# Patient Record
Sex: MALE | Race: WHITE | Employment: OTHER | URBAN - METROPOLITAN AREA
[De-identification: names, ages, dates, MRNs, and addresses within clinical notes are randomized per-mention and may not be internally consistent; named-entity substitution may affect disease eponyms.]

---

## 2017-01-04 ENCOUNTER — TELEPHONE (OUTPATIENT)
Dept: CARDIOLOGY | Facility: CLINIC | Age: 72
End: 2017-01-04

## 2017-01-04 NOTE — TELEPHONE ENCOUNTER
Pt's wife here for OV - told JENELLE that her , this patient, needed refill of Adcirca but didn't want to be seen.  Called pt per her request-  Pt has not been seen by Dr. Jimenez or Dr. Rouse since 2014.  Pt needs follow up OV in order to get refills.  Messaged Dr. Rouse's RN who stated that an appt was needed before refills will be approved.    Called pt and he will schedule OV 02/07/2017.  On schedule.  No refills until pt seen.

## 2017-01-26 DIAGNOSIS — I27.20 PULMONARY HYPERTENSION (H): Primary | ICD-10-CM

## 2017-02-01 RX ORDER — TADALAFIL 20 MG/1
TABLET ORAL
Qty: 90 TABLET | Refills: 3 | Status: ON HOLD
Start: 2017-02-01 | End: 2017-02-25

## 2017-02-04 NOTE — PROGRESS NOTES
Herson Rouse M.D.  Cardiovascular Medicine    I personally saw and examined this patient, discussed care with housestaff and other consultants, reviewed current laboratories and imaging studies, and conveyed impression and diagnostic/therapeutic plan to patient.        Problem List  1. Oxygen dependent respiratory failure    2. Histiocytosis X    3. ASHD with stenting    4. Hyperlipidemia    5. Diabetes  6. Steroid dependence    History  Mr. Sandoval presents to clinic today, accompanied by his wife.  Since his last visit in 2014, he has had an optical surgery for a bulging membrane in his left eye.  He is in need of cataract surgery. No ED visits or hospitalizations.  He reports today that he feels his breathing function is declining.  He wears supplemental O2 with extended activity, using 4 Liters.  At home doing ADL's around the house, he does not wear the O2.  He will wear the O2 also for naps and an oxygen converter at night.  With general ADL's, he does not feel short of breath unless he is bending over.  He will take the stairs down but the elevator up.  He feels he is declining most the times he attempts to walk without his O2.    He and his wife reports episodes of dizziness when he attempts to walk up ramps or change positions quickly without his O2.  No episodes of syncope or pre-syncope.  He denies any chest pain or palpitations.  He denies any changes in speech or sudden weakness, but did notice a change in long distance vision about 6 months ago, which has prompted referral for cataract surgery.    He denies any lower leg or belly swelling.  He sleeps with two pillows at night and occasionally wakes up coughing.  His wife denies any snoring.  He has not had a recent sleep study.  He wakes 2-3 times a night to use the restroom.    He has been losing weight, secondary to chronic diarrhea.  He has been working with Dr. Trivedi of [facility].  He and his wife feel this is also contributing to his  weakness and lack of endurance.    He has done pulmonary rehab in the past, he currently does not exercise.    Objective    Wt Readings from Last 5 Encounters:   05/27/16 63.504 kg (140 lb)   09/04/14 68.13 kg (150 lb 3.2 oz)   08/27/14 71.079 kg (156 lb 11.2 oz)   08/21/14 71.079 kg (156 lb 11.2 oz)   08/19/14 70.761 kg (156 lb)       Meds  Current Outpatient Prescriptions   Medication     loperamide (IMODIUM) 2 MG capsule     ADCIRCA 20 MG TABS     metoprolol (TOPROL-XL) 25 MG 24 hr tablet     digoxin (LANOXIN) 125 MCG tablet     rosuvastatin (CRESTOR) 10 MG tablet     levothyroxine (SYNTHROID) 100 MCG tablet     FLUoxetine HCl (PROZAC PO)     AMLODIPINE BESYLATE PO     LISINOPRIL PO     METFORMIN HCL PO     PREDNISONE PO     Colchicine (COLCRYS PO)     ASPIRIN PO     No current facility-administered medications for this visit.         Labs    Results for KYLE JACOBSON (MRN [id number]) as of 2/8/2017 09:48   Ref. Range 5/27/2016 08:22 2/7/2017 10:55   Sodium Latest Ref Range: 133-144 mmol/L  139   Potassium Latest Ref Range: 3.4-5.3 mmol/L  3.8   Chloride Latest Ref Range:  mmol/L  103   Carbon Dioxide Latest Ref Range: 20-32 mmol/L  26   Urea Nitrogen Latest Ref Range: 7-30 mg/dL  10   Creatinine Latest Ref Range: 0.66-1.25 mg/dL  0.62 (L)   GFR Estimate Latest Ref Range: >60 mL/min/1.7m2  >90...   GFR Estimate If Black Latest Ref Range: >60 mL/min/1.7m2  >90...   Calcium Latest Ref Range: 8.5-10.1 mg/dL  8.4 (L)   Anion Gap Latest Ref Range: 3-14 mmol/L  10   Albumin Latest Ref Range: 3.4-5.0 g/dL  3.5   Protein Total Latest Ref Range: 6.8-8.8 g/dL  6.9   Bilirubin Total Latest Ref Range: 0.2-1.3 mg/dL  0.4   Alkaline Phosphatase Latest Ref Range:  U/L  39 (L)   ALT Latest Ref Range: 0-70 U/L  15   AST Latest Ref Range: 0-45 U/L  15   Folate Latest Ref Range: >5.4 ng/mL  22.1   Iron Latest Ref Range:  ug/dL  30 (L)   Iron Binding Cap Latest Ref Range: 240-430 ug/dL  467 (H)   Iron Saturation  Index Latest Ref Range: 15-46 %  6 (L)   N-Terminal Pro Bnp Latest Ref Range: 0-125 pg/mL  553 (H)   Vitamin B12 Latest Ref Range: 193-986 pg/mL  288   Glucose Latest Ref Range: 70-99 mg/dL  92   WBC Latest Ref Range: 4.0-11.0 10e9/L  9.0   Hemoglobin Latest Ref Range: 13.3-17.7 g/dL  12.0 (L)   Hematocrit Latest Ref Range: 40.0-53.0 %  38.8 (L)   Platelet Count Latest Ref Range: 150-450 10e9/L  197   RBC Count Latest Ref Range: 4.4-5.9 10e12/L  4.88   MCV Latest Ref Range:  fl  80   MCH Latest Ref Range: 26.5-33.0 pg  24.6 (L)   MCHC Latest Ref Range: 31.5-36.5 g/dL  30.9 (L)   RDW Latest Ref Range: 10.0-15.0 %  18.3 (H)   SURGICAL PATHOLOGY EXAM Unknown Rpt      Imaging     The right ventricle is moderately dilated. Severely decreased right   ventricular systolic function Right ventricular systolic pressure is   elevated, consistent with severe pulmonary hypertension. There is mild to   moderate (1-2+) tricuspid regurgitation. Flattened septum is consistent with   RV pressure/volume overload. The visual ejection fraction is estimated at   50-55%. The transmitral spectral Doppler flow pattern is suggestive of   impaired LV relaxation. The right atrium is mild to moderately dilated.   Severe RV systolic dysfunction, severe pulm hypertension, RV  enlargement all   appear to be new findings.  PatientHeight: 67 in  PatientWeight: 162 lbs  SystolicPressure: 114 mmHg  DiastolicPressure: 80 mmHg  HeartRate: 89 bpm  BSA 1.9 m^2        Left Ventricle  The left ventricle is normal in size.  Left ventricular hypertrophy: asymmetric with no LVOT obstruction.  The visual ejection fraction is estimated at 50-55%.  The transmitral spectral Doppler flow pattern is suggestive of impaired LV   relaxation.  Flattened septum is consistent with RV pressure/volume overload.     Right Ventricle  The right ventricle is moderately dilated.  Severely decreased right ventricular systolic function.     Atria  Normal left atrial  size.  The right atrium is mild to moderately dilated.     Tricuspid Valve  Normal tricuspid valve.  There is mild to moderate (1-2+) tricuspid regurgitation.  Right ventricular systolic pressure is elevated, consistent with severe   pulmonary hypertension.     Aortic Valve  Normal tricuspid aortic valve.     Pulmonic Valve  Normal pulmonic valve.     Vessels  The aortic root is normal size.  The inferior vena cava is not dilated.     Pericardium  There is no pericardial effusion.     Rhythm  The rhythm was normal sinus.     Procedure  Complete Echo Adult.  Contrast Definity.     MMode 2D Measurements & Calculations  IVSd: 1.3 cm  LVIDd: 3.8 cm  LVIDs: 2.4 cm  LVPWd: 1.0 cm  FS: 36 %  LV mass(C)d: 144 grams  Ao root diam: 3.2 cm  LA dimension: 3.1 cm  LA/Ao: 0.97   Doppler Measurements & Calculations  MV E point: 42 cm/sec  MV A point: 115 cm/sec  MV E/A: 0.37   MV dec time: 0.22 sec  TR Max valery: 413 cm/sec  TR Max P mmHg    Assessment/Plan     1. Pulmonary HTN.  Repeat echocardiogram.  Will also repeat labs, check B12, folate and iron panel.  Mr. Sandoval was instructed to wear his oxygen at all times. Pulmonary rehab was recommended, as well as a daily exercise regimen was encouraged. Additionally, we recommended a sleep study or overnight oximetry.     2. Chronic Diarrhea  We recommended he stop the omeprazole, utilize Imodium, and modify his diet according to BRAT guidelines, eliminate dairy and reduce caffeine intake.    3. Return to clinic in 2 weeks.    4. Laboratories demonstrate profound iron deficiency and this would require parenteral replacement and GI evaluation per Dr. Patricia.

## 2017-02-07 ENCOUNTER — OFFICE VISIT (OUTPATIENT)
Dept: CARDIOLOGY | Facility: CLINIC | Age: 72
End: 2017-02-07
Payer: MEDICARE

## 2017-02-07 VITALS
HEART RATE: 64 BPM | SYSTOLIC BLOOD PRESSURE: 132 MMHG | HEIGHT: 67 IN | DIASTOLIC BLOOD PRESSURE: 76 MMHG | WEIGHT: 135.3 LBS | BODY MASS INDEX: 21.24 KG/M2 | OXYGEN SATURATION: 84 %

## 2017-02-07 DIAGNOSIS — I27.20 PULMONARY HYPERTENSION (H): Primary | ICD-10-CM

## 2017-02-07 DIAGNOSIS — R06.09 DYSPNEA ON EXERTION: ICD-10-CM

## 2017-02-07 DIAGNOSIS — I27.20 PULMONARY HYPERTENSION (H): ICD-10-CM

## 2017-02-07 DIAGNOSIS — D64.9 ANEMIA: ICD-10-CM

## 2017-02-07 LAB
ALBUMIN SERPL-MCNC: 3.5 G/DL (ref 3.4–5)
ALP SERPL-CCNC: 39 U/L (ref 40–150)
ALT SERPL W P-5'-P-CCNC: 15 U/L (ref 0–70)
ANION GAP SERPL CALCULATED.3IONS-SCNC: 10 MMOL/L (ref 3–14)
AST SERPL W P-5'-P-CCNC: 15 U/L (ref 0–45)
BILIRUB SERPL-MCNC: 0.4 MG/DL (ref 0.2–1.3)
BUN SERPL-MCNC: 10 MG/DL (ref 7–30)
CALCIUM SERPL-MCNC: 8.4 MG/DL (ref 8.5–10.1)
CHLORIDE SERPL-SCNC: 103 MMOL/L (ref 94–109)
CO2 SERPL-SCNC: 26 MMOL/L (ref 20–32)
CREAT SERPL-MCNC: 0.62 MG/DL (ref 0.66–1.25)
ERYTHROCYTE [DISTWIDTH] IN BLOOD BY AUTOMATED COUNT: 18.3 % (ref 10–15)
FOLATE SERPL-MCNC: 22.1 NG/ML
GFR SERPL CREATININE-BSD FRML MDRD: ABNORMAL ML/MIN/1.7M2
GLUCOSE SERPL-MCNC: 92 MG/DL (ref 70–99)
HCT VFR BLD AUTO: 38.8 % (ref 40–53)
HGB BLD-MCNC: 12 G/DL (ref 13.3–17.7)
IRON SATN MFR SERPL: 6 % (ref 15–46)
IRON SERPL-MCNC: 30 UG/DL (ref 35–180)
MCH RBC QN AUTO: 24.6 PG (ref 26.5–33)
MCHC RBC AUTO-ENTMCNC: 30.9 G/DL (ref 31.5–36.5)
MCV RBC AUTO: 80 FL (ref 78–100)
NT-PROBNP SERPL-MCNC: 553 PG/ML (ref 0–125)
PLATELET # BLD AUTO: 197 10E9/L (ref 150–450)
POTASSIUM SERPL-SCNC: 3.8 MMOL/L (ref 3.4–5.3)
PROT SERPL-MCNC: 6.9 G/DL (ref 6.8–8.8)
RBC # BLD AUTO: 4.88 10E12/L (ref 4.4–5.9)
SODIUM SERPL-SCNC: 139 MMOL/L (ref 133–144)
TIBC SERPL-MCNC: 467 UG/DL (ref 240–430)
VIT B12 SERPL-MCNC: 288 PG/ML (ref 193–986)
WBC # BLD AUTO: 9 10E9/L (ref 4–11)

## 2017-02-07 PROCEDURE — 83550 IRON BINDING TEST: CPT | Performed by: INTERNAL MEDICINE

## 2017-02-07 PROCEDURE — 83880 ASSAY OF NATRIURETIC PEPTIDE: CPT | Performed by: INTERNAL MEDICINE

## 2017-02-07 PROCEDURE — 83540 ASSAY OF IRON: CPT | Performed by: INTERNAL MEDICINE

## 2017-02-07 PROCEDURE — 80053 COMPREHEN METABOLIC PANEL: CPT | Performed by: INTERNAL MEDICINE

## 2017-02-07 PROCEDURE — 85027 COMPLETE CBC AUTOMATED: CPT | Performed by: INTERNAL MEDICINE

## 2017-02-07 PROCEDURE — 36415 COLL VENOUS BLD VENIPUNCTURE: CPT | Performed by: INTERNAL MEDICINE

## 2017-02-07 PROCEDURE — 99213 OFFICE O/P EST LOW 20 MIN: CPT | Performed by: INTERNAL MEDICINE

## 2017-02-07 PROCEDURE — 82607 VITAMIN B-12: CPT | Performed by: INTERNAL MEDICINE

## 2017-02-07 PROCEDURE — 82746 ASSAY OF FOLIC ACID SERUM: CPT | Performed by: INTERNAL MEDICINE

## 2017-02-07 RX ORDER — LOPERAMIDE HCL 2 MG
2 CAPSULE ORAL PRN
COMMUNITY
End: 2017-02-22

## 2017-02-07 NOTE — PATIENT INSTRUCTIONS
Medication Changes:  STOP your Omeproazole  Patient Instructions:  **Labs today (CMP, N-Terminal Pro BNP, CBC, B-12, Folate, and Iron Studies)  Pulmonary Rehab  Follow up Appointment Information:  2 weeks with an Echocardiogram prior    For scheduling at [facility] please call [phone]  For scheduling at the [facility] please call [phone]  We are located on the second floor [address] at the [facility].  Our address is     [address] [place] [address] [address],   [address]  [place]  [address]    Thank you for allowing us to be a part of your care here at the [facility]    If you have questions or concerns please contact us at:    Willow Chavez, RALPH      Nurse Coordinator       Pulmonary Hypertension     [facility]   (P)[phone]  (F)[phone]    ** Please note that you will NOT receive a reminder call regarding your scheduled testing, reminder calls are for provider appointments only.  If you are scheduled for testing within the [facility] system you may receive a call regarding pre-registration for billing purposes only.**     Remember to weigh yourself daily after voiding and before you consume any food or beverages and log the numbers.  If you have gained/lost 2 pounds overnight or 5 pounds in a week contact us immediately for medication adjustments or further instructions.

## 2017-02-07 NOTE — Clinical Note
2/7/2017    Florencio Patricia MD  [facility] [id number]  [address]  [place] [address]    RE: Ravi Sandoval       Dear Colleague,    I had the pleasure of seeing Ravi Sandoval in the [facility].    Herson Rouse M.D.  Cardiovascular Medicine    I personally saw and examined this patient, discussed care with housestaff and other consultants, reviewed current laboratories and imaging studies, and conveyed impression and diagnostic/therapeutic plan to patient.    Problem List  1. Oxygen dependent respiratory failure    2. Histiocytosis X    3. ASHD with stenting    4. Hyperlipidemia    5. Diabetes  6. Steroid dependence    History  Mr. Sandoval presents to clinic today, accompanied by his wife.  Since his last visit in 2014, he has had an optical surgery for a bulging membrane in his left eye.  He is in need of cataract surgery. No ED visits or hospitalizations.  He reports today that he feels his breathing function is declining.  He wears supplemental O2 with extended activity, using 4 Liters.  At home doing ADL's around the house, he does not wear the O2.  He will wear the O2 also for naps and an oxygen converter at night.  With general ADL's, he does not feel short of breath unless he is bending over.  He will take the stairs down but the elevator up.  He feels he is declining most the times he attempts to walk without his O2.    He and his wife reports episodes of dizziness when he attempts to walk up ramps or change positions quickly without his O2.  No episodes of syncope or pre-syncope.  He denies any chest pain or palpitations.  He denies any changes in speech or sudden weakness, but did notice a change in long distance vision about 6 months ago, which has prompted referral for cataract surgery.    He denies any lower leg or belly swelling.  He sleeps with two pillows at night and occasionally wakes up coughing.  His wife denies any snoring.  He has not had a recent  sleep study.  He wakes 2-3 times a night to use the restroom.    He has been losing weight, secondary to chronic diarrhea.  He has been working with Dr. Trivedi of [facility].  He and his wife feel this is also contributing to his weakness and lack of endurance.    He has done pulmonary rehab in the past, he currently does not exercise.    Objective    Wt Readings from Last 5 Encounters:   05/27/16 63.504 kg (140 lb)   09/04/14 68.13 kg (150 lb 3.2 oz)   08/27/14 71.079 kg (156 lb 11.2 oz)   08/21/14 71.079 kg (156 lb 11.2 oz)   08/19/14 70.761 kg (156 lb)       Meds  Current Outpatient Prescriptions   Medication     loperamide (IMODIUM) 2 MG capsule     ADCIRCA 20 MG TABS     metoprolol (TOPROL-XL) 25 MG 24 hr tablet     digoxin (LANOXIN) 125 MCG tablet     rosuvastatin (CRESTOR) 10 MG tablet     levothyroxine (SYNTHROID) 100 MCG tablet     FLUoxetine HCl (PROZAC PO)     AMLODIPINE BESYLATE PO     LISINOPRIL PO     METFORMIN HCL PO     PREDNISONE PO     Colchicine (COLCRYS PO)     ASPIRIN PO     No current facility-administered medications for this visit.     Labs    Results for KYLE JACOBSON (MRN [id number]) as of 2/8/2017 09:48   Ref. Range 5/27/2016 08:22 2/7/2017 10:55   Sodium Latest Ref Range: 133-144 mmol/L  139   Potassium Latest Ref Range: 3.4-5.3 mmol/L  3.8   Chloride Latest Ref Range:  mmol/L  103   Carbon Dioxide Latest Ref Range: 20-32 mmol/L  26   Urea Nitrogen Latest Ref Range: 7-30 mg/dL  10   Creatinine Latest Ref Range: 0.66-1.25 mg/dL  0.62 (L)   GFR Estimate Latest Ref Range: >60 mL/min/1.7m2  >90...   GFR Estimate If Black Latest Ref Range: >60 mL/min/1.7m2  >90...   Calcium Latest Ref Range: 8.5-10.1 mg/dL  8.4 (L)   Anion Gap Latest Ref Range: 3-14 mmol/L  10   Albumin Latest Ref Range: 3.4-5.0 g/dL  3.5   Protein Total Latest Ref Range: 6.8-8.8 g/dL  6.9   Bilirubin Total Latest Ref Range: 0.2-1.3 mg/dL  0.4   Alkaline Phosphatase Latest Ref Range:  U/L  39 (L)   ALT  Latest Ref Range: 0-70 U/L  15   AST Latest Ref Range: 0-45 U/L  15   Folate Latest Ref Range: >5.4 ng/mL  22.1   Iron Latest Ref Range:  ug/dL  30 (L)   Iron Binding Cap Latest Ref Range: 240-430 ug/dL  467 (H)   Iron Saturation Index Latest Ref Range: 15-46 %  6 (L)   N-Terminal Pro Bnp Latest Ref Range: 0-125 pg/mL  553 (H)   Vitamin B12 Latest Ref Range: 193-986 pg/mL  288   Glucose Latest Ref Range: 70-99 mg/dL  92   WBC Latest Ref Range: 4.0-11.0 10e9/L  9.0   Hemoglobin Latest Ref Range: 13.3-17.7 g/dL  12.0 (L)   Hematocrit Latest Ref Range: 40.0-53.0 %  38.8 (L)   Platelet Count Latest Ref Range: 150-450 10e9/L  197   RBC Count Latest Ref Range: 4.4-5.9 10e12/L  4.88   MCV Latest Ref Range:  fl  80   MCH Latest Ref Range: 26.5-33.0 pg  24.6 (L)   MCHC Latest Ref Range: 31.5-36.5 g/dL  30.9 (L)   RDW Latest Ref Range: 10.0-15.0 %  18.3 (H)   SURGICAL PATHOLOGY EXAM Unknown Rpt      Imaging     The right ventricle is moderately dilated. Severely decreased right   ventricular systolic function Right ventricular systolic pressure is   elevated, consistent with severe pulmonary hypertension. There is mild to   moderate (1-2+) tricuspid regurgitation. Flattened septum is consistent with   RV pressure/volume overload. The visual ejection fraction is estimated at   50-55%. The transmitral spectral Doppler flow pattern is suggestive of   impaired LV relaxation. The right atrium is mild to moderately dilated.   Severe RV systolic dysfunction, severe pulm hypertension, RV  enlargement all   appear to be new findings.  PatientHeight: 67 in  PatientWeight: 162 lbs  SystolicPressure: 114 mmHg  DiastolicPressure: 80 mmHg  HeartRate: 89 bpm  BSA 1.9 m^2        Left Ventricle  The left ventricle is normal in size.  Left ventricular hypertrophy: asymmetric with no LVOT obstruction.  The visual ejection fraction is estimated at 50-55%.  The transmitral spectral Doppler flow pattern is suggestive of impaired LV    relaxation.  Flattened septum is consistent with RV pressure/volume overload.     Right Ventricle  The right ventricle is moderately dilated.  Severely decreased right ventricular systolic function.     Atria  Normal left atrial size.  The right atrium is mild to moderately dilated.     Tricuspid Valve  Normal tricuspid valve.  There is mild to moderate (1-2+) tricuspid regurgitation.  Right ventricular systolic pressure is elevated, consistent with severe   pulmonary hypertension.     Aortic Valve  Normal tricuspid aortic valve.     Pulmonic Valve  Normal pulmonic valve.     Vessels  The aortic root is normal size.  The inferior vena cava is not dilated.     Pericardium  There is no pericardial effusion.     Rhythm  The rhythm was normal sinus.     Procedure  Complete Echo Adult.  Contrast Definity.     MMode 2D Measurements & Calculations  IVSd: 1.3 cm  LVIDd: 3.8 cm  LVIDs: 2.4 cm  LVPWd: 1.0 cm  FS: 36 %  LV mass(C)d: 144 grams  Ao root diam: 3.2 cm  LA dimension: 3.1 cm  LA/Ao: 0.97   Doppler Measurements & Calculations  MV E point: 42 cm/sec  MV A point: 115 cm/sec  MV E/A: 0.37   MV dec time: 0.22 sec  TR Max valery: 413 cm/sec  TR Max P mmHg    Assessment/Plan     1. Pulmonary HTN.  Repeat echocardiogram.  Will also repeat labs, check B12, folate and iron panel.  Mr. Sandoval was instructed to wear his oxygen at all times. Pulmonary rehab was recommended, as well as a daily exercise regimen was encouraged. Additionally, we recommended a sleep study or overnight oximetry.     2. Chronic Diarrhea  We recommended he stop the omeprazole, utilize Imodium, and modify his diet according to BRAT guidelines, eliminate dairy and reduce caffeine intake.    3. Return to clinic in 2 weeks.    4. Laboratories demonstrate profound iron deficiency and this would require parenteral replacement and GI evaluation per Dr. Patricia.      Thank you for allowing me to participate in the care of your patient.    Sincerely,     Herson  Sumeet Rouse MD     [facility]

## 2017-02-07 NOTE — NURSING NOTE
Med Reconcile: Reviewed and verified all current medications with the patient. The updated medication list was printed and given to the patient.  Return Appointment: Patient given instructions regarding scheduling next clinic visit. Patient demonstrated understanding of this information and agreed to call with further questions or concerns.  Medication Change: Patient was educated regarding prescribed medication change, including discussion of the indication, administration, side effects, and when to report to MD or RN. Patient demonstrated understanding of this information and agreed to call with further questions or concerns.  Patient stated he understood all health information given and agreed to call with further questions or concerns.     Medication Changes:   STOP your Omeproazole   Patient Instructions:   **Labs today (CMP, N-Terminal Pro BNP, CBC, B-12, Folate, and Iron Studies)   Pulmonary Rehab   Follow up Appointment Information:   2 weeks with an Echocardiogram prior

## 2017-02-07 NOTE — MR AVS SNAPSHOT
After Visit Summary   2/7/2017    Ravi Sandoval    MRN: [id number]           Patient Information     Date Of Birth          1945        Visit Information        Provider Department      2/7/2017 9:00 AM Herson Rouse MD [facility] AT [place]        Today's Diagnoses     Pulmonary hypertension (H)    -  1     Dyspnea on exertion         Anemia           Care Instructions    Medication Changes:  STOP your Omeproazole  Patient Instructions:  **Labs today (CMP, N-Terminal Pro BNP, CBC, B-12, Folate, and Iron Studies)  Pulmonary Rehab  Follow up Appointment Information:  2 weeks with an Echocardiogram prior    For scheduling at [facility] please call [phone]  For scheduling at the [place] please call [phone]  We are located on the second floor [address] at the [facility].  Our address is     [address]   [address]  [place]  [address]    Thank you for allowing us to be a part of your care here at the [facility]    If you have questions or concerns please contact us at:    Willow Chavez RN      Nurse Coordinator       Pulmonary Hypertension     [facility]   [phone]  (F)[phone]    ** Please note that you will NOT receive a reminder call regarding your scheduled testing, reminder calls are for provider appointments only.  If you are scheduled for testing within the [place] system you may receive a call regarding pre-registration for billing purposes only.**     Remember to weigh yourself daily after voiding and before you consume any food or beverages and log the numbers.  If you have gained/lost 2 pounds overnight or 5 pounds in a week contact us immediately for medication adjustments or further instructions.        Follow-ups after your visit        Additional Services     PULMONARY REHAB REFERRAL           Follow-Up with Pulmonary Hypertension Clinic                "  Future tests that were ordered for you today     Open Future Orders        Priority Expected Expires Ordered    Echocardiogram Complete Routine 2/21/2017 2/7/2018 2/7/2017    PULMONARY REHAB REFERRAL Routine 2/14/2017 2/7/2018 2/7/2017    Follow-Up with Pulmonary Hypertension Clinic Routine 2/21/2017 2/7/2018 2/7/2017    Comprehensive metabolic panel Routine 2/7/2017 2/23/2017 2/7/2017    N terminal pro BNP outpatient Routine 2/7/2017 2/23/2017 2/7/2017    CBC with platelets Routine 2/7/2017 2/23/2017 2/7/2017    Folate Routine 2/7/2017 2/23/2017 2/7/2017    Vitamin B12 Routine 2/7/2017 2/23/2017 2/7/2017    Iron and iron binding capacity Routine 2/7/2017 2/23/2017 2/7/2017            Who to contact     If you have questions or need follow up information about today's clinic visit or your schedule please contact [facility] [place] directly at [phone].  Normal or non-critical lab and imaging results will be communicated to you by Shakehart, letter or phone within 4 business days after the clinic has received the results. If you do not hear from us within 7 days, please contact the clinic through [facility] or phone. If you have a critical or abnormal lab result, we will notify you by phone as soon as possible.  Submit refill requests through Invoiceable or call your pharmacy and they will forward the refill request to us. Please allow 3 business days for your refill to be completed.          Additional Information About Your Visit        Shakehart Information     Invoiceable lets you send messages to your doctor, view your test results, renew your prescriptions, schedule appointments and more. To sign up, go to [email or web address] . Click on \"Log in\" on the left side of the screen, which will take you to the Welcome page. Then click on \"Sign up Now\" on the right side of the page.     You will be asked to enter the access code listed below, as well as some personal information. Please " "follow the directions to create your username and password.     Your access code is: [email or web address]  Expires: 2017 10:43 AM     Your access code will  in 90 days. If you need help or a new code, please call your [place] clinic or [phone].        Care EveryWhere ID     This is your Care EveryWhere ID. This could be used by other organizations to access your [place] medical records  [id number]        Your Vitals Were     Pulse Height BMI (Body Mass Index) Pulse Oximetry          64 1.702 m (5' 7\") 21.19 kg/m2 84%         Blood Pressure from Last 3 Encounters:   17 132/76   16 154/85   14 142/86    Weight from Last 3 Encounters:   17 61.372 kg (135 lb 4.8 oz)   16 63.504 kg (140 lb)   14 68.13 kg (150 lb 3.2 oz)                 Today's Medication Changes          These changes are accurate as of: 17 10:45 AM.  If you have any questions, ask your nurse or doctor.               Stop taking these medicines if you haven't already. Please contact your care team if you have questions.     PRILOSEC PO   Stopped by:  Herson Rouse MD                    Primary Care Provider Office Phone # Fax #    Florencio Carmelo Patricia [phone]       [facility] [address] [address]  [facility] [address]        Thank you!     Thank you for choosing [facility] PHYSIC[facility] HE[facility] AT [place]  for your care. Our goal is always to provide you with excellent care. Hearing back from our patients is one way we can continue to improve our services. Please take a few minutes to complete the written survey that you may receive in the mail after your visit with us. Thank you!             Your Updated Medication List - Protect others around you: Learn how to safely use, store and throw away your medicines at www.disposemymeds.org.          This list is accurate as of: 17 10:45 AM.  Always use your most recent med list.                   Brand Name " Dispense Instructions for use    ADCIRCA 20 MG Tabs   Generic drug:  tadalafil (PAH)     90 tablet    TAKE ONE TABLET BY MOUTH EVERY DAY       AMLODIPINE BESYLATE PO      Take 2.5 mg by mouth daily       ASPIRIN PO      Take 81 mg by mouth At Bedtime       COLCRYS PO      Take 0.6 mg by mouth daily TAKES IN THE EVENING       digoxin 125 MCG tablet    LANOXIN    90 tablet    Take 1 tablet (125 mcg) by mouth daily       levothyroxine 100 MCG tablet    SYNTHROID    30 tablet    Take 1 tablet (100 mcg) by mouth daily       LISINOPRIL PO      Take 20 mg by mouth daily       loperamide 2 MG capsule    IMODIUM     Take 2 mg by mouth as needed for diarrhea       METFORMIN HCL PO      Take 1,000 mg by mouth 2 times daily (with meals)       metoprolol 25 MG 24 hr tablet    TOPROL-XL    180 tablet    Take 1 tablet (25 mg) by mouth 2 times daily       PREDNISONE PO      Take 10 mg by mouth daily       PROZAC PO      Take 40 mg by mouth daily       rosuvastatin 10 MG tablet    CRESTOR    90 tablet    Take 1 tablet (10 mg) by mouth daily

## 2017-02-15 ENCOUNTER — HOSPITAL ENCOUNTER (OUTPATIENT)
Dept: CARDIOLOGY | Facility: CLINIC | Age: 72
Discharge: HOME | End: 2017-02-15
Attending: INTERNAL MEDICINE | Admitting: INTERNAL MEDICINE
Payer: MEDICARE

## 2017-02-15 DIAGNOSIS — I27.20 PULMONARY HYPERTENSION (H): ICD-10-CM

## 2017-02-15 PROCEDURE — 93306 TTE W/DOPPLER COMPLETE: CPT

## 2017-02-15 PROCEDURE — 93306 TTE W/DOPPLER COMPLETE: CPT | Mod: 26 | Performed by: INTERNAL MEDICINE

## 2017-02-17 ENCOUNTER — TRANSFERRED RECORDS (OUTPATIENT)
Dept: HEALTH INFORMATION MANAGEMENT | Facility: CLINIC | Age: 72
End: 2017-02-17

## 2017-02-20 ENCOUNTER — TRANSFERRED RECORDS (OUTPATIENT)
Dept: HEALTH INFORMATION MANAGEMENT | Facility: CLINIC | Age: 72
End: 2017-02-20

## 2017-02-21 ENCOUNTER — OFFICE VISIT (OUTPATIENT)
Dept: CARDIOLOGY | Facility: CLINIC | Age: 72
End: 2017-02-21
Attending: INTERNAL MEDICINE
Payer: MEDICARE

## 2017-02-21 VITALS
WEIGHT: 136.8 LBS | SYSTOLIC BLOOD PRESSURE: 164 MMHG | HEIGHT: 67 IN | HEART RATE: 66 BPM | BODY MASS INDEX: 21.47 KG/M2 | DIASTOLIC BLOOD PRESSURE: 86 MMHG | OXYGEN SATURATION: 94 %

## 2017-02-21 DIAGNOSIS — R06.09 DYSPNEA ON EXERTION: ICD-10-CM

## 2017-02-21 DIAGNOSIS — I27.20 PULMONARY HYPERTENSION (H): Primary | ICD-10-CM

## 2017-02-21 PROBLEM — D64.9 ANEMIA: Status: ACTIVE | Noted: 2017-02-21

## 2017-02-21 PROCEDURE — 99212 OFFICE O/P EST SF 10 MIN: CPT | Performed by: INTERNAL MEDICINE

## 2017-02-21 NOTE — MR AVS SNAPSHOT
After Visit Summary   2/21/2017    Ravi Sandoval    MRN: [id number]           Patient Information     Date Of Birth          1945        Visit Information        Provider Department      2/21/2017 1:00 PM Herson Rouse MD [facility]YSIC[facility] AT [place]        Today's Diagnoses     Anemia    -  1    Pulmonary hypertension (H)        Dyspnea on exertion          Care Instructions    Medication Changes:  Increase Adcirca (tadalafil) to 40 mg Daily    Patient Instructions:  3 IV Iron Infusions    Pulmonary Rehab after Iron Infusins    Follow up Appointment Information:  April follow up with Labs prior (CBC, CMP, BNP, Iron with Iron binding capacity)    For scheduling at [facility] please call [phone]  For scheduling at the [place] please call [phone]  We are located on the second floor [address] at the [facility].  Our address is     [address] [place] [address],   [address]  [place]  [address]    Thank you for allowing us to be a part of your care here at the [facility]    If you have questions or concerns please contact us at:    Willow Chavez RN      Nurse Coordinator       Pulmonary Hypertension     [facility]   [phone])[phone]  (F)[phone]    ** Please note that you will NOT receive a reminder call regarding your scheduled testing, reminder calls are for provider appointments only.  If you are scheduled for testing within the [place] system you may receive a call regarding pre-registration for billing purposes only.**     Remember to weigh yourself daily after voiding and before you consume any food or beverages and log the numbers.  If you have gained/lost 2 pounds overnight or 5 pounds in a week contact us immediately for medication adjustments or further instructions.            Follow-ups after your visit        Additional Services     PULMONARY REHAB REFERRAL                 Future tests  "that were ordered for you today     Open Future Orders        Priority Expected Expires Ordered    Comprehensive metabolic panel Routine 2017    N terminal pro BNP outpatient Routine 2017    CBC with platelets Routine 2017    Iron and iron binding capacity Routine 2017    PULMONARY REHAB REFERRAL Routine  2018            Who to contact     If you have questions or need follow up information about today's clinic visit or your schedule please contact [facility]YSIC[facility] HE[facility] AT [place] directly at [phone].  Normal or non-critical lab and imaging results will be communicated to you by LiveHivehart, letter or phone within 4 business days after the clinic has received the results. If you do not hear from us within 7 days, please contact the clinic through LiveHivehart or phone. If you have a critical or abnormal lab result, we will notify you by phone as soon as possible.  Submit refill requests through Clever Sense or call your pharmacy and they will forward the refill request to us. Please allow 3 business days for your refill to be completed.          Additional Information About Your Visit        LiveHivehart Information     Clever Sense lets you send messages to your doctor, view your test results, renew your prescriptions, schedule appointments and more. To sign up, go to [email or web address] . Click on \"Log in\" on the left side of the screen, which will take you to the Welcome page. Then click on \"Sign up Now\" on the right side of the page.     You will be asked to enter the access code listed below, as well as some personal information. Please follow the directions to create your username and password.     Your access code is: [email or web address]  Expires: 2017 10:43 AM     Your access code will  in 90 days. If you need help or a new code, please call your [place] clinic or [phone].      " "  Care EveryWhere ID     This is your Care EveryWhere ID. This could be used by other organizations to access your [place] medical records  [id number]        Your Vitals Were     Pulse Height Pulse Oximetry BMI (Body Mass Index)          66 1.702 m (5' 7\") 94% 21.43 kg/m2         Blood Pressure from Last 3 Encounters:   02/21/17 164/86   02/07/17 132/76   05/27/16 154/85    Weight from Last 3 Encounters:   02/21/17 62.1 kg (136 lb 12.8 oz)   02/07/17 61.4 kg (135 lb 4.8 oz)   05/27/16 63.5 kg (140 lb)              We Performed the Following     Follow-Up with Pulmonary Hypertension Clinic        Primary Care Provider Office Phone # Fax #    Florencio Patricia [phone]       [facility] MANAGEMENT [address] [address]  [facility] [address]        Thank you!     Thank you for choosing [facility]YS[facility] AT [place]  for your care. Our goal is always to provide you with excellent care. Hearing back from our patients is one way we can continue to improve our services. Please take a few minutes to complete the written survey that you may receive in the mail after your visit with us. Thank you!             Your Updated Medication List - Protect others around you: Learn how to safely use, store and throw away your medicines at www.disposemymeds.org.          This list is accurate as of: 2/21/17  2:29 PM.  Always use your most recent med list.                   Brand Name Dispense Instructions for use    ADCIRCA 20 MG Tabs   Generic drug:  tadalafil (PAH)     90 tablet    TAKE ONE TABLET BY MOUTH EVERY DAY       AMLODIPINE BESYLATE PO      Take 2.5 mg by mouth daily       ASPIRIN PO      Take 81 mg by mouth At Bedtime       COLCRYS PO      Take 0.6 mg by mouth daily TAKES IN THE EVENING       digoxin 125 MCG tablet    LANOXIN    90 tablet    Take 1 tablet (125 mcg) by mouth daily       levothyroxine 100 MCG tablet    SYNTHROID    30 tablet    Take 1 tablet (100 mcg) by mouth " daily       LISINOPRIL PO      Take 20 mg by mouth daily       loperamide 2 MG capsule    IMODIUM     Take 2 mg by mouth as needed for diarrhea       METFORMIN HCL PO      Take 1,000 mg by mouth 2 times daily (with meals)       metoprolol 25 MG 24 hr tablet    TOPROL-XL    180 tablet    Take 1 tablet (25 mg) by mouth 2 times daily       PREDNISONE PO      Take 10 mg by mouth daily       PROZAC PO      Take 40 mg by mouth daily       rosuvastatin 10 MG tablet    CRESTOR    90 tablet    Take 1 tablet (10 mg) by mouth daily

## 2017-02-21 NOTE — PROGRESS NOTES
"Herson Rouse M.D.  Cardiovascular Medicine    I personally saw and examined this patient, discussed care with housestaff and other consultants, reviewed current laboratories and imaging studies, and conveyed impression and diagnostic/therapeutic plan to patient.    Problem List    History      Objective  /86 (BP Location: Left arm, Cuff Size: Adult Small)  Pulse 66  Ht 1.702 m (5' 7\")  Wt 62.1 kg (136 lb 12.8 oz)  SpO2 94%  BMI 21.43 kg/m2    Wt Readings from Last 5 Encounters:   02/21/17 62.1 kg (136 lb 12.8 oz)   02/07/17 61.4 kg (135 lb 4.8 oz)   05/27/16 63.5 kg (140 lb)   09/04/14 68.1 kg (150 lb 3.2 oz)   08/27/14 71.1 kg (156 lb 11.2 oz)       Meds    Current Outpatient Prescriptions   Medication     loperamide (IMODIUM) 2 MG capsule     ADCIRCA 20 MG TABS     metoprolol (TOPROL-XL) 25 MG 24 hr tablet     digoxin (LANOXIN) 125 MCG tablet     rosuvastatin (CRESTOR) 10 MG tablet     levothyroxine (SYNTHROID) 100 MCG tablet     FLUoxetine HCl (PROZAC PO)     AMLODIPINE BESYLATE PO     LISINOPRIL PO     METFORMIN HCL PO     PREDNISONE PO     Colchicine (COLCRYS PO)     ASPIRIN PO     No current facility-administered medications for this visit.          Labs  Results for KYLE JACOBSON (MRN [id number]) as of 2/21/2017 13:56   Ref. Range 9/4/2014 10:48 5/27/2016 07:38 5/27/2016 08:22 2/7/2017 10:55 2/15/2017 10:04   Sodium Latest Ref Range: 133 - 144 mmol/L 142   139    Potassium Latest Ref Range: 3.4 - 5.3 mmol/L 3.9   3.8    Chloride Latest Ref Range: 94 - 109 mmol/L 106   103    Carbon Dioxide Latest Ref Range: 20 - 32 mmol/L 30   26    Urea Nitrogen Latest Ref Range: 7 - 30 mg/dL 11   10    Creatinine Latest Ref Range: 0.66 - 1.25 mg/dL 0.73   0.62 (L)    GFR Estimate Latest Ref Range: >60 mL/min/1.7m2 >90...   >90...    GFR Estimate If Black Latest Ref Range: >60 mL/min/1.7m2 >90...   >90...    Calcium Latest Ref Range: 8.5 - 10.1 mg/dL 8.7   8.4 (L)    Anion Gap Latest Ref Range: 3 - 14 mmol/L 6   10  "   Magnesium Latest Ref Range: 1.6 - 2.3 mg/dL 1.3 (L)       Albumin Latest Ref Range: 3.4 - 5.0 g/dL 3.5   3.5    Protein Total Latest Ref Range: 6.8 - 8.8 g/dL 6.9   6.9    Bilirubin Total Latest Ref Range: 0.2 - 1.3 mg/dL 0.4   0.4    Alkaline Phosphatase Latest Ref Range: 40 - 150 U/L 49   39 (L)    ALT Latest Ref Range: 0 - 70 U/L 13   15    AST Latest Ref Range: 0 - 45 U/L 10   15    Folate Latest Ref Range: >5.4 ng/mL    22.1    Iron Latest Ref Range: 35 - 180 ug/dL    30 (L)    Iron Binding Cap Latest Ref Range: 240 - 430 ug/dL    467 (H)    Iron Saturation Index Latest Ref Range: 15 - 46 %    6 (L)    N-Terminal Pro Bnp Latest Ref Range: 0 - 125 pg/mL    553 (H)    T4 Free Latest Ref Range: 0.76 - 1.46 ng/dL 1.84 (H)       TSH Latest Ref Range: 0.40 - 4.00 mU/L 0.16 (L)       Vitamin B12 Latest Ref Range: 193 - 986 pg/mL    288    Glucose Latest Ref Range: 70 - 99 mg/dL 115 (H)   92    WBC Latest Ref Range: 4.0 - 11.0 10e9/L    9.0    Hemoglobin Latest Ref Range: 13.3 - 17.7 g/dL    12.0 (L)    Hematocrit Latest Ref Range: 40.0 - 53.0 %    38.8 (L)    Platelet Count Latest Ref Range: 150 - 450 10e9/L    197    RBC Count Latest Ref Range: 4.4 - 5.9 10e12/L    4.88    MCV Latest Ref Range: 78 - 100 fl    80    MCH Latest Ref Range: 26.5 - 33.0 pg    24.6 (L)    MCHC Latest Ref Range: 31.5 - 36.5 g/dL    30.9 (L)    RDW Latest Ref Range: 10.0 - 15.0 %    18.3 (H)    SURGICAL PATHOLOGY EXAM Unknown   Rpt     ECHO COMPLETE Unknown     Rpt   COLONOSCOPY Unknown  Rpt        Imaging     Contrast was used without apparent complications.  The right ventricle is moderately dilated. Severely decreased right   ventricular systolic function Right ventricular systolic pressure is   elevated, consistent with severe pulmonary hypertension. There is mild to   moderate (1-2+) tricuspid regurgitation. Flattened septum is consistent with   RV pressure/volume overload. The visual ejection fraction is estimated at   50-55%. The  transmitral spectral Doppler flow pattern is suggestive of   impaired LV relaxation. The right atrium is mild to moderately dilated.   Severe RV systolic dysfunction, severe pulm hypertension, RV  enlargement all   appear to be new findings.  PatientHeight: 67 in  PatientWeight: 162 lbs  SystolicPressure: 114 mmHg  DiastolicPressure: 80 mmHg  HeartRate: 89 bpm  BSA 1.9 m^2        Left Ventricle  The left ventricle is normal in size.  Left ventricular hypertrophy: asymmetric with no LVOT obstruction.  The visual ejection fraction is estimated at 50-55%.  The transmitral spectral Doppler flow pattern is suggestive of impaired LV   relaxation.  Flattened septum is consistent with RV pressure/volume overload.     Right Ventricle  The right ventricle is moderately dilated.  Severely decreased right ventricular systolic function.     Atria  Normal left atrial size.  The right atrium is mild to moderately dilated.     Tricuspid Valve  Normal tricuspid valve.  There is mild to moderate (1-2+) tricuspid regurgitation.  Right ventricular systolic pressure is elevated, consistent with severe   pulmonary hypertension.     Aortic Valve  Normal tricuspid aortic valve.     Pulmonic Valve  Normal pulmonic valve.     Vessels  The aortic root is normal size.  The inferior vena cava is not dilated.     Pericardium  There is no pericardial effusion.     Rhythm  The rhythm was normal sinus.     Procedure  Complete Echo Adult.  Contrast Definity.     MMode 2D Measurements & Calculations  IVSd: 1.3 cm  LVIDd: 3.8 cm  LVIDs: 2.4 cm  LVPWd: 1.0 cm  FS: 36 %  LV mass(C)d: 144 grams  Ao root diam: 3.2 cm  LA dimension: 3.1 cm  LA/Ao: 0.97   Doppler Measurements & Calculations  MV E point: 42 cm/sec  MV A point: 115 cm/sec  MV E/A: 0.37   MV dec time: 0.22 sec  TR Max valery: 413 cm/sec  TR Max P mmHg       PROCEDURES PERFORMED:   1. Right heart catheterization.   2. Vasodilator challenge study.       CLINICAL DATA: This is a 69-year-old  gentleman with a prior history   of coronary disease with previous inferior MI and coronary stenting   in 2010, but a preserved ejection fraction. He also has severe lung   disease with histiocytosis X. He has recently experienced declining   DLCO as well as worsening dyspnea on exertion. Evaluation included   an echocardiogram showing new right ventricular systolic dysfunction   and severe pulmonary hypertension. He was referred for   catheterization to further evaluate the etiology of his pulmonary   hypertension and potential intervention.       CATHETERIZATION RESULTS:   1. Severe pulmonary hypertension.       2. Good vasodilator response to Flolan.   a. Baseline hemodynamics.   1) Pulmonary artery pressure 74/34, mean of 48.   2) Pulmonary capillary wedge pressure mean 9.   3) Right atrial pressure mean 9.   4) Right ventricular pressure 70/17.   5) Thermodilution cardiac output/index 2.5/1.35.   6) Sam cardiac output/index 3.0/1.77.   7) Pulmonary vascular arteriolar resistance 15.7 Woods units.   8) Pulmonary artery oxygen saturation 52%.   9) Systemic blood pressure 169/104.       b. Flolan vasodilator hemodynamics at 16 nanograms per kilogram per   minute.   1) Pulmonary artery pressure 72/32, mean of 46.   2) Pulmonary capillary wedge pressure mean 12.   3) Right atrial pressure mean 12.   4) Thermodilution cardiac output/index 5.0/2.75.   5) Sam cardiac output/index 4.8/2.6.   6) Pulmonary vascular arteriolar resistance 6.8 Wood units.   7) Pulmonary artery oxygen saturation 66%.   8) Systemic blood pressure 118/80.       COMMENT: The patient will follow up with Dr. Rouse to further   discuss management of these results.       PROCEDURE: After local anesthesia with 1% lidocaine, a 7 Indonesian   sheath was inserted in the right internal jugular vein with a single   antegrade puncture. A 7 Indonesian sheath was inserted. A 7 Indonesian Goodrich   was advanced and hemodynamic monitoring performed on the way in    including RA, RV, PA, and pulmonary capillary wedge pressure. Next,   baseline oxygen sampling and thermodilution cardiac outputs   performed. Flolan was then begun at 2 nanograms per kilogram per   minute and titrated every 2 minutes to 16 nanograms per kilogram per   minute, at which point repeat pulmonary capillary wedge and pulmonary   artery pressures were obtained followed by oxygen sampling and then   repeat thermodilution cardiac outputs. Finally, pullback right   atrial pressure was obtained. The Flolan was discontinued and blood   pressure returned to baseline. The sheath was removed, hemostasis   obtained, and the patient transferred back to the care unit. No   contrast was used for this study. Fluoroscopy time was 0.6 minutes.  Assessment/Plan     We reviewed the findings as noted above.  His diarrhea has resolved.  He is iron deficient and will receive parenteral replacement.  Encourage pulmonary rehabilitation.  Patient to increase Adcirca to 40 day and RTC in April

## 2017-02-21 NOTE — LETTER
"2/21/2017    Florencio Patricia MD  [facility]   [address] [address]  [facility] [address]    RE: Ravi Sandoval       Dear Colleague,    I had the pleasure of seeing Ravi Sandoval in the [facility].    Herson Rouse M.D.  Cardiovascular Medicine    I personally saw and examined this patient, discussed care with housestaff and other consultants, reviewed current laboratories and imaging studies, and conveyed impression and diagnostic/therapeutic plan to patient.    Problem List    History      Objective  /86 (BP Location: Left arm, Cuff Size: Adult Small)  Pulse 66  Ht 1.702 m (5' 7\")  Wt 62.1 kg (136 lb 12.8 oz)  SpO2 94%  BMI 21.43 kg/m2    Wt Readings from Last 5 Encounters:   02/21/17 62.1 kg (136 lb 12.8 oz)   02/07/17 61.4 kg (135 lb 4.8 oz)   05/27/16 63.5 kg (140 lb)   09/04/14 68.1 kg (150 lb 3.2 oz)   08/27/14 71.1 kg (156 lb 11.2 oz)       Meds    Current Outpatient Prescriptions   Medication     loperamide (IMODIUM) 2 MG capsule     ADCIRCA 20 MG TABS     metoprolol (TOPROL-XL) 25 MG 24 hr tablet     digoxin (LANOXIN) 125 MCG tablet     rosuvastatin (CRESTOR) 10 MG tablet     levothyroxine (SYNTHROID) 100 MCG tablet     FLUoxetine HCl (PROZAC PO)     AMLODIPINE BESYLATE PO     LISINOPRIL PO     METFORMIN HCL PO     PREDNISONE PO     Colchicine (COLCRYS PO)     ASPIRIN PO     No current facility-administered medications for this visit.          Labs  Results for RAVI SANDOVAL (MRN [id number]) as of 2/21/2017 13:56   Ref. Range 9/4/2014 10:48 5/27/2016 07:38 5/27/2016 08:22 2/7/2017 10:55 2/15/2017 10:04   Sodium Latest Ref Range: 133 - 144 mmol/L 142   139    Potassium Latest Ref Range: 3.4 - 5.3 mmol/L 3.9   3.8    Chloride Latest Ref Range: 94 - 109 mmol/L 106   103    Carbon Dioxide Latest Ref Range: 20 - 32 mmol/L 30   26    Urea Nitrogen Latest Ref Range: 7 - 30 mg/dL 11   10    Creatinine Latest Ref Range: 0.66 - 1.25 mg/dL 0.73   0.62 (L)    GFR " Estimate Latest Ref Range: >60 mL/min/1.7m2 >90...   >90...    GFR Estimate If Black Latest Ref Range: >60 mL/min/1.7m2 >90...   >90...    Calcium Latest Ref Range: 8.5 - 10.1 mg/dL 8.7   8.4 (L)    Anion Gap Latest Ref Range: 3 - 14 mmol/L 6   10    Magnesium Latest Ref Range: 1.6 - 2.3 mg/dL 1.3 (L)       Albumin Latest Ref Range: 3.4 - 5.0 g/dL 3.5   3.5    Protein Total Latest Ref Range: 6.8 - 8.8 g/dL 6.9   6.9    Bilirubin Total Latest Ref Range: 0.2 - 1.3 mg/dL 0.4   0.4    Alkaline Phosphatase Latest Ref Range: 40 - 150 U/L 49   39 (L)    ALT Latest Ref Range: 0 - 70 U/L 13   15    AST Latest Ref Range: 0 - 45 U/L 10   15    Folate Latest Ref Range: >5.4 ng/mL    22.1    Iron Latest Ref Range: 35 - 180 ug/dL    30 (L)    Iron Binding Cap Latest Ref Range: 240 - 430 ug/dL    467 (H)    Iron Saturation Index Latest Ref Range: 15 - 46 %    6 (L)    N-Terminal Pro Bnp Latest Ref Range: 0 - 125 pg/mL    553 (H)    T4 Free Latest Ref Range: 0.76 - 1.46 ng/dL 1.84 (H)       TSH Latest Ref Range: 0.40 - 4.00 mU/L 0.16 (L)       Vitamin B12 Latest Ref Range: 193 - 986 pg/mL    288    Glucose Latest Ref Range: 70 - 99 mg/dL 115 (H)   92    WBC Latest Ref Range: 4.0 - 11.0 10e9/L    9.0    Hemoglobin Latest Ref Range: 13.3 - 17.7 g/dL    12.0 (L)    Hematocrit Latest Ref Range: 40.0 - 53.0 %    38.8 (L)    Platelet Count Latest Ref Range: 150 - 450 10e9/L    197    RBC Count Latest Ref Range: 4.4 - 5.9 10e12/L    4.88    MCV Latest Ref Range: 78 - 100 fl    80    MCH Latest Ref Range: 26.5 - 33.0 pg    24.6 (L)    MCHC Latest Ref Range: 31.5 - 36.5 g/dL    30.9 (L)    RDW Latest Ref Range: 10.0 - 15.0 %    18.3 (H)    SURGICAL PATHOLOGY EXAM Unknown   Rpt     ECHO COMPLETE Unknown     Rpt   COLONOSCOPY Unknown  Rpt        Imaging     Contrast was used without apparent complications.  The right ventricle is moderately dilated. Severely decreased right   ventricular systolic function Right ventricular systolic pressure  is   elevated, consistent with severe pulmonary hypertension. There is mild to   moderate (1-2+) tricuspid regurgitation. Flattened septum is consistent with   RV pressure/volume overload. The visual ejection fraction is estimated at   50-55%. The transmitral spectral Doppler flow pattern is suggestive of   impaired LV relaxation. The right atrium is mild to moderately dilated.   Severe RV systolic dysfunction, severe pulm hypertension, RV  enlargement all   appear to be new findings.  PatientHeight: 67 in  PatientWeight: 162 lbs  SystolicPressure: 114 mmHg  DiastolicPressure: 80 mmHg  HeartRate: 89 bpm  BSA 1.9 m^2        Left Ventricle  The left ventricle is normal in size.  Left ventricular hypertrophy: asymmetric with no LVOT obstruction.  The visual ejection fraction is estimated at 50-55%.  The transmitral spectral Doppler flow pattern is suggestive of impaired LV   relaxation.  Flattened septum is consistent with RV pressure/volume overload.     Right Ventricle  The right ventricle is moderately dilated.  Severely decreased right ventricular systolic function.     Atria  Normal left atrial size.  The right atrium is mild to moderately dilated.     Tricuspid Valve  Normal tricuspid valve.  There is mild to moderate (1-2+) tricuspid regurgitation.  Right ventricular systolic pressure is elevated, consistent with severe   pulmonary hypertension.     Aortic Valve  Normal tricuspid aortic valve.     Pulmonic Valve  Normal pulmonic valve.     Vessels  The aortic root is normal size.  The inferior vena cava is not dilated.     Pericardium  There is no pericardial effusion.     Rhythm  The rhythm was normal sinus.     Procedure  Complete Echo Adult.  Contrast Definity.     MMode 2D Measurements & Calculations  IVSd: 1.3 cm  LVIDd: 3.8 cm  LVIDs: 2.4 cm  LVPWd: 1.0 cm  FS: 36 %  LV mass(C)d: 144 grams  Ao root diam: 3.2 cm  LA dimension: 3.1 cm  LA/Ao: 0.97   Doppler Measurements & Calculations  MV E point: 42  cm/sec  MV A point: 115 cm/sec  MV E/A: 0.37   MV dec time: 0.22 sec  TR Max valery: 413 cm/sec  TR Max P mmHg       PROCEDURES PERFORMED:   1. Right heart catheterization.   2. Vasodilator challenge study.       CLINICAL DATA: This is a 69-year-old gentleman with a prior history   of coronary disease with previous inferior MI and coronary stenting   in , but a preserved ejection fraction. He also has severe lung   disease with histiocytosis X. He has recently experienced declining   DLCO as well as worsening dyspnea on exertion. Evaluation included   an echocardiogram showing new right ventricular systolic dysfunction   and severe pulmonary hypertension. He was referred for   catheterization to further evaluate the etiology of his pulmonary   hypertension and potential intervention.       CATHETERIZATION RESULTS:   1. Severe pulmonary hypertension.       2. Good vasodilator response to Flolan.   a. Baseline hemodynamics.   1) Pulmonary artery pressure 74/34, mean of 48.   2) Pulmonary capillary wedge pressure mean 9.   3) Right atrial pressure mean 9.   4) Right ventricular pressure 70/17.   5) Thermodilution cardiac output/index 2.5/1.35.   6) Sam cardiac output/index 3.0/1.77.   7) Pulmonary vascular arteriolar resistance 15.7 Woods units.   8) Pulmonary artery oxygen saturation 52%.   9) Systemic blood pressure 169/104.       b. Flolan vasodilator hemodynamics at 16 nanograms per kilogram per   minute.   1) Pulmonary artery pressure 72/32, mean of 46.   2) Pulmonary capillary wedge pressure mean 12.   3) Right atrial pressure mean 12.   4) Thermodilution cardiac output/index 5.0/2.75.   5) Sam cardiac output/index 4.8/2.6.   6) Pulmonary vascular arteriolar resistance 6.8 Wood units.   7) Pulmonary artery oxygen saturation 66%.   8) Systemic blood pressure 118/80.       COMMENT: The patient will follow up with Dr. Rouse to further   discuss management of these results.       PROCEDURE: After local  anesthesia with 1% lidocaine, a 7 Thai   sheath was inserted in the right internal jugular vein with a single   antegrade puncture. A 7 Thai sheath was inserted. A 7 Thai [place]   was advanced and hemodynamic monitoring performed on the way in   including RA, RV, PA, and pulmonary capillary wedge pressure. Next,   baseline oxygen sampling and thermodilution cardiac outputs   performed. Flolan was then begun at 2 nanograms per kilogram per   minute and titrated every 2 minutes to 16 nanograms per kilogram per   minute, at which point repeat pulmonary capillary wedge and pulmonary   artery pressures were obtained followed by oxygen sampling and then   repeat thermodilution cardiac outputs. Finally, pullback right   atrial pressure was obtained. The Flolan was discontinued and blood   pressure returned to baseline. The sheath was removed, hemostasis   obtained, and the patient transferred back to the care unit. No   contrast was used for this study. Fluoroscopy time was 0.6 minutes.  Assessment/Plan     We reviewed the findings as noted above.  His diarrhea has resolved.  He is iron deficient and will receive parenteral replacement.  Encourage pulmonary rehabilitation.  Patient to increase Adcirca to 40 day and RTC in April    Thank you for allowing me to participate in the care of your patient.    Sincerely,     Herson Rouse MD     [facility]

## 2017-02-21 NOTE — PATIENT INSTRUCTIONS
Medication Changes:  Increase Adcirca (tadalafil) to 40 mg Daily    Patient Instructions:  3 IV Iron Infusions    Pulmonary Rehab after Iron Infusins    Follow up Appointment Information:  April follow up with Labs prior (CBC, CMP, BNP, Iron with Iron binding capacity)    For scheduling at [facility] please call [phone]  For scheduling at the [facility] please call [phone]  We are located on the second floor [address] at the [facility].  Our address is     [facility] [address],   [address]  [place]  [address]    Thank you for allowing us to be a part of your care here at the [facility]    If you have questions or concerns please contact us at:    Willow Chavez, RN      Nurse Coordinator       Pulmonary Hypertension     [facility]   (P)[phone]  (F)[phone]    ** Please note that you will NOT receive a reminder call regarding your scheduled testing, reminder calls are for provider appointments only.  If you are scheduled for testing within the [facility] system you may receive a call regarding pre-registration for billing purposes only.**     Remember to weigh yourself daily after voiding and before you consume any food or beverages and log the numbers.  If you have gained/lost 2 pounds overnight or 5 pounds in a week contact us immediately for medication adjustments or further instructions.

## 2017-02-21 NOTE — NURSING NOTE
Med Reconcile: Reviewed and verified all current medications with the patient. The updated medication list was printed and given to the patient.  Return Appointment: Patient given instructions regarding scheduling next clinic visit. Patient demonstrated understanding of this information and agreed to call with further questions or concerns.  Medication Change: Patient was educated regarding prescribed medication change, including discussion of the indication, administration, side effects, and when to report to MD or RN. Patient demonstrated understanding of this information and agreed to call with further questions or concerns.  Patient stated he understood all health information given and agreed to call with further questions or concerns.     Medication Changes:  Increase Adcirca (tadalafil) to 40 mg Daily    Patient Instructions:  3 IV Iron Infusions    Pulmonary Rehab after Iron Infusins    Follow up Appointment Information:  April follow up with Labs prior (CBC, CMP, BNP, Iron with Iron binding capacity

## 2017-02-22 ENCOUNTER — APPOINTMENT (OUTPATIENT)
Dept: GENERAL RADIOLOGY | Facility: CLINIC | Age: 72
DRG: 314 | End: 2017-02-22
Attending: EMERGENCY MEDICINE
Payer: MEDICARE

## 2017-02-22 ENCOUNTER — APPOINTMENT (OUTPATIENT)
Dept: CT IMAGING | Facility: CLINIC | Age: 72
DRG: 314 | End: 2017-02-22
Attending: EMERGENCY MEDICINE
Payer: MEDICARE

## 2017-02-22 ENCOUNTER — HOSPITAL ENCOUNTER (INPATIENT)
Facility: CLINIC | Age: 72
LOS: 3 days | Discharge: HOME | DRG: 314 | End: 2017-02-25
Attending: EMERGENCY MEDICINE | Admitting: INTERNAL MEDICINE
Payer: MEDICARE

## 2017-02-22 DIAGNOSIS — J96.21 ACUTE ON CHRONIC RESPIRATORY FAILURE WITH HYPOXIA (H): ICD-10-CM

## 2017-02-22 DIAGNOSIS — I27.20 PULMONARY HYPERTENSION (H): Primary | ICD-10-CM

## 2017-02-22 DIAGNOSIS — I50.9 ACUTE ON CHRONIC CONGESTIVE HEART FAILURE, UNSPECIFIED CONGESTIVE HEART FAILURE TYPE: ICD-10-CM

## 2017-02-22 DIAGNOSIS — N40.1 BENIGN PROSTATIC HYPERPLASIA WITH LOWER URINARY TRACT SYMPTOMS, UNSPECIFIED MORPHOLOGY: ICD-10-CM

## 2017-02-22 DIAGNOSIS — J84.112 IPF (IDIOPATHIC PULMONARY FIBROSIS) (H): ICD-10-CM

## 2017-02-22 PROBLEM — R06.03 ACUTE RESPIRATORY DISTRESS: Status: ACTIVE | Noted: 2017-02-22

## 2017-02-22 LAB
ALBUMIN SERPL-MCNC: 3.6 G/DL (ref 3.4–5)
ALBUMIN UR-MCNC: NEGATIVE MG/DL
ALP SERPL-CCNC: 58 U/L (ref 40–150)
ALT SERPL W P-5'-P-CCNC: 15 U/L (ref 0–70)
ANION GAP SERPL CALCULATED.3IONS-SCNC: 15 MMOL/L (ref 3–14)
APPEARANCE UR: CLEAR
AST SERPL W P-5'-P-CCNC: 18 U/L (ref 0–45)
BACTERIA SPEC CULT: NORMAL
BASE DEFICIT BLDV-SCNC: 4.8 MMOL/L
BASOPHILS # BLD AUTO: 0 10E9/L (ref 0–0.2)
BASOPHILS NFR BLD AUTO: 0.1 %
BILIRUB SERPL-MCNC: 0.5 MG/DL (ref 0.2–1.3)
BILIRUB UR QL STRIP: NEGATIVE
BUN SERPL-MCNC: 9 MG/DL (ref 7–30)
CALCIUM SERPL-MCNC: 8.2 MG/DL (ref 8.5–10.1)
CHLORIDE SERPL-SCNC: 101 MMOL/L (ref 94–109)
CO2 SERPL-SCNC: 21 MMOL/L (ref 20–32)
COLOR UR AUTO: YELLOW
CREAT SERPL-MCNC: 0.59 MG/DL (ref 0.66–1.25)
DIFFERENTIAL METHOD BLD: ABNORMAL
EOSINOPHIL # BLD AUTO: 0 10E9/L (ref 0–0.7)
EOSINOPHIL NFR BLD AUTO: 0.5 %
ERYTHROCYTE [DISTWIDTH] IN BLOOD BY AUTOMATED COUNT: 18.4 % (ref 10–15)
FLUAV+FLUBV AG SPEC QL: NEGATIVE
FLUAV+FLUBV AG SPEC QL: NORMAL
GFR SERPL CREATININE-BSD FRML MDRD: ABNORMAL ML/MIN/1.7M2
GLUCOSE SERPL-MCNC: 189 MG/DL (ref 70–99)
GLUCOSE UR STRIP-MCNC: 100 MG/DL
HCO3 BLDV-SCNC: 21 MMOL/L (ref 21–28)
HCT VFR BLD AUTO: 39 % (ref 40–53)
HGB BLD-MCNC: 12.4 G/DL (ref 13.3–17.7)
HGB UR QL STRIP: NEGATIVE
IMM GRANULOCYTES # BLD: 0 10E9/L (ref 0–0.4)
IMM GRANULOCYTES NFR BLD: 0.4 %
INR PPP: 0.91 (ref 0.86–1.14)
KETONES UR STRIP-MCNC: ABNORMAL MG/DL
LACTATE BLD-SCNC: 7.1 MMOL/L (ref 0.7–2.1)
LEUKOCYTE ESTERASE UR QL STRIP: NEGATIVE
LYMPHOCYTES # BLD AUTO: 0.8 10E9/L (ref 0.8–5.3)
LYMPHOCYTES NFR BLD AUTO: 10.3 %
Lab: NORMAL
MCH RBC QN AUTO: 25.1 PG (ref 26.5–33)
MCHC RBC AUTO-ENTMCNC: 31.8 G/DL (ref 31.5–36.5)
MCV RBC AUTO: 79 FL (ref 78–100)
MICRO REPORT STATUS: NORMAL
MONOCYTES # BLD AUTO: 0.1 10E9/L (ref 0–1.3)
MONOCYTES NFR BLD AUTO: 1.4 %
NEUTROPHILS # BLD AUTO: 7 10E9/L (ref 1.6–8.3)
NEUTROPHILS NFR BLD AUTO: 87.3 %
NITRATE UR QL: NEGATIVE
NRBC # BLD AUTO: 0 10*3/UL
NRBC BLD AUTO-RTO: 0 /100
NT-PROBNP SERPL-MCNC: 750 PG/ML (ref 0–900)
OXYHGB MFR BLDV: 29 %
PCO2 BLDV: 42 MM HG (ref 40–50)
PH BLDV: 7.31 PH (ref 7.32–7.43)
PH UR STRIP: 7 PH (ref 5–7)
PLATELET # BLD AUTO: 212 10E9/L (ref 150–450)
PO2 BLDV: 22 MM HG (ref 25–47)
POTASSIUM SERPL-SCNC: 4 MMOL/L (ref 3.4–5.3)
PROT SERPL-MCNC: 7.2 G/DL (ref 6.8–8.8)
RBC # BLD AUTO: 4.95 10E12/L (ref 4.4–5.9)
SODIUM SERPL-SCNC: 137 MMOL/L (ref 133–144)
SP GR UR STRIP: 1.02 (ref 1–1.03)
SPECIMEN SOURCE: NORMAL
SPECIMEN SOURCE: NORMAL
T4 FREE SERPL-MCNC: 1.3 NG/DL (ref 0.76–1.46)
TROPONIN I SERPL-MCNC: NORMAL UG/L (ref 0–0.04)
TSH SERPL DL<=0.005 MIU/L-ACNC: 7.59 MU/L (ref 0.4–4)
URN SPEC COLLECT METH UR: ABNORMAL
UROBILINOGEN UR STRIP-ACNC: 1 EU/DL (ref 0.2–1)
WBC # BLD AUTO: 8 10E9/L (ref 4–11)

## 2017-02-22 PROCEDURE — 87804 INFLUENZA ASSAY W/OPTIC: CPT | Performed by: EMERGENCY MEDICINE

## 2017-02-22 PROCEDURE — 94640 AIRWAY INHALATION TREATMENT: CPT

## 2017-02-22 PROCEDURE — 40000275 ZZH STATISTIC RCP TIME EA 10 MIN

## 2017-02-22 PROCEDURE — 83880 ASSAY OF NATRIURETIC PEPTIDE: CPT | Performed by: EMERGENCY MEDICINE

## 2017-02-22 PROCEDURE — 83605 ASSAY OF LACTIC ACID: CPT | Performed by: EMERGENCY MEDICINE

## 2017-02-22 PROCEDURE — 96375 TX/PRO/DX INJ NEW DRUG ADDON: CPT

## 2017-02-22 PROCEDURE — 84439 ASSAY OF FREE THYROXINE: CPT | Performed by: EMERGENCY MEDICINE

## 2017-02-22 PROCEDURE — 25000128 H RX IP 250 OP 636

## 2017-02-22 PROCEDURE — 84443 ASSAY THYROID STIM HORMONE: CPT | Performed by: EMERGENCY MEDICINE

## 2017-02-22 PROCEDURE — 85610 PROTHROMBIN TIME: CPT | Performed by: EMERGENCY MEDICINE

## 2017-02-22 PROCEDURE — 94660 CPAP INITIATION&MGMT: CPT

## 2017-02-22 PROCEDURE — 40000281 ZZH STATISTIC TRANSPORT TIME EA 15 MIN

## 2017-02-22 PROCEDURE — 99291 CRITICAL CARE FIRST HOUR: CPT | Mod: 25

## 2017-02-22 PROCEDURE — 82805 BLOOD GASES W/O2 SATURATION: CPT | Performed by: EMERGENCY MEDICINE

## 2017-02-22 PROCEDURE — A9270 NON-COVERED ITEM OR SERVICE: HCPCS | Mod: GY | Performed by: INTERNAL MEDICINE

## 2017-02-22 PROCEDURE — 21000000 ZZH R&B IMCU HEART CARE

## 2017-02-22 PROCEDURE — 96365 THER/PROPH/DIAG IV INF INIT: CPT

## 2017-02-22 PROCEDURE — 71010 XR CHEST PORT 1 VW: CPT

## 2017-02-22 PROCEDURE — 71260 CT THORAX DX C+: CPT

## 2017-02-22 PROCEDURE — 87040 BLOOD CULTURE FOR BACTERIA: CPT | Performed by: EMERGENCY MEDICINE

## 2017-02-22 PROCEDURE — 84484 ASSAY OF TROPONIN QUANT: CPT | Performed by: EMERGENCY MEDICINE

## 2017-02-22 PROCEDURE — 25000132 ZZH RX MED GY IP 250 OP 250 PS 637: Mod: GY | Performed by: EMERGENCY MEDICINE

## 2017-02-22 PROCEDURE — 81003 URINALYSIS AUTO W/O SCOPE: CPT | Performed by: EMERGENCY MEDICINE

## 2017-02-22 PROCEDURE — 93005 ELECTROCARDIOGRAM TRACING: CPT

## 2017-02-22 PROCEDURE — 87086 URINE CULTURE/COLONY COUNT: CPT | Performed by: EMERGENCY MEDICINE

## 2017-02-22 PROCEDURE — 25000128 H RX IP 250 OP 636: Performed by: EMERGENCY MEDICINE

## 2017-02-22 PROCEDURE — 00000146 ZZHCL STATISTIC GLUCOSE BY METER IP

## 2017-02-22 PROCEDURE — 25500064 ZZH RX 255 OP 636: Performed by: EMERGENCY MEDICINE

## 2017-02-22 PROCEDURE — 51798 US URINE CAPACITY MEASURE: CPT

## 2017-02-22 PROCEDURE — 25000132 ZZH RX MED GY IP 250 OP 250 PS 637: Mod: GY | Performed by: INTERNAL MEDICINE

## 2017-02-22 PROCEDURE — 87186 SC STD MICRODIL/AGAR DIL: CPT | Performed by: EMERGENCY MEDICINE

## 2017-02-22 PROCEDURE — 27210339 ZZH CIRCUIT HUMIDITY W/CPAP BIP

## 2017-02-22 PROCEDURE — 96367 TX/PROPH/DG ADDL SEQ IV INF: CPT

## 2017-02-22 PROCEDURE — 99223 1ST HOSP IP/OBS HIGH 75: CPT | Mod: AI | Performed by: INTERNAL MEDICINE

## 2017-02-22 PROCEDURE — 87077 CULTURE AEROBIC IDENTIFY: CPT | Performed by: EMERGENCY MEDICINE

## 2017-02-22 PROCEDURE — 25000125 ZZHC RX 250: Performed by: EMERGENCY MEDICINE

## 2017-02-22 PROCEDURE — 99292 CRITICAL CARE ADDL 30 MIN: CPT

## 2017-02-22 PROCEDURE — 80053 COMPREHEN METABOLIC PANEL: CPT | Performed by: EMERGENCY MEDICINE

## 2017-02-22 PROCEDURE — 85025 COMPLETE CBC W/AUTO DIFF WBC: CPT | Performed by: EMERGENCY MEDICINE

## 2017-02-22 PROCEDURE — 87800 DETECT AGNT MULT DNA DIREC: CPT | Performed by: EMERGENCY MEDICINE

## 2017-02-22 PROCEDURE — A9270 NON-COVERED ITEM OR SERVICE: HCPCS | Mod: GY | Performed by: EMERGENCY MEDICINE

## 2017-02-22 RX ORDER — PROCHLORPERAZINE 25 MG
12.5 SUPPOSITORY, RECTAL RECTAL EVERY 12 HOURS PRN
Status: DISCONTINUED | OUTPATIENT
Start: 2017-02-22 | End: 2017-02-25 | Stop reason: HOSPADM

## 2017-02-22 RX ORDER — PREDNISONE 20 MG/1
40 TABLET ORAL DAILY
Status: DISCONTINUED | OUTPATIENT
Start: 2017-02-23 | End: 2017-02-23

## 2017-02-22 RX ORDER — DIGOXIN 125 MCG
125 TABLET ORAL DAILY
Status: DISCONTINUED | OUTPATIENT
Start: 2017-02-23 | End: 2017-02-25 | Stop reason: HOSPADM

## 2017-02-22 RX ORDER — POTASSIUM CHLORIDE 1500 MG/1
20-40 TABLET, EXTENDED RELEASE ORAL
Status: DISCONTINUED | OUTPATIENT
Start: 2017-02-22 | End: 2017-02-25 | Stop reason: HOSPADM

## 2017-02-22 RX ORDER — POTASSIUM CHLORIDE 29.8 MG/ML
20 INJECTION INTRAVENOUS
Status: DISCONTINUED | OUTPATIENT
Start: 2017-02-22 | End: 2017-02-25 | Stop reason: HOSPADM

## 2017-02-22 RX ORDER — TADALAFIL 20 MG/1
2 TABLET ORAL DAILY
Status: DISCONTINUED | OUTPATIENT
Start: 2017-02-23 | End: 2017-02-23

## 2017-02-22 RX ORDER — ALBUTEROL SULFATE 0.83 MG/ML
2.5 SOLUTION RESPIRATORY (INHALATION)
Status: DISCONTINUED | OUTPATIENT
Start: 2017-02-22 | End: 2017-02-22

## 2017-02-22 RX ORDER — DIPHENHYDRAMINE HYDROCHLORIDE 50 MG/ML
50 INJECTION INTRAMUSCULAR; INTRAVENOUS
Status: DISCONTINUED | OUTPATIENT
Start: 2017-02-22 | End: 2017-02-25 | Stop reason: HOSPADM

## 2017-02-22 RX ORDER — ONDANSETRON 2 MG/ML
4 INJECTION INTRAMUSCULAR; INTRAVENOUS EVERY 6 HOURS PRN
Status: DISCONTINUED | OUTPATIENT
Start: 2017-02-22 | End: 2017-02-25 | Stop reason: HOSPADM

## 2017-02-22 RX ORDER — LISINOPRIL 20 MG/1
20 TABLET ORAL DAILY
Status: DISCONTINUED | OUTPATIENT
Start: 2017-02-23 | End: 2017-02-25 | Stop reason: HOSPADM

## 2017-02-22 RX ORDER — NITROGLYCERIN 20 MG/100ML
.07-2 INJECTION INTRAVENOUS CONTINUOUS
Status: DISCONTINUED | OUTPATIENT
Start: 2017-02-22 | End: 2017-02-22

## 2017-02-22 RX ORDER — DEXTROSE MONOHYDRATE 25 G/50ML
25-50 INJECTION, SOLUTION INTRAVENOUS
Status: DISCONTINUED | OUTPATIENT
Start: 2017-02-22 | End: 2017-02-24

## 2017-02-22 RX ORDER — METHYLPREDNISOLONE SODIUM SUCCINATE 125 MG/2ML
125 INJECTION, POWDER, LYOPHILIZED, FOR SOLUTION INTRAMUSCULAR; INTRAVENOUS
Status: DISCONTINUED | OUTPATIENT
Start: 2017-02-22 | End: 2017-02-25 | Stop reason: HOSPADM

## 2017-02-22 RX ORDER — METHYLPREDNISOLONE SODIUM SUCCINATE 125 MG/2ML
INJECTION, POWDER, LYOPHILIZED, FOR SOLUTION INTRAMUSCULAR; INTRAVENOUS
Status: COMPLETED
Start: 2017-02-22 | End: 2017-02-22

## 2017-02-22 RX ORDER — METOPROLOL SUCCINATE 25 MG/1
25 TABLET, EXTENDED RELEASE ORAL 2 TIMES DAILY
Status: DISCONTINUED | OUTPATIENT
Start: 2017-02-22 | End: 2017-02-23

## 2017-02-22 RX ORDER — NICOTINE POLACRILEX 4 MG
15-30 LOZENGE BUCCAL
Status: DISCONTINUED | OUTPATIENT
Start: 2017-02-22 | End: 2017-02-24

## 2017-02-22 RX ORDER — NALOXONE HYDROCHLORIDE 0.4 MG/ML
.1-.4 INJECTION, SOLUTION INTRAMUSCULAR; INTRAVENOUS; SUBCUTANEOUS
Status: DISCONTINUED | OUTPATIENT
Start: 2017-02-22 | End: 2017-02-25 | Stop reason: HOSPADM

## 2017-02-22 RX ORDER — ONDANSETRON 4 MG/1
4 TABLET, ORALLY DISINTEGRATING ORAL EVERY 6 HOURS PRN
Status: DISCONTINUED | OUTPATIENT
Start: 2017-02-22 | End: 2017-02-25 | Stop reason: HOSPADM

## 2017-02-22 RX ORDER — PROCHLORPERAZINE MALEATE 5 MG
5 TABLET ORAL EVERY 6 HOURS PRN
Status: DISCONTINUED | OUTPATIENT
Start: 2017-02-22 | End: 2017-02-25 | Stop reason: HOSPADM

## 2017-02-22 RX ORDER — IOPAMIDOL 755 MG/ML
57 INJECTION, SOLUTION INTRAVASCULAR ONCE
Status: COMPLETED | OUTPATIENT
Start: 2017-02-22 | End: 2017-02-22

## 2017-02-22 RX ORDER — ROSUVASTATIN CALCIUM 5 MG/1
10 TABLET, COATED ORAL DAILY
Status: DISCONTINUED | OUTPATIENT
Start: 2017-02-23 | End: 2017-02-25 | Stop reason: HOSPADM

## 2017-02-22 RX ORDER — POTASSIUM CHLORIDE 7.45 MG/ML
10 INJECTION INTRAVENOUS
Status: DISCONTINUED | OUTPATIENT
Start: 2017-02-22 | End: 2017-02-25 | Stop reason: HOSPADM

## 2017-02-22 RX ORDER — POLYETHYLENE GLYCOL 3350 17 G/17G
17 POWDER, FOR SOLUTION ORAL DAILY PRN
Status: DISCONTINUED | OUTPATIENT
Start: 2017-02-22 | End: 2017-02-25 | Stop reason: HOSPADM

## 2017-02-22 RX ORDER — ACETAMINOPHEN 325 MG/1
650 TABLET ORAL ONCE
Status: COMPLETED | OUTPATIENT
Start: 2017-02-22 | End: 2017-02-22

## 2017-02-22 RX ORDER — FUROSEMIDE 10 MG/ML
40 INJECTION INTRAMUSCULAR; INTRAVENOUS ONCE
Status: COMPLETED | OUTPATIENT
Start: 2017-02-22 | End: 2017-02-22

## 2017-02-22 RX ORDER — ALBUTEROL SULFATE 0.83 MG/ML
2.5 SOLUTION RESPIRATORY (INHALATION)
Status: DISCONTINUED | OUTPATIENT
Start: 2017-02-22 | End: 2017-02-25 | Stop reason: HOSPADM

## 2017-02-22 RX ORDER — AMLODIPINE BESYLATE 2.5 MG/1
2.5 TABLET ORAL DAILY
Status: DISCONTINUED | OUTPATIENT
Start: 2017-02-23 | End: 2017-02-25 | Stop reason: HOSPADM

## 2017-02-22 RX ORDER — ONDANSETRON 2 MG/ML
INJECTION INTRAMUSCULAR; INTRAVENOUS
Status: COMPLETED
Start: 2017-02-22 | End: 2017-02-22

## 2017-02-22 RX ORDER — LEVOTHYROXINE SODIUM 100 UG/1
100 TABLET ORAL DAILY
Status: DISCONTINUED | OUTPATIENT
Start: 2017-02-23 | End: 2017-02-25 | Stop reason: HOSPADM

## 2017-02-22 RX ORDER — ACETAMINOPHEN 325 MG/1
650 TABLET ORAL EVERY 4 HOURS PRN
Status: DISCONTINUED | OUTPATIENT
Start: 2017-02-22 | End: 2017-02-25 | Stop reason: HOSPADM

## 2017-02-22 RX ORDER — AMOXICILLIN 250 MG
1-2 CAPSULE ORAL 2 TIMES DAILY PRN
Status: DISCONTINUED | OUTPATIENT
Start: 2017-02-22 | End: 2017-02-25 | Stop reason: HOSPADM

## 2017-02-22 RX ORDER — LOPERAMIDE HCL 2 MG
2 CAPSULE ORAL DAILY PRN
Status: DISCONTINUED | OUTPATIENT
Start: 2017-02-22 | End: 2017-02-25 | Stop reason: HOSPADM

## 2017-02-22 RX ORDER — COLCHICINE 0.6 MG/1
0.6 TABLET ORAL DAILY
Status: DISCONTINUED | OUTPATIENT
Start: 2017-02-23 | End: 2017-02-25 | Stop reason: HOSPADM

## 2017-02-22 RX ORDER — ASPIRIN 81 MG/1
81 TABLET, CHEWABLE ORAL AT BEDTIME
Status: DISCONTINUED | OUTPATIENT
Start: 2017-02-22 | End: 2017-02-25 | Stop reason: HOSPADM

## 2017-02-22 RX ORDER — PIPERACILLIN SODIUM, TAZOBACTAM SODIUM 3; .375 G/15ML; G/15ML
3.38 INJECTION, POWDER, LYOPHILIZED, FOR SOLUTION INTRAVENOUS ONCE
Status: COMPLETED | OUTPATIENT
Start: 2017-02-22 | End: 2017-02-22

## 2017-02-22 RX ORDER — IPRATROPIUM BROMIDE AND ALBUTEROL SULFATE 2.5; .5 MG/3ML; MG/3ML
3 SOLUTION RESPIRATORY (INHALATION)
Status: DISCONTINUED | OUTPATIENT
Start: 2017-02-23 | End: 2017-02-23

## 2017-02-22 RX ORDER — POTASSIUM CHLORIDE 1.5 G/1.58G
20-40 POWDER, FOR SOLUTION ORAL
Status: DISCONTINUED | OUTPATIENT
Start: 2017-02-22 | End: 2017-02-25 | Stop reason: HOSPADM

## 2017-02-22 RX ORDER — METHYLPREDNISOLONE SODIUM SUCCINATE 125 MG/2ML
125 INJECTION, POWDER, LYOPHILIZED, FOR SOLUTION INTRAMUSCULAR; INTRAVENOUS ONCE
Status: COMPLETED | OUTPATIENT
Start: 2017-02-22 | End: 2017-02-22

## 2017-02-22 RX ORDER — ONDANSETRON 2 MG/ML
4 INJECTION INTRAMUSCULAR; INTRAVENOUS EVERY 30 MIN PRN
Status: DISCONTINUED | OUTPATIENT
Start: 2017-02-22 | End: 2017-02-22

## 2017-02-22 RX ORDER — BISACODYL 10 MG
10 SUPPOSITORY, RECTAL RECTAL DAILY PRN
Status: DISCONTINUED | OUTPATIENT
Start: 2017-02-22 | End: 2017-02-25 | Stop reason: HOSPADM

## 2017-02-22 RX ADMIN — METHYLPREDNISOLONE SODIUM SUCCINATE: 125 INJECTION, POWDER, FOR SOLUTION INTRAMUSCULAR; INTRAVENOUS at 19:38

## 2017-02-22 RX ADMIN — FUROSEMIDE 40 MG: 10 INJECTION, SOLUTION INTRAVENOUS at 22:01

## 2017-02-22 RX ADMIN — ONDANSETRON 4 MG: 2 SOLUTION INTRAMUSCULAR; INTRAVENOUS at 19:32

## 2017-02-22 RX ADMIN — PIPERACILLIN SODIUM,TAZOBACTAM SODIUM 3.38 G: 3; .375 INJECTION, POWDER, FOR SOLUTION INTRAVENOUS at 20:17

## 2017-02-22 RX ADMIN — NITROGLYCERIN 0.07 MCG/KG/MIN: 20 INJECTION INTRAVENOUS at 22:06

## 2017-02-22 RX ADMIN — ACETAMINOPHEN 650 MG: 325 TABLET, FILM COATED ORAL at 20:11

## 2017-02-22 RX ADMIN — ASPIRIN 81 MG 81 MG: 81 TABLET ORAL at 23:59

## 2017-02-22 RX ADMIN — METHYLPREDNISOLONE SODIUM SUCCINATE 125 MG: 125 INJECTION, POWDER, FOR SOLUTION INTRAMUSCULAR; INTRAVENOUS at 19:36

## 2017-02-22 RX ADMIN — IOPAMIDOL 57 ML: 755 INJECTION, SOLUTION INTRAVENOUS at 21:06

## 2017-02-22 RX ADMIN — METOPROLOL SUCCINATE 25 MG: 25 TABLET, EXTENDED RELEASE ORAL at 23:59

## 2017-02-22 RX ADMIN — SODIUM CHLORIDE 84 ML: 9 INJECTION, SOLUTION INTRAVENOUS at 21:06

## 2017-02-22 ASSESSMENT — ACTIVITIES OF DAILY LIVING (ADL)
RETIRED_EATING: 0-->INDEPENDENT
SWALLOWING: 0-->SWALLOWS FOODS/LIQUIDS WITHOUT DIFFICULTY
AMBULATION: 0-->INDEPENDENT
FALL_HISTORY_WITHIN_LAST_SIX_MONTHS: NO
TOILETING: 0-->INDEPENDENT
SWALLOWING: 0-->SWALLOWS FOODS/LIQUIDS WITHOUT DIFFICULTY
EATING: 0-->INDEPENDENT
DRESS: 0-->INDEPENDENT
TRANSFERRING: 0-->INDEPENDENT
RETIRED_COMMUNICATION: 0-->UNDERSTANDS/COMMUNICATES WITHOUT DIFFICULTY
TOILETING: 0-->INDEPENDENT
DRESS: 0-->INDEPENDENT
CURRENT_FUNCTIONAL_LEVEL_COMMENT: 0
TRANSFERRING: 0-->INDEPENDENT
BATHING: 0-->INDEPENDENT
CHANGE_IN_FUNCTIONAL_STATUS_SINCE_ONSET_OF_CURRENT_ILLNESS/INJURY: NO
COMMUNICATION: 0-->UNDERSTANDS/COMMUNICATES WITHOUT DIFFICULTY
COGNITION: 0 - NO COGNITION ISSUES REPORTED
BATHING: 0-->INDEPENDENT
AMBULATION: 0-->INDEPENDENT

## 2017-02-22 NOTE — IP AVS SNAPSHOT
MRN:[id number]                      After Visit Summary   2/22/2017    Ravi Sandoval    MRN: [id number]           Thank you!     Thank you for choosing [facility] for your care. Our goal is always to provide you with excellent care. Hearing back from our patients is one way we can continue to improve our services. Please take a few minutes to complete the written survey that you may receive in the mail after you visit with us. Thank you!        Patient Information     Date Of Birth          1945        About your hospital stay     You were admitted on:  February 22, 2017 You last received care in the:  [place] [address] [facility]    You were discharged on:  February 25, 2017        Reason for your hospital stay       Acute on chronic hypoxic resp failure.                  Who to Call     For medical emergencies, please call 911.  For non-urgent questions about your medical care, please call your primary care provider or clinic, [phone]          Attending Provider     Provider Specialty    Dinesh Crandall MD Emergency Medicine    Myriam Powers MD Internal Medicine    Jennifer Schroeder MD Internal Medicine       Primary Care Provider Office Phone # Fax #    Florencio Patricia [phone]       [facility] [address] [address]  [facility] [address]        After Care Instructions     Activity       Your activity upon discharge: activity as tolerated            Diet       Follow this diet upon discharge:  Combination low fat, low, 2 grmas Na,  Moderate Consistent CHO (4-6 CHO units/meal)                  Follow-up Appointments     Follow-up and recommended labs and tests        Follow up with Dr. Rouse of cardiology in one week.    **[facility] will contact you on Feb. 27th to schedule appointment.    [facility] ([facility])  [address]  [place] [address]  Phone: [phone]                  Your  next 10 appointments already scheduled     Apr 18, 2017  9:30 AM CDT   LAB with [facility]   [facility]    [address]  [facility] [address]   [phone]           Patient must bring picture ID.  Patient should be prepared to give a urine specimen  Please do not eat 10-12 hours before your appointment if you are coming in fasting for labs on lipids, cholesterol, or glucose (sugar).  Pregnant women should follow their Care Team instructions. Water with medications is okay. Do not drink coffee or other fluids.   If you have concerns about taking  your medications, please ask at office or if scheduling via "[employer]", send a message by clicking on Secure Messaging, Message Your Care Team.            Apr 18, 2017 10:30 AM CDT   Pulmonary Hypertension Return with Herson Rouse MD   [facility]    [address] [place] [address] [address]  [facility] [address]   [phone]              Additional Services     Follow-Up with Cardiologist                 Pending Results     Date and Time Order Name Status Description    2/23/2017 1455 Blood culture Preliminary     2/22/2017 2006 Blood culture Preliminary             Statement of Approval     Ordered          02/25/17 1434  I have reviewed and agree with all the recommendations and orders detailed in this document.  EFFECTIVE NOW     Approved and electronically signed by:  Jennifer Schroeder MD           02/25/17 1400  I have reviewed and agree with all the recommendations and orders detailed in this document.  EFFECTIVE NOW     Approved and electronically signed by:  Jennifer Schroeder MD           02/25/17 1400  I have reviewed and agree with all the recommendations and orders detailed in this document.  EFFECTIVE NOW     Approved and electronically signed by:  Jennifer Schroeder MD             Admission Information     Date & Time Provider  "Department Dept. Phone    2017 Jennifer Schroeder MD [place] [address] [facility] [phone]      Your Vitals Were     Blood Pressure Temperature Respirations Height Weight Pulse Oximetry    126/64 (BP Location: Right arm) 95.2  F (35.1  C) (Oral) 16 1.702 m (5' 7\") 61.7 kg (136 lb 0.4 oz) 92%    BMI (Body Mass Index)                   21.3 kg/m2           [employer] Information     [employer] lets you send messages to your doctor, view your test results, renew your prescriptions, schedule appointments and more. To sign up, go to [email or web address] . Click on \"Log in\" on the left side of the screen, which will take you to the Welcome page. Then click on \"Sign up Now\" on the right side of the page.     You will be asked to enter the access code listed below, as well as some personal information. Please follow the directions to create your username and password.     Your access code is: [email or web address]  Expires: 2017 10:43 AM     Your access code will  in 90 days. If you need help or a new code, please call your [facility] clinic or [phone].        Care EveryWhere ID     This is your Care EveryWhere ID. This could be used by other organizations to access your [facility] medical records  [id number]           Review of your medicines      START taking        Dose / Directions    furosemide 20 MG tablet   Commonly known as:  LASIX   Used for:  Acute on chronic congestive heart failure, unspecified congestive heart failure type (H)        Dose:  20 mg   Take 1 tablet (20 mg) by mouth daily   Quantity:  30 tablet   Refills:  1       tamsulosin 0.4 MG capsule   Commonly known as:  FLOMAX   Used for:  Benign prostatic hyperplasia with lower urinary tract symptoms, unspecified morphology        Dose:  0.4 mg   Take 1 capsule (0.4 mg) by mouth daily   Quantity:  30 capsule   Refills:  3         CONTINUE these medicines which may have CHANGED, or have new prescriptions. If we are uncertain of the size of " tablets/capsules you have at home, strength may be listed as something that might have changed.        Dose / Directions    tadalafil (PAH) 20 MG Tabs   Commonly known as:  ADCIRCA   This may have changed:  See the new instructions.   Used for:  Pulmonary hypertension (H)        Dose:  40 mg   Take 2 tablets (40 mg) by mouth daily   Quantity:  60 tablet   Refills:  3         CONTINUE these medicines which have NOT CHANGED        Dose / Directions    AMLODIPINE BESYLATE PO        Dose:  2.5 mg   Take 2.5 mg by mouth daily   Refills:  0       ASPIRIN PO        Dose:  81 mg   Take 81 mg by mouth At Bedtime   Refills:  0       COLCRYS PO        Dose:  0.6 mg   Take 0.6 mg by mouth daily TAKES IN THE EVENING   Refills:  0       digoxin 125 MCG tablet   Commonly known as:  LANOXIN   Used for:  Chronic systolic heart failure (H)        Dose:  125 mcg   Take 1 tablet (125 mcg) by mouth daily   Quantity:  90 tablet   Refills:  3       levothyroxine 100 MCG tablet   Commonly known as:  SYNTHROID   Used for:  Hyperthyroidism        Dose:  100 mcg   Take 1 tablet (100 mcg) by mouth daily   Quantity:  30 tablet   Refills:  11       LISINOPRIL PO        Dose:  20 mg   Take 20 mg by mouth daily   Refills:  0       METFORMIN HCL PO        Dose:  1000 mg   Take 1,000 mg by mouth 2 times daily (with meals)   Refills:  0       metoprolol 25 MG 24 hr tablet   Commonly known as:  TOPROL-XL   Used for:  Pulmonary hypertension (H)        Dose:  25 mg   Take 1 tablet (25 mg) by mouth 2 times daily   Quantity:  180 tablet   Refills:  0       OMEPRAZOLE PO        Dose:  40 mg   Take 40 mg by mouth daily   Refills:  0       PREDNISONE PO        Dose:  10 mg   Take 10 mg by mouth daily   Refills:  0       PROZAC PO        Dose:  40 mg   Take 40 mg by mouth daily   Refills:  0       rosuvastatin 10 MG tablet   Commonly known as:  CRESTOR   Used for:  Hyperlipidemia LDL goal <70        Dose:  10 mg   Take 1 tablet (10 mg) by mouth daily    Quantity:  90 tablet   Refills:  3            Where to get your medicines      These medications were sent to [facility] [address]  [place], [place] - [address] AT [facility] [place] [address]     Phone:  [phone]     furosemide 20 MG tablet    tadalafil (PAH) 20 MG Tabs    tamsulosin 0.4 MG capsule                Protect others around you: Learn how to safely use, store and throw away your medicines at www.disposemymeds.org.             Medication List: This is a list of all your medications and when to take them. Check marks below indicate your daily home schedule. Keep this list as a reference.      Medications           Morning Afternoon Evening Bedtime As Needed    AMLODIPINE BESYLATE PO   Take 2.5 mg by mouth daily   Last time this was given:  2.5 mg on 2/25/2017  8:42 AM                                ASPIRIN PO   Take 81 mg by mouth At Bedtime   Last time this was given:  81 mg on 2/24/2017 10:05 PM                                COLCRYS PO   Take 0.6 mg by mouth daily TAKES IN THE EVENING   Last time this was given:  0.6 mg on 2/25/2017  8:42 AM                                digoxin 125 MCG tablet   Commonly known as:  LANOXIN   Take 1 tablet (125 mcg) by mouth daily   Last time this was given:  125 mcg on 2/25/2017  8:41 AM                                furosemide 20 MG tablet   Commonly known as:  LASIX   Take 1 tablet (20 mg) by mouth daily   Last time this was given:  20 mg on 2/25/2017  8:42 AM                                levothyroxine 100 MCG tablet   Commonly known as:  SYNTHROID   Take 1 tablet (100 mcg) by mouth daily   Last time this was given:  100 mcg on 2/25/2017  8:41 AM                                LISINOPRIL PO   Take 20 mg by mouth daily   Last time this was given:  20 mg on 2/25/2017  8:42 AM                                METFORMIN HCL PO   Take 1,000 mg by mouth 2 times daily (with meals)                                 metoprolol 25 MG 24 hr tablet   Commonly known as:  TOPROL-XL   Take 1 tablet (25 mg) by mouth 2 times daily   Last time this was given:  12.5 mg on 2/25/2017  8:41 AM                                OMEPRAZOLE PO   Take 40 mg by mouth daily   Last time this was given:  40 mg on 2/25/2017  8:42 AM                                PREDNISONE PO   Take 10 mg by mouth daily   Last time this was given:  10 mg on 2/25/2017  8:42 AM                                PROZAC PO   Take 40 mg by mouth daily   Last time this was given:  40 mg on 2/25/2017  8:40 AM                                rosuvastatin 10 MG tablet   Commonly known as:  CRESTOR   Take 1 tablet (10 mg) by mouth daily   Last time this was given:  10 mg on 2/25/2017  8:41 AM                                tadalafil (PAH) 20 MG Tabs   Commonly known as:  ADCIRCA   Take 2 tablets (40 mg) by mouth daily   Last time this was given:  40 mg on 2/25/2017  9:28 AM                                tamsulosin 0.4 MG capsule   Commonly known as:  FLOMAX   Take 1 capsule (0.4 mg) by mouth daily   Last time this was given:  0.4 mg on 2/25/2017  8:42 AM

## 2017-02-22 NOTE — IP AVS SNAPSHOT
[address]    [facility] [address]    Phone:  [phone]                                       After Visit Summary   2/22/2017    Ravi Sandoval    MRN: [id number]           After Visit Summary Signature Page     I have received my discharge instructions, and my questions have been answered. I have discussed any challenges I see with this plan with the nurse or doctor.    ..........................................................................................................................................  Patient/Patient Representative Signature      ..........................................................................................................................................  Patient Representative Print Name and Relationship to Patient    ..................................................               ................................................  Date                                            Time    ..........................................................................................................................................  Reviewed by Signature/Title    ...................................................              ..............................................  Date                                                            Time

## 2017-02-23 LAB
ANION GAP SERPL CALCULATED.3IONS-SCNC: 10 MMOL/L (ref 3–14)
BUN SERPL-MCNC: 10 MG/DL (ref 7–30)
CALCIUM SERPL-MCNC: 7.7 MG/DL (ref 8.5–10.1)
CHLORIDE SERPL-SCNC: 101 MMOL/L (ref 94–109)
CO2 SERPL-SCNC: 27 MMOL/L (ref 20–32)
CREAT SERPL-MCNC: 0.76 MG/DL (ref 0.66–1.25)
DIGOXIN SERPL-MCNC: 0.7 UG/L (ref 0.5–2)
GFR SERPL CREATININE-BSD FRML MDRD: ABNORMAL ML/MIN/1.7M2
GLUCOSE BLDC GLUCOMTR-MCNC: 177 MG/DL (ref 70–99)
GLUCOSE BLDC GLUCOMTR-MCNC: 206 MG/DL (ref 70–99)
GLUCOSE BLDC GLUCOMTR-MCNC: 226 MG/DL (ref 70–99)
GLUCOSE BLDC GLUCOMTR-MCNC: 234 MG/DL (ref 70–99)
GLUCOSE BLDC GLUCOMTR-MCNC: 245 MG/DL (ref 70–99)
GLUCOSE SERPL-MCNC: 208 MG/DL (ref 70–99)
GRAM STN SPEC: NORMAL
HBA1C MFR BLD: 8.3 % (ref 4.3–6)
HGB BLD-MCNC: 10.6 G/DL (ref 13.3–17.7)
LACTATE SERPL-SCNC: 1.9 MMOL/L (ref 0.4–2)
Lab: NORMAL
MICRO REPORT STATUS: NORMAL
POTASSIUM SERPL-SCNC: 3.4 MMOL/L (ref 3.4–5.3)
PROCALCITONIN SERPL-MCNC: 27.85 NG/ML
SODIUM SERPL-SCNC: 138 MMOL/L (ref 133–144)
SPECIMEN SOURCE: NORMAL
TROPONIN I SERPL-MCNC: 0.02 UG/L (ref 0–0.04)
TROPONIN I SERPL-MCNC: 0.04 UG/L (ref 0–0.04)

## 2017-02-23 PROCEDURE — 36415 COLL VENOUS BLD VENIPUNCTURE: CPT | Performed by: INTERNAL MEDICINE

## 2017-02-23 PROCEDURE — 80048 BASIC METABOLIC PNL TOTAL CA: CPT | Performed by: INTERNAL MEDICINE

## 2017-02-23 PROCEDURE — 99233 SBSQ HOSP IP/OBS HIGH 50: CPT | Performed by: INTERNAL MEDICINE

## 2017-02-23 PROCEDURE — A9270 NON-COVERED ITEM OR SERVICE: HCPCS | Mod: GY | Performed by: INTERNAL MEDICINE

## 2017-02-23 PROCEDURE — 87205 SMEAR GRAM STAIN: CPT | Performed by: INTERNAL MEDICINE

## 2017-02-23 PROCEDURE — 83036 HEMOGLOBIN GLYCOSYLATED A1C: CPT | Performed by: INTERNAL MEDICINE

## 2017-02-23 PROCEDURE — 00000146 ZZHCL STATISTIC GLUCOSE BY METER IP

## 2017-02-23 PROCEDURE — 80162 ASSAY OF DIGOXIN TOTAL: CPT | Performed by: INTERNAL MEDICINE

## 2017-02-23 PROCEDURE — 21000000 ZZH R&B IMCU HEART CARE

## 2017-02-23 PROCEDURE — 25000132 ZZH RX MED GY IP 250 OP 250 PS 637: Mod: GY | Performed by: INTERNAL MEDICINE

## 2017-02-23 PROCEDURE — 84484 ASSAY OF TROPONIN QUANT: CPT | Performed by: INTERNAL MEDICINE

## 2017-02-23 PROCEDURE — 83605 ASSAY OF LACTIC ACID: CPT | Performed by: INTERNAL MEDICINE

## 2017-02-23 PROCEDURE — 85018 HEMOGLOBIN: CPT | Performed by: INTERNAL MEDICINE

## 2017-02-23 PROCEDURE — 87070 CULTURE OTHR SPECIMN AEROBIC: CPT | Performed by: INTERNAL MEDICINE

## 2017-02-23 PROCEDURE — 25000125 ZZHC RX 250: Performed by: INTERNAL MEDICINE

## 2017-02-23 PROCEDURE — 99221 1ST HOSP IP/OBS SF/LOW 40: CPT | Performed by: INTERNAL MEDICINE

## 2017-02-23 PROCEDURE — 25000128 H RX IP 250 OP 636: Performed by: INTERNAL MEDICINE

## 2017-02-23 PROCEDURE — 40000275 ZZH STATISTIC RCP TIME EA 10 MIN

## 2017-02-23 PROCEDURE — 25000131 ZZH RX MED GY IP 250 OP 636 PS 637: Mod: GY | Performed by: INTERNAL MEDICINE

## 2017-02-23 PROCEDURE — 94640 AIRWAY INHALATION TREATMENT: CPT

## 2017-02-23 PROCEDURE — 84145 PROCALCITONIN (PCT): CPT | Performed by: INTERNAL MEDICINE

## 2017-02-23 PROCEDURE — 87040 BLOOD CULTURE FOR BACTERIA: CPT | Performed by: INTERNAL MEDICINE

## 2017-02-23 RX ORDER — NICOTINE POLACRILEX 4 MG
15-30 LOZENGE BUCCAL
Status: DISCONTINUED | OUTPATIENT
Start: 2017-02-23 | End: 2017-02-25 | Stop reason: HOSPADM

## 2017-02-23 RX ORDER — FUROSEMIDE 20 MG
20 TABLET ORAL DAILY
Status: DISCONTINUED | OUTPATIENT
Start: 2017-02-23 | End: 2017-02-25 | Stop reason: HOSPADM

## 2017-02-23 RX ORDER — IPRATROPIUM BROMIDE AND ALBUTEROL SULFATE 2.5; .5 MG/3ML; MG/3ML
3 SOLUTION RESPIRATORY (INHALATION) 4 TIMES DAILY PRN
Status: DISCONTINUED | OUTPATIENT
Start: 2017-02-23 | End: 2017-02-25 | Stop reason: HOSPADM

## 2017-02-23 RX ORDER — PREDNISONE 10 MG/1
10 TABLET ORAL DAILY
Status: DISCONTINUED | OUTPATIENT
Start: 2017-02-23 | End: 2017-02-25 | Stop reason: HOSPADM

## 2017-02-23 RX ORDER — TAMSULOSIN HYDROCHLORIDE 0.4 MG/1
0.4 CAPSULE ORAL DAILY
Status: DISCONTINUED | OUTPATIENT
Start: 2017-02-23 | End: 2017-02-25 | Stop reason: HOSPADM

## 2017-02-23 RX ORDER — PIPERACILLIN SODIUM, TAZOBACTAM SODIUM 3; .375 G/15ML; G/15ML
3.38 INJECTION, POWDER, LYOPHILIZED, FOR SOLUTION INTRAVENOUS EVERY 6 HOURS
Status: DISCONTINUED | OUTPATIENT
Start: 2017-02-23 | End: 2017-02-25 | Stop reason: HOSPADM

## 2017-02-23 RX ORDER — TADALAFIL 20 MG/1
40 TABLET ORAL DAILY
Status: DISCONTINUED | OUTPATIENT
Start: 2017-02-24 | End: 2017-02-25 | Stop reason: HOSPADM

## 2017-02-23 RX ORDER — DEXTROSE MONOHYDRATE 25 G/50ML
25-50 INJECTION, SOLUTION INTRAVENOUS
Status: DISCONTINUED | OUTPATIENT
Start: 2017-02-23 | End: 2017-02-25 | Stop reason: HOSPADM

## 2017-02-23 RX ORDER — MULTIPLE VITAMINS W/ MINERALS TAB 9MG-400MCG
1 TAB ORAL DAILY
Status: DISCONTINUED | OUTPATIENT
Start: 2017-02-23 | End: 2017-02-25 | Stop reason: HOSPADM

## 2017-02-23 RX ADMIN — LISINOPRIL 20 MG: 20 TABLET ORAL at 09:10

## 2017-02-23 RX ADMIN — ASPIRIN 81 MG 81 MG: 81 TABLET ORAL at 21:19

## 2017-02-23 RX ADMIN — Medication 12.5 MG: at 21:19

## 2017-02-23 RX ADMIN — IPRATROPIUM BROMIDE AND ALBUTEROL SULFATE 3 ML: .5; 3 SOLUTION RESPIRATORY (INHALATION) at 07:28

## 2017-02-23 RX ADMIN — AMLODIPINE BESYLATE 2.5 MG: 2.5 TABLET ORAL at 09:10

## 2017-02-23 RX ADMIN — PREDNISONE 10 MG: 10 TABLET ORAL at 09:10

## 2017-02-23 RX ADMIN — FUROSEMIDE 20 MG: 20 TABLET ORAL at 12:02

## 2017-02-23 RX ADMIN — DIGOXIN 125 MCG: 125 TABLET ORAL at 12:02

## 2017-02-23 RX ADMIN — INSULIN ASPART 2 UNITS: 100 INJECTION, SOLUTION INTRAVENOUS; SUBCUTANEOUS at 17:20

## 2017-02-23 RX ADMIN — INSULIN GLARGINE 10 UNITS: 100 INJECTION, SOLUTION SUBCUTANEOUS at 00:51

## 2017-02-23 RX ADMIN — PIPERACILLIN AND TAZOBACTAM 3.38 G: 3; .375 INJECTION, POWDER, FOR SOLUTION INTRAVENOUS at 22:52

## 2017-02-23 RX ADMIN — Medication 12.5 MG: at 12:02

## 2017-02-23 RX ADMIN — OMEPRAZOLE 40 MG: 20 CAPSULE, DELAYED RELEASE ORAL at 09:08

## 2017-02-23 RX ADMIN — MULTIPLE VITAMINS W/ MINERALS TAB 1 TABLET: TAB at 17:20

## 2017-02-23 RX ADMIN — COLCHICINE 0.6 MG: 0.6 TABLET, FILM COATED ORAL at 09:09

## 2017-02-23 RX ADMIN — ENOXAPARIN SODIUM 40 MG: 40 INJECTION SUBCUTANEOUS at 09:21

## 2017-02-23 RX ADMIN — INSULIN ASPART 1 UNITS: 100 INJECTION, SOLUTION INTRAVENOUS; SUBCUTANEOUS at 10:16

## 2017-02-23 RX ADMIN — FLUOXETINE 40 MG: 20 CAPSULE ORAL at 09:09

## 2017-02-23 RX ADMIN — ROSUVASTATIN CALCIUM 10 MG: 5 TABLET ORAL at 09:07

## 2017-02-23 RX ADMIN — IRON SUCROSE 200 MG: 20 INJECTION, SOLUTION INTRAVENOUS at 09:03

## 2017-02-23 RX ADMIN — TADALAFIL 40 MG: 20 TABLET ORAL at 12:34

## 2017-02-23 RX ADMIN — LEVOTHYROXINE SODIUM 100 MCG: 100 TABLET ORAL at 09:09

## 2017-02-23 RX ADMIN — TAMSULOSIN HYDROCHLORIDE 0.4 MG: 0.4 CAPSULE ORAL at 17:14

## 2017-02-23 RX ADMIN — PIPERACILLIN AND TAZOBACTAM 3.38 G: 3; .375 INJECTION, POWDER, FOR SOLUTION INTRAVENOUS at 17:14

## 2017-02-23 NOTE — CONSULTS
"CLINICAL NUTRITION SERVICES  -  ASSESSMENT NOTE      Recommendations Ordered by Registered Dietitian (RD):   -Ordered chocolate Glucerna BID between meals    -Ordered daily multivitamin with minerals for potential nutrient deficiencies related to variable PO intake     Future/Additional Recommendations:     -Please encourage intake    -If patient continues to have low intake, may need to modify supplements to support intake and starting a 3-day calorie count to quantify intake.     -If patient consistently unable to meet 2/3rds of estimated needs (about 1,400 kcal and 50 g protein) recommend starting enteral nutrition support.   Malnutrition: Severe malnutrition in the context of chronic illness or disease        REASON FOR ASSESSMENT  Ravi Sandoval is a 72 year old male seen by Registered Dietitian for Admission Nutrition Risk Screen - Unintentional weight loss of 10 lbs or more in the past 2 months      NUTRITION HISTORY  - Information obtained from Patient  -Breakfast: bowel of cheerios  -Lunch: Popcorn and Coke-a-Cola  -Dinner: variable, depending on what wife prepares    Patient reports eating less since his heart attack in 2009. Patient reports significant recent weight loss but is unsure as to cause of weight loss, suspects related to chronic diarrhea.     Writer suspects weight loss likely due to increased work of breathing with declining respiratory function and emphysema, along with reported inconsistent use with supplemental oxygen at home.     CURRENT NUTRITION ORDERS  Diet Order:     Moderate Consistent Carbohydrate     Current Intake/Tolerance:  Patient has not ate during this admission, however did order breakfast.      PHYSICAL FINDINGS  Observed  Muscle Wasting: clavicle/thoracic region dorsal hand, moderate  Subcutaneous fat loss: facial and thoracic region moderate  Obtained from Chart/Interdisciplinary Team  None noted    ANTHROPOMETRICS  Height: 170 cm ( 5' 7\")  Weight: 57.1 kg (125 lbs 14.12 " oz)  Body mass index is 19.72 kg/(m^2).  Weight Status:  Normal BMI  IBW: 67.3 kg (148 lbs)  % IBW: 85%  Weight History: Weight loss of 14 kg in 6 months (20% body weight)   Wt Readings from Last 10 Encounters:   02/23/17 57.1 kg (125 lb 14.1 oz)   02/21/17 62.1 kg (136 lb 12.8 oz)   02/07/17 61.4 kg (135 lb 4.8 oz)   05/27/16 63.5 kg (140 lb)   09/04/14 68.1 kg (150 lb 3.2 oz)   08/27/14 71.1 kg (156 lb 11.2 oz)   08/21/14 71.1 kg (156 lb 11.2 oz)   08/19/14 70.8 kg (156 lb)   08/14/14 70.6 kg (155 lb 9.6 oz)   08/12/14 70.3 kg (155 lb)       LABS  24hr Glucose: 177-226       MEDICATIONS  NovaLog  Lantus    Dosing Weight 57 kg (admission weight)    ASSESSED NUTRITION NEEDS:  Estimated Energy Needs: 1469-2524 kcals (35-40 Kcal/Kg)  Justification: repletion, increased needs with COPD  Estimated Protein Needs: 68-86 grams protein (1.2-1.5 g pro/Kg)  Justification: Repletion  Estimated Fluid Needs: 3977-1694  mL (1 mL/Kcal)  Justification: maintenance or per provider pending fluid status    MALNUTRITION:  % Weight Loss:  > 10% in 6 months (severe malnutrition)  % Intake:  <75% for >/= 3 months (non-severe malnutrition)  Subcutaneous Fat Loss:  Facial and Thoracic region moderate depletion  Muscle Loss:  Clavicle bone region moderate depletion and Dorsal hand region moderate depletion  Fluid Retention:  Mild edema    Malnutrition Diagnosis: Severe malnutrition  In Context of:  Chronic illness or disease    NUTRITION DIAGNOSIS:  Unintended weight loss related to inadequate PO intake and increased needs with respiratory illness as evidenced by weight loss of 20% in 6 months, moderate subcutaneous fat loss in facial and thoracic region, and moderate muscle loss in dorsal hand and clavicle region       NUTRITION INTERVENTIONS  Recommendations / Nutrition Prescription    -Continue moderate carbohydrate diet     -Recommend oral supplements     -Recommend a multivitamin     -If pt has continued poor PO, consider modifying  supplements per patient preference and starting calories counts to better assess intake     -If pt unable to meet 2/3s of estimated needs may need to consider beginning nutrition support     Implementation  Nutrition education: Per Provider order if indicated   Order chocolate Glucerna supplements between meals   Ordered daily multivitamin/mineral  .  Nutrition Goals  Patient to consume >75% of meals and supplements daily      MONITORING AND EVALUATION:  Progress towards goals will be monitored and evaluated per protocol and Practice Guidelines    Carline Jara  Dietetic Intern  Cardiac/Oncology Pager: [phone]

## 2017-02-23 NOTE — ED NOTES
Pt comes from home with his wife where approx 1 hour prior to arrival pt had sudden onset of SOB. Pt has a hx of pulmonary edema and pulmonary htn and is normally on 4 Liters NC. Tonight, upon arrival for EMS pt was tachypnic with a RR of 32, oxygen saturations 70% and hypertensive. Pt states when he became SOB he began shaking uncontrollably and unable to slow down his breathing. Pt placed on 15 L NRB by EMS and given 1 duoneb and 1 spray nitro with some relief. Pt brought to us on 15 L NRB, labored breathing and tachypnic. Dr. Crandall at bedside. Pt placed on bipap shortly after arrival to the ED with some relief. Wife at bedside

## 2017-02-23 NOTE — ED NOTES
Pt has been attempting to urinate for the past 2 hours. RN performed bladder scanner and revealed 850 ml of urine in his bladder. RN performed quick catheter and drained 900 ml of urine. UA sent for analysis

## 2017-02-23 NOTE — PHARMACY-ADMISSION MEDICATION HISTORY
Admission medication history interview status for the 2/22/2017  admission is complete. See EPIC admission navigator for prior to admission medications     Medication history source reliability:Good    Actions taken by pharmacist (provider contacted, etc):None     Additional medication history information not noted on PTA med list : pt was seen by cardiology yesterday and has orders to increase Adcirca (tadalafil) to 40 mg daily, but pt has not yet started this new dose    Medication reconciliation/reorder completed by provider prior to medication history? No    Time spent in this activity: 15 minutes    Prior to Admission medications    Medication Sig Last Dose Taking? Auth Provider   OMEPRAZOLE PO Take 40 mg by mouth daily 2/22/2017 at Unknown time Yes Unknown, Entered By History   ADCIRCA 20 MG TABS TAKE ONE TABLET BY MOUTH EVERY DAY 2/22/2017 at Unknown time Yes Herson Rouse MD   metoprolol (TOPROL-XL) 25 MG 24 hr tablet Take 1 tablet (25 mg) by mouth 2 times daily 2/22/2017 at am Yes Herson Rouse MD   digoxin (LANOXIN) 125 MCG tablet Take 1 tablet (125 mcg) by mouth daily 2/22/2017 at Unknown time Yes Herson Rouse MD   rosuvastatin (CRESTOR) 10 MG tablet Take 1 tablet (10 mg) by mouth daily 2/21/2017 at hs Yes Hamlet Jimenez MD   levothyroxine (SYNTHROID) 100 MCG tablet Take 1 tablet (100 mcg) by mouth daily 2/21/2017 at pm Yes Efren Schwartz MD   FLUoxetine HCl (PROZAC PO) Take 40 mg by mouth daily 2/22/2017 at Unknown time Yes Reported, Patient   AMLODIPINE BESYLATE PO Take 2.5 mg by mouth daily 2/22/2017 at Unknown time Yes Reported, Patient   LISINOPRIL PO Take 20 mg by mouth daily 2/22/2017 at Unknown time Yes Reported, Patient   METFORMIN HCL PO Take 1,000 mg by mouth 2 times daily (with meals) 2/22/2017 at am Yes Reported, Patient   PREDNISONE PO Take 10 mg by mouth daily 2/22/2017 at Unknown time Yes Reported, Patient   Colchicine (COLCRYS PO) Take 0.6 mg  by mouth daily TAKES IN THE EVENING 2/21/2017 at pm Yes Reported, Patient   ASPIRIN PO Take 81 mg by mouth At Bedtime 2/21/2017 at hs Yes Reported, Patient

## 2017-02-23 NOTE — ED PROVIDER NOTES
History     Chief Complaint:  Shortness of breath    HPI   History somewhat limited due to patient's respiratory distress.  Ravi Sandoval is a 72 year old male who presents with respiratory distress. The patient has a pertinent history COPD, previous cardiac arrest with arthrosclerotic heart disease and Histiocytosis X along with pulmonary fibrosis and edema.  He has chronic shortness of breath and wears oxygen at home, mostly at night and when he takes naps. He was transplant recipient eligible but has been removed from this.  He recently underwent an echocardiogram on 2/15/2016 after being seen for increasing exertional shortness of breath with some exertion and was found to have findings as noted below. Over the past several days, he has noted some increased work of breathing and today, around 1800 tonight, he reports having increasing work of breathing despite oxygen at home when he got up to try and walk to the bathroom. EMS was subsequently called and on arrival there, they found the patient hypertensive with a pressure of 236/180. He was tachypneic with an oxygen saturations initially 70% on room air. He was given a Duoneb, placed on oxy-mask, and given nitroglycerin with improvement of his vitals including oxygen saturations to the 90%. On arrival here, the patient denies chest pain but complains of some ongoing neck pain and shortness of breath.    Echocardiogram complete obtained on 2/15/2017:  The right ventricular systolic pressure is approximated at 57mmHg plus the  right atrial pressure estimated at 5-10mmHg.  The right ventricular systolic function is moderate or moderate to severely  reduced. The right ventricle is mild to moderately or moderately dilated. The  right atrium is only mildly dilated. There is trace to mild tricuspid  regurgitation.  Left ventricular systolic function is borderline reduced. The visual ejection  fraction is estimated at 55-60%. There is mild concentric left  ventricular  hypertrophy. The left ventricular cavity is small. Flattened septum is  consistent with RV pressure/volume overload.  Results are similar to the prior echo and the pulmonary pressure is mildly  less severe.    Allergies:  Tetracycline      Medications:    Imodium  Adcirca  Toprol-XL  Lanoxin  Crestor  Levothyroxine  Amlodipine  Prozac  Lisinopril  Metformin  Prednisone  Colcrys  Aspirin      Past Medical History:    Type 2 diabetes  Thyroid disease  Hypertension  Pulmonary hypertension  Histiocytosis X  Cardiac arrest  COPD  BPH  ASHD  Anemia    Past Surgical History:    Cardiac surgery  Lung nodule biopsy  Colonoscopy   Echocardiogram     Family History:    Pulmonary HTN  Skin cancer  Creutzfeldt Eduardo disease     Social History:  Smoking status: Current smoker  Alcohol use: No   Marital Status:        Review of Systems   Unable to perform ROS: Severe respiratory distress     Physical Exam   Vitals:  Patient Vitals for the past 24 hrs:   BP Temp Temp src Heart Rate Resp SpO2 Height Weight   02/23/17 0130 - - - - - 98 % - -   02/23/17 0115 122/68 - - - - - - -   02/23/17 0113 122/68 - - 64 18 98 % - 57.1 kg (125 lb 14.1 oz)   02/23/17 0030 (!) 166/93 - - 73 20 - - -   02/23/17 0000 (!) 135/102 - - - - - - -   02/22/17 2330 139/86 - - 76 20 97 % - -   02/22/17 2245 139/72 - - 80 18 94 % - -   02/22/17 2230 124/75 - - 75 - 97 % - -   02/22/17 2216 - - - 72 - 97 % - -   02/22/17 2215 120/80 - - 73 12 96 % - -   02/22/17 2200 119/80 - - 72 - 97 % - -   02/22/17 2145 (!) 146/112 - - 75 23 97 % - -   02/22/17 2130 144/76 - - 80 16 98 % - -   02/22/17 2115 (!) 149/91 - - 86 17 98 % - -   02/22/17 2113 (!) 174/99 - - 86 - 97 % - -   02/22/17 2045 (!) 165/101 - - 90 17 99 % - -   02/22/17 2030 (!) 173/98 - - 92 - 99 % - -   02/22/17 2015 (!) 150/92 - - 91 - - - -   02/22/17 2000 (!) 176/105 - - 94 20 100 % - -   02/22/17 1957 - - - 97 18 100 % - -   02/22/17 1945 158/89 - - 86 24 94 % - -   02/22/17 1944  "- 99.8  F (37.7  C) Temporal - - 98 % - -   02/1943 - - - 101 - 96 % - -   02/22/17 1942 - - - 94 30 98 % - -   02/22/17 1937 - - - - - - 1.702 m (5' 7\") 61.2 kg (135 lb)   02/22/17 1933 - - - 91 30 (!) 82 % - -   02/22/17 1929 - - - 101 - (!) 88 % - -   02/22/17 1928 - - - - - 97 % - -   02/22/17 1927 (!) 138/106 - - - - - - -     Physical Exam  Nursing note and vitals reviewed.  Constitutional:  Awake and alert, in moderate respiratory distress.  HENT:   Nose:    Nose normal.   Mouth/Throat:   Mucous membranes are normal.   Eyes:    Conjunctivae normal and EOM are normal.      Pupils are equal, round, and reactive to light.   Neck:    Trachea normal.   Cardiovascular:  Tachycardic rate, regular rhythm, normal heart sounds and normal pulses. No murmur heard.  Pulmonary/Chest:  Tachypneic, in moderate respiratory distress with rales in the bases.  Abdominal:   Soft. Normal appearance and bowel sounds are normal.      There is no tenderness.      There is no rebound and no CVA tenderness.   Musculoskeletal:  Extremities atraumatic x 4.   Lymphadenopathy:  No cervical adenopathy.   Neurological:   Alert and oriented to person, place, and time. Normal strength.      No cranial nerve deficit or sensory deficit. GCS eye subscore is 4. GCS verbal subscore is 5. GCS motor subscore is 6.   Skin:    Skin is intact. No rash noted.   Psychiatric:   Anxious affect    Emergency Department Course     ECG (19:33:15):  Rate 91 bpm. MA interval 130. QRS duration 106. QT/QTc 388/477. P-R-T axes 65 -61 93. Normal sinus rhythm. Left anterior fascicular block. ST & T wave abnormality. Prolonged QT. Interpreted at 1934 by Dinesh Crandall MD.     Imaging:  Radiographic findings were communicated with the patient and family who voiced understanding of the findings.    X-ray Chest, Portable view:  Increase in interstitial densities in both lungs  diffusely. Since some of this was present previously, this probably  represents " worsening pulmonary fibrosis, although it is difficult to  rule out superimposed acute changes. Normal heart size and pulmonary  vascularity. No pleural effusion.  Result per radiology.      Laboratory:    1932 - Troponin: <0.015   BNP: 750  T4 free: 1.30  TSH: 7.59 (H)  CBC: HGB 12.4 (L), o/w WNL (WBC 8.0, )    CMP: Glucose 189 (H), Creatinine 0.59 (L), Calcium 8.2 (L), Anion gap 15 (H), o/w WNL  INR: 0.91   Lactic Acid: 7.1 (HH)    Venous Blood Gas    Recent Labs  Lab 02/22/17 1932   PHV 7.31*   PCO2V 42   PO2V 22*   HCO3V 21     Blood Culture x2: Pending   Urine Culture: Pending    Interventions:  1832 - Zofran 4 mg IV  1936 - Solu-medrol 125 mg IV  1938 - Solu-medrol 125 mg IV  2011 - Tylenol 650 mg PO  2017 - Zosyn 3.375 g IVP  2201 Lasix 40 mg IV  2206 Nitroglycerin 0.07 mcg/kg/min IV Drip    Emergency Department Course:  The patient arrived in the emergency department via EMS.   Past medical records, nursing notes, and vitals reviewed.  I performed an exam of the patient and obtained history, as documented above.  IV inserted and blood drawn. The patient was placed on oxygen via BIPAP and continuous cardiac monitoring and pulse oximetry.   2041: Rechecked patient. Explained findings and plan.  I discussed the treatment plan with the patient and his family. They expressed understanding of this plan and consented to admission. I discussed the patient with Dr. Powers, who will admit the patient to a monitored bed for further evaluation and treatment.    Impression & Plan      Medical Decision Making:  Ravi Sandoval is a 72 year old male who presents to the emergency department via EMS in acute respiratory failure. His oxygen saturation levels were quite low for EMS, but they did improve with high flow O2. My initial thought was that this might be secondary to acute pulmonary edema on top of his chronic pulmonary fibrosis. I also considered the possibility of a pneumothorax, pulmonary embolism, and  pneumonia. With his possible low grade fever as well as his elevated lactic acid, I felt it was reasonable to give him an IV dose of Zosyn in case he was septic. His chest x-ray showed the possibility of pulmonary edema on top of his fibrosis as well. I then proceeded with a CT scan of his chest to check for PE and further evaluate his lungs; findings as above. I do not believe he is actually septic and my suspicion is that he has some congestive heart failure now that acutely pushed him over the edge in regards to his respiratory status. He remained improved here on Bi-pap. We provided him with IV Lasix and a Nitro drip. He will be admitted to the CICU under the care of Dr. Powers, who I spoke with as well.     Critical Care Time: 35 minutes    Diagnosis:    ICD-10-CM    1. Acute on chronic respiratory failure with hypoxia (H) J96.21 Blood culture     Blood culture     *UA reflex to Microscopic (ED Lab POCT Only 3-11)     Urine Culture     Lactic acid     Troponin I     Glucose by meter     Glucose by meter     Glucose by meter     Glucose by meter   2. Acute on chronic congestive heart failure, unspecified congestive heart failure type (H) I50.9    3. IPF (idiopathic pulmonary fibrosis) (H) J84.112        Collin Salas  2/22/2017     EMERGENCY DEPARTMENT  I, Collin Salas, am serving as a scribe at 7:26 PM on 2/22/2017 to document services personally performed by Dinesh Crandall MD, based on my observations and the provider's statements to me.       Dinesh Crandall MD  02/23/17 0300

## 2017-02-23 NOTE — PLAN OF CARE
Problem: Goal Outcome Summary  Goal: Goal Outcome Summary  Outcome: Improving  Admission at beginning of shift. VSS. Tele SB +BBB. Patient on 6l oxymask with o2 sats 95-99%. A&O. Patient having trouble with urination and emptying. Bladder scanned  showed over 1000cc. PRN 16fr coude sharma inserted with large outputs. Patient with slight trop. bump overnight. Patient able to sleep comfortably the rest of night. Cards and Pulmonology to see today. Continue to monitor.

## 2017-02-23 NOTE — CONSULTS
REQUESTING PHYSICIAN:  None stated.       REASON FOR CONSULTATION:  Shortness of breath, severe pulmonary hypertension, prolonged QTc on EKG.      HISTORY OF PRESENT ILLNESS:  I had the pleasure of seeing Mr. Ravi Sandoval in consultation at [facility] today.  He is a very pleasant 72-year-old gentleman who is followed by Dr. Rouse in our Cardiology Clinic and actually saw Dr. Rouse just 2 days ago in followup.  He has a history of severe pulmonary hypertension secondary to significant lung disease which is a consequence of histiocytosis X and COPD as well as pulmonary fibrosis.  He underwent right heart catheterization in 2014 that confirmed the diagnosis of severe pulmonary hypertension which was responsive to vasodilator therapy.  Therefore, he was started on tadalafil at that time.  At his most recent office visit with Dr. Rouse, this was increased to 40 mg daily.  Unfortunately, he continues to smoke, and has not been compliant with his oxygen therapy on a regular basis.      He was admitted to [facility] yesterday after an episode of acute shortness of breath that occurred while he was trying to urinate.  He was noted to be disoriented and significantly hypoxic with sats of 70% on room air.  He was started on oxygen therapy and received DuoNebs with improvement in his oxygenation.  He denied any chest discomfort, palpitations, syncope or presyncope.  As stated above, he is not always compliant with his oxygen therapy and did not increase his tadalafil as recommended recently.      He was also noted to have an elevated QTc on his EKG.  Of note, he has been on Imodium therapy due to chronic diarrhea.  Interestingly, he is also on chronic colchicine therapy for his lung disease which he has been taking for the past 20 years or so.      PAST MEDICAL HISTORY:   1.  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   2.  Severe  pulmonary hypertension that is secondary to above.  As stated above, right heart catheterization in 2014 confirmed a response to bronchodilator therapy, at which point the tadalafil was initiated.  His last echocardiogram in 02/2015 demonstrated severe pulmonary hypertension with moderate to severe reduction in RV systolic function.  Left ventricular systolic function is normal at 55% to 60%.    3.  History of coronary artery disease, status post coronary angiography in 10/2010 for inferior ST elevation myocardial infarction.  At that time he underwent successful PTCA to the mid RCA with a bare metal stent.  The procedure was complicated by hypotension that required pressors and ultimately intubation.   4.  Diabetes.   5.  Dyslipidemia.   6.  Hypertension.      FAMILY HISTORY:  Noncontributory.      ALLERGIES:  Tetracycline.      SOCIAL HISTORY:  As stated above, he still smokes about 5 cigarettes a day.      CURRENT MEDICATIONS:  Amlodipine, aspirin, colchicine, digoxin, insulin, levothyroxine, metoprolol, rosuvastatin, prednisone and tadalafil.      REVIEW OF SYSTEMS:  All systems reviewed and negative except as in HPI.      PHYSICAL EXAMINATION:   GENERAL:  He was alert and oriented.   VITAL SIGNS:  Blood pressure was 104/60.  Sats are 96% on 6 liters.   NECK:  Revealed no jugular venous distention.   CHEST:  Remarkable for distant breath sounds with some crackles at the bases.   CARDIOVASCULAR:  Revealed regular rate and rhythm.   ABDOMEN:  Soft, nontender.   EXTREMITIES:  Demonstrated no edema.      ADMISSION LABORATORY DATA:  Remarkable for a creatinine of 0.76.  Troponins are negative at 0.015 and 0.024.  N-terminal BNP is within normal limits at 750.  Hemoglobin is 10.6.  Digoxin level was 0.7.      He underwent a CT of the chest to rule out a pulmonary embolism.  This demonstrated no evidence of pulmonary embolism or dissection with severe pulmonary emphysema and possible associated pulmonary edema.       IMPRESSION:   1.  Severe oxygen-dependent lung disease that is multifactorial secondary to histiocytosis X, chronic obstructive pulmonary disease and pulmonary fibrosis.    2.  Severe pulmonary hypertension, on vasodilator therapy.   3.  Coronary artery disease.   4.  Tobacco abuse.      Mr. Sandoval was admitted to [facility] for an episode of hypoxia and shortness of breath.  Of note, he was noted to be markedly hypertensive at the time of presentation which may have resulted in pulmonary edema in the context of decreased cardiopulmonary reserve.  He appears to be improving at this point with diuretic and oxygen therapy.  His tadalafil has been restarted at a higher dose which is appropriate.      At this point in time, I do not think that there is any evidence of an acute coronary syndrome and the patient actually appears to be euvolemic after receiving a single dose of intravenous Lasix.  Therefore, at this point in time I would not recommend any further intravenous diuretic therapy but I will start him on low-dose oral furosemide therapy given that his severe pulmonary hypertension and right ventricular dysfunction would predispose him to recurrent fluid overload.      Regarding the issue of his prolonged QTc, the diarrhea was apparently very problematic for him and appears to have resolved with the use of Imodium.  His corrected QTc back in  was documented at 458 so it was mildly elevated at that time as well.  At this point in time, I would not recommend any changes to this regimen but he will require periodic EKG monitoring.      It was a pleasure seeing him in consultation today.         ANAIS BECK MD             D: 2017 08:44   T: 2017 09:21   MT: artie      Name:     KYLE SANDOVAL   MRN:      [id number]        Account:       [id number]   :      1945           Consult Date:  2017      Document: [id number]

## 2017-02-23 NOTE — H&P
PRIMARY CARE PHYSICIAN:  Dr. Florencio Patricia.      CHIEF COMPLAINT:  Urinary retention and shortness of breath.      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  He uses 4 liters of oxygen at home, but per his wife, he often will not wear this.  He came out of the bathroom after dinner and said that he was unable to urinate and he appeared shaky and his hands were blue.  His wife checked his oxygen saturations and they were in the 70s.  She cannot recall whether he was wearing his oxygen at this time.  Per the ED physician's note, when EMS arrived, he was 70% on room air.  Normally he is supposed to be using 4 liters, but as above, he does not like to wear his oxygen.  He was given DuoNebs placed on OxyMask and at arrival to the ED, was 90%.  His temperature is 99.8, his heart rate is 86, blood pressure 138/106, respirations 30.  Oxygenation was 88%.  It appears he was initially on 15 liters and then placed on BiPAP with oxygen going up into the 100% range.      In the ED due to his elevated lactic acid he was given Zosyn x1.  He has also received Solu-Medrol x2, 125 mg and Tylenol 650 mg p.o.  He was placed on BiPAP.  He was given Lasix 40 mg IV x1 and placed on nitroglycerin drip.  In the ED, he had a Ellison placed and had over a liter out.  This was then removed per patient's request.        The patient has had progressive shortness of breath over the last week.  He has appeared more weak and shaky per his wife.  Notably, he has been persistently losing weight for many years, states he has lost 4 pounds in the last month and denies any recent lower extremity swelling.  He states he likes salt, that he does not watch his salt, despite the  history of right heart failure.  He has had no recent changes in medications.  He denies any fevers or cough or recent cold-like symptoms.  He did have an episode of chest pain when he was short of breath.  He stated this lasted 15 minutes and then resolved and describes it as an aching in his left chest.  He does not feel this prompted his shortness of breath but occurred during it.  He denies any palpitations or lightheadedness.      His labs in the ED revealed a creatinine of 0.59.  His electrolytes were normal.  Liver enzymes are normal.  Lactic acid was elevated at 7.1.  His troponin was negative.  His lactate was elevated at 7.1.  His BNP notably was normal at 750.        His EKG reviewed by myself revealed a heart rate of 91, normal sinus rhythm with left anterior fascicular block with nonspecific ST-T wave abnormalities.  His corrected QT was measured at 477.      Chest x-ray reviewed by myself does reveal diffuse interstitial changes.  The last chest x-ray was in 2011.  These are definitely worse from before.  It is difficult to differentiate these between pulmonary fibrosis versus CHF.  His chest CT read appeared to show probable pulmonary edema superimposed on severe pulmonary emphysema.  There is no evidence of PE.  He has a slowly growing lobulated nodule in the lingula since exam in 2012.  Sludge and gallstones were noted in the gallbladder.  The patient denies any recent abdominal pain or nausea or vomiting.      PAST MEDICAL HISTORY:   1.  Chronic oxygen dependent respiratory failure multifactorial secondary to histiocytosis X, COPD, pulmonary fibrosis.   2.  Severe pulmonary hypertension on Adcirca.  He was recently seen by his Cardiology and recommended that his Adcirca was increased to 40 mg daily.  He has not yet done this.  His echocardiogram in 02/2017 revealed an RV systolic pressure of 57 plus a right atrial pressure estimated at 5-10.  His right systolic function was moderately to moderately  severely reduced with a moderately dilated right ventricle and trace to mild tricuspid regurgitation.  His LV function was 55% to 60% with mild concentric LVH.  He had a flattened affect septum consistent with RV pressure overload.      3.  Coronary artery disease:  He has a history of ST elevation MI in 10/2010 at which time he underwent angioplasty with bare metal stent of the mid RCA.  He had rapid decompensation after this requiring intubation and his RCA was ballooned and some thrombus was noted in the lesion; the residual was 60-70%.  There was a small intimate dissection noted in the mid RCA, which was stented with a bare metal stent.  The rest of his angiogram at that side revealed no flow-limiting disease.  He had 70% stenosis of his mid LAD; diffuse, more mild disease, 30-40% throughout; diagonal branch had no flow-limiting lesions.  His ramus intermedius had mild irregularities, left circumflex had 30-50% stenosis in the mid section.  PDA and PL branches had mild diffuse disease.  As above, his RCA was completely occluded and he underwent bare metal stent to the RCA.   4.  He follows with Dr. Cabello:  He has had a biopsy and has histiocytosis X, pulmonary fibrosis as well as COPD.  He is on chronic prednisone.  He is supposed to be using oxygen 4 liters a day, but does not.   5.  Tobacco dependence.  He continues to smoke 5 cigarettes a day and does not want to quit.   6.  History of benign prostatic hypertrophy.   7.  Gout.   8.  Depression.   9.  History of herpes zoster.   10.  Gastroesophageal reflux disease.   11.  Hypothyroidism.   12.  Hypertension.   13.  Hyperlipidemia.   14.  Recent diagnosis of iron deficiency anemia.  Baseline hemoglobin is around 12 with MCV at low limits of normal at 79.  Iron level on 02/07/2017 was 30, TIBC was up at 467 and iron saturation index was 6.      MEDICATIONS:  Awaiting reconciliation   Prior to Admission Medications   Prescriptions Last Dose Informant Patient  Reported? Taking?   ADCIRCA 20 MG TABS 2/22/2017 at Unknown time Spouse/Significant Other No No   Sig: TAKE ONE TABLET BY MOUTH EVERY DAY   AMLODIPINE BESYLATE PO 2/22/2017 at Unknown time Self Yes Yes   Sig: Take 2.5 mg by mouth daily   ASPIRIN PO 2/21/2017 at hs Self Yes Yes   Sig: Take 81 mg by mouth At Bedtime   Colchicine (COLCRYS PO) 2/21/2017 at pm Self Yes Yes   Sig: Take 0.6 mg by mouth daily TAKES IN THE EVENING   FLUoxetine HCl (PROZAC PO) 2/22/2017 at Unknown time Self Yes Yes   Sig: Take 40 mg by mouth daily   LISINOPRIL PO 2/22/2017 at Unknown time Self Yes Yes   Sig: Take 20 mg by mouth daily   METFORMIN HCL PO 2/22/2017 at am Self Yes Yes   Sig: Take 1,000 mg by mouth 2 times daily (with meals)   OMEPRAZOLE PO 2/22/2017 at Unknown time Self Yes Yes   Sig: Take 40 mg by mouth daily   PREDNISONE PO 2/22/2017 at Unknown time Self Yes Yes   Sig: Take 10 mg by mouth daily   digoxin (LANOXIN) 125 MCG tablet 2/22/2017 at Unknown time Self No Yes   Sig: Take 1 tablet (125 mcg) by mouth daily   levothyroxine (SYNTHROID) 100 MCG tablet 2/21/2017 at pm Self No Yes   Sig: Take 1 tablet (100 mcg) by mouth daily   metoprolol (TOPROL-XL) 25 MG 24 hr tablet 2/22/2017 at am Self No Yes   Sig: Take 1 tablet (25 mg) by mouth 2 times daily   rosuvastatin (CRESTOR) 10 MG tablet 2/21/2017 at hs Self No Yes   Sig: Take 1 tablet (10 mg) by mouth daily      Facility-Administered Medications: None          ALLERGIES:  Tetracycline.      SOCIAL HISTORY:  He lives with his wife.  He is supposed to use a can but does not use it very much.  He is not very active.  His wife does most of the housework.  He is able to walk on level ground around a grocery store but not much more than this.  He smokes about 5 cigarettes a day.  He does not drink alcohol.      FAMILY HISTORY:    Family History   Problem Relation Age of Onset     Cardiovascular Father      Pulmonary HTN     CANCER Mother      Skin CA     Neurologic Disorder Sister       Creutzfeldt Eduardo Disease        REVIEW OF SYSTEMS:  A complete 10-point review of systems was performed and is unremarkable except what I have already mentioned above.      PHYSICAL EXAMINATION:     VITAL SIGNS:  Heart rate is 80, blood pressure 139/72, respirations 18, oxygenation 94%. Currently he is on an 8-liter nonrebreather.   GENERAL:  He appears lethargic, but otherwise in no acute distress.  He is able to complete full sentences.  He appears very frail.   NEUROPSYCHIATRIC:  He is alert, oriented x3, able to give a coherent history.   NEUROLOGIC:  Cranial nerves II-XII are grossly intact.  Face is symmetric.  He is moving all 4 extremities without gross focal neurological deficit.   HEENT:  Sclerae are anicteric, not injected.   NECK:  Supple without lymphadenopathy or thyromegaly.   HEART:  Regular without murmur, gallop or rub.  He has distant heart sounds.  Extremities are warm without edema.     LUNGS:  Revealed marked decrease in air flow.  There are no wheezes, rhonchi or crackles.   ABDOMEN:  Soft, nontender, nondistended, without hepatosplenomegaly or masses.   MUSCULOSKELETAL:  Reveals that he has a frail build.  He has no joint swelling or erythema or warmth.   SKIN:  Reveals no areas of erythema or tenderness or severe ecchymoses or petechiae.  He does have multiple seborrheic keratoses.       LABORATORY DATA AND OTHER STUDIES:  As above in HPI.      ASSESSMENT AND PLAN:  Ravi Sandoval is a 72-year-old man with history of chronic oxygen-dependent respiratory failure, multifactorial, secondary to histiocytosis X, pulmonary fibrosis, chronic obstructive pulmonary disease, pulmonary hypertension with cor pulmonale who presents today with urinary retention and acute respiratory distress.  He was found to have saturations of 70% on room air and at arrival to the ED here his sats were 88% on high flow oxygen.  He was placed on BiPAP and started on nitroglycerin and Lasix and has had significant  improvement in his breathing and is currently down to 8 liters nonrebreather, satting at 94%. His evaluation was significant for increased interstitial markings of his chest x-ray and CT showed no evidence of PE but what appears to be superimposed pulmonary edema on chronic fibrosis.  His labs are significant for an elevated lactic of 7.1.  His BMP was actually normal.  CBC revealed a stable hemoglobin, no leukocytosis. It was also noted his blood pressure was quite elevated when EMS arrived with systolic blood pressure in the 230s.   1.  Acute on chronic hypoxic respiratory failure, most likely secondary to acute pulmonary edema; however, without signs of significant fluid overload:  He has actually been losing weight gradually for many years, and states he lost 4 pounds in the last month.  He has had no edema and his BNP is normal at 750.  However, as above, he does appear to have a superimposed pulmonary edema and no other clear cause of his acute respiratory distress is found. His blood pressure was also quite elevated at arrival by EMS which could have precipitated pulmonary edema.  He has no signs to suggest an acute infection.  He was given Lasix 40 mg IV in the ED.  Given there is not significant fluid overload, I am going to hold on further doses.  Cardiology is consulted and appreciate their input regarding continued diuresis and adjustment of his Adcirca.    2.  Coronary artery disease, history of ST elevation myocardial infarction of the right coronary artery (RCA) complicated by right coronary artery dissection in 2012 with disease of 70% stenosis in the LAD and moderate disease throughout the other coronary arteries.  He describes an episode of chest pain following the shortness of breath that lasted about 15 minutes and then resolved.  His troponin is negative.  EKG just reveals nonspecific changes.  Will follow serial troponins, but I do not think this is acute MI at this time so I have not put him  on heparin.  We will continue his aspirin 81 mg daily as well as metoprolol XL 12.5 mg twice daily and continue statin.   3.  Severe pulmonary hypertension with cor pulmonale:  As above, he had a recent echocardiogram that revealed an RV systolic pressure at 57 mmHg with RV systolic function moderately to severely reduced with a moderately dilated RV.  LV function was 55%-60% with LVH, mild.  He is on Adcirca for this.  He was just seen by his cardiologist, Dr. Rouse, at the [facility], and it was advised that the increase the Adcirca from 20 mg to 40 mg.  He has not yet done this.  I am going to increase this tomorrow.  Will hold until tomorrow as he was given nitroglycerin in the ED, which I have now discontinued.  Will continue his Digoxin 125 mg daily and I have ordered a digoxin level for the morning.   4.  Hypertension with severely elevated blood pressure at arrival by EMS with reported blood pressure of 236:  This very well could have precipitated his flash pulmonary edema.  Currently his blood pressure is well controlled but he is on a nitroglycerin drip which I am stopping given that he is also on a phosphodiesterase inhibitor Adcirca.  We will continue to follow his blood pressure and adjust his medications as needed.  I am going and continue his prior to admission amlodipine 2.5 mg daily, lisinopril 20 mg daily and metoprolol XL 25 mg twice daily.   5.  Chronic pulmonary fibrosis, chronic obstructive pulmonary disease with histiocytosis X:  He follows with Dr. Cabello. History and exam not compatible with COPD exacerbation. Notably, he does follow with Pulmonary and is not on chronic bronchodilators. I've ordered these PRN. He received high dose methylprednisolone in the ED, we'll just continue PTA prednisone 10 mg daily for now.  6.  Depression:  Will continue his prior to admission fluoxetine.   7.  Gout:  Continue colchicine daily.   8.  Hypothyroidism.  His TSH at admission here is 7.   Will continue levothyroxine 100 mcg daily.  He should have a followup TSH in the next 3 months.   9.  Iron deficiency anemia, recently diagnosed in the clinic:  His baseline hemoglobin is 12.  It is stable here at admission.  We will place him on IV Venofer which was ordered as an outpatient prior to this admission.    10.  Acute urinary retention:  He had a Ellison placed with over a liter out.  He wanted the Ellison removed.  Will follow for recurrent urinary retention and Ellison will be placed p.r.n.   11.  Diabetes mellitus:  We will hold his metformin given his recent IV dye with the CT scan.  I will have him on sliding scale insulin.  He received Solu-Medrol and BS already 180, so I ordered a dose of Lantus 10 units and will give him will give him 2 units of NovoLog with meals.   12.  Chronic diarrhea, of unclear etiology. Recently started on loperamide with significant improvement.   13.  QT prolongation at 477:  He will, of course, be on telemetry and monitor.   14.  Lobular lesion of the lingula:  It slowly growing.  He should have followup in 6 months.   15.  Deep venous thrombosis prophylaxis:  I placed him on Lovenox.   16.  This was discussed he is a full code.        KATHERINE COELLO MD             D: 2017 23:56   T: 2017 01:15   MT:       Name:     KYLE JACOBSON   MRN:      [id number]        Account:      [id number]   :      1945           Admitted:     [id number]      Document: [id number]       cc: Florencio Patricia MD

## 2017-02-23 NOTE — ED NOTES
RN communicated with Dr. Crandall about lactic acid and no plans to give pt IV fluid due to risk of pulmonary edema in this pt. Plan to administer IV zosyn

## 2017-02-23 NOTE — PLAN OF CARE
Problem: Individualization  Goal: Patient Preferences  Outcome: Improving  Pt alert, oriented. No complaints of any chest pain or discomfort or pain elsewhere. Lungs clear, diminished in bases, on 4-5 L NC. Sats >90%. Nails cyanotic and clubbed. SR on tele. Ellison patent. Ate 100% of breakfast. Up SBA. Monitor.

## 2017-02-23 NOTE — PROGRESS NOTES
[facility]    Hospitalist Progress Note    Date of Service (when I saw the patient): 02/23/2017    Assessment & Plan   Ravi Sandoval is a 72-year-old man with history of chronic oxygen-dependent respiratory failure on 4 L at baseline, multifactorial, secondary to histiocytosis X, pulmonary fibrosis, chronic obstructive pulmonary disease, pulmonary hypertension with cor pulmonale who presents with urinary retention and acute respiratory distress. He was found to have saturations of 70% on room air and at arrival to the ED here his sats were 88% on high flow oxygen. He was placed on BiPAP and started on nitroglycerin and Lasix and has had significant improvement in his breathing and is currently down to 8 liters nonrebreather, satting at 94%. His evaluation was significant for increased interstitial markings of his chest x-ray and CT showed no evidence of PE but what appears to be superimposed pulmonary edema on chronic fibrosis. His labs are significant for an elevated lactic of 7.1. His BMP was actually normal. CBC revealed a stable hemoglobin, no leukocytosis. It was also noted his blood pressure was quite elevated when EMS arrived with systolic blood pressure in the 230s.     Acute on chronic hypoxic respiratory failure, most likely secondary to acute pulmonary edema  Without signs of significant fluid overload on admission: He has actually been losing weight gradually for many years, and states he lost 4 pounds in the last month. He has had no edema and his BNP is normal at 750. However, as above, he does appear to have a superimposed pulmonary edema likely from his uncontrolled HTN and no other clear cause of his acute respiratory distress is found.   - He was given Lasix 40 mg IV x1 in the ED.   - He has improved with regards to his hypoxia and is presently on 4 L NC and saturating at 99%.  - Lactic acidosis resolved and suspect the hypoxia was to blame.   - Taper O2 to a goal of > 90%.  -  Continued on oral lasix 20 mg daily by cards.  - Prn nebs and sputum cx pending.      Hypertensive Urgency   With severely elevated blood pressure at arrival by EMS with reported blood pressure of 236: This likely precipitated his flash pulmonary edema. Currently his blood pressure is well controlled off Nitro drip.    - His pta Tadalafil has been in creased from 20 mg daily to 40.    - C/w his prior to admission amlodipine 2.5 mg daily, lisinopril 20 mg daily and metoprolol XL 25 mg twice daily.  - Appreciate cards input.     Positive blood culture - E.Coli  1 of 3 drawn in the ER was positive for gram negative rods - E.Coli. Unclear of the source.  He has he has no fever, leukocytosis, UA negative. CXR without Pna. He did have urinary retention on admission and it may have been some seeding from this but his UA was negative.    - Of note his CT chest did show sludge and gallstones in the GB but he denies biliary symptoms. Abd neg.  - Will check a RUQ US.      - Check a procalcitonin.   - Received a dose of Zosyn in the ER.   Will resume this for now until we get a little more information.   - Repeat blood cx drawn today and will follow.      Addendum:  Procal elevated at 27.85.     Acute urinary retention  He had a Ellison placed with over a liter out in the ER. He wanted the Ellison removed and it was.   - Ellison replaced overnight for retention.   - Start Flomax.    - Monitor for orthostatic hypotension as he is on Tadalafil.     Severe pulmonary hypertension with cor pulmonale:   As above, he had a recent echocardiogram that revealed an RV systolic pressure at 57 mmHg with RV systolic function moderately to severely reduced with a moderately dilated RV. LV function was 55%-60% with LVH, mild. He is on Adcirca for this. He was just seen by his cardiologist, Dr. Rouse, at the [facility], and it was advised that the increase the Adcirca from 20 mg to 40 mg. He had not yet done this.   - This was  increased 2/23 as above.  - Will continue his Digoxin 125 mg daily.  Dig level WNL.      Chronic pulmonary fibrosis, chronic obstructive pulmonary disease with histiocytosis X  He follows with Dr. Cabello. History and exam not compatible with COPD exacerbation. Notably, he does follow with Pulmonary and is not on chronic bronchodilators. I've ordered these PRN.   - He received high dose methylprednisolone in the ED, we'll just continue PTA prednisone 10 mg daily for now.  - Appreciate Pulmonary input. No new recommendations at this time.     Coronary artery disease  History of ST elevation myocardial infarction of the right coronary artery (RCA) complicated by right coronary artery dissection in 2012 with disease of 70% stenosis in the LAD and moderate disease throughout the other coronary arteries. He describes an episode of chest pain following the shortness of breath that lasted about 15 minutes and then resolved. His troponin is negative. EKG just reveals nonspecific changes.   - Serial trops negative.   - Appreciate cards input.  Agree that this does not appear to be ACS.   - We will continue his aspirin 81 mg daily as well as metoprolol XL 12.5 mg twice daily and continue statin.     Depression:   Will continue his prior to admission fluoxetine.     Hypothyroidism  His TSH at admission here is 7. Will continue levothyroxine 100 mcg daily. He should have a followup TSH in the next 3 months.     Iron deficiency anemia  Recently diagnosed in the clinic. His baseline hemoglobin is 12. It is stable here at admission.   - We will place him on IV Venofer which was ordered as an outpatient prior to this admission.     Diabetes mellitus:   We will hold his metformin given his recent IV dye with the CT scan. I will have him on sliding scale insulin. He received Solu-Medrol in the ER and continues on prednisone daily for now.   - Started on Lantus 10 units and will give him will give him 2 units of NovoLog with meals.   -  Sugars 177 today.  Will follow.     Chronic diarrhea, of unclear etiology  Recently started on loperamide with significant improvement.     QT prolongation at 477  - Appreciate cards input.  Not a new finding and apparently has had this for some time.   - Periodic EKG monitoring is all that is needed at this time.     Lobular lesion of the lingula   It slowly growing.  - He should follow up with repeat imaging including a PET scan once recovered as an outpt.        DVT Prophylaxis: Enoxaprain (Lovenox) SQ  Code Status: Full Code    Disposition: Pending continued clinical improvement. Follow blood cultures, RUQ US, O2 sat's and BP. Anticipate another 1-2 days.       Cole Silverio       Interval History   Ellison replaced overnight for retention. Feeling better today and denies any sob on 4 L NC.      -Data reviewed today: I reviewed all new labs and imaging results over the last 24 hours. I personally reviewed no images or EKG's today.    Physical Exam   Temp: 97.5  F (36.4  C) Temp src: Oral BP: 125/74   Heart Rate: 69 Resp: 14 SpO2: 99 % O2 Device: Nasal cannula Oxygen Delivery: 4 LPM  Vitals:    02/22/17 1937 02/23/17 0113   Weight: 61.2 kg (135 lb) 57.1 kg (125 lb 14.1 oz)     Vital Signs with Ranges  Temp:  [97.5  F (36.4  C)-99.8  F (37.7  C)] 97.5  F (36.4  C)  Heart Rate:  [] 69  Resp:  [12-30] 14  BP: (104-176)/() 125/74  FiO2 (%):  [50 %] 50 %  SpO2:  [82 %-100 %] 99 %  I/O last 3 completed shifts:  In: -   Out: 2350 [Urine:2350]    Gen: Patient in no acute distress.  Appears comfortable.  Heart:  S1S2+, regular rate and rhythm, No murmurs.  Lungs:  Decreased throughout but no wheezing or rales.   Abdomen:  Soft, non tender, non distended, bowel sounds positive.  Extremities:  No edema.    Medications        predniSONE (DELTASONE) tablet 10 mg  10 mg Oral Daily     metoprolol  12.5 mg Oral BID     furosemide  20 mg Oral Daily     [START ON 2/24/2017] tadalafil (PAH)  40 mg Oral Daily      amLODIPine (NORVASC) tablet 2.5 mg  2.5 mg Oral Daily     aspirin chewable tablet 81 mg  81 mg Oral At Bedtime     colchicine tablet 0.6 mg  0.6 mg Oral Daily     digoxin  125 mcg Oral Daily     FLUoxetine (PROzac) capsule 40 mg  40 mg Oral Daily     levothyroxine  100 mcg Oral Daily     lisinopril (PRINIVIL/ZESTRIL) tablet 20 mg  20 mg Oral Daily     omeprazole (priLOSEC) CR capsule 40 mg  40 mg Oral Daily     rosuvastatin  10 mg Oral Daily     enoxaparin  40 mg Subcutaneous Q24H     insulin aspart  1-7 Units Subcutaneous TID AC     insulin aspart  1-5 Units Subcutaneous At Bedtime     iron sucrose (VENOFER) intermittent infusion  200 mg Intravenous Q24H     insulin aspart  2 Units Subcutaneous TID w/meals       Data     Recent Labs  Lab 02/23/17  0523 02/23/17  0115 02/22/17  1932   WBC  --   --  8.0   HGB 10.6*  --  12.4*   MCV  --   --  79   PLT  --   --  212   INR  --   --  0.91     --  137   POTASSIUM 3.4  --  4.0   CHLORIDE 101  --  101   CO2 27  --  21   BUN 10  --  9   CR 0.76  --  0.59*   ANIONGAP 10  --  15*   AV 7.7*  --  8.2*   *  --  189*   ALBUMIN  --   --  3.6   PROTTOTAL  --   --  7.2   BILITOTAL  --   --  0.5   ALKPHOS  --   --  58   ALT  --   --  15   AST  --   --  18   TROPI 0.024 0.038 <0.015The 99th percentile for upper reference range is 0.045 ug/L.  Troponin values in the range of 0.045 - 0.120 ug/L may be associated with risks of adverse clinical events.       Recent Results (from the past 24 hour(s))   XR Chest Port 1 View    Narrative    CHEST ONE VIEW PORTABLE   2/22/2017 7:49 PM     HISTORY: Short of breath.    COMPARISON: 4/12/2011      Impression    IMPRESSION: Increase in interstitial densities in both lungs  diffusely. Since some of this was present previously, this probably  represents worsening pulmonary fibrosis, although it is difficult to  rule out superimposed acute changes. Normal heart size and pulmonary  vascularity. No pleural effusion.    JULIO C EID MD    CT Chest Pulmonary Embolism w Contrast    Narrative    CT CHEST PULMONARY EMBOLISM WITH CONTRAST   2/22/2017 9:12 PM     HISTORY: Short of breath. Rule out pulmonary embolism.    TECHNIQUE:  57 mL Isovue-370. Radiation dose for this scan was reduced  using automated exposure control, adjustment of the mA and/or kV  according to patient size, or iterative reconstruction technique.    COMPARISON: CT scan 2/13/2012.    FINDINGS:  There is severe centrilobular emphysema throughout the  upper and lower lobes, a bit worse than on the previous exam.  Superimposed from this there is gravity dependent density in the lower  lobes suggestive of pulmonary edema. Calcifications are seen in  coronary arteries, otherwise the heart appears normal.    There is a lobulated nodule in the lingula measuring 1.2 x 0.8 cm  diameter, increased in size compared with the 0.7 x 0.6 cm diameter on  the previous exam. This is noncalcified. No other pulmonary nodules  are seen.    There is no evidence of pulmonary embolism or aortic dissection. No  pleural effusion.    Gallstones and sludge are seen in the gallbladder. The ovoid  low-attenuation density high in the central aspect of the liver noted  previously measures 2.8-2.1 cm diameter, smaller than on exam of  7/24/2008. Etiology is unknown.      Impression    IMPRESSION:  1. No evidence of pulmonary embolism or aortic dissection.  2. Probable pulmonary edema superimposed on severe pulmonary  emphysema.  3. Increased size of a lobulated nodule in the lingula since the exam  in 2012. While this is relatively slow growth, a low-grade malignancy  cannot be completely excluded. Follow-up exam in 6 months is advised.  Alternatively, PET CT scan may be helpful to evaluate for activity in  the nodule.  4. Sludge and gallstones in the gallbladder.  5. Decreased size of the low-attenuation lesion high in the central  aspect of the liver, of indeterminate etiology.    JULIO C EID MD

## 2017-02-23 NOTE — ED NOTES
[facility]  ED Nurse Handoff Report    ED Chief complaint: Respiratory Distress (Pt presents from home with c.o SOB that started at 1800. Pt has hx of pulmonary edema and pulmonary fibrosis. Pt o2 sats on RA were 70% for EMS. Pt given 1 duoneb and 1 nitro spray)      ED Diagnosis:   Final diagnoses:   None       Code Status: Hospitalist to address    Allergies:   Allergies   Allergen Reactions     Tetracycline        Activity level:  Stand with Assist     Needed?: No    Isolation: No  Infection: Not Applicable    Bariatric?: No      Vital Signs:   Vitals:    02/1943 02/22/17 1944 02/22/17 1945 02/22/17 1957   BP:   158/89    Resp:   24 18   Temp:  99.8  F (37.7  C)     TempSrc:  Temporal     SpO2: 96% 98% 94% 100%   Weight:       Height:           Cardiac Rhythm: ,   Cardiac  Cardiac Rhythm: Normal sinus rhythm    Pain level:      Is this patient confused?: No    Patient Report: Initial Complaint: SOB, hypoxic   Focused Assessment: Pt presents by EMS as a stabilization pt for respiratory distress. Pt has a hx of pulmonary fibrosis and pulmonary hypertension. Pt states yesterday and today he was in his normal state of health when 1 hour prior to arrival he walked out of the bathroom and c/o extreme, sudden onset SOB to his wife. Baseline, pt wears 4 L NC. EMS found pt to be hypoxic at 70% and tachypnic in resp distress. Pt given 1 duoneb and 1 nitro spray by EMS. Pt arrived to ED on 15 L NRB and stated he was feeling slightly improved. Pt placed on bipap in ED which helped pt significantly. Pt given 1 duoneb, 125 mg solumedrol, 650 mg tylenol for low grade fever and IV zosyn. Patient's lactic acid 7.1 so IV zosyn given but unable to give pt large amounts of IV fluid. CXR showed worsening pulmonary fibrosis. Pt TSH 7.59, lactic 7.2, trop negative, Flu negative and all other blood work essentially unremarkable. At age 63 pt was taken off the lung transplant list.   Chest CT pending to  r/o PE. Chest CT was negative for PE, did show pulmonary edema. Pt started on nitro gtt and given 40 mg IV lasix.   Tests Performed: CXR, blood work, blood cultures x2  Abnormal Results: Lactic 7.2, TSH 7.59  Treatments provided: See above    Family Comments: Wife at bedside     OBS brochure/video discussed/provided to patient: No    ED Medications:   Medications   sodium chloride (PF) 0.9% PF flush 3 mL (not administered)   sodium chloride (PF) 0.9% PF flush 3 mL (not administered)   albuterol neb solution 2.5 mg (not administered)   ondansetron (ZOFRAN) injection 4 mg (not administered)   piperacillin-tazobactam (ZOSYN) 3.375 g vial to attach to  mL bag (3.375 g Intravenous New Bag 2/22/17 2017)   ondansetron (ZOFRAN) 2 MG/ML injection (4 mg  Given 2/22/17 1932)   methylPREDNISolone sodium succinate (solu-MEDROL) injection 125 mg (125 mg Intravenous Given 2/22/17 1936)   methylPREDNISolone sodium succinate (solu-MEDROL) 125 mg/2 mL injection (  Given 2/22/17 1938)   acetaminophen (TYLENOL) tablet 650 mg (650 mg Oral Given 2/22/17 2011)       Drips infusing?:  No      ED NURSE PHONE NUMBER: [phone]

## 2017-02-23 NOTE — PROVIDER NOTIFICATION
MD Notification    Notified Person:  MD    Notified Persons Name: Dr. Powers    Notification Date/Time: 2/23/17-0210am    Notification Interaction:  Talked with Physician    Purpose of Notification: Positive Troponin (0.038)    Orders Received: Nothing at this time, continue to monitor.    Comments: Pt comfortable and sleeping well.

## 2017-02-23 NOTE — ED NOTES
Critical lab value of lactic acid 7.1 communicated by lab. This critical value was given to Dr. Crandall

## 2017-02-24 ENCOUNTER — APPOINTMENT (OUTPATIENT)
Dept: ULTRASOUND IMAGING | Facility: CLINIC | Age: 72
DRG: 314 | End: 2017-02-24
Attending: INTERNAL MEDICINE
Payer: MEDICARE

## 2017-02-24 LAB
ANION GAP SERPL CALCULATED.3IONS-SCNC: 10 MMOL/L (ref 3–14)
BACTERIA SPEC CULT: NO GROWTH
BUN SERPL-MCNC: 23 MG/DL (ref 7–30)
CALCIUM SERPL-MCNC: 7.8 MG/DL (ref 8.5–10.1)
CHLORIDE SERPL-SCNC: 105 MMOL/L (ref 94–109)
CO2 SERPL-SCNC: 26 MMOL/L (ref 20–32)
CREAT SERPL-MCNC: 0.64 MG/DL (ref 0.66–1.25)
GFR SERPL CREATININE-BSD FRML MDRD: ABNORMAL ML/MIN/1.7M2
GLUCOSE BLDC GLUCOMTR-MCNC: 106 MG/DL (ref 70–99)
GLUCOSE BLDC GLUCOMTR-MCNC: 156 MG/DL (ref 70–99)
GLUCOSE BLDC GLUCOMTR-MCNC: 216 MG/DL (ref 70–99)
GLUCOSE BLDC GLUCOMTR-MCNC: 236 MG/DL (ref 70–99)
GLUCOSE SERPL-MCNC: 135 MG/DL (ref 70–99)
HGB BLD-MCNC: 9.9 G/DL (ref 13.3–17.7)
Lab: NORMAL
MICRO REPORT STATUS: NORMAL
POTASSIUM SERPL-SCNC: 3.3 MMOL/L (ref 3.4–5.3)
POTASSIUM SERPL-SCNC: 4.2 MMOL/L (ref 3.4–5.3)
SODIUM SERPL-SCNC: 141 MMOL/L (ref 133–144)
SPECIMEN SOURCE: NORMAL
WBC # BLD AUTO: 16.8 10E9/L (ref 4–11)

## 2017-02-24 PROCEDURE — 25000132 ZZH RX MED GY IP 250 OP 250 PS 637: Mod: GY | Performed by: INTERNAL MEDICINE

## 2017-02-24 PROCEDURE — 25000125 ZZHC RX 250: Performed by: INTERNAL MEDICINE

## 2017-02-24 PROCEDURE — 36415 COLL VENOUS BLD VENIPUNCTURE: CPT | Performed by: INTERNAL MEDICINE

## 2017-02-24 PROCEDURE — A9270 NON-COVERED ITEM OR SERVICE: HCPCS | Mod: GY | Performed by: INTERNAL MEDICINE

## 2017-02-24 PROCEDURE — 99232 SBSQ HOSP IP/OBS MODERATE 35: CPT | Performed by: INTERNAL MEDICINE

## 2017-02-24 PROCEDURE — 84132 ASSAY OF SERUM POTASSIUM: CPT | Performed by: INTERNAL MEDICINE

## 2017-02-24 PROCEDURE — 85048 AUTOMATED LEUKOCYTE COUNT: CPT | Performed by: INTERNAL MEDICINE

## 2017-02-24 PROCEDURE — 25000128 H RX IP 250 OP 636: Performed by: INTERNAL MEDICINE

## 2017-02-24 PROCEDURE — 76705 ECHO EXAM OF ABDOMEN: CPT

## 2017-02-24 PROCEDURE — 12000000 ZZH R&B MED SURG/OB

## 2017-02-24 PROCEDURE — 00000146 ZZHCL STATISTIC GLUCOSE BY METER IP

## 2017-02-24 PROCEDURE — 85018 HEMOGLOBIN: CPT | Performed by: INTERNAL MEDICINE

## 2017-02-24 PROCEDURE — 99233 SBSQ HOSP IP/OBS HIGH 50: CPT | Performed by: INTERNAL MEDICINE

## 2017-02-24 PROCEDURE — 80048 BASIC METABOLIC PNL TOTAL CA: CPT | Performed by: INTERNAL MEDICINE

## 2017-02-24 RX ADMIN — FUROSEMIDE 20 MG: 20 TABLET ORAL at 09:52

## 2017-02-24 RX ADMIN — AMLODIPINE BESYLATE 2.5 MG: 2.5 TABLET ORAL at 09:52

## 2017-02-24 RX ADMIN — DIGOXIN 125 MCG: 125 TABLET ORAL at 09:51

## 2017-02-24 RX ADMIN — ASPIRIN 81 MG 81 MG: 81 TABLET ORAL at 22:05

## 2017-02-24 RX ADMIN — ENOXAPARIN SODIUM 40 MG: 40 INJECTION SUBCUTANEOUS at 09:51

## 2017-02-24 RX ADMIN — MULTIPLE VITAMINS W/ MINERALS TAB 1 TABLET: TAB at 09:51

## 2017-02-24 RX ADMIN — LEVOTHYROXINE SODIUM 100 MCG: 100 TABLET ORAL at 09:51

## 2017-02-24 RX ADMIN — PIPERACILLIN AND TAZOBACTAM 3.38 G: 3; .375 INJECTION, POWDER, FOR SOLUTION INTRAVENOUS at 18:08

## 2017-02-24 RX ADMIN — TADALAFIL 40 MG: 20 TABLET ORAL at 10:21

## 2017-02-24 RX ADMIN — PIPERACILLIN AND TAZOBACTAM 3.38 G: 3; .375 INJECTION, POWDER, FOR SOLUTION INTRAVENOUS at 23:42

## 2017-02-24 RX ADMIN — FLUOXETINE 40 MG: 20 CAPSULE ORAL at 09:51

## 2017-02-24 RX ADMIN — COLCHICINE 0.6 MG: 0.6 TABLET, FILM COATED ORAL at 09:51

## 2017-02-24 RX ADMIN — TAMSULOSIN HYDROCHLORIDE 0.4 MG: 0.4 CAPSULE ORAL at 09:51

## 2017-02-24 RX ADMIN — POTASSIUM CHLORIDE 40 MEQ: 1500 TABLET, EXTENDED RELEASE ORAL at 09:52

## 2017-02-24 RX ADMIN — PREDNISONE 10 MG: 10 TABLET ORAL at 09:52

## 2017-02-24 RX ADMIN — LISINOPRIL 20 MG: 20 TABLET ORAL at 09:52

## 2017-02-24 RX ADMIN — INSULIN ASPART 2 UNITS: 100 INJECTION, SOLUTION INTRAVENOUS; SUBCUTANEOUS at 18:09

## 2017-02-24 RX ADMIN — POTASSIUM CHLORIDE 20 MEQ: 1500 TABLET, EXTENDED RELEASE ORAL at 16:17

## 2017-02-24 RX ADMIN — IRON SUCROSE 200 MG: 20 INJECTION, SOLUTION INTRAVENOUS at 09:55

## 2017-02-24 RX ADMIN — PIPERACILLIN AND TAZOBACTAM 3.38 G: 3; .375 INJECTION, POWDER, FOR SOLUTION INTRAVENOUS at 05:01

## 2017-02-24 RX ADMIN — ROSUVASTATIN CALCIUM 10 MG: 5 TABLET ORAL at 09:52

## 2017-02-24 RX ADMIN — Medication 12.5 MG: at 20:38

## 2017-02-24 RX ADMIN — OMEPRAZOLE 40 MG: 20 CAPSULE, DELAYED RELEASE ORAL at 09:51

## 2017-02-24 RX ADMIN — PIPERACILLIN AND TAZOBACTAM 3.38 G: 3; .375 INJECTION, POWDER, FOR SOLUTION INTRAVENOUS at 11:08

## 2017-02-24 RX ADMIN — Medication 12.5 MG: at 09:52

## 2017-02-24 NOTE — PLAN OF CARE
Problem: Goal Outcome Summary  Goal: Goal Outcome Summary  Outcome: Improving  VSS, on home 4L NC sating 98%. No breathing complaints or pain today. Planned transfer to medical bed-awaiting bed availability. Wife involved and mentioned to RN that she would like home care at discharge but pt might be resistant, RN needs to pass on to SW tomorrow to speak to pt/wife about home care set up. Medication Tadalafil needs to be requested by RN prior to dose --locked up in pharmacy--this med also needs to go home with pt at discharge. Pink coccyx with tiny scab like open areas-pt states this has been an ongoing issue. Remind to turn

## 2017-02-24 NOTE — PROGRESS NOTES
"Cardiology Progress Note          Assessment and Plan:         IMPRESSION:   1. Severe oxygen-dependent lung disease that is multifactorial secondary to histiocytosis X, chronic obstructive pulmonary disease and pulmonary fibrosis.   2. Severe pulmonary hypertension, on vasodilator therapy.   3. Coronary artery disease.   4. Tobacco abuse.     Plan  - on PO lasix and increased dose of tadalafil appropriately  - no further cardiac w/up from my standpoint  - will follow up with Dr Rouse as an outpatient          Interval History:     Feels that breathing has improved.                Review of Systems:   As per subjective, otherwise 5 systems reviewed and negative.           Physical Exam:   Blood pressure 117/63, temperature 97.4  F (36.3  C), temperature source Oral, resp. rate 11, height 1.702 m (5' 7\"), weight 57.4 kg (126 lb 8.7 oz), SpO2 99 %.      Vital Sign Ranges  Temperature Temp  Av.5  F (36.4  C)  Min: 97.4  F (36.3  C)  Max: 97.8  F (36.6  C)   Blood pressure Systolic (24hrs), Av , Min:94 , Max:125        Diastolic (24hrs), Av, Min:57, Max:92      Pulse No Data Recorded   Respirations Resp  Avg: 15.8  Min: 11  Max: 23   Pulse oximetry SpO2  Av %  Min: 92 %  Max: 99 %         Intake/Output Summary (Last 24 hours) at 17 0926  Last data filed at 17 2300   Gross per 24 hour   Intake              340 ml   Output              400 ml   Net              -60 ml     GENERAL: He was alert and oriented.   NECK: Revealed no jugular venous distention.   CHEST: Distant breath sounds with some crackles at the bases.   CARDIOVASCULAR: Revealed regular rate and rhythm.   ABDOMEN: Soft, nontender.   EXTREMITIES: Demonstrated no edema.            Medications:     I have reviewed this patient's current medications.              Data:     Labs reviewed.     Tele: WON Hu M.D.    "

## 2017-02-24 NOTE — PROGRESS NOTES
[facility]    Hospitalist Progress Note    Date of Service (when I saw the patient): 02/24/2017    Assessment & Plan   Ravi Sandoval is a 72-year-old man with history of chronic oxygen-dependent respiratory failure, multifactorial, secondary to histiocytosis X, pulmonary fibrosis, chronic obstructive pulmonary disease, pulmonary hypertension with cor pulmonale who presented on 1/22/17 with urinary retention and acute respiratory distress.  He was found to have O2 sat of 70% on room air and at arrival to the ED here his sats were 88% on high flow oxygen.  He was placed on BiPAP and started on nitroglycerin and Lasix with subsequent improvement in his breathing.  His evaluation was significant for increased interstitial markings on chest x-ray and CT showed no evidence of PE but it showed pulmonary edema and pulm fibrosis.  His labs were significant for an elevated lactic of 7.1 but no leukocytosis.  It was also noted his blood pressure was quite elevated when EMS arrived with systolic blood pressure in the 230s.     1.   Acute on chronic hypoxic respiratory failure:  Most likely due to non-compliance w/ O2 supplement, and underlying  histiocytosis X, pulmonary fibrosis, chronic obstructive pulmonary disease, pulmonary hypertension with cor pulmonale.  He was treated w/ iv lasix and NGT drip.  Currently on home po lasix and increase dose of tadalafil.  Appreciate cards rec.  No new work up or rec from card.    2.   CAD, h/o STEMI of RCA complicated by right coronary artery dissection in 2012 with disease of 70% stenosis in the LAD and moderate disease throughout the other coronary arteries:  Seen by card and rec is to resume PTA metoprolol, crestor, lisinopril and ASA.    3.   Severe pulmonary hypertension with cor pulmonale:   As above, he had a recent echocardiogram that revealed an RV systolic pressure at 57 mmHg with RV systolic function moderately to severely reduced with a moderately dilated RV.   LV function was 55%-60% with LVH, mild. He is on Adcirca for this. He was just seen by his cardiologist, Dr. Rouse, at the [facility], and it was advised that the increase the Adcirca from 20 mg to 40 mg daily.  Dose adjusted after admit.  Continue digoxin as well.    4.   Hypertensive urgency:  Treated w/ NGT drip.  Resolved.  Continue his prior to admission amlodipine 2.5 mg daily, lisinopril 20 mg daily and metoprolol XL 25 mg twice daily.     5.   Chronic pulmonary fibrosis, COPD with histiocytosis X:   He follows with Dr. Cabello.  Continue PTA prednisone 10 mg daily for now.    6.   Depression:   Will continue his prior to admission fluoxetine.     7.   Gout:   Continue colchicine daily.     8.   Hypothyroidism:  Continue levothyroxine 100 mcg daily.     9.   Iron deficiency anemia, recently diagnosed in the clinic:  His baseline hemoglobin is 12.  Hgb is 9.9 grams today.  Iron study pending.  He has been on Venofer since admit.     10.    Acute urinary retention: He had a Ellison placed with over a liter out.  He wanted the Ellison removed. Will follow for recurrent urinary retention and Ellison will be placed p.r.n.     11.   Diabetes mellitus:   We will hold his metformin given his recent IV dye with the CT scan.  I will have him on sliding scale insulin. He received Solu-Medrol and BS already 180, so I ordered a dose of Lantus   10 units and will give him will give him 2 units of NovoLog with meals.     12.   Chronic diarrhea, of unclear etiology:  Recently started on loperamide with significant improvement.     13.   QT prolongation at 477:   Card service are aware.  Monitor on tele.    14.   Lobular lesion of the lingula:  It slowly growing. He should have followup in 6 months.     15.   Deep venous thrombosis prophylaxis:   Lovenox    16.   Cholelithiasis and liver lesion per US:  No GI symptoms today.  He says he has chronic liver cyst.  No US evidence of acute cholecystitis.  D/c IV zosyn .   Monitor.      17.   Code status:  Full.      Dispo:  Probable d/c to home in am.      Jennifer Schroeder MD.   Hospitalist.  [phone], pager.       Interval History     SOB is better.  Denied abd pain, SOB or CP.  On couple liters O2 supplement.    -Data reviewed today: I reviewed all new labs and imaging results over the last 24 hours. I personally reviewed Abd US.    Physical Exam   Temp: 97.5  F (36.4  C) Temp src: Oral BP: 140/76   Heart Rate: 64 Resp: 17 SpO2: 98 % O2 Device: Nasal cannula Oxygen Delivery: 4 LPM  Vitals:    02/22/17 1937 02/23/17 0113 02/24/17 0500   Weight: 61.2 kg (135 lb) 57.1 kg (125 lb 14.1 oz) 57.4 kg (126 lb 8.7 oz)     Vital Signs with Ranges  Temp:  [97.4  F (36.3  C)-97.8  F (36.6  C)] 97.5  F (36.4  C)  Heart Rate:  [49-75] 64  Resp:  [11-23] 17  BP: ()/(57-76) 140/76  SpO2:  [94 %-99 %] 98 %  I/O last 3 completed shifts:  In: 580 [P.O.:480; I.V.:100]  Out: 850 [Urine:850]    General: aao x 3, NAD.  HEENT:  NC/AT, PERRL, EOMI, neck supple, no thyromegaly, op clear, mmm.  CVS:  NL s 1 and s2, no m/r/g.  Lungs:  Decreased BS w/ faint bibasilar rhonchi.   Abd:  Soft, + bs, NT, no rebound or gaurding, no fluid shift.  Ext:  No c/c.  Lymph:  No edema.  Neuro:  Nonfocal.  Musculoskeletal: No calf tenderness to palpation.    Skin:  No rash.  Psychiatry:  Mood and affect appropriate.    Medications        predniSONE (DELTASONE) tablet 10 mg  10 mg Oral Daily     metoprolol  12.5 mg Oral BID     furosemide  20 mg Oral Daily     tadalafil (PAH)  40 mg Oral Daily     multivitamin, therapeutic with minerals  1 tablet Oral Daily     tamsulosin  0.4 mg Oral Daily     piperacillin-tazobactam  3.375 g Intravenous Q6H     amLODIPine (NORVASC) tablet 2.5 mg  2.5 mg Oral Daily     aspirin chewable tablet 81 mg  81 mg Oral At Bedtime     colchicine tablet 0.6 mg  0.6 mg Oral Daily     digoxin  125 mcg Oral Daily     FLUoxetine (PROzac) capsule 40 mg  40 mg Oral Daily     levothyroxine  100 mcg Oral  Daily     lisinopril (PRINIVIL/ZESTRIL) tablet 20 mg  20 mg Oral Daily     omeprazole (priLOSEC) CR capsule 40 mg  40 mg Oral Daily     rosuvastatin  10 mg Oral Daily     enoxaparin  40 mg Subcutaneous Q24H     insulin aspart  1-7 Units Subcutaneous TID AC     insulin aspart  1-5 Units Subcutaneous At Bedtime     iron sucrose (VENOFER) intermittent infusion  200 mg Intravenous Q24H     insulin aspart  2 Units Subcutaneous TID w/meals       Data     Recent Labs  Lab 02/24/17  0530 02/23/17  0523 02/23/17  0115 02/22/17  1932   WBC 16.8*  --   --  8.0   HGB 9.9* 10.6*  --  12.4*   MCV  --   --   --  79   PLT  --   --   --  212   INR  --   --   --  0.91    138  --  137   POTASSIUM 3.3* 3.4  --  4.0   CHLORIDE 105 101  --  101   CO2 26 27  --  21   BUN 23 10  --  9   CR 0.64* 0.76  --  0.59*   ANIONGAP 10 10  --  15*   AV 7.8* 7.7*  --  8.2*   * 208*  --  189*   ALBUMIN  --   --   --  3.6   PROTTOTAL  --   --   --  7.2   BILITOTAL  --   --   --  0.5   ALKPHOS  --   --   --  58   ALT  --   --   --  15   AST  --   --   --  18   TROPI  --  0.024 0.038 <0.015The 99th percentile for upper reference range is 0.045 ug/L.  Troponin values in the range of 0.045 - 0.120 ug/L may be associated with risks of adverse clinical events.       Recent Results (from the past 24 hour(s))   US Abdomen Limited    Narrative    RIGHT UPPER QUADRANT ULTRASOUND 2/24/2017 9:09 AM    HISTORY:  Gallstone identified on CT.    COMPARISON:  CT chest 2/22/2017.    FINDINGS:  Gallbladder is normal in size with multiple gallstones in  the dependent portion of the gallbladder. Sludge also identified  within the gallbladder. Gallbladder wall is mildly thickened measuring  0.3 cm in diameter. Patient is nontender over the gallbladder. No  evidence for dilated intra or extrahepatic biliary tree. 3.5 x 3.3 x  4.1 cm complex  lesion in the midportion of the liver identified which  corresponds with the abnormality seen on CT. MRI study would  be  helpful for further evaluation. Liver is otherwise unremarkable.  Portions of the tail of the pancreas are obscured. Visualized pancreas  is normal. Right kidney is normal. Remainder of the scan is  unremarkable.      Impression    IMPRESSION:    1. Cholelithiasis. Gallbladder wall is mildly thickened. Patient is  nontender over the gallbladder.  2. Indeterminate complex lesion in the liver. MRI study would be  helpful for further evaluation.  3. Remainder of the scan is unremarkable.    YANCY SCHAEFFER MD

## 2017-02-24 NOTE — PLAN OF CARE
Problem: Goal Outcome Summary  Goal: Goal Outcome Summary  Outcome: No Change  VSS. No c/o pain. Pt repositioning independently in bed. LS dim with fine crackles. Pt NPO since midnight for abd u/s today. Tele SR. Will continue to monitor.

## 2017-02-24 NOTE — PROGRESS NOTES
Noted to have pink coccynx, small amt of stool near anus. Right side of coccynx pink and 2 small pin point areas of which are open. Encouraged to turn q2hr and get up in chair.

## 2017-02-24 NOTE — PLAN OF CARE
Problem: Goal Outcome Summary  Goal: Goal Outcome Summary  Outcome: No Change  Antibiotics continue. Ellison cath patent, US abd tomorrow.

## 2017-02-25 VITALS
WEIGHT: 136.02 LBS | DIASTOLIC BLOOD PRESSURE: 64 MMHG | BODY MASS INDEX: 21.35 KG/M2 | SYSTOLIC BLOOD PRESSURE: 126 MMHG | HEIGHT: 67 IN | OXYGEN SATURATION: 92 % | TEMPERATURE: 95.2 F | RESPIRATION RATE: 16 BRPM

## 2017-02-25 LAB
ANION GAP SERPL CALCULATED.3IONS-SCNC: 7 MMOL/L (ref 3–14)
BACTERIA SPEC CULT: ABNORMAL
BACTERIA SPEC CULT: NORMAL
BUN SERPL-MCNC: 12 MG/DL (ref 7–30)
CALCIUM SERPL-MCNC: 7.8 MG/DL (ref 8.5–10.1)
CHLORIDE SERPL-SCNC: 107 MMOL/L (ref 94–109)
CO2 SERPL-SCNC: 28 MMOL/L (ref 20–32)
CREAT SERPL-MCNC: 0.58 MG/DL (ref 0.66–1.25)
ERYTHROCYTE [DISTWIDTH] IN BLOOD BY AUTOMATED COUNT: 18.4 % (ref 10–15)
FERRITIN SERPL-MCNC: 284 NG/ML (ref 26–388)
FOLATE SERPL-MCNC: 13.9 NG/ML
GFR SERPL CREATININE-BSD FRML MDRD: ABNORMAL ML/MIN/1.7M2
GLUCOSE BLDC GLUCOMTR-MCNC: 127 MG/DL (ref 70–99)
GLUCOSE BLDC GLUCOMTR-MCNC: 340 MG/DL (ref 70–99)
GLUCOSE SERPL-MCNC: 103 MG/DL (ref 70–99)
HCT VFR BLD AUTO: 31.8 % (ref 40–53)
HGB BLD-MCNC: 9.9 G/DL (ref 13.3–17.7)
IRON SATN MFR SERPL: 55 % (ref 15–46)
IRON SERPL-MCNC: 168 UG/DL (ref 35–180)
Lab: ABNORMAL
MAGNESIUM SERPL-MCNC: 1.8 MG/DL (ref 1.6–2.3)
MCH RBC QN AUTO: 24.4 PG (ref 26.5–33)
MCHC RBC AUTO-ENTMCNC: 31.1 G/DL (ref 31.5–36.5)
MCV RBC AUTO: 78 FL (ref 78–100)
MICRO REPORT STATUS: ABNORMAL
MICRO REPORT STATUS: NORMAL
MICROORGANISM SPEC CULT: ABNORMAL
PLATELET # BLD AUTO: 162 10E9/L (ref 150–450)
POTASSIUM SERPL-SCNC: 3.7 MMOL/L (ref 3.4–5.3)
RBC # BLD AUTO: 4.06 10E12/L (ref 4.4–5.9)
SODIUM SERPL-SCNC: 142 MMOL/L (ref 133–144)
SPECIMEN SOURCE: ABNORMAL
SPECIMEN SOURCE: NORMAL
TIBC SERPL-MCNC: 305 UG/DL (ref 240–430)
VIT B12 SERPL-MCNC: 314 PG/ML (ref 193–986)
WBC # BLD AUTO: 12 10E9/L (ref 4–11)

## 2017-02-25 PROCEDURE — A9270 NON-COVERED ITEM OR SERVICE: HCPCS | Mod: GY | Performed by: INTERNAL MEDICINE

## 2017-02-25 PROCEDURE — 25000132 ZZH RX MED GY IP 250 OP 250 PS 637: Mod: GY | Performed by: INTERNAL MEDICINE

## 2017-02-25 PROCEDURE — 82728 ASSAY OF FERRITIN: CPT | Performed by: INTERNAL MEDICINE

## 2017-02-25 PROCEDURE — 83540 ASSAY OF IRON: CPT | Performed by: INTERNAL MEDICINE

## 2017-02-25 PROCEDURE — 99239 HOSP IP/OBS DSCHRG MGMT >30: CPT | Performed by: INTERNAL MEDICINE

## 2017-02-25 PROCEDURE — 25000125 ZZHC RX 250: Performed by: INTERNAL MEDICINE

## 2017-02-25 PROCEDURE — 82607 VITAMIN B-12: CPT | Performed by: INTERNAL MEDICINE

## 2017-02-25 PROCEDURE — 83550 IRON BINDING TEST: CPT | Performed by: INTERNAL MEDICINE

## 2017-02-25 PROCEDURE — 00000146 ZZHCL STATISTIC GLUCOSE BY METER IP

## 2017-02-25 PROCEDURE — 80048 BASIC METABOLIC PNL TOTAL CA: CPT | Performed by: INTERNAL MEDICINE

## 2017-02-25 PROCEDURE — 82746 ASSAY OF FOLIC ACID SERUM: CPT | Performed by: INTERNAL MEDICINE

## 2017-02-25 PROCEDURE — 25000128 H RX IP 250 OP 636: Performed by: INTERNAL MEDICINE

## 2017-02-25 PROCEDURE — 85027 COMPLETE CBC AUTOMATED: CPT | Performed by: INTERNAL MEDICINE

## 2017-02-25 PROCEDURE — 83735 ASSAY OF MAGNESIUM: CPT | Performed by: INTERNAL MEDICINE

## 2017-02-25 PROCEDURE — 36415 COLL VENOUS BLD VENIPUNCTURE: CPT | Performed by: INTERNAL MEDICINE

## 2017-02-25 RX ORDER — TAMSULOSIN HYDROCHLORIDE 0.4 MG/1
0.4 CAPSULE ORAL DAILY
Qty: 30 CAPSULE | Refills: 3 | Status: SHIPPED | OUTPATIENT
Start: 2017-02-25 | End: 2019-01-01

## 2017-02-25 RX ORDER — TADALAFIL 20 MG/1
40 TABLET ORAL DAILY
Qty: 60 TABLET | Refills: 3 | Status: ON HOLD | OUTPATIENT
Start: 2017-02-25 | End: 2018-01-01

## 2017-02-25 RX ORDER — FUROSEMIDE 20 MG
20 TABLET ORAL DAILY
Qty: 30 TABLET | Refills: 1 | Status: ON HOLD | OUTPATIENT
Start: 2017-02-25 | End: 2017-03-14

## 2017-02-25 RX ADMIN — Medication 12.5 MG: at 08:41

## 2017-02-25 RX ADMIN — IRON SUCROSE 200 MG: 20 INJECTION, SOLUTION INTRAVENOUS at 13:05

## 2017-02-25 RX ADMIN — OMEPRAZOLE 40 MG: 20 CAPSULE, DELAYED RELEASE ORAL at 08:42

## 2017-02-25 RX ADMIN — LISINOPRIL 20 MG: 20 TABLET ORAL at 08:42

## 2017-02-25 RX ADMIN — DIGOXIN 125 MCG: 125 TABLET ORAL at 08:41

## 2017-02-25 RX ADMIN — FLUOXETINE 40 MG: 20 CAPSULE ORAL at 08:40

## 2017-02-25 RX ADMIN — MULTIPLE VITAMINS W/ MINERALS TAB 1 TABLET: TAB at 08:41

## 2017-02-25 RX ADMIN — INSULIN ASPART 5 UNITS: 100 INJECTION, SOLUTION INTRAVENOUS; SUBCUTANEOUS at 13:00

## 2017-02-25 RX ADMIN — PREDNISONE 10 MG: 10 TABLET ORAL at 08:42

## 2017-02-25 RX ADMIN — LEVOTHYROXINE SODIUM 100 MCG: 100 TABLET ORAL at 08:41

## 2017-02-25 RX ADMIN — PIPERACILLIN AND TAZOBACTAM 3.38 G: 3; .375 INJECTION, POWDER, FOR SOLUTION INTRAVENOUS at 06:11

## 2017-02-25 RX ADMIN — COLCHICINE 0.6 MG: 0.6 TABLET, FILM COATED ORAL at 08:42

## 2017-02-25 RX ADMIN — ROSUVASTATIN CALCIUM 10 MG: 5 TABLET ORAL at 08:41

## 2017-02-25 RX ADMIN — TAMSULOSIN HYDROCHLORIDE 0.4 MG: 0.4 CAPSULE ORAL at 08:42

## 2017-02-25 RX ADMIN — ENOXAPARIN SODIUM 40 MG: 40 INJECTION SUBCUTANEOUS at 08:50

## 2017-02-25 RX ADMIN — AMLODIPINE BESYLATE 2.5 MG: 2.5 TABLET ORAL at 08:42

## 2017-02-25 RX ADMIN — FUROSEMIDE 20 MG: 20 TABLET ORAL at 08:42

## 2017-02-25 RX ADMIN — TADALAFIL 40 MG: 20 TABLET ORAL at 09:28

## 2017-02-25 RX ADMIN — PIPERACILLIN AND TAZOBACTAM 3.38 G: 3; .375 INJECTION, POWDER, FOR SOLUTION INTRAVENOUS at 11:22

## 2017-02-25 NOTE — PROGRESS NOTES
Ellison removed at 1430.  Patient voided 150 ml at 1700.  Discharge instructions reviewed with patient and spouse, questions answered.  IV removed.PTA medication  Tadalafil was brought up from the pharmacy and returned to patient. Patient carried belongings out. Escorted out by transport aid, driven home by wife.

## 2017-02-25 NOTE — PROGRESS NOTES
Report called to RALPH Zurita on station [id number]. Pt is to transfer this evening to [facility].

## 2017-02-25 NOTE — PLAN OF CARE
Problem: Goal Outcome Summary  Goal: Goal Outcome Summary  Outcome: Improving  A/O VSS on 4L O2, which is baseline for patient. Tolerating regular diet. Orders to discharge, Hsarma removed @1430.  Awaiting patient to void post- sharma removal to discharge.

## 2017-02-25 NOTE — DISCHARGE SUMMARY
[facility]    Discharge Summary  Hospitalist    Date of Admission:  2/22/2017  Date of Discharge:  2/25/2017  Discharging Provider: Jennifer Schroeder MD  Date of Service (when I saw the patient): 02/25/17    Discharge Diagnoses     1)   Acute on chronic hypoxic respiratory failure.  2)   Hypertensive urgency.  3)   Iron deficiency anemia.  4)   Complex liver lesion.  5)   Uncontrolled DM type.  6)   Lactic acidosis w/o obvious source of infection.  7)   Urinary retention.       PAST MEDICAL HISTORY:   1.   Chronic oxygen dependent respiratory failure multifactorial secondary to histiocytosis X, COPD, pulmonary fibrosis.   2.   Severe pulmonary hypertension on Adcirca. He was recently seen by his Cardiology and recommended that his Adcirca was increased to 40 mg daily. He has not yet done this. His echocardiogram in 02/2017 revealed an RV systolic pressure of 57 plus a right atrial pressure estimated at 5-10. His right systolic function was moderately to moderately severely reduced with a moderately dilated right ventricle and trace to mild tricuspid regurgitation. His LV function was 55% to 60% with mild concentric LVH. He had a flattened affect septum consistent with RV pressure overload.   3.   Coronary artery disease: He has a history of ST elevation MI in 10/2010 at which time he underwent angioplasty with bare metal stent of the mid RCA. He had rapid decompensation after this requiring intubation and his RCA was ballooned and some thrombus was noted in the lesion; the residual was 60-70%. There was a small intimate dissection noted in the mid RCA, which was stented with a bare metal stent. The rest of his angiogram at that side revealed no flow-limiting disease. He had 70% stenosis of his mid LAD; diffuse, more mild disease, 30-40% throughout; diagonal branch had no flow-limiting lesions. His ramus intermedius had mild irregularities, left circumflex had 30-50% stenosis in the mid section. PDA and  PL branches had mild diffuse disease. As above, his RCA was completely occluded and he underwent bare metal stent to the RCA.   4.   He follows with Dr. Cabello: He has had a biopsy and has histiocytosis X, pulmonary fibrosis as well as COPD. He is on chronic prednisone. He is supposed to be using oxygen 4 liters a day, but does not.   5.   Tobacco dependence. He continues to smoke 5 cigarettes a day and does not want to quit.   6.   History of benign prostatic hypertrophy.   7.   Gout.   8.   Depression.   9.   History of herpes zoster.   10.   Gastroesophageal reflux disease.   11.   Hypothyroidism.   12.   Hypertension.   13.   Hyperlipidemia.   14.   Recent diagnosis of iron deficiency anemia. Baseline hemoglobin is around 12 with MCV at low limits of normal at 79. Iron level on 02/07/2017 was 30, TIBC was up at 467 and iron saturation index was 6.     History of Present Illness   Ravi Sandoval is a 72-year-old man with history of chronic oxygen-dependent respiratory failure, due to histiocytosis X, pulmonary fibrosis, chronic obstructive pulmonary disease, pulmonary hypertension with cor pulmonale.  [facility] urinary retention and acute respiratory distress. He was found to have O2 sat of 70% on room air and at arrival to the ED, his sats were 88% on high flow oxygen.  He was placed on BiPAP and started on nitroglycerin and Lasix with subsequent improvement in his breathing.  There was significantly increased interstitial markings on chest x-ray and CT showed no evidence of PE but it showed pulmonary edema and pulm fibrosis.  His labs were significant for an elevated lactic of 7.1 but no leukocytosis.  It was also noted his blood pressure was quite elevated when EMS arrived with systolic blood pressure in the 230s.     Hospital Course   Rvai Sandoval was admitted on 2/22/2017.  The following problems were addressed during his hospitalization:    1.   Acute on chronic hypoxic  respiratory failure:   Multifactorial, non-compliance w/ O2 supplement, histiocytosis X, pulmonary fibrosis, chronic obstructive pulmonary disease, pulmonary hypertension with cor pulmonale and pulmonary edema.  He required treatment w/ iv lasix and NGT drip.  His Tadalafil dose was also titrated up to 40 mg po daily per his cardiologist rec.  He was also initiated on Lasix 20 mg po daily.  He is back on his PTA O2 supplement as well.  He is negative 3080 ml since admit but no change in wt 135 to 136 lbs.  Will d/c him to home w/ Lasix 20 mg po daily and Tadalafi 40 mg po daily.  F/u w/ Dr. Rouse next wk.     2.   CAD, h/o STEMI of RCA complicated by RCA dissection in 2012 with disease of 70% stenosis in the LAD and moderate disease throughout the other coronary arteries:   Seen by card and rec is to resume PTA metoprolol, crestor, lisinopril and ASA.     3.   Severe pulmonary hypertension with cor pulmonale:   As above, he had a recent echocardiogram that revealed an RV systolic pressure at 57 mmHg with RV systolic function moderately to severely reduced with a moderately dilated RV.  LV function was 55%-60% with LVH, mild.  He is on Adcirca for this.  He was just seen by his cardiologist, Dr. Rouse, at the [facility] ER, and it was advised that the increase the Adcirca from 20 mg to 40 mg daily.  Dose adjusted after admit.  Continue digoxin as well.     4.   Hypertensive urgency:   Treated w/ NGT drip.  Resolved.  Continue his prior to admission amlodipine 2.5 mg daily, lisinopril 20 mg daily and metoprolol XL 25 mg twice daily.      5.  Chronic pulmonary fibrosis, COPD with histiocytosis X:   He follows with Dr. Cabello.  Continue PTA prednisone 10 mg daily for now.     6.   Depression:   Will continue his prior to admission fluoxetine.      7.   Gout:  Continue colchicine daily.      8.   Hypothyroidism:   Continue levothyroxine 100 mcg daily.      9.   Iron deficiency anemia, recently diagnosed  in the clinic:  His baseline hemoglobin is 12. Hgb is 9.9 grams today. Iron study revealed adequate iron storage.  He received 3 doses of Venofer during this hospitalization per Dr. Rouse's recommendation.      10.   Acute urinary retention:  May be due to BPH.  He had a Sharma placed immediately draining over a liter.  Dc sharma today.  Start flomax.      11.   Diabetes mellitus:  Uncontrolled likely due to med noncompliance and is also chronically on prednisone.. Resume PTA Metformin.  He received contrast on 2/22/17, 3 days ago.     12.   Chronic diarrhea, of unclear etiology:   Recently started on loperamide with significant improvement.      13.   Lobular lesion of the lingula:   US on 2/24 showed cholelithiasis and complex liver lesion.  No US evidence of acute cholecystitis.  He received empiric IV zosyn for a day.  The liver lesion is complex and is slowly growing.  He should have followup in 6 months.      14.   Code status:   Full.       Pending Results   These results will be followed up by PCP  Unresulted Labs Ordered in the Past 30 Days of this Admission     Date and Time Order Name Status Description    2/23/2017 1455 Blood culture Preliminary     2/22/2017 2006 Blood culture Preliminary              Primary Care Physician   Florencio Patricia    Physical Exam   Temp: 95.2  F (35.1  C) Temp src: Oral BP: 126/64   Heart Rate: 75 Resp: 16 SpO2: 92 % O2 Device: Nasal cannula Oxygen Delivery: 4 LPM  Vitals:    02/23/17 0113 02/24/17 0500 02/25/17 0609   Weight: 57.1 kg (125 lb 14.1 oz) 57.4 kg (126 lb 8.7 oz) 61.7 kg (136 lb 0.4 oz)     Vital Signs with Ranges  Temp:  [95.2  F (35.1  C)-97.5  F (36.4  C)] 95.2  F (35.1  C)  Heart Rate:  [61-75] 75  Resp:  [13-17] 16  BP: (126-158)/(61-86) 126/64  SpO2:  [92 %-97 %] 92 %  I/O last 3 completed shifts:  In: 240 [P.O.:240]  Out: 1250 [Urine:1250]    General: aao x 3, NAD.  HEENT: NC/AT, PERRL, EOMI, neck supple, no thyromegaly, op clear, mmm.  CVS: NL s 1  and s2, no m/r/g.  Lungs: Decreased BS w/ faint bibasilar rhonchi.   Abd: Soft, + bs, NT, no rebound or gaurding, no fluid shift.  Ext: No c/c.  Lymph: No edema.  Neuro: Nonfocal.  Musculoskeletal: No calf tenderness to palpation.   Skin: No rash.  Psychiatry: Mood and affect appropriate.    Discharge Disposition   Discharged to home  Condition at discharge: Stable    Consultations This Hospital Stay   CARDIOLOGY IP CONSULT  PULMONARY IP CONSULT    Time Spent on this Encounter   I, Jennifer Schroeder, personally saw the patient today and spent greater than 30 minutes discharging this patient.    Discharge Orders     Reason for your hospital stay   Acute on chronic hypoxic resp failure.     Follow-up and recommended labs and tests    Follow up with Dr. Rouse of cardiology in one wk.     Activity   Your activity upon discharge: activity as tolerated     Diet   Follow this diet upon discharge:  Combination low fat, low, 2 grmas Na,  Moderate Consistent CHO (4-6 CHO units/meal)       Discharge Medications   Current Discharge Medication List      START taking these medications    Details   furosemide (LASIX) 20 MG tablet Take 1 tablet (20 mg) by mouth daily  Qty: 30 tablet, Refills: 1    Associated Diagnoses: Acute on chronic congestive heart failure, unspecified congestive heart failure type (H)         Flomax 0.4 mg po daily # 30 tabs, 3 refills.        CONTINUE these medications which have CHANGED    Details   tadalafil, PAH, (ADCIRCA) 20 MG TABS Take 2 tablets (40 mg) by mouth daily  Qty: 60 tablet, Refills: 3    Associated Diagnoses: Pulmonary hypertension (H)         CONTINUE these medications which have NOT CHANGED    Details   OMEPRAZOLE PO Take 40 mg by mouth daily      metoprolol (TOPROL-XL) 25 MG 24 hr tablet Take 1 tablet (25 mg) by mouth 2 times daily  Qty: 180 tablet, Refills: 0    Comments: Pt needs to make appt or obtain refill from his new cardiologist.  Associated Diagnoses: Pulmonary hypertension (H)       digoxin (LANOXIN) 125 MCG tablet Take 1 tablet (125 mcg) by mouth daily  Qty: 90 tablet, Refills: 3    Associated Diagnoses: Chronic systolic heart failure (H)      rosuvastatin (CRESTOR) 10 MG tablet Take 1 tablet (10 mg) by mouth daily  Qty: 90 tablet, Refills: 3    Associated Diagnoses: Hyperlipidemia LDL goal <70      levothyroxine (SYNTHROID) 100 MCG tablet Take 1 tablet (100 mcg) by mouth daily  Qty: 30 tablet, Refills: 11    Associated Diagnoses: Hyperthyroidism      FLUoxetine HCl (PROZAC PO) Take 40 mg by mouth daily      AMLODIPINE BESYLATE PO Take 2.5 mg by mouth daily      LISINOPRIL PO Take 20 mg by mouth daily      METFORMIN HCL PO Take 1,000 mg by mouth 2 times daily (with meals)      PREDNISONE PO Take 10 mg by mouth daily      Colchicine (COLCRYS PO) Take 0.6 mg by mouth daily TAKES IN THE EVENING      ASPIRIN PO Take 81 mg by mouth At Bedtime           Allergies   Allergies   Allergen Reactions     Tetracycline

## 2017-02-25 NOTE — PLAN OF CARE
Problem: Goal Outcome Summary  Goal: Goal Outcome Summary  Outcome: No Change  A & O x 4, VSS, no c/o pain. LS dim and on 4LO2 via NC which is baseline. Tele: SR w/BBB. Terra patent, in until no longer on lasix. Continue to monitor.

## 2017-02-27 ENCOUNTER — CARE COORDINATION (OUTPATIENT)
Dept: CARDIOLOGY | Facility: CLINIC | Age: 72
End: 2017-02-27

## 2017-02-27 NOTE — PROGRESS NOTES
Called patient's wife Kelly to discuss any post hospital d/c questions she may have, review medication changes, and confirm f/u appts. Patient's wife denied any questions regarding new medications or changes to some of his current medications that he was taking prior to admission. Patient's wife denied that patient had any increased SOB(from baseline), chest pain, or light headedness. RN confirmed with patient's wife that has an apt scheduled on 4/18/17 with Dr. Rouse. Patient's wife advised to call clinic with any cardiac related questions or concerns prior to his apt on 4/18/17. Patient's wife verbalized understanding and agreed with plan.

## 2017-02-28 LAB
BACTERIA SPEC CULT: NO GROWTH
Lab: NORMAL
MICRO REPORT STATUS: NORMAL
SPECIMEN SOURCE: NORMAL

## 2017-03-01 LAB
BACTERIA SPEC CULT: NO GROWTH
Lab: NORMAL
MICRO REPORT STATUS: NORMAL
SPECIMEN SOURCE: NORMAL

## 2017-03-12 ENCOUNTER — HOSPITAL ENCOUNTER (INPATIENT)
Facility: CLINIC | Age: 72
LOS: 3 days | Discharge: NURSING, REHAB OR LONG-TERM CARE | DRG: 641 | End: 2017-03-15
Attending: EMERGENCY MEDICINE | Admitting: INTERNAL MEDICINE
Payer: MEDICARE

## 2017-03-12 ENCOUNTER — APPOINTMENT (OUTPATIENT)
Dept: GENERAL RADIOLOGY | Facility: CLINIC | Age: 72
DRG: 641 | End: 2017-03-12
Attending: EMERGENCY MEDICINE
Payer: MEDICARE

## 2017-03-12 DIAGNOSIS — R09.02 HYPOXIA: ICD-10-CM

## 2017-03-12 DIAGNOSIS — I50.9 ACUTE ON CHRONIC CONGESTIVE HEART FAILURE, UNSPECIFIED CONGESTIVE HEART FAILURE TYPE: ICD-10-CM

## 2017-03-12 DIAGNOSIS — M62.81 GENERALIZED MUSCLE WEAKNESS: ICD-10-CM

## 2017-03-12 DIAGNOSIS — E87.20 ACIDOSIS: ICD-10-CM

## 2017-03-12 DIAGNOSIS — E11.9 TYPE 2 DIABETES MELLITUS WITHOUT COMPLICATION, WITHOUT LONG-TERM CURRENT USE OF INSULIN (H): ICD-10-CM

## 2017-03-12 DIAGNOSIS — K52.9 CHRONIC DIARRHEA: Primary | ICD-10-CM

## 2017-03-12 LAB
ALBUMIN UR-MCNC: 10 MG/DL
ANION GAP SERPL CALCULATED.3IONS-SCNC: 15 MMOL/L (ref 3–14)
APPEARANCE UR: CLEAR
BASE EXCESS BLDV CALC-SCNC: 1.3 MMOL/L
BASOPHILS # BLD AUTO: 0 10E9/L (ref 0–0.2)
BASOPHILS NFR BLD AUTO: 0.1 %
BILIRUB UR QL STRIP: NEGATIVE
BUN SERPL-MCNC: 11 MG/DL (ref 7–30)
CALCIUM SERPL-MCNC: 8 MG/DL (ref 8.5–10.1)
CHLORIDE SERPL-SCNC: 99 MMOL/L (ref 94–109)
CO2 SERPL-SCNC: 22 MMOL/L (ref 20–32)
COLOR UR AUTO: YELLOW
CREAT SERPL-MCNC: 0.58 MG/DL (ref 0.66–1.25)
DIFFERENTIAL METHOD BLD: ABNORMAL
EOSINOPHIL # BLD AUTO: 0 10E9/L (ref 0–0.7)
EOSINOPHIL NFR BLD AUTO: 0.1 %
ERYTHROCYTE [DISTWIDTH] IN BLOOD BY AUTOMATED COUNT: 21.6 % (ref 10–15)
GFR SERPL CREATININE-BSD FRML MDRD: ABNORMAL ML/MIN/1.7M2
GLUCOSE BLDC GLUCOMTR-MCNC: 118 MG/DL (ref 70–99)
GLUCOSE BLDC GLUCOMTR-MCNC: 216 MG/DL (ref 70–99)
GLUCOSE SERPL-MCNC: 202 MG/DL (ref 70–99)
GLUCOSE UR STRIP-MCNC: 300 MG/DL
HCO3 BLDV-SCNC: 27 MMOL/L (ref 21–28)
HCT VFR BLD AUTO: 35.2 % (ref 40–53)
HGB BLD-MCNC: 11.2 G/DL (ref 13.3–17.7)
HGB UR QL STRIP: NEGATIVE
HYALINE CASTS #/AREA URNS LPF: 1 /LPF (ref 0–2)
IMM GRANULOCYTES # BLD: 0.1 10E9/L (ref 0–0.4)
IMM GRANULOCYTES NFR BLD: 0.4 %
INTERPRETATION ECG - MUSE: NORMAL
KETONES UR STRIP-MCNC: NEGATIVE MG/DL
LACTATE BLD-SCNC: 1.5 MMOL/L (ref 0.7–2.1)
LACTATE SERPL-SCNC: 6.1 MMOL/L (ref 0.4–2)
LEUKOCYTE ESTERASE UR QL STRIP: NEGATIVE
LYMPHOCYTES # BLD AUTO: 1.3 10E9/L (ref 0.8–5.3)
LYMPHOCYTES NFR BLD AUTO: 8.8 %
MCH RBC QN AUTO: 26 PG (ref 26.5–33)
MCHC RBC AUTO-ENTMCNC: 31.8 G/DL (ref 31.5–36.5)
MCV RBC AUTO: 82 FL (ref 78–100)
MONOCYTES # BLD AUTO: 1.1 10E9/L (ref 0–1.3)
MONOCYTES NFR BLD AUTO: 7.2 %
MUCOUS THREADS #/AREA URNS LPF: PRESENT /LPF
NEUTROPHILS # BLD AUTO: 12.6 10E9/L (ref 1.6–8.3)
NEUTROPHILS NFR BLD AUTO: 83.4 %
NITRATE UR QL: NEGATIVE
NRBC # BLD AUTO: 0 10*3/UL
NRBC BLD AUTO-RTO: 0 /100
NT-PROBNP SERPL-MCNC: 769 PG/ML (ref 0–900)
OXYHGB MFR BLDV: 25 %
PCO2 BLDV: 45 MM HG (ref 40–50)
PH BLDV: 7.38 PH (ref 7.32–7.43)
PH UR STRIP: 7 PH (ref 5–7)
PLATELET # BLD AUTO: 196 10E9/L (ref 150–450)
PO2 BLDV: 20 MM HG (ref 25–47)
POTASSIUM SERPL-SCNC: 4 MMOL/L (ref 3.4–5.3)
RBC # BLD AUTO: 4.3 10E12/L (ref 4.4–5.9)
RBC #/AREA URNS AUTO: 1 /HPF (ref 0–2)
SODIUM SERPL-SCNC: 136 MMOL/L (ref 133–144)
SP GR UR STRIP: 1.01 (ref 1–1.03)
TROPONIN I SERPL-MCNC: NORMAL UG/L (ref 0–0.04)
URN SPEC COLLECT METH UR: ABNORMAL
UROBILINOGEN UR STRIP-MCNC: 4 MG/DL (ref 0–2)
WBC # BLD AUTO: 15.1 10E9/L (ref 4–11)
WBC #/AREA URNS AUTO: 1 /HPF (ref 0–2)

## 2017-03-12 PROCEDURE — 12000000 ZZH R&B MED SURG/OB

## 2017-03-12 PROCEDURE — A9270 NON-COVERED ITEM OR SERVICE: HCPCS | Mod: GY | Performed by: INTERNAL MEDICINE

## 2017-03-12 PROCEDURE — 84484 ASSAY OF TROPONIN QUANT: CPT | Performed by: EMERGENCY MEDICINE

## 2017-03-12 PROCEDURE — 82805 BLOOD GASES W/O2 SATURATION: CPT | Performed by: EMERGENCY MEDICINE

## 2017-03-12 PROCEDURE — 81001 URINALYSIS AUTO W/SCOPE: CPT | Performed by: INTERNAL MEDICINE

## 2017-03-12 PROCEDURE — 99223 1ST HOSP IP/OBS HIGH 75: CPT | Mod: AI | Performed by: INTERNAL MEDICINE

## 2017-03-12 PROCEDURE — 93005 ELECTROCARDIOGRAM TRACING: CPT

## 2017-03-12 PROCEDURE — 83880 ASSAY OF NATRIURETIC PEPTIDE: CPT | Performed by: EMERGENCY MEDICINE

## 2017-03-12 PROCEDURE — 96360 HYDRATION IV INFUSION INIT: CPT

## 2017-03-12 PROCEDURE — 25000128 H RX IP 250 OP 636: Performed by: EMERGENCY MEDICINE

## 2017-03-12 PROCEDURE — 25000128 H RX IP 250 OP 636: Performed by: INTERNAL MEDICINE

## 2017-03-12 PROCEDURE — 80048 BASIC METABOLIC PNL TOTAL CA: CPT | Performed by: EMERGENCY MEDICINE

## 2017-03-12 PROCEDURE — 99285 EMERGENCY DEPT VISIT HI MDM: CPT | Mod: 25

## 2017-03-12 PROCEDURE — 85025 COMPLETE CBC W/AUTO DIFF WBC: CPT | Performed by: EMERGENCY MEDICINE

## 2017-03-12 PROCEDURE — 83605 ASSAY OF LACTIC ACID: CPT | Performed by: INTERNAL MEDICINE

## 2017-03-12 PROCEDURE — 87493 C DIFF AMPLIFIED PROBE: CPT | Performed by: INTERNAL MEDICINE

## 2017-03-12 PROCEDURE — 25000125 ZZHC RX 250: Performed by: INTERNAL MEDICINE

## 2017-03-12 PROCEDURE — 36415 COLL VENOUS BLD VENIPUNCTURE: CPT | Performed by: INTERNAL MEDICINE

## 2017-03-12 PROCEDURE — 25000132 ZZH RX MED GY IP 250 OP 250 PS 637: Mod: GY | Performed by: INTERNAL MEDICINE

## 2017-03-12 PROCEDURE — 00000146 ZZHCL STATISTIC GLUCOSE BY METER IP

## 2017-03-12 PROCEDURE — 71020 XR CHEST 2 VW: CPT

## 2017-03-12 PROCEDURE — 83605 ASSAY OF LACTIC ACID: CPT | Performed by: EMERGENCY MEDICINE

## 2017-03-12 PROCEDURE — 87040 BLOOD CULTURE FOR BACTERIA: CPT | Performed by: INTERNAL MEDICINE

## 2017-03-12 PROCEDURE — 25000131 ZZH RX MED GY IP 250 OP 636 PS 637: Mod: GY | Performed by: INTERNAL MEDICINE

## 2017-03-12 RX ORDER — METOPROLOL SUCCINATE 25 MG/1
25 TABLET, EXTENDED RELEASE ORAL 2 TIMES DAILY
Status: DISCONTINUED | OUTPATIENT
Start: 2017-03-12 | End: 2017-03-15 | Stop reason: HOSPADM

## 2017-03-12 RX ORDER — PREDNISONE 10 MG/1
10 TABLET ORAL DAILY
Status: DISCONTINUED | OUTPATIENT
Start: 2017-03-12 | End: 2017-03-15 | Stop reason: HOSPADM

## 2017-03-12 RX ORDER — LEVOTHYROXINE SODIUM 100 UG/1
100 TABLET ORAL DAILY
Status: DISCONTINUED | OUTPATIENT
Start: 2017-03-12 | End: 2017-03-15 | Stop reason: HOSPADM

## 2017-03-12 RX ORDER — ASPIRIN 81 MG/1
81 TABLET, CHEWABLE ORAL AT BEDTIME
Status: DISCONTINUED | OUTPATIENT
Start: 2017-03-12 | End: 2017-03-15 | Stop reason: HOSPADM

## 2017-03-12 RX ORDER — AMLODIPINE BESYLATE 2.5 MG/1
2.5 TABLET ORAL DAILY
Status: DISCONTINUED | OUTPATIENT
Start: 2017-03-12 | End: 2017-03-15 | Stop reason: HOSPADM

## 2017-03-12 RX ORDER — COLCHICINE 0.6 MG/1
0.6 TABLET ORAL
Status: DISCONTINUED | OUTPATIENT
Start: 2017-03-12 | End: 2017-03-13

## 2017-03-12 RX ORDER — DEXTROSE MONOHYDRATE 25 G/50ML
25-50 INJECTION, SOLUTION INTRAVENOUS
Status: DISCONTINUED | OUTPATIENT
Start: 2017-03-12 | End: 2017-03-15 | Stop reason: HOSPADM

## 2017-03-12 RX ORDER — TAMSULOSIN HYDROCHLORIDE 0.4 MG/1
0.4 CAPSULE ORAL DAILY
Status: DISCONTINUED | OUTPATIENT
Start: 2017-03-12 | End: 2017-03-15 | Stop reason: HOSPADM

## 2017-03-12 RX ORDER — NALOXONE HYDROCHLORIDE 0.4 MG/ML
.1-.4 INJECTION, SOLUTION INTRAMUSCULAR; INTRAVENOUS; SUBCUTANEOUS
Status: DISCONTINUED | OUTPATIENT
Start: 2017-03-12 | End: 2017-03-15 | Stop reason: HOSPADM

## 2017-03-12 RX ORDER — ACETAMINOPHEN 325 MG/1
650 TABLET ORAL EVERY 4 HOURS PRN
Status: DISCONTINUED | OUTPATIENT
Start: 2017-03-12 | End: 2017-03-15 | Stop reason: HOSPADM

## 2017-03-12 RX ORDER — DIGOXIN 125 MCG
125 TABLET ORAL DAILY
Status: DISCONTINUED | OUTPATIENT
Start: 2017-03-12 | End: 2017-03-15 | Stop reason: HOSPADM

## 2017-03-12 RX ORDER — TADALAFIL 5 MG/1
40 TABLET ORAL DAILY
Status: DISCONTINUED | OUTPATIENT
Start: 2017-03-12 | End: 2017-03-15 | Stop reason: HOSPADM

## 2017-03-12 RX ORDER — NICOTINE POLACRILEX 4 MG
15-30 LOZENGE BUCCAL
Status: DISCONTINUED | OUTPATIENT
Start: 2017-03-12 | End: 2017-03-15 | Stop reason: HOSPADM

## 2017-03-12 RX ORDER — TADALAFIL 20 MG/1
40 TABLET ORAL DAILY
Status: DISCONTINUED | OUTPATIENT
Start: 2017-03-12 | End: 2017-03-12 | Stop reason: ALTCHOICE

## 2017-03-12 RX ORDER — ROSUVASTATIN CALCIUM 10 MG/1
10 TABLET, COATED ORAL DAILY
Status: DISCONTINUED | OUTPATIENT
Start: 2017-03-12 | End: 2017-03-15 | Stop reason: HOSPADM

## 2017-03-12 RX ADMIN — ASPIRIN 81 MG 81 MG: 81 TABLET ORAL at 20:23

## 2017-03-12 RX ADMIN — TAMSULOSIN HYDROCHLORIDE 0.4 MG: 0.4 CAPSULE ORAL at 15:43

## 2017-03-12 RX ADMIN — FLUOXETINE HYDROCHLORIDE 40 MG: 20 CAPSULE ORAL at 15:43

## 2017-03-12 RX ADMIN — LEVOTHYROXINE SODIUM 100 MCG: 100 TABLET ORAL at 15:44

## 2017-03-12 RX ADMIN — SODIUM CHLORIDE 500 ML: 9 INJECTION, SOLUTION INTRAVENOUS at 11:00

## 2017-03-12 RX ADMIN — ENOXAPARIN SODIUM 30 MG: 30 INJECTION SUBCUTANEOUS at 15:45

## 2017-03-12 RX ADMIN — AMLODIPINE BESYLATE 2.5 MG: 2.5 TABLET ORAL at 15:44

## 2017-03-12 RX ADMIN — ROSUVASTATIN CALCIUM 10 MG: 10 TABLET ORAL at 15:44

## 2017-03-12 RX ADMIN — METOPROLOL SUCCINATE 25 MG: 25 TABLET, EXTENDED RELEASE ORAL at 20:24

## 2017-03-12 RX ADMIN — INSULIN ASPART 1 UNITS: 100 INJECTION, SOLUTION INTRAVENOUS; SUBCUTANEOUS at 23:04

## 2017-03-12 RX ADMIN — PREDNISONE 10 MG: 10 TABLET ORAL at 15:44

## 2017-03-12 RX ADMIN — OMEPRAZOLE 40 MG: 20 CAPSULE, DELAYED RELEASE ORAL at 15:44

## 2017-03-12 ASSESSMENT — ENCOUNTER SYMPTOMS
NAUSEA: 0
LIGHT-HEADEDNESS: 1
VOMITING: 0
DIARRHEA: 1
SHORTNESS OF BREATH: 1

## 2017-03-12 ASSESSMENT — ACTIVITIES OF DAILY LIVING (ADL)
SWALLOWING: 0-->SWALLOWS FOODS/LIQUIDS WITHOUT DIFFICULTY
RETIRED_EATING: 0-->INDEPENDENT
NUMBER_OF_TIMES_PATIENT_HAS_FALLEN_WITHIN_LAST_SIX_MONTHS: 1
TRANSFERRING: 0-->INDEPENDENT
TOILETING: 0-->INDEPENDENT
WHICH_OF_THE_ABOVE_FUNCTIONAL_RISKS_HAD_A_RECENT_ONSET_OR_CHANGE?: FALL HISTORY
BATHING: 0-->INDEPENDENT
COGNITION: 0 - NO COGNITION ISSUES REPORTED
FALL_HISTORY_WITHIN_LAST_SIX_MONTHS: YES
AMBULATION: 0-->INDEPENDENT
RETIRED_COMMUNICATION: 0-->UNDERSTANDS/COMMUNICATES WITHOUT DIFFICULTY
DRESS: 0-->INDEPENDENT

## 2017-03-12 NOTE — PHARMACY-ADMISSION MEDICATION HISTORY
Admission medication history interview status for the 3/12/2017  admission is complete. See EPIC admission navigator for prior to admission medications     Medication history source reliability:Good    Actions taken by pharmacist (provider contacted, etc):  Spoke w/ patient.      Additional medication history information not noted on PTA med list :None    Medication reconciliation/reorder completed by provider prior to medication history? No    Time spent in this activity: 20 minutes    Prior to Admission medications    Medication Sig Last Dose Taking? Auth Provider   tadalafil, PAH, (ADCIRCA) 20 MG TABS Take 2 tablets (40 mg) by mouth daily 3/11/2017 at Unknown time Yes Jennifer Schroeder MD   furosemide (LASIX) 20 MG tablet Take 1 tablet (20 mg) by mouth daily 3/11/2017 at Unknown time Yes Jennifer Schroeder MD   tamsulosin (FLOMAX) 0.4 MG capsule Take 1 capsule (0.4 mg) by mouth daily 3/11/2017 at Unknown time Yes Jennifer Schroeder MD   OMEPRAZOLE PO Take 40 mg by mouth daily 3/11/2017 at Unknown time Yes Unknown, Entered By History   metoprolol (TOPROL-XL) 25 MG 24 hr tablet Take 1 tablet (25 mg) by mouth 2 times daily 3/11/2017 at Unknown time Yes Herson Rouse MD   digoxin (LANOXIN) 125 MCG tablet Take 1 tablet (125 mcg) by mouth daily 3/11/2017 at Unknown time Yes Herson Rouse MD   rosuvastatin (CRESTOR) 10 MG tablet Take 1 tablet (10 mg) by mouth daily 3/11/2017 at Unknown time Yes Hamlet Jimenez MD   levothyroxine (SYNTHROID) 100 MCG tablet Take 1 tablet (100 mcg) by mouth daily 3/11/2017 at Unknown time Yes Efren Schwartz MD   FLUoxetine HCl (PROZAC PO) Take 40 mg by mouth daily 3/11/2017 at Unknown time Yes Reported, Patient   AMLODIPINE BESYLATE PO Take 2.5 mg by mouth daily 3/11/2017 at Unknown time Yes Reported, Patient   LISINOPRIL PO Take 20 mg by mouth daily 3/11/2017 at Unknown time Yes Reported, Patient   METFORMIN HCL PO Take 1,000 mg by mouth 2 times daily (with meals)  3/11/2017 at Unknown time Yes Reported, Patient   PREDNISONE PO Take 10 mg by mouth daily 3/11/2017 at Unknown time Yes Reported, Patient   Colchicine (COLCRYS PO) Take 0.6 mg by mouth daily TAKES IN THE EVENING 3/11/2017 at Unknown time Yes Reported, Patient   ASPIRIN PO Take 81 mg by mouth At Bedtime 3/11/2017 at Unknown time Yes Reported, Patient     Lesa Srinivasan, PharmD

## 2017-03-12 NOTE — ED PROVIDER NOTES
History     Chief Complaint:  Weakness     HPI   Ravi Sandoval is a 72 year old male, with a history of pulmonary edema, hypertension and COPD, who presents via EMS for weakness. The weakness has occurred the last week and half and was the most-severe this morning.  He stood up out of bed and was unable to support his own weight so he quickly lowered himself to the ground. He was unable to get up, prompting the 911 call. Diarrhea has occurred twice daily since his admission two or three weeks ago (though before this he was having 4 episodes/day chronically which was finally controlled with Imodium; the Imodium was d/c'd during his past admission) . This has improved with Imodium. He also has chronic shortness of breath which hasn't changed except that he reports some increased CHOUDHURY. This shortness of breath is exacerbated while standing, but is always there. There otherwise is no orthopnea or leg swelling. He is on 4 liters of oxygen at home, and his wife reports 74% spO2 at home this morning.  He denies leg swelling, chest pain, nausea or vomiting.     Cardiac Risk Factors   Sex: Male   Tobacco: Positive  Hypertension: Positive  Diabetes: Positive  Hyperlipidemia: Negative  Family History: Negative    Allergies:  Tetracycline     Medications:    Adcirca  Lasix  Flomax  Omeprazole  Toprol  Lanoxin  Crestor  Synthroid  Prozac  Amlodipine Besylate  Lisinopril  Metformin  Prednisone  Colchicine  Aspirin     Past Medical History:    ASHD  Benign prostatic hyperplasia  COPD   Histiocytosis  HTN  Thyroid disease  Type 2 DM  Anemia    Past Surgical History:    Cardiac stent  Lung biposy  Colonoscopy    Family History:    HTN  Skin cancer  CJD    Social History:  Marital Status:   Presents to the ED via EMS with wife following  Tobacco Use: Current 0.25 pack/day smoker  Alcohol Use: No  PCP: Florencio Patricia     Review of Systems   Respiratory: Positive for shortness of breath.    Cardiovascular: Negative for  chest pain and leg swelling.   Gastrointestinal: Positive for diarrhea. Negative for nausea and vomiting.   Neurological: Positive for light-headedness.   All other systems reviewed and are negative.    .    Physical Exam   First Vitals:  BP (!) 141/93    Pulse 87    Resp 18    SpO2 (!) 88%    Physical Exam  General/Appearance: appears stated age, well-groomed, appears comfortable  Eyes: EOMI, no scleral injection, no icterus  ENT: MMM  Neck: supple, nl ROM, no stiffness  Cardiovascular: RRR, nl S1S2, no m/r/g, 2+ pulses in all 4 extremities, cap refill <2sec  Respiratory: Decreased air sounds diffusely with dry crackles to bilateral bases, on 4 liters nasal cannula, no increase work of breathing/tachypnea/dyspnea  Back: no lesions  GI: abd soft, non-distended, nttp,  no HSM, no rebound, no guarding, nl BS  MSK: VIDES, good tone, no bony abnormality  Skin: warm and well-perfused, no rash, no edema, no ecchymosis, nl turgor  Neuro: GCS 15, alert and oriented, no gross focal neuro deficits  Psych: interacts appropriately  Heme: no petechia, no purpura, no active bleeding    Emergency Department Course   ECG:  @ 1055  Indication: Weakness  Vent. Rate 73 bpm. OK interval 162 ms. QRS duration 118 ms. QT/QTc 434/478 ms. P-R-T axis 76 -68 51.   Normal sinus rhythm. Left axis deviation. Nonspecific intraventricular conduction delay. Abnormal ECG.   Read @ 1058 by Dr. Carlisle.    Imaging:  Chest XR, per radiology:  IMPRESSION: Again seen is moderate interstitial prominence diffusely  throughout both lungs. This again likely represents pulmonary  fibrosis. The lungs are otherwise clear with no pulmonary  consolidation or mass demonstrated. No other abnormality is seen. I  see no definite change since the previous examination.     Radiographic findings were communicated with the patient and family who voiced understanding of the findings.    Laboratory:  BNP: 769  Lactic acid: 6.1 (HH)  CBC:  WBC 15.1 (H), HGB 11.2 (L), ,  otherwise WNL  CMP: Creatinine 0.58, glucose 202 (H), calcium 8.0 (L), anion gap 15 (H), otherwise WNL  Troponin: < 0.015  Blood gas venous and oxyhgb: pH 7.38 PCO2 45 PO2 20 (L) Bicarbonate 27 Oxyhemoglobin 25 Base 1.3  UA: pending    Emergency Department Course:  Nursing notes and vitals reviewed.  I performed an exam of the patient as documented above.  The above workup was undertaken.  1209: I rechecked the patient and discussed results. Patient has almost finished 500 ccs of fluid without worsening of his shortness of breath. His O2 sats are 97% on 6 liters.   Findings and plan explained to the patient who consents to admission.   1213: I discussed the patient with Dr. Sheth of the hospitalist service, who will admit the patient to an adult med bed for further monitoring, evaluation, and treatment.      Impression & Plan      CMS Diagnoses:   The patient has signs of Severe Sepsis as evidenced by:    1. Organ dysfunction: Lactic Acid >2    Time signs of severe sepsis present = 10:35 though doubt this is 2/2 infectious source. Suspect this is 2/2 hypoxia that was present prior to ED presentation.      3 Hour Severe Sepsis Bundle Completion:  1. Initial Lactic Acid Result:   Recent Labs   Lab Test  03/12/17   1000  02/23/17   0115  02/22/17   1932   LACT  6.1*  1.9  7.1*     2. Blood Cultures before Antibiotics: No  3. Broad Spectrum Antibiotics Administered: No     Anti-infectives     None        4. 500 ml of IV fluids. Not giving additional fluids 2/2 severe pulm htn and doubt that there is an infectious etiology. Suspect elevated lactate is 2/2 hypoxia.    The patient was transferred out of the ED prior to the 6 hour viviana.        Medical Decision Making:  This patient is a 72 year old male with a history of pulmonary fibrosis and pulmonary HTN, on 4 liters nasal cannula oxygen at baseline, who presents with increased weakness as well as increased dyspnea on exertion. He has had increasing weakness over the  past 1.5 that to him being unable to get off the floor today. No obvious etiology for this has been found. Electrolytes are within normal limits. He does have elevated lactate and CBC, however, he doesn't have any obvious infection, hypotension, tachycardia. He is also afebrile. He recently was admitted for pulmonary edema requiring bipap, Lasix, nitro and at that time his lactate was even higher than it was today. No infection was found. I suspect his lactic acidosis is due to the hypoxia he had prior to arrival. This needs to be trended, however at this time I don't think antibiotics are indicated. Similarly, I don't want to give him a lot of fluid. He has very tenuous pulmonary hypertension and I think the 300 ccs/kg fluid would throw him into dangerous pulmonary edema. We increased his oxygen to 4-6 liters and he is breathing well, as well as having O2 sats in the mid-to-upper 90s. There is nothing to suggest an acute cardiac etiology. Chest XR is WNL except for the pulmonary fibrosis and does not show pulmonary edema or infection. I am still waiting for urine, however he is not having any urinary symptoms and therefore I doubt this is a possible source of sepsis/infection. Given his abnormal lactic acidosis and relative hypoxia, he'll be brought in to the hospital for continued monitoring and management.     Diagnosis:    ICD-10-CM    1. Acidosis E87.2    2. Hypoxia R09.02    3. Generalized muscle weakness M62.81        Disposition:  Admitted to an adult med bed.    ILauren, am serving as a scribe on 3/12/2017 at 9:43 AM to personally document services performed by Dr. Carlisle based on my observations and the provider's statements to me.    EMERGENCY DEPARTMENT       Heather Carlisle MD  03/12/17 [id number]

## 2017-03-12 NOTE — IP AVS SNAPSHOT
` `     [place] [address] ONCOLOGY: [phone]                 INTERAGENCY TRANSFER FORM - NOTES (H&P, Discharge Summary, Consults, Procedures, Therapies)   3/12/2017                    Hospital Admission Date: 3/12/2017  RAVI SANDOVAL   : 1945  Sex: Male        Patient PCP Information     Provider PCP Type    Florencio Patricia MD General      History & Physicals     No notes of this type exist for this encounter.         Discharge Summaries      Discharge Summaries by Asia Sheth MD at 3/14/2017 10:37 PM     Author:  Asia Sheth MD Service:  Hospitalist Author Type:  Physician    Filed:  3/15/2017  8:25 AM Date of Service:  3/14/2017 10:37 PM Note Created:  3/14/2017 10:00 PM    Status:  Signed :  Asia Sheth MD (Physician)         [facility]    Discharge Summary  Hospitalist    Date of Admission:  3/12/2017  Date of Discharge:  3/15/2017  Discharging Provider:[IM1.1] Asia Sheth[EL1.1]  Date of Service (when I saw the patient): 03/15/17    Discharge Diagnoses   Lactic acidosis  Leukocytosis, suspect stress demargination  Generalized weakness  Physical deconditioning  History of urinary retention  Severe pulmonary hypertension with a history of cor pulmonale.   Hypertension  Diabetes mellitus, type 2  Pulmonary fibrosis  COPD  Histiocytosis X  Chronic hypoxic respiratory failure  Depression.   Chronic gout  Iron deficiency anemia  Chronic diarrhea  Lobulated nodule of the lingula  Coronary artery disease    History of Present Illness   Ravi Sandoval is an 72 year old male who presented with generalized weakness.    Hospital Course   Ravi Sandoval was admitted on 3/12/2017.  The following problems were addressed during his hospitalization:     Lactic acidosis  Suspect secondary to hypoxia as patient had multiple episodes of being off oxygen for extended period of time, and wife reported during one of these episodes his legs were turning  blue. Likely tolerates hypoxia very poorly with his underlying pulmonary hypertension. He also was recently started on furosemide following his last discharge, and reports his diarrhea seemed worse since that was started, therefore suspected to have component of volume depletion as well. No evidence for infection. Lactic acid normalized following IV fluids and maintenance of normal oxygen saturations. Blood cultures from admission no growth to date. Discontinued metformin on discharge as below.     Leukocytosis, suspect stress demargination  WBC 15.1 on admission. As above, no clear source of infection. CXR stable, UA unremarkable, C difficile negative. WBC decreasing, now normal      Generalized weakness  Physical deconditioning  Patient felt too weak to get up from the ground leading to admission. He reports that overall he has felt a steady decline following his last discharge, rather than an abrupt decline. Declined TCU last admission. Suspect volume depletion from furosemide start and worsened diarrhea contributing as well. PT/OT and SW consulted. Planning to discharge to TCU for ongoing therapies.      History of urinary retention  Required Ellison catheter during last hospitalization. Reports he was urinating normally at home after removal. UA unremarkable. Urinating normally.       Severe pulmonary hypertension with a history of cor pulmonale.   Recent echocardiogram with RV systolic pressure of 57 plus right atrial pressure, estimated at 5-10, and RV systolic function moderately to severely reduced with a moderately dilated RV with trace to mild LV function of 55-60%. Follow[facility] by Dr[facility] Rouse at the [facility]. Continue Adcirca 40 mg daily (recently increased as outpatient by Dr. Rouse), digoxin. Furosemide held during admission with concern for volume depletion. Plan to resume furosemide 20 mg every other day on discharge with close follow-up of symptoms and weights.      Hypertension  With  hypertensive urgency during his last hospitalization. Continue prior to admission amlodipine, metoprolol, lisinopril.      Diabetes mellitus, type 2  Prior to admission on metformin. HgbA1c 8.1% 3/13/17. Discontinued metformin permanently given recurrent lactic acidosis. Started low dose glipizide. Monitor blood sugars and titrate up as needed.      Pulmonary fibrosis  COPD  Histiocytosis X  Chronic hypoxic respiratory failure  Followed by Dr. Kaba. On chronic prednisone 10 mg daily. No acute issues. Continue home oxygen prescription.     Depression.   Continue prior to admission fluoxetine.      Chronic gout  No acute joint complaints. Reports colchicine is for his underlying lung disease, not gout, although this has been resumed     Iron deficiency anemia  Baseline hemoglobin around 10 g/dl. He has recently received IV iron. Ferritin 284 2/25/17. Hemoglobin decreased some during admission in absence of bleeding, likely some hemoconcentration from suspected dehydration on admission.      Chronic diarrhea  The patient has had an outpatient workup, including a colonoscopy. No specific relation to colchicine, had tolerated well prior to more recent issues with diarrhea. Some worsening from baseline reported on admission. C difficile negative. Subsequently improving. Continue loperamide PRN     Lobulated nodule of the lingula  He should have follow up scan 6 months from previous study.      Coronary artery disease  Denies chest pain. Continue aspirin, rosuvastatin, metoprolol XL     Pending Results   These results will be followed up by hospitalistist  Unresulted Labs Ordered in the Past 30 Days of this Admission     Date and Time Order Name Status Description    3/12/2017 1358 Blood culture Preliminary     3/12/2017 1358 Blood culture Preliminary           Code Status   Full Code       Primary Care Physician   Florencio Patricia    Physical Exam   Temp: 96.6  F (35.9  C) Temp src: Oral BP: 130/69 Pulse: 55 Heart  Rate: 63 Resp: 18 SpO2: 92 % O2 Device: Nasal cannula Oxygen Delivery: 4 LPM  Vitals:    03/13/17 0618 03/14/17 0549   Weight: 57.1 kg (125 lb 14.1 oz) 61.6 kg (135 lb 12.9 oz)     Vital Signs with Ranges  Temp:  [96  F (35.6  C)-96.8  F (36  C)] 96.6  F (35.9  C)  Pulse:  [55] 55  Heart Rate:  [58-64] 63  Resp:  [16-18] 18  BP: (124-140)/(67-71) 130/69  SpO2:  [92 %-95 %] 92 %  I/O last 3 completed shifts:  In: 480 [P.O.:480]  Out: 150 [Urine:150]    Constitutional: Well-appearing, NAD  Respiratory: Clear to auscultation bilaterally, good air movement bilaterally  Cardiovascular: RRR, no m/r/g. No peripheral edema.  GI: Soft, non-tender, non-distended. BS normoactive.   Skin/Integumen: Warm, dry        Discharge Disposition   Discharged to transitional care unit  Condition at discharge: Stable    Consultations This Hospital Stay   PHYSICAL THERAPY ADULT IP CONSULT  OCCUPATIONAL THERAPY ADULT IP CONSULT  SOCIAL WORK IP CONSULT  OCCUPATIONAL THERAPY ADULT IP CONSULT  PHYSICAL THERAPY ADULT IP CONSULT    Time Spent on this Encounter[IM1.1]   IAsia, personally saw the patient today and spent less than or equal to 30 minutes discharging this patient.[EL1.1]    Discharge Orders     General info for SNF   Length of Stay Estimate: Short Term Care: Estimated # of Days <30  Condition at Discharge: Improving  Level of care:skilled   Rehabilitation Potential: Good  Admission H&P remains valid and up-to-date: Yes  Recent Chemotherapy: N/A  Use Nursing Home Standing Orders: Yes     Mantoux instructions   Give two-step Mantoux (PPD) Per Facility Policy Yes     Reason for your hospital stay   You were hospitalized for generalized weakness, likely a combination of deconditioning from your last hospitalization and dehydration.     Glucose monitor nursing POCT   Before meals and at bedtime     Daily weights   Call Provider for weight gain of more than 2 pounds per day or 5 pounds per week.     Follow Up and  recommended labs and tests   Follow up with senior care physician.  The following labs/tests are recommended: BMP in 2-3 days.     Activity - Up with nursing assistance     Full Code     Occupational Therapy Adult Consult   Evaluate and treat as clinically indicated.    Reason:  Physical deconditioning; functional immobility.     Physical Therapy Adult Consult   Evaluate and treat as clinically indicated.    Reason:  Physical deconditioning     Oxygen - Nasal cannula   4 Lpm by nasal cannula to keep O2 sats 92% or greater.     Advance Diet as Tolerated   Follow this diet upon discharge: Moderate Consistent CHO (4-6 CHO units/meal)       Discharge Medications   Current Discharge Medication List      START taking these medications    Details   loperamide (IMODIUM) 2 MG capsule Take 1 capsule (2 mg) by mouth 4 times daily as needed for diarrhea  Qty: 20 capsule    Associated Diagnoses: Chronic diarrhea      glipiZIDE (GLUCOTROL) 5 MG tablet Take 1 tablet (5 mg) by mouth every morning (before breakfast)  Qty: 30 tablet, Refills: 0    Associated Diagnoses: Type 2 diabetes mellitus without complication, without long-term current use of insulin (H)         CONTINUE these medications which have CHANGED    Details   furosemide (LASIX) 20 MG tablet Take 1 tablet (20 mg) by mouth every other day  Qty: 30 tablet, Refills: 1    Associated Diagnoses: Acute on chronic congestive heart failure, unspecified congestive heart failure type (H)         CONTINUE these medications which have NOT CHANGED    Details   tadalafil, PAH, (ADCIRCA) 20 MG TABS Take 2 tablets (40 mg) by mouth daily  Qty: 60 tablet, Refills: 3    Associated Diagnoses: Pulmonary hypertension (H)      tamsulosin (FLOMAX) 0.4 MG capsule Take 1 capsule (0.4 mg) by mouth daily  Qty: 30 capsule, Refills: 3    Associated Diagnoses: Benign prostatic hyperplasia with lower urinary tract symptoms, unspecified morphology      OMEPRAZOLE PO Take 40 mg by mouth daily       metoprolol (TOPROL-XL) 25 MG 24 hr tablet Take 1 tablet (25 mg) by mouth 2 times daily  Qty: 180 tablet, Refills: 0    Comments: Pt needs to make appt or obtain refill from his new cardiologist.  Associated Diagnoses: Pulmonary hypertension (H)      digoxin (LANOXIN) 125 MCG tablet Take 1 tablet (125 mcg) by mouth daily  Qty: 90 tablet, Refills: 3    Associated Diagnoses: Chronic systolic heart failure (H)      rosuvastatin (CRESTOR) 10 MG tablet Take 1 tablet (10 mg) by mouth daily  Qty: 90 tablet, Refills: 3    Associated Diagnoses: Hyperlipidemia LDL goal <70      levothyroxine (SYNTHROID) 100 MCG tablet Take 1 tablet (100 mcg) by mouth daily  Qty: 30 tablet, Refills: 11    Associated Diagnoses: Hyperthyroidism      FLUoxetine HCl (PROZAC PO) Take 40 mg by mouth daily      AMLODIPINE BESYLATE PO Take 2.5 mg by mouth daily      LISINOPRIL PO Take 20 mg by mouth daily      PREDNISONE PO Take 10 mg by mouth daily      Colchicine (COLCRYS PO) Take 0.6 mg by mouth daily TAKES IN THE EVENING      ASPIRIN PO Take 81 mg by mouth At Bedtime         STOP taking these medications       METFORMIN HCL PO Comments:   Reason for Stopping:             Allergies   Allergies   Allergen Reactions     Tetracycline      Data   Most Recent 3 CBC's:  Recent Labs   Lab Test  03/14/17   0714  03/13/17   0735  03/12/17   1000   WBC  8.4  11.2*  15.1*   HGB  9.2*  10.9*  11.2*   MCV  81  82  82   PLT  157  182  196      Most Recent 3 BMP's:  Recent Labs   Lab Test  03/14/17   0714  03/13/17   0735  03/12/17   1000   NA  138  139  136   POTASSIUM  3.2*  3.8  4.0   CHLORIDE  103  102  99   CO2  26  28  22   BUN  10  10  11   CR  0.52*  0.48*  0.58*   ANIONGAP  9  9  15*   AV  7.8*  8.1*  8.0*   GLC  122*  108*  202*     Most Recent 2 LFT's:  Recent Labs   Lab Test  02/22/17   1932  02/07/17   1055   AST  18  15   ALT  15  15   ALKPHOS  58  39*   BILITOTAL  0.5  0.4     Most Recent INR's and Anticoagulation Dosing History:  Anticoagulation  Dose History     Recent Dosing and Labs Latest Ref Rng & Units 10/26/2010 10/27/2010 10/28/2010 10/30/2010 11/4/2010 2/12/2014 2/22/2017    INR 0.86 - 1.14 1.23(H) 1.13 1.25(H) 1.21(H) 1.09 0.98 0.91        Most Recent 3 Troponin's:  Recent Labs   Lab Test  03/12/17   1000  02/23/17   0523  02/23/17   0115   TROPI  <0.015  The 99th percentile for upper reference range is 0.045 ug/L.  Troponin values in   the range of 0.045 - 0.120 ug/L may be associated with risks of adverse   clinical events.    0.024  0.038     Most Recent Cholesterol Panel:  Recent Labs   Lab Test  01/10/14   0000   LDL  72   HDL  63   TRIG  111     Most Recent 6 Bacteria Isolates From Any Culture (See EPIC Reports for Culture Details):  Recent Labs   Lab Test  03/12/17   1425  03/12/17   1421  02/23/17   1537  02/23/17   1200  02/22/17   2153  02/22/17   2006   CULT  No growth after 2 days  No growth after 2 days  No growth  Moderate growth Normal alexander  No growth  No growth     Most Recent TSH, T4 and A1c Labs:  Recent Labs   Lab Test  03/13/17   0735   02/22/17   1932   TSH   --    --   7.59*   T4   --    --   1.30   A1C  8.1*   < >   --     < > = values in this interval not displayed.     Results for orders placed or performed during the hospital encounter of 03/12/17   XR Chest 2 Views    Narrative    CHEST TWO VIEWS   3/12/2017 10:25 AM    HISTORY: Chest pain. Shortness of Breath.    COMPARISON: 2/22/2017.      Impression    IMPRESSION: Again seen is moderate interstitial prominence diffusely  throughout both lungs. This again likely represents pulmonary  fibrosis. The lungs are otherwise clear with no pulmonary  consolidation or mass demonstrated. No other abnormality is seen. I  see no definite change since the previous examination.     PEDRO LUIS DAS MD[IM1.1]          Revision History        User Key Date/Time User Provider Type Action    > EL1.1 3/15/2017  8:25 AM Asia Sheth MD Physician Sign     IM1.1 3/14/2017 10:37  PM Giancarlo Weber MD Physician Share            Discharge Summaries by Jennifer Schroeder MD at 2/25/2017  2:02 PM     Author:  Jennifer Schroeder MD Service:  Hospitalist Author Type:  Physician    Filed:  2/25/2017  2:35 PM Date of Service:  2/25/2017  2:02 PM Note Created:  2/25/2017  2:01 PM    Status:  Addendum :  Jennifer Schroeder MD (Physician)         [facility]    Discharge Summary  Hospitalist    Date of Admission:  2/22/2017  Date of Discharge:[AO1.1]  2/25/2017[AO1.2]  Discharging Provider:[AO1.1] Jennifer Schroeder[AO1.2], MD  Date of Service (when I saw the patient):[AO1.1] 02/25/17    Discharge Diagnoses[AO1.2]     1)   Acute on chronic hypoxic respiratory failure.  2)   Hypertensive urgency.  3)   Iron deficiency anemia.  4)   Complex liver lesion.  5)   Uncontrolled DM type.  6)   Lactic acidosis w/o obvious source of infection.  7)   Urinary retention.       PAST MEDICAL HISTORY:   1.   Chronic oxygen dependent respiratory failure multifactorial secondary to histiocytosis X, COPD, pulmonary fibrosis.   2.   Severe pulmonary hypertension on Adcirca. He was recently seen by his Cardiology and recommended that his Adcirca was increased to 40 mg daily. He has not yet done this. His echocardiogram in 02/2017 revealed an RV systolic pressure of 57 plus a right atrial pressure estimated at 5-10. His right systolic function was moderately to moderately severely reduced with a moderately dilated right ventricle and trace to mild tricuspid regurgitation. His LV function was 55% to 60% with mild concentric LVH. He had a flattened affect septum consistent with RV pressure overload.   3.   Coronary artery disease: He has a history of ST elevation MI in 10/2010 at which time he underwent angioplasty with bare metal stent of the mid RCA. He had rapid decompensation after this requiring intubation and his RCA was ballooned and some thrombus was noted in the lesion; the residual was 60-70%. There was a  small intimate dissection noted in the mid RCA, which was stented with a bare metal stent. The rest of his angiogram at that side revealed no flow-limiting disease. He had 70% stenosis of his mid LAD; diffuse, more mild disease, 30-40% throughout; diagonal branch had no flow-limiting lesions. His ramus intermedius had mild irregularities, left circumflex had 30-50% stenosis in the mid section. PDA and PL branches had mild diffuse disease. As above, his RCA was completely occluded and he underwent bare metal stent to the RCA.   4.   He follows with Dr. Cabello: He has had a biopsy and has histiocytosis X, pulmonary fibrosis as well as COPD. He is on chronic prednisone. He is supposed to be using oxygen 4 liters a day, but does not.   5.   Tobacco dependence. He continues to smoke 5 cigarettes a day and does not want to quit.   6.   History of benign prostatic hypertrophy.   7.   Gout.   8.   Depression.   9.   History of herpes zoster.   10.   Gastroesophageal reflux disease.   11.   Hypothyroidism.   12.   Hypertension.   13.   Hyperlipidemia.   14.   Recent diagnosis of iron deficiency anemia. Baseline hemoglobin is around 12 with MCV at low limits of normal at 79. Iron level on 02/07/2017 was 30, TIBC was up at 467 and iron saturation index was 6.[AO1.1]     History of Present Illness[AO1.2]   Ravi Sandoval is a 72-year-old man with history of chronic oxygen-dependent respiratory failure, due to histiocytosis X, pulmonary fibrosis, chronic obstructive pulmonary disease, pulmonary hypertension with cor pulmonale.  He [facility] to [facility] ED on 1/22/17 with urinary retention and acute respiratory distress. He was found to have O2 sat of 70% on room air and at arrival to the ED, his sats were 88% on high flow oxygen.  He was placed on BiPAP and started on nitroglycerin and Lasix with subsequent improvement in his breathing.  There was significantly increased interstitial markings on chest x-ray and CT showed no evidence of  PE but it showed pulmonary edema and pulm fibrosis.  His labs were significant for an elevated lactic of 7.1 but no leukocytosis.  It was also noted his blood pressure was quite elevated when EMS arrived with systolic blood pressure in the 230s.[AO1.1]     Hospital Course[AO1.2]   Ravi Sandoval was admitted on 2/22/2017.  The following problems were addressed during his hospitalization:    1.   Acute on chronic hypoxic respiratory failure:   Multifactorial, non-compliance w/ O2 supplement, histiocytosis X, pulmonary fibrosis, chronic obstructive pulmonary disease, pulmonary hypertension with cor pulmonale and pulmonary edema.  He required treatment w/ iv lasix and NGT drip.  His Tadalafil dose was also titrated up to 40 mg po daily per his cardiologist rec.  He was also initiated on Lasix 20 mg po daily.  He is back on his PTA O2 supplement as well.  He is negative 3080 ml since admit but no change in wt 135 to 136 lbs.  Will d/c him to home w/ Lasix 20 mg po daily and Tadalafi 40 mg po daily.  F/u w/ Dr. Rouse next wk.     2.   CAD, h/o STEMI of RCA complicated by RCA dissection in 2012 with disease of 70% stenosis in the LAD and moderate disease throughout the other coronary arteries:   Seen by card and rec is to resume PTA metoprolol, crestor, lisinopril and ASA.     3.   Severe pulmonary hypertension with cor pulmonale:   As above, he had a recent echocardiogram that revealed an RV systolic pressure at 57 mmHg with RV systolic function moderately to severely reduced with a moderately dilated RV.  LV function was 55%-60% with LVH, mild.  He is on Adcirca for this.  He was just seen by his cardiologist, Dr. Rouse, at the [facility], and it was advised that the increase the Adcirca from 20 mg to 40 mg daily.  Dose adjusted after admit.  Continue digoxin as well.     4.   Hypertensive urgency:   Treated w/ NGT drip.  Resolved.  Continue his prior to admission amlodipine 2.5 mg daily, lisinopril 20  mg daily and metoprolol XL 25 mg twice daily.      5.  Chronic pulmonary fibrosis, COPD with histiocytosis X:   He follows with Dr. Cabello.  Continue PTA prednisone 10 mg daily for now.     6.   Depression:   Will continue his prior to admission fluoxetine.      7.   Gout:  Continue colchicine daily.      8.   Hypothyroidism:   Continue levothyroxine 100 mcg daily.      9.   Iron deficiency anemia, recently diagnosed in the clinic:  His baseline hemoglobin is 12. Hgb is 9.9 grams today. Iron study revealed adequate iron storage.  He received 3 doses of Venofer during this hospitalization per Dr. Rouse's recommendation.      10.   Acute urinary retention:  May be due to BPH.  He had a Sharma placed immediately draining over a liter.  Dc sharma today.  Start flomax.      11.   Diabetes mellitus:  Uncontrolled likely due to med noncompliance and is also chronically on prednisone.. Resume PTA Metformin.  He received contrast on 2/22/17, 3 days ago.     12.   Chronic diarrhea, of unclear etiology:   Recently started on loperamide with significant improvement.      13.   Lobular lesion of the lingula:   US on 2/24 showed cholelithiasis and complex liver lesion.  No US evidence of acute cholecystitis.  He received empiric IV zosyn for a day.  The liver lesion is complex and is slowly growing.  He should have followup in 6 months.      14.   Code status:   Full.   [AO1.1]    Pending Results[AO1.2]   These results will be followed up by PCP[AO1.1]  Unresulted Labs Ordered in the Past 30 Days of this Admission     Date and Time Order Name Status Description    2/23/2017 1455 Blood culture Preliminary     2/22/2017 2006 Blood culture Preliminary              Primary Care Physician   Florencio Patricia    Physical Exam   Temp: 95.2  F (35.1  C) Temp src: Oral BP: 126/64   Heart Rate: 75 Resp: 16 SpO2: 92 % O2 Device: Nasal cannula Oxygen Delivery: 4 LPM  Vitals:    02/23/17 0113 02/24/17 0500 02/25/17 0609   Weight: 57.1  kg (125 lb 14.1 oz) 57.4 kg (126 lb 8.7 oz) 61.7 kg (136 lb 0.4 oz)[AO1.2]     Vital Signs with Ranges[AO1.1]  Temp:  [95.2  F (35.1  C)-97.5  F (36.4  C)] 95.2  F (35.1  C)  Heart Rate:  [61-75] 75  Resp:  [13-17] 16  BP: (126-158)/(61-86) 126/64  SpO2:  [92 %-97 %] 92 %  I/O last 3 completed shifts:  In: 240 [P.O.:240]  Out: 1250 [Urine:1250][AO1.2]    General: aao x 3, NAD.  HEENT: NC/AT, PERRL, EOMI, neck supple, no thyromegaly, op clear, mmm.  CVS: NL s 1 and s2, no m/r/g.  Lungs: Decreased BS w/ faint bibasilar rhonchi.   Abd: Soft, + bs, NT, no rebound or gaurding, no fluid shift.  Ext: No c/c.  Lymph: No edema.  Neuro: Nonfocal.  Musculoskeletal: No calf tenderness to palpation.   Skin: No rash.  Psychiatry: Mood and affect appropriate.[AO1.1]    Discharge Disposition[AO1.2]   Discharged to home  Condition at discharge: Stable[AO1.1]    Consultations This Hospital Stay   CARDIOLOGY IP CONSULT  PULMONARY IP CONSULT    Time Spent on this Encounter[AO1.2]   IJennifer, personally saw the patient today and spent greater than 30 minutes discharging this patient.[AO1.1]    Discharge Orders     Reason for your hospital stay   Acute on chronic hypoxic resp failure.     Follow-up and recommended labs and tests    Follow up with Dr. Rouse of cardiology in one wk.     Activity   Your activity upon discharge: activity as tolerated     Diet   Follow this diet upon discharge:  Combination low fat, low, 2 grmas Na,  Moderate Consistent CHO (4-6 CHO units/meal)       Discharge Medications   Current Discharge Medication List      START taking these medications    Details   furosemide (LASIX) 20 MG tablet Take 1 tablet (20 mg) by mouth daily  Qty: 30 tablet, Refills: 1    Associated Diagnoses: Acute on chronic congestive heart failure, unspecified congestive heart failure type (H)[AO1.2]         Flomax[AO1.1] 0.4[AO1.3] mg po daily # 30 tabs,[AO1.1] 3[AO1.3] refills.[AO1.1]        CONTINUE these medications which  have CHANGED    Details   tadalafil, PAH, (ADCIRCA) 20 MG TABS Take 2 tablets (40 mg) by mouth daily  Qty: 60 tablet, Refills: 3    Associated Diagnoses: Pulmonary hypertension (H)         CONTINUE these medications which have NOT CHANGED    Details   OMEPRAZOLE PO Take 40 mg by mouth daily      metoprolol (TOPROL-XL) 25 MG 24 hr tablet Take 1 tablet (25 mg) by mouth 2 times daily  Qty: 180 tablet, Refills: 0    Comments: Pt needs to make appt or obtain refill from his new cardiologist.  Associated Diagnoses: Pulmonary hypertension (H)      digoxin (LANOXIN) 125 MCG tablet Take 1 tablet (125 mcg) by mouth daily  Qty: 90 tablet, Refills: 3    Associated Diagnoses: Chronic systolic heart failure (H)      rosuvastatin (CRESTOR) 10 MG tablet Take 1 tablet (10 mg) by mouth daily  Qty: 90 tablet, Refills: 3    Associated Diagnoses: Hyperlipidemia LDL goal <70      levothyroxine (SYNTHROID) 100 MCG tablet Take 1 tablet (100 mcg) by mouth daily  Qty: 30 tablet, Refills: 11    Associated Diagnoses: Hyperthyroidism      FLUoxetine HCl (PROZAC PO) Take 40 mg by mouth daily      AMLODIPINE BESYLATE PO Take 2.5 mg by mouth daily      LISINOPRIL PO Take 20 mg by mouth daily      METFORMIN HCL PO Take 1,000 mg by mouth 2 times daily (with meals)      PREDNISONE PO Take 10 mg by mouth daily      Colchicine (COLCRYS PO) Take 0.6 mg by mouth daily TAKES IN THE EVENING      ASPIRIN PO Take 81 mg by mouth At Bedtime           Allergies   Allergies   Allergen Reactions     Tetracycline[AO1.2]         Revision History        User Key Date/Time User Provider Type Action    > AO1.3 2/25/2017  2:35 PM Jennifer Schroeder MD Physician Addend     AO1.2 2/25/2017  2:33 PM Jennifer Schroeder MD Physician Sign     AO1.1 2/25/2017  2:01 PM Jennifer Schroeder MD Physician                      Consult Notes      Consults by Renea Mtz RD, LD at 3/13/2017 10:40 AM     Author:  Renea Mtz RD, LD Service:  Nutrition Author Type:  Registered  "Dietitian    Filed:  3/13/2017 10:40 AM Date of Service:  3/13/2017 10:40 AM Note Created:  3/13/2017 10:26 AM    Status:  Signed :  Renea Mtz RD, LD (Registered Dietitian)         CLINICAL NUTRITION SERVICES  -  ASSESSMENT NOTE      Recommendations Ordered by Registered Dietitian (RADHA):     Medical Food Supplement: chocolate Glucerna at 10am and 2pm (each provides 220 amor and 10 gm pro)    Ordered a daily multivitamin       Future/Additional Recommendations:     Recommended pt start drinking a supplement at home for added nutrition     Malnutrition:     Non-severe malnutrition in the context of acute on chronic illness          REASON FOR ASSESSMENT  Ravi Sandoval is a 72 year old male seen by Registered Dietitian for Admission Nutrition Risk Screen - \"Reduced oral intake over the last month\"      NUTRITION HISTORY  Visited with pt this morning  States he is not a big eater - \"just don't eat as much as I did when I was younger\"  Breakfast - bowl of Cheerios and 2 cups of coffee  Lunch - popcorn and a Coke  Dinner - varies, \"depends on how the cook feels.\"  He was not able to give me specifics, just stated that he will eat something  Does not consume any nutrition supplements  Notes that his meals/po intake hasn't really changed    Notes that since his heart attack (2010), his intake has been decreased and he lost wt (\"haven't regained the wt or the lost muscle\")      CURRENT NUTRITION ORDERS  Diet Order:     Moderate Consistent Carbohydrate     Current Intake/Tolerance:  Pt reports eating 50% of his breakfast today - eggs, hash browns, sausage sheryl, blueberry muffin, OJ, coffee  He is open to trying the Glucerna supplement      PHYSICAL FINDINGS  Observed  Muscle Wasting  - mild loss noted in temples and clavicle    Obtained from Chart/Interdisciplinary Team  Strength in his lower extremities is 5/5 at present.  No edema    ANTHROPOMETRICS  Height: 5'7\"  Weight:(3/13) 57.1 kg / 125 lbs 14.12 " oz  Body mass index is 19.72 kg/(m^2).  Weight Status:  Normal BMI  IBW: 67.3 kg  % IBW: 85 %  Weight History: Pt feels his usual wt is ~138#.  Records reflect wt loss of 10% past 10 months and 7% (10#) past month.[SJ1.1]  Wt Readings from Last 10 Encounters:   03/13/17 57.1 kg (125 lb 14.1 oz)   02/25/17 61.7 kg (136 lb 0.4 oz)   02/21/17 62.1 kg (136 lb 12.8 oz)   02/07/17 61.4 kg (135 lb 4.8 oz)   05/27/16 63.5 kg (140 lb)   09/04/14 68.1 kg (150 lb 3.2 oz)   08/27/14 71.1 kg (156 lb 11.2 oz)   08/21/14 71.1 kg (156 lb 11.2 oz)   08/19/14 70.8 kg (156 lb)   08/14/14 70.6 kg (155 lb 9.6 oz)[SJ1.2]         LABS  Labs reviewed    MEDICATIONS  Medications reviewed    Dosing Weight: (3/13) 57.1 kg    ASSESSED NUTRITION NEEDS:  Estimated Energy Needs: 9561-0555 kcals (30-35 Kcal/Kg)  Justification: underweight, higher needs with chronic resp failure  Estimated Protein Needs: 70-85 grams protein (1.2-1.5 g pro/Kg)  Justification: Repletion      MALNUTRITION:  % Weight Loss:  > 5% in 1 month   % Intake:  No decreased intake noted  Subcutaneous Fat Loss:  None observed  Muscle Loss:  Temporal region mild depletion and Clavicle bone region mild depletion  Fluid Retention:  None noted    Malnutrition Diagnosis: Non-Severe malnutrition  In Context of:  Acute on Chronic illness or disease    NUTRITION DIAGNOSIS:  Unintended weight loss related to predicted suboptimal intake of cals/pro and higher need with chronic resp failure as evidenced by 7% wt loss past month      NUTRITION INTERVENTIONS  Recommendations / Nutrition Prescription  Moderate CHO  Nutrition supplements  Daily multivitamin  .      Implementation  Nutrition education: Per Provider order if indicated   Medical Food Supplement: chocolate Glucerna at 10am and 2pm (each provides 220 amor and 10 gm pro)  Ordered a daily multivitamin  Recommended pt start drinking a supplement at home for added nutrition  .      Nutrition Goals  Pt to consume 50% meals and 100%  supplements  .      MONITORING AND EVALUATION:  Progress towards goals will be monitored and evaluated per protocol and Practice Guidelines[SJ1.1]                   Revision History        User Key Date/Time User Provider Type Action    > SJ1.2 3/13/2017 10:40 AM Renea Mtz RD, LD Registered Dietitian Sign     SJ1.1 3/13/2017 10:26 AM Renea Mtz RD, LD Registered Dietitian             Consults signed by Christine Hu MD at 2/24/2017  9:25 AM      Author:  Christine Hu MD Service:  Cardiology Author Type:  Physician    Filed:  2/24/2017  9:25 AM Date of Service:  2/23/2017  8:44 AM Note Created:  2/23/2017 12:01 PM    Status:  Signed :  Christine Hu MD (Physician)         REQUESTING PHYSICIAN:  None stated.       REASON FOR CONSULTATION:  Shortness of breath, severe pulmonary hypertension, prolonged QTc on EKG.      HISTORY OF PRESENT ILLNESS:  I had the pleasure of seeing Mr. Ravi Sandoval in consultation at [facility] today.  He is a very pleasant 72-year-old gentleman who is followed by Dr. Rouse in our Cardiology Clinic and actually saw Dr. Rouse just 2 days ago in followup.  He has a history of severe pulmonary hypertension secondary to significant lung disease which is a consequence of histiocytosis X and COPD as well as pulmonary fibrosis.  He underwent right heart catheterization in 2014 that confirmed the diagnosis of severe pulmonary hypertension which was responsive to vasodilator therapy.  Therefore, he was started on tadalafil at that time.  At his most recent office visit with Dr. Rouse, this was increased to 40 mg daily.  Unfortunately, he continues to smoke, and has not been compliant with his oxygen therapy on a regular basis.      He was admitted to [facility] yesterday after an episode of acute shortness of breath that occurred while he was trying to urinate.  He was noted to be disoriented and significantly  hypoxic with sats of 70% on room air.  He was started on oxygen therapy and received DuoNebs with improvement in his oxygenation.  He denied any chest discomfort, palpitations, syncope or presyncope.  As stated above, he is not always compliant with his oxygen therapy and did not increase his tadalafil as recommended recently.      He was also noted to have an elevated QTc on his EKG.  Of note, he has been on Imodium therapy due to chronic diarrhea.  Interestingly, he is also on chronic colchicine therapy for his lung disease which he has been taking for the past 20 years or so.      PAST MEDICAL HISTORY:   1.  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   2.  Severe pulmonary hypertension that is secondary to above.  As stated above, right heart catheterization in 2014 confirmed a response to bronchodilator therapy, at which point the tadalafil was initiated.  His last echocardiogram in 02/2015 demonstrated severe pulmonary hypertension with moderate to severe reduction in RV systolic function.  Left ventricular systolic function is normal at 55% to 60%.    3.  History of coronary artery disease, status post coronary angiography in 10/2010 for inferior ST elevation myocardial infarction.  At that time he underwent successful PTCA to the mid RCA with a bare metal stent.  The procedure was complicated by hypotension that required pressors and ultimately intubation.   4.  Diabetes.   5.  Dyslipidemia.   6.  Hypertension.      FAMILY HISTORY:  Noncontributory.      ALLERGIES:  Tetracycline.      SOCIAL HISTORY:  As stated above, he still smokes about 5 cigarettes a day.      CURRENT MEDICATIONS:  Amlodipine, aspirin, colchicine, digoxin, insulin, levothyroxine, metoprolol, rosuvastatin, prednisone and tadalafil.      REVIEW OF SYSTEMS:  All systems reviewed and negative except as in HPI.      PHYSICAL EXAMINATION:   GENERAL:  He was alert and oriented.   VITAL SIGNS:   Blood pressure was 104/60.  Sats are 96% on 6 liters.   NECK:  Revealed no jugular venous distention.   CHEST:  Remarkable for distant breath sounds with some crackles at the bases.   CARDIOVASCULAR:  Revealed regular rate and rhythm.   ABDOMEN:  Soft, nontender.   EXTREMITIES:  Demonstrated no edema.      ADMISSION LABORATORY DATA:  Remarkable for a creatinine of 0.76.  Troponins are negative at 0.015 and 0.024.  N-terminal BNP is within normal limits at 750.  Hemoglobin is 10.6.  Digoxin level was 0.7.      He underwent a CT of the chest to rule out a pulmonary embolism.  This demonstrated no evidence of pulmonary embolism or dissection with severe pulmonary emphysema and possible associated pulmonary edema.      IMPRESSION:   1.  Severe oxygen-dependent lung disease that is multifactorial secondary to histiocytosis X, chronic obstructive pulmonary disease and pulmonary fibrosis.    2.  Severe pulmonary hypertension, on vasodilator therapy.   3.  Coronary artery disease.   4.  Tobacco abuse.      Mr. Sandoval was admitted to [facility] for an episode of hypoxia and shortness of breath.  Of note, he was noted to be markedly hypertensive at the time of presentation which may have resulted in pulmonary edema in the context of decreased cardiopulmonary reserve.  He appears to be improving at this point with diuretic and oxygen therapy.  His tadalafil has been restarted at a higher dose which is appropriate.      At this point in time, I do not think that there is any evidence of an acute coronary syndrome and the patient actually appears to be euvolemic after receiving a single dose of intravenous Lasix.  Therefore, at this point in time I would not recommend any further intravenous diuretic therapy but I will start him on low-dose oral furosemide therapy given that his severe pulmonary hypertension and right ventricular dysfunction would predispose him to recurrent fluid overload.      Regarding the issue of his  prolonged QTc, the diarrhea was apparently very problematic for him and appears to have resolved with the use of Imodium.  His corrected QTc back in  was documented at 458 so it was mildly elevated at that time as well.  At this point in time, I would not recommend any changes to this regimen but he will require periodic EKG monitoring.      It was a pleasure seeing him in consultation today.         ANAIS HU MD             D: 2017 08:44   T: 2017 09:21   MT: artie      Name:     KYLE SANDOVAL   MRN:      [id number]        Account:       [id number]   :      1945           Consult Date:  2017      Document: [id number][BA1.1]         Revision History        User Key Date/Time User Provider Type Action    > BA1.1 2017  9:25 AM Anais Hu MD Physician Sign     [N/A] 2017 12:01 PM Anais Hu MD Physician Edit            Consults signed by Roel Kaba MD at 2017  8:37 AM      Author:  Roel Kaba MD Service:  Pulmonology Author Type:  Physician    Filed:  2017  8:37 AM Date of Service:  2017  9:46 AM Note Created:  2017 10:15 AM    Status:  Signed :  Roel Kaba MD (Physician)         REQUESTING PHYSICIAN:  None stated.      HISTORY OF PRESENT ILLNESS:  Kyle Sandoval is a 72-year-old patient who has complicated medical history with chronic hypoxic respiratory failure secondary to histiocytosis X with pulmonary fibrosis, COPD, with severe emphysema on CT scan, severe pulmonary hypertension who came into the hospital on 2017 with increasing shortness of breath.  Really did not have precipitating infection symptoms.  His room air sat was 70% on room air.  Normally the patient uses 4 liters per minute, but is very inconsistent in his use, only uses it at night and rarely napping.  The patient continues to smoke 5 cigarettes a day as well.  He has never had benefit from inhalers in the past.  He was treated  with steroids, BiPAP, diuretics and admitted.  Today, he is significantly improved on low-flow oxygen.  His chest CT showed evidence of severe emphysema with fibrosis, pulmonary edema and the lingular nodule which has increased in size.  The patient also has underlying coronary artery disease with stent placement.  He follows with Dr. Cabello and Dr. Rouse for his pulmonary hypertension and is on 40 mg for this.  There is no other contributing history.  He is not having chest pain.  His cough is at baseline.  He occasionally has green sputum.  He is having no wheezing, hemoptysis, weight loss, etc.      PAST MEDICAL HISTORY:  Pertinent for:   1.  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   2.  Severe pulmonary hypertension.   3.  Coronary artery disease.   4.  Tobacco dependence.   5.  Gout.   6.  Depression.   7.  Gastroesophageal reflux.   8.  Hypothyroidism.   9.  Hypertension.   10.  Hyperlipidemia.   11.  Iron-deficiency anemia.      ALLERGIES:  Tetracycline.       MEDICATIONS:  In the chart.      SOCIAL HISTORY:  He lives with his wife.  He is very inactive.  He says he does not want to go to pulmonary rehab.  He continues to smoke 5 cigarettes a day.      FAMILY HISTORY:  Father had cardiovascular disease.  Mother with skin cancer.  Sister with neurological disease.      REVIEW OF SYSTEMS:  Ten-point review of systems is negative.      PHYSICAL EXAMINATION:   GENERAL:  Alert and oriented, sitting in a chair in no acute distress.  He has a catheter in.  He is thin.  No head and neck lymphadenopathy.   HEENT:  Negative.   CHEST:  Markedly decreased breath sounds.  No wheezes, crackles, rhonchi.   CARDIOVASCULAR:  Normal rate, rhythm.  No murmur, S3 or S4.  Heart tones are distant.   ABDOMEN:  Nontender, nondistended.     SKIN:  There are seborrheic keratoses present on the skin.   EXTREMITIES:  Minimal edema.       ASSESSMENT:  This patient has a complex history of cardiopulmonary disease with multiple etiologies described previously.  He has acute on chronic respiratory failure that appears to be likely secondary to pulmonary edema as on the CT and he should be treated in this way.  There does not appear to be a chronic obstructive pulmonary disease exacerbation per se.  The patient has never responded to inhalers per his history.  Most of his CT changes may be a result of his histiocytosis X as well and his underlying smoking.  We discussed the detrimental effects of continued smoking as well as noncompliance with his oxygen usage.  We have no further recommendations for this patient from a pulmonary point of view in regards to his acute on chronic respiratory failure.  In regards to the lingular nodule, when the patient is recovered, consideration of a PET CT should be made.  This may be inflammatory and/or malignant.  The patient clearly is not any sort of operative candidate and actually biopsies have some significant risk as well.  We will follow the patient intermittently.         ANANDA KABA MD             D: 2017 09:46   T: 2017 10:15   MT: RENAY      Name:     KYLE JACOBSON   MRN:      [id number]        Account:       [id number]   :      1945           Consult Date:  2017      Document: [id number]    [RS1.1]   Revision History        User Key Date/Time User Provider Type Action    > RS1.1 2017  8:37 AM Ananda Kaba MD Physician Sign            Consults by Carline Armendariz at 2017 10:14 AM     Author:  Carline Armendariz Service:  Nutrition Author Type:  Dietetic Intern    Filed:  2017 12:25 PM Date of Service:  2017 10:14 AM Note Created:  2017 10:13 AM    Status:  Attested :  Carline Armendariz (Dietetic Intern)    Cosigner:  Renea Mtz RD, LD at 2017  2:52 PM        Attestation signed by Renea Mtz RD, LD at 2017  2:52 PM        I  have reviewed the note below    Renea Mtz RD, LD  Clinical Dietitian - [facility]  Pager - [phone]                                 CLINICAL NUTRITION SERVICES  -  ASSESSMENT NOTE      Recommendations Ordered by Registered Dietitian (RD):[KF1.1]   -[KF1.2]Ordered chocolate[KF1.3] Glucerna[KF1.4] BID between meals[KF1.3]    -[KF1.2]Ordered daily multivitamin with minerals for potential nutrient deficiencies related to variable PO intake[KF1.3]     Future/Additional Recommendations:[KF1.1]     -Please encourage intake    -If patient continues to have low intake, may need to modify supplements to support intake and starting a 3-day calorie count to quantify intake.     -If patient consistently unable to meet 2/3rds of estimated needs (about 1,400 kcal and 50 g protein) recommend starting enteral nutrition support.[KF1.2]   Malnutrition:[KF1.1] Severe malnutrition in the context of chronic illness or disease[KF1.3]        REASON FOR ASSESSMENT  Ravi Sandoval is a 72 year old male seen by Registered Dietitian for Admission Nutrition Risk Screen - Unintentional weight loss of 10 lbs or more in the past 2 months      NUTRITION HISTORY[KF1.1]  - Information obtained from Patient  -Breakfast: bowel of cheerios  -Lunch: Popcorn and Coke-a-Cola  -Dinner: variable,[KF1.3] depending on what[KF1.2] wife prepares    Patient reports eating less since his heart attack in 2009. Patient reports significant[KF1.3] recent[KF1.2] weight loss but is unsure as to cause of weight loss, suspects related to chronic diarrhea.     Writer s[KF1.3]uspec[KF1.1]ts[KF1.3] weight loss[KF1.1] likely[KF1.3] du[KF1.1]e[KF1.3] to increased work of breathing[KF1.1] with declining respiratory function and emphysema[KF1.3], along[KF1.2] with reported inconsistent use with supplemental oxygen at home.[KF1.3]     CURRENT NUTRITION ORDERS  Diet Order:     Moderate Consistent Carbohydrate     Current  "Intake/Tolerance:[KF1.1]  Patient has not[KF1.3] ate[KF1.2] during this admission[KF1.3], however did order breakfast.[KF1.2]      PHYSICAL FINDINGS  Observed[KF1.1]  Muscle Wasting: clavicle/thoracic region dorsal hand, moderate  Subcutaneous fat loss: facial and thoracic region moderate[KF1.3]  Obtained from Chart/Interdisciplinary Team[KF1.1]  None noted[KF1.2]    ANTHROPOMETRICS  Height: 170 cm ([KF1.1] 5' 7\"[KF1.5])  Weight: 57.1 kg ([KF1.1]125 lbs 14.12 oz[KF1.5])[KF1.1]  Body mass index is 19.72 kg/(m^2).[KF1.5]  Weight Status:  Normal BMI  IBW: 67.3 kg (148 lbs)  % IBW: 85%  Weight History: Weight loss of 14 kg in 6 months (20% body weight)[KF1.1]   Wt Readings from Last 10 Encounters:   02/23/17 57.1 kg (125 lb 14.1 oz)   02/21/17 62.1 kg (136 lb 12.8 oz)   02/07/17 61.4 kg (135 lb 4.8 oz)   05/27/16 63.5 kg (140 lb)   09/04/14 68.1 kg (150 lb 3.2 oz)   08/27/14 71.1 kg (156 lb 11.2 oz)   08/21/14 71.1 kg (156 lb 11.2 oz)   08/19/14 70.8 kg (156 lb)   08/14/14 70.6 kg (155 lb 9.6 oz)   08/12/14 70.3 kg (155 lb)[KF1.6]       LABS  24hr Glucose: 177-226       MEDICATIONS  NovaLog  Lantus    Dosing Weight 57 kg (admission weight)    ASSESSED NUTRITION NEEDS:  Estimated Energy Needs: 1995[KF1.1]-2280[KF1.3] kcals (35[KF1.1]-40[KF1.3] Kcal/Kg)  Justification: repletion[KF1.1], increased needs with COPD[KF1.3]  Estimated Protein Needs: 68-86 grams protein (1.2-1.5 g pro/Kg)  Justification: Repletion  Estimated Fluid Needs: 3621-4388  mL (1 mL/Kcal)  Justification: maintenance or per provider pending fluid status    MALNUTRITION:  % Weight Loss:[KF1.1]  > 10% in 6 months (severe malnutrition)[KF1.3]  % Intake:[KF1.1]  <75% for >/= 3 months (non-severe malnutrition)[KF1.3]  Subcutaneous Fat Loss:[KF1.1]  Facial and Thoracic region moderate depletion[KF1.3]  Muscle Loss:[KF1.1]  Clavicle bone region moderate depletion and Dorsal hand region moderate depletion[KF1.3]  Fluid Retention:[KF1.1]  Mild " edema[KF1.3]    Malnutrition Diagnosis:[KF1.1] Severe malnutrition[KF1.3]  In Context of:[KF1.1]  Chronic illness or disease[KF1.3]    NUTRITION DIAGNOSIS:[KF1.1]  Unintended weight loss[KF1.3] related to[KF1.1] inadequate PO intake and increased needs with respiratory illne[KF1.3]s[KF1.2]s[KF1.3] as evidenced by[KF1.1] weight loss of 20% in 6 months, moderate subcutaneous fat loss in facial and thoracic region[KF1.3],[KF1.2] and moderate muscle loss in dorsal hand and clavicle region[KF1.3]       NUTRITION INTERVENTIONS  Recommendations / Nutrition Prescription[KF1.1]    -Continue moderate carbohydrate diet     -Recommend o[KF1.2]ral supplements[KF1.3]     -Recommend a m[KF1.2]ultivitamin[KF1.3]     -If pt has continued poor PO, consider modifying supplements per patient preference and starting calories counts to better assess intake     -If pt unable to meet 2/3s of estimated needs may need to consider beginning nutrition support[KF1.2]     Implementation  Nutrition education:[KF1.1] Per Provider order if indicated   Order[KF1.3] chocolate Glucerna[KF1.4] supplements between meals   Ordered daily multivitamin/mineral[KF1.3]  .  Nutrition Goals[KF1.1]  Patient to consume >75% of meals and supplements daily[KF1.3]      MONITORING AND EVALUATION:[KF1.1]  Progress towards goals will be monitored and evaluated per protocol and Practice Guidelines    Carline Jara  Dietetic Intern  Cardiac/Oncology Pager: [phone][KF1.3]             Revision History        User Key Date/Time User Provider Type Action    > KF1.4 2/23/2017 12:25 PM Fast, Carline AGUDELO Dietetic Intern Sign     KF1.2 2/23/2017 11:42 AM Fast, Carline AGUDELO Dietetic Intern Sign     KF1.3 2/23/2017 10:47 AM Fast, Carline AGUDELO Dietetic Intern      KF1.6 2/23/2017 10:16 AM Fast, Carline AGUDELO Dietetic Intern      KF1.5 2/23/2017 10:14 AM Fast, Carline AGUDELO Dietetic Intern      KF1.1 2/23/2017 10:13 AM Carline Armendariz Dietetic Intern             Consults by Christine Hu MD at  2/23/2017  8:47 AM     Author:  Christine Hu MD Service:  Cardiology Author Type:  Physician    Filed:  2/23/2017  8:47 AM Date of Service:  2/23/2017  8:47 AM Note Created:  2/23/2017  8:46 AM    Status:  Signed :  Christine Hu MD (Physician)     Consult Orders:    1. Cardiology IP Consult: Patient to be seen: Routine - within 24 hours; acute respiratory distress, h/o pulmonmary HTN; Consultant may enter orders: Yes [[id number]] ordered by Myriam Powers MD at 02/22/17 2320                dictated[BA1.1]     Revision History        User Key Date/Time User Provider Type Action    > BA1.1 2/23/2017  8:47 AM Christine Hu MD Physician Sign                     Progress Notes - Physician (Notes from 03/12/17 through 03/15/17)      Progress Notes by Giancarlo Weber MD at 3/14/2017  4:32 PM     Author:  Giancarlo Weber MD Service:  Hospitalist Author Type:  Physician    Filed:  3/14/2017  5:24 PM Date of Service:  3/14/2017  4:32 PM Note Created:  3/14/2017  4:32 PM    Status:  Signed :  Giancarlo Weber MD (Physician)         [facility]    Hospitalist Progress Note    Date of Service (when I saw the patient):[IM1.1] 03/14/2017    Assessment & Plan[IM1.2]   Ravi Sandoval is a 72 year old male with history of chronic oxygen-dependent respiratory failure on 4L O2, histiocytosis X, pulmonary fibrosis, severe pulmonary hypertension with a history of cor pulmonale and COPD. recently hospitalized from 02/22/2017 to 02/25/2017 when he had lactic acidosis, but no evidence of infection. Admitted again 3/12/2017 with generalized weakness with lactic acid of 6.1.    Lactic acidosis  Suspect secondary to hypoxia as patient had multiple episodes of being off oxygen for extended period of time, and wife reported during one of these episodes his legs were turning blue. Likely tolerates hypoxia very poorly with his underlying pulmonary hypertension. He also was recently started on  furosemide following his last discharge, and reports his diarrhea seemed worse since that was started, therefore may have component of volume depletion as well. No evidence for infection.  - Lactic acid normalized  - Blood cultures from admission NG[IM1.1]TD  - Discontinue metformin on discharge[IM1.3]     Leukocytosis  WBC 15.1 on admission. As above, no clear source of infection. CXR stable, UA unremarkable, C difficile negative.  - WBC decreasing[IM1.1], now normal[IM1.3]     Generalized weakness  Patient felt too weak to get up from the ground leading to admission. He reports that overall he has felt a steady decline following his last discharge, rather than an abrupt decline.[IM1.1] Declined TCU last admission. Suspect volume depletion from furosemide start and worsened diarrhea contributing as well.[IM1.3]   - PT/OT[IM1.1] and SW[IM1.3] consulted[IM1.1]. P[IM1.3]valentine[IM1.1] to discharge to TCU[IM1.3]    History of urinary retention  Required Ellison catheter during last hospitalization. Reports he was urinating normally at home after removal. UA unremarkable[IM1.1]. Urinating normally.[IM1.3]      Severe pulmonary hypertension with a history of cor pulmonale.   Recent echocardiogram with RV systolic pressure of 57 plus right atrial pressure, estimated at 5-10, and RV systolic function moderately to severely reduced with a moderately dilated RV with trace to mild LV function of 55-60% with mild concentric LVH. Followed by Dr. Rouse at the [facility].  - Continue Adcirca 40 mg daily (recently increased as outpatient by Dr. Rouse)  - Continue digoxin   - Hold furosemide temporarily[IM1.1]. Plan to resume furosemide 20 mg every other day on discharge with close follow-up of symptoms and weights.[IM1.3]     Hypertension  During his last hospitalization, the patient had hypertensive urgency.   - Continue PTA amlodipine[IM1.1],[IM1.3] metoprolol[IM1.1],[IM1.3] lisinopril.     Diabetes mellitus,  type 2  Prior to admission on metformin.  - Blood sugars well-controlled with minimal insulin needs  - Moderate consistent carbohydrate diet, medium SSI  - HgbA1c 8.1%  -[IM1.1] Discontinue[IM1.3] metformin[IM1.1] permanently given recurrent lactic acidosis[IM1.3]    Pulmonary fibrosis  COPD  Histiocytosis X  Chronic hypoxic respiratory failure  Followed by Dr. Kaba. On chronic prednisone 10 mg daily. At this point, no indication for stress-dose steroids.  - No acute issues.  - Continue home oxygen    Depression.   Continue prior to admission fluoxetine.     Chronic gout  No acute joint complaints. Reports colchicine is for his underlying lung disease, not gout, although this[IM1.1] has[IM1.3] be[IM1.1]en[IM1.3] resumed    Iron deficiency anemia  Baseline hemoglobin is 8. He did get some IV iron in the past. Ferritin 284 2/25/17.  - Monitor hemoglobin periodically, currently stable    Chronic diarrhea  The patient has had an outpatient workup, including a colonoscopy. No specific relation to diarrhea, had tolerated well prior to more recent issues with diarrhea[IM1.1].[IM1.3] C difficile negative  - Loperamide PRN[IM1.1]  - Improving[IM1.3]    Lobulated nodule of the lingula  He should have follow up scan 6 months from previous study.      Coronary artery disease  Denies chest pain. Continue aspirin, rosuvastatin, metoprolol XL     DVT Prophylaxis: Enoxaparin (Lovenox) SQ  Code Status:[IM1.1] Full Code[IM1.2]    Disposition:[IM1.1] An[IM1.3]ticipate[IM1.1] discharge to TCU[IM1.3] 3/15/17[IM1.4].[IM1.1]     Giancarlo Weber    Interval History[IM1.2]   No acute events overnight.[IM1.1] Continues to feel stronger since admission. Eating and drinking well. Stool more solid today. Denies chest pain, shortness of breath.[IM1.3]     -Data reviewed today: I reviewed all new labs and imaging results over the last 24 hours. I personally reviewed no images or EKG's today.[IM1.1]    Physical Exam   Temp: 96  F (35.6  C) Temp  src: Axillary BP: 127/68 Pulse: 55 Heart Rate: 62 Resp: 18 SpO2: 93 % O2 Device: Nasal cannula Oxygen Delivery: 4 LPM  Vitals:    03/13/17 0618 03/14/17 0549   Weight: 57.1 kg (125 lb 14.1 oz) 61.6 kg (135 lb 12.9 oz)[IM1.2]     Vital Signs with Ranges[IM1.1]  Temp:  [96  F (35.6  C)-97.1  F (36.2  C)] 96  F (35.6  C)  Pulse:  [55-70] 55  Heart Rate:  [58-62] 62  Resp:  [16-18] 18  BP: (112-140)/(58-71) 127/68  SpO2:  [92 %-95 %] 93 %  I/O last 3 completed shifts:  In: 480 [P.O.:480]  Out: 150 [Urine:150][IM1.2]    Constitutional: Elderly male, NAD[IM1.1]  ENT: Right TM clear[IM1.3]  Respiratory: Clear to auscultation bilaterally, good air movement bilaterally  Cardiovascular: RRR, no m/r/g. No peripheral edema.  GI: Soft, non-tender, non-distended. BS normoactive.   Skin/Integumen: Warm, dry  Other:[IM1.1]     Medications        colchicine tablet 0.6 mg  0.6 mg Oral QPM     lisinopril  20 mg Oral Daily     multivitamin, therapeutic with minerals  1 tablet Oral Daily     amLODIPine (NORVASC) tablet 2.5 mg  2.5 mg Oral Daily     aspirin chewable tablet 81 mg  81 mg Oral At Bedtime     digoxin  125 mcg Oral Daily     FLUoxetine (PROzac) capsule 40 mg  40 mg Oral Daily     levothyroxine  100 mcg Oral Daily     metoprolol  25 mg Oral BID     omeprazole (priLOSEC) CR capsule 40 mg  40 mg Oral Daily     predniSONE (DELTASONE) tablet 10 mg  10 mg Oral Daily     rosuvastatin  10 mg Oral Daily     tamsulosin  0.4 mg Oral Daily     insulin aspart  1-7 Units Subcutaneous TID AC     insulin aspart  1-5 Units Subcutaneous At Bedtime     enoxaparin  30 mg Subcutaneous Q24H     tadalafil  40 mg Oral Daily     sodium chloride (PF)  3 mL Intracatheter Q8H       Data     Recent Labs  Lab 03/14/17  0714 03/13/17  0735 03/12/17  1000   WBC 8.4 11.2* 15.1*   HGB 9.2* 10.9* 11.2*   MCV 81 82 82    182 196    139 136   POTASSIUM 3.2* 3.8 4.0   CHLORIDE 103 102 99   CO2 26 28 22   BUN 10 10 11   CR 0.52* 0.48* 0.58*   ANIONGAP  9 9 15*   AV 7.8* 8.1* 8.0*   * 108* 202*   TROPI  --   --  <0.015The 99th percentile for upper reference range is 0.045 ug/L.  Troponin values in the range of 0.045 - 0.120 ug/L may be associated with risks of adverse clinical events.       No results found for this or any previous visit (from the past 24 hour(s)).[IM1.2]     Revision History        User Key Date/Time User Provider Type Action    > IM1.4 3/14/2017  5:24 PM Giancarlo Weber MD Physician Sign     IM1.3 3/14/2017  5:14 PM Giancarlo Weber MD Physician      IM1.2 3/14/2017  4:33 PM Giancarlo Weber MD Physician      IM1.1 3/14/2017  4:32 PM Giancarlo Weber MD Physician             Progress Notes by Brittney Rodriguez LSW at 3/14/2017  2:12 PM     Author:  Brittney Rodriguze LSW Service:  (none) Author Type:      Filed:  3/14/2017  2:16 PM Date of Service:  3/14/2017  2:12 PM Note Created:  3/14/2017  2:12 PM    Status:  Signed :  Brittney Rodriguez LSW ()         SALLIE  I: SALLIE was updated that [facility] would be able to accept patient tomorrow. SALLIE called and updated spouse who was okay with this plan. SALLIE discussed transportation needs and would like a w/c ride arranged for patient. SALLIE discussed fees for transport. Patient's spouse is okay with this. SALLIE called [facility] and arranged ride for 1300 on 3/15. SALLIE will fax orders/PAS/script when complete.     P: SALLIE will continue to follow and assist as needed.    VICENTE Bowers   *[id number][SM1.1]       Revision History        User Key Date/Time User Provider Type Action    > SM1.1 3/14/2017  2:16 PM Brittney Rodriguez LSW  Sign            Progress Notes by Kassi Bliss, PT at 3/13/2017  4:42 PM     Author:  Kassi Bliss, PT Service:  Physical Medicine and Rehabilitation Author Type:  Physical Therapist    Filed:  3/13/2017  4:42 PM Date of Service:  3/13/2017  4:42 PM Note Created:  3/13/2017  4:42 PM    Status:  Signed :  Kassi Bliss, PT  "(Physical Therapist)          03/13/17 1432   Quick Adds   Type of Visit Initial PT Evaluation   Living Environment   Lives With spouse   Living Arrangements apartment   Home Accessibility grab bars present (bathtub)  (wa;l-in shower, RTS with handles)   Number of Stairs to Enter Home 0   Number of Stairs Within Home 0   Transportation Available family or friend will provide   Self-Care   Dominant Hand right   Usual Activity Tolerance fair   Current Activity Tolerance poor   Equipment Currently Used at Home wound care supplies  (owns SEC but doesn't currently use)   Functional Level Prior   Ambulation 0-->independent   Transferring 0-->independent   Toileting 1-->assistive equipment   Bathing 1-->assistive equipment   Dressing 0-->independent   Eating 0-->independent   Communication 0-->understands/communicates without difficulty   Swallowing 0-->swallows foods/liquids without difficulty   Cognition 0 - no cognition issues reported   Fall history within last six months yes   Number of times patient has fallen within last six months 1   Which of the above functional risks had a recent onset or change? ambulation;toileting;transferring   Prior Functional Level Comment Has cane but doesn't use. 4-5L supplemental O2 at baseline   General Information   Onset of Illness/Injury or Date of Surgery - Date 03/12/17   Referring Physician Asia Sheth   Patient/Family Goals Statement Open to rehab. as was so weak upon returning home following 2/25/17 hosp. stay   Pertinent History of Current Problem (include personal factors and/or comorbidities that impact the POC) Admitted with generalized weakness, lactic acidosis. Fell on floor the day of admission due to \"legs giving out on me\". PMHX: chronic O2 dep. resp. failure on 4LNC, histiocytosis X, pulmonary fibrosis, COPD, DM, CAD, sever pulmonary hypertension, hosp. 2/22/17-2/25/17 for lactic acidosis with no evidence of infection. Went home following that hospital stay but " has gotten steadily weaker since DC.   Precautions/Limitations fall precautions;oxygen therapy device and L/min   Weight-Bearing Status - LLE full weight-bearing   Weight-Bearing Status - RLE full weight-bearing   Heart Disease Risk Factors Medical history;Gender;Age;Lack of physical activity   General Observations Resting comfortably in bed with 5LNC intact   General Info Comments Up with assist   Cognitive Status Examination   Orientation orientation to person, place and time   Level of Consciousness alert   Follows Commands and Answers Questions 100% of the time   Personal Safety and Judgment intact   Memory intact   Pain Assessment   Patient Currently in Pain No   Integumentary/Edema   Integumentary/Edema no deficits were identifed   Posture    Posture Forward head position;Protracted shoulders   Range of Motion (ROM)   ROM Quick Adds No deficits were identified   Strength   Strength Comments Hip flex. mark. 4-/5, knee ext. mark. 4-/5, DF 4/5   Bed Mobility   Bed Mobility Comments Supine>sit with CGA using bedrail, performed slowly. Sit>supine indep. with HOB flat and without use of bedrailing.   Transfer Skills   Transfer Comments Sit<>stand with CGA using FWW once and without AD once. Fair balance upon initial stand.   Gait   Gait Comments Pt. amb. 20 ft. with FWW, FWB, CGA for pull portable O2.    Balance   Balance Comments Able to stand with feet shld. width apart and feet together with EO and EC for 30 secs. each with minimal sway. Able to reach outside DIEGO mark. at 3 levels.   Sensory Examination   Sensory Perception Comments Intact and symmetrical to lt. touch mark. except slightly diminished sensation to lt. touch right plantar surface of foot.   Coordination   Coordination no deficits were identified   Muscle Tone   Muscle Tone no deficits were identified   General Therapy Interventions   Planned Therapy Interventions bed mobility training;transfer training;progressive activity/exercise   Clinical  "Impression   Criteria for Skilled Therapeutic Intervention yes, treatment indicated   PT Diagnosis Impaired cardiopulmonary response to amb., SBA for bed mobility and transfers, weakness LEs   Influenced by the following impairments Medical diagnosis, motivation by pt.   Functional limitations due to impairments Pt. desats post. bed mobility, transfers and amb. on 6LNC 02. Falls risk.   Clinical Presentation Stable/Uncomplicated   Clinical Presentation Rationale Decreasing strength at home, fall at home, CGA for bed mobility and transfers, need to moniter O2 sats post. activity   Clinical Decision Making (Complexity) Low complexity   Therapy Frequency` daily   Predicted Duration of Therapy Intervention (days/wks) 3-4 days   Anticipated Equipment Needs at Discharge other (see comments)  (may need FWW if returns home)   Anticipated Discharge Disposition Transitional Care Facility   Risk & Benefits of therapy have been explained Yes   Patient, Family & other staff in agreement with plan of care Yes   Clinical Impression Comments Pt. will benefit from skilled PT to promote increased LE strength, indep. with moblity and ADLs.   [facility] [employer] TM \"6 Clicks\"   2016, Trustees of [facility], under license to [employer].  All rights reserved.   6 Clicks Short Forms Basic Mobility Inpatient Short Form   [facility] [employer]PAC  \"6 Clicks\" V.2 Basic Mobility Inpatient Short Form   1. Turning from your back to your side while in a flat bed without using bedrails? 4 - None   2. Moving from lying on your back to sitting on the side of a flat bed without using bedrails? 3 - A Little   3. Moving to and from a bed to a chair (including a wheelchair)? 3 - A Little   4. Standing up from a chair using your arms (e.g., wheelchair, or bedside chair)? 3 - A Little   5. To walk in hospital room? 3 - A Little   6. Climbing 3-5 steps with a railing? 2 - A Lot   Basic Mobility Raw Score (Score out of 24.Lower scores equate " to lower levels of function) 18   Total Evaluation Time   Total Evaluation Time (Minutes) 20[AE1.1]        Revision History        User Key Date/Time User Provider Type Action    > AE1.1 3/13/2017  4:42 PM Kassi Bliss, PT Physical Therapist Sign            Progress Notes by Brittney Rodriguez LSW at 3/13/2017  3:57 PM     Author:  Brittney Rodriguez LSW Service:  (none) Author Type:      Filed:  3/13/2017  4:03 PM Date of Service:  3/13/2017  3:57 PM Note Created:  3/13/2017  3:57 PM    Status:  Signed :  Brittney Rodriguez LSW ()         Care Transition Initial Assessment -   Reason For Consult: discharge planning  Met with: PATIENT,FAMILY   Active Problems:    Lactic acid acidosis         DATA  Lives With: spouse  Living Arrangements: apartment  Description of Support System: Supportive, Involved  Who is your support system?: Wife, Children  Support Assessment: Adequate family and caregiver support.   Identified issues/concerns regarding health management: patient will need TCU at d/c  Patient feels that they have adequate support @ home?  YES   Quality Of Family Relationships: supportive, involved  Transportation Available: family or friend will provide    ASSESSMENT  Cognitive Status: alert and oriented X4  Concerns to be addressed: Patient is a 72 year old male who was admitted to the hospital for lactic acid acidosis. Prior to hospitalization patient was living at home with spouse where they were managing well. Patient and spouse are aware that patient will need TCU at d.c and expressed interest in [facility]. SW sent referral via [facility].  anticipates patient will d/c on 3/15.  will discuss transportation needs once d.c placement is confirmed.     PLAN  Financial costs for the patient includes   Patient given options and choices for discharge  To TCU  Patient Goals and Preferences: [facility]  Patient anticipates discharging to:  TCU    Brittney  VICENTE Rodriguez   *[id number][SM1.1]             Revision History        User Key Date/Time User Provider Type Action    > SM1.1 3/13/2017  4:03 PM Brittney Rodriguez LSW  Sign            Progress Notes by Kendra Grace OT at 3/13/2017  1:57 PM     Author:  Kendra Grace OT Service:  (none) Author Type:  Occupational Therapist    Filed:  3/13/2017  1:59 PM Date of Service:  3/13/2017  1:57 PM Note Created:  3/13/2017  1:57 PM    Status:  Signed :  Kendra Grace OT (Occupational Therapist)          03/13/17 1118   Quick Adds   Type of Visit Initial Occupational Therapy Evaluation   Living Environment   Lives With spouse   Living Arrangements apartment   Home Accessibility grab bars present (bathtub)  (RTS with handles, walk in shower)   Number of Stairs to Enter Home 0  (elevator)   Number of Stairs Within Home 0   Living Environment Comment Goes down to get mail from [facility]. Wife at home and healthy   Self-Care   Dominant Hand right   Functional Level Prior   Ambulation 0-->independent   Transferring 0-->independent   Toileting 1-->assistive equipment   Bathing 1-->assistive equipment   Dressing 0-->independent   Eating 0-->independent   Communication 0-->understands/communicates without difficulty   Swallowing 0-->swallows foods/liquids without difficulty   Cognition 0 - no cognition issues reported   Fall history within last six months yes   Number of times patient has fallen within last six months 1   Which of the above functional risks had a recent onset or change? ambulation;transferring;toileting;bathing;dressing;fall history   Prior Functional Level Comment Has cane but doesn't use. 4-5L supplemental O2 at baseline   General Information   Onset of Illness/Injury or Date of Surgery - Date 03/12/17   Referring Physician Domenic   Patient/Family Goals Statement To get stronger and go home.   Additional Occupational Profile Info/Pertinent History of Current Problem Patient  admitted with generalized weakness and lactic acid of 6.1. PMH includes chronic, O2 dependent respiratory failure on 4L O2, histiocytosis X, pulmonary fibrosis, severe pulmonary hypertension. Was hospitalized 2/22-2/25 with lactic acidosis with no evidence of infection.   Precautions/Limitations fall precautions;oxygen therapy device and L/min  (5L O2 via NC, 93% at rest)   Cognitive Status Examination   Orientation orientation to person, place and time   Level of Consciousness alert   Able to Follow Commands WNL/WFL   Personal Safety (Cognitive) WNL/WFL   Memory intact   Cognitive Comment Able to recall 3/3 words after distraction   Visual Perception   Visual Perception Wears glasses   Visual Perception Comments denies changes in vision this LOS   Sensory Examination   Sensory Comments denies any disturbances   Pain Assessment   Patient Currently in Pain No   Integumentary/Edema   Integumentary/Edema no deficits were identifed   Posture   Posture forward head position   Range of Motion (ROM)   ROM Comment BUE WNL   Strength   Strength Comments B deltoids, biceps 4/5, triceps 5/5   Hand Strength   Hand Strength Comments B grasp 5/5   Mobility   Bed Mobility Comments MIN A at trunk supine to EOB with bed similair to home (flat, no rail)   Transfer Skill: Sit to Stand   Level of [place]: Sit/Stand contact guard   Transfer Skill: Toilet Transfer   Level of [place]: Toilet contact guard   Assistive Device grab bars  (on R)   Balance   Balance Comments No LOB noted, slow cautious gait   Lower Body Dressing   Level of [place]: Dress Lower Body moderate assist (50% patients effort)  (SBA R sock, DEP L sock EOB)   Toileting   Level of [place]: Toilet (Able to manage clothing CGA)   Grooming   Level of [place]: Grooming contact guard  (brushing teeth, combing hair, washing face at sink)   Instrumental Activities of Daily Living (IADL)   Previous Responsibilities medication management;finances;driving    IADL Comments Hired help for cleaning. Wife does laundry and cooking   Activities of Daily Living Analysis   Impairments Contributing to Impaired Activities of Daily Living balance impaired;strength decreased   General Therapy Interventions   Planned Therapy Interventions ADL retraining;balance training;transfer training   Clinical Impression   Criteria for Skilled Therapeutic Interventions Met yes, treatment indicated   OT Diagnosis Decreased I in ADLs   Influenced by the following impairments Decreased balance, overall strength, SOB with ADLs   Assessment of Occupational Performance 1-3 Performance Deficits   Identified Performance Deficits Dressing, toileting, bathing, grooming and hygeiene   Clinical Decision Making (Complexity) Low complexity   Therapy Frequency daily   Predicted Duration of Therapy Intervention (days/wks) 3 days   Anticipated Discharge Disposition Transitional Care Facility   Risks and Benefits of Treatment have been explained. Yes   Patient, Family & other staff in agreement with plan of care Yes   Total Evaluation Time   Total Evaluation Time (Minutes) 15[JM1.1]        Revision History        User Key Date/Time User Provider Type Action    > JM1.1 3/13/2017  1:59 PM Kendra Grace, OT Occupational Therapist Sign            Progress Notes by Giancarlo Weber MD at 3/13/2017  8:38 AM     Author:  Giancarlo Weber MD Service:  Hospitalist Author Type:  Physician    Filed:  3/13/2017  9:28 AM Date of Service:  3/13/2017  8:38 AM Note Created:  3/13/2017  8:38 AM    Status:  Signed :  Giancarlo Weber MD (Physician)         [facility]    Hospitalist Progress Note    Date of Service (when I saw the patient):[IM1.1] 03/13/2017    Assessment & Plan[IM1.2]   Ravi Sandoval is a 72 year old male with history of chronic oxygen-dependent respiratory failure on 4L O2, histiocytosis X, pulmonary fibrosis, severe pulmonary hypertension with a history of cor pulmonale and COPD.  recently hospitalized from 02/22/2017 to 02/25/2017 when he had lactic acidosis, but no evidence of infection. Admitted again[IM1.1] 3/12/2017[IM1.3] with generalized weakness with lactic acid of 6.1.    Lactic acidosis[IM1.1]  Suspect secondary to hypoxia as patient had multiple episodes of being off oxygen for extended period of time, and wife reported during one of these episodes his legs were turning blue. Likely tolerates hypoxia very poorly with his underlying pulmonary hypertension. He also was recently started on furosemide following his last discharge, and reports his diarrhea seemed worse since that was started, therefore may have component of volume depletion as well. No evidence for infection.[IM1.4]  - Lactic acid normalized  - UA negative for infection[IM1.1]  - Blood cultures from admission NGTD, continue to monitor[IM1.4]    Leukocytosis  WBC 15.1 on admission. As above, no clear source of infection. CXR stable, UA unremarkable, C difficile negative.  - WBC decreasing    Generalized weakness[IM1.1]  Patient felt too weak to get up from the ground leading to admission. He reports that overall he has felt a steady decline following his last discharge, rather than an abrupt decline.   - PT/OT consulted  - Consult SW for discharge planning. Patient declined TCU after last hospitalization, he is now more open to this.[IM1.4]     History of urinary retention  Required Ellison catheter during last hospitalization[IM1.1]. Reports he was urinating normally at home after removal.[IM1.4]  - UA unremarkable  -[IM1.1] Monitor b[IM1.4]ladder scan[IM1.1] to ensure no retention[IM1.4]     Severe pulmonary hypertension with a history of cor pulmonale.   Recent echocardiogram with RV systolic pressure of 57 plus right atrial pressure, estimated at 5-10, and RV systolic function moderately to severely reduced with a moderately dilated RV with trace to mild LV function of 55-60% with mild concentric LVH. Followed by   [facility] at the [facility].  - Continue Adcirca 40 mg daily[IM1.1] (recently increased as outpatient by Dr. Rouse)  - Continue digoxin   - Hold furosemide temporarily, consider resumption[IM1.4] 3/14/17[IM1.5]    Hypertension  During his last hospitalization, the patient had hypertensive urgency.[IM1.1]   -[IM1.4] Continue amlodipine and metoprolol[IM1.1]. Will resume PTA[IM1.4] lisinopril.     Diabetes mellitus, type 2  Prior to admission on metformin.  - Blood sugars well-controlled with minimal insulin needs  - Moderate consistent carbohydrate diet, medium SSI  - HgbA1c 8.1%  - Hold metformin    Pulmonary fibrosis  COPD  Histiocytosis X[IM1.1]  Chronic hypoxic respiratory failure[IM1.4]  Followed by Dr. Kaba. On chronic prednisone 10 mg daily. At this point,[IM1.1] no indication for[IM1.4] stress-dose steroids.  - No acute issues.[IM1.1]  - Continue home oxygen[IM1.4]    Depression.   - Continue prior to admission fluoxetine.     Chronic gout  No acute joint complaints.  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 He did get some IV iron in the past.[IM1.1] Ferritin 284 2/25/17.  - Monitor hemoglobin periodically, currently stable[IM1.4]    Chronic diarrhea  The patient has had an outpatient workup, including a colonoscopy.[IM1.1] No specific relation to diarrhea, had tolerated well prior to more recent issues with diarrhea[IM1.4]  - C difficile negative[IM1.1]  - Loperamide PRN[IM1.4]    Lobulated nodule of the lingula  He should have follow up scan 6 months from previous study.      Coronary artery disease  Denies chest pain. Continue aspirin, rosuvastatin, metoprolol XL     DVT Prophylaxis: Enoxaparin (Lovenox) SQ  Code Status:[IM1.1] Full Code[IM1.2]    Disposition:[IM1.1] Await therapy evaluations. Anticipate will need TCU. Discharge in 1-2  days.[IM1.4]     Giancarlo Weber    Interval History[IM1.2]   No acute events overnight. Reports he feels stronger compared to admission. No new symptoms this morning, denies chest pain, new shortness of breath.[IM1.4]     -Data reviewed today: I reviewed all new labs and imaging results over the last 24 hours. I personally reviewed[IM1.1] no images or EKG's today[IM1.4].[IM1.1]    Physical Exam   Temp: 96.7  F (35.9  C) Temp src: Oral BP: 161/83 Pulse: 70 Heart Rate: 76 Resp: 18 SpO2: 91 % O2 Device: Nasal cannula Oxygen Delivery: 5 LPM  Vitals:    03/13/17 0618   Weight: 57.1 kg (125 lb 14.1 oz)[IM1.2]     Vital Signs with Ranges[IM1.1]  Temp:  [95.6  F (35.3  C)-99.1  F (37.3  C)] 96.7  F (35.9  C)  Pulse:  [55-87] 70  Heart Rate:  [59-86] 76  Resp:  [8-20] 18  BP: (108-161)/(52-93) 161/83  SpO2:  [88 %-98 %] 91 %  I/O last 3 completed shifts:  In: 560 [P.O.:560]  Out: 450 [Urine:450][IM1.2]    Constitutional:[IM1.1] Elderly male[IM1.4], NAD  Respiratory: Clear to auscultation bilaterally, good air movement bilaterally  Cardiovascular: RRR, no m/r/g. No peripheral edema.  GI: Soft, non-tender, non-distended. BS normoactive.   Skin/Integumen: Warm, dry  Other:[IM1.1]     Medications        amLODIPine (NORVASC) tablet 2.5 mg  2.5 mg Oral Daily     aspirin chewable tablet 81 mg  81 mg Oral At Bedtime     digoxin  125 mcg Oral Daily     FLUoxetine (PROzac) capsule 40 mg  40 mg Oral Daily     levothyroxine  100 mcg Oral Daily     metoprolol  25 mg Oral BID     omeprazole (priLOSEC) CR capsule 40 mg  40 mg Oral Daily     predniSONE (DELTASONE) tablet 10 mg  10 mg Oral Daily     rosuvastatin  10 mg Oral Daily     tamsulosin  0.4 mg Oral Daily     insulin aspart  1-7 Units Subcutaneous TID AC     insulin aspart  1-5 Units Subcutaneous At Bedtime     enoxaparin  30 mg Subcutaneous Q24H     tadalafil  40 mg Oral Daily     sodium chloride (PF)  3 mL Intracatheter Q8H       Data     Recent Labs  Lab 03/13/17  0729  03/12/17  1000   WBC 11.2* 15.1*   HGB 10.9* 11.2*   MCV 82 82    196    136   POTASSIUM 3.8 4.0   CHLORIDE 102 99   CO2 28 22   BUN 10 11   CR 0.48* 0.58*   ANIONGAP 9 15*   AV 8.1* 8.0*   * 202*   TROPI  --  <0.015The 99th percentile for upper reference range is 0.045 ug/L.  Troponin values in the range of 0.045 - 0.120 ug/L may be associated with risks of adverse clinical events.       Recent Results (from the past 24 hour(s))   XR Chest 2 Views    Narrative    CHEST TWO VIEWS   3/12/2017 10:25 AM    HISTORY: Chest pain. Shortness of Breath.    COMPARISON: 2/22/2017.      Impression    IMPRESSION: Again seen is moderate interstitial prominence diffusely  throughout both lungs. This again likely represents pulmonary  fibrosis. The lungs are otherwise clear with no pulmonary  consolidation or mass demonstrated. No other abnormality is seen. I  see no definite change since the previous examination.     PEDRO LUIS DAS MD[IM1.2]          Revision History        User Key Date/Time User Provider Type Action    > IM1.5 3/13/2017  9:28 AM Giancarlo Weber MD Physician Sign     IM1.4 3/13/2017  9:15 AM Giancarlo Weber MD Physician      IM1.3 3/13/2017  8:50 AM Giancarlo Weber MD Physician      IM1.2 3/13/2017  8:39 AM Giancarlo Weber MD Physician      IM1.1 3/13/2017  8:38 AM Giancarlo Weber MD Physician             ED Provider Notes by Heather Carlisle MD at 3/12/2017  9:39 AM     Author:  Heather Carlisle MD Service:  Emergency Medicine Author Type:  Physician    Filed:  3/12/2017  2:06 PM Date of Service:  3/12/2017  9:39 AM Note Created:  3/12/2017  9:43 AM    Status:  Signed :  Heather Carlisle MD (Physician)           History     Chief Complaint:[NB1.1]  Weakness[NB1.2]     HPI   Ravi Sandoval is a 72 year old male, with a history of pulmonary edema, hypertension and COPD, who presents via EMS for weakness. The weakness has occurred the last week  and half[NB1.1] and was the most-severe this morning.  He stood up out of bed and was unable to support his own weight so he quickly lowered himself to the ground. He was unable to get up, prompting the 911 call.[KM1.1] Diarrhea has occurred twice[NB1.1] daily[KM1.1] since his admission two or three weeks ago[NB1.1] (though before this he was having 4 episodes/day chronically which was finally controlled with Imodium; the Imodium was d/c'd during his past admission)[KM1.1] . This has improved with Imodium. He also has chronic shortness of breath which hasn't changed[NB1.1] except that he reports some increased CHOUDHURY[KM1.1]. This shortness of breath is exacerbated while standing, but is always there.[NB1.1] There otherwise is no orthopnea or leg swelling.[KM1.1] He is on 4 liters of oxygen at home, and his wife reports 74% spO2 at home this morning.  He denies leg swelling, chest pain, nausea or vomiting.[NB1.1]     Cardiac Risk Factors   Sex: Male   Tobacco: Positive  Hypertension: Positive  Diabetes: Positive  Hyperlipidemia: Negative  Family History: Negative[NB1.2]    Allergies:  Tetracycline     Medications:    Adcirca  Lasix  Flomax  Omeprazole  Toprol  Lanoxin  Crestor  Synthroid  Prozac  Amlodipine Besylate  Lisinopril  Metformin  Prednisone  Colchicine  Aspirin     Past Medical History:    ASHD  Benign prostatic hyperplasia  COPD   Histiocytosis  HTN  Thyroid disease  Type 2 DM  Anemia    Past Surgical History:    Cardiac stent  Lung biposy  Colonoscopy    Family History:    HTN  Skin cancer  CJD    Social History:  Marital Status:   Presents to the ED via EMS with wife following  Tobacco Use: Current 0.25 pack/day smoker  Alcohol Use: No  PCP:[NB1.1] Florencio Patricia[NB1.3]     Review of Systems   Respiratory: Positive for shortness of breath.    Cardiovascular: Negative for chest pain and leg swelling.   Gastrointestinal: Positive for diarrhea. Negative for nausea and vomiting.   Neurological:  Positive for light-headedness.   All other systems reviewed and are negative.    .    Physical Exam   First Vitals:[NB1.1]  BP (!) 141/93    Pulse 87    Resp 18    SpO2 (!) 88%[NB1.4]    Physical Exam[NB1.1]  General/Appearance: appears stated age, well-groomed, appears comfortable  Eyes: EOMI, no scleral injection, no icterus  ENT: MMM  Neck: supple, nl ROM, no stiffness  Cardiovascular: RRR, nl S1S2, no m/r/g, 2+ pulses in all 4 extremities, cap refill <2sec  Respiratory:[NB1.5] Decreased air sounds diffusely with dry crackles to bilateral bases, on 4 liters nasal cannula, no increase work of breathing/tachypnea/dyspnea[NB1.6]  Back: no lesions  GI: abd soft, non-distended, nttp,  no HSM, no rebound, no guarding, nl BS  MSK: VIDES, good tone, no bony abnormality  Skin: warm and well-perfused, no rash, no edema, no ecchymosis, nl turgor  Neuro: GCS 15, alert and oriented, no gross focal neuro deficits  Psych: interacts appropriately  Heme: no petechia, no purpura, no active bleeding[NB1.5]    Emergency Department Course   ECG:[NB1.1]  @ 1055  Indication: Weakness  Vent. Rate 73 bpm. TN interval 162 ms. QRS duration 118 ms. QT/QTc 434/478 ms. P-R-T axis 76 -68 51.   Normal sinus rhythm. Left axis deviation. Nonspecific intraventricular conduction delay. Abnormal ECG.   Read @ 1058 by Dr. Carlisle.[NB1.7]    Imaging:[NB1.1]  Chest XR, per radiology:[NB1.5]  IMPRESSION: Again seen is moderate interstitial prominence diffusely  throughout both lungs. This again likely represents pulmonary  fibrosis. The lungs are otherwise clear with no pulmonary  consolidation or mass demonstrated. No other abnormality is seen. I  see no definite change since the previous examination.[NB1.6]     Radiographic findings were communicated with the patient and family who voiced understanding of the findings.[NB1.5]    Laboratory:[NB1.1]  BNP:[NB1.5] 769[NB1.8]  Lactic acid:[NB1.5] 6.1 (HH)[NB1.8]  CBC:  WBC[NB1.5] 15.1 (H)[NB1.7], HGB[NB1.5]  11.2 (L)[NB1.7], PLT[NB1.5] 196[NB1.7], otherwise WNL  CMP:[NB1.5] Creatinine 0.58, glucose 202 (H), calcium 8.0 (L), anion gap 15 (H),[NB1.8] otherwise WNL[NB1.5]  Troponin:[NB1.9] < 0.015[NB1.10]  Blood gas venous and oxyhgb: pH[NB1.11] 7.38[NB1.10] PCO2[NB1.11] 45[NB1.10] PO2[NB1.11] 20 (L)[NB1.10] Bicarbonate[NB1.11] 27[NB1.10] Oxyhemoglobin[NB1.11] 25[NB1.10] Base[NB1.11] 1.3[NB1.10]  UA: pending[NB1.12]    Emergency Department Course:  Nursing notes and vitals reviewed.  I performed an exam of the patient as documented above.  The above workup was undertaken.[NB1.1]  1209[NB1.13]: I rechecked the patient and discussed results.[NB1.8] Patient has almost finished 500 ccs of fluid without worsening of his shortness of breath. His O2 sats are 97% on 6 liters.   Findings and plan explained to the patient who consents to admission.   1213[NB1.13]:[NB1.12] I discussed the patient with [NB1.13] Domenic[KM1.1] of the hospitalist service, who will admit the patient to a[NB1.13]n adult med[NB1.14] bed for further monitoring, evaluation, and treatment.[NB1.13]      Impression & Plan[NB1.1]      CMS Diagnoses:[NB1.15]   The patient has signs of Severe Sepsis as evidenced by:[NB1.13]    1[KM1.1]. Organ dysfunction: Lactic Acid >2    Time[NB1.13] signs of[KM1.1] severe sepsis present =[NB1.13] 10:35 though doubt this is 2/2 infectious source. Suspect this is 2/2 hypoxia that was present prior to ED presentation.[KM1.1]      3 Hour Severe Sepsis Bundle Completion:  1. Initial Lactic Acid Result:[NB1.13]   Recent Labs   Lab Test  03/12/17   1000  02/23/17   0115  02/22/17   1932   LACT  6.1*  1.9  7.1*[NB1.16]     2. Blood Cultures before Antibiotics:[NB1.13] No[KM1.1]  3. Broad Spectrum Antibiotics Administered: No[NB1.13]     Anti-infectives     None[NB1.17]        4.[NB1.13] 500 ml of IV fluids. Not giving additional fluids 2/2 severe pulm htn and doubt that there is an infectious etiology. Suspect elevated lactate is  2/2 hypoxia.[KM1.1]    The patient was transferred out of the ED prior to the 6 hour viviana.[NB1.14]        Medical Decision Making:[NB1.1]  This patient is a 72 year old male with a history of pulmonary fibrosis and pulmonary HTN, on 4 liters nasal cannula oxygen at baseline[NB1.14], who presents with increased weakness as well as increased dyspnea on exertion. He has had increasing weakness over the past 1.5 that to him being unable to get off the floor today. No obvious etiology for this has been found. Electrolytes are within normal limits. He does have elevated lactate and CBC, however, he doesn't have any obvious infection, hypotension, tachycardia. He is also afebrile. He recently was admitted for pulmonary edema requiring bipap, Lasix, nitro and at that time his lactate was even higher than it was today. No infection was found. I suspect his lactic acidosis is due to the hypoxia he had prior to arrival. This needs to be trended, however at this time I don't think antibiotics are indicated. Similarly, I don't want to give him a lot of fluid. He has very tenuous pulmonary hypertension and I think the 300 ccs/kg fluid would throw him into dangerous pulmonary edema. We increased his oxygen to 4-6 liters and he is breathing well, as well as having O2 sats in the mid-to-upper 90s. There is nothing to suggest an acute cardiac etiology. Chest XR is WNL except for the pulmonary fibrosis and does not show pulmonary edema or infection. I am still waiting for urine, however he is not having any urinary symptoms and therefore I doubt this is a possible source of sepsis/infection. Given his abnormal lactic acidosis and relative hypoxia, he'll be brought in to the hospital for continued monitoring and management.[NB1.12]     Diagnosis:[NB1.1]    ICD-10-CM    1. Acidosis E87.2    2. Hypoxia R09.02    3. Generalized muscle weakness M62.81[NB1.10]        Disposition:[NB1.1]  Admitted to an adult med bed.[NB1.14]    Lauren WILCOX  Bonfig, am serving as a scribe on 3/12/2017 at 9:43 AM to personally document services performed by Dr. Carlisle based on my observations and the provider's statements to me.    EMERGENCY DEPARTMENT[NB1.1]       Heather Carlisle MD  03/12/17 1406  [KM1.2]     Revision History        User Key Date/Time User Provider Type Action    > KM1.2 3/12/2017  2:06 PM Heather Carlisle MD Physician Sign     KM1.1 3/12/2017  2:00 PM Heather Carlisle MD Physician      NB1.16 3/12/2017  1:11 PM Bonfig, Lauren Scribe Share     NB1.12 3/12/2017  1:01 PM Bonfig, Lauren Scribe      NB1.14 3/12/2017 12:40 PM Bonfig, Lauren Scribe Share     NB1.17 3/12/2017 12:16 PM Bonfig, Lauren Scribe Share     NB1.13 3/12/2017 12:09 PM Bonfig, Lauren Scribe      NB1.10 3/12/2017 12:06 PM Bonfig, Lauren Scribe Share     NB1.11 3/12/2017 11:44 AM Bonfig, Lauren Scribe Share     NB1.9 3/12/2017 11:33 AM Bonfig, Lauren Scribe Share     [N/A] 3/12/2017 11:27 AM Bonfig, Lauren Scribe Share     NB1.15 3/12/2017 11:26 AM Bonfig, Lauren Scribe Share     NB1.7 3/12/2017 11:03 AM Bonfig, Lauren Scribe Share     NB1.8 3/12/2017 10:58 AM Bonfig, Lauren Scribe Share     NB1.6 3/12/2017 10:32 AM Bonfig, Lauren Scribe Share     NB1.2 3/12/2017 10:12 AM Bonfig, Lauren Scribe Share     NB1.5 3/12/2017 10:05 AM Bonfig, Lauren Scribe Share     NB1.4 3/12/2017 10:00 AM Bonfig, Lauren Scribe Share     NB1.3 3/12/2017  9:58 AM Bonfig, Lauren Scribe      NB1.1 3/12/2017  9:43 AM Lauren Fuentes             ED Notes by Hang Warren RN at 3/12/2017  1:12 PM     Author:  Hang Warren RN Service:  Emergency Medicine Author Type:  Registered Nurse    Filed:  3/12/2017  1:13 PM Date of Service:  3/12/2017  1:12 PM Note Created:  3/12/2017  1:13 PM    Status:  Signed :  Hang Warren RN (Registered Nurse)         Up to stand at bedside independently with stand by assist for void for urine specimen collection. Scant  amount = output. Will consult with lab regarding amount of sample.[AD1.1]      Revision History        User Key Date/Time User Provider Type Action    > AD1.1 3/12/2017  1:13 PM Hang Warren RN Registered Nurse Sign            ED Notes by Dreyer, Bret R, RN at 3/12/2017  1:00 PM     Author:  Dreyer, Bret R, RN Service:  (none) Author Type:  Registered Nurse    Filed:  3/12/2017  1:00 PM Date of Service:  3/12/2017  1:00 PM Note Created:  3/12/2017  1:00 PM    Status:  Signed :  Dreyer, Bret R, RN (Registered Nurse)         MD at bedside.[BD1.1]     Revision History        User Key Date/Time User Provider Type Action    > BD1.1 3/12/2017  1:00 PM Dreyer, Bret R, RN Registered Nurse Sign            ED Notes by Hang Warren RN at 3/12/2017 12:10 PM     Author:  Hang Warren RN Service:  Emergency Medicine Author Type:  Registered Nurse    Filed:  3/12/2017 12:15 PM Date of Service:  3/12/2017 12:10 PM Note Created:  3/12/2017 12:10 PM    Status:  Signed :  Hang Warren RN (Registered Nurse)         [facility]  ED Nurse Handoff Report    ED Chief complaint: Shortness of Breath (O2 dependent at home. today sats were 74 per wife at home. increase weakness)      ED Diagnosis:   Final diagnoses:   Acidosis   Hypoxia   Generalized muscle weakness       Code Status: Full Code    Allergies:   Allergies   Allergen Reactions     Tetracycline        Activity level:  Assist of one     Needed?: No    Isolation: No  Infection: Not Applicable    Bariatric?: No      Vital Signs:   Vitals:    03/12/17 0950 03/12/17 1000 03/12/17 1020 03/12/17 1022   BP: 140/75 111/62     Pulse:       Resp: 15 10     Temp:   99.1  F (37.3  C) 98  F (36.7  C)   TempSrc:   Temporal Oral   SpO2: 91% 94%         Cardiac Rhythm: ,        Pain level: 0-10 Pain Scale: 0    Is this patient confused?: No    Patient Report: Initial Complaint: increased SOB  Focused Assessment: Pt recently inpatient  for SOB secondary to heart failure. Hx of pulmonary fibrosis and pulmonary edema. Got one dose of anbx, was d/c'd to home, but never really felt better, per pt and pt's wife. had a near fall today, where pt was able to lower himself to the ground, but could not get up independently. On arrival to ED today, pt c/o SOB, sats = 83%. Pt normally uses 4L NC at home, presently on 6L NC with O2 sats in mids 90s, which is typical per pt. Denies pain, is alert and oriented.  Tests Performed: labs, ekg, xray  Abnormal Results: lactic, blood gases pending at this time.  Treatments provided: iv fluid bolus, increased NC o2.    Family Comments: wife at bedside    OBS brochure/video discussed/provided to patient: N/A    ED Medications:   Medications   0.9% sodium chloride BOLUS (not administered)       Drips infusing?:  No      ED NURSE PHONE NUMBER: [phone][AD1.1]            Revision History        User Key Date/Time User Provider Type Action    > AD1.1 3/12/2017 12:15 PM Hang Warren RN Registered Nurse Sign            ED Notes by Hang Warren RN at 3/12/2017 10:26 AM     Author:  Hang Warren RN Service:  Emergency Medicine Author Type:  Registered Nurse    Filed:  3/12/2017 10:26 AM Date of Service:  3/12/2017 10:26 AM Note Created:  3/12/2017 10:26 AM    Status:  Signed :  Hang Warren RN (Registered Nurse)         Urinal given for urine specimen.[AD1.1]     Revision History        User Key Date/Time User Provider Type Action    > AD1.1 3/12/2017 10:26 AM Hang Warren RN Registered Nurse Sign            ED Notes by Hang Warren RN at 3/12/2017 10:00 AM     Author:  Hang Warren RN Service:  Emergency Medicine Author Type:  Registered Nurse    Filed:  3/12/2017 10:24 AM Date of Service:  3/12/2017 10:00 AM Note Created:  3/12/2017 10:24 AM    Status:  Signed :  Hang Warren RN (Registered Nurse)         O2 increased to 6L NC with O2 sats = 94%, which is where pt  usually measures on 4L NC at home.[AD1.1]     Revision History        User Key Date/Time User Provider Type Action    > AD1.1 3/12/2017 10:24 AM Hang Warren RN Registered Nurse Sign            ED Notes by Markus Carr RN at 3/12/2017  9:42 AM     Author:  Markus Carr RN Service:  (none) Author Type:  Registered Nurse    Filed:  3/12/2017  9:42 AM Date of Service:  3/12/2017  9:42 AM Note Created:  3/12/2017  9:42 AM    Status:  Signed :  Markus Carr RN (Registered Nurse)         Bed: ED01  Expected date: 3/12/17  Expected time: 9:29 AM  Means of arrival: Ambulance  Comments:  E1 72M weak/SOB     Revision History        User Key Date/Time User Provider Type Action    > MS1.1 3/12/2017  9:42 AM Markus Carr, RN Registered Nurse Sign                  Procedure Notes     No notes of this type exist for this encounter.         Progress Notes - Therapies (Notes from 03/12/17 through 03/15/17)      Progress Notes by Kassi Bliss, PT at 3/13/2017  4:42 PM     Author:  Kassi Bliss PT Service:  Physical Medicine and Rehabilitation Author Type:  Physical Therapist    Filed:  3/13/2017  4:42 PM Date of Service:  3/13/2017  4:42 PM Note Created:  3/13/2017  4:42 PM    Status:  Signed :  Kassi Bliss PT (Physical Therapist)          03/13/17 1432   Quick Adds   Type of Visit Initial PT Evaluation   Living Environment   Lives With spouse   Living Arrangements apartment   Home Accessibility grab bars present (bathtub)  (wa;l-in shower, RTS with handles)   Number of Stairs to Enter Home 0   Number of Stairs Within Home 0   Transportation Available family or friend will provide   Self-Care   Dominant Hand right   Usual Activity Tolerance fair   Current Activity Tolerance poor   Equipment Currently Used at Home wound care supplies  (owns [employer] but doesn't currently use)   Functional Level Prior   Ambulation 0-->independent   Transferring 0-->independent   Toileting 1-->assistive  "equipment   Bathing 1-->assistive equipment   Dressing 0-->independent   Eating 0-->independent   Communication 0-->understands/communicates without difficulty   Swallowing 0-->swallows foods/liquids without difficulty   Cognition 0 - no cognition issues reported   Fall history within last six months yes   Number of times patient has fallen within last six months 1   Which of the above functional risks had a recent onset or change? ambulation;toileting;transferring   Prior Functional Level Comment Has cane but doesn't use. 4-5L supplemental O2 at baseline   General Information   Onset of Illness/Injury or Date of Surgery - Date 03/12/17   Referring Physician Asia Sheth   Patient/Family Goals Statement Open to rehab. as was so weak upon returning home following 2/25/17 hosp. stay   Pertinent History of Current Problem (include personal factors and/or comorbidities that impact the POC) Admitted with generalized weakness, lactic acidosis. Fell on floor the day of admission due to \"legs giving out on me\". PMHX: chronic O2 dep. resp. failure on 4LNC, histiocytosis X, pulmonary fibrosis, COPD, DM, CAD, sever pulmonary hypertension, hosp. 2/22/17-2/25/17 for lactic acidosis with no evidence of infection. Went home following that hospital stay but has gotten steadily weaker since DC.   Precautions/Limitations fall precautions;oxygen therapy device and L/min   Weight-Bearing Status - LLE full weight-bearing   Weight-Bearing Status - RLE full weight-bearing   Heart Disease Risk Factors Medical history;Gender;Age;Lack of physical activity   General Observations Resting comfortably in bed with 5LNC intact   General Info Comments Up with assist   Cognitive Status Examination   Orientation orientation to person, place and time   Level of Consciousness alert   Follows Commands and Answers Questions 100% of the time   Personal Safety and Judgment intact   Memory intact   Pain Assessment   Patient Currently in Pain No "   Integumentary/Edema   Integumentary/Edema no deficits were identifed   Posture    Posture Forward head position;Protracted shoulders   Range of Motion (ROM)   ROM Quick Adds No deficits were identified   Strength   Strength Comments Hip flex. mark. 4-/5, knee ext. mark. 4-/5, DF 4/5   Bed Mobility   Bed Mobility Comments Supine>sit with CGA using bedrail, performed slowly. Sit>supine indep. with HOB flat and without use of bedrailing.   Transfer Skills   Transfer Comments Sit<>stand with CGA using FWW once and without AD once. Fair balance upon initial stand.   Gait   Gait Comments Pt. amb. 20 ft. with FWW, FWB, CGA for pull portable O2.    Balance   Balance Comments Able to stand with feet shld. width apart and feet together with EO and EC for 30 secs. each with minimal sway. Able to reach outside DIEGO mark. at 3 levels.   Sensory Examination   Sensory Perception Comments Intact and symmetrical to lt. touch mark. except slightly diminished sensation to lt. touch right plantar surface of foot.   Coordination   Coordination no deficits were identified   Muscle Tone   Muscle Tone no deficits were identified   General Therapy Interventions   Planned Therapy Interventions bed mobility training;transfer training;progressive activity/exercise   Clinical Impression   Criteria for Skilled Therapeutic Intervention yes, treatment indicated   PT Diagnosis Impaired cardiopulmonary response to amb., SBA for bed mobility and transfers, weakness LEs   Influenced by the following impairments Medical diagnosis, motivation by pt.   Functional limitations due to impairments Pt. desats post. bed mobility, transfers and amb. on 6LNC 02. Falls risk.   Clinical Presentation Stable/Uncomplicated   Clinical Presentation Rationale Decreasing strength at home, fall at home, CGA for bed mobility and transfers, need to moniter O2 sats post. activity   Clinical Decision Making (Complexity) Low complexity   Therapy Frequency` daily   Predicted  "Duration of Therapy Intervention (days/wks) 3-4 days   Anticipated Equipment Needs at Discharge other (see comments)  (may need FWW if returns home)   Anticipated Discharge Disposition Transitional Care Facility   Risk & Benefits of therapy have been explained Yes   Patient, Family & other staff in agreement with plan of care Yes   Clinical Impression Comments Pt. will benefit from skilled PT to promote increased LE strength, indep. with moblity and ADLs.   [facility] AM-PAC TM \"6 Clicks\"   2016, Trustees of [facility], under license to [employer].  All rights reserved.   6 Clicks Short Forms Basic Mobility Inpatient Short Form   [facility] AM-PAC  \"6 Clicks\" V.2 Basic Mobility Inpatient Short Form   1. Turning from your back to your side while in a flat bed without using bedrails? 4 - None   2. Moving from lying on your back to sitting on the side of a flat bed without using bedrails? 3 - A Little   3. Moving to and from a bed to a chair (including a wheelchair)? 3 - A Little   4. Standing up from a chair using your arms (e.g., wheelchair, or bedside chair)? 3 - A Little   5. To walk in hospital room? 3 - A Little   6. Climbing 3-5 steps with a railing? 2 - A Lot   Basic Mobility Raw Score (Score out of 24.Lower scores equate to lower levels of function) 18   Total Evaluation Time   Total Evaluation Time (Minutes) 20[AE1.1]        Revision History        User Key Date/Time User Provider Type Action    > AE1.1 3/13/2017  4:42 PM Kassi Bliss, PT Physical Therapist Sign            Progress Notes by Kendra Grace OT at 3/13/2017  1:57 PM     Author:  Kendra Grace OT Service:  (none) Author Type:  Occupational Therapist    Filed:  3/13/2017  1:59 PM Date of Service:  3/13/2017  1:57 PM Note Created:  3/13/2017  1:57 PM    Status:  Signed :  Kendra Grace OT (Occupational Therapist)          03/13/17 1118   Quick Adds   Type of Visit Initial Occupational Therapy " Evaluation   Living Environment   Lives With spouse   Living Arrangements apartment   Home Accessibility grab bars present (bathtub)  (RTS with handles, walk in shower)   Number of Stairs to Enter Home 0  (elevator)   Number of Stairs Within Home 0   Living Environment Comment Goes down to get mail from [employer]. Wife at home and healthy   Self-Care   Dominant Hand right   Functional Level Prior   Ambulation 0-->independent   Transferring 0-->independent   Toileting 1-->assistive equipment   Bathing 1-->assistive equipment   Dressing 0-->independent   Eating 0-->independent   Communication 0-->understands/communicates without difficulty   Swallowing 0-->swallows foods/liquids without difficulty   Cognition 0 - no cognition issues reported   Fall history within last six months yes   Number of times patient has fallen within last six months 1   Which of the above functional risks had a recent onset or change? ambulation;transferring;toileting;bathing;dressing;fall history   Prior Functional Level Comment Has cane but doesn't use. 4-5L supplemental O2 at baseline   General Information   Onset of Illness/Injury or Date of Surgery - Date 03/12/17   Referring Physician Domenic   Patient/Family Goals Statement To get stronger and go home.   Additional Occupational Profile Info/Pertinent History of Current Problem Patient admitted with generalized weakness and lactic acid of 6.1. PMH includes chronic, O2 dependent respiratory failure on 4L O2, histiocytosis X, pulmonary fibrosis, severe pulmonary hypertension. Was hospitalized 2/22-2/25 with lactic acidosis with no evidence of infection.   Precautions/Limitations fall precautions;oxygen therapy device and L/min  (5L O2 via NC, 93% at rest)   Cognitive Status Examination   Orientation orientation to person, place and time   Level of Consciousness alert   Able to Follow Commands WNL/WFL   Personal Safety (Cognitive) WNL/WFL   Memory intact   Cognitive Comment Able to recall 3/3  words after distraction   Visual Perception   Visual Perception Wears glasses   Visual Perception Comments denies changes in vision this LOS   Sensory Examination   Sensory Comments denies any disturbances   Pain Assessment   Patient Currently in Pain No   Integumentary/Edema   Integumentary/Edema no deficits were identifed   Posture   Posture forward head position   Range of Motion (ROM)   ROM Comment BUE WNL   Strength   Strength Comments B deltoids, biceps 4/5, triceps 5/5   Hand Strength   Hand Strength Comments B grasp 5/5   Mobility   Bed Mobility Comments MIN A at trunk supine to EOB with bed similair to home (flat, no rail)   Transfer Skill: Sit to Stand   Level of Dixie: Sit/Stand contact guard   Transfer Skill: Toilet Transfer   Level of [place]: Toilet contact guard   Assistive Device grab bars  (on R)   Balance   Balance Comments No LOB noted, slow cautious gait   Lower Body Dressing   Level of Dixie: Dress Lower Body moderate assist (50% patients effort)  (SBA R sock, DEP L sock EOB)   Toileting   Level of Dixie: Toilet (Able to manage clothing CGA)   Grooming   Level of Dixie: Grooming contact guard  (brushing teeth, combing hair, washing face at sink)   Instrumental Activities of Daily Living (IADL)   Previous Responsibilities medication management;finances;driving   IADL Comments Hired help for cleaning. Wife does laundry and cooking   Activities of Daily Living Analysis   Impairments Contributing to Impaired Activities of Daily Living balance impaired;strength decreased   General Therapy Interventions   Planned Therapy Interventions ADL retraining;balance training;transfer training   Clinical Impression   Criteria for Skilled Therapeutic Interventions Met yes, treatment indicated   OT Diagnosis Decreased I in ADLs   Influenced by the following impairments Decreased balance, overall strength, SOB with ADLs   Assessment of Occupational Performance 1-3 Performance  Deficits   Identified Performance Deficits Dressing, toileting, bathing, grooming and hygeiene   Clinical Decision Making (Complexity) Low complexity   Therapy Frequency daily   Predicted Duration of Therapy Intervention (days/wks) 3 days   Anticipated Discharge Disposition Transitional Care Facility   Risks and Benefits of Treatment have been explained. Yes   Patient, Family & other staff in agreement with plan of care Yes   Total Evaluation Time   Total Evaluation Time (Minutes) 15[JM1.1]        Revision History        User Key Date/Time User Provider Type Action    > JM1.1 3/13/2017  1:59 PM Kendra Grace, OT Occupational Therapist Sign

## 2017-03-12 NOTE — IP AVS SNAPSHOT
` ` Patient Information     Patient Name Sex     Ravi Sandoval ([id number]) Male 1945       Room Bed    [facility] 8834-02      Patient Demographics     Address Phone E-mail Address    [address]   [place] [address] [phone] (Home) [email or web address]      Patient Ethnicity & Race     Ethnic Group Patient Race    American White      Emergency Contact(s)     Name Relation Home Work Mobile    Kelly Sandoval Spouse [phone] none [phone]      Documents on File        Status Date Received Description       Documents for the Patient    Privacy Notice - [place] Received 12     Insurance Card Received 11     External Medication Information Consent Accepted 14     Patient ID Received 11     Consent for Services - Hospital/Clinic Received () 10/25/10     Consent for Services - Hospital/Clinic Received () 11     Consent for Services - Hospital/Clinic Received () 13     Consent for Services - Hospital/Clinic  () 13 CONSENT FOR SERVICES    Consent for EHR Access  13 Copied from existing Consent for services - C/HOD collected on 2013    [facility] Specified Other       [facility] Authorization  13     Consent for Services - Hospital/Clinic Received () 14     Insurance Card Received 14     Patient ID Received 14     Consent for Services - Informed Received 14 Heart and Lung    Consent to Communicate Received 14     HIM [facility]WN Authorization - File Only Received 14 Email Consent    Business/Insurance/Care Coordination/Health Form - Patient  16 MEDICAREBLUE RX, [employer] PRIOR AUTHORIZATION, 2016    Consent for Services/Privacy Notice - Hospital/Clinic Received 16     Insurance Card Received 16     Insurance Card Received 02/15/17 BCBS 2017    Consent for Services - Hospital/Clinic  (Deleted)         Documents for the Encounter    CMS IM for Patient Signature          Admission Information     Attending Provider Admitting Provider Admission Type Admission Date/Time    Asia Sheth MD Lindeke, Elizabeth A, MD Emergency 03/12/17  0942    Discharge Date Hospital Service Auth/Cert Status Service Area     Hospitalist Incomplete [facility]    Unit Room/Bed Admission Status        88 ONCOLOGY 8834/8834-02 Admission (Confirmed)       Admission     Complaint    Lactic acid acidosis      Hospital Account     Name Acct ID Class Status Primary Coverage    Ravi Sandoval [id number] Inpatient Open MEDICARE - MEDICARE            Guarantor Account (for Hospital Account #[id number])     Name Relation to Pt Service Area Active? Acct Type    Ravi Sandoval Self FCS Yes Personal/Family    Address Phone          [address] [place] [address] [phone](H)              Coverage Information (for Hospital Account #[id number])     1. MEDICARE/MEDICARE     F/O Payor/Plan Precert #    MEDICARE/MEDICARE     Subscriber Subscriber #    Ravi Sandoval [id number]    Address Phone    [employer]  [address]  [place] [address] [phone]          2. [place]     F/O Payor/Plan Precert #    [place]     Subscriber Subscriber #    Ravi Sandoval [id number]    Address Phone    [address] [address]  [place] [address] [phone]

## 2017-03-12 NOTE — ED NOTES
Up to stand at bedside independently with stand by assist for void for urine specimen collection. Scant amount = output. Will consult with lab regarding amount of sample.

## 2017-03-12 NOTE — IP AVS SNAPSHOT
[place] [address] ONCOLOGY: [phone]                                              INTERAGENCY TRANSFER FORM - PHYSICIAN ORDERS   3/12/2017                    Hospital Admission Date: 3/12/2017  KYLE JACOBSON   : 1945  Sex: Male        Attending Provider: Asia Sheth MD     Allergies:  Tetracycline    Infection:  None   Service:  HOSPITALIST    Ht:  --   Wt:  62.7 kg (138 lb 3.2 oz)   Admission Wt:  57.1 kg (125 lb 14.1 oz)    BMI:  --   BSA:  --            Patient PCP Information     Provider PCP Type    Florencio Patricia MD General      ED Clinical Impression     Diagnosis Description Comment Added By Time Added    Acidosis [E87.2] Acidosis [E87.2]  Heather Carlisle MD 3/12/2017 11:45 AM    Hypoxia [R09.02] Hypoxia [R09.02]  Heather Carlisle MD 3/12/2017 11:45 AM    Generalized muscle weakness [M62.81] Generalized muscle weakness [M62.81]  Heather Carlisle MD 3/12/2017 11:45 AM      Hospital Problems as of 3/15/2017              Priority Class Noted POA    Lactic acid acidosis Medium  3/12/2017 Yes      Non-Hospital Problems as of 3/15/2017              Priority Class Noted    Pulmonary hypertension (H)   2014    Anemia Medium  2017    Acute respiratory distress (H) Medium  2017      Code Status History     Date Active Date Inactive Code Status Order ID Comments User Context    3/14/2017 10:00 PM  Full Code [id number]  Giancarlo Weber MD Outpatient    3/12/2017  1:58 PM 3/14/2017 10:00 PM Full Code [id number]  Asia Sheth MD Inpatient    2017 11:21 PM 2017  7:45 PM Full Code [id number]  Myriam Powers MD Inpatient    2014  9:17 AM 2014 12:32 PM Full Code [id number]  Delonte Leon, RN Inpatient         Medication Review      START taking        Dose / Directions Comments    glipiZIDE 5 MG tablet   Commonly known as:  GLUCOTROL   Used for:  Type 2 diabetes mellitus without complication, without  long-term current use of insulin (H)        Dose:  5 mg   Take 1 tablet (5 mg) by mouth every morning (before breakfast)   Quantity:  30 tablet   Refills:  0        loperamide 2 MG capsule   Commonly known as:  IMODIUM   Used for:  Chronic diarrhea        Dose:  2 mg   Take 1 capsule (2 mg) by mouth 4 times daily as needed for diarrhea   Quantity:  20 capsule   Refills:  0          CONTINUE these medications which may have CHANGED, or have new prescriptions. If we are uncertain of the size of tablets/capsules you have at home, strength may be listed as something that might have changed.        Dose / Directions Comments    furosemide 20 MG tablet   Commonly known as:  LASIX   This may have changed:  when to take this   Used for:  Acute on chronic congestive heart failure, unspecified congestive heart failure type (H)        Dose:  20 mg   Take 1 tablet (20 mg) by mouth every other day   Quantity:  30 tablet   Refills:  1          CONTINUE these medications which have NOT CHANGED        Dose / Directions Comments    AMLODIPINE BESYLATE PO        Dose:  2.5 mg   Take 2.5 mg by mouth daily   Refills:  0        ASPIRIN PO        Dose:  81 mg   Take 81 mg by mouth At Bedtime   Refills:  0        COLCRYS PO        Dose:  0.6 mg   Take 0.6 mg by mouth daily TAKES IN THE EVENING   Refills:  0        digoxin 125 MCG tablet   Commonly known as:  LANOXIN   Used for:  Chronic systolic heart failure (H)        Dose:  125 mcg   Take 1 tablet (125 mcg) by mouth daily   Quantity:  90 tablet   Refills:  3        levothyroxine 100 MCG tablet   Commonly known as:  SYNTHROID   Used for:  Hyperthyroidism        Dose:  100 mcg   Take 1 tablet (100 mcg) by mouth daily   Quantity:  30 tablet   Refills:  11        LISINOPRIL PO        Dose:  20 mg   Take 20 mg by mouth daily   Refills:  0        metoprolol 25 MG 24 hr tablet   Commonly known as:  TOPROL-XL   Used for:  Pulmonary hypertension (H)        Dose:  25 mg   Take 1 tablet (25 mg)  by mouth 2 times daily   Quantity:  180 tablet   Refills:  0    Pt needs to make appt or obtain refill from his new cardiologist.       OMEPRAZOLE PO        Dose:  40 mg   Take 40 mg by mouth daily   Refills:  0        PREDNISONE PO        Dose:  10 mg   Take 10 mg by mouth daily   Refills:  0        PROZAC PO        Dose:  40 mg   Take 40 mg by mouth daily   Refills:  0        rosuvastatin 10 MG tablet   Commonly known as:  CRESTOR   Used for:  Hyperlipidemia LDL goal <70        Dose:  10 mg   Take 1 tablet (10 mg) by mouth daily   Quantity:  90 tablet   Refills:  3        tadalafil (PAH) 20 MG Tabs   Commonly known as:  ADCIRCA   Used for:  Pulmonary hypertension (H)        Dose:  40 mg   Take 2 tablets (40 mg) by mouth daily   Quantity:  60 tablet   Refills:  3        tamsulosin 0.4 MG capsule   Commonly known as:  FLOMAX   Used for:  Benign prostatic hyperplasia with lower urinary tract symptoms, unspecified morphology        Dose:  0.4 mg   Take 1 capsule (0.4 mg) by mouth daily   Quantity:  30 capsule   Refills:  3          STOP taking     METFORMIN HCL PO                     Further instructions from your care team       [facility]  [address] [place] [address]  [phone]    Summary of Visit     Reason for your hospital stay       You were hospitalized for generalized weakness, likely a combination of deconditioning from your last hospitalization and dehydration.             After Care     Activity - Up with nursing assistance           Advance Diet as Tolerated       Follow this diet upon discharge: Moderate Consistent CHO (4-6 CHO units/meal)       Daily weights       Call Provider for weight gain of more than 2 pounds per day or 5 pounds per week.       General info for SNF       Length of Stay Estimate: Short Term Care: Estimated # of Days <30  Condition at Discharge: Improving  Level of care:skilled   Rehabilitation Potential: Good  Admission H&P remains valid and up-to-date:  Yes  Recent Chemotherapy: N/A  Use Nursing Home Standing Orders: Yes       Glucose monitor nursing POCT       Before meals and at bedtime       Mantoux instructions       Give two-step Mantoux (PPD) Per Facility Policy Yes             Procedures     Oxygen - Nasal cannula       4 Lpm by nasal cannula to keep O2 sats 92% or greater.             Referrals     Occupational Therapy Adult Consult       Evaluate and treat as clinically indicated.    Reason:  Physical deconditioning; functional immobility.       Physical Therapy Adult Consult       Evaluate and treat as clinically indicated.    Reason:  Physical deconditioning             Your next 10 appointments already scheduled     Apr 18, 2017  9:30 AM CDT   LAB with [facility]   [facility]    [address] [place] [id number] [phone]           Patient must bring picture ID.  Patient should be prepared to give a urine specimen  Please do not eat 10-12 hours before your appointment if you are coming in fasting for labs on lipids, cholesterol, or glucose (sugar).  Pregnant women should follow their Care Team instructions. Water with medications is okay. Do not drink coffee or other fluids.   If you have concerns about taking  your medications, please ask at office or if scheduling via [employer], send a message by clicking on Secure Messaging, Message Your Care Team.            Apr 18, 2017 10:30 AM CDT   Pulmonary Hypertension Return with Herson Rouse MD   [facility]    [address] [place] [id number] [phone]              Follow-Up Appointment Instructions     Future Labs/Procedures    Follow Up and recommended labs and tests     Comments:    Follow up with retirement physician.  The following labs/tests are recommended: BMP in 2-3 days.      Follow-Up Appointment Instructions      Follow Up and recommended labs and tests       Follow up with long-term physician.  The following labs/tests are recommended: BMP in 2-3 days.             Statement of Approval     Ordered          03/15/17 0856  I have reviewed and agree with all the recommendations and orders detailed in this document.  EFFECTIVE NOW     Approved and electronically signed by:  Asia Sheth MD

## 2017-03-12 NOTE — H&P
"PRIMARY CARE PHYSICIAN:  Florencio Patricia MD.      PRIMARY CARDIOLOGIST:  Herson Rouse MD.       PRIMARY PULMONOLOGIST:  Roel Kaba MD.      CHIEF COMPLAINT:  Weakness.      HISTORY OF PRESENT ILLNESS:  Ravi Sandoval is a 72-year-old male with chronic hypoxic respiratory failure secondary to severe pulmonary hypertension with cor pulmonale, pulmonary fibrosis and histiocytosis X.  He was recently admitted from 02/22/2017 to 02/25/2017 when he had hypertensive urgency, lactic acidosis without an obvious source of infection, urinary retention, iron deficiency anemia and a complex liver lesion.  He also had acute on chronic hypoxic respiratory failure.  The patient says that he has been weak ever since discharge from the hospital on 02/25/2017.  However, this morning the patient was much weaker than usual.  The patient is on chronic home oxygen at 4 liters.  He had to get out of bed very quickly this morning as he had diarrhea.  He was not able to make it to the bathroom, unfortunately, and had incontinence.  He also was unable to stand and collapsed, and was unable to stand from his collapse.  His wife reports that she tried to lift him up, but she was unable to.  The patient was off oxygen for several 20-minute episodes, approximately 3.  According to the patient's wife, when EMS first arrived the patient's legs were blue.  Initially, his O2 saturation in the field was 74%.  The paramedics put the patient back on oxygen, and he quickly improved to the upper 80s.  The patient is now in the mid to upper 90s.  The patient says that he felt maybe a little more weak on the right than on the left.  He has had chronic diarrhea for \"years.\"  He had a colonoscopy in 05/2016.  He was advised to start taking Imodium.  The patient says that his stool was mostly liquid this morning.  He normally has about 2 diarrheal stools a day, but sometimes up to 4.      The patient, during his last hospitalization, had urinary retention, " requiring a Ellison catheter.  He did have his Ellison catheter removed and was started on Flomax on discharge.  He also had pulmonary edema during his last hospitalization and was diuresed.  He was put on Lasix on discharge.  The patient says that he has not urinated yet today.  He feels dehydrated.  He was given 500 cc of normal saline in the Emergency Room.  The other abnormality they noted was that his lactic acid was 6.1.  Of note, the patient's lactic acid during his last hospitalization was 7.1 without any source of infection.      PAST MEDICAL HISTORY:   1.  History of chronic oxygen-dependent respiratory failure secondary to histiocytosis X, COPD, pulmonary fibrosis and severe pulmonary hypertension.   2.  Severe pulmonary hypertension, on Adcirca, seen by Cardiology.  His Adcirca was increased to 40 mg daily.  Echocardiogram in 02/2017 reveal RV systolic pressure of 57 plus right atrial pressure, estimated at 5-10, and RV systolic function moderately to severely reduced with a moderately dilated RV with trace to mild LV function of 55-60% with mild concentric LVH.  He had a flattened septum consistent with RV pressure overload.   3.  History of coronary artery disease with a history of ST elevation MI in 10/2010.  He underwent angioplasty with bare metal stent to the mid RCA.  He had rapid decompensation after this, requiring intubation.  RCA was ballooned and some thrombus was noted in the lesion, residual 60-70%, with a small intimal dissection noted in the mid RCA, which was stented with a bare metal stent.  The rest of his angiogram at that time revealed no flow-limiting disease.  He had a 70% stenosis in the mid-LAD and diffuse more mild disease, 30-40% throughout.  The diagonal branch had no flow-limiting lesions.  Ramus intermedius had mild irregularities.  Left circumflex had 30-50% stenosis in the mid section.  PDA and PL branches had mild diffuse disease.  As above, his RCA was completely occluded,  and he underwent bare metal stents to the RCA.   4.  History of histiocytosis X.  He had a biopsy.  He also has pulmonary fibrosis, on chronic prednisone.  He uses oxygen 4 liters.   5.  History of tobacco dependence.  He continues to smoke 5 cigarettes a day and does not want to quit.   6.  History of benign prostatic hypertrophy.  During his last hospitalization, the patient had a Ellison catheter and was discharged on Flomax.   7.  History of hypertension.  Last admission, he had hypertensive urgency and was treated with a nitroglycerin drip.   8.  History of gout, currently on colchicine.   9.  History of depression.   10.  History of hypothyroidism.  The patient is on levothyroxine.   11.  History of GERD.   12.  History of diabetes, uncontrolled, chronically on prednisone.   13.  History of chronic diarrhea.  He had a workup in 05/2016 with a colonoscopy.  He was on Imodium with significant improvement in the past.   14.  History of hyperlipidemia.   15.  History of recent diagnosis of iron deficiency anemia with a baseline hemoglobin around 12 with an MCV of 79.  Iron level on 02/07/2017 was 30, TIBC 467 and iron saturation 6.   16.  History of lobular lesion in the lingula.  Ultrasound on 02/24 showed cholelithiasis and complex liver lesion.  There was no ultrasound evidence of acute cholecystitis.  Liver lesion is complex and slowly growing, and should have a followup in 6 months.      ALLERGIES:  Tetracycline.      MEDICATIONS ON ADMISSION:   1.  Amlodipine 2.5 mg daily.   2.  Aspirin 81 mg daily.   3.  Colchicine 0.6 mg daily.   4.  Digoxin 125 mcg daily.   5.  Prilosec 40 mg daily.   6.  Levothyroxine 100 mcg daily.   7.  Toprol-XL 25 mg p.o. b.i.d.   8.  Omeprazole 40 mg daily.   9.  Prednisone 10 mg daily.   10.  Crestor 10 mg daily.   11.  Tadalafil 40 mg daily.   12.  Flomax 0.4 mg daily.   13.  Lasix 20 mg daily.   14.  Lisinopril 20 mg daily.   15.  Metformin 1000 mg p.o. b.i.d.      FAMILY HISTORY:   Father had pulmonary hypertension.  Mother had skin cancer.  Sister had Creutzfeldt-Eduardo disease.      SOCIAL HISTORY:  The patient is .  He is accompanied by his son, daughter-in-law and wife.  He does not drink alcohol.  He smokes about 5 cigarettes a day.  He is not very active.  He is able to walk on level ground to the grocery store, but not much more than that.      REVIEW OF SYSTEMS:     CONSTITUTIONAL:  The patient estimates he has lost about 40 pounds in the last 7 years.  He did have some teeth chattering yesterday, but denies fever.   ENT:  Negative.   PULMONARY:  Stable.   CARDIAC:  He denies chest pain.   GASTROINTESTINAL:  Chronic diarrhea as above.   GENITOURINARY:  The patient has not urinated today.  He does have a history of BPH.   ENDOCRINE:  History of type 2 diabetes.   NEUROLOGIC:  History of seeing Dr. Mckeon in the past after he had a heart attack.  He has not seen him, however, for many years.   PSYCHIATRIC:  History of depression.   SKIN:  Negative.      PHYSICAL EXAMINATION:   VITAL SIGNS:  Blood pressure is 139/72, pulse 80 and respiratory rate 18.  He is 94% on 8-liter nonrebreather.   GENERAL:  He is in no apparent distress.   HEENT:  Pupils are equal.  Extraocular movements are intact.  Buccal mucosa appears dry.   CHEST:  He has decreased breath sounds throughout, but no wheezing or rhonchi.   CARDIOVASCULAR:  Regular rate and rhythm with normal S1 and S2 with a 1/6 systolic ejection murmur best heard at the left lower sternal border.   ABDOMEN:  Positive bowel sounds.  Soft and nontender.   EXTREMITIES:  No edema.   SKIN:  He has no rashes or erythema.   NEUROLOGIC:  Strength in his lower extremities is 5/5 at present.      DATA:  Sodium is 136, potassium 4.0, chloride 99, bicarbonate 22, BUN 11, creatinine 0.58 and calcium 8.0.  Anion gap is 15.  Lactic acid is 6.1.  BNP is 769.  Troponin is less than 0.015.  Glucose is 202.  White count is 15.1, hemoglobin 11.2 and platelet  count 186,000.  Venous pH is 7.38, venous pCO2 45, venous O2 20 and bicarbonate 27.  Chest x-ray showed moderate interstitial prominence diffusely throughout both lungs, likely represents pulmonary fibrosis.  Lungs were otherwise clear with no pulmonary consolidation or mass.  There was no other abnormality.  Urinalysis:  The patient has not produced a urine yet.  EKG shows sinus rhythm with left axis deviation and nonspecific intraventricular conduction delay.      ASSESSMENT:  Ravi Sandoval is a 72-year-old male who has a history of chronic oxygen-dependent respiratory failure of 4 liters.  He also has histiocytosis X, pulmonary fibrosis, severe pulmonary hypertension with a history of cor pulmonale and COPD.  The patient was recently hospitalized from 02/22/2017 to 02/25/2017 when he had lactic acidosis, but no evidence of infection.  During that hospitalization, he was found to have some cor pulmonale and had increased interstitial markings on his chest x-ray.  He did have some diuresis.  He was also given Lasix on discharge.  He now presents with weakness and appears dry on exam.  He again has an elevated lactic acid of 6.1.  His main complaint, however, is weakness.    1.  Lactic acidosis.  The cause of this is unclear.  However.  The patient did have probably 3 episodes where he was off his oxygen  for 20 minutes at a time.  According to his wife, his legs were blue when the paramedics arrived.  I suspect that his lactic acid elevation is mostly secondary to tissue hypoxia when he was off his oxygen and also possibly some hypoperfusion.  The patient did get some fluids in the Emergency Room.  For now, I am going to hold on any further fluids given his history of cor pulmonale, but would like to recheck a lactic acid around 8:00 p.m. and also in the morning.  I am not going to start any antibiotics as the patient has no evidence of infection.  However, I would like to check a UA when the patient is able to  urinate.     2.  Weakness.  I suspect the patient has some weakness secondary to possibly diarrhea and also dehydration.  The patient did get some fluid resuscitation in the Emergency Room.  For now, we will check orthostatics.  We will hold his lisinopril, but continue his other blood pressure medications with parameters.  Given his history of cor pulmonale, I would like to avoid fluid overload.  We will get daily weights and I's and O's.   3.  History of urinary retention.  We will do a bladder scan to see if the patient is able to urinate or will need a Ellison catheter.  The patient did have urinary retention during his last hospitalization, requiring Ellison catheter.   4.  History of severe pulmonary hypertension with a history of cor pulmonale.  Recent echocardiogram is as noted in the history of present illness.  The patient is followed by Dr. Rouse at the [facility] ER.  He Adcirca was increased from 20 to 40 mg, and we will continue this.   5.  History of hypertension.  During his last hospitalization, the patient had hypertensive urgency.  For now, we will continue him on his amlodipine and metoprolol, but hold his lisinopril for now.   6.  History of diabetes.  We will hold the patient's metformin.  Metformin could have contributed to his lactic acidosis also.  The patient will be on a moderate carbohydrate diet and will be on medium sliding scale insulin.  We will not start any Lantus for now until I see what his glucoses are.   7.  History of chronic pulmonary fibrosis, COPD and histiocytosis X, followed by Dr. Kaba.  The patient is on chronic prednisone 10 mg daily.  At this point, I do not think he needs stress-dose steroids.  We will hold on having the patient be seen by [facility] as he does not actually have any complaints of worsening shortness of breath, but could consider this tomorrow.  The patient was seen by Dr. Kaba last admission.   8.  History of depression.  We will  continue the Prozac.   9.  History of gout.  We will continue colchicine.   10.  History of iron deficiency anemia.  The patient's baseline hemoglobin is 8.  He did get some IV iron in the past.  I would like to recheck iron levels in the morning per patient and family request.   11.  History of chronic diarrhea.  The patient has had an outpatient workup, including a colonoscopy.  At this point, I would like to check for C. difficile, however, given that he had a bout this morning.   12.  History of lobulation of the lingula, slowly going.  He should have follow up in 6 months.   13.  History of coronary artery disease.  The patient denies chest pain now.  We will continue him on his usual medications.   14.  Deep venous thrombosis prophylaxis.  We will put the patient on Lovenox.   15.  Code status was discussed with the patient and family.  He is full code.         MATTHEW MAZARIEGOS MD             D: 2017 13:41   T: 2017 15:12   MT: [id number]      Name:     KYLE JACOBSON   MRN:      [id number]        Account:      [id number]   :      1945           Admitted:     [id number]      Document: [id number]       cc: Florencio Kaba MD

## 2017-03-12 NOTE — PLAN OF CARE
Problem: Goal Outcome Summary  Goal: Goal Outcome Summary  Outcome: No Change  Up to floor from ED at 1400.  VSS, A/O, ambulates assist 1, did have a fall at home.  Enteric precautions for c. Difficile rule out., several episodes of loose stool in ED.  Was able to wean O2 to 5L this shift.

## 2017-03-12 NOTE — ED NOTES
Bed: ED01  Expected date: 3/12/17  Expected time: 9:29 AM  Means of arrival: Ambulance  Comments:  E1 72M weak/SOB

## 2017-03-12 NOTE — IP AVS SNAPSHOT
[place] [address] ONCOLOGY: [phone]                                              INTERAGENCY TRANSFER FORM - LAB / IMAGING / EKG / EMG RESULTS   3/12/2017                    Hospital Admission Date: 3/12/2017  KYLE JACOBSON   : 1945  Sex: Male        Attending Provider: Asia Sheth MD     Allergies:  Tetracycline    Infection:  None   Service:  HOSPITALIST    Ht:  --   Wt:  62.7 kg (138 lb 3.2 oz)   Admission Wt:  57.1 kg (125 lb 14.1 oz)    BMI:  --   BSA:  --            Patient PCP Information     Provider PCP Type    Florencio Patriica MD General         Lab Results - 3 Days      Blood culture [[id number]]  Resulted: 03/15/17 0729, Result status: Preliminary result    Ordering provider: Asia Sheth MD  17 1358 Resulting lab: [facility]    Specimen Information    Type Source Collected On   Blood  17 1421          Components       Value Reference Range Flag Lab   Specimen Description Blood Right Arm   FrStHsLb   Special Requests Aerobic and anaerobic bottles received   FrStHsLb   Culture Micro No growth after 3 days   225   Micro Report Status Pending   225            Blood culture [[id number]]  Resulted: 03/15/17 07, Result status: Preliminary result    Ordering provider: Asia Sheth MD  17 1358 Resulting lab: [facility]    Specimen Information    Type Source Collected On   Blood  17 1425          Components       Value Reference Range Flag Lab   Specimen Description Blood Left Arm   FrStHsLb   Special Requests Aerobic and anaerobic bottles received   FrStHsLb   Culture Micro No growth after 3 days   225   Micro Report Status Pending   225            Potassium [[id number]]  Resulted: 03/15/17 0719, Result status: Final result    Ordering provider: Giancarlo Weber MD  03/15/17 0003 Resulting lab: [facility]    Specimen Information    Type Source Collected  On   Blood  03/15/17 0658          Components       Value Reference Range Flag Lab   Potassium 3.7 3.4 - 5.3 mmol/L  [facility]            Glucose by meter [[id number]] (Abnormal)  Resulted: 03/15/17 0136, Result status: Final result    Ordering provider: Asia Sheth MD  03/15/17 0127 Resulting lab: POINT OF CARE TEST, GLUCOSE    Specimen Information    Type Source Collected On     03/15/17 0127          Components       Value Reference Range Flag Lab   Glucose 166 70 - 99 mg/dL H 170   Comment:  Dr/RN Notified            Glucose by meter [[id number]] (Abnormal)  Resulted: 03/14/17 2121, Result status: Final result    Ordering provider: Asia Sheth MD  03/14/17 2111 Resulting lab: POINT OF CARE TEST, GLUCOSE    Specimen Information    Type Source Collected On     03/14/17 2111          Components       Value Reference Range Flag Lab   Glucose 268 70 - 99 mg/dL H 170            Glucose by meter [[id number]] (Abnormal)  Resulted: 03/14/17 1736, Result status: Final result    Ordering provider: Asia Sheth MD  03/14/17 1732 Resulting lab: POINT OF CARE TEST, GLUCOSE    Specimen Information    Type Source Collected On     03/14/17 1732          Components       Value Reference Range Flag Lab   Glucose 321 70 - 99 mg/dL H 170            Glucose by meter [[id number]] (Abnormal)  Resulted: 03/14/17 1326, Result status: Final result    Ordering provider: Asia Sheth MD  03/14/17 1317 Resulting lab: POINT OF CARE TEST, GLUCOSE    Specimen Information    Type Source Collected On     03/14/17 1317          Components       Value Reference Range Flag Lab   Glucose 217 70 - 99 mg/dL H 170            Glucose by meter [[id number]] (Abnormal)  Resulted: 03/14/17 0816, Result status: Final result    Ordering provider: Asia Sheth MD  03/14/17 0807 Resulting lab: POINT OF CARE TEST, GLUCOSE    Specimen Information    Type Source Collected On     03/14/17 0807          Components       Value  Reference Range Flag Lab   Glucose 135 70 - 99 mg/dL H 170   Comment:  Dr/RN Notified            Basic metabolic panel [[id number]] (Abnormal)  Resulted: 03/14/17 0743, Result status: Final result    Ordering provider: Giancarlo Weber MD  03/14/17 0000 Resulting lab: [facility]    Specimen Information    Type Source Collected On   Blood  03/14/17 0714          Components       Value Reference Range Flag Lab   Sodium 138 133 - 144 mmol/L  FrStHsLb   Potassium 3.2 3.4 - 5.3 mmol/L L FrStHsLb   Chloride 103 94 - 109 mmol/L  FrStHsLb   Carbon Dioxide 26 20 - 32 mmol/L  FrStHsLb   Anion Gap 9 3 - 14 mmol/L  FrStHsLb   Glucose 122 70 - 99 mg/dL H FrStHsLb   Urea Nitrogen 10 7 - 30 mg/dL  FrStHsLb   Creatinine 0.52 0.66 - 1.25 mg/dL L FrStHsLb   GFR Estimate -- >60 mL/min/1.7m2  FrStHsLb   Result:         >90  Non  GFR Calc     GFR Estimate If Black -- >60 mL/min/1.7m2  FrStHsLb   Result:         >90   GFR Calc     Calcium 7.8 8.5 - 10.1 mg/dL L FrStHsLb   Result:              CBC with platelets [[id number]] (Abnormal)  Resulted: 03/14/17 0730, Result status: Final result    Ordering provider: Giancarlo Weber MD  03/14/17 0000 Resulting lab: [facility]    Specimen Information    Type Source Collected On   Blood  03/14/17 0714          Components       Value Reference Range Flag Lab   WBC 8.4 4.0 - 11.0 10e9/L  FrStHsLb   RBC Count 3.60 4.4 - 5.9 10e12/L L FrStHsLb   Hemoglobin 9.2 13.3 - 17.7 g/dL L FrStHsLb   Hematocrit 29.1 40.0 - 53.0 % L FrStHsLb   MCV 81 78 - 100 fl  FrStHsLb   MCH 25.6 26.5 - 33.0 pg L FrStHsLb   MCHC 31.6 31.5 - 36.5 g/dL  FrStHsLb   RDW 21.0 10.0 - 15.0 % H FrStHsLb   Platelet Count 157 150 - 450 10e9/L  FrStHsLb            Glucose by meter [[id number]] (Abnormal)  Resulted: 03/13/17 2211, Result status: Final result    Ordering provider: Asia Sheth MD  03/13/17 2207 Resulting lab: POINT OF CARE TEST, GLUCOSE    Specimen  Information    Type Source Collected On     03/13/17 2207          Components       Value Reference Range Flag Lab   Glucose 234 70 - 99 mg/dL H 170   Comment:  /RN Notified            Glucose by meter [[id number]] (Abnormal)  Resulted: 03/13/17 1756, Result status: Final result    Ordering provider: Asia Sheth MD  03/13/17 1747 Resulting lab: POINT OF CARE TEST, GLUCOSE    Specimen Information    Type Source Collected On     03/13/17 1747          Components       Value Reference Range Flag Lab   Glucose 242 70 - 99 mg/dL H 170   Comment:  /RN Notified            Glucose by meter [[id number]] (Abnormal)  Resulted: 03/13/17 1316, Result status: Final result    Ordering provider: Asia Sheth MD  03/13/17 1307 Resulting lab: POINT OF CARE TEST, GLUCOSE    Specimen Information    Type Source Collected On     03/13/17 1307          Components       Value Reference Range Flag Lab   Glucose 172 70 - 99 mg/dL H 170            Glucose by meter [[id number]] (Abnormal)  Resulted: 03/13/17 1241, Result status: Final result    Ordering provider: Asia Sheth MD  03/13/17 1235 Resulting lab: POINT OF CARE TEST, GLUCOSE    Specimen Information    Type Source Collected On     03/13/17 1235          Components       Value Reference Range Flag Lab   Glucose 176 70 - 99 mg/dL H 170            Glucose by meter [[id number]]  Resulted: 03/13/17 0821, Result status: Final result    Ordering provider: Asia Sheth MD  03/13/17 0817 Resulting lab: POINT OF CARE TEST, GLUCOSE    Specimen Information    Type Source Collected On     03/13/17 0817          Components       Value Reference Range Flag Lab   Glucose 96 70 - 99 mg/dL  170            Hemoglobin A1c [[id number]] (Abnormal)  Resulted: 03/13/17 0812, Result status: Final result    Ordering provider: Asia Sheth MD  03/13/17 0000 Resulting lab: [facility]    Specimen Information    Type Source Collected On   Blood   03/13/17 0735          Components       Value Reference Range Flag Lab   Hemoglobin A1C 8.1 4.3 - 6.0 % H FrStHsLb            Basic metabolic panel [[id number]] (Abnormal)  Resulted: 03/13/17 0806, Result status: Final result    Ordering provider: Asia Sheth MD  03/13/17 0000 Resulting lab: [facility]    Specimen Information    Type Source Collected On   Blood  03/13/17 0735          Components       Value Reference Range Flag Lab   Sodium 139 133 - 144 mmol/L  FrStHsLb   Potassium 3.8 3.4 - 5.3 mmol/L  FrStHsLb   Chloride 102 94 - 109 mmol/L  FrStHsLb   Carbon Dioxide 28 20 - 32 mmol/L  FrStHsLb   Anion Gap 9 3 - 14 mmol/L  FrStHsLb   Glucose 108 70 - 99 mg/dL H FrStHsLb   Urea Nitrogen 10 7 - 30 mg/dL  FrStHsLb   Creatinine 0.48 0.66 - 1.25 mg/dL L FrStHsLb   GFR Estimate -- >60 mL/min/1.7m2  FrStHsLb   Result:         >90  Non  GFR Calc     GFR Estimate If Black -- >60 mL/min/1.7m2  FrStHsLb   Result:         >90   GFR Calc     Calcium 8.1 8.5 - 10.1 mg/dL L FrStHsLb   Result:              CBC with platelets [[id number]] (Abnormal)  Resulted: 03/13/17 0757, Result status: Final result    Ordering provider: Asia Sheth MD  03/13/17 0000 Resulting lab: [facility]    Specimen Information    Type Source Collected On   Blood  03/13/17 0735          Components       Value Reference Range Flag Lab   WBC 11.2 4.0 - 11.0 10e9/L H FrStHsLb   RBC Count 4.19 4.4 - 5.9 10e12/L L FrStHsLb   Hemoglobin 10.9 13.3 - 17.7 g/dL L FrStHsLb   Hematocrit 34.3 40.0 - 53.0 % L FrStHsLb   MCV 82 78 - 100 fl  FrStHsLb   MCH 26.0 26.5 - 33.0 pg L FrStHsLb   MCHC 31.8 31.5 - 36.5 g/dL  FrStHsLb   RDW 21.5 10.0 - 15.0 % H FrStHsLb   Platelet Count 182 150 - 450 10e9/L  FrStHsLb            Lactic acid whole blood [[id number]]  Resulted: 03/13/17 0753, Result status: Final result    Ordering provider: Asia Sheth MD  03/13/17 0000 Resulting lab:  [facility]    Specimen Information    Type Source Collected On   Blood  03/13/17 0735          Components       Value Reference Range Flag Lab   Lactic Acid 1.6 0.7 - 2.1 mmol/L  FrStHsLb            Clostridium difficile toxin B PCR [[id number]]  Resulted: 03/13/17 0437, Result status: Final result    Ordering provider: Asia Sheth MD  03/12/17 1358 Resulting lab: [facility]    Specimen Information    Type Source Collected On   Stool  03/12/17 2255          Components       Value Reference Range Flag Lab   Specimen Description Feces   FrStHsLb   C Diff Toxin B PCR -- NEG  75   Result:         Negative  Negative: Clostridium difficile target DNA sequences NOT detected, presumed   negative for Clostridium difficile toxin B or the number of bacteria present   may be below the limit of detection for the test.   FDA approved assay performed using [employer] [facility] in [place] GeneXpert real-time PCR.   A negative result does not exclude actual disease due to Clostridium difficile   and may be due to improper collection, handling and storage of the specimen or   the number of organisms in the specimen is below the detection limit of the   assay.              Glucose by meter [[id number]] (Abnormal)  Resulted: 03/13/17 0226, Result status: Final result    Ordering provider: Asia Sheth MD  03/13/17 0218 Resulting lab: POINT OF CARE TEST, GLUCOSE    Specimen Information    Type Source Collected On     03/13/17 0218          Components       Value Reference Range Flag Lab   Glucose 146 70 - 99 mg/dL H 170            Glucose by meter [[id number]] (Abnormal)  Resulted: 03/12/17 2111, Result status: Final result    Ordering provider: Asia Sheth MD  03/12/17 2102 Resulting lab: POINT OF CARE TEST, GLUCOSE    Specimen Information    Type Source Collected On     03/12/17 2102          Components       Value Reference Range Flag Lab   Glucose 216 70 - 99 mg/dL H 170   Comment:   /RALPH Notified            Lactic acid whole blood [[id number]]  Resulted: 03/12/17 2010, Result status: Final result    Ordering provider: Asia Sheth MD  03/12/17 1358 Resulting lab: [facility]    Specimen Information    Type Source Collected On   Blood  03/12/17 2000          Components       Value Reference Range Flag Lab   Lactic Acid 1.5 0.7 - 2.1 mmol/L  FrStHsLb            Glucose by meter [[id number]] (Abnormal)  Resulted: 03/12/17 1525, Result status: Final result    Ordering provider: Asia Sheth MD  03/12/17 1520 Resulting lab: POINT OF CARE TEST, GLUCOSE    Specimen Information    Type Source Collected On     03/12/17 1520          Components       Value Reference Range Flag Lab   Glucose 118 70 - 99 mg/dL H 170   Comment:  Dr/RN Notified            UA with Microscopic reflex to Culture [[id number]] (Abnormal)  Resulted: 03/12/17 1442, Result status: Final result    Ordering provider: Asia Sheth MD  03/12/17 1358 Resulting lab: [facility]    Specimen Information    Type Source Collected On   Urine Urine clean catch 03/12/17 1410          Components       Value Reference Range Flag Lab   Color Urine Yellow   FrStHsLb   Appearance Urine Clear   FrStHsLb   Glucose Urine 300 NEG mg/dL A FrStHsLb   Bilirubin Urine Negative NEG  FrStHsLb   Ketones Urine Negative NEG mg/dL  FrStHsLb   Specific [place] Urine 1.014 1.003 - 1.035  FrStHsLb   Blood Urine Negative NEG  FrStHsLb   pH Urine 7.0 5.0 - 7.0 pH  FrStHsLb   Protein Albumin Urine 10 NEG mg/dL A FrStHsLb   Urobilinogen mg/dL 4.0 0.0 - 2.0 mg/dL H FrStHsLb   Nitrite Urine Negative NEG  FrStHsLb   Leukocyte Esterase Urine Negative NEG  FrStHsLb   Source Midstream Urine   FrStHsLb   WBC Urine 1 0 - 2 /HPF  FrStHsLb   RBC Urine 1 0 - 2 /HPF  FrStHsLb   Mucous Urine Present NEG /LPF A FrStHsLb   Hyaline Casts 1 0 - 2 /LPF  FrStHsLb            Blood gas venous and oxyhgb [[id number]] (Abnormal)   Resulted: 03/12/17 1204, Result status: Final result    Ordering provider: Heather Carlisle MD  03/12/17 1143 Resulting lab: [facility]    Specimen Information    Type Source Collected On   Blood  03/12/17 1159          Components       Value Reference Range Flag Lab   Ph Venous 7.38 7.32 - 7.43 pH  FrStHsLb   PCO2 Venous 45 40 - 50 mm Hg  FrStHsLb   PO2 Venous 20 25 - 47 mm Hg L FrStHsLb   Bicarbonate Venous 27 21 - 28 mmol/L  FrStHsLb   Oxyhemoglobin Venous 25 %  FrStHsLb   Base Excess Venous 1.3 mmol/L  FrStHsLb   Comment:  Reference range:  -7.7 to 1.9            Troponin I [[id number]]  Resulted: 03/12/17 1144, Result status: Final result    Ordering provider: Heather Carlisle MD  03/12/17 1000 Resulting lab: [facility]    Specimen Information    Type Source Collected On     03/12/17 1000          Components       Value Reference Range Flag Lab   Troponin I ES -- 0.000 - 0.045 ug/L  FrStHsLb   Result:         <0.015  The 99th percentile for upper reference range is 0.045 ug/L.  Troponin values in   the range of 0.045 - 0.120 ug/L may be associated with risks of adverse   clinical events.              CBC with platelets differential [[id number]] (Abnormal)  Resulted: 03/12/17 1102, Result status: Final result    Ordering provider: Heather Carlisle MD  03/12/17 0959 Resulting lab: [facility]    Specimen Information    Type Source Collected On   Blood  03/12/17 1000          Components       Value Reference Range Flag Lab   WBC 15.1 4.0 - 11.0 10e9/L H FrStHsLb   RBC Count 4.30 4.4 - 5.9 10e12/L L FrStHsLb   Hemoglobin 11.2 13.3 - 17.7 g/dL L FrStHsLb   Hematocrit 35.2 40.0 - 53.0 % L FrStHsLb   MCV 82 78 - 100 fl  FrStHsLb   MCH 26.0 26.5 - 33.0 pg L FrStHsLb   MCHC 31.8 31.5 - 36.5 g/dL  FrStHsLb   RDW 21.6 10.0 - 15.0 % H FrStHsLb   Platelet Count 196 150 - 450 10e9/L  FrStHsLb   Diff Method Automated Method   FrStHsLb   %  Neutrophils 83.4 %  FrStHsLb   % Lymphocytes 8.8 %  FrStHsLb   % Monocytes 7.2 %  FrStHsLb   % Eosinophils 0.1 %  FrStHsLb   % Basophils 0.1 %  FrStHsLb   % Immature Granulocytes 0.4 %  FrStHsLb   Nucleated RBCs 0 0 /100  FrStHsLb   Absolute Neutrophil 12.6 1.6 - 8.3 10e9/L H FrStHsLb   Absolute Lymphocytes 1.3 0.8 - 5.3 10e9/L  FrStHsLb   Absolute Monocytes 1.1 0.0 - 1.3 10e9/L  FrStHsLb   Absolute Eosinophils 0.0 0.0 - 0.7 10e9/L  FrStHsLb   Absolute Basophils 0.0 0.0 - 0.2 10e9/L  FrStHsLb   Abs Immature Granulocytes 0.1 0 - 0.4 10e9/L  FrStHsLb   Absolute Nucleated RBC 0.0   FrStHsLb            BNP [[id number]]  Resulted: 03/12/17 1042, Result status: Final result    Ordering provider: Heather Carlisle MD  03/12/17 1002 Resulting lab: [facility]    Specimen Information    Type Source Collected On   Blood  03/12/17 1000          Components       Value Reference Range Flag Lab   N-Terminal Pro BNP Inpatient 769 0 - 900 pg/mL  FrStHsLb   Comment:         Reference range shown and results flagged as abnormal are suggested inpatient   cut points for confirming diagnosis if CHF in an acute setting. Establishing   a   baseline value for each individual patient is useful for follow-up. An   inpatient or emergency department NT-proPBNP <300 pg/mL effectively rules out   acute CHF, with 99% negative predictive value.  The outpatient non-acute reference range for ruling out CHF is:   0-125 pg/mL (age 18 to less than 75)   0-450 pg/mL (age 75 yrs and older)              Basic metabolic panel [[id number]] (Abnormal)  Resulted: 03/12/17 1038, Result status: Final result    Ordering provider: Heather Carlisle MD  03/12/17 0959 Resulting lab: [facility]    Specimen Information    Type Source Collected On   Blood  03/12/17 1000          Components       Value Reference Range Flag Lab   Sodium 136 133 - 144 mmol/L  FrStHsLb   Potassium 4.0 3.4 - 5.3 mmol/L  FrStHsLb   Chloride  99 94 - 109 mmol/L  FrStHsLb   Carbon Dioxide 22 20 - 32 mmol/L  FrStHsLb   Anion Gap 15 3 - 14 mmol/L H FrStHsLb   Glucose 202 70 - 99 mg/dL H FrStHsLb   Urea Nitrogen 11 7 - 30 mg/dL  FrStHsLb   Creatinine 0.58 0.66 - 1.25 mg/dL L FrStHsLb   GFR Estimate -- >60 mL/min/1.7m2  FrStHsLb   Result:         >90  Non  GFR Calc     GFR Estimate If Black -- >60 mL/min/1.7m2  FrStHsLb   Result:         >90   GFR Calc     Calcium 8.0 8.5 - 10.1 mg/dL L FrStHsLb   Result:              Lactic acid [[id number]] (Abnormal)  Resulted: 03/12/17 1032, Result status: Final result    Ordering provider: Heather Mckeon MD  03/12/17 0959 Resulting lab: [facility]    Specimen Information    Type Source Collected On   Blood  03/12/17 1000          Components       Value Reference Range Flag Lab   Lactic Acid 6.1 0.4 - 2.0 mmol/L  FrStHsLb   Comment:  Critical Value called to and read back by  DR MCKEON IN ER AT 1032 JK              Testing Performed By     Lab - Abbreviation Name Director Address Valid Date Range    14 - FrStHsLb [facility] Unknown [address] [place] [address] 05/08/15 1057 - Present    75 - Unknown [facility] Unknown [address] [address] 01/15/15 1019 - Present    170 - Unknown POINT OF CARE TEST, GLUCOSE Unknown Unknown 10/31/11 1114 - Present    225 - Unknown [facility] Unknown [address] [address] 12/19/14 0954 - Present            Unresulted Labs (24h ago through future)    Start       Ordered    03/16/17 0000  Platelet count  EVERY 72 HOURS,   Timed     Comments:  If no result is listed, this lab has not been done the past 365 days. LATEST LAB RESULT: Platelet Count (10e9/L)       Date                     Value                 03/12/2017               196              ----------    03/12/17 1441    03/16/17 0000  Creatinine  EVERY 72  HOURS,   Timed     Comments:  Last Lab Result: Creatinine (mg/dL)       Date                     Value                 2017               0.58 (L)         ----------    17 1441    Pending  Iron and iron binding capacity  ROUTINE,   Routine      Pending         Imaging Results - 3 Days      XR Chest 2 Views [[id number]]  Resulted: 17 1031, Result status: Final result    Ordering provider: Heather Carlisle MD  17 0959 Resulted by: Pedro Luis Younger MD    Performed: 17 1007 - 17 1025 Resulting lab: RADIOLOGY RESULTS    Narrative:       CHEST TWO VIEWS   3/12/2017 10:25 AM    HISTORY: Chest pain. Shortness of Breath.    COMPARISON: 2017.      Impression:       IMPRESSION: Again seen is moderate interstitial prominence diffusely  throughout both lungs. This again likely represents pulmonary  fibrosis. The lungs are otherwise clear with no pulmonary  consolidation or mass demonstrated. No other abnormality is seen. I  see no definite change since the previous examination.     PEDRO LUIS YOUNGER MD      Testing Performed By     Lab - Abbreviation Name Director Address Valid Date Range    104 - Rad Rslts RADIOLOGY RESULTS Unknown Unknown 05 1553 - Present               ECG/EMG Results      Echocardiogram Complete [[id number]]  Resulted: 02/15/17 0935, Result status: Edited Result - FINAL    Ordering provider: Lesia Rouse MD  02/15/17 0927 Resulted by: Violet Garcia MD    Performed: 02/15/17 1003 - 02/15/17 1004 Resulting lab: RADIOLOGY RESULTS    Narrative:       [id number]  [facility]  [id number]  [id number]^TOYA^LESIA^MEGAN        [facility]  [facility]  Echocardiography Laboratory  [address]  [place] [address]  Phone [phone]  Fax [phone]        Name: KYLE JACOBSON  MRN: [id number]  : 1945  Study Date: 02/15/2017 09:35 AM  Age: 72 yrs  Gender: Male  Patient Location:  [facility]  Reason For Study: Other secondary pulmonary hypertension  Ordering Physician: LESIA PITTMAN  Referring Physician: LESIA PITTMAN  Performed By: Reanna Chou RDCS     BSA: 1.7 m2  Height: 67 in  Weight: 135 lb  HR: 82  BP: 140/90 mmHg  _____________________________________________________________________________  __     Procedure  Complete Echo Adult.     _____________________________________________________________________________  __        Interpretation Summary     The right ventricular systolic pressure is approximated at 57mmHg plus the  right atrial pressure estimated at 5-10mmHg.  The right ventricular systolic function is moderate or moderate to severely  reduced. The right ventricle is mild to moderately or moderately dilated. The  right atrium is only mildly dilated. There is trace to mild tricuspid  regurgitation.  Left ventricular systolic function is borderline reduced. The visual ejection  fraction is estimated at 55-60%. There is mild concentric left ventricular  hypertrophy. The left ventricular cavity is small. Flattened septum is  consistent with RV pressure/volume overload.  Results are similar to the prior echo and the pulmonary pressure is mildly  less severe.  _____________________________________________________________________________  __        Left Ventricle  The left ventricular cavity is small. There is mild concentric left  ventricular hypertrophy. Left ventricular systolic function is borderline  reduced. The visual ejection fraction is estimated at 55-60%. The transmitral  spectral Doppler flow pattern is suggestive of impaired LV relaxation. E by E  prime ratio is greater than 15, that likely suggests increased left  ventricular filling pressures. Flattened septum is consistent with RV  pressure/volume overload.     Right Ventricle  The right ventricle is mild to moderately dilated. There is mild right  ventricular hypertrophy. The right ventricular systolic  function is moderate  to severely reduced.     Atria  Normal left atrial size. The right atrium is mildly dilated. There is no  atrial shunt seen.     Mitral Valve  There is trace mitral regurgitation.     Tricuspid Valve  The right ventricular systolic pressure is approximated at 57mmHg plus the  right atrial pressure. There is trace to mild tricuspid regurgitation. Normal  IVC (1.5-2.5cm) with >50% respiratory collapse; right atrial pressure is  estimated at 5-10mmHg.        Aortic Valve  No aortic regurgitation is present. No hemodynamically significant valvular  aortic stenosis.     Pulmonic Valve  There is trace pulmonic valvular regurgitation. There is no pulmonic valvular  stenosis.     Vessels  The aortic root is normal size.     Pericardium  The pericardium appears normal.     Rhythm  The rhythm was normal sinus.     _____________________________________________________________________________  __  MMode/2D Measurements & Calculations  IVSd: 1.2 cm  LVIDd: 3.9 cm  LVIDs: 2.4 cm  LVPWd: 1.1 cm  FS: 39.2 %  EDV(Teich): 67.2 ml  ESV(Teich): 20.0 ml  LV mass(C)d: 153.2 grams  Ao root diam: 3.2 cm  LA dimension: 3.4 cm     asc Aorta Diam: 3.0 cm  LA/Ao: 1.1  LVOT diam: 2.1 cm  LVOT area: 3.6 cm2  LA Volume (BP): 35.6 ml  LA Volume Index (BP): 20.8 ml/m2        Doppler Measurements & Calculations  MV E max valery: 53.9 cm/sec  MV A max valery: 123.0 cm/sec  MV E/A: 0.44  MV P1/2t max valery: 54.5 cm/sec  MV P1/2t: 92.8 msec  MVA(P1/2t): 2.4 cm2  MV dec slope: 171.9 cm/sec2  MV dec time: 0.31 sec  TR max valery: 378.6 cm/sec  TR max P.3 mmHg  Medial E/e': 17.7              _____________________________________________________________________________  __           Report approved by: Steff Horowitz 02/15/2017 03:02 PM       1    Type Source Collected On     02/15/17 0935          View Image (below)              Encounter-Level Documents:     There are no encounter-level documents.      Order-Level Documents:     There  are no order-level documents.

## 2017-03-12 NOTE — ED NOTES
[facility]  ED Nurse Handoff Report    ED Chief complaint: Shortness of Breath (O2 dependent at home. today sats were 74 per wife at home. increase weakness)      ED Diagnosis:   Final diagnoses:   Acidosis   Hypoxia   Generalized muscle weakness       Code Status: Full Code    Allergies:   Allergies   Allergen Reactions     Tetracycline        Activity level:  Assist of one     Needed?: No    Isolation: No  Infection: Not Applicable    Bariatric?: No      Vital Signs:   Vitals:    03/12/17 0950 03/12/17 1000 03/12/17 1020 03/12/17 1022   BP: 140/75 111/62     Pulse:       Resp: 15 10     Temp:   99.1  F (37.3  C) 98  F (36.7  C)   TempSrc:   Temporal Oral   SpO2: 91% 94%         Cardiac Rhythm: ,        Pain level: 0-10 Pain Scale: 0    Is this patient confused?: No    Patient Report: Initial Complaint: increased SOB  Focused Assessment: Pt recently inpatient for SOB secondary to heart failure. Hx of pulmonary fibrosis and pulmonary edema. Got one dose of anbx, was d/c'd to home, but never really felt better, per pt and pt's wife. had a near fall today, where pt was able to lower himself to the ground, but could not get up independently. On arrival to ED today, pt c/o SOB, sats = 83%. Pt normally uses 4L NC at home, presently on 6L NC with O2 sats in mids 90s, which is typical per pt. Denies pain, is alert and oriented.  Tests Performed: labs, ekg, xray  Abnormal Results: lactic, blood gases pending at this time.  Treatments provided: iv fluid bolus, increased NC o2.    Family Comments: wife at bedside    OBS brochure/video discussed/provided to patient: N/A    ED Medications:   Medications   0.9% sodium chloride BOLUS (not administered)       Drips infusing?:  No      ED NURSE PHONE NUMBER: [phone]

## 2017-03-12 NOTE — IP AVS SNAPSHOT
` [place] [address] ONCOLOGY: [phone]                                              INTERAGENCY TRANSFER FORM - NURSING   3/12/2017                    Hospital Admission Date: 3/12/2017  KYLE JACOBSON   : 1945  Sex: Male        Attending Provider: Asia Sheth MD     Allergies:  Tetracycline    Infection:  None   Service:  HOSPITALIST    Ht:  --   Wt:  62.7 kg (138 lb 3.2 oz)   Admission Wt:  57.1 kg (125 lb 14.1 oz)    BMI:  --   BSA:  --            Patient PCP Information     Provider PCP Type    Florencio Patricia MD General      Current Code Status     Date Active Code Status Order ID Comments User Context       Prior      Code Status History     Date Active Date Inactive Code Status Order ID Comments User Context    3/14/2017 10:00 PM  Full Code [id number]  Giancarlo Weber MD Outpatient    3/12/2017  1:58 PM 3/14/2017 10:00 PM Full Code [id number]  Asia Sheth MD Inpatient    2017 11:21 PM 2017  7:45 PM Full Code [id number]  Myriam Powers MD Inpatient    2014  9:17 AM 2014 12:32 PM Full Code [id number]  Delonte Leon, RN Inpatient      Advance Directives        Does patient have a scanned Advance Directive/ACP document in EPIC?           No        Hospital Problems as of 3/15/2017              Priority Class Noted POA    Lactic acid acidosis Medium  3/12/2017 Yes      Non-Hospital Problems as of 3/15/2017              Priority Class Noted    Pulmonary hypertension (H)   2014    Anemia Medium  2017    Acute respiratory distress (H) Medium  2017      Immunizations     None         END      ASSESSMENT     Discharge Profile Flowsheet     EXPECTED DISCHARGE     All Quadrants Bowel Sounds  audible and normoactive 03/15/17 0837    Expected Discharge Date  03/15/17 03/14/17 1418   Last Bowel Movement  03/14/17 03/15/17 0154    DISCHARGE NEEDS ASSESSMENT     GI Signs/Symptoms  diarrhea 17    Concerns To Be Addressed   discharge planning concerns 03/14/17 1418   Passing flatus  yes 03/15/17 0837    Patient/family verbalizes understanding of discharge plan recommendations?  Yes 03/14/17 1418   COMMUNICATION ASSESSMENT      Medical Team notified of plan?  yes 03/14/17 1418   Patient's communication style  spoken language (English or Bilingual) 03/12/17 0943    Anticipated Changes Related to Illness  inability to care for self 03/14/17 1418   FINAL RESOURCES      Equipment Currently Used at Home  wound care supplies 03/14/17 1418   Resources List  Transitional Care 03/14/17 1418    Transportation Available  van, wheelchair accessible 03/14/17 1418   Other Resources  Transportation Services 03/14/17 1418    Current Discharge Risk  physical impairment 03/14/17 1418   Transportation Agency  [facility] 03/14/17 1418    # of Referrals Placed by CTS  Post Acute Facilities 03/14/17 1418   Transportation Contact Info  [phone] 03/14/17 1418    FUNCTIONAL LEVEL CURRENT     Transportation Status  Active 03/14/17 1418    Ambulation  1-->assistive equipment 03/15/17 0837   Transitional Care  [facility] [phone] 03/14/17 1418    Transferring  0-->independent 03/15/17 0837   SKIN      Toileting  0-->independent 03/15/17 0837   Inspection  Full 03/15/17 0837    Bathing  0-->independent 03/15/17 0837   Skin areas NOT inspected  Coccyx;Buttock, left;Buttock, right;Hip, right;Hip, left 03/15/17 0154    Dressing  0-->independent 03/15/17 0837   Skin WDL  ex 03/15/17 0837    Eating  0-->independent 03/15/17 0837   Skin Color/Characteristics  redness blanchable 03/15/17 0837    Communication  0-->understands/communicates without difficulty 03/15/17 0837   Skin Temperature  warm 03/15/17 0837    Swallowing  0-->swallows foods/liquids without difficulty 03/15/17 0837   Skin Moisture  dry 03/15/17 0837    Current Functional Level Comment  0 02/22/17 2316   Skin Integrity  bruise(s);scab(s) 03/15/17 0837    Change in Functional Status  "Since Onset of Current Illness/Injury  no 02/22/17 2316   SAFETY      GASTROINTESTINAL (ADULT,PEDIATRIC,OB)     Safety WDL  WDL 03/15/17 0837    GI WDL  WDL 03/15/17 0837                      Assessment WDL (Within Defined Limits) Definitions           Safety WDL     Effective: 09/28/15    Row Information: <b>WDL Definition:</b> Bed in low position, wheels locked; call light in reach; upper side rails up x 2; ID band on<br> <font color=\"gray\"><i>Item=AS safety wdl>>List=AS safety wdl>>Version=F14</i></font>      Skin WDL     Effective: 09/28/15    Row Information: <b>WDL Definition:</b> Warm; dry; intact; elastic; without discoloration; pressure points without redness<br> <font color=\"gray\"><i>Item=AS skin wdl>>List=AS skin wdl>>Version=F14</i></font>      Vitals     Vital Signs Flowsheet     VITAL SIGNS     Weight  62.7 kg (138 lb 3.2 oz) 03/15/17 0358    Temp  98.9  F (37.2  C) 03/15/17 0819   POSITIONING      Temp src  Oral 03/15/17 0819   Body Position  independently positioning 03/15/17 0623    Resp  16 03/15/17 0819   Head of Bed (HOB)  HOB at 20-30 degrees 03/15/17 0623    Pulse  57 03/15/17 0120   Positioning/Transfer Devices  pillows;in use 03/15/17 0623    Heart Rate  63 03/15/17 0829   DAILY CARE      Pulse/Heart Rate Source  Monitor 03/15/17 0819   Activity Type  ambulated to bathroom 03/15/17 0704    BP  139/62 03/15/17 0830   Activity Level of Assistance  assistance, 1 person 03/15/17 0704    BP Location  Right arm 03/15/17 0830   ECG      OXYGEN THERAPY     ECG Rhythm  Sinus bradycardia 02/24/17 0116    SpO2  94 % 03/15/17 0819   Equipment  electrodes changed 02/24/17 1125    O2 Device  None (Room air) 03/15/17 0819   EKG MONITORING      Oxygen Delivery  4 LPM 03/15/17 0120   Cardiac Regularity  Regular 02/22/17 1949    PAIN/COMFORT     Cardiac Rhythm  NSR 02/22/17 1949    Patient Currently in Pain  denies 03/15/17 0947   JOSE COMA SCALE      Preferred Pain Scale  number (Numeric Rating Pain Scale) " "02/23/17 2336   Best Eye Response  4-->(E4) spontaneous 03/13/17 2058    0-10 Pain Scale  0 03/12/17 0947   Best Motor Response  6-->(M6) obeys commands 03/13/17 2058    HEIGHT AND WEIGHT     Best Verbal Response  5-->(V5) oriented 03/13/17 2058    Height  1.702 m (5' 7\") 02/22/17 1938   Adri Coma Scale Score  15 03/13/17 2058    Height Method  Stated 02/22/17 1938                 Patient Lines/Drains/Airways Status    Active LINES/DRAINS/AIRWAYS     Name: Placement date: Placement time: Site: Days: Last dressing change:    Peripheral IV 03/13/17 Left Lower forearm 03/13/17 2024   Lower forearm   1     Wound 03/13/17 Left;Right;Midline Coccyx   03/13/17   0000   Coccyx   2     Incision/Surgical Site 02/12/14 Right Clavicle 02/12/14   1330    1126             Patient Lines/Drains/Airways Status    Active PICC/CVC     None            Intake/Output Detail Report     Date Intake     Output Net    Shift P.O. I.V. IV Piggyback Total Urine Total       Noc 03/13/17 2300 - 03/14/17 0659 -- -- -- -- 150 150 -150    Day 03/14/17 0700 - 03/14/17 1459 360 -- -- 360 -- -- 360    Mattie 03/14/17 1500 - 03/14/17 2259 360 -- -- 360 -- -- 360    Noc 03/14/17 2300 - 03/15/17 0659 -- -- -- -- -- -- 0    Day 03/15/17 0700 - 03/15/17 1459 240 -- -- 240 -- -- 240      Last Void/BM       Most Recent Value    Urine Occurrence 1 at 03/15/2017 0707    Stool Occurrence 1 [per patient/report] at 03/14/2017 1500      Case Management/Discharge Planning     Case Management/Discharge Planning Flowsheet     REFERRAL INFORMATION     Reaction To Health Status  accepting 03/13/17 1556    Admission Type  inpatient 03/13/17 1556   Understanding Of Condition And Treatment  adequate understanding of medical condition 03/13/17 1556    Arrived From  home or self-care 03/13/17 1556   EXPECTED DISCHARGE      # of Referrals Placed by CTS  Post Acute Facilities 03/14/17 1418   Expected Discharge Date  03/15/17 03/14/17 1418    Reason For Consult  discharge " planning 03/13/17 1556   ASSESSMENT/CONCERNS TO BE ADDRESSED      Record Reviewed  patient profile 03/13/17 1556   Concerns To Be Addressed  discharge planning concerns 03/14/17 1418     Assigned to Case  VICENTE Remy 03/14/17 1418   DISCHARGE PLANNING      LIVING ENVIRONMENT     Patient/family verbalizes understanding of discharge plan recommendations?  Yes 03/14/17 1418    Lives With  spouse 03/13/17 1556   Medical Team notified of plan?  yes 03/14/17 1418    Living Arrangements  apartment 03/13/17 1556   Anticipated Changes Related to Illness  inability to care for self 03/14/17 1418    Provides Primary Care For  no one 03/13/17 1556   Transportation Available  van, wheelchair accessible 03/14/17 1418    Quality Of Family Relationships  supportive;involved 03/13/17 1556   Current Discharge Risk  physical impairment 03/14/17 1418    Able to Return to Prior Living Arrangements  no 03/13/17 1556   FINAL NOTE      HOME SAFETY     Final Note  Patient is to d.c to [place] via w/c at 1300 on 3/15 03/14/17 1418    Patient Feels Safe Living in Home?  yes 03/13/17 1556   FINAL RESOURCES      ASSESSMENT OF FAMILY/SOCIAL SUPPORT     Equipment Currently Used at Home  wound care supplies 03/14/17 1418    Marital Status   03/13/17 1556   Resources List  Transitional Care 03/14/17 1418    Who is your support system?  Wife;Children 03/13/17 1556   Other Resources  Transportation Services 03/14/17 1418    Spouse's Name  halima 03/13/17 1556   Transportation Agency  [facility] 03/14/17 1418    Description of Support System  Supportive;Involved 03/13/17 1556   Transportation Contact Info  [phone] 03/14/17 141    Support Assessment  Adequate family and caregiver support 03/13/17 1556   Transportation Status  Active 03/14/17 1418    Quality of Family Relationships  supportive;involved 03/13/17 1556   Transitional Care  [facility] [phone] 03/14/17 1418    Communication  preferences  phone 03/13/17 1552   ABUSE RISK SCREEN      COPING/STRESS     QUESTION TO PATIENT:  Has a member of your family or a partner(now or in the past) intimidated, hurt, manipulated, or controlled you in any way?  no 03/12/17 0949    Major Change/Loss/Stressor  hospitalization 03/13/17 1554   (R) MENTAL HEALTH SUICIDE RISK      Patient Personal Strengths  able to adapt;strong support system 03/13/17 1556   Are you depressed or being treated for depression?  No 03/12/17 1418    Sources Of Support  spouse;adult child(nicole) 03/13/17 2292

## 2017-03-13 ENCOUNTER — APPOINTMENT (OUTPATIENT)
Dept: OCCUPATIONAL THERAPY | Facility: CLINIC | Age: 72
DRG: 641 | End: 2017-03-13
Attending: INTERNAL MEDICINE
Payer: MEDICARE

## 2017-03-13 ENCOUNTER — APPOINTMENT (OUTPATIENT)
Dept: PHYSICAL THERAPY | Facility: CLINIC | Age: 72
DRG: 641 | End: 2017-03-13
Attending: INTERNAL MEDICINE
Payer: MEDICARE

## 2017-03-13 LAB
ANION GAP SERPL CALCULATED.3IONS-SCNC: 9 MMOL/L (ref 3–14)
BUN SERPL-MCNC: 10 MG/DL (ref 7–30)
C DIFF TOX B STL QL: NORMAL
CALCIUM SERPL-MCNC: 8.1 MG/DL (ref 8.5–10.1)
CHLORIDE SERPL-SCNC: 102 MMOL/L (ref 94–109)
CO2 SERPL-SCNC: 28 MMOL/L (ref 20–32)
CREAT SERPL-MCNC: 0.48 MG/DL (ref 0.66–1.25)
ERYTHROCYTE [DISTWIDTH] IN BLOOD BY AUTOMATED COUNT: 21.5 % (ref 10–15)
GFR SERPL CREATININE-BSD FRML MDRD: ABNORMAL ML/MIN/1.7M2
GLUCOSE BLDC GLUCOMTR-MCNC: 146 MG/DL (ref 70–99)
GLUCOSE BLDC GLUCOMTR-MCNC: 172 MG/DL (ref 70–99)
GLUCOSE BLDC GLUCOMTR-MCNC: 176 MG/DL (ref 70–99)
GLUCOSE BLDC GLUCOMTR-MCNC: 234 MG/DL (ref 70–99)
GLUCOSE BLDC GLUCOMTR-MCNC: 242 MG/DL (ref 70–99)
GLUCOSE BLDC GLUCOMTR-MCNC: 96 MG/DL (ref 70–99)
GLUCOSE SERPL-MCNC: 108 MG/DL (ref 70–99)
HBA1C MFR BLD: 8.1 % (ref 4.3–6)
HCT VFR BLD AUTO: 34.3 % (ref 40–53)
HGB BLD-MCNC: 10.9 G/DL (ref 13.3–17.7)
LACTATE BLD-SCNC: 1.6 MMOL/L (ref 0.7–2.1)
MCH RBC QN AUTO: 26 PG (ref 26.5–33)
MCHC RBC AUTO-ENTMCNC: 31.8 G/DL (ref 31.5–36.5)
MCV RBC AUTO: 82 FL (ref 78–100)
PLATELET # BLD AUTO: 182 10E9/L (ref 150–450)
POTASSIUM SERPL-SCNC: 3.8 MMOL/L (ref 3.4–5.3)
RBC # BLD AUTO: 4.19 10E12/L (ref 4.4–5.9)
SODIUM SERPL-SCNC: 139 MMOL/L (ref 133–144)
SPECIMEN SOURCE: NORMAL
WBC # BLD AUTO: 11.2 10E9/L (ref 4–11)

## 2017-03-13 PROCEDURE — 83036 HEMOGLOBIN GLYCOSYLATED A1C: CPT | Performed by: INTERNAL MEDICINE

## 2017-03-13 PROCEDURE — 97161 PT EVAL LOW COMPLEX 20 MIN: CPT | Mod: GP | Performed by: PHYSICAL THERAPIST

## 2017-03-13 PROCEDURE — 97535 SELF CARE MNGMENT TRAINING: CPT | Mod: GO

## 2017-03-13 PROCEDURE — 40000133 ZZH STATISTIC OT WARD VISIT

## 2017-03-13 PROCEDURE — A9270 NON-COVERED ITEM OR SERVICE: HCPCS | Mod: GY | Performed by: INTERNAL MEDICINE

## 2017-03-13 PROCEDURE — 25000128 H RX IP 250 OP 636: Performed by: INTERNAL MEDICINE

## 2017-03-13 PROCEDURE — 12000000 ZZH R&B MED SURG/OB

## 2017-03-13 PROCEDURE — 97530 THERAPEUTIC ACTIVITIES: CPT | Mod: GO

## 2017-03-13 PROCEDURE — 85027 COMPLETE CBC AUTOMATED: CPT | Performed by: INTERNAL MEDICINE

## 2017-03-13 PROCEDURE — 83605 ASSAY OF LACTIC ACID: CPT | Performed by: INTERNAL MEDICINE

## 2017-03-13 PROCEDURE — 25000132 ZZH RX MED GY IP 250 OP 250 PS 637: Mod: GY | Performed by: INTERNAL MEDICINE

## 2017-03-13 PROCEDURE — 99232 SBSQ HOSP IP/OBS MODERATE 35: CPT | Performed by: INTERNAL MEDICINE

## 2017-03-13 PROCEDURE — 80048 BASIC METABOLIC PNL TOTAL CA: CPT | Performed by: INTERNAL MEDICINE

## 2017-03-13 PROCEDURE — 97530 THERAPEUTIC ACTIVITIES: CPT | Mod: GP | Performed by: PHYSICAL THERAPIST

## 2017-03-13 PROCEDURE — 25000125 ZZHC RX 250: Performed by: INTERNAL MEDICINE

## 2017-03-13 PROCEDURE — 40000193 ZZH STATISTIC PT WARD VISIT: Performed by: PHYSICAL THERAPIST

## 2017-03-13 PROCEDURE — 97165 OT EVAL LOW COMPLEX 30 MIN: CPT | Mod: GO

## 2017-03-13 PROCEDURE — 97110 THERAPEUTIC EXERCISES: CPT | Mod: GP | Performed by: PHYSICAL THERAPIST

## 2017-03-13 PROCEDURE — 36415 COLL VENOUS BLD VENIPUNCTURE: CPT | Performed by: INTERNAL MEDICINE

## 2017-03-13 PROCEDURE — 00000146 ZZHCL STATISTIC GLUCOSE BY METER IP

## 2017-03-13 PROCEDURE — 97116 GAIT TRAINING THERAPY: CPT | Mod: GP | Performed by: PHYSICAL THERAPIST

## 2017-03-13 RX ORDER — LISINOPRIL 20 MG/1
20 TABLET ORAL DAILY
Status: DISCONTINUED | OUTPATIENT
Start: 2017-03-13 | End: 2017-03-15 | Stop reason: HOSPADM

## 2017-03-13 RX ORDER — COLCHICINE 0.6 MG/1
0.6 TABLET ORAL EVERY EVENING
Status: DISCONTINUED | OUTPATIENT
Start: 2017-03-13 | End: 2017-03-15 | Stop reason: HOSPADM

## 2017-03-13 RX ORDER — MULTIPLE VITAMINS W/ MINERALS TAB 9MG-400MCG
1 TAB ORAL DAILY
Status: DISCONTINUED | OUTPATIENT
Start: 2017-03-13 | End: 2017-03-15 | Stop reason: HOSPADM

## 2017-03-13 RX ORDER — LOPERAMIDE HCL 2 MG
2 CAPSULE ORAL 4 TIMES DAILY PRN
Status: DISCONTINUED | OUTPATIENT
Start: 2017-03-13 | End: 2017-03-15 | Stop reason: HOSPADM

## 2017-03-13 RX ADMIN — METOPROLOL SUCCINATE 25 MG: 25 TABLET, EXTENDED RELEASE ORAL at 21:37

## 2017-03-13 RX ADMIN — AMLODIPINE BESYLATE 2.5 MG: 2.5 TABLET ORAL at 09:16

## 2017-03-13 RX ADMIN — ENOXAPARIN SODIUM 30 MG: 30 INJECTION SUBCUTANEOUS at 14:16

## 2017-03-13 RX ADMIN — LISINOPRIL 20 MG: 20 TABLET ORAL at 09:37

## 2017-03-13 RX ADMIN — METOPROLOL SUCCINATE 25 MG: 25 TABLET, EXTENDED RELEASE ORAL at 09:16

## 2017-03-13 RX ADMIN — INSULIN ASPART 1 UNITS: 100 INJECTION, SOLUTION INTRAVENOUS; SUBCUTANEOUS at 22:18

## 2017-03-13 RX ADMIN — ASPIRIN 81 MG 81 MG: 81 TABLET ORAL at 21:37

## 2017-03-13 RX ADMIN — ROSUVASTATIN CALCIUM 10 MG: 10 TABLET ORAL at 09:16

## 2017-03-13 RX ADMIN — LEVOTHYROXINE SODIUM 100 MCG: 100 TABLET ORAL at 09:16

## 2017-03-13 RX ADMIN — COLCHICINE 0.6 MG: 0.6 TABLET, FILM COATED ORAL at 21:36

## 2017-03-13 RX ADMIN — PREDNISONE 10 MG: 10 TABLET ORAL at 09:16

## 2017-03-13 RX ADMIN — DIGOXIN 125 MCG: 125 TABLET ORAL at 09:16

## 2017-03-13 RX ADMIN — TAMSULOSIN HYDROCHLORIDE 0.4 MG: 0.4 CAPSULE ORAL at 09:16

## 2017-03-13 RX ADMIN — MULTIPLE VITAMINS W/ MINERALS TAB 1 TABLET: TAB at 14:16

## 2017-03-13 RX ADMIN — FLUOXETINE HYDROCHLORIDE 40 MG: 20 CAPSULE ORAL at 09:16

## 2017-03-13 RX ADMIN — OMEPRAZOLE 40 MG: 20 CAPSULE, DELAYED RELEASE ORAL at 09:16

## 2017-03-13 RX ADMIN — TADALAFIL 40 MG: 5 TABLET, FILM COATED ORAL at 09:16

## 2017-03-13 ASSESSMENT — ACTIVITIES OF DAILY LIVING (ADL): PREVIOUS_RESPONSIBILITIES: MEDICATION MANAGEMENT;FINANCES;DRIVING

## 2017-03-13 NOTE — PLAN OF CARE
Problem: Discharge Planning  Goal: Discharge Planning (Adult, OB, Behavioral, Peds)  Patient's goal is to d/c to [facility] via w/c at 1300 on 3/15

## 2017-03-13 NOTE — CONSULTS
"CLINICAL NUTRITION SERVICES  -  ASSESSMENT NOTE      Recommendations Ordered by Registered Dietitian (RD):     Medical Food Supplement: chocolate Glucerna at 10am and 2pm (each provides 220 amor and 10 gm pro)    Ordered a daily multivitamin       Future/Additional Recommendations:     Recommended pt start drinking a supplement at home for added nutrition     Malnutrition:     Non-severe malnutrition in the context of acute on chronic illness          REASON FOR ASSESSMENT  Ravi Sandoval is a 72 year old male seen by Registered Dietitian for Admission Nutrition Risk Screen - \"Reduced oral intake over the last month\"      NUTRITION HISTORY  Visited with pt this morning  States he is not a big eater - \"just don't eat as much as I did when I was younger\"  Breakfast - bowl of Cheerios and 2 cups of coffee  Lunch - popcorn and a Coke  Dinner - varies, \"depends on how the cook feels.\"  He was not able to give me specifics, just stated that he will eat something  Does not consume any nutrition supplements  Notes that his meals/po intake hasn't really changed    Notes that since his heart attack (2010), his intake has been decreased and he lost wt (\"haven't regained the wt or the lost muscle\")      CURRENT NUTRITION ORDERS  Diet Order:     Moderate Consistent Carbohydrate     Current Intake/Tolerance:  Pt reports eating 50% of his breakfast today - eggs, hash browns, sausage sheryl, blueberry muffin, OJ, coffee  He is open to trying the Glucerna supplement      PHYSICAL FINDINGS  Observed  Muscle Wasting  - mild loss noted in temples and clavicle    Obtained from Chart/Interdisciplinary Team  Strength in his lower extremities is 5/5 at present.  No edema    ANTHROPOMETRICS  Height: 5'7\"  Weight:(3/13) 57.1 kg / 125 lbs 14.12 oz  Body mass index is 19.72 kg/(m^2).  Weight Status:  Normal BMI  IBW: 67.3 kg  % IBW: 85 %  Weight History: Pt feels his usual wt is ~138#.  Records reflect wt loss of 10% past 10 months and 7% (10#) " past month.  Wt Readings from Last 10 Encounters:   03/13/17 57.1 kg (125 lb 14.1 oz)   02/25/17 61.7 kg (136 lb 0.4 oz)   02/21/17 62.1 kg (136 lb 12.8 oz)   02/07/17 61.4 kg (135 lb 4.8 oz)   05/27/16 63.5 kg (140 lb)   09/04/14 68.1 kg (150 lb 3.2 oz)   08/27/14 71.1 kg (156 lb 11.2 oz)   08/21/14 71.1 kg (156 lb 11.2 oz)   08/19/14 70.8 kg (156 lb)   08/14/14 70.6 kg (155 lb 9.6 oz)         LABS  Labs reviewed    MEDICATIONS  Medications reviewed    Dosing Weight: (3/13) 57.1 kg    ASSESSED NUTRITION NEEDS:  Estimated Energy Needs: 8389-4872 kcals (30-35 Kcal/Kg)  Justification: underweight, higher needs with chronic resp failure  Estimated Protein Needs: 70-85 grams protein (1.2-1.5 g pro/Kg)  Justification: Repletion      MALNUTRITION:  % Weight Loss:  > 5% in 1 month   % Intake:  No decreased intake noted  Subcutaneous Fat Loss:  None observed  Muscle Loss:  Temporal region mild depletion and Clavicle bone region mild depletion  Fluid Retention:  None noted    Malnutrition Diagnosis: Non-Severe malnutrition  In Context of:  Acute on Chronic illness or disease    NUTRITION DIAGNOSIS:  Unintended weight loss related to predicted suboptimal intake of cals/pro and higher need with chronic resp failure as evidenced by 7% wt loss past month      NUTRITION INTERVENTIONS  Recommendations / Nutrition Prescription  Moderate CHO  Nutrition supplements  Daily multivitamin  .      Implementation  Nutrition education: Per Provider order if indicated   Medical Food Supplement: chocolate Glucerna at 10am and 2pm (each provides 220 amor and 10 gm pro)  Ordered a daily multivitamin  Recommended pt start drinking a supplement at home for added nutrition  .      Nutrition Goals  Pt to consume 50% meals and 100% supplements  .      MONITORING AND EVALUATION:  Progress towards goals will be monitored and evaluated per protocol and Practice Guidelines

## 2017-03-13 NOTE — PLAN OF CARE
"Problem: Goal Outcome Summary  Goal: Goal Outcome Summary  OT: Evaluation completed and treatment initiated. Patient reports independence/MOD I with ADLs/IADLs at baseline without use of AD living in an apartment with his wife. Patient MOD A for LE dressing, CGA sit<>stand, <> toilet, grooming/hygiene at sink, and in-room ambulation without AD. Slow, cautious gait noted with improvement noted back from BR at end of session. O2 sats 80% after activity with 4L via NC and O2 tank (tank has 4 or 6L setting), recovered quickly with PLB, rest, and return to 5L. Patient states he feels \"good, but weak\". Recommend: TCU at discharge.       "

## 2017-03-13 NOTE — PLAN OF CARE
Problem: Goal Outcome Summary  Goal: Goal Outcome Summary  Outcome: Therapy, progress toward functional goals is gradual  PT: At baseline, pt. Indep. With mobility within apt., amb. Long hallways at apt. Building and for short distances in the community prior to 2/22/17 hospitalization. Since DCing from hosp. On 2/15/17 has stayed inside his apt. Only. Pt. Owns a SEC but has not used it for a while. Spouse does laundry and cooking; they have hired help for cleaning. Currently, pt. Performing bed mobility and transfers with CGA. Noted pt. desat to 85% following transfer to sitting and then to standing, climbing back to 92-94% post. 30-45 secs.  Amb. 190 ft. With FWW, desatting to 75% on 6LNC post. Amb. O2 sats climbed to 92% in 2 min. Note LE weakness. Standing balance is fair. Recommend TCU upon DC.

## 2017-03-13 NOTE — PLAN OF CARE
Problem: Goal Outcome Summary  Goal: Goal Outcome Summary  Outcome: No Change  [id number]  A&O, VSS. Pt had loose stool x1, specimen sent to lab to r/o C-diff with negative result. On  5L O2 with sats of 92-95%. Up with A1 to BR. Denies pain when asked.

## 2017-03-13 NOTE — PROGRESS NOTES
03/13/17 1118   Quick Adds   Type of Visit Initial Occupational Therapy Evaluation   Living Environment   Lives With spouse   Living Arrangements apartment   Home Accessibility grab bars present (bathtub)  (RTS with handles, walk in shower)   Number of Stairs to Enter Home 0  (elevator)   Number of Stairs Within Home 0   Living Environment Comment Goes down to get mail from [employer]. Wife at home and healthy   Self-Care   Dominant Hand right   Functional Level Prior   Ambulation 0-->independent   Transferring 0-->independent   Toileting 1-->assistive equipment   Bathing 1-->assistive equipment   Dressing 0-->independent   Eating 0-->independent   Communication 0-->understands/communicates without difficulty   Swallowing 0-->swallows foods/liquids without difficulty   Cognition 0 - no cognition issues reported   Fall history within last six months yes   Number of times patient has fallen within last six months 1   Which of the above functional risks had a recent onset or change? ambulation;transferring;toileting;bathing;dressing;fall history   Prior Functional Level Comment Has cane but doesn't use. 4-5L supplemental O2 at baseline   General Information   Onset of Illness/Injury or Date of Surgery - Date 03/12/17   Referring Physician Domenic   Patient/Family Goals Statement To get stronger and go home.   Additional Occupational Profile Info/Pertinent History of Current Problem Patient admitted with generalized weakness and lactic acid of 6.1. PMH includes chronic, O2 dependent respiratory failure on 4L O2, histiocytosis X, pulmonary fibrosis, severe pulmonary hypertension. Was hospitalized 2/22-2/25 with lactic acidosis with no evidence of infection.   Precautions/Limitations fall precautions;oxygen therapy device and L/min  (5L O2 via NC, 93% at rest)   Cognitive Status Examination   Orientation orientation to person, place and time   Level of Consciousness alert   Able to Follow Commands WNL/WFL   Personal Safety  (Cognitive) WNL/WFL   Memory intact   Cognitive Comment Able to recall 3/3 words after distraction   Visual Perception   Visual Perception Wears glasses   Visual Perception Comments denies changes in vision this LOS   Sensory Examination   Sensory Comments denies any disturbances   Pain Assessment   Patient Currently in Pain No   Integumentary/Edema   Integumentary/Edema no deficits were identifed   Posture   Posture forward head position   Range of Motion (ROM)   ROM Comment BUE WNL   Strength   Strength Comments B deltoids, biceps 4/5, triceps 5/5   Hand Strength   Hand Strength Comments B grasp 5/5   Mobility   Bed Mobility Comments MIN A at trunk supine to EOB with bed similair to home (flat, no rail)   Transfer Skill: Sit to Stand   Level of [place]: Sit/Stand contact guard   Transfer Skill: Toilet Transfer   Level of [place]: Toilet contact guard   Assistive Device grab bars  (on R)   Balance   Balance Comments No LOB noted, slow cautious gait   Lower Body Dressing   Level of [place]: Dress Lower Body moderate assist (50% patients effort)  (SBA R sock, DEP L sock EOB)   Toileting   Level of [place]: Toilet (Able to manage clothing CGA)   Grooming   Level of [place]: Grooming contact guard  (brushing teeth, combing hair, washing face at sink)   Instrumental Activities of Daily Living (IADL)   Previous Responsibilities medication management;finances;driving   IADL Comments Hired help for cleaning. Wife does laundry and cooking   Activities of Daily Living Analysis   Impairments Contributing to Impaired Activities of Daily Living balance impaired;strength decreased   General Therapy Interventions   Planned Therapy Interventions ADL retraining;balance training;transfer training   Clinical Impression   Criteria for Skilled Therapeutic Interventions Met yes, treatment indicated   OT Diagnosis Decreased I in ADLs   Influenced by the following impairments Decreased balance, overall strength,  SOB with ADLs   Assessment of Occupational Performance 1-3 Performance Deficits   Identified Performance Deficits Dressing, toileting, bathing, grooming and hygeiene   Clinical Decision Making (Complexity) Low complexity   Therapy Frequency daily   Predicted Duration of Therapy Intervention (days/wks) 3 days   Anticipated Discharge Disposition Transitional Care Facility   Risks and Benefits of Treatment have been explained. Yes   Patient, Family & other staff in agreement with plan of care Yes   Total Evaluation Time   Total Evaluation Time (Minutes) 15

## 2017-03-13 NOTE — PLAN OF CARE
Problem: Goal Outcome Summary  Goal: Goal Outcome Summary  Outcome: No Change  Hypertensive, all other VS WDL.  Sats 90s on 5L NC, on 4L O2 at baseline, desats when ambulating.  Assist of 1 for ambulation.  A/O.  Tele NSR.  Discharge 1-2 days pending evaluation by PT/OT with probable d/c to TCU.

## 2017-03-13 NOTE — PROGRESS NOTES
Care Transition Initial Assessment -   Reason For Consult: discharge planning  Met with: PATIENT,FAMILY   Active Problems:    Lactic acid acidosis         DATA  Lives With: spouse  Living Arrangements: apartment  Description of Support System: Supportive, Involved  Who is your support system?: Wife, Children  Support Assessment: Adequate family and caregiver support.   Identified issues/concerns regarding health management: patient will need TCU at d/c  Patient feels that they have adequate support @ home?  YES   Quality Of Family Relationships: supportive, involved  Transportation Available: family or friend will provide    ASSESSMENT  Cognitive Status: alert and oriented X4  Concerns to be addressed: Patient is a 72 year old male who was admitted to the hospital for lactic acid acidosis. Prior to hospitalization patient was living at home with spouse where they were managing well. Patient and spouse are aware that patient will need TCU at d.c and expressed interest in [facility]. SW sent referral via [facility]. SW anticipates patient will d/c on 3/15. SW will discuss transportation needs once d.c placement is confirmed.     PLAN  Financial costs for the patient includes   Patient given options and choices for discharge  To TCU  Patient Goals and Preferences: [facility]  Patient anticipates discharging to:  TCU    VICENTE Bowers   *[phone]

## 2017-03-13 NOTE — PROGRESS NOTES
" 03/13/17 1432   Quick Adds   Type of Visit Initial PT Evaluation   Living Environment   Lives With spouse   Living Arrangements apartment   Home Accessibility grab bars present (bathtub)  (wa;l-in shower, RTS with handles)   Number of Stairs to Enter Home 0   Number of Stairs Within Home 0   Transportation Available family or friend will provide   Self-Care   Dominant Hand right   Usual Activity Tolerance fair   Current Activity Tolerance poor   Equipment Currently Used at Home wound care supplies  (owns SEC but doesn't currently use)   Functional Level Prior   Ambulation 0-->independent   Transferring 0-->independent   Toileting 1-->assistive equipment   Bathing 1-->assistive equipment   Dressing 0-->independent   Eating 0-->independent   Communication 0-->understands/communicates without difficulty   Swallowing 0-->swallows foods/liquids without difficulty   Cognition 0 - no cognition issues reported   Fall history within last six months yes   Number of times patient has fallen within last six months 1   Which of the above functional risks had a recent onset or change? ambulation;toileting;transferring   Prior Functional Level Comment Has cane but doesn't use. 4-5L supplemental O2 at baseline   General Information   Onset of Illness/Injury or Date of Surgery - Date 03/12/17   Referring Physician Asia Sheth   Patient/Family Goals Statement Open to rehab. as was so weak upon returning home following 2/25/17 hosp. stay   Pertinent History of Current Problem (include personal factors and/or comorbidities that impact the POC) Admitted with generalized weakness, lactic acidosis. Fell on floor the day of admission due to \"legs giving out on me\". PMHX: chronic O2 dep. resp. failure on 4LNC, histiocytosis X, pulmonary fibrosis, COPD, DM, CAD, sever pulmonary hypertension, hosp. 2/22/17-2/25/17 for lactic acidosis with no evidence of infection. Went home following that hospital stay but has gotten steadily weaker " since DC.   Precautions/Limitations fall precautions;oxygen therapy device and L/min   Weight-Bearing Status - LLE full weight-bearing   Weight-Bearing Status - RLE full weight-bearing   Heart Disease Risk Factors Medical history;Gender;Age;Lack of physical activity   General Observations Resting comfortably in bed with 5LNC intact   General Info Comments Up with assist   Cognitive Status Examination   Orientation orientation to person, place and time   Level of Consciousness alert   Follows Commands and Answers Questions 100% of the time   Personal Safety and Judgment intact   Memory intact   Pain Assessment   Patient Currently in Pain No   Integumentary/Edema   Integumentary/Edema no deficits were identifed   Posture    Posture Forward head position;Protracted shoulders   Range of Motion (ROM)   ROM Quick Adds No deficits were identified   Strength   Strength Comments Hip flex. mark. 4-/5, knee ext. mark. 4-/5, DF 4/5   Bed Mobility   Bed Mobility Comments Supine>sit with CGA using bedrail, performed slowly. Sit>supine indep. with HOB flat and without use of bedrailing.   Transfer Skills   Transfer Comments Sit<>stand with CGA using FWW once and without AD once. Fair balance upon initial stand.   Gait   Gait Comments Pt. amb. 20 ft. with FWW, FWB, CGA for pull portable O2.    Balance   Balance Comments Able to stand with feet shld. width apart and feet together with EO and EC for 30 secs. each with minimal sway. Able to reach outside DIEGO mark. at 3 levels.   Sensory Examination   Sensory Perception Comments Intact and symmetrical to lt. touch mark. except slightly diminished sensation to lt. touch right plantar surface of foot.   Coordination   Coordination no deficits were identified   Muscle Tone   Muscle Tone no deficits were identified   General Therapy Interventions   Planned Therapy Interventions bed mobility training;transfer training;progressive activity/exercise   Clinical Impression   Criteria for Skilled  "Therapeutic Intervention yes, treatment indicated   PT Diagnosis Impaired cardiopulmonary response to amb., SBA for bed mobility and transfers, weakness LEs   Influenced by the following impairments Medical diagnosis, motivation by pt.   Functional limitations due to impairments Pt. desats post. bed mobility, transfers and amb. on 6LNC 02. Falls risk.   Clinical Presentation Stable/Uncomplicated   Clinical Presentation Rationale Decreasing strength at home, fall at home, CGA for bed mobility and transfers, need to moniter O2 sats post. activity   Clinical Decision Making (Complexity) Low complexity   Therapy Frequency` daily   Predicted Duration of Therapy Intervention (days/wks) 3-4 days   Anticipated Equipment Needs at Discharge other (see comments)  (may need FWW if returns home)   Anticipated Discharge Disposition Transitional Care Facility   Risk & Benefits of therapy have been explained Yes   Patient, Family & other staff in agreement with plan of care Yes   Clinical Impression Comments Pt. will benefit from skilled PT to promote increased LE strength, indep. with moblity and ADLs.   [facility] Light Extraction TM \"6 Clicks\"   2016, Trustees of [facility], under license to [employer].  All rights reserved.   6 Clicks Short Forms Basic Mobility Inpatient Short Form   [facility] AM[employer]PAC  \"6 Clicks\" V.2 Basic Mobility Inpatient Short Form   1. Turning from your back to your side while in a flat bed without using bedrails? 4 - None   2. Moving from lying on your back to sitting on the side of a flat bed without using bedrails? 3 - A Little   3. Moving to and from a bed to a chair (including a wheelchair)? 3 - A Little   4. Standing up from a chair using your arms (e.g., wheelchair, or bedside chair)? 3 - A Little   5. To walk in hospital room? 3 - A Little   6. Climbing 3-5 steps with a railing? 2 - A Lot   Basic Mobility Raw Score (Score out of 24.Lower scores equate to lower levels of function) 18 "   Total Evaluation Time   Total Evaluation Time (Minutes) 20

## 2017-03-13 NOTE — PROGRESS NOTES
[facility]    Hospitalist Progress Note    Date of Service (when I saw the patient): 03/13/2017    Assessment & Plan   Ravi Sandoval is a 72 year old male with history of chronic oxygen-dependent respiratory failure on 4L O2, histiocytosis X, pulmonary fibrosis, severe pulmonary hypertension with a history of cor pulmonale and COPD. recently hospitalized from 02/22/2017 to 02/25/2017 when he had lactic acidosis, but no evidence of infection. Admitted again 3/12/2017 with generalized weakness with lactic acid of 6.1.    Lactic acidosis  Suspect secondary to hypoxia as patient had multiple episodes of being off oxygen for extended period of time, and wife reported during one of these episodes his legs were turning blue. Likely tolerates hypoxia very poorly with his underlying pulmonary hypertension. He also was recently started on furosemide following his last discharge, and reports his diarrhea seemed worse since that was started, therefore may have component of volume depletion as well. No evidence for infection.  - Lactic acid normalized  - UA negative for infection  - Blood cultures from admission NGTD, continue to monitor    Leukocytosis  WBC 15.1 on admission. As above, no clear source of infection. CXR stable, UA unremarkable, C difficile negative.  - WBC decreasing    Generalized weakness  Patient felt too weak to get up from the ground leading to admission. He reports that overall he has felt a steady decline following his last discharge, rather than an abrupt decline.   - PT/OT consulted  - Consult SW for discharge planning. Patient declined TCU after last hospitalization, he is now more open to this.     History of urinary retention  Required Ellison catheter during last hospitalization. Reports he was urinating normally at home after removal.  - UA unremarkable  - Monitor bladder scan to ensure no retention     Severe pulmonary hypertension with a history of cor pulmonale.   Recent  echocardiogram with RV systolic pressure of 57 plus right atrial pressure, estimated at 5-10, and RV systolic function moderately to severely reduced with a moderately dilated RV with trace to mild LV function of 55-60% with mild concentric LVH. [facility].  - Continue Adcirca 40 mg daily (recently increased as outpatient by Dr. Rouse)  - Continue digoxin   - Hold furosemide temporarily, consider resumption 3/14/17    Hypertension  During his last hospitalization, the patient had hypertensive urgency.   - Continue amlodipine and metoprolol. Will resume PTA lisinopril.     Diabetes mellitus, type 2  Prior to admission on metformin.  - Blood sugars well-controlled with minimal insulin needs  - Moderate consistent carbohydrate diet, medium SSI  - HgbA1c 8.1%  - Hold metformin    Pulmonary fibrosis  COPD  Histiocytosis X  Chronic hypoxic respiratory failure  Followed by Dr. Kaba. On chronic prednisone 10 mg daily. At this point, no indication for stress-dose steroids.  - No acute issues.  - Continue home oxygen    Depression.   - Continue prior to admission fluoxetine.     Chronic gout  No acute joint complaints.  - Reports colchicine is for his underlying lung disease, not gout, although this will be resumed    Iron deficiency anemia  Baseline hemoglobin is 8. He did get some IV iron in the past. Ferritin 284 2/25/17.  - Monitor hemoglobin periodically, currently stable    Chronic diarrhea  The patient has had an outpatient workup, including a colonoscopy. No specific relation to diarrhea, had tolerated well prior to more recent issues with diarrhea  - C difficile negative  - Loperamide PRN    Lobulated nodule of the lingula  He should have follow up scan 6 months from previous study.      Coronary artery disease  Denies chest pain. Continue aspirin, rosuvastatin, metoprolol XL     DVT Prophylaxis: Enoxaparin (Lovenox) SQ  Code Status: Full Code    Disposition: Await  therapy evaluations. Anticipate will need TCU. Discharge in 1-2 days.     Giancarlo Weber    Interval History   No acute events overnight. Reports he feels stronger compared to admission. No new symptoms this morning, denies chest pain, new shortness of breath.     -Data reviewed today: I reviewed all new labs and imaging results over the last 24 hours. I personally reviewed no images or EKG's today.    Physical Exam   Temp: 96.7  F (35.9  C) Temp src: Oral BP: 161/83 Pulse: 70 Heart Rate: 76 Resp: 18 SpO2: 91 % O2 Device: Nasal cannula Oxygen Delivery: 5 LPM  Vitals:    03/13/17 0618   Weight: 57.1 kg (125 lb 14.1 oz)     Vital Signs with Ranges  Temp:  [95.6  F (35.3  C)-99.1  F (37.3  C)] 96.7  F (35.9  C)  Pulse:  [55-87] 70  Heart Rate:  [59-86] 76  Resp:  [8-20] 18  BP: (108-161)/(52-93) 161/83  SpO2:  [88 %-98 %] 91 %  I/O last 3 completed shifts:  In: 560 [P.O.:560]  Out: 450 [Urine:450]    Constitutional: Elderly male, NAD  Respiratory: Clear to auscultation bilaterally, good air movement bilaterally  Cardiovascular: RRR, no m/r/g. No peripheral edema.  GI: Soft, non-tender, non-distended. BS normoactive.   Skin/Integumen: Warm, dry  Other:     Medications        amLODIPine (NORVASC) tablet 2.5 mg  2.5 mg Oral Daily     aspirin chewable tablet 81 mg  81 mg Oral At Bedtime     digoxin  125 mcg Oral Daily     FLUoxetine (PROzac) capsule 40 mg  40 mg Oral Daily     levothyroxine  100 mcg Oral Daily     metoprolol  25 mg Oral BID     omeprazole (priLOSEC) CR capsule 40 mg  40 mg Oral Daily     predniSONE (DELTASONE) tablet 10 mg  10 mg Oral Daily     rosuvastatin  10 mg Oral Daily     tamsulosin  0.4 mg Oral Daily     insulin aspart  1-7 Units Subcutaneous TID AC     insulin aspart  1-5 Units Subcutaneous At Bedtime     enoxaparin  30 mg Subcutaneous Q24H     tadalafil  40 mg Oral Daily     sodium chloride (PF)  3 mL Intracatheter Q8H       Data     Recent Labs  Lab 03/13/17  0735 03/12/17  1000   WBC 11.2*  15.1*   HGB 10.9* 11.2*   MCV 82 82    196    136   POTASSIUM 3.8 4.0   CHLORIDE 102 99   CO2 28 22   BUN 10 11   CR 0.48* 0.58*   ANIONGAP 9 15*   AV 8.1* 8.0*   * 202*   TROPI  --  <0.015The 99th percentile for upper reference range is 0.045 ug/L.  Troponin values in the range of 0.045 - 0.120 ug/L may be associated with risks of adverse clinical events.       Recent Results (from the past 24 hour(s))   XR Chest 2 Views    Narrative    CHEST TWO VIEWS   3/12/2017 10:25 AM    HISTORY: Chest pain. Shortness of Breath.    COMPARISON: 2/22/2017.      Impression    IMPRESSION: Again seen is moderate interstitial prominence diffusely  throughout both lungs. This again likely represents pulmonary  fibrosis. The lungs are otherwise clear with no pulmonary  consolidation or mass demonstrated. No other abnormality is seen. I  see no definite change since the previous examination.     PEDRO LUIS DAS MD

## 2017-03-14 ENCOUNTER — APPOINTMENT (OUTPATIENT)
Dept: OCCUPATIONAL THERAPY | Facility: CLINIC | Age: 72
DRG: 641 | End: 2017-03-14
Payer: MEDICARE

## 2017-03-14 LAB
ANION GAP SERPL CALCULATED.3IONS-SCNC: 9 MMOL/L (ref 3–14)
BUN SERPL-MCNC: 10 MG/DL (ref 7–30)
CALCIUM SERPL-MCNC: 7.8 MG/DL (ref 8.5–10.1)
CHLORIDE SERPL-SCNC: 103 MMOL/L (ref 94–109)
CO2 SERPL-SCNC: 26 MMOL/L (ref 20–32)
CREAT SERPL-MCNC: 0.52 MG/DL (ref 0.66–1.25)
ERYTHROCYTE [DISTWIDTH] IN BLOOD BY AUTOMATED COUNT: 21 % (ref 10–15)
GFR SERPL CREATININE-BSD FRML MDRD: ABNORMAL ML/MIN/1.7M2
GLUCOSE BLDC GLUCOMTR-MCNC: 135 MG/DL (ref 70–99)
GLUCOSE BLDC GLUCOMTR-MCNC: 217 MG/DL (ref 70–99)
GLUCOSE BLDC GLUCOMTR-MCNC: 268 MG/DL (ref 70–99)
GLUCOSE BLDC GLUCOMTR-MCNC: 321 MG/DL (ref 70–99)
GLUCOSE SERPL-MCNC: 122 MG/DL (ref 70–99)
HCT VFR BLD AUTO: 29.1 % (ref 40–53)
HGB BLD-MCNC: 9.2 G/DL (ref 13.3–17.7)
MCH RBC QN AUTO: 25.6 PG (ref 26.5–33)
MCHC RBC AUTO-ENTMCNC: 31.6 G/DL (ref 31.5–36.5)
MCV RBC AUTO: 81 FL (ref 78–100)
PLATELET # BLD AUTO: 157 10E9/L (ref 150–450)
POTASSIUM SERPL-SCNC: 3.2 MMOL/L (ref 3.4–5.3)
RBC # BLD AUTO: 3.6 10E12/L (ref 4.4–5.9)
SODIUM SERPL-SCNC: 138 MMOL/L (ref 133–144)
WBC # BLD AUTO: 8.4 10E9/L (ref 4–11)

## 2017-03-14 PROCEDURE — 25000132 ZZH RX MED GY IP 250 OP 250 PS 637: Mod: GY | Performed by: INTERNAL MEDICINE

## 2017-03-14 PROCEDURE — 99232 SBSQ HOSP IP/OBS MODERATE 35: CPT | Performed by: INTERNAL MEDICINE

## 2017-03-14 PROCEDURE — 00000146 ZZHCL STATISTIC GLUCOSE BY METER IP

## 2017-03-14 PROCEDURE — 80048 BASIC METABOLIC PNL TOTAL CA: CPT | Performed by: INTERNAL MEDICINE

## 2017-03-14 PROCEDURE — A9270 NON-COVERED ITEM OR SERVICE: HCPCS | Mod: GY | Performed by: INTERNAL MEDICINE

## 2017-03-14 PROCEDURE — 36415 COLL VENOUS BLD VENIPUNCTURE: CPT | Performed by: INTERNAL MEDICINE

## 2017-03-14 PROCEDURE — 12000000 ZZH R&B MED SURG/OB

## 2017-03-14 PROCEDURE — 85027 COMPLETE CBC AUTOMATED: CPT | Performed by: INTERNAL MEDICINE

## 2017-03-14 PROCEDURE — 97535 SELF CARE MNGMENT TRAINING: CPT | Mod: GO

## 2017-03-14 PROCEDURE — 40000133 ZZH STATISTIC OT WARD VISIT

## 2017-03-14 PROCEDURE — 25000125 ZZHC RX 250: Performed by: INTERNAL MEDICINE

## 2017-03-14 PROCEDURE — 25000128 H RX IP 250 OP 636: Performed by: INTERNAL MEDICINE

## 2017-03-14 RX ORDER — POTASSIUM CHLORIDE 1.5 G/1.58G
40 POWDER, FOR SOLUTION ORAL ONCE
Status: COMPLETED | OUTPATIENT
Start: 2017-03-14 | End: 2017-03-14

## 2017-03-14 RX ORDER — LOPERAMIDE HCL 2 MG
2 CAPSULE ORAL 4 TIMES DAILY PRN
Qty: 20 CAPSULE | DISCHARGE
Start: 2017-03-14 | End: 2019-01-01

## 2017-03-14 RX ORDER — GLIPIZIDE 5 MG/1
5 TABLET ORAL
Qty: 30 TABLET | Refills: 0 | Status: ON HOLD | DISCHARGE
Start: 2017-03-14 | End: 2018-01-01

## 2017-03-14 RX ORDER — FUROSEMIDE 20 MG
20 TABLET ORAL EVERY OTHER DAY
Qty: 30 TABLET | Refills: 1 | Status: ON HOLD | DISCHARGE
Start: 2017-03-14 | End: 2018-01-01

## 2017-03-14 RX ADMIN — INSULIN ASPART 2 UNITS: 100 INJECTION, SOLUTION INTRAVENOUS; SUBCUTANEOUS at 22:24

## 2017-03-14 RX ADMIN — OMEPRAZOLE 40 MG: 20 CAPSULE, DELAYED RELEASE ORAL at 08:32

## 2017-03-14 RX ADMIN — ASPIRIN 81 MG 81 MG: 81 TABLET ORAL at 22:24

## 2017-03-14 RX ADMIN — ROSUVASTATIN CALCIUM 10 MG: 10 TABLET ORAL at 08:32

## 2017-03-14 RX ADMIN — POTASSIUM CHLORIDE 40 MEQ: 1.5 POWDER, FOR SOLUTION ORAL at 10:38

## 2017-03-14 RX ADMIN — FLUOXETINE HYDROCHLORIDE 40 MG: 20 CAPSULE ORAL at 08:32

## 2017-03-14 RX ADMIN — MULTIPLE VITAMINS W/ MINERALS TAB 1 TABLET: TAB at 08:31

## 2017-03-14 RX ADMIN — AMLODIPINE BESYLATE 2.5 MG: 2.5 TABLET ORAL at 08:31

## 2017-03-14 RX ADMIN — METOPROLOL SUCCINATE 25 MG: 25 TABLET, EXTENDED RELEASE ORAL at 20:23

## 2017-03-14 RX ADMIN — LISINOPRIL 20 MG: 20 TABLET ORAL at 08:31

## 2017-03-14 RX ADMIN — PREDNISONE 10 MG: 10 TABLET ORAL at 08:31

## 2017-03-14 RX ADMIN — TADALAFIL 40 MG: 5 TABLET, FILM COATED ORAL at 08:32

## 2017-03-14 RX ADMIN — LEVOTHYROXINE SODIUM 100 MCG: 100 TABLET ORAL at 08:31

## 2017-03-14 RX ADMIN — COLCHICINE 0.6 MG: 0.6 TABLET, FILM COATED ORAL at 20:23

## 2017-03-14 RX ADMIN — TAMSULOSIN HYDROCHLORIDE 0.4 MG: 0.4 CAPSULE ORAL at 08:31

## 2017-03-14 RX ADMIN — ENOXAPARIN SODIUM 30 MG: 30 INJECTION SUBCUTANEOUS at 13:15

## 2017-03-14 NOTE — PLAN OF CARE
Problem: Goal Outcome Summary  Goal: Goal Outcome Summary  Outcome: No Change  Reason for Admission: Acidosis [E87.2]  Hypoxia [R09.02]  Generalized muscle weakness [M62.81]  Pertinent Medical History: COPD, pulmonary fibrosis, DM   Admission: 3/12/2017  9:42 AM  Isolation/Precautions: No active isolations  Tele: NSR  VS: /68 (BP Location: Left arm)  Pulse 55  Temp 96  F (35.6  C) (Axillary)  Resp 18  Wt 61.6 kg (135 lb 12.9 oz)  SpO2 93%  BMI 21.27 kg/m2  Resp: 4 liters oxygen (baseline at home).    Skin/Wound: blanchable erythema on coccyx  GI/Diet: mod. Cho diet  : voiding in bathroom  IV: SL  Pain: denied  Abnormal Labs/Glucose: K 3.2 (one time dose potassium given), monitoring blood sugars  Activity/Safety: ambulated with 1 assist and walker to bathroom, PT/OT following  Education:   Plans for DC: TCU at DC

## 2017-03-14 NOTE — PLAN OF CARE
Problem: Goal Outcome Summary  Goal: Goal Outcome Summary  OT:  Pt performed transfers/functional mobility to/from bathroom and supply cart in hallway for retrieval of needed ADL item, FWW level with CGA for safety. O2 sats 97% on 4LPM at rest; decreased to 85% on 4LPM post activity. Pt re-educated on PLB technique, able to return demo, increased time for O2 >90%, noted cold temp of fingers. Pt able to don/doff B socks seated EOB with SBA, educated on compensatory technique of leg crossing to ease SOB when attempting to flex forward, initially disagreed, and then attempted with success. OT recommendation: TCU at discharge.

## 2017-03-14 NOTE — PROGRESS NOTES
[facility]    Hospitalist Progress Note    Date of Service (when I saw the patient): 03/14/2017    Assessment & Plan   Ravi Sandoval is a 72 year old male with history of chronic oxygen-dependent respiratory failure on 4L O2, histiocytosis X, pulmonary fibrosis, severe pulmonary hypertension with a history of cor pulmonale and COPD. recently hospitalized from 02/22/2017 to 02/25/2017 when he had lactic acidosis, but no evidence of infection. Admitted again 3/12/2017 with generalized weakness with lactic acid of 6.1.    Lactic acidosis  Suspect secondary to hypoxia as patient had multiple episodes of being off oxygen for extended period of time, and wife reported during one of these episodes his legs were turning blue. Likely tolerates hypoxia very poorly with his underlying pulmonary hypertension. He also was recently started on furosemide following his last discharge, and reports his diarrhea seemed worse since that was started, therefore may have component of volume depletion as well. No evidence for infection.  - Lactic acid normalized  - Blood cultures from admission NGTD  - Discontinue metformin on discharge     Leukocytosis  WBC 15.1 on admission. As above, no clear source of infection. CXR stable, UA unremarkable, C difficile negative.  - WBC decreasing, now normal     Generalized weakness  Patient felt too weak to get up from the ground leading to admission. He reports that overall he has felt a steady decline following his last discharge, rather than an abrupt decline. Declined TCU last admission. Suspect volume depletion from furosemide start and worsened diarrhea contributing as well.   - PT/OT and SW consulted. Planning to discharge to TCU    History of urinary retention  Required Ellison catheter during last hospitalization. Reports he was urinating normally at home after removal. UA unremarkable. Urinating normally.      Severe pulmonary hypertension with a history of cor pulmonale.    Recent echocardiogram with RV systolic pressure of 57 plus right atrial pressure, estimated at 5-10, and RV systolic function moderately to severely reduced with a moderately dilated RV with trace to mild LV function of 55-60% with mild concentric LVH. Followed by Dr. Rouse at the [facility].  - Continue Adcirca 40 mg daily (recently increased as outpatient by Dr. Rouse)  - Continue digoxin   - Hold furosemide temporarily. Plan to resume furosemide 20 mg every other day on discharge with close follow-up of symptoms and weights.     Hypertension  During his last hospitalization, the patient had hypertensive urgency.   - Continue PTA amlodipine, metoprolol, lisinopril.     Diabetes mellitus, type 2  Prior to admission on metformin.  - Blood sugars well-controlled with minimal insulin needs  - Moderate consistent carbohydrate diet, medium SSI  - HgbA1c 8.1%  - Discontinue metformin permanently given recurrent lactic acidosis    Pulmonary fibrosis  COPD  Histiocytosis X  Chronic hypoxic respiratory failure  Followed by Dr. Kaba. On chronic prednisone 10 mg daily. At this point, no indication for stress-dose steroids.  - No acute issues.  - Continue home oxygen    Depression.   Continue prior to admission fluoxetine.     Chronic gout  No acute joint complaints. Reports colchicine is for his underlying lung disease, not gout, although this has been resumed    Iron deficiency anemia  Baseline hemoglobin is 8. He did get some IV iron in the past. Ferritin 284 2/25/17.  - Monitor hemoglobin periodically, currently stable    Chronic diarrhea  The patient has had an outpatient workup, including a colonoscopy. No specific relation to diarrhea, had tolerated well prior to more recent issues with diarrhea. C difficile negative  - Loperamide PRN  - Improving    Lobulated nodule of the lingula  He should have follow up scan 6 months from previous study.      Coronary artery disease  Denies chest pain.  Continue aspirin, rosuvastatin, metoprolol XL     DVT Prophylaxis: Enoxaparin (Lovenox) SQ  Code Status: Full Code    Disposition: Anticipate discharge to TCU 3/15/17.     Giancarlo Weber    Interval History   No acute events overnight. Continues to feel stronger since admission. Eating and drinking well. Stool more solid today. Denies chest pain, shortness of breath.     -Data reviewed today: I reviewed all new labs and imaging results over the last 24 hours. I personally reviewed no images or EKG's today.    Physical Exam   Temp: 96  F (35.6  C) Temp src: Axillary BP: 127/68 Pulse: 55 Heart Rate: 62 Resp: 18 SpO2: 93 % O2 Device: Nasal cannula Oxygen Delivery: 4 LPM  Vitals:    03/13/17 0618 03/14/17 0549   Weight: 57.1 kg (125 lb 14.1 oz) 61.6 kg (135 lb 12.9 oz)     Vital Signs with Ranges  Temp:  [96  F (35.6  C)-97.1  F (36.2  C)] 96  F (35.6  C)  Pulse:  [55-70] 55  Heart Rate:  [58-62] 62  Resp:  [16-18] 18  BP: (112-140)/(58-71) 127/68  SpO2:  [92 %-95 %] 93 %  I/O last 3 completed shifts:  In: 480 [P.O.:480]  Out: 150 [Urine:150]    Constitutional: Elderly male, NAD  ENT: Right TM clear  Respiratory: Clear to auscultation bilaterally, good air movement bilaterally  Cardiovascular: RRR, no m/r/g. No peripheral edema.  GI: Soft, non-tender, non-distended. BS normoactive.   Skin/Integumen: Warm, dry  Other:     Medications        colchicine tablet 0.6 mg  0.6 mg Oral QPM     lisinopril  20 mg Oral Daily     multivitamin, therapeutic with minerals  1 tablet Oral Daily     amLODIPine (NORVASC) tablet 2.5 mg  2.5 mg Oral Daily     aspirin chewable tablet 81 mg  81 mg Oral At Bedtime     digoxin  125 mcg Oral Daily     FLUoxetine (PROzac) capsule 40 mg  40 mg Oral Daily     levothyroxine  100 mcg Oral Daily     metoprolol  25 mg Oral BID     omeprazole (priLOSEC) CR capsule 40 mg  40 mg Oral Daily     predniSONE (DELTASONE) tablet 10 mg  10 mg Oral Daily     rosuvastatin  10 mg Oral Daily     tamsulosin  0.4 mg  Oral Daily     insulin aspart  1-7 Units Subcutaneous TID AC     insulin aspart  1-5 Units Subcutaneous At Bedtime     enoxaparin  30 mg Subcutaneous Q24H     tadalafil  40 mg Oral Daily     sodium chloride (PF)  3 mL Intracatheter Q8H       Data     Recent Labs  Lab 03/14/17  0714 03/13/17  0735 03/12/17  1000   WBC 8.4 11.2* 15.1*   HGB 9.2* 10.9* 11.2*   MCV 81 82 82    182 196    139 136   POTASSIUM 3.2* 3.8 4.0   CHLORIDE 103 102 99   CO2 26 28 22   BUN 10 10 11   CR 0.52* 0.48* 0.58*   ANIONGAP 9 9 15*   AV 7.8* 8.1* 8.0*   * 108* 202*   TROPI  --   --  <0.015The 99th percentile for upper reference range is 0.045 ug/L.  Troponin values in the range of 0.045 - 0.120 ug/L may be associated with risks of adverse clinical events.       No results found for this or any previous visit (from the past 24 hour(s)).

## 2017-03-14 NOTE — PROVIDER NOTIFICATION
MD Notification    Notified Person:  MD    Notified Persons Name: Dr. Weber    Notification Date/Time: 0930 March 14, 2017    Notification Interaction:  Text paged     Purpose of Notification: K 3.2    Orders Received: One time dose 40 meq PO potassium    Comments:

## 2017-03-14 NOTE — PLAN OF CARE
Problem: Goal Outcome Summary  Goal: Goal Outcome Summary  PT: Attempted PT session. Pt up in a chair eating lunch. Pt requested PT return later today so he could finish eating his meal.

## 2017-03-14 NOTE — PROGRESS NOTES
SALLIE  I: SALLIE was updated that [facility] would be able to accept patient tomorrow. SALLIE called and updated spouse who was okay with this plan. SALLIE discussed transportation needs and would like a w/c ride arranged for patient. SALLIE discussed fees for transport. Patient's spouse is okay with this. SALLIE called [facility] and arranged ride for 1300 on 3/15. SALLIE will fax orders/PAS/script when complete.     P: SALLIE will continue to follow and assist as needed.    Brittney Rodriguez, VICENTE   *[phone]

## 2017-03-15 VITALS
TEMPERATURE: 95.9 F | SYSTOLIC BLOOD PRESSURE: 139 MMHG | OXYGEN SATURATION: 92 % | WEIGHT: 138.2 LBS | BODY MASS INDEX: 21.65 KG/M2 | HEART RATE: 57 BPM | DIASTOLIC BLOOD PRESSURE: 62 MMHG | RESPIRATION RATE: 16 BRPM

## 2017-03-15 LAB
GLUCOSE BLDC GLUCOMTR-MCNC: 166 MG/DL (ref 70–99)
POTASSIUM SERPL-SCNC: 3.7 MMOL/L (ref 3.4–5.3)

## 2017-03-15 PROCEDURE — 99207 ZZC NON-BILLABLE SERV PER CHARTING: CPT | Performed by: INTERNAL MEDICINE

## 2017-03-15 PROCEDURE — A9270 NON-COVERED ITEM OR SERVICE: HCPCS | Mod: GY | Performed by: INTERNAL MEDICINE

## 2017-03-15 PROCEDURE — 36415 COLL VENOUS BLD VENIPUNCTURE: CPT | Performed by: INTERNAL MEDICINE

## 2017-03-15 PROCEDURE — 25000125 ZZHC RX 250: Performed by: INTERNAL MEDICINE

## 2017-03-15 PROCEDURE — 25000132 ZZH RX MED GY IP 250 OP 250 PS 637: Mod: GY | Performed by: INTERNAL MEDICINE

## 2017-03-15 PROCEDURE — 00000146 ZZHCL STATISTIC GLUCOSE BY METER IP

## 2017-03-15 PROCEDURE — 84132 ASSAY OF SERUM POTASSIUM: CPT | Performed by: INTERNAL MEDICINE

## 2017-03-15 RX ADMIN — METOPROLOL SUCCINATE 25 MG: 25 TABLET, EXTENDED RELEASE ORAL at 08:20

## 2017-03-15 RX ADMIN — FLUOXETINE HYDROCHLORIDE 40 MG: 20 CAPSULE ORAL at 08:19

## 2017-03-15 RX ADMIN — OMEPRAZOLE 40 MG: 20 CAPSULE, DELAYED RELEASE ORAL at 08:19

## 2017-03-15 RX ADMIN — DIGOXIN 125 MCG: 125 TABLET ORAL at 08:20

## 2017-03-15 RX ADMIN — PREDNISONE 10 MG: 10 TABLET ORAL at 08:19

## 2017-03-15 RX ADMIN — ROSUVASTATIN CALCIUM 10 MG: 10 TABLET ORAL at 08:21

## 2017-03-15 RX ADMIN — MULTIPLE VITAMINS W/ MINERALS TAB 1 TABLET: TAB at 08:21

## 2017-03-15 RX ADMIN — LEVOTHYROXINE SODIUM 100 MCG: 100 TABLET ORAL at 08:21

## 2017-03-15 RX ADMIN — LISINOPRIL 20 MG: 20 TABLET ORAL at 08:19

## 2017-03-15 RX ADMIN — AMLODIPINE BESYLATE 2.5 MG: 2.5 TABLET ORAL at 08:29

## 2017-03-15 RX ADMIN — TAMSULOSIN HYDROCHLORIDE 0.4 MG: 0.4 CAPSULE ORAL at 08:19

## 2017-03-15 RX ADMIN — TADALAFIL 40 MG: 5 TABLET, FILM COATED ORAL at 08:18

## 2017-03-15 RX ADMIN — ACETAMINOPHEN 650 MG: 325 TABLET, FILM COATED ORAL at 08:19

## 2017-03-15 NOTE — PLAN OF CARE
Problem: Goal Outcome Summary  Goal: Goal Outcome Summary  Outcome: No Change  Patient is A&OX4, VSS on baseline 4L of O2 per NC, CMS intact. Tele NSR. Up with SBA and walker. Voiding in bathroom. Mod carb Diet. Denies Pain. Lung sounds diminished with fine crackles. [facility], ride is set up. Will continue to monitor.

## 2017-03-15 NOTE — DISCHARGE SUMMARY
DATE OF ADMISSION:  03/12/2017      DATE OF DISCHARGE:  03/15/2007      DISCHARGE DIAGNOSES:   1.  Lactic acidosis, resolved.   2.  Generalized weakness.   3.  Leukocytosis, suspect stress demargination.   4.  Physical deconditioning.   5.  History of urinary retention.   6.  History of severe pulmonary hypertension with a history of cor pulmonale.   7.  History of hypertension.   8.  Type 2 diabetes.   9.  Pulmonary fibrosis.   10.  Chronic obstructive pulmonary disease.     11.  Histiocytosis X.   12.  Chronic hypoxic respiratory failure, on 4 liters of oxygen.   13.  History of depression.   14.  History of chronic gout.   15.  History of iron deficiency anemia.   16.  Chronic diarrhea.   17.  Lobulated nodule of the lingula.   18.  Coronary artery disease.      DISCHARGE MEDICATIONS:   New medications:   1.  Glipizide 5 mg p.o. daily in the morning.   2.  Imodium 2 mg 4 times daily p.r.n. diarrhea.      Medications which have changed:  Lasix 20 mg every other day; prior dosage was 20 mg daily.      Medications which have not changed:   1.  Amlodipine 2.5 mg daily.   2.  Aspirin 81 mg daily.     3.  Colcrys 0.6 mg daily in the evening.   4.  Digoxin 125 mcg p.o. daily.   5.  Levothyroxine 100 mcg p.o. daily.   6.  Lisinopril 20 mg daily.   7.  Metoprolol XL 25 mg p.o. b.i.d.   8.  Omeprazole 40 mg daily.   9.  Prednisone 10 mg daily.   10.  Prozac 40 mg daily.   11.  Rosuvastatin 10 mg daily.   12.  Tadalafil 20 mg tablets, takes 2 tablets or 40 mg daily.   13.  Flomax 0.4 mg daily.           Ravi Sandoval is being discharged to a rehab facility for continuing physical therapy and occupational therapy.  The patient will follow up with the nursing home physician.  Lab tests recommended, BMP in 2-3 days.  The patient will be discharged on 4 liters of oxygen to keep sats 92% or better.      PENDING TEST RESULTS:  Blood culture 03/12/2017, final report is pending.      PERTINENT LABORATORY DATA AND IMAGING STUDIES:   Potassium on the day of discharge 3.7.  Initial lactic acid from admission was 6.1 on 03/12/2017; followup lactic acid that evening 1.5 and in the morning of 03/13/2017 of 1.6.  The patient's last CBC was from 03/14/2017, white count 8.4, hemoglobin 9.2, platelet count 157.        Chest x-ray 03/12/2017 again seen as moderate interstitial prominence diffusely throughout both lungs, likely represents pulmonary fibrosis.  Lungs were otherwise clear with no pulmonary consolidation or mass demonstrated, no other abnormality was seen.  No definite change since the previous examination.      CONSULTATIONS DURING THIS HOSPITALIZATION:  Physical Therapy and Occupational Therapy.      HOSPITAL COURSE:  Please see dictated history and physical for patient's initial presentation.  Ravi Sandoval is a 72-year-old male with a recent hospitalization from 02/22/2017 to 02/25/2017 when he had hypertensive urgency, lactic acidosis without an obvious source of infection, urinary retention, iron deficiency anemia and a complex liver lesion.  He also had acute on chronic hypoxic respiratory failure and required some diuresis.  The patient refused to go to rehab and was discharged home.  He gradually became weaker at home.  He does have a history of chronic diarrhea, which has been worked up in the past with a colonoscopy in 05/2016.  During his last hospitalization, the patient also had some urinary retention which required a Ellison catheter.  On the morning of admission, the patient had diarrhea.  He was not able to make it to the bathroom.  Because of his diarrhea, he was not able to continue using his oxygen and had about 3 episodes of 20 minutes each when he was not on his oxygen which is usually 4 liters.  His wife also reported that he had blue legs when EMT arrived.  The patient also appeared dry on exam in the emergency room and was given fluid resuscitation.       PROBLEMS ADDRESSED DURING THIS HOSPITALIZATION:   1.  Lactic  acidosis, suspect secondary to hypoxia as the patient had multiple episodes of being off oxygen for an extended period of time and his wife reported that his legs were cyanotic when EMT arrived. He likely tolerates hypoxia very poorly with his underlying pulmonary hypertension, pulmonary fibrosis and COPD as well as histiocytosis.  Patient also was recently started on furosemide during his last discharge and his diarrhea apparently seemed worse since that was started.  He also had a component of volume depletion as well but no evidence for infection.   His lactic acid normalized following the IV fluids that were given in the emergency room.  He maintained his oxygen saturations on 4 liters with his regular home dosage.  Blood cultures from admission showed no growth to date.  His metformin was discontinued due to his recurrent lactic acidosis.   2.  Leukocytosis, suspect stress demargination.  White count 15.1 on admission, but there was no clear source of infection.  Chest x-ray was stable.   His UA was unremarkable.  C. diff was negative and his WBC normalized during his hospitalization and did not require antibiotics.   3.  Generalized weakness.  Physical Therapy did see the patient, and the patient had physical deconditioning.  He was too weak to get up from the ground leading to his admission.  He has been steadily declining following his last discharge.  He declined TCU last time, but planning to discharge to TCU for ongoing therapies.   PT, OT, and Social Work consulted during this hospitalization.   4.  History of urinary retention.  The patient required a Ellison catheter during his last hospitalization.  He is now urinating normally at home without a Ellison.  His UA was unremarkable.   5.  Severe pulmonary hypertension with a history of cor pulmonale.  The patient had a recent echocardiogram in 02/2017 which showed right RV systolic pressure of 57 plus right atrial pressure.  RV systolic function was moderate  to severely reduced with moderately dilated RV with trace to mild LV function 55% to 60%.  The patient is followed by Dr. Rouse at the [facility].  He is to continue his Adcirca at 40 mg daily, which was recently increased as an outpatient by Dr. Rouse.  He will also continue his digoxin.  His furosemide was held during admission due to concern for volume depletion.  Plan to resume furosemide 20 mg every other day on discharge with close followup with symptoms and weights.   6.  History of hypertensive urgency during his hospitalization in 02/2017.  His blood pressures were well controlled on his current medications.   7.  Type 2 diabetes.  Prior to admission, he was on metformin.  His A1c was 8.1 on 03/13/2017.  The metformin was discontinued permanently given his recurrent lactic acidosis and the patient was started on a low dose of glipizide. Plan to monitor blood sugars and titrate up as needed.   8.  Chronic respiratory failure with hypoxia.  The patient is followed by Dr. Kaba at [facility].  He is on chronic prednisone 10 mg daily.  He had no acute issues and his home oxygen was continued.   9.  History of iron deficiency anemia.  His baseline hemoglobin is around 10.  He did receive IV iron on 02/25/2017.   10.  Chronic diarrhea.  The patient had outpatient workup including a colonoscopy within the last year.  No specific relation to colchicine, tolerated well prior to recent issues with diarrhea.  C. diff was done during this hospitalization and was negative.  We will continue on loperamide p.r.n.   11.  Lobulated nodule of the lingula.  He should have a followup scan in 6 months from his previous study.   12.  History of coronary artery disease, stable during this hospitalization.  The patient will continue on aspirin, rosuvastatin, and Toprol-XL   13.  History of pulmonary fibrosis, COPD, and histiocytosis X, all stable during this hospitalization.   14.  History of depression.   The patient was continued on his fluoxetine.        CODE STATUS:  Full code.      Time for this discharge less than 30 minutes.         MATTHEW MAZARIEGOS MD             D: 03/15/2017 09:07   T: 03/15/2017 10:45   MT: GIULIANA      Name:     KYLE JACOBSON   MRN:      [id number]        Account:        [id number]   :      1945           Admit Date:                                       Discharge Date:       Document: [id number]

## 2017-03-15 NOTE — PLAN OF CARE
Problem: Goal Outcome Summary  Goal: Goal Outcome Summary  Occupational Therapy Discharge Summary     Reason for therapy discharge:    Discharged to transitional care facility.     Progress towards therapy goal(s). See goals on Care Plan in [facility] electronic health record for goal details.  Goals not met.  Barriers to achieving goals:   discharge from facility.     Therapy recommendation(s):    Continued therapy is recommended.  Rationale/Recommendations:  to maximize safety and independence in all ADLS, IADLs and mobility tasks as well as continued strengthening. .

## 2017-03-15 NOTE — PROGRESS NOTES
SALLIE  I: SW called patient's spouse and updated her on patient's d/c. Patient will require O2 at d/c. Spouse informed SW that he is open to [facility] for o2. SW called [facility] ([phone]) and spoke with Diana who arranged for a portable tank to be dropped off for patient to transport to TCU.    P: SW will continue to follow and assist as needed.    PAS-RR    D: Per DHS regulation, SW completed and submitted PAS-RR to [place] [employer] Direct Connect via the Senior [employer] Line.  PAS-RR confirmation # is : [id number]      I: SW spoke with pt and they are aware a PAS-RR has been submitted.  SALLIE reviewed with pt that they may be contacted for a follow up appointment within 10 days of hospital discharge if their SNF stay is < 30 days.  Contact information for Senior [employer] was also provided.    A: pt verbalized understanding.    P: Further questions may be directed to Senior [employer] Line at #[phone], option #4 for PAS-RR staff.      Brittney Rodriguez, VICENTE   *[phone]

## 2017-03-15 NOTE — PLAN OF CARE
Problem: Goal Outcome Summary  Goal: Goal Outcome Summary  Outcome: No Change  Patient is A&OX4, VSS on baseline 4L of O2 per NC, CMS intact. Up with SBA and walker.   Voiding in bathroom. Mod carb  Diet. Denies Pain. Lung sounds diminished with fine crackles, states breathing is normal for him. Plan to D/C to [facility] home at 1pm tomorrow, ride is set up.   Will continue to monitor.

## 2017-03-15 NOTE — PLAN OF CARE
Problem: Goal Outcome Summary  Goal: Goal Outcome Summary  Outcome: Improving  A&O. CMS intact. Bowel sounds active and present. VSS. Up with assist of 1 with walker. Denies pain. Pt states he understands plan for discharge, denies questions, has all of his belongings.

## 2017-03-15 NOTE — DISCHARGE SUMMARY
[facility]    Discharge Summary  Hospitalist    Date of Admission:  3/12/2017  Date of Discharge:  3/15/2017  Discharging Provider: Asia Sheth  Date of Service (when I saw the patient): 03/15/17    Discharge Diagnoses   Lactic acidosis  Leukocytosis, suspect stress demargination  Generalized weakness  Physical deconditioning  History of urinary retention  Severe pulmonary hypertension with a history of cor pulmonale.   Hypertension  Diabetes mellitus, type 2  Pulmonary fibrosis  COPD  Histiocytosis X  Chronic hypoxic respiratory failure  Depression.   Chronic gout  Iron deficiency anemia  Chronic diarrhea  Lobulated nodule of the lingula  Coronary artery disease    History of Present Illness   Ravi Sandoval is an 72 year old male who presented with generalized weakness.    Hospital Course   Ravi Sandoval was admitted on 3/12/2017.  The following problems were addressed during his hospitalization:     Lactic acidosis  Suspect secondary to hypoxia as patient had multiple episodes of being off oxygen for extended period of time, and wife reported during one of these episodes his legs were turning blue. Likely tolerates hypoxia very poorly with his underlying pulmonary hypertension. He also was recently started on furosemide following his last discharge, and reports his diarrhea seemed worse since that was started, therefore suspected to have component of volume depletion as well. No evidence for infection. Lactic acid normalized following IV fluids and maintenance of normal oxygen saturations. Blood cultures from admission no growth to date. Discontinued metformin on discharge as below.     Leukocytosis, suspect stress demargination  WBC 15.1 on admission. As above, no clear source of infection. CXR stable, UA unremarkable, C difficile negative. WBC decreasing, now normal      Generalized weakness  Physical deconditioning  Patient felt too weak to get up from the ground leading to admission. He  reports that overall he has felt a steady decline following his last discharge, rather than an abrupt decline. Declined TCU last admission. Suspect volume depletion from furosemide start and worsened diarrhea contributing as well. PT/OT and SW consulted. Planning to discharge to TCU for ongoing therapies.      History of urinary retention  Required Ellison catheter during last hospitalization. Reports he was urinating normally at home after removal. UA unremarkable. Urinating normally.       Severe pulmonary hypertension with a history of cor pulmonale.   Recent echocardiogram with RV systolic pressure of 57 plus right atrial pressure, estimated at 5-10, and RV systolic function moderately to severely reduced with a moderately dilated RV with trace to mild LV function of 55-60%. Followed by Dr. Rouse at the [facility]. Continue Adcirca 40 mg daily (recently increased as outpatient by Dr. Rouse), digoxin. Furosemide held during admission with concern for volume depletion. Plan to resume furosemide 20 mg every other day on discharge with close follow-up of symptoms and weights.      Hypertension  With hypertensive urgency during his last hospitalization. Continue prior to admission amlodipine, metoprolol, lisinopril.      Diabetes mellitus, type 2  Prior to admission on metformin. HgbA1c 8.1% 3/13/17. Discontinued metformin permanently given recurrent lactic acidosis. Started low dose glipizide. Monitor blood sugars and titrate up as needed.      Pulmonary fibrosis  COPD  Histiocytosis X  Chronic hypoxic respiratory failure  Followed by Dr. Kaba. On chronic prednisone 10 mg daily. No acute issues. Continue home oxygen prescription.     Depression.   Continue prior to admission fluoxetine.      Chronic gout  No acute joint complaints. Reports colchicine is for his underlying lung disease, not gout, although this has been resumed     Iron deficiency anemia  Baseline hemoglobin around 10 g/dl. He has  recently received IV iron. Ferritin 284 2/25/17. Hemoglobin decreased some during admission in absence of bleeding, likely some hemoconcentration from suspected dehydration on admission.      Chronic diarrhea  The patient has had an outpatient workup, including a colonoscopy. No specific relation to colchicine, had tolerated well prior to more recent issues with diarrhea. Some worsening from baseline reported on admission. C difficile negative. Subsequently improving. Continue loperamide PRN     Lobulated nodule of the lingula  He should have follow up scan 6 months from previous study.      Coronary artery disease  Denies chest pain. Continue aspirin, rosuvastatin, metoprolol XL     Pending Results   These results will be followed up by hospitalistist  Unresulted Labs Ordered in the Past 30 Days of this Admission     Date and Time Order Name Status Description    3/12/2017 1358 Blood culture Preliminary     3/12/2017 1358 Blood culture Preliminary           Code Status   Full Code       Primary Care Physician   Florencio Patricia    Physical Exam   Temp: 96.6  F (35.9  C) Temp src: Oral BP: 130/69 Pulse: 55 Heart Rate: 63 Resp: 18 SpO2: 92 % O2 Device: Nasal cannula Oxygen Delivery: 4 LPM  Vitals:    03/13/17 0618 03/14/17 0549   Weight: 57.1 kg (125 lb 14.1 oz) 61.6 kg (135 lb 12.9 oz)     Vital Signs with Ranges  Temp:  [96  F (35.6  C)-96.8  F (36  C)] 96.6  F (35.9  C)  Pulse:  [55] 55  Heart Rate:  [58-64] 63  Resp:  [16-18] 18  BP: (124-140)/(67-71) 130/69  SpO2:  [92 %-95 %] 92 %  I/O last 3 completed shifts:  In: 480 [P.O.:480]  Out: 150 [Urine:150]    Constitutional: Well-appearing, NAD  Respiratory: Clear to auscultation bilaterally, good air movement bilaterally  Cardiovascular: RRR, no m/r/g. No peripheral edema.  GI: Soft, non-tender, non-distended. BS normoactive.   Skin/Integumen: Warm, dry        Discharge Disposition   Discharged to transitional care unit  Condition at discharge:  Stable    Consultations This Hospital Stay   PHYSICAL THERAPY ADULT IP CONSULT  OCCUPATIONAL THERAPY ADULT IP CONSULT  SOCIAL WORK IP CONSULT  OCCUPATIONAL THERAPY ADULT IP CONSULT  PHYSICAL THERAPY ADULT IP CONSULT    Time Spent on this Encounter   I, Asia Sheth, personally saw the patient today and spent less than or equal to 30 minutes discharging this patient.    Discharge Orders     General info for SNF   Length of Stay Estimate: Short Term Care: Estimated # of Days <30  Condition at Discharge: Improving  Level of care:skilled   Rehabilitation Potential: Good  Admission H&P remains valid and up-to-date: Yes  Recent Chemotherapy: N/A  Use Nursing Home Standing Orders: Yes     Mantoux instructions   Give two-step Mantoux (PPD) Per Facility Policy Yes     Reason for your hospital stay   You were hospitalized for generalized weakness, likely a combination of deconditioning from your last hospitalization and dehydration.     Glucose monitor nursing POCT   Before meals and at bedtime     Daily weights   Call Provider for weight gain of more than 2 pounds per day or 5 pounds per week.     Follow Up and recommended labs and tests   Follow up with care home physician.  The following labs/tests are recommended: BMP in 2-3 days.     Activity - Up with nursing assistance     Full Code     Occupational Therapy Adult Consult   Evaluate and treat as clinically indicated.    Reason:  Physical deconditioning; functional immobility.     Physical Therapy Adult Consult   Evaluate and treat as clinically indicated.    Reason:  Physical deconditioning     Oxygen - Nasal cannula   4 Lpm by nasal cannula to keep O2 sats 92% or greater.     Advance Diet as Tolerated   Follow this diet upon discharge: Moderate Consistent CHO (4-6 CHO units/meal)       Discharge Medications   Current Discharge Medication List      START taking these medications    Details   loperamide (IMODIUM) 2 MG capsule Take 1 capsule (2 mg) by mouth 4  times daily as needed for diarrhea  Qty: 20 capsule    Associated Diagnoses: Chronic diarrhea      glipiZIDE (GLUCOTROL) 5 MG tablet Take 1 tablet (5 mg) by mouth every morning (before breakfast)  Qty: 30 tablet, Refills: 0    Associated Diagnoses: Type 2 diabetes mellitus without complication, without long-term current use of insulin (H)         CONTINUE these medications which have CHANGED    Details   furosemide (LASIX) 20 MG tablet Take 1 tablet (20 mg) by mouth every other day  Qty: 30 tablet, Refills: 1    Associated Diagnoses: Acute on chronic congestive heart failure, unspecified congestive heart failure type (H)         CONTINUE these medications which have NOT CHANGED    Details   tadalafil, PAH, (ADCIRCA) 20 MG TABS Take 2 tablets (40 mg) by mouth daily  Qty: 60 tablet, Refills: 3    Associated Diagnoses: Pulmonary hypertension (H)      tamsulosin (FLOMAX) 0.4 MG capsule Take 1 capsule (0.4 mg) by mouth daily  Qty: 30 capsule, Refills: 3    Associated Diagnoses: Benign prostatic hyperplasia with lower urinary tract symptoms, unspecified morphology      OMEPRAZOLE PO Take 40 mg by mouth daily      metoprolol (TOPROL-XL) 25 MG 24 hr tablet Take 1 tablet (25 mg) by mouth 2 times daily  Qty: 180 tablet, Refills: 0    Comments: Pt needs to make appt or obtain refill from his new cardiologist.  Associated Diagnoses: Pulmonary hypertension (H)      digoxin (LANOXIN) 125 MCG tablet Take 1 tablet (125 mcg) by mouth daily  Qty: 90 tablet, Refills: 3    Associated Diagnoses: Chronic systolic heart failure (H)      rosuvastatin (CRESTOR) 10 MG tablet Take 1 tablet (10 mg) by mouth daily  Qty: 90 tablet, Refills: 3    Associated Diagnoses: Hyperlipidemia LDL goal <70      levothyroxine (SYNTHROID) 100 MCG tablet Take 1 tablet (100 mcg) by mouth daily  Qty: 30 tablet, Refills: 11    Associated Diagnoses: Hyperthyroidism      FLUoxetine HCl (PROZAC PO) Take 40 mg by mouth daily      AMLODIPINE BESYLATE PO Take 2.5 mg by  mouth daily      LISINOPRIL PO Take 20 mg by mouth daily      PREDNISONE PO Take 10 mg by mouth daily      Colchicine (COLCRYS PO) Take 0.6 mg by mouth daily TAKES IN THE EVENING      ASPIRIN PO Take 81 mg by mouth At Bedtime         STOP taking these medications       METFORMIN HCL PO Comments:   Reason for Stopping:             Allergies   Allergies   Allergen Reactions     Tetracycline      Data   Most Recent 3 CBC's:  Recent Labs   Lab Test  03/14/17   0714  03/13/17   0735  03/12/17   1000   WBC  8.4  11.2*  15.1*   HGB  9.2*  10.9*  11.2*   MCV  81  82  82   PLT  157  182  196      Most Recent 3 BMP's:  Recent Labs   Lab Test  03/14/17   0714  03/13/17   0735  03/12/17   1000   NA  138  139  136   POTASSIUM  3.2*  3.8  4.0   CHLORIDE  103  102  99   CO2  26  28  22   BUN  10  10  11   CR  0.52*  0.48*  0.58*   ANIONGAP  9  9  15*   AV  7.8*  8.1*  8.0*   GLC  122*  108*  202*     Most Recent 2 LFT's:  Recent Labs   Lab Test  02/22/17   1932  02/07/17   1055   AST  18  15   ALT  15  15   ALKPHOS  58  39*   BILITOTAL  0.5  0.4     Most Recent INR's and Anticoagulation Dosing History:  Anticoagulation Dose History     Recent Dosing and Labs Latest Ref Rng & Units 10/26/2010 10/27/2010 10/28/2010 10/30/2010 11/4/2010 2/12/2014 2/22/2017    INR 0.86 - 1.14 1.23(H) 1.13 1.25(H) 1.21(H) 1.09 0.98 0.91        Most Recent 3 Troponin's:  Recent Labs   Lab Test  03/12/17   1000  02/23/17   0523  02/23/17   0115   TROPI  <0.015  The 99th percentile for upper reference range is 0.045 ug/L.  Troponin values in   the range of 0.045 - 0.120 ug/L may be associated with risks of adverse   clinical events.    0.024  0.038     Most Recent Cholesterol Panel:  Recent Labs   Lab Test  01/10/14   0000   LDL  72   HDL  63   TRIG  111     Most Recent 6 Bacteria Isolates From Any Culture (See EPIC Reports for Culture Details):  Recent Labs   Lab Test  03/12/17   1425  03/12/17   1421  02/23/17   1537  02/23/17   1200  02/22/17   2152   02/22/17   2006   CULT  No growth after 2 days  No growth after 2 days  No growth  Moderate growth Normal alexander  No growth  No growth     Most Recent TSH, T4 and A1c Labs:  Recent Labs   Lab Test  03/13/17   0735   02/22/17   1932   TSH   --    --   7.59*   T4   --    --   1.30   A1C  8.1*   < >   --     < > = values in this interval not displayed.     Results for orders placed or performed during the hospital encounter of 03/12/17   XR Chest 2 Views    Narrative    CHEST TWO VIEWS   3/12/2017 10:25 AM    HISTORY: Chest pain. Shortness of Breath.    COMPARISON: 2/22/2017.      Impression    IMPRESSION: Again seen is moderate interstitial prominence diffusely  throughout both lungs. This again likely represents pulmonary  fibrosis. The lungs are otherwise clear with no pulmonary  consolidation or mass demonstrated. No other abnormality is seen. I  see no definite change since the previous examination.     PEDRO LUIS DAS MD

## 2017-03-15 NOTE — PLAN OF CARE
Problem: Goal Outcome Summary  Goal: Goal Outcome Summary  Outcome: Adequate for Discharge Date Met:  03/15/17     Patient discharged at 1:42 PM to [place].  IV was discontinued. Pain at time of discharge was none. Belongings returned to patient.  Discharge instructions reviewed with patient.  Patient verbalized understanding and all questions were answered.  Prescriptions sent to TCU.  At time of discharge, patient condition was stable and left the unit escorted by [facility] transport via w/c.

## 2017-03-15 NOTE — PLAN OF CARE
Problem: Goal Outcome Summary  Goal: Goal Outcome Summary  PT: Pt busy with provider upon PT attempt. Noting pt with plans to discharge today at 1pm.     Physical Therapy Discharge Summary    Reason for therapy discharge:    Discharged to transitional care facility.    Progress towards therapy goal(s). See goals on Care Plan in [facility] electronic health record for goal details.  Goals not met.  Barriers to achieving goals:   discharge from facility.    Therapy recommendation(s):    Continued therapy is recommended.  Rationale/Recommendations:  to progress balance, safety, and independence with functional mobility.

## 2017-03-16 LAB — GLUCOSE BLDC GLUCOMTR-MCNC: 112 MG/DL (ref 70–99)

## 2017-03-18 LAB
BACTERIA SPEC CULT: NO GROWTH
BACTERIA SPEC CULT: NO GROWTH
Lab: NORMAL
Lab: NORMAL
MICRO REPORT STATUS: NORMAL
MICRO REPORT STATUS: NORMAL
SPECIMEN SOURCE: NORMAL
SPECIMEN SOURCE: NORMAL

## 2017-04-17 DIAGNOSIS — Z53.9 ERRONEOUS ENCOUNTER--DISREGARD: Primary | ICD-10-CM

## 2017-04-17 NOTE — PROGRESS NOTES
Herson Rouse M.D.  Cardiovascular Medicine    I personally saw and examined this patient, discussed care with housestaff and other consultants, reviewed current laboratories and imaging studies, and conveyed impression and diagnostic/therapeutic plan to patient.    Problem List  1. Chronic oxygen dependent respiratory failure  2. COLD  3. Histocytosis X  4. Pulmonary hypertensiob  5. Urinary retention  6. Depression  7. ASHD with stenting  8. Hypertension  9. Hyperlipidemia  10. Iron deficiency  11. Diarrhea  12. Diabetes    History    Patient returns for follow-up subsequent to recent hospitalizations.  C/o weakness, right sacro-iliac discomfort.    .    Objective  Wt Readings from Last 24 Encounters:   04/18/17 63.5 kg (140 lb)   03/15/17 62.7 kg (138 lb 3.2 oz)   02/25/17 61.7 kg (136 lb 0.4 oz)   02/21/17 62.1 kg (136 lb 12.8 oz)   02/07/17 61.4 kg (135 lb 4.8 oz)   05/27/16 63.5 kg (140 lb)   09/04/14 68.1 kg (150 lb 3.2 oz)   08/27/14 71.1 kg (156 lb 11.2 oz)   08/21/14 71.1 kg (156 lb 11.2 oz)   08/19/14 70.8 kg (156 lb)   08/14/14 70.6 kg (155 lb 9.6 oz)   08/12/14 70.3 kg (155 lb)   08/07/14 70.6 kg (155 lb 9.6 oz)   08/05/14 71 kg (156 lb 8 oz)   07/31/14 70.6 kg (155 lb 9.6 oz)   07/29/14 71.4 kg (157 lb 6.4 oz)   07/24/14 70.8 kg (156 lb)   07/22/14 70.9 kg (156 lb 3.2 oz)   07/17/14 71.2 kg (157 lb)   07/10/14 71.2 kg (157 lb)   07/08/14 71.2 kg (157 lb)   07/03/14 71.1 kg (156 lb 12.8 oz)   07/01/14 71.2 kg (157 lb)   06/26/14 71 kg (156 lb 9.6 oz)       Wt Readings from Last 5 Encounters:   03/15/17 62.7 kg (138 lb 3.2 oz)   02/25/17 61.7 kg (136 lb 0.4 oz)   02/21/17 62.1 kg (136 lb 12.8 oz)   02/07/17 61.4 kg (135 lb 4.8 oz)   05/27/16 63.5 kg (140 lb)       Meds    Current Outpatient Prescriptions on File Prior to Visit:  loperamide (IMODIUM) 2 MG capsule Take 1 capsule (2 mg) by mouth 4 times daily as needed for diarrhea   furosemide (LASIX) 20 MG tablet Take 1 tablet (20 mg) by mouth every  other day   glipiZIDE (GLUCOTROL) 5 MG tablet Take 1 tablet (5 mg) by mouth every morning (before breakfast)   tadalafil, PAH, (ADCIRCA) 20 MG TABS Take 2 tablets (40 mg) by mouth daily   tamsulosin (FLOMAX) 0.4 MG capsule Take 1 capsule (0.4 mg) by mouth daily   OMEPRAZOLE PO Take 40 mg by mouth daily   metoprolol (TOPROL-XL) 25 MG 24 hr tablet Take 1 tablet (25 mg) by mouth 2 times daily   digoxin (LANOXIN) 125 MCG tablet Take 1 tablet (125 mcg) by mouth daily   rosuvastatin (CRESTOR) 10 MG tablet Take 1 tablet (10 mg) by mouth daily   levothyroxine (SYNTHROID) 100 MCG tablet Take 1 tablet (100 mcg) by mouth daily   FLUoxetine HCl (PROZAC PO) Take 40 mg by mouth daily   AMLODIPINE BESYLATE PO Take 2.5 mg by mouth daily   LISINOPRIL PO Take 20 mg by mouth daily   PREDNISONE PO Take 10 mg by mouth daily   Colchicine (COLCRYS PO) Take 0.6 mg by mouth daily TAKES IN THE EVENING   ASPIRIN PO Take 81 mg by mouth At Bedtime     No current facility-administered medications on file prior to visit.     Labs  Results for KYLE JACOBSON (MRN [id number]) as of 4/18/2017 17:49   Ref. Range 3/15/2017 07:23 4/18/2017 09:43   Sodium Latest Ref Range: 133 - 144 mmol/L  136   Potassium Latest Ref Range: 3.4 - 5.3 mmol/L  4.3   Chloride Latest Ref Range: 94 - 109 mmol/L  101   Carbon Dioxide Latest Ref Range: 20 - 32 mmol/L  30   Urea Nitrogen Latest Ref Range: 7 - 30 mg/dL  15   Creatinine Latest Ref Range: 0.66 - 1.25 mg/dL  0.61 (L)   GFR Estimate Latest Ref Range: >60 mL/min/1.7m2  >90...   GFR Estimate If Black Latest Ref Range: >60 mL/min/1.7m2  >90...   Calcium Latest Ref Range: 8.5 - 10.1 mg/dL  8.2 (L)   Anion Gap Latest Ref Range: 3 - 14 mmol/L  5   Albumin Latest Ref Range: 3.4 - 5.0 g/dL  3.2 (L)   Protein Total Latest Ref Range: 6.8 - 8.8 g/dL  6.6 (L)   Bilirubin Total Latest Ref Range: 0.2 - 1.3 mg/dL  0.5   Alkaline Phosphatase Latest Ref Range: 40 - 150 U/L  95   ALT Latest Ref Range: 0 - 70 U/L  58   AST Latest Ref  Range: 0 - 45 U/L  24   Iron Latest Ref Range: 35 - 180 ug/dL  90   Iron Binding Cap Latest Ref Range: 240 - 430 ug/dL  395   Iron Saturation Index Latest Ref Range: 15 - 46 %  23   N-Terminal Pro Bnp Latest Ref Range: 0 - 125 pg/mL  489 (H)   Glucose Latest Ref Range: 70 - 99 mg/dL 112 (H) 173 (H)   WBC Latest Ref Range: 4.0 - 11.0 10e9/L  16.2 (H)   Hemoglobin Latest Ref Range: 13.3 - 17.7 g/dL  13.5   Hematocrit Latest Ref Range: 40.0 - 53.0 %  41.8   Platelet Count Latest Ref Range: 150 - 450 10e9/L  165   RBC Count Latest Ref Range: 4.4 - 5.9 10e12/L  4.88   MCV Latest Ref Range: 78 - 100 fl  86   MCH Latest Ref Range: 26.5 - 33.0 pg  27.7   MCHC Latest Ref Range: 31.5 - 36.5 g/dL  32.3   RDW Latest Ref Range: 10.0 - 15.0 %  22.5 (H)             Results for KYLE JACOBSON (MRN [id number]) as of 4/17/2017 14:23   Ref. Range 3/14/2017 07:14 3/14/2017 08:07   Sodium Latest Ref Range: 133 - 144 mmol/L 138    Potassium Latest Ref Range: 3.4 - 5.3 mmol/L 3.2 (L)    Chloride Latest Ref Range: 94 - 109 mmol/L 103    Carbon Dioxide Latest Ref Range: 20 - 32 mmol/L 26    Urea Nitrogen Latest Ref Range: 7 - 30 mg/dL 10    Creatinine Latest Ref Range: 0.66 - 1.25 mg/dL 0.52 (L)    GFR Estimate Latest Ref Range: >60 mL/min/1.7m2 >90...    GFR Estimate If Black Latest Ref Range: >60 mL/min/1.7m2 >90...    Calcium Latest Ref Range: 8.5 - 10.1 mg/dL 7.8 (L)    Anion Gap Latest Ref Range: 3 - 14 mmol/L 9    Glucose Latest Ref Range: 70 - 99 mg/dL 122 (H) 135 (H)   WBC Latest Ref Range: 4.0 - 11.0 10e9/L 8.4    Hemoglobin Latest Ref Range: 13.3 - 17.7 g/dL 9.2 (L)    Hematocrit Latest Ref Range: 40.0 - 53.0 % 29.1 (L)    Platelet Count Latest Ref Range: 150 - 450 10e9/L 157    RBC Count Latest Ref Range: 4.4 - 5.9 10e12/L 3.60 (L)    MCV Latest Ref Range: 78 - 100 fl 81    MCH Latest Ref Range: 26.5 - 33.0 pg 25.6 (L)    MCHC Latest Ref Range: 31.5 - 36.5 g/dL 31.6    RDW Latest Ref Range: 10.0 - 15.0 % 21.0 (H)        Imaging    HISTORY: Short of breath. Rule out pulmonary embolism.     TECHNIQUE: 57 mL Isovue-370. Radiation dose for this scan was reduced  using automated exposure control, adjustment of the mA and/or kV  according to patient size, or iterative reconstruction technique.     COMPARISON: CT scan 2/13/2012.     FINDINGS: There is severe centrilobular emphysema throughout the  upper and lower lobes, a bit worse than on the previous exam.  Superimposed from this there is gravity dependent density in the lower  lobes suggestive of pulmonary edema. Calcifications are seen in  coronary arteries, otherwise the heart appears normal.     There is a lobulated nodule in the lingula measuring 1.2 x 0.8 cm  diameter, increased in size compared with the 0.7 x 0.6 cm diameter on  the previous exam. This is noncalcified. No other pulmonary nodules  are seen.     There is no evidence of pulmonary embolism or aortic dissection. No  pleural effusion.     Gallstones and sludge are seen in the gallbladder. The ovoid  low-attenuation density high in the central aspect of the liver noted  previously measures 2.8-2.1 cm diameter, smaller than on exam of  7/24/2008. Etiology is unknown.         IMPRESSION:  1. No evidence of pulmonary embolism or aortic dissection.  2. Probable pulmonary edema superimposed on severe pulmonary  emphysema.  3. Increased size of a lobulated nodule in the lingula since the exam  in 2012. While this is relatively slow growth, a low-grade malignancy  cannot be completely excluded. Follow-up exam in 6 months is advised.  Alternatively, PET CT scan may be helpful to evaluate for activity in  the nodule.  4. Sludge and gallstones in the gallbladder.  5. Decreased size of the low-attenuation lesion high in the central  aspect of the liver, of indeterminate etiology.    [id number]  [facility]ly [facility], later [facility]  [id number]  [id number]^TOYA^LESIA^MEGAN        [facility]  [facility]chocardiography Laboratory  [address] [place] [address]  [facility] [address]  [place] [address]  Phone [phone]  Fax [phone]        Name: KYLE JACOBSON  MRN: [id number]  : 1945  Study Date: 02/15/2017 09:35 AM  Age: 72 yrs  Gender: Male  [place] [facility] [place] Study: Other secondary pulmonary hypertension  Ordering Physician: LESIA PITTMAN  Referring Physician: LESIA PITTMAN  Performed By: Reanna Chou RDCS     BSA: 1.7 m2  Height: 67 in  Weight: 135 lb  HR: 82  BP: 140/90 mmHg  _____________________________________________________________________________  __     Procedure  Complete Echo Adult.     _____________________________________________________________________________  __        Interpretation Summary     The right ventricular systolic pressure is approximated at 57mmHg plus the  right atrial pressure estimated at 5-10mmHg.  The right ventricular systolic function is moderate or moderate to severely  reduced. The right ventricle is mild to moderately or moderately dilated. The  right atrium is only mildly dilated. There is trace to mild tricuspid  regurgitation.  Left ventricular systolic function is borderline reduced. The visual ejection  fraction is estimated at 55-60%. There is mild concentric left ventricular  hypertrophy. The left ventricular cavity is small. Flattened septum is  consistent with RV pressure/volume overload.  Results are similar to the prior echo and the pulmonary pressure is mildly  less severe.  _____________________________________________________________________________  __        Left Ventricle  The left ventricular cavity is small. There is mild concentric left  ventricular hypertrophy. Left ventricular systolic function is borderline  reduced. The visual ejection fraction is estimated at 55-60%. The transmitral  spectral Doppler flow pattern is suggestive of impaired LV relaxation. E by E  prime ratio is greater than 15, that likely suggests increased left  ventricular filling  pressures. Flattened septum is consistent with RV  pressure/volume overload.     Right Ventricle  The right ventricle is mild to moderately dilated. There is mild right  ventricular hypertrophy. The right ventricular systolic function is moderate  to severely reduced.     Atria  Normal left atrial size. The right atrium is mildly dilated. There is no  atrial shunt seen.     Mitral Valve  There is trace mitral regurgitation.     Tricuspid Valve  The right ventricular systolic pressure is approximated at 57mmHg plus the  right atrial pressure. There is trace to mild tricuspid regurgitation. Normal  IVC (1.5-2.5cm) with >50% respiratory collapse; right atrial pressure is  estimated at 5-10mmHg.        Aortic Valve  No aortic regurgitation is present. No hemodynamically significant valvular  aortic stenosis.     Pulmonic Valve  There is trace pulmonic valvular regurgitation. There is no pulmonic valvular  stenosis.     Vessels  The aortic root is normal size.     Pericardium  The pericardium appears normal.     Rhythm  The rhythm was normal sinus.     _____________________________________________________________________________  __  MMode/2D Measurements & Calculations  IVSd: 1.2 cm  LVIDd: 3.9 cm  LVIDs: 2.4 cm  LVPWd: 1.1 cm  FS: 39.2 %  EDV(Teich): 67.2 ml  ESV(Teich): 20.0 ml  LV mass(C)d: 153.2 grams  Ao root diam: 3.2 cm  LA dimension: 3.4 cm     asc Aorta Diam: 3.0 cm  LA/Ao: 1.1  LVOT diam: 2.1 cm  LVOT area: 3.6 cm2  LA Volume (BP): 35.6 ml  LA Volume Index (BP): 20.8 ml/m2        Doppler Measurements & Calculations  MV E max valery: 53.9 cm/sec  MV A max valery: 123.0 cm/sec  MV E/A: 0.44  MV P1/2t max valery: 54.5 cm/sec  MV P1/2t: 92.8 msec  MVA(P1/2t): 2.4 cm2  MV dec slope: 171.9 cm/sec2  MV dec time: 0.31 sec  TR max valery: 378.6 cm/sec  TR max P.3 mmHg  Medial E/e': 17.7                     Assessment/Plan     Patient has chronic problems that are being address by rehabilitation services.  Cardiac status  appears stable.  He feels better on low dose Adcirca and will reduce to 20/day      Medication Changes:  Decrease Adcirca (tadalafil) to 20 mg (1 Tablet) Daily to see how you feel    Patient Instructions:  Use your Cane when you walk    Follow up Appointment Information:  We will call you with follow up after we review you labs.

## 2017-04-18 ENCOUNTER — OFFICE VISIT (OUTPATIENT)
Dept: CARDIOLOGY | Facility: CLINIC | Age: 72
End: 2017-04-18
Payer: MEDICARE

## 2017-04-18 VITALS
WEIGHT: 140 LBS | HEART RATE: 60 BPM | DIASTOLIC BLOOD PRESSURE: 64 MMHG | SYSTOLIC BLOOD PRESSURE: 136 MMHG | BODY MASS INDEX: 21.97 KG/M2 | HEIGHT: 67 IN | OXYGEN SATURATION: 86 %

## 2017-04-18 DIAGNOSIS — I50.23 ACUTE ON CHRONIC SYSTOLIC CONGESTIVE HEART FAILURE (H): ICD-10-CM

## 2017-04-18 DIAGNOSIS — I27.0 PRIMARY PULMONARY HYPERTENSION (H): Primary | ICD-10-CM

## 2017-04-18 DIAGNOSIS — I50.43 ACUTE ON CHRONIC COMBINED SYSTOLIC AND DIASTOLIC CONGESTIVE HEART FAILURE (H): ICD-10-CM

## 2017-04-18 DIAGNOSIS — R06.09 DYSPNEA ON EXERTION: ICD-10-CM

## 2017-04-18 DIAGNOSIS — I27.20 PULMONARY HYPERTENSION (H): ICD-10-CM

## 2017-04-18 LAB
ALBUMIN SERPL-MCNC: 3.2 G/DL (ref 3.4–5)
ALP SERPL-CCNC: 95 U/L (ref 40–150)
ALT SERPL W P-5'-P-CCNC: 58 U/L (ref 0–70)
ANION GAP SERPL CALCULATED.3IONS-SCNC: 5 MMOL/L (ref 3–14)
AST SERPL W P-5'-P-CCNC: 24 U/L (ref 0–45)
BILIRUB SERPL-MCNC: 0.5 MG/DL (ref 0.2–1.3)
BUN SERPL-MCNC: 15 MG/DL (ref 7–30)
CALCIUM SERPL-MCNC: 8.2 MG/DL (ref 8.5–10.1)
CHLORIDE SERPL-SCNC: 101 MMOL/L (ref 94–109)
CO2 SERPL-SCNC: 30 MMOL/L (ref 20–32)
CREAT SERPL-MCNC: 0.61 MG/DL (ref 0.66–1.25)
ERYTHROCYTE [DISTWIDTH] IN BLOOD BY AUTOMATED COUNT: 22.5 % (ref 10–15)
GFR SERPL CREATININE-BSD FRML MDRD: ABNORMAL ML/MIN/1.7M2
GLUCOSE SERPL-MCNC: 173 MG/DL (ref 70–99)
HCT VFR BLD AUTO: 41.8 % (ref 40–53)
HGB BLD-MCNC: 13.5 G/DL (ref 13.3–17.7)
IRON SATN MFR SERPL: 23 % (ref 15–46)
IRON SERPL-MCNC: 90 UG/DL (ref 35–180)
MCH RBC QN AUTO: 27.7 PG (ref 26.5–33)
MCHC RBC AUTO-ENTMCNC: 32.3 G/DL (ref 31.5–36.5)
MCV RBC AUTO: 86 FL (ref 78–100)
NT-PROBNP SERPL-MCNC: 489 PG/ML (ref 0–125)
PLATELET # BLD AUTO: 165 10E9/L (ref 150–450)
POTASSIUM SERPL-SCNC: 4.3 MMOL/L (ref 3.4–5.3)
PROT SERPL-MCNC: 6.6 G/DL (ref 6.8–8.8)
RBC # BLD AUTO: 4.88 10E12/L (ref 4.4–5.9)
SODIUM SERPL-SCNC: 136 MMOL/L (ref 133–144)
TIBC SERPL-MCNC: 395 UG/DL (ref 240–430)
WBC # BLD AUTO: 16.2 10E9/L (ref 4–11)

## 2017-04-18 PROCEDURE — 83550 IRON BINDING TEST: CPT | Performed by: INTERNAL MEDICINE

## 2017-04-18 PROCEDURE — 85027 COMPLETE CBC AUTOMATED: CPT | Performed by: INTERNAL MEDICINE

## 2017-04-18 PROCEDURE — 36415 COLL VENOUS BLD VENIPUNCTURE: CPT | Performed by: INTERNAL MEDICINE

## 2017-04-18 PROCEDURE — 83880 ASSAY OF NATRIURETIC PEPTIDE: CPT | Performed by: INTERNAL MEDICINE

## 2017-04-18 PROCEDURE — 99214 OFFICE O/P EST MOD 30 MIN: CPT | Performed by: INTERNAL MEDICINE

## 2017-04-18 PROCEDURE — 83540 ASSAY OF IRON: CPT | Performed by: INTERNAL MEDICINE

## 2017-04-18 PROCEDURE — 80053 COMPREHEN METABOLIC PANEL: CPT | Performed by: INTERNAL MEDICINE

## 2017-04-18 NOTE — MR AVS SNAPSHOT
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    For scheduling at [facility] please call [phone]  For scheduling at the [place] please call [phone]  We are located on the second floor [address] at the [facility].  Our address is     [address] [place] [address]   [address]  [place] [address]    Thank you for allowing us to be a part of your care here at the [facility]    If you have questions or concerns please contact us at:    Willow Chavez, RALPH      Nurse Coordinator       Pulmonary Hypertension     [facility]   (P)[phone]  (F)[phone]    ** Please note that you will NOT receive a reminder call regarding your scheduled testing, reminder calls are for provider appointments only.  If you are scheduled for testing within the [facility] system you may receive a call regarding pre-registration for billing purposes only.**     Remember to weigh yourself daily after voiding and before you consume any food or beverages and log the numbers.  If you have gained/lost 2 pounds overnight or 5 pounds in a week contact us immediately for medication adjustments or further instructions.        Follow-ups after your  "visit        Future tests that were ordered for you today     Open Future Orders        Priority Expected Expires Ordered    TSH with free T4 reflex Routine 2017    Iron and iron binding capacity Routine 2017    N terminal pro BNP outpatient Routine 2017    Comprehensive metabolic panel Routine 2017            Who to contact     If you have questions or need follow up information about today's clinic visit or your schedule please contact [facility] AT [place] directly at [phone].  Normal or non-critical lab and imaging results will be communicated to you by MyChart, letter or phone within 4 business days after the clinic has received the results. If you do not hear from us within 7 days, please contact the clinic through [facility] or phone. If you have a critical or abnormal lab result, we will notify you by phone as soon as possible.  Submit refill requests through [employer] or call your pharmacy and they will forward the refill request to us. Please allow 3 business days for your refill to be completed.          Additional Information About Your Visit        MyChart Information     [employer] lets you send messages to your doctor, view your test results, renew your prescriptions, schedule appointments and more. To sign up, go to [email or web address] . Click on \"Log in\" on the left side of the screen, which will take you to the Welcome page. Then click on \"Sign up Now\" on the right side of the page.     You will be asked to enter the access code listed below, as well as some personal information. Please follow the directions to create your username and password.     Your access code is: [email or web address]  Expires: 2017 11:41 AM     Your access code will  in 90 days. If you need help or a new code, please call your [place] clinic or [phone].        Care EveryWhere ID     " "This is your Care EveryWhere ID. This could be used by other organizations to access your [facility] medical records  [id number]        Your Vitals Were     Pulse Height Pulse Oximetry BMI (Body Mass Index)          60 1.702 m (5' 7\") 86% 21.93 kg/m2         Blood Pressure from Last 3 Encounters:   04/18/17 136/64   03/15/17 139/62   02/25/17 126/64    Weight from Last 3 Encounters:   04/18/17 63.5 kg (140 lb)   03/15/17 62.7 kg (138 lb 3.2 oz)   02/25/17 61.7 kg (136 lb 0.4 oz)              We Performed the Following     CBC with platelets differential          Today's Medication Changes          These changes are accurate as of: 4/18/17 11:41 AM.  If you have any questions, ask your nurse or doctor.               These medicines have changed or have updated prescriptions.        Dose/Directions    glipiZIDE 5 MG tablet   Commonly known as:  GLUCOTROL   This may have changed:  when to take this   Used for:  Type 2 diabetes mellitus without complication, without long-term current use of insulin (H)        Dose:  5 mg   Take 1 tablet (5 mg) by mouth every morning (before breakfast)   Quantity:  30 tablet   Refills:  0                Primary Care Provider Office Phone # Fax #    Florencio Patricia [phone]       [facility] [address]  [facility] [address]        Thank you!     Thank you for choosing [facility] [place]  for your care. Our goal is always to provide you with excellent care. Hearing back from our patients is one way we can continue to improve our services. Please take a few minutes to complete the written survey that you may receive in the mail after your visit with us. Thank you!             Your Updated Medication List - Protect others around you: Learn how to safely use, store and throw away your medicines at www.disposemymeds.org.          This list is accurate as of: 4/18/17 11:41 AM.  Always use your most recent med list.       "             Brand Name Dispense Instructions for use    AMLODIPINE BESYLATE PO      Take 2.5 mg by mouth daily       ASPIRIN PO      Take 81 mg by mouth At Bedtime       COLCRYS PO      Take 0.6 mg by mouth daily TAKES IN THE EVENING       digoxin 125 MCG tablet    LANOXIN    90 tablet    Take 1 tablet (125 mcg) by mouth daily       furosemide 20 MG tablet    LASIX    30 tablet    Take 1 tablet (20 mg) by mouth every other day       glipiZIDE 5 MG tablet    GLUCOTROL    30 tablet    Take 1 tablet (5 mg) by mouth every morning (before breakfast)       levothyroxine 100 MCG tablet    SYNTHROID    30 tablet    Take 1 tablet (100 mcg) by mouth daily       LISINOPRIL PO      Take 20 mg by mouth daily       loperamide 2 MG capsule    IMODIUM    20 capsule    Take 1 capsule (2 mg) by mouth 4 times daily as needed for diarrhea       metoprolol 25 MG 24 hr tablet    TOPROL-XL    180 tablet    Take 1 tablet (25 mg) by mouth 2 times daily       OMEPRAZOLE PO      Take 40 mg by mouth daily       PREDNISONE PO      Take 10 mg by mouth daily       PROZAC PO      Take 40 mg by mouth daily       rosuvastatin 10 MG tablet    CRESTOR    90 tablet    Take 1 tablet (10 mg) by mouth daily       tadalafil (PAH) 20 MG Tabs    ADCIRCA    60 tablet    Take 2 tablets (40 mg) by mouth daily       tamsulosin 0.4 MG capsule    FLOMAX    30 capsule    Take 1 capsule (0.4 mg) by mouth daily

## 2017-04-18 NOTE — NURSING NOTE
Med Reconcile: Reviewed and verified all current medications with the patient. The updated medication list was printed and given to the patient.  Return Appointment: Patient given instructions regarding scheduling next clinic visit. Patient demonstrated understanding of this information and agreed to call with further questions or concerns.  Medication Change: Patient was educated regarding prescribed medication change, including discussion of the indication, administration, side effects, and when to report to MD or RN. Patient demonstrated understanding of this information and agreed to call with further questions or concerns.  Patient stated he understood all health information given and agreed to call with further questions or concerns.     Medication Changes:  Decrease Adcirca (tadalafil) to 20 mg (1 Tablet) Daily to see how you feel    Patient Instructions:  Use your Cane when you walk    Follow up Appointment Information:  We will call you with follow up after we review you labs.

## 2017-04-18 NOTE — LETTER
4/18/2017    Florencio Patricia MD  [facility]   [address]  [facility] [address]    RE: Ravi Sandoval       Dear Colleague,    I had the pleasure of seeing Ravi CANTRELL Lori in the [facility].    Herson Rouse M.D.  Cardiovascular Medicine    I personally saw and examined this patient, discussed care with housestaff and other consultants, reviewed current laboratories and imaging studies, and conveyed impression and diagnostic/therapeutic plan to patient.    Problem List  1. Chronic oxygen dependent respiratory failure  2. COLD  3. Histocytosis X  4. Pulmonary hypertensiob  5. Urinary retention  6. Depression  7. ASHD with stenting  8. Hypertension  9. Hyperlipidemia  10. Iron deficiency  11. Diarrhea  12. Diabetes    History    Patient returns for follow-up subsequent to recent hospitalizations.  C/o weakness, right sacro-iliac discomfort.    .    Objective  Wt Readings from Last 24 Encounters:   04/18/17 63.5 kg (140 lb)   03/15/17 62.7 kg (138 lb 3.2 oz)   02/25/17 61.7 kg (136 lb 0.4 oz)   02/21/17 62.1 kg (136 lb 12.8 oz)   02/07/17 61.4 kg (135 lb 4.8 oz)   05/27/16 63.5 kg (140 lb)   09/04/14 68.1 kg (150 lb 3.2 oz)   08/27/14 71.1 kg (156 lb 11.2 oz)   08/21/14 71.1 kg (156 lb 11.2 oz)   08/19/14 70.8 kg (156 lb)   08/14/14 70.6 kg (155 lb 9.6 oz)   08/12/14 70.3 kg (155 lb)   08/07/14 70.6 kg (155 lb 9.6 oz)   08/05/14 71 kg (156 lb 8 oz)   07/31/14 70.6 kg (155 lb 9.6 oz)   07/29/14 71.4 kg (157 lb 6.4 oz)   07/24/14 70.8 kg (156 lb)   07/22/14 70.9 kg (156 lb 3.2 oz)   07/17/14 71.2 kg (157 lb)   07/10/14 71.2 kg (157 lb)   07/08/14 71.2 kg (157 lb)   07/03/14 71.1 kg (156 lb 12.8 oz)   07/01/14 71.2 kg (157 lb)   06/26/14 71 kg (156 lb 9.6 oz)       Wt Readings from Last 5 Encounters:   03/15/17 62.7 kg (138 lb 3.2 oz)   02/25/17 61.7 kg (136 lb 0.4 oz)   02/21/17 62.1 kg (136 lb 12.8 oz)   02/07/17 61.4 kg (135 lb 4.8 oz)   05/27/16 63.5 kg (140 lb)        Meds    Current Outpatient Prescriptions on File Prior to Visit:  loperamide (IMODIUM) 2 MG capsule Take 1 capsule (2 mg) by mouth 4 times daily as needed for diarrhea   furosemide (LASIX) 20 MG tablet Take 1 tablet (20 mg) by mouth every other day   glipiZIDE (GLUCOTROL) 5 MG tablet Take 1 tablet (5 mg) by mouth every morning (before breakfast)   tadalafil, PAH, (ADCIRCA) 20 MG TABS Take 2 tablets (40 mg) by mouth daily   tamsulosin (FLOMAX) 0.4 MG capsule Take 1 capsule (0.4 mg) by mouth daily   OMEPRAZOLE PO Take 40 mg by mouth daily   metoprolol (TOPROL-XL) 25 MG 24 hr tablet Take 1 tablet (25 mg) by mouth 2 times daily   digoxin (LANOXIN) 125 MCG tablet Take 1 tablet (125 mcg) by mouth daily   rosuvastatin (CRESTOR) 10 MG tablet Take 1 tablet (10 mg) by mouth daily   levothyroxine (SYNTHROID) 100 MCG tablet Take 1 tablet (100 mcg) by mouth daily   FLUoxetine HCl (PROZAC PO) Take 40 mg by mouth daily   AMLODIPINE BESYLATE PO Take 2.5 mg by mouth daily   LISINOPRIL PO Take 20 mg by mouth daily   PREDNISONE PO Take 10 mg by mouth daily   Colchicine (COLCRYS PO) Take 0.6 mg by mouth daily TAKES IN THE EVENING   ASPIRIN PO Take 81 mg by mouth At Bedtime     No current facility-administered medications on file prior to visit.     Labs  Results for KYLE JACOBSON (MRN [id number]) as of 4/18/2017 17:49   Ref. Range 3/15/2017 07:23 4/18/2017 09:43   Sodium Latest Ref Range: 133 - 144 mmol/L  136   Potassium Latest Ref Range: 3.4 - 5.3 mmol/L  4.3   Chloride Latest Ref Range: 94 - 109 mmol/L  101   Carbon Dioxide Latest Ref Range: 20 - 32 mmol/L  30   Urea Nitrogen Latest Ref Range: 7 - 30 mg/dL  15   Creatinine Latest Ref Range: 0.66 - 1.25 mg/dL  0.61 (L)   GFR Estimate Latest Ref Range: >60 mL/min/1.7m2  >90...   GFR Estimate If Black Latest Ref Range: >60 mL/min/1.7m2  >90...   Calcium Latest Ref Range: 8.5 - 10.1 mg/dL  8.2 (L)   Anion Gap Latest Ref Range: 3 - 14 mmol/L  5   Albumin Latest Ref Range: 3.4 -  5.0 g/dL  3.2 (L)   Protein Total Latest Ref Range: 6.8 - 8.8 g/dL  6.6 (L)   Bilirubin Total Latest Ref Range: 0.2 - 1.3 mg/dL  0.5   Alkaline Phosphatase Latest Ref Range: 40 - 150 U/L  95   ALT Latest Ref Range: 0 - 70 U/L  58   AST Latest Ref Range: 0 - 45 U/L  24   Iron Latest Ref Range: 35 - 180 ug/dL  90   Iron Binding Cap Latest Ref Range: 240 - 430 ug/dL  395   Iron Saturation Index Latest Ref Range: 15 - 46 %  23   N-Terminal Pro Bnp Latest Ref Range: 0 - 125 pg/mL  489 (H)   Glucose Latest Ref Range: 70 - 99 mg/dL 112 (H) 173 (H)   WBC Latest Ref Range: 4.0 - 11.0 10e9/L  16.2 (H)   Hemoglobin Latest Ref Range: 13.3 - 17.7 g/dL  13.5   Hematocrit Latest Ref Range: 40.0 - 53.0 %  41.8   Platelet Count Latest Ref Range: 150 - 450 10e9/L  165   RBC Count Latest Ref Range: 4.4 - 5.9 10e12/L  4.88   MCV Latest Ref Range: 78 - 100 fl  86   MCH Latest Ref Range: 26.5 - 33.0 pg  27.7   MCHC Latest Ref Range: 31.5 - 36.5 g/dL  32.3   RDW Latest Ref Range: 10.0 - 15.0 %  22.5 (H)             Results for KYLE JACOBSON (MRN [id number]) as of 4/17/2017 14:23   Ref. Range 3/14/2017 07:14 3/14/2017 08:07   Sodium Latest Ref Range: 133 - 144 mmol/L 138    Potassium Latest Ref Range: 3.4 - 5.3 mmol/L 3.2 (L)    Chloride Latest Ref Range: 94 - 109 mmol/L 103    Carbon Dioxide Latest Ref Range: 20 - 32 mmol/L 26    Urea Nitrogen Latest Ref Range: 7 - 30 mg/dL 10    Creatinine Latest Ref Range: 0.66 - 1.25 mg/dL 0.52 (L)    GFR Estimate Latest Ref Range: >60 mL/min/1.7m2 >90...    GFR Estimate If Black Latest Ref Range: >60 mL/min/1.7m2 >90...    Calcium Latest Ref Range: 8.5 - 10.1 mg/dL 7.8 (L)    Anion Gap Latest Ref Range: 3 - 14 mmol/L 9    Glucose Latest Ref Range: 70 - 99 mg/dL 122 (H) 135 (H)   WBC Latest Ref Range: 4.0 - 11.0 10e9/L 8.4    Hemoglobin Latest Ref Range: 13.3 - 17.7 g/dL 9.2 (L)    Hematocrit Latest Ref Range: 40.0 - 53.0 % 29.1 (L)    Platelet Count Latest Ref Range: 150 - 450 10e9/L 157    RBC Count  Latest Ref Range: 4.4 - 5.9 10e12/L 3.60 (L)    MCV Latest Ref Range: 78 - 100 fl 81    MCH Latest Ref Range: 26.5 - 33.0 pg 25.6 (L)    MCHC Latest Ref Range: 31.5 - 36.5 g/dL 31.6    RDW Latest Ref Range: 10.0 - 15.0 % 21.0 (H)        Imaging   HISTORY: Short of breath. Rule out pulmonary embolism.     TECHNIQUE: 57 mL [id number]. Radiation dose for this scan was reduced  using automated exposure control, adjustment of the mA and/or kV  according to patient size, or iterative reconstruction technique.     COMPARISON: CT scan 2/13/2012.     FINDINGS: There is severe centrilobular emphysema throughout the  upper and lower lobes, a bit worse than on the previous exam.  Superimposed from this there is gravity dependent density in the lower  lobes suggestive of pulmonary edema. Calcifications are seen in  coronary arteries, otherwise the heart appears normal.     There is a lobulated nodule in the lingula measuring 1.2 x 0.8 cm  diameter, increased in size compared with the 0.7 x 0.6 cm diameter on  the previous exam. This is noncalcified. No other pulmonary nodules  are seen.     There is no evidence of pulmonary embolism or aortic dissection. No  pleural effusion.     Gallstones and sludge are seen in the gallbladder. The ovoid  low-attenuation density high in the central aspect of the liver noted  previously measures 2.8-2.1 cm diameter, smaller than on exam of  7/24/2008. Etiology is unknown.         IMPRESSION:  1. No evidence of pulmonary embolism or aortic dissection.  2. Probable pulmonary edema superimposed on severe pulmonary  emphysema.  3. Increased size of a lobulated nodule in the lingula since the exam  in 2012. While this is relatively slow growth, a low-grade malignancy  cannot be completely excluded. Follow-up exam in 6 months is advised.  Alternatively, PET CT scan may be helpful to evaluate for activity in  the nodule.  4. Sludge and gallstones in the gallbladder.  5. Decreased size of the  low-attenuation lesion high in the central  aspect of the liver, of indeterminate etiology.    [id number]  [facility]  [id number]  [id number]^TOYA^LESIA^MEGAN        [facility]  [facility]  Echocardiography Laboratory  [address]  [address]  [place] [address]  Phone [phone]  Fax [phone]        Name: KYLE JACOBSON  MRN: [id number]  : 1945  Study Date: 02/15/2017 09:35 AM  Age: 72 yrs  Gender: Male  Patient Location: [facility]  Reason For Study: Other secondary pulmonary hypertension  Ordering Physician: LESIA PITTMAN  Referring Physician: LESIA PITTMAN  Performed By: Reanna Chou RDCS     BSA: 1.7 m2  Height: 67 in  Weight: 135 lb  HR: 82  BP: 140/90 mmHg  _____________________________________________________________________________  __     Procedure  Complete Echo Adult.     _____________________________________________________________________________  __        Interpretation Summary     The right ventricular systolic pressure is approximated at 57mmHg plus the  right atrial pressure estimated at 5-10mmHg.  The right ventricular systolic function is moderate or moderate to severely  reduced. The right ventricle is mild to moderately or moderately dilated. The  right atrium is only mildly dilated. There is trace to mild tricuspid  regurgitation.  Left ventricular systolic function is borderline reduced. The visual ejection  fraction is estimated at 55-60%. There is mild concentric left ventricular  hypertrophy. The left ventricular cavity is small. Flattened septum is  consistent with RV pressure/volume overload.  Results are similar to the prior echo and the pulmonary pressure is mildly  less severe.  _____________________________________________________________________________  __        Left Ventricle  The left ventricular cavity is small. There is mild concentric left  ventricular hypertrophy. Left ventricular systolic function is  borderline  reduced. The visual ejection fraction is estimated at 55-60%. The transmitral  spectral Doppler flow pattern is suggestive of impaired LV relaxation. E by E  prime ratio is greater than 15, that likely suggests increased left  ventricular filling pressures. Flattened septum is consistent with RV  pressure/volume overload.     Right Ventricle  The right ventricle is mild to moderately dilated. There is mild right  ventricular hypertrophy. The right ventricular systolic function is moderate  to severely reduced.     Atria  Normal left atrial size. The right atrium is mildly dilated. There is no  atrial shunt seen.     Mitral Valve  There is trace mitral regurgitation.     Tricuspid Valve  The right ventricular systolic pressure is approximated at 57mmHg plus the  right atrial pressure. There is trace to mild tricuspid regurgitation. Normal  IVC (1.5-2.5cm) with >50% respiratory collapse; right atrial pressure is  estimated at 5-10mmHg.        Aortic Valve  No aortic regurgitation is present. No hemodynamically significant valvular  aortic stenosis.     Pulmonic Valve  There is trace pulmonic valvular regurgitation. There is no pulmonic valvular  stenosis.     Vessels  The aortic root is normal size.     Pericardium  The pericardium appears normal.     Rhythm  The rhythm was normal sinus.     _____________________________________________________________________________  __  MMode/2D Measurements & Calculations  IVSd: 1.2 cm  LVIDd: 3.9 cm  LVIDs: 2.4 cm  LVPWd: 1.1 cm  FS: 39.2 %  EDV(Teich): 67.2 ml  ESV(Teich): 20.0 ml  LV mass(C)d: 153.2 grams  Ao root diam: 3.2 cm  LA dimension: 3.4 cm     asc Aorta Diam: 3.0 cm  LA/Ao: 1.1  LVOT diam: 2.1 cm  LVOT area: 3.6 cm2  LA Volume (BP): 35.6 ml  LA Volume Index (BP): 20.8 ml/m2        Doppler Measurements & Calculations  MV E max valery: 53.9 cm/sec  MV A max valery: 123.0 cm/sec  MV E/A: 0.44  MV P1/2t max valery: 54.5 cm/sec  MV P1/2t: 92.8 msec  MVA(P1/2t): 2.4  cm2  MV dec slope: 171.9 cm/sec2  MV dec time: 0.31 sec  TR max valery: 378.6 cm/sec  TR max P.3 mmHg  Medial E/e': 17.7                     Assessment/Plan     Patient has chronic problems that are being address by rehabilitation services.  Cardiac status appears stable.  He feels better on low dose Adcirca and will reduce to 20/day    Medication Changes:  Decrease Adcirca (tadalafil) to 20 mg (1 Tablet) Daily to see how you feel    Patient Instructions:  Use your Cane when you walk    Follow up Appointment Information:  We will call you with follow up after we review you labs.    Thank you for allowing me to participate in the care of your patient.    Sincerely,     Herson Rouse MD     [facility]

## 2017-04-18 NOTE — PATIENT INSTRUCTIONS
Medication Changes:  Decrease Adcirca (tadalafil) to 20 mg (1 Tablet) Daily to see how you feel    Patient Instructions:  Use your Cane when you walk    Follow up Appointment Information:  We will call you with follow up after we review you labs.    For scheduling at [facility] please call [phone]  For scheduling at the [facility] please call [phone]  We are located on the second floor [address] at the [facility].  Our address is     [address]   [address]  [place]  [address]    Thank you for allowing us to be a part of your care here at the [facility]    If you have questions or concerns please contact us at:    Willow Chavez RN      Nurse Coordinator       Pulmonary Hypertension     [facility]   [phone])[phone]  (F)[phone]    ** Please note that you will NOT receive a reminder call regarding your scheduled testing, reminder calls are for provider appointments only.  If you are scheduled for testing within the [facility] system you may receive a call regarding pre-registration for billing purposes only.**     Remember to weigh yourself daily after voiding and before you consume any food or beverages and log the numbers.  If you have gained/lost 2 pounds overnight or 5 pounds in a week contact us immediately for medication adjustments or further instructions.

## 2017-05-16 ENCOUNTER — TRANSFERRED RECORDS (OUTPATIENT)
Dept: HEALTH INFORMATION MANAGEMENT | Facility: CLINIC | Age: 72
End: 2017-05-16

## 2017-05-30 RX ORDER — BROMFENAC SODIUM 0.7 MG/ML
1 SOLUTION/ DROPS OPHTHALMIC
COMMUNITY
End: 2018-01-01

## 2017-05-31 ENCOUNTER — ANESTHESIA EVENT (OUTPATIENT)
Dept: SURGERY | Facility: CLINIC | Age: 72
End: 2017-05-31
Payer: MEDICARE

## 2017-05-31 ENCOUNTER — HOSPITAL ENCOUNTER (OUTPATIENT)
Facility: CLINIC | Age: 72
Discharge: HOME | End: 2017-05-31
Attending: OPHTHALMOLOGY | Admitting: OPHTHALMOLOGY
Payer: MEDICARE

## 2017-05-31 ENCOUNTER — ANESTHESIA (OUTPATIENT)
Dept: SURGERY | Facility: CLINIC | Age: 72
End: 2017-05-31
Payer: MEDICARE

## 2017-05-31 VITALS
WEIGHT: 142 LBS | SYSTOLIC BLOOD PRESSURE: 139 MMHG | OXYGEN SATURATION: 94 % | TEMPERATURE: 97.6 F | BODY MASS INDEX: 22.29 KG/M2 | DIASTOLIC BLOOD PRESSURE: 79 MMHG | HEIGHT: 67 IN | RESPIRATION RATE: 14 BRPM

## 2017-05-31 LAB — GLUCOSE BLDC GLUCOMTR-MCNC: 140 MG/DL (ref 70–99)

## 2017-05-31 PROCEDURE — 37000008 ZZH ANESTHESIA TECHNICAL FEE, 1ST 30 MIN: Performed by: OPHTHALMOLOGY

## 2017-05-31 PROCEDURE — 25000128 H RX IP 250 OP 636: Performed by: OPHTHALMOLOGY

## 2017-05-31 PROCEDURE — 25000128 H RX IP 250 OP 636: Performed by: NURSE ANESTHETIST, CERTIFIED REGISTERED

## 2017-05-31 PROCEDURE — 27210794 ZZH OR GENERAL SUPPLY STERILE: Performed by: OPHTHALMOLOGY

## 2017-05-31 PROCEDURE — 40000170 ZZH STATISTIC PRE-PROCEDURE ASSESSMENT II: Performed by: OPHTHALMOLOGY

## 2017-05-31 PROCEDURE — 25000132 ZZH RX MED GY IP 250 OP 250 PS 637: Mod: GY | Performed by: OPHTHALMOLOGY

## 2017-05-31 PROCEDURE — 71000028 ZZH EYE RECOVERY PHASE 2 EACH 15 MINS: Performed by: OPHTHALMOLOGY

## 2017-05-31 PROCEDURE — 25000128 H RX IP 250 OP 636: Performed by: ANESTHESIOLOGY

## 2017-05-31 PROCEDURE — 25000125 ZZHC RX 250: Performed by: NURSE ANESTHETIST, CERTIFIED REGISTERED

## 2017-05-31 PROCEDURE — 25000125 ZZHC RX 250: Performed by: ANESTHESIOLOGY

## 2017-05-31 PROCEDURE — 25000125 ZZHC RX 250: Performed by: OPHTHALMOLOGY

## 2017-05-31 PROCEDURE — V2632 POST CHMBR INTRAOCULAR LENS: HCPCS | Performed by: OPHTHALMOLOGY

## 2017-05-31 PROCEDURE — 36000101 ZZH EYE SURGERY LEVEL 3 1ST 30 MIN: Performed by: OPHTHALMOLOGY

## 2017-05-31 PROCEDURE — 82962 GLUCOSE BLOOD TEST: CPT

## 2017-05-31 DEVICE — EYE IMP IOL AMO PCL TECNIS ZCB00 15.0: Type: IMPLANTABLE DEVICE | Site: EYE | Status: FUNCTIONAL

## 2017-05-31 RX ORDER — ALBUTEROL SULFATE 0.83 MG/ML
2.5 SOLUTION RESPIRATORY (INHALATION) EVERY 4 HOURS PRN
Status: DISCONTINUED | OUTPATIENT
Start: 2017-05-31 | End: 2017-05-31 | Stop reason: HOSPADM

## 2017-05-31 RX ORDER — TROPICAMIDE 10 MG/ML
1 SOLUTION/ DROPS OPHTHALMIC
Status: COMPLETED | OUTPATIENT
Start: 2017-05-31 | End: 2017-05-31

## 2017-05-31 RX ORDER — BALANCED SALT SOLUTION 6.4; .75; .48; .3; 3.9; 1.7 MG/ML; MG/ML; MG/ML; MG/ML; MG/ML; MG/ML
SOLUTION OPHTHALMIC PRN
Status: DISCONTINUED | OUTPATIENT
Start: 2017-05-31 | End: 2017-05-31 | Stop reason: HOSPADM

## 2017-05-31 RX ORDER — NALOXONE HYDROCHLORIDE 0.4 MG/ML
.1-.4 INJECTION, SOLUTION INTRAMUSCULAR; INTRAVENOUS; SUBCUTANEOUS
Status: DISCONTINUED | OUTPATIENT
Start: 2017-05-31 | End: 2017-05-31 | Stop reason: HOSPADM

## 2017-05-31 RX ORDER — PROPARACAINE HYDROCHLORIDE 5 MG/ML
1 SOLUTION/ DROPS OPHTHALMIC ONCE
Status: DISCONTINUED | OUTPATIENT
Start: 2017-05-31 | End: 2017-05-31 | Stop reason: HOSPADM

## 2017-05-31 RX ORDER — SODIUM CHLORIDE, SODIUM LACTATE, POTASSIUM CHLORIDE, CALCIUM CHLORIDE 600; 310; 30; 20 MG/100ML; MG/100ML; MG/100ML; MG/100ML
500 INJECTION, SOLUTION INTRAVENOUS CONTINUOUS
Status: DISCONTINUED | OUTPATIENT
Start: 2017-05-31 | End: 2017-05-31 | Stop reason: HOSPADM

## 2017-05-31 RX ORDER — LIDOCAINE HYDROCHLORIDE 10 MG/ML
INJECTION, SOLUTION EPIDURAL; INFILTRATION; INTRACAUDAL; PERINEURAL PRN
Status: DISCONTINUED | OUTPATIENT
Start: 2017-05-31 | End: 2017-05-31 | Stop reason: HOSPADM

## 2017-05-31 RX ORDER — ONDANSETRON 4 MG/1
4 TABLET, ORALLY DISINTEGRATING ORAL EVERY 30 MIN PRN
Status: DISCONTINUED | OUTPATIENT
Start: 2017-05-31 | End: 2017-05-31 | Stop reason: HOSPADM

## 2017-05-31 RX ORDER — LIDOCAINE 40 MG/G
CREAM TOPICAL
Status: DISCONTINUED | OUTPATIENT
Start: 2017-05-31 | End: 2017-05-31 | Stop reason: HOSPADM

## 2017-05-31 RX ORDER — PROPARACAINE HYDROCHLORIDE 5 MG/ML
1 SOLUTION/ DROPS OPHTHALMIC ONCE
Status: COMPLETED | OUTPATIENT
Start: 2017-05-31 | End: 2017-05-31

## 2017-05-31 RX ORDER — HYDRALAZINE HYDROCHLORIDE 20 MG/ML
2.5-5 INJECTION INTRAMUSCULAR; INTRAVENOUS EVERY 10 MIN PRN
Status: DISCONTINUED | OUTPATIENT
Start: 2017-05-31 | End: 2017-05-31 | Stop reason: HOSPADM

## 2017-05-31 RX ORDER — SODIUM CHLORIDE, SODIUM LACTATE, POTASSIUM CHLORIDE, CALCIUM CHLORIDE 600; 310; 30; 20 MG/100ML; MG/100ML; MG/100ML; MG/100ML
INJECTION, SOLUTION INTRAVENOUS CONTINUOUS
Status: DISCONTINUED | OUTPATIENT
Start: 2017-05-31 | End: 2017-05-31 | Stop reason: HOSPADM

## 2017-05-31 RX ORDER — FENTANYL CITRATE 50 UG/ML
25-50 INJECTION, SOLUTION INTRAMUSCULAR; INTRAVENOUS
Status: DISCONTINUED | OUTPATIENT
Start: 2017-05-31 | End: 2017-05-31 | Stop reason: HOSPADM

## 2017-05-31 RX ORDER — MEPERIDINE HYDROCHLORIDE 25 MG/ML
12.5 INJECTION INTRAMUSCULAR; INTRAVENOUS; SUBCUTANEOUS
Status: DISCONTINUED | OUTPATIENT
Start: 2017-05-31 | End: 2017-05-31 | Stop reason: HOSPADM

## 2017-05-31 RX ORDER — MOXIFLOXACIN 5 MG/ML
1 SOLUTION/ DROPS OPHTHALMIC
Status: COMPLETED | OUTPATIENT
Start: 2017-05-31 | End: 2017-05-31

## 2017-05-31 RX ORDER — DICLOFENAC SODIUM 1 MG/ML
1 SOLUTION/ DROPS OPHTHALMIC
Status: COMPLETED | OUTPATIENT
Start: 2017-05-31 | End: 2017-05-31

## 2017-05-31 RX ORDER — ONDANSETRON 2 MG/ML
INJECTION INTRAMUSCULAR; INTRAVENOUS PRN
Status: DISCONTINUED | OUTPATIENT
Start: 2017-05-31 | End: 2017-05-31

## 2017-05-31 RX ORDER — PHENYLEPHRINE HYDROCHLORIDE 25 MG/ML
1 SOLUTION/ DROPS OPHTHALMIC
Status: COMPLETED | OUTPATIENT
Start: 2017-05-31 | End: 2017-05-31

## 2017-05-31 RX ORDER — ONDANSETRON 2 MG/ML
4 INJECTION INTRAMUSCULAR; INTRAVENOUS EVERY 30 MIN PRN
Status: DISCONTINUED | OUTPATIENT
Start: 2017-05-31 | End: 2017-05-31 | Stop reason: HOSPADM

## 2017-05-31 RX ORDER — KETOROLAC TROMETHAMINE 5 MG/ML
SOLUTION OPHTHALMIC PRN
Status: DISCONTINUED | OUTPATIENT
Start: 2017-05-31 | End: 2017-05-31 | Stop reason: HOSPADM

## 2017-05-31 RX ADMIN — DICLOFENAC SODIUM 1 DROP: 1 SOLUTION/ DROPS OPHTHALMIC at 08:34

## 2017-05-31 RX ADMIN — ONDANSETRON 4 MG: 2 INJECTION INTRAMUSCULAR; INTRAVENOUS at 10:09

## 2017-05-31 RX ADMIN — TROPICAMIDE 1 DROP: 10 SOLUTION/ DROPS OPHTHALMIC at 08:46

## 2017-05-31 RX ADMIN — MOXIFLOXACIN HYDROCHLORIDE 1 DROP: 5 SOLUTION/ DROPS OPHTHALMIC at 08:33

## 2017-05-31 RX ADMIN — LIDOCAINE HYDROCHLORIDE 1 ML: 10 INJECTION, SOLUTION EPIDURAL; INFILTRATION; INTRACAUDAL; PERINEURAL at 08:46

## 2017-05-31 RX ADMIN — DICLOFENAC SODIUM 1 DROP: 1 SOLUTION/ DROPS OPHTHALMIC at 08:45

## 2017-05-31 RX ADMIN — MOXIFLOXACIN HYDROCHLORIDE 1 DROP: 5 SOLUTION/ DROPS OPHTHALMIC at 08:48

## 2017-05-31 RX ADMIN — PHENYLEPHRINE HYDROCHLORIDE 1 DROP: 2.5 SOLUTION/ DROPS OPHTHALMIC at 08:48

## 2017-05-31 RX ADMIN — PHENYLEPHRINE HYDROCHLORIDE 1 DROP: 2.5 SOLUTION/ DROPS OPHTHALMIC at 08:45

## 2017-05-31 RX ADMIN — MOXIFLOXACIN HYDROCHLORIDE 1 DROP: 5 SOLUTION/ DROPS OPHTHALMIC at 08:45

## 2017-05-31 RX ADMIN — SODIUM CHLORIDE, POTASSIUM CHLORIDE, SODIUM LACTATE AND CALCIUM CHLORIDE 500 ML: 600; 310; 30; 20 INJECTION, SOLUTION INTRAVENOUS at 08:47

## 2017-05-31 RX ADMIN — DEXMEDETOMIDINE HYDROCHLORIDE 4 MCG: 100 INJECTION, SOLUTION INTRAVENOUS at 10:03

## 2017-05-31 RX ADMIN — PHENYLEPHRINE HYDROCHLORIDE 1 DROP: 2.5 SOLUTION/ DROPS OPHTHALMIC at 08:33

## 2017-05-31 RX ADMIN — PROPARACAINE HYDROCHLORIDE 1 DROP: 5 SOLUTION/ DROPS OPHTHALMIC at 08:33

## 2017-05-31 RX ADMIN — TROPICAMIDE 1 DROP: 10 SOLUTION/ DROPS OPHTHALMIC at 08:47

## 2017-05-31 RX ADMIN — MIDAZOLAM HYDROCHLORIDE 0.5 MG: 1 INJECTION, SOLUTION INTRAMUSCULAR; INTRAVENOUS at 10:09

## 2017-05-31 RX ADMIN — DICLOFENAC SODIUM 1 DROP: 1 SOLUTION/ DROPS OPHTHALMIC at 08:48

## 2017-05-31 RX ADMIN — TROPICAMIDE 1 DROP: 10 SOLUTION/ DROPS OPHTHALMIC at 08:33

## 2017-05-31 RX ADMIN — DEXMEDETOMIDINE HYDROCHLORIDE 4 MCG: 100 INJECTION, SOLUTION INTRAVENOUS at 10:07

## 2017-05-31 ASSESSMENT — COPD QUESTIONNAIRES
COPD: 1
CAT_SEVERITY: SEVERE

## 2017-05-31 NOTE — IP AVS SNAPSHOT
MRN:[id number]                      After Visit Summary   5/31/2017    Ravi Sandoval    MRN: [id number]           Thank you!     Thank you for choosing [facility] for your care. Our goal is always to provide you with excellent care. Hearing back from our patients is one way we can continue to improve our services. Please take a few minutes to complete the written survey that you may receive in the mail after you visit with us. Thank you!        Patient Information     Date Of Birth          1945        About your hospital stay     You were admitted on:  May 31, 2017 You last received care in the:  [facility]    You were discharged on:  May 31, 2017       Who to Call     For medical emergencies, please call 911.  For non-urgent questions about your medical care, please call your primary care provider or clinic, [phone]  For questions related to your surgery, please call your surgery clinic        Attending Provider     Provider Specialty    Carolyn Wood MD Ophthalmology       Primary Care Provider Office Phone # Fax #    Florencio Carmelo Patricia [phone]      Your next 10 appointments already scheduled     Jun 07, 2017   Procedure with Carolyn Wood MD   [facility] (--)    [address] [place] [address] [place], [address]  [facility] [address] [phone]              Further instructions from your care team           POST-OPERATIVE CARE FOLLOWING CATARACT SURGERY    DR. CAROLYN WOOD  [facility]  [phone]    Postoperative medications: After surgery, you will use several different eye drop medications. You may have either the brand specific form or generic of each type used.     Begin your eye drops today as directed.  You should instill the drop, close the eye without blinking and keep closed for 3 minutes allowing the drop to absorb.  It is fine to instill all 3 of the drops consecutively, waiting the 3 minutes in between each  one. Place the shield for sleep.  In the morning, instill 1 drop of all 3 eye drops before your post-op appointment.      Antibiotic  Moxeza - is an antibiotic drop that is used to minimize the risk of infection. Instill your first drop at bedtime tonight, then 2 times daily for one week.       -OR-    Ofloxacin - this generic antibiotic is to be used 4 times a day for one week, you can start using this drop after you get home, instilling 3 separate times on the day of surgery and then 4 times a day there after.     Anti-inflammatory   Prolensa - use it once daily until gone, beginning today.    -OR-    Ketorolac -this generic drop should be used 4 times daily until gone. You can start your drops when you get home, instilling 3 separate times on the day of surgery.     Steroid  Durezol - is a steroid drop used to minimize inflammation and modulate healing.  It should be used 2 times daily until gone, beginning with your first drop at bedtime tonight.    -OR-    Prednisolone - the generic form should be used 4 times daily for 3 weeks or until gone. You can start your first drop after you get home, instilling 3 separate times on the day of surgery.        Do not rub the operated eye. Wear the eye shield during sleeping hours for one week.      Light sensitivity may be noticed. Sunglasses may be worn for comfort.      Some discomfort and irritation may be noticed. Acetaminophen (Tylenol) or Ibuprofen (Advil) may be taken for discomfort.      Avoid bending over, strenuous activity or heavy lifting for one week.      Keep the operated eye dry.      You may wash your hair, bathe or shower, but keep the operated eye closed while doing so.      Use medication exactly as prescribed by your doctor.  You may restart your regular home medications.      Bring all materials and medications to the clinic on your first post-operative visit.      Call the doctor s office if any of the following should occur:  -  any sudden vision  "change  -  nausea or severe headache  -  increase in pain not controlled  -  increased amount of floaters (black spots in front of vision)         -  or signs of infection (pus, increasing redness or tenderness)    [facility] Discharge  Instructions  Anesthesia (Eye Care Center)   Adult Discharge Instructions    For 24 hours after surgery    1. Get plenty of rest.  Make arrangements to have a responsible adult stay with you for at least 6 hours after you leave the hospital.  2. Do not drive or use heavy equipment for 24 hours.    3. Do not drink alcohol for 24 hours.  4. Do not sign legal documents or make important decisions for 24 hours.  5. Avoid strenuous or risky activities. You may feel lightheaded.  If so, sit for a few minutes before standing.  Have someone help you get up.   6. Conscious sedation patients may resume a regular diet..  7. Any questions of medical nature, call your physician.  Revised 12/10/2015    Pending Results     No orders found from 5/29/2017 to 6/1/2017.            Admission Information     Date & Time Provider Department Dept. Phone    5/31/2017 Trever Delgado MD [facility] [place] [phone]      Your Vitals Were     Blood Pressure Temperature Respirations Height Weight Pulse Oximetry    115/94 97.6  F (36.4  C) (Temporal) 16 1.702 m (5' 7\") 64.4 kg (142 lb) 95%    BMI (Body Mass Index)                   22.24 kg/m2           MyChart Information     [employer] gives you secure access to your electronic health record. If you see a primary care provider, you can also send messages to your care team and make appointments. If you have questions, please call your primary care clinic.  If you do not have a primary care provider, please call [phone] and they will assist you.        Care EveryWhere ID     This is your Care EveryWhere ID. This could be used by other organizations to access your [facility] medical records  [id number]         "   Review of your medicines      UNREVIEWED medicines. Ask your doctor about these medicines        Dose / Directions    AMLODIPINE BESYLATE PO        Dose:  2.5 mg   Take 2.5 mg by mouth daily   Refills:  0       ASPIRIN PO        Dose:  81 mg   Take 81 mg by mouth At Bedtime   Refills:  0       COLCRYS PO        Dose:  0.6 mg   Take 0.6 mg by mouth daily TAKES IN THE EVENING   Refills:  0       digoxin 125 MCG tablet   Commonly known as:  LANOXIN   Used for:  Chronic systolic heart failure (H)        Dose:  125 mcg   Take 1 tablet (125 mcg) by mouth daily   Quantity:  90 tablet   Refills:  3       furosemide 20 MG tablet   Commonly known as:  LASIX   Used for:  Acute on chronic congestive heart failure, unspecified congestive heart failure type (H)        Dose:  20 mg   Take 1 tablet (20 mg) by mouth every other day   Quantity:  30 tablet   Refills:  1       glipiZIDE 5 MG tablet   Commonly known as:  GLUCOTROL   Used for:  Type 2 diabetes mellitus without complication, without long-term current use of insulin (H)        Dose:  5 mg   Take 1 tablet (5 mg) by mouth every morning (before breakfast)   Quantity:  30 tablet   Refills:  0       levothyroxine 100 MCG tablet   Commonly known as:  SYNTHROID   Used for:  Hyperthyroidism        Dose:  100 mcg   Take 1 tablet (100 mcg) by mouth daily   Quantity:  30 tablet   Refills:  11       LISINOPRIL PO        Dose:  20 mg   Take 20 mg by mouth daily   Refills:  0       loperamide 2 MG capsule   Commonly known as:  IMODIUM   Used for:  Chronic diarrhea        Dose:  2 mg   Take 1 capsule (2 mg) by mouth 4 times daily as needed for diarrhea   Quantity:  20 capsule   Refills:  0       metoprolol 25 MG 24 hr tablet   Commonly known as:  TOPROL-XL   Used for:  Pulmonary hypertension (H)        Dose:  25 mg   Take 1 tablet (25 mg) by mouth 2 times daily   Quantity:  180 tablet   Refills:  0       OMEPRAZOLE PO        Dose:  40 mg   Take 40 mg by mouth daily   Refills:  0        PREDNISONE PO        Dose:  10 mg   Take 10 mg by mouth Take 4 daily for 5 days, the 3 daily for 4 days, then 2 daily x 3 dayhs, then daily till gone.   Refills:  0       PROLENSA 0.07 % ophthalmic solution   Generic drug:  bromfenac        Dose:  1 drop   1 drop   Refills:  0       PROZAC PO        Dose:  40 mg   Take 40 mg by mouth daily   Refills:  0       rosuvastatin 10 MG tablet   Commonly known as:  CRESTOR   Used for:  Hyperlipidemia LDL goal <70        Dose:  10 mg   Take 1 tablet (10 mg) by mouth daily   Quantity:  90 tablet   Refills:  3       tadalafil (PAH) 20 MG Tabs   Commonly known as:  ADCIRCA   Used for:  Pulmonary hypertension (H)        Dose:  40 mg   Take 2 tablets (40 mg) by mouth daily   Quantity:  60 tablet   Refills:  3       tamsulosin 0.4 MG capsule   Commonly known as:  FLOMAX   Used for:  Benign prostatic hyperplasia with lower urinary tract symptoms, unspecified morphology        Dose:  0.4 mg   Take 1 capsule (0.4 mg) by mouth daily   Quantity:  30 capsule   Refills:  3                Protect others around you: Learn how to safely use, store and throw away your medicines at www.disposemymeds.org.             Medication List: This is a list of all your medications and when to take them. Check marks below indicate your daily home schedule. Keep this list as a reference.      Medications           Morning Afternoon Evening Bedtime As Needed    AMLODIPINE BESYLATE PO   Take 2.5 mg by mouth daily                                ASPIRIN PO   Take 81 mg by mouth At Bedtime                                COLCRYS PO   Take 0.6 mg by mouth daily TAKES IN THE EVENING                                digoxin 125 MCG tablet   Commonly known as:  LANOXIN   Take 1 tablet (125 mcg) by mouth daily                                furosemide 20 MG tablet   Commonly known as:  LASIX   Take 1 tablet (20 mg) by mouth every other day                                glipiZIDE 5 MG tablet   Commonly known as:   GLUCOTROL   Take 1 tablet (5 mg) by mouth every morning (before breakfast)                                levothyroxine 100 MCG tablet   Commonly known as:  SYNTHROID   Take 1 tablet (100 mcg) by mouth daily                                LISINOPRIL PO   Take 20 mg by mouth daily                                loperamide 2 MG capsule   Commonly known as:  IMODIUM   Take 1 capsule (2 mg) by mouth 4 times daily as needed for diarrhea                                metoprolol 25 MG 24 hr tablet   Commonly known as:  TOPROL-XL   Take 1 tablet (25 mg) by mouth 2 times daily                                OMEPRAZOLE PO   Take 40 mg by mouth daily                                PREDNISONE PO   Take 10 mg by mouth Take 4 daily for 5 days, the 3 daily for 4 days, then 2 daily x 3 dayhs, then daily till gone.                                PROLENSA 0.07 % ophthalmic solution   1 drop   Generic drug:  bromfenac                                PROZAC PO   Take 40 mg by mouth daily                                rosuvastatin 10 MG tablet   Commonly known as:  CRESTOR   Take 1 tablet (10 mg) by mouth daily                                tadalafil (PAH) 20 MG Tabs   Commonly known as:  ADCIRCA   Take 2 tablets (40 mg) by mouth daily                                tamsulosin 0.4 MG capsule   Commonly known as:  FLOMAX   Take 1 capsule (0.4 mg) by mouth daily

## 2017-05-31 NOTE — DISCHARGE INSTRUCTIONS
POST-OPERATIVE CARE FOLLOWING CATARACT SURGERY    DR. CAROLYN WOOD  [facility]  [phone]    Postoperative medications: After surgery, you will use several different eye drop medications. You may have either the brand specific form or generic of each type used.     Begin your eye drops today as directed.  You should instill the drop, close the eye without blinking and keep closed for 3 minutes allowing the drop to absorb.  It is fine to instill all 3 of the drops consecutively, waiting the 3 minutes in between each one. Place the shield for sleep.  In the morning, instill 1 drop of all 3 eye drops before your post-op appointment.      Antibiotic  Moxeza - is an antibiotic drop that is used to minimize the risk of infection. Instill your first drop at bedtime tonight, then 2 times daily for one week.       -OR-    Ofloxacin - this generic antibiotic is to be used 4 times a day for one week, you can start using this drop after you get home, instilling 3 separate times on the day of surgery and then 4 times a day there after.     Anti-inflammatory   Prolensa - use it once daily until gone, beginning today.    -OR-    Ketorolac -this generic drop should be used 4 times daily until gone. You can start your drops when you get home, instilling 3 separate times on the day of surgery.     Steroid  Durezol - is a steroid drop used to minimize inflammation and modulate healing.  It should be used 2 times daily until gone, beginning with your first drop at bedtime tonight.    -OR-    Prednisolone - the generic form should be used 4 times daily for 3 weeks or until gone. You can start your first drop after you get home, instilling 3 separate times on the day of surgery.        Do not rub the operated eye. Wear the eye shield during sleeping hours for one week.      Light sensitivity may be noticed. Sunglasses may be worn for comfort.      Some discomfort and irritation may be noticed. Acetaminophen (Tylenol) or  Ibuprofen (Advil) may be taken for discomfort.      Avoid bending over, strenuous activity or heavy lifting for one week.      Keep the operated eye dry.      You may wash your hair, bathe or shower, but keep the operated eye closed while doing so.      Use medication exactly as prescribed by your doctor.  You may restart your regular home medications.      Bring all materials and medications to the clinic on your first post-operative visit.      Call the doctor s office if any of the following should occur:  -  any sudden vision change  -  nausea or severe headache  -  increase in pain not controlled  -  increased amount of floaters (black spots in front of vision)         -  or signs of infection (pus, increasing redness or tenderness)    [facility] Discharge  Instructions  Anesthesia (Eye Care Center)   Adult Discharge Instructions    For 24 hours after surgery    1. Get plenty of rest.  Make arrangements to have a responsible adult stay with you for at least 6 hours after you leave the hospital.  2. Do not drive or use heavy equipment for 24 hours.    3. Do not drink alcohol for 24 hours.  4. Do not sign legal documents or make important decisions for 24 hours.  5. Avoid strenuous or risky activities. You may feel lightheaded.  If so, sit for a few minutes before standing.  Have someone help you get up.   6. Conscious sedation patients may resume a regular diet..  7. Any questions of medical nature, call your physician.  Revised 12/10/2015

## 2017-05-31 NOTE — OP NOTE
[facility]  Ophthalmology Operative Note    PREOPERATIVE DIAGNOSIS:  Dense cataract of the Right eye.       POSTOPERATIVE DIAGNOSIS:  Dense cataract of the Right eye.       OPERATION:  Clear corneal phacoemulsification with intraocular lens implant of the Right eye.       PROCEDURE:  Ravi Sandoval was brought into the operating room with a topical anesthetic.  Under the microscope after a sterile ophthalmic prep, a lid speculum was put into place.  Anterior chamber was entered with a #75 blade.  Anterior chamber was then filled with lidocaine solution and a viscoelastic material.  A keratome blade was then used to fashion a 2.6 mm temporal incision in the corneoscleral junction and anterior capsule of the lens was opened using a circular capsulorrhexis.  The lens was carefully hydrodissected.  The phacoemulsification tip was then introduced into the eye and a central groove fashioned.  The nucleus was split in half and then quartered, and nuclear material was aspirated.  The irrigation-aspiration apparatus was then utilized to clear the remaining cortical material.  The posterior capsule was polished and a foldable posterior chamber intraocular lens was then introduced into the capsular bag, unfolded, and rotated into the horizontal position.  No sutures were necessary to close the incision site.  The viscoelastic material was aspirated.  Drops of Moxeza and Prolensa were used on the surface of the eye and a clear shield was put over the eye before the patient was dismissed from the operating room.  The patient tolerated the procedure without difficulty.       Implant Name Type Inv. Item Serial No.  Lot No. LRB No. Used   EYE IMP IOL KATHERYN PCL TECNIS ZCB00 15.0 Lens/Eye Implant EYE IMP IOL KATHERYN PCL TECNIS ZCB00 15.0 [id number] [employer]   Right 1           CAROLYN WOOD MD

## 2017-05-31 NOTE — ANESTHESIA CARE TRANSFER NOTE
Patient: Ravi Sandoval    Procedure(s):  RIGHT EYE PHACOEMULSIFICATION CLEAR CORNEA WITH STANDARD INTRAOCULAR LENS IMPLANT  - Wound Class: I-Clean    Diagnosis: RIGHT EYE CATARACT  Diagnosis Additional Information: No value filed.    Anesthesia Type:   MAC     Note:  Airway :Nasal Cannula  Patient transferred to:Phase II  Comments: Pt awake and able to verbalize needs. Spontaneous respiration without difficulty.  All monitors on and audible. Report given to PACU RN, Vital Signs Stable. Pt denies pain.      Vitals: (Last set prior to Anesthesia Care Transfer)    CRNA VITALS  5/31/2017 0955 - 5/31/2017 1029      5/31/2017             NIBP: 136/79    NIBP Mean: 96                Electronically Signed By: IRINA Matthews CRNA  May 31, 2017  10:29 AM

## 2017-05-31 NOTE — IP AVS SNAPSHOT
[facility]    [address]    [place] [address]    Phone:  [phone]    Fax:  [phone]                                       After Visit Summary   5/31/2017    Ravi Sandoval    MRN: [id number]           After Visit Summary Signature Page     I have received my discharge instructions, and my questions have been answered. I have discussed any challenges I see with this plan with the nurse or doctor.    ..........................................................................................................................................  Patient/Patient Representative Signature      ..........................................................................................................................................  Patient Representative Print Name and Relationship to Patient    ..................................................               ................................................  Date                                            Time    ..........................................................................................................................................  Reviewed by Signature/Title    ...................................................              ..............................................  Date                                                            Time

## 2017-05-31 NOTE — ANESTHESIA PREPROCEDURE EVALUATION
Anesthesia Evaluation     . Pt has had prior anesthetic.     No history of anesthetic complications          ROS/MED HX    ENT/Pulmonary:     (+)severe COPD, O2 dependent, , . Other pulmonary disease Pulmonary fibrosis.   (-) sleep apnea   Neurologic:       Cardiovascular:     (+) Dyslipidemia, hypertension--CAD, --. : . CHF . . :. . pulmonary hypertension, Previous cardiac testing Echodate:results:RV dilated and function mod-severely reduced  LV normaldate: results: date: results: date: results:          METS/Exercise Tolerance:     Hematologic: Comments: Histiocytosis X        Musculoskeletal:         GI/Hepatic:     (+) GERD Asymptomatic on medication,       Renal/Genitourinary:         Endo:     (+) type II DM thyroid problem hypothyroidism, .      Psychiatric:         Infectious Disease:         Malignancy:         Other:                                    Anesthesia Plan      History & Physical Review  History and physical reviewed and following examination; no interval change.    ASA Status:  4 .    NPO Status:  > 8 hours    Plan for MAC Reason for MAC:  Procedure to face, neck, head or breast  PONV prophylaxis:  Ondansetron (or other 5HT-3)       Postoperative Care  Postoperative pain management:  Oral pain medications.      Consents  Anesthetic plan, risks, benefits and alternatives discussed with:  Patient..                        Procedure: Procedure(s):  PHACOEMULSIFICATION CLEAR CORNEA WITH STANDARD INTRAOCULAR LENS IMPLANT  Preop diagnosis: RIGHT EYE CATARACT    Allergies   Allergen Reactions     Tetracycline      Past Medical History:   Diagnosis Date     ACP (advance care planning)      Anemia      ASHD (arteriosclerotic heart disease)      BPH (benign prostatic hyperplasia)      Cardiac arrest (H)      Cataracts, bilateral      Chronic diarrhea      Chronic respiratory failure with hypoxia (H)      COPD (chronic obstructive pulmonary disease) (H)     HOME O2     Depression      Disease of lung       Dysplasia of prostate      Gastroesophageal reflux disease      General weakness      Gout      Herpes zoster      Histiocytosis X (H)      Hyperlipidemia      Hypertension      Hypothyroidism      Oxygen dependent      Postinflammatory pulmonary fibrosis (H)      Prostatic hypertrophy, benign      Pulmonary HTN (H)      Pulmonary hypertensive venous disease (H)      Thyroid disease      Tobacco use      Type 2 diabetes mellitus without complications (H)      Past Surgical History:   Procedure Laterality Date     BIOPSY, LUNG NODULE       CARDIAC SURGERY      HX OF STENT     COLONOSCOPY N/A 5/27/2016    Procedure: COMBINED COLONOSCOPY, SINGLE OR MULTIPLE BIOPSY/POLYPECTOMY BY BIOPSY;  Surgeon: Sera Coffman MD;  Location:  GI     Prior to Admission medications    Medication Sig Start Date End Date Taking? Authorizing Provider   bromfenac (PROLENSA) 0.07 % ophthalmic solution 1 drop   Yes Reported, Patient   loperamide (IMODIUM) 2 MG capsule Take 1 capsule (2 mg) by mouth 4 times daily as needed for diarrhea 3/14/17   Giancarlo Weber MD   furosemide (LASIX) 20 MG tablet Take 1 tablet (20 mg) by mouth every other day 3/14/17   Giancarlo Weber MD   glipiZIDE (GLUCOTROL) 5 MG tablet Take 1 tablet (5 mg) by mouth every morning (before breakfast)  Patient taking differently: Take 5 mg by mouth 2 times daily (before meals)  3/14/17   Giancarlo Weber MD   tadalafil, PAH, (ADCIRCA) 20 MG TABS Take 2 tablets (40 mg) by mouth daily 2/25/17   Jennifer Schroeder MD   tamsulosin (FLOMAX) 0.4 MG capsule Take 1 capsule (0.4 mg) by mouth daily 2/25/17   Jennifer Schroeder MD   OMEPRAZOLE PO Take 40 mg by mouth daily    Unknown, Entered By History   metoprolol (TOPROL-XL) 25 MG 24 hr tablet Take 1 tablet (25 mg) by mouth 2 times daily 7/6/16   Herson Rouse MD   digoxin (LANOXIN) 125 MCG tablet Take 1 tablet (125 mcg) by mouth daily 4/22/15   Herson Rouse MD   rosuvastatin (CRESTOR) 10 MG tablet Take 1  tablet (10 mg) by mouth daily 2/16/15   Hamlet Jimenez MD   levothyroxine (SYNTHROID) 100 MCG tablet Take 1 tablet (100 mcg) by mouth daily 11/26/14   Efren Schwartz MD   FLUoxetine HCl (PROZAC PO) Take 40 mg by mouth daily    Reported, Patient   AMLODIPINE BESYLATE PO Take 2.5 mg by mouth daily    Reported, Patient   LISINOPRIL PO Take 20 mg by mouth daily    Reported, Patient   PREDNISONE PO Take 10 mg by mouth Take 4 daily for 5 days, the 3 daily for 4 days, then 2 daily x 3 dayhs, then daily till gone.    Reported, Patient   Colchicine (COLCRYS PO) Take 0.6 mg by mouth daily TAKES IN THE EVENING    Reported, Patient   ASPIRIN PO Take 81 mg by mouth At Bedtime    Reported, Patient     Current Facility-Administered Medications Ordered in Epic   Medication Dose Route Frequency Last Rate Last Dose     proparacaine (ALCAINE) 0.5 % ophthalmic solution 1 drop  1 drop Ophthalmic Once         phenylephrine (MYDFRIN /ZANDER-SYNEPHRINE) 2.5 % ophthalmic solution 1 drop  1 drop Ophthalmic q5 Min Prior to Surgery         tropicamide (MYDRIACYL) 1 % ophthalmic solution 1 drop  1 drop Ophthalmic q5 Min Prior to Surgery         lidocaine (AKTEN) ophthalmic gel 0.5 mL  0.5 mL Ophthalmic Once         proparacaine (ALCAINE) 0.5 % ophthalmic solution 1 drop  1 drop Ophthalmic Once         moxifloxacin (VIGAMOX) 0.5 % ophthalmic solution 1 drop  1 drop Ophthalmic q5 Min Prior to Surgery         diclofenac (VOLTAREN) 0.1 % ophthalmic solution 1 drop  1 drop Ophthalmic q5 Min Prior to Surgery         povidone-iodine 5 % ophthalmic solution 1 drop  1 drop Ophthalmic Once         No current [facility]-ordered outpatient prescriptions on file.     Wt Readings from Last 1 Encounters:   05/30/17 64.4 kg (142 lb)     Temp Readings from Last 1 Encounters:   03/15/17 35.5  C (95.9  F) (Oral)     BP Readings from Last 6 Encounters:   04/18/17 136/64   03/15/17 139/62   02/25/17 126/64   02/21/17 164/86   02/07/17 132/76   05/27/16 154/85      Pulse Readings from Last 4 Encounters:   04/18/17 60   03/15/17 57   02/21/17 66   02/07/17 64     Resp Readings from Last 1 Encounters:   03/15/17 16     SpO2 Readings from Last 1 Encounters:   04/18/17 (!) 86%     Recent Labs   Lab Test  04/18/17   0943  03/15/17   0658  03/14/17   0714   NA  136   --   138   POTASSIUM  4.3  3.7  3.2*   CHLORIDE  101   --   103   CO2  30   --   26   ANIONGAP  5   --   9   GLC  173*   --   122*   BUN  15   --   10   CR  0.61*   --   0.52*   AV  8.2*   --   7.8*     Recent Labs   Lab Test  04/18/17   0943  03/14/17   0714   WBC  16.2*  8.4   HGB  13.5  9.2*   PLT  165  157     Recent Labs   Lab Test  02/22/17   1932  02/12/14   0928   INR  0.91  0.98      RECENT LABS:   ECG:   ECHO:   CXR:

## 2017-05-31 NOTE — ANESTHESIA POSTPROCEDURE EVALUATION
Patient: Ravi Sandoval    Procedure(s):  RIGHT EYE PHACOEMULSIFICATION CLEAR CORNEA WITH STANDARD INTRAOCULAR LENS IMPLANT  - Wound Class: I-Clean    Diagnosis:RIGHT EYE CATARACT  Diagnosis Additional Information: No value filed.    Anesthesia Type:  MAC    Note:  Anesthesia Post Evaluation    Patient location during evaluation: PACU  Patient participation: Able to fully participate in evaluation  Level of consciousness: awake  Pain management: adequate  Airway patency: patent  Cardiovascular status: acceptable  Respiratory status: acceptable  Hydration status: acceptable  PONV: none     Anesthetic complications: None          Last vitals:  Vitals:    05/31/17 0828 05/31/17 1000 05/31/17 1047   BP: 141/86 (!) 115/94 139/79   Resp: 18 16 14   Temp: 36.4  C (97.6  F)     SpO2: 90% 95% 94%         Electronically Signed By: Amanda Gore MD, MD  May 31, 2017  2:29 PM

## 2017-06-07 ENCOUNTER — ANESTHESIA (OUTPATIENT)
Dept: SURGERY | Facility: CLINIC | Age: 72
End: 2017-06-07
Payer: MEDICARE

## 2017-06-07 ENCOUNTER — HOSPITAL ENCOUNTER (OUTPATIENT)
Facility: CLINIC | Age: 72
Discharge: HOME | End: 2017-06-07
Attending: OPHTHALMOLOGY | Admitting: OPHTHALMOLOGY
Payer: MEDICARE

## 2017-06-07 ENCOUNTER — ANESTHESIA EVENT (OUTPATIENT)
Dept: SURGERY | Facility: CLINIC | Age: 72
End: 2017-06-07
Payer: MEDICARE

## 2017-06-07 ENCOUNTER — SURGERY (OUTPATIENT)
Age: 72
End: 2017-06-07

## 2017-06-07 VITALS
RESPIRATION RATE: 16 BRPM | OXYGEN SATURATION: 98 % | TEMPERATURE: 97 F | BODY MASS INDEX: 22.29 KG/M2 | SYSTOLIC BLOOD PRESSURE: 127 MMHG | WEIGHT: 142 LBS | DIASTOLIC BLOOD PRESSURE: 71 MMHG | HEIGHT: 67 IN

## 2017-06-07 LAB — GLUCOSE BLDC GLUCOMTR-MCNC: 135 MG/DL (ref 70–99)

## 2017-06-07 PROCEDURE — 25000128 H RX IP 250 OP 636: Performed by: OPHTHALMOLOGY

## 2017-06-07 PROCEDURE — 25000128 H RX IP 250 OP 636: Performed by: ANESTHESIOLOGY

## 2017-06-07 PROCEDURE — 25000128 H RX IP 250 OP 636: Performed by: NURSE ANESTHETIST, CERTIFIED REGISTERED

## 2017-06-07 PROCEDURE — 25000125 ZZHC RX 250: Performed by: OPHTHALMOLOGY

## 2017-06-07 PROCEDURE — 37000008 ZZH ANESTHESIA TECHNICAL FEE, 1ST 30 MIN: Performed by: OPHTHALMOLOGY

## 2017-06-07 PROCEDURE — 82962 GLUCOSE BLOOD TEST: CPT

## 2017-06-07 PROCEDURE — 40000170 ZZH STATISTIC PRE-PROCEDURE ASSESSMENT II: Performed by: OPHTHALMOLOGY

## 2017-06-07 PROCEDURE — 71000028 ZZH EYE RECOVERY PHASE 2 EACH 15 MINS: Performed by: OPHTHALMOLOGY

## 2017-06-07 PROCEDURE — V2632 POST CHMBR INTRAOCULAR LENS: HCPCS | Performed by: OPHTHALMOLOGY

## 2017-06-07 PROCEDURE — 27210794 ZZH OR GENERAL SUPPLY STERILE: Performed by: OPHTHALMOLOGY

## 2017-06-07 PROCEDURE — 36000101 ZZH EYE SURGERY LEVEL 3 1ST 30 MIN: Performed by: OPHTHALMOLOGY

## 2017-06-07 DEVICE — EYE IMP IOL AMO PCL TECNIS ZCB00 14.0: Type: IMPLANTABLE DEVICE | Site: EYE | Status: FUNCTIONAL

## 2017-06-07 RX ORDER — SODIUM CHLORIDE, SODIUM LACTATE, POTASSIUM CHLORIDE, CALCIUM CHLORIDE 600; 310; 30; 20 MG/100ML; MG/100ML; MG/100ML; MG/100ML
INJECTION, SOLUTION INTRAVENOUS CONTINUOUS
Status: DISCONTINUED | OUTPATIENT
Start: 2017-06-07 | End: 2017-06-07 | Stop reason: HOSPADM

## 2017-06-07 RX ORDER — MOXIFLOXACIN 5 MG/ML
1 SOLUTION/ DROPS OPHTHALMIC
Status: COMPLETED | OUTPATIENT
Start: 2017-06-07 | End: 2017-06-07

## 2017-06-07 RX ORDER — DICLOFENAC SODIUM 1 MG/ML
1 SOLUTION/ DROPS OPHTHALMIC
Status: COMPLETED | OUTPATIENT
Start: 2017-06-07 | End: 2017-06-07

## 2017-06-07 RX ORDER — LIDOCAINE HYDROCHLORIDE 10 MG/ML
INJECTION, SOLUTION EPIDURAL; INFILTRATION; INTRACAUDAL; PERINEURAL PRN
Status: DISCONTINUED | OUTPATIENT
Start: 2017-06-07 | End: 2017-06-07 | Stop reason: HOSPADM

## 2017-06-07 RX ORDER — TROPICAMIDE 10 MG/ML
1 SOLUTION/ DROPS OPHTHALMIC
Status: COMPLETED | OUTPATIENT
Start: 2017-06-07 | End: 2017-06-07

## 2017-06-07 RX ORDER — PHENYLEPHRINE HYDROCHLORIDE 25 MG/ML
1 SOLUTION/ DROPS OPHTHALMIC
Status: COMPLETED | OUTPATIENT
Start: 2017-06-07 | End: 2017-06-07

## 2017-06-07 RX ORDER — PROPARACAINE HYDROCHLORIDE 5 MG/ML
1 SOLUTION/ DROPS OPHTHALMIC ONCE
Status: COMPLETED | OUTPATIENT
Start: 2017-06-07 | End: 2017-06-07

## 2017-06-07 RX ORDER — PROPARACAINE HYDROCHLORIDE 5 MG/ML
1 SOLUTION/ DROPS OPHTHALMIC ONCE
Status: DISCONTINUED | OUTPATIENT
Start: 2017-06-07 | End: 2017-06-07 | Stop reason: HOSPADM

## 2017-06-07 RX ORDER — ONDANSETRON 2 MG/ML
INJECTION INTRAMUSCULAR; INTRAVENOUS PRN
Status: DISCONTINUED | OUTPATIENT
Start: 2017-06-07 | End: 2017-06-07

## 2017-06-07 RX ORDER — BALANCED SALT SOLUTION 6.4; .75; .48; .3; 3.9; 1.7 MG/ML; MG/ML; MG/ML; MG/ML; MG/ML; MG/ML
SOLUTION OPHTHALMIC PRN
Status: DISCONTINUED | OUTPATIENT
Start: 2017-06-07 | End: 2017-06-07 | Stop reason: HOSPADM

## 2017-06-07 RX ADMIN — TROPICAMIDE 1 DROP: 10 SOLUTION/ DROPS OPHTHALMIC at 07:47

## 2017-06-07 RX ADMIN — SODIUM CHLORIDE, POTASSIUM CHLORIDE, SODIUM LACTATE AND CALCIUM CHLORIDE: 600; 310; 30; 20 INJECTION, SOLUTION INTRAVENOUS at 07:47

## 2017-06-07 RX ADMIN — LIDOCAINE HYDROCHLORIDE 0.5 ML: 35 GEL OPHTHALMIC at 08:18

## 2017-06-07 RX ADMIN — MOXIFLOXACIN HYDROCHLORIDE 1 DROP: 5 SOLUTION/ DROPS OPHTHALMIC at 07:26

## 2017-06-07 RX ADMIN — ONDANSETRON 4 MG: 2 INJECTION INTRAMUSCULAR; INTRAVENOUS at 08:10

## 2017-06-07 RX ADMIN — LIDOCAINE HYDROCHLORIDE 1 ML: 10 INJECTION, SOLUTION EPIDURAL; INFILTRATION; INTRACAUDAL; PERINEURAL at 08:19

## 2017-06-07 RX ADMIN — BALANCED SALT SOLUTION 15 ML: 6.4; .75; .48; .3; 3.9; 1.7 SOLUTION OPHTHALMIC at 08:18

## 2017-06-07 RX ADMIN — DICLOFENAC SODIUM 1 DROP: 1 SOLUTION/ DROPS OPHTHALMIC at 07:47

## 2017-06-07 RX ADMIN — DICLOFENAC SODIUM 1 DROP: 1 SOLUTION/ DROPS OPHTHALMIC at 07:27

## 2017-06-07 RX ADMIN — MOXIFLOXACIN HYDROCHLORIDE 1 DROP: 5 SOLUTION/ DROPS OPHTHALMIC at 07:44

## 2017-06-07 RX ADMIN — MOXIFLOXACIN HYDROCHLORIDE 1 DROP: 5 SOLUTION/ DROPS OPHTHALMIC at 07:47

## 2017-06-07 RX ADMIN — SODIUM CHONDROITIN SULFATE / SODIUM HYALURONATE 1 ML: 0.55-0.5 INJECTION INTRAOCULAR at 08:19

## 2017-06-07 RX ADMIN — PHENYLEPHRINE HYDROCHLORIDE 1 DROP: 2.5 SOLUTION/ DROPS OPHTHALMIC at 07:26

## 2017-06-07 RX ADMIN — EPINEPHRINE 500 ML: 1 INJECTION, SOLUTION, CONCENTRATE INTRAVENOUS at 08:18

## 2017-06-07 RX ADMIN — TROPICAMIDE 1 DROP: 10 SOLUTION/ DROPS OPHTHALMIC at 07:27

## 2017-06-07 RX ADMIN — PHENYLEPHRINE HYDROCHLORIDE 1 DROP: 2.5 SOLUTION/ DROPS OPHTHALMIC at 07:43

## 2017-06-07 RX ADMIN — DICLOFENAC SODIUM 1 DROP: 1 SOLUTION/ DROPS OPHTHALMIC at 07:43

## 2017-06-07 RX ADMIN — MIDAZOLAM HYDROCHLORIDE 0.5 MG: 1 INJECTION, SOLUTION INTRAMUSCULAR; INTRAVENOUS at 08:10

## 2017-06-07 RX ADMIN — PROPARACAINE HYDROCHLORIDE 1 DROP: 5 SOLUTION/ DROPS OPHTHALMIC at 07:26

## 2017-06-07 RX ADMIN — PHENYLEPHRINE HYDROCHLORIDE 1 DROP: 2.5 SOLUTION/ DROPS OPHTHALMIC at 07:47

## 2017-06-07 RX ADMIN — TROPICAMIDE 1 DROP: 10 SOLUTION/ DROPS OPHTHALMIC at 07:43

## 2017-06-07 ASSESSMENT — COPD QUESTIONNAIRES
CAT_SEVERITY: SEVERE
COPD: 1

## 2017-06-07 NOTE — IP AVS SNAPSHOT
MRN:[id number]                      After Visit Summary   6/7/2017    Ravi Sandoval    MRN: [id number]           Thank you!     Thank you for choosing [facility] for your care. Our goal is always to provide you with excellent care. Hearing back from our patients is one way we can continue to improve our services. Please take a few minutes to complete the written survey that you may receive in the mail after you visit with us. Thank you!        Patient Information     Date Of Birth          1945        About your hospital stay     You were admitted on:  June 7, 2017 You last received care in the:  [facility]    You were discharged on:  June 7, 2017       Who to Call     For medical emergencies, please call 911.  For non-urgent questions about your medical care, please call your primary care provider or clinic, [phone]  For questions related to your surgery, please call your surgery clinic        Attending Provider     Provider Specialty    Trever Delgado MD Ophthalmology       Primary Care Provider Office Phone # Fax #    Florencio Patricia [phone]      Further instructions from your care team       [facility] Discharge  Instructions  Anesthesia ([facility])   Adult Discharge Instructions    For 24 hours after surgery    1. Get plenty of rest.  Make arrangements to have a responsible adult stay with you for at least 6 hours after you leave the hospital.  2. Do not drive or use heavy equipment for 24 hours.    3. Do not drink alcohol for 24 hours.  4. Do not sign legal documents or make important decisions for 24 hours.  5. Avoid strenuous or risky activities. You may feel lightheaded.  If so, sit for a few minutes before standing.  Have someone help you get up.   6. Conscious sedation patients may resume a regular diet..  Any questions of medical nature, call your physician.    POST-OPERATIVE CARE  FOLLOWING CATARACT SURGERY    DR. CAROLYN WOOD  [facility]  [phone]    Postoperative medications: After surgery, you will use several different eye drop medications. You may have either the brand specific form or generic of each type used.     Begin your eye drops today as directed.  You should instill the drop, close the eye without blinking and keep closed for 3 minutes allowing the drop to absorb.  It is fine to instill all 3 of the drops consecutively, waiting the 3 minutes in between each one. Place the shield for sleep.  In the morning, instill 1 drop of all 3 eye drops before your post-op appointment.      Antibiotic  Moxeza - is an antibiotic drop that is used to minimize the risk of infection. Instill your first drop at bedtime tonight, then 2 times daily for one week.       -OR-    Ofloxacin - this generic antibiotic is to be used 4 times a day for one week, you can start using this drop after you get home, instilling 3 separate times on the day of surgery and then 4 times a day there after.     Anti-inflammatory   Prolensa - use it once daily until gone, beginning today.    -OR-    Ketorolac -this generic drop should be used 4 times daily until gone. You can start your drops when you get home, instilling 3 separate times on the day of surgery.     Steroid  Durezol - is a steroid drop used to minimize inflammation and modulate healing.  It should be used 2 times daily until gone, beginning with your first drop at bedtime tonight.    -OR-    Prednisolone - the generic form should be used 4 times daily for 3 weeks or until gone. You can start your first drop after you get home, instilling 3 separate times on the day of surgery.      Do not rub the operated eye. Wear the eye shield during sleeping hours for one week.    Light sensitivity may be noticed. Sunglasses may be worn for comfort.    Some discomfort and irritation may be noticed. Acetaminophen (Tylenol) or Ibuprofen (Advil) may be taken  "for discomfort.    Avoid bending over, strenuous activity or heavy lifting for one week.    Keep the operated eye dry.    You may wash your hair, bathe or shower, but keep the operated eye closed while doing so.    Use medication exactly as prescribed by your doctor.  You may restart your regular home medications.    Bring all materials and medications to the clinic on your first post-operative visit.    Call the doctor s office if any of the following should occur:  -  any sudden vision change  -  nausea or severe headache  -  increase in pain not controlled  -  increased amount of floaters (black spots in front of vision)         -  or signs of infection (pus, increasing redness or tenderness)            7. Revised 12/10/2015    Pending Results     No orders found from 6/5/2017 to 6/8/2017.            Admission Information     Date & Time Provider Department Dept. Phone    6/7/2017 Trever Delgado MD [facility] [phone]      Your Vitals Were     Blood Pressure Temperature Respirations Height Weight Pulse Oximetry    133/77 97  F (36.1  C) (Temporal) 16 1.702 m (5' 7\") 64.4 kg (142 lb) 98%    BMI (Body Mass Index)                   22.24 kg/m2           MyChart Information     [facility] gives you secure access to your electronic health record. If you see a primary care provider, you can also send messages to your care team and make appointments. If you have questions, please call your primary care clinic.  If you do not have a primary care provider, please call [phone] and they will assist you.        Care EveryWhere ID     This is your Care EveryWhere ID. This could be used by other organizations to access your [facility] medical records  [id number]           Review of your medicines      CONTINUE these medicines which may have CHANGED, or have new prescriptions. If we are uncertain of the size of tablets/capsules you have at home, strength may be listed as something that might have " changed.        Dose / Directions    glipiZIDE 5 MG tablet   Commonly known as:  GLUCOTROL   This may have changed:  when to take this   Used for:  Type 2 diabetes mellitus without complication, without long-term current use of insulin (H)        Dose:  5 mg   Take 1 tablet (5 mg) by mouth every morning (before breakfast)   Quantity:  30 tablet   Refills:  0       tadalafil (PAH) 20 MG Tabs   Commonly known as:  ADCIRCA   This may have changed:  additional instructions   Used for:  Pulmonary hypertension (H)        Dose:  40 mg   Take 2 tablets (40 mg) by mouth daily   Quantity:  60 tablet   Refills:  3         CONTINUE these medicines which have NOT CHANGED        Dose / Directions    AMLODIPINE BESYLATE PO        Dose:  2.5 mg   Take 2.5 mg by mouth daily   Refills:  0       ASPIRIN PO        Dose:  81 mg   Take 81 mg by mouth At Bedtime   Refills:  0       COLCRYS PO        Dose:  0.6 mg   Take 0.6 mg by mouth daily TAKES IN THE EVENING   Refills:  0       digoxin 125 MCG tablet   Commonly known as:  LANOXIN   Used for:  Chronic systolic heart failure (H)        Dose:  125 mcg   Take 1 tablet (125 mcg) by mouth daily   Quantity:  90 tablet   Refills:  3       furosemide 20 MG tablet   Commonly known as:  LASIX   Used for:  Acute on chronic congestive heart failure, unspecified congestive heart failure type (H)        Dose:  20 mg   Take 1 tablet (20 mg) by mouth every other day   Quantity:  30 tablet   Refills:  1       levothyroxine 100 MCG tablet   Commonly known as:  SYNTHROID   Used for:  Hyperthyroidism        Dose:  100 mcg   Take 1 tablet (100 mcg) by mouth daily   Quantity:  30 tablet   Refills:  11       LISINOPRIL PO        Dose:  20 mg   Take 20 mg by mouth daily   Refills:  0       loperamide 2 MG capsule   Commonly known as:  IMODIUM   Used for:  Chronic diarrhea        Dose:  2 mg   Take 1 capsule (2 mg) by mouth 4 times daily as needed for diarrhea   Quantity:  20 capsule   Refills:  0        metoprolol 25 MG 24 hr tablet   Commonly known as:  TOPROL-XL   Used for:  Pulmonary hypertension (H)        Dose:  25 mg   Take 1 tablet (25 mg) by mouth 2 times daily   Quantity:  180 tablet   Refills:  0       OMEPRAZOLE PO        Dose:  40 mg   Take 40 mg by mouth daily   Refills:  0       PREDNISONE PO        Dose:  10 mg   Take 10 mg by mouth daily   Refills:  0       PROLENSA 0.07 % ophthalmic solution   Generic drug:  bromfenac        Dose:  1 drop   1 drop   Refills:  0       PROZAC PO        Dose:  40 mg   Take 40 mg by mouth daily   Refills:  0       rosuvastatin 10 MG tablet   Commonly known as:  CRESTOR   Used for:  Hyperlipidemia LDL goal <70        Dose:  10 mg   Take 1 tablet (10 mg) by mouth daily   Quantity:  90 tablet   Refills:  3       tamsulosin 0.4 MG capsule   Commonly known as:  FLOMAX   Used for:  Benign prostatic hyperplasia with lower urinary tract symptoms, unspecified morphology        Dose:  0.4 mg   Take 1 capsule (0.4 mg) by mouth daily   Quantity:  30 capsule   Refills:  3                Protect others around you: Learn how to safely use, store and throw away your medicines at www.disposemymeds.org.             Medication List: This is a list of all your medications and when to take them. Check marks below indicate your daily home schedule. Keep this list as a reference.      Medications           Morning Afternoon Evening Bedtime As Needed    AMLODIPINE BESYLATE PO   Take 2.5 mg by mouth daily                                ASPIRIN PO   Take 81 mg by mouth At Bedtime                                COLCRYS PO   Take 0.6 mg by mouth daily TAKES IN THE EVENING                                digoxin 125 MCG tablet   Commonly known as:  LANOXIN   Take 1 tablet (125 mcg) by mouth daily                                furosemide 20 MG tablet   Commonly known as:  LASIX   Take 1 tablet (20 mg) by mouth every other day                                glipiZIDE 5 MG tablet   Commonly known as:   GLUCOTROL   Take 1 tablet (5 mg) by mouth every morning (before breakfast)                                levothyroxine 100 MCG tablet   Commonly known as:  SYNTHROID   Take 1 tablet (100 mcg) by mouth daily                                LISINOPRIL PO   Take 20 mg by mouth daily                                loperamide 2 MG capsule   Commonly known as:  IMODIUM   Take 1 capsule (2 mg) by mouth 4 times daily as needed for diarrhea                                metoprolol 25 MG 24 hr tablet   Commonly known as:  TOPROL-XL   Take 1 tablet (25 mg) by mouth 2 times daily                                OMEPRAZOLE PO   Take 40 mg by mouth daily                                PREDNISONE PO   Take 10 mg by mouth daily                                PROLENSA 0.07 % ophthalmic solution   1 drop   Generic drug:  bromfenac                                PROZAC PO   Take 40 mg by mouth daily                                rosuvastatin 10 MG tablet   Commonly known as:  CRESTOR   Take 1 tablet (10 mg) by mouth daily                                tadalafil (PAH) 20 MG Tabs   Commonly known as:  ADCIRCA   Take 2 tablets (40 mg) by mouth daily                                tamsulosin 0.4 MG capsule   Commonly known as:  FLOMAX   Take 1 capsule (0.4 mg) by mouth daily

## 2017-06-07 NOTE — IP AVS SNAPSHOT
[facility]    [address]    [place] [address]    Phone:  [phone]    Fax:  [phone]                                       After Visit Summary   6/7/2017    Ravi Sandoval    MRN: [id number]           After Visit Summary Signature Page     I have received my discharge instructions, and my questions have been answered. I have discussed any challenges I see with this plan with the nurse or doctor.    ..........................................................................................................................................  Patient/Patient Representative Signature      ..........................................................................................................................................  Patient Representative Print Name and Relationship to Patient    ..................................................               ................................................  Date                                            Time    ..........................................................................................................................................  Reviewed by Signature/Title    ...................................................              ..............................................  Date                                                            Time

## 2017-06-07 NOTE — ANESTHESIA POSTPROCEDURE EVALUATION
Patient: Ravi Sandoval    Procedure(s):  LEFT EYE PHACOEMULSIFICATION CLEAR CORNEA WITH STANDARD INTRAOCULAR LENS IMPLANT  - Wound Class: I-Clean    Diagnosis:LEFT EYE CATARACT  Diagnosis Additional Information: No value filed.    Anesthesia Type:  MAC    Note:  Anesthesia Post Evaluation    Patient location during evaluation: PACU  Patient participation: Able to fully participate in evaluation  Level of consciousness: awake  Pain management: adequate  Airway patency: patent  Cardiovascular status: acceptable  Respiratory status: acceptable  Hydration status: acceptable  PONV: none     Anesthetic complications: None          Last vitals:  Vitals:    06/07/17 0745 06/07/17 0838 06/07/17 0900   BP: 128/71 133/77 127/71   Resp: 16 16 16   Temp: 36.1  C (97  F)     SpO2: 95% 98% 98%         Electronically Signed By: VALARIE BARTH MD  June 7, 2017  10:41 AM

## 2017-06-07 NOTE — DISCHARGE INSTRUCTIONS
[facility] Discharge  Instructions  Anesthesia ([facility])   Adult Discharge Instructions    For 24 hours after surgery    1. Get plenty of rest.  Make arrangements to have a responsible adult stay with you for at least 6 hours after you leave the hospital.  2. Do not drive or use heavy equipment for 24 hours.    3. Do not drink alcohol for 24 hours.  4. Do not sign legal documents or make important decisions for 24 hours.  5. Avoid strenuous or risky activities. You may feel lightheaded.  If so, sit for a few minutes before standing.  Have someone help you get up.   6. Conscious sedation patients may resume a regular diet..  Any questions of medical nature, call your physician.    POST-OPERATIVE CARE FOLLOWING CATARACT SURGERY    DR. CAROLYN WOOD  [facility]  [phone]    Postoperative medications: After surgery, you will use several different eye drop medications. You may have either the brand specific form or generic of each type used.     Begin your eye drops today as directed.  You should instill the drop, close the eye without blinking and keep closed for 3 minutes allowing the drop to absorb.  It is fine to instill all 3 of the drops consecutively, waiting the 3 minutes in between each one. Place the shield for sleep.  In the morning, instill 1 drop of all 3 eye drops before your post-op appointment.      Antibiotic  Moxeza - is an antibiotic drop that is used to minimize the risk of infection. Instill your first drop at bedtime tonight, then 2 times daily for one week.       -OR-    Ofloxacin - this generic antibiotic is to be used 4 times a day for one week, you can start using this drop after you get home, instilling 3 separate times on the day of surgery and then 4 times a day there after.     Anti-inflammatory   Prolensa - use it once daily until gone, beginning today.    -OR-    Ketorolac -this generic drop should be used 4 times daily until gone. You  can start your drops when you get home, instilling 3 separate times on the day of surgery.     Steroid  Durezol - is a steroid drop used to minimize inflammation and modulate healing.  It should be used 2 times daily until gone, beginning with your first drop at bedtime tonight.    -OR-    Prednisolone - the generic form should be used 4 times daily for 3 weeks or until gone. You can start your first drop after you get home, instilling 3 separate times on the day of surgery.      Do not rub the operated eye. Wear the eye shield during sleeping hours for one week.    Light sensitivity may be noticed. Sunglasses may be worn for comfort.    Some discomfort and irritation may be noticed. Acetaminophen (Tylenol) or Ibuprofen (Advil) may be taken for discomfort.    Avoid bending over, strenuous activity or heavy lifting for one week.    Keep the operated eye dry.    You may wash your hair, bathe or shower, but keep the operated eye closed while doing so.    Use medication exactly as prescribed by your doctor.  You may restart your regular home medications.    Bring all materials and medications to the clinic on your first post-operative visit.    Call the doctor s office if any of the following should occur:  -  any sudden vision change  -  nausea or severe headache  -  increase in pain not controlled  -  increased amount of floaters (black spots in front of vision)         -  or signs of infection (pus, increasing redness or tenderness)            7. Revised 12/10/2015

## 2017-06-07 NOTE — OP NOTE
[facility]  Ophthalmology Operative Note    PREOPERATIVE DIAGNOSIS:  Dense cataract of the Left eye.       POSTOPERATIVE DIAGNOSIS:  Dense cataract of the Left eye.       OPERATION:  Clear corneal phacoemulsification with intraocular lens implant of the Left eye.       PROCEDURE:  Ravi Sandoval was brought into the operating room with a topical anesthetic.  Under the microscope after a sterile ophthalmic prep, a lid speculum was put into place.  Anterior chamber was entered with a #75 blade.  Anterior chamber was then filled with lidocaine solution and a viscoelastic material.  A keratome blade was then used to fashion a 2.6 mm temporal incision in the corneoscleral junction and anterior capsule of the lens was opened using a circular capsulorrhexis.  The lens was carefully hydrodissected.  The phacoemulsification tip was then introduced into the eye and a central groove fashioned.  The nucleus was split in half and then quartered, and nuclear material was aspirated.  The irrigation-aspiration apparatus was then utilized to clear the remaining cortical material.  The posterior capsule was polished and a foldable posterior chamber intraocular lens was then introduced into the capsular bag, unfolded, and rotated into the horizontal position.  No sutures were necessary to close the incision site.  The viscoelastic material was aspirated.  Drops of Moxeza and Prolensa were used on the surface of the eye and a clear shield was put over the eye before the patient was dismissed from the operating room.  The patient tolerated the procedure without difficulty.       Implant Name Type Inv. Item Serial No.  Lot No. LRB No. Used   EYE IMP IOL KATHERYN PCL TECNIS ZCB00 14.0 Lens/Eye Implant EYE IMP IOL KATHERYN PCL TECNIS ZCB00 14.0 [id number] [employer]   Left 1           CAROLYN WOOD MD

## 2017-06-07 NOTE — OR NURSING
Prolensa 0.07% ophthalmic solution 2 drops given in the left eye at the end of the case.  Pt supplied medication, no charge.

## 2017-06-07 NOTE — ANESTHESIA PREPROCEDURE EVALUATION
Anesthesia Evaluation     . Pt has had prior anesthetic.     No history of anesthetic complications          ROS/MED HX    ENT/Pulmonary:     (+)severe COPD, O2 dependent, , . Other pulmonary disease Pulmonary fibrosis.   (-) sleep apnea   Neurologic:       Cardiovascular:     (+) Dyslipidemia, hypertension--CAD, -past MI,-. : . CHF . . :. . pulmonary hypertension, Previous cardiac testing Echodate:results:RV dilated and function mod-severely reduced  LV normaldate: results: date: results: date: results:          METS/Exercise Tolerance:     Hematologic: Comments: Histiocytosis X    (+) Anemia, -      Musculoskeletal:         GI/Hepatic:     (+) GERD Asymptomatic on medication,       Renal/Genitourinary:     (+) BPH,       Endo:     (+) type II DM thyroid problem hypothyroidism, .      Psychiatric:     (+) psychiatric history depression      Infectious Disease:         Malignancy:         Other: Comment: Gout;                    Physical Exam  Normal systems: cardiovascular, pulmonary and dental    Airway   Mallampati: II  TM distance: >3 FB  Neck ROM: full    Dental     Cardiovascular       Pulmonary                         Anesthesia Plan      History & Physical Review  History and physical reviewed and following examination; no interval change.    ASA Status:  4 .    NPO Status:  > 8 hours    Plan for MAC Reason for MAC:  Procedure to face, neck, head or breast  PONV prophylaxis:  Ondansetron (or other 5HT-3)       Postoperative Care  Postoperative pain management:  Oral pain medications.      Consents  Anesthetic plan, risks, benefits and alternatives discussed with:  Patient..                        Procedure: Procedure(s):  PHACOEMULSIFICATION CLEAR CORNEA WITH STANDARD INTRAOCULAR LENS IMPLANT  Preop diagnosis: RIGHT EYE CATARACT    Allergies   Allergen Reactions     Tetracycline      Past Medical History:   Diagnosis Date     ACP (advance care planning)      Anemia      ASHD (arteriosclerotic heart  disease)      BPH (benign prostatic hyperplasia)      Cardiac arrest (H)      Cataracts, bilateral      Chronic diarrhea      Chronic respiratory failure with hypoxia (H)      COPD (chronic obstructive pulmonary disease) (H)     HOME O2     Depression      Disease of lung      Dysplasia of prostate      Gastroesophageal reflux disease      General weakness      Gout      Heart attack (H)      Herpes zoster      Histiocytosis X (H)      Hyperlipidemia      Hypertension      Hypothyroidism      Oxygen dependent      Postinflammatory pulmonary fibrosis (H)      Prostatic hypertrophy, benign      Pulmonary HTN (H)      Pulmonary hypertensive venous disease (H)      Thyroid disease      Tobacco use      Type 2 diabetes mellitus without complications (H)      Past Surgical History:   Procedure Laterality Date     BIOPSY, LUNG NODULE       CARDIAC SURGERY      HX OF STENT     COLONOSCOPY N/A 5/27/2016    Procedure: COMBINED COLONOSCOPY, SINGLE OR MULTIPLE BIOPSY/POLYPECTOMY BY BIOPSY;  Surgeon: Sera Coffman MD;  Location:  GI     PHACOEMULSIFICATION CLEAR CORNEA WITH STANDARD INTRAOCULAR LENS IMPLANT Right 5/31/2017    Procedure: PHACOEMULSIFICATION CLEAR CORNEA WITH STANDARD INTRAOCULAR LENS IMPLANT;  RIGHT EYE PHACOEMULSIFICATION CLEAR CORNEA WITH STANDARD INTRAOCULAR LENS IMPLANT ;  Surgeon: Trever Delgado MD;  Location: [facility]     Prior to Admission medications    Medication Sig Start Date End Date Taking? Authorizing Provider   bromfenac (PROLENSA) 0.07 % ophthalmic solution 1 drop   Yes Reported, Patient   loperamide (IMODIUM) 2 MG capsule Take 1 capsule (2 mg) by mouth 4 times daily as needed for diarrhea 3/14/17   Giancarlo Weber MD   furosemide (LASIX) 20 MG tablet Take 1 tablet (20 mg) by mouth every other day 3/14/17   Giancarlo Weber MD   glipiZIDE (GLUCOTROL) 5 MG tablet Take 1 tablet (5 mg) by mouth every morning (before breakfast)  Patient taking differently: Take 5 mg by mouth 2 times daily  (before meals)  3/14/17   Giancarlo Weber MD   tadalafil, PAH, (ADCIRCA) 20 MG TABS Take 2 tablets (40 mg) by mouth daily 2/25/17   Jennifer Schroeder MD   tamsulosin (FLOMAX) 0.4 MG capsule Take 1 capsule (0.4 mg) by mouth daily 2/25/17   Jennifer Schroeder MD   OMEPRAZOLE PO Take 40 mg by mouth daily    Unknown, Entered By History   metoprolol (TOPROL-XL) 25 MG 24 hr tablet Take 1 tablet (25 mg) by mouth 2 times daily 7/6/16   Herson Rouse MD   digoxin (LANOXIN) 125 MCG tablet Take 1 tablet (125 mcg) by mouth daily 4/22/15   Herson Rouse MD   rosuvastatin (CRESTOR) 10 MG tablet Take 1 tablet (10 mg) by mouth daily 2/16/15   Hamlet Jimenez MD   levothyroxine (SYNTHROID) 100 MCG tablet Take 1 tablet (100 mcg) by mouth daily 11/26/14   Efren Schwartz MD   FLUoxetine HCl (PROZAC PO) Take 40 mg by mouth daily    Reported, Patient   AMLODIPINE BESYLATE PO Take 2.5 mg by mouth daily    Reported, Patient   LISINOPRIL PO Take 20 mg by mouth daily    Reported, Patient   PREDNISONE PO Take 10 mg by mouth Take 4 daily for 5 days, the 3 daily for 4 days, then 2 daily x 3 dayhs, then daily till gone.    Reported, Patient   Colchicine (COLCRYS PO) Take 0.6 mg by mouth daily TAKES IN THE EVENING    Reported, Patient   ASPIRIN PO Take 81 mg by mouth At Bedtime    Reported, Patient     No current Epic-ordered facility-administered medications on file.      No current [facility]-ordered outpatient prescriptions on file.     Wt Readings from Last 1 Encounters:   06/05/17 64.4 kg (142 lb)     Temp Readings from Last 1 Encounters:   05/31/17 36.4  C (97.6  F) (Temporal)     BP Readings from Last 6 Encounters:   05/31/17 139/79   04/18/17 136/64   03/15/17 139/62   02/25/17 126/64   02/21/17 164/86   02/07/17 132/76     Pulse Readings from Last 4 Encounters:   04/18/17 60   03/15/17 57   02/21/17 66   02/07/17 64     Resp Readings from Last 1 Encounters:   05/31/17 14     SpO2 Readings from Last 1 Encounters:    05/31/17 94%     Recent Labs   Lab Test  04/18/17   0943  03/15/17   0658  03/14/17   0714   NA  136   --   138   POTASSIUM  4.3  3.7  3.2*   CHLORIDE  101   --   103   CO2  30   --   26   ANIONGAP  5   --   9   GLC  173*   --   122*   BUN  15   --   10   CR  0.61*   --   0.52*   AV  8.2*   --   7.8*     Recent Labs   Lab Test  04/18/17   0943  03/14/17   0714   WBC  16.2*  8.4   HGB  13.5  9.2*   PLT  165  157     Recent Labs   Lab Test  02/22/17   1932  02/12/14   0928   INR  0.91  0.98      RECENT LABS:   ECG:   ECHO:   CXR:

## 2017-06-07 NOTE — ANESTHESIA CARE TRANSFER NOTE
Patient: Ravi Sandoval    Procedure(s):  LEFT EYE PHACOEMULSIFICATION CLEAR CORNEA WITH STANDARD INTRAOCULAR LENS IMPLANT  - Wound Class: I-Clean    Diagnosis: LEFT EYE CATARACT  Diagnosis Additional Information: No value filed.    Anesthesia Type:   MAC     Note:  Airway :Nasal Cannula  Patient transferred to:PACU  Comments: Uneventful transport to [facility]  IV patent  Pt responds appropriately to command  Pt comfortable  Report to RN      Vitals: (Last set prior to Anesthesia Care Transfer)    CRNA VITALS  6/7/2017 0801 - 6/7/2017 0835      6/7/2017             Pulse: 59    Ht Rate: 58    SpO2: 96 %    Resp Rate (set): 10                Electronically Signed By: IRINA VARELA CRNA  June 7, 2017  8:35 AM

## 2017-07-13 ENCOUNTER — TRANSFERRED RECORDS (OUTPATIENT)
Dept: HEALTH INFORMATION MANAGEMENT | Facility: CLINIC | Age: 72
End: 2017-07-13

## 2017-09-11 ENCOUNTER — TRANSFERRED RECORDS (OUTPATIENT)
Dept: HEALTH INFORMATION MANAGEMENT | Facility: CLINIC | Age: 72
End: 2017-09-11

## 2017-09-21 DIAGNOSIS — Z98.890 MOHS DEFECT OF NASAL TIP: Primary | ICD-10-CM

## 2017-09-21 DIAGNOSIS — M95.0 MOHS DEFECT OF NASAL TIP: Primary | ICD-10-CM

## 2017-09-21 RX ORDER — DEXAMETHASONE SODIUM PHOSPHATE 10 MG/ML
10 INJECTION, SOLUTION INTRAMUSCULAR; INTRAVENOUS ONCE
Status: CANCELLED | OUTPATIENT
Start: 2017-09-21 | End: 2017-09-21

## 2017-09-22 ENCOUNTER — OFFICE VISIT (OUTPATIENT)
Dept: OTOLARYNGOLOGY | Facility: CLINIC | Age: 72
End: 2017-09-22

## 2017-09-22 DIAGNOSIS — C44.301 SKIN CANCER OF NOSE: Primary | ICD-10-CM

## 2017-09-22 NOTE — NURSING NOTE
Photos taken today.  Discussed options for surgery.  Dorsum of nose can be done in clinic under straight local.  Patient to follow up with Dr. Fuchs regarding his scheduled MOHS on 10-30-17.    Patient scheduled for MOHS closure next week Friday.    Sagar Gonzales RN  9/22/2017 2:17 PM

## 2017-09-22 NOTE — LETTER
9/22/2017       RE: Ravi Sandoval  [address]   [facility] [address]     Dear Colleague,    Thank you for referring your patient, aRvi Sandoval, to the [facility] at [facility]. Please see a copy of my visit note below.    Facial Plastic and Reconstructive Surgery Consultation    Referring Provider:   Zaid Fuchs MD  [facility]  [address]  [place] [address]    HPI:   I had the pleasure of seeing Ravi Sandoval today in clinic for consultation for nasal cutaneous malignancy. Ravi Sandoval is a 72 year old male with a history of multiple cutaneous malignancies who last had a MOHS procedure and biopsy of the tip of the nose and left ala.   He will have an anticipated resection of the ala on 10/30 and the defect is expected to be substantial requiring surgical reconstruction. He is reticent to have the surgical resection of the left alar lesion. He has not decided he would like to proceed.    He is on chronic nasal canula for pulmonary fibrosis.        Review Of Systems  ROS: 10 point ROS neg other than the symptoms noted above in the HPI.    Patient Active Problem List   Diagnosis     Pulmonary hypertension (H)     Anemia     Acute respiratory distress     Lactic acid acidosis     Acute on chronic systolic congestive heart failure (H)     Past Surgical History:   Procedure Laterality Date     BIOPSY, LUNG NODULE       CARDIAC SURGERY      HX OF STENT     COLONOSCOPY N/A 5/27/2016    Procedure: COMBINED COLONOSCOPY, SINGLE OR MULTIPLE BIOPSY/POLYPECTOMY BY BIOPSY;  Surgeon: Sera Coffman MD;  Location: [facility]     PHACOEMULSIFICATION CLEAR CORNEA WITH STANDARD INTRAOCULAR LENS IMPLANT Right 5/31/2017    Procedure: PHACOEMULSIFICATION CLEAR CORNEA WITH STANDARD INTRAOCULAR LENS IMPLANT;  RIGHT EYE PHACOEMULSIFICATION CLEAR CORNEA WITH STANDARD INTRAOCULAR LENS IMPLANT ;  Surgeon: Trever Delgado MD;  Location: [facility]      PHACOEMULSIFICATION CLEAR CORNEA WITH STANDARD INTRAOCULAR LENS IMPLANT Left 6/7/2017    Procedure: PHACOEMULSIFICATION CLEAR CORNEA WITH STANDARD INTRAOCULAR LENS IMPLANT;  LEFT EYE PHACOEMULSIFICATION CLEAR CORNEA WITH STANDARD INTRAOCULAR LENS IMPLANT ;  Surgeon: Trever Delgado MD;  Location: [facility]     Current Outpatient Prescriptions   Medication Sig Dispense Refill     bromfenac (PROLENSA) 0.07 % ophthalmic solution 1 drop       loperamide (IMODIUM) 2 MG capsule Take 1 capsule (2 mg) by mouth 4 times daily as needed for diarrhea 20 capsule      furosemide (LASIX) 20 MG tablet Take 1 tablet (20 mg) by mouth every other day 30 tablet 1     glipiZIDE (GLUCOTROL) 5 MG tablet Take 1 tablet (5 mg) by mouth every morning (before breakfast) (Patient taking differently: Take 5 mg by mouth 2 times daily (before meals) ) 30 tablet 0     tadalafil, PAH, (ADCIRCA) 20 MG TABS Take 2 tablets (40 mg) by mouth daily (Patient taking differently: Take 40 mg by mouth daily Takes only 20 mg daily) 60 tablet 3     tamsulosin (FLOMAX) 0.4 MG capsule Take 1 capsule (0.4 mg) by mouth daily 30 capsule 3     OMEPRAZOLE PO Take 40 mg by mouth daily       metoprolol (TOPROL-XL) 25 MG 24 hr tablet Take 1 tablet (25 mg) by mouth 2 times daily 180 tablet 0     digoxin (LANOXIN) 125 MCG tablet Take 1 tablet (125 mcg) by mouth daily 90 tablet 3     rosuvastatin (CRESTOR) 10 MG tablet Take 1 tablet (10 mg) by mouth daily 90 tablet 3     levothyroxine (SYNTHROID) 100 MCG tablet Take 1 tablet (100 mcg) by mouth daily 30 tablet 11     FLUoxetine HCl (PROZAC PO) Take 40 mg by mouth daily       AMLODIPINE BESYLATE PO Take 2.5 mg by mouth daily       LISINOPRIL PO Take 20 mg by mouth daily       PREDNISONE PO Take 10 mg by mouth daily        Colchicine (COLCRYS PO) Take 0.6 mg by mouth daily TAKES IN THE EVENING       ASPIRIN PO Take 81 mg by mouth At Bedtime       Tetracycline  Social History     Social History     Marital status:       Spouse name: N/A     Number of children: N/A     Years of education: N/A     Occupational History     Not on file.     Social History Main Topics     Smoking status: Current Every Day Smoker     Packs/day: 0.25     Years: 55.00     Types: Cigarettes     Smokeless tobacco: Never Used     Alcohol use No     Drug use: No     Sexual activity: Not on file     Other Topics Concern     Caffeine Concern Yes     4 cups coffee/day     Sleep Concern Yes     Special Diet No     Exercise No     Seat Belt Yes     Social History Narrative     Family History   Problem Relation Age of Onset     Cardiovascular Father      Pulmonary HTN     CANCER Mother      Skin CA     Neurologic Disorder Sister      Creutzfeldt Eduardo Disease       PE:  Alert and Oriented, Answering Questions Appropriately  Atraumatic, Normocephalic, Face Symmetric  Multiple cutaneous irregularities and skin damage  Nasal canula O2 in place  Skin: Razo 2  Facial Nerve Intact and facial movement symmetric  EOM's, PEERL  Nasal Exam: supratip defect 0.8 cm in length, left alar shave biopsy in place  Chin: Normal   Lips/Teeth/Toungue/Gums: Lips intact, Normal Dentition, Occlusion intact, Oral mucosa intact, no lesions/ulcerations/masses, Tongue mobile  Neck: No lymphadenopathy, no thyromegaly, trachea midline  Chest: No wheezing, cyanosis, or stridor  Card: Normal upper extremity pulses and capillary refill, not diaphoretic  Neuro/Psych: CN's 2-12 intact, Moves all extremities, ambulation in intact, positive affect, no notable muscle weakness     IMPRESSION:    Cutaneous malignancy s/p excision      PLAN:    Ravi Sandoval presents with two surgical sites addressed by Dr. Fuchs.  He would like to proceed with reconstruction of the supratip defect.  I can do this with primary closure in the clinic under local which is his preference.    He and Dr. Fuchs will discuss the management of the alar malignancy.      Photodocumentation was obtained.     I spent a total of  45 minutes face-to-face with Ravi Sandoval during today's office visit.  Over 50% of this time was spent counseling the patient and/or coordinating care regarding management of his skin cancer and acquired deformities.  See note for details.    Again, thank you for allowing me to participate in the care of your patient.      Sincerely,    Alysa Contreras MD

## 2017-09-22 NOTE — PROGRESS NOTES
Facial Plastic and Reconstructive Surgery Consultation    Referring Provider:   Zaid Fuchs MD  [facility]  [address] [address]    HPI:   I had the pleasure of seeing Ravi Sandoval today in clinic for consultation for nasal cutaneous malignancy. Ravi Sandoval is a 72 year old male with a history of multiple cutaneous malignancies who last had a MOHS procedure and biopsy of the tip of the nose and left ala.   He will have an anticipated resection of the ala on 10/30 and the defect is expected to be substantial requiring surgical reconstruction. He is reticent to have the surgical resection of the left alar lesion. He has not decided he would like to proceed.    He is on chronic nasal canula for pulmonary fibrosis.        Review Of Systems  ROS: 10 point ROS neg other than the symptoms noted above in the HPI.    Patient Active Problem List   Diagnosis     Pulmonary hypertension (H)     Anemia     Acute respiratory distress     Lactic acid acidosis     Acute on chronic systolic congestive heart failure (H)     Past Surgical History:   Procedure Laterality Date     BIOPSY, LUNG NODULE       CARDIAC SURGERY      HX OF STENT     COLONOSCOPY N/A 5/27/2016    Procedure: COMBINED COLONOSCOPY, SINGLE OR MULTIPLE BIOPSY/POLYPECTOMY BY BIOPSY;  Surgeon: Sera Coffman MD;  Location:  GI     PHACOEMULSIFICATION CLEAR CORNEA WITH STANDARD INTRAOCULAR LENS IMPLANT Right 5/31/2017    Procedure: PHACOEMULSIFICATION CLEAR CORNEA WITH STANDARD INTRAOCULAR LENS IMPLANT;  RIGHT EYE PHACOEMULSIFICATION CLEAR CORNEA WITH STANDARD INTRAOCULAR LENS IMPLANT ;  Surgeon: Trever Delgado MD;  Location:  EC     PHACOEMULSIFICATION CLEAR CORNEA WITH STANDARD INTRAOCULAR LENS IMPLANT Left 6/7/2017    Procedure: PHACOEMULSIFICATION CLEAR CORNEA WITH STANDARD INTRAOCULAR LENS IMPLANT;  LEFT EYE PHACOEMULSIFICATION CLEAR CORNEA WITH STANDARD INTRAOCULAR LENS IMPLANT ;  Surgeon: Trever Delgado  MD;  Location: [facility]     Current Outpatient Prescriptions   Medication Sig Dispense Refill     bromfenac (PROLENSA) 0.07 % ophthalmic solution 1 drop       loperamide (IMODIUM) 2 MG capsule Take 1 capsule (2 mg) by mouth 4 times daily as needed for diarrhea 20 capsule      furosemide (LASIX) 20 MG tablet Take 1 tablet (20 mg) by mouth every other day 30 tablet 1     glipiZIDE (GLUCOTROL) 5 MG tablet Take 1 tablet (5 mg) by mouth every morning (before breakfast) (Patient taking differently: Take 5 mg by mouth 2 times daily (before meals) ) 30 tablet 0     tadalafil, PAH, (ADCIRCA) 20 MG TABS Take 2 tablets (40 mg) by mouth daily (Patient taking differently: Take 40 mg by mouth daily Takes only 20 mg daily) 60 tablet 3     tamsulosin (FLOMAX) 0.4 MG capsule Take 1 capsule (0.4 mg) by mouth daily 30 capsule 3     OMEPRAZOLE PO Take 40 mg by mouth daily       metoprolol (TOPROL-XL) 25 MG 24 hr tablet Take 1 tablet (25 mg) by mouth 2 times daily 180 tablet 0     digoxin (LANOXIN) 125 MCG tablet Take 1 tablet (125 mcg) by mouth daily 90 tablet 3     rosuvastatin (CRESTOR) 10 MG tablet Take 1 tablet (10 mg) by mouth daily 90 tablet 3     levothyroxine (SYNTHROID) 100 MCG tablet Take 1 tablet (100 mcg) by mouth daily 30 tablet 11     FLUoxetine HCl (PROZAC PO) Take 40 mg by mouth daily       AMLODIPINE BESYLATE PO Take 2.5 mg by mouth daily       LISINOPRIL PO Take 20 mg by mouth daily       PREDNISONE PO Take 10 mg by mouth daily        Colchicine (COLCRYS PO) Take 0.6 mg by mouth daily TAKES IN THE EVENING       ASPIRIN PO Take 81 mg by mouth At Bedtime       Tetracycline  Social History     Social History     Marital status:      Spouse name: N/A     Number of children: N/A     Years of education: N/A     Occupational History     Not on file.     Social History Main Topics     Smoking status: Current Every Day Smoker     Packs/day: 0.25     Years: 55.00     Types: Cigarettes     Smokeless tobacco: Never Used      Alcohol use No     Drug use: No     Sexual activity: Not on file     Other Topics Concern     Caffeine Concern Yes     4 cups coffee/day     Sleep Concern Yes     Special Diet No     Exercise No     Seat Belt Yes     Social History Narrative     Family History   Problem Relation Age of Onset     Cardiovascular Father      Pulmonary HTN     CANCER Mother      Skin CA     Neurologic Disorder Sister      Creutzfeldt Eduardo Disease       PE:  Alert and Oriented, Answering Questions Appropriately  Atraumatic, Normocephalic, Face Symmetric  Multiple cutaneous irregularities and skin damage  Nasal canula O2 in place  Skin: Razo 2  Facial Nerve Intact and facial movement symmetric  EOM's, PEERL  Nasal Exam: supratip defect 0.8 cm in length, left alar shave biopsy in place  Chin: Normal   Lips/Teeth/Toungue/Gums: Lips intact, Normal Dentition, Occlusion intact, Oral mucosa intact, no lesions/ulcerations/masses, Tongue mobile  Neck: No lymphadenopathy, no thyromegaly, trachea midline  Chest: No wheezing, cyanosis, or stridor  Card: Normal upper extremity pulses and capillary refill, not diaphoretic  Neuro/Psych: CN's 2-12 intact, Moves all extremities, ambulation in intact, positive affect, no notable muscle weakness     IMPRESSION:    Cutaneous malignancy s/p excision      PLAN:    Ravi Sandoval presents with two surgical sites addressed by Dr. Fuchs.  He would like to proceed with reconstruction of the supratip defect.  I can do this with primary closure in the clinic under local which is his preference.    He and Dr. Fuchs will discuss the management of the alar malignancy.      Photodocumentation was obtained.     I spent a total of 45 minutes face-to-face with Ravi CANTRELL Lori during today's office visit.  Over 50% of this time was spent counseling the patient and/or coordinating care regarding management of his skin cancer and acquired deformities.  See note for details.

## 2017-09-29 ENCOUNTER — OFFICE VISIT (OUTPATIENT)
Dept: OTOLARYNGOLOGY | Facility: CLINIC | Age: 72
End: 2017-09-29

## 2017-09-29 DIAGNOSIS — C44.301 SKIN CANCER OF NOSE: Primary | ICD-10-CM

## 2017-09-29 DIAGNOSIS — M95.0 ACQUIRED NASAL DEFORMITY: ICD-10-CM

## 2017-09-29 NOTE — PROGRESS NOTES
Facial Plastic and Reconstructive Surgery    Ravi Sandoval presented s/p MOHs excision of the supratip nose with a 1.5 cm in diameter cutaneous defect. He is oxygen dependent and has pulmonary hypertension and so due to his comorbidities we elected to perform his procedure in the office under local.    Procedure: Local tissue rearrangement for closure of MOHs defect           Preparation of  Wound bed  Indications: 1.2 cm cutaneous defect from excision of cutaneous malignancy  Surgeon: Alysa Contreras    Written consent was obtained. The area was cleaned with sterilizing solution. The defect was prepared for closure with a 15 jacob scalpel by freshening the  Edges and resection granulation tissue from the base. The nasal skin surrounding the defect was undermined. A 5.0 vycril was used to reapproximate the SMAS and subsequently with subdermal sutures. The skin edges were then reapproximated with a 5.0 nylon in a horizontal running mattress. The area was cleaned and acquaphor was placed over the incision. The are was dressed.    The patient tolerated the procedure well with no complications.

## 2017-09-29 NOTE — LETTER
9/29/2017       RE: Ravi Sandoval  [address]   [facility] [address]     Dear Colleague,    Thank you for referring your patient, Ravi Sandoval, to the [facility] ENT REBECCA at [facility]. Please see a copy of my visit note below.    Facial Plastic and Reconstructive Surgery    Ravi Sandoval presented s/p MOHs excision of the supratip nose with a 1.5 cm in diameter cutaneous defect. He is oxygen dependent and has pulmonary hypertension and so due to his comorbidities we elected to perform his procedure in the office under local.    Procedure: Local tissue rearrangement for closure of MOHs defect           Preparation of  Wound bed  Indications: 1.2 cm cutaneous defect from excision of cutaneous malignancy  Surgeon: Alysa Contreras    Written consent was obtained. The area was cleaned with sterilizing solution. The defect was prepared for closure with a 15 jacob scalpel by freshening the  Edges and resection granulation tissue from the base. The nasal skin surrounding the defect was undermined. A 5.0 vycril was used to reapproximate the SMAS and subsequently with subdermal sutures. The skin edges were then reapproximated with a 5.0 nylon in a horizontal running mattress. The area was cleaned and acquaphor was placed over the incision. The are was dressed.    The patient tolerated the procedure well with no complications.        Again, thank you for allowing me to participate in the care of your patient.      Sincerely,    Alysa Contreras MD

## 2017-09-29 NOTE — NURSING NOTE
PREPROCEDURE:  Nasal Mohs Defect Repair  Yes Patient ID verified with 2 identifiers (name and  or MRN)  Yes: Procedure and site verified with patient/designee  Yes: Accurate consent documentation in medical record  Yes: Marking not required. Reason: Provider is in continuous attendance with the patient from consent through completion of procedure.     TIME OUT:  Yes: Time-Out performed immediately prior to starting procedure, including verbal and active participation of all team members addressing:  * Correct patient identity  * Confirmed that the correct side and site are marked  * An accurate procedure consent form  * Agreement on the procedure to be done  * Correct patient position  * Relevant images and results are properly labeled and appropriately displayed  * The need to administer antibiotics or fluids for irrigation purposes during the procedure as applicable  * Safety precations based on patient history or medication use    DURING PROCEDURE: Verification of correct person, site, and procedure occurs any time the responsibility for care of the patient is transferred to another member of the care team.

## 2017-10-06 ENCOUNTER — OFFICE VISIT (OUTPATIENT)
Dept: OTOLARYNGOLOGY | Facility: CLINIC | Age: 72
End: 2017-10-06

## 2017-10-06 NOTE — PROGRESS NOTES
Facial Plastic and Reconstructive Surgery    Ravi Sandoval is doing well.   Sutures were removed.  Incisional care was educated and performed.    He is decided he would like to defer excision of the left alar malignancy.  I will notify Dr. Fuchs.    Today he showed me an enlarging cyst in the left aspect of his neck.  He states this has continued to get larger in the last several years.  It is tender and uncomfortable on examination.  This approximately 1.5 cm in diameter.  It appears to be in continuity with the dermis.  He would like this treated and excised.  It has a history of getting inflamed.    Left neck cyst  I think an excisional procedure for the cysto is appropriately to address it so it does not recur and continue good implant and as it has significant potential for infection. I can perform this in the clinic office under local.  He would like to proceed.  I will work to schedule this.  I would send this to pathology for evaluation.

## 2017-10-06 NOTE — NURSING NOTE
Sutures removed.  Patient to follow up in one month for Neck Cyst Removal.    Sagar Gonzales RN  10/6/2017 4:03 PM

## 2017-10-06 NOTE — LETTER
10/6/2017       RE: Ravi Sandoval  [address]   [facility] [address]     Dear Colleague,    Thank you for referring your patient, Ravi Sandoval, to the [facility] ENT REBECCA at [facility]. Please see a copy of my visit note below.    Facial Plastic and Reconstructive Surgery    Ravi Sandoval is doing well.   Sutures were removed.  Incisional care was educated and performed.    He is decided he would like to defer excision of the left alar malignancy.  I will notify Dr. Fuchs.    Today he showed me an enlarging cyst in the left aspect of his neck.  He states this has continued to get larger in the last several years.  It is tender and uncomfortable on examination.  This approximately 1.5 cm in diameter.  It appears to be in continuity with the dermis.  He would like this treated and excised.  It has a history of getting inflamed.    Left neck cyst  I think an excisional procedure for the cysto is appropriately to address it so it does not recur and continue good implant and as it has significant potential for infection. I can perform this in the clinic office under local.  He would like to proceed.  I will work to schedule this.  I would send this to pathology for evaluation.    Again, thank you for allowing me to participate in the care of your patient.      Sincerely,    Alysa Contreras MD

## 2017-12-01 ENCOUNTER — OFFICE VISIT (OUTPATIENT)
Dept: OTOLARYNGOLOGY | Facility: CLINIC | Age: 72
End: 2017-12-01

## 2017-12-01 ENCOUNTER — TRANSFERRED RECORDS (OUTPATIENT)
Dept: HEALTH INFORMATION MANAGEMENT | Facility: CLINIC | Age: 72
End: 2017-12-01

## 2017-12-01 DIAGNOSIS — L98.9 SKIN LESION: Primary | ICD-10-CM

## 2017-12-01 NOTE — NURSING NOTE
Obtained written and verbal consent for Excision of Neck Mass.  Patient agrees with plan and consents to the procedure.          PREPROCEDURE: Excision of Neck Mass  Yes Patient ID verified with 2 identifiers (name and  or MRN)  Yes: Procedure and site verified with patient/designee  Yes: Accurate consent documentation in medical record  Yes: Marking not required. Reason: Provider is in continuous attendance with the patient from consent through completion of procedure.     TIME OUT:  Yes: Time-Out performed immediately prior to starting procedure, including verbal and active participation of all team members addressing:  * Correct patient identity  * Confirmed that the correct side and site are marked  * An accurate procedure consent form  * Agreement on the procedure to be done  * Correct patient position  * Relevant images and results are properly labeled and appropriately displayed  * The need to administer antibiotics or fluids for irrigation purposes during the procedure as applicable  * Safety precations based on patient history or medication use    DURING PROCEDURE: Verification of correct person, site, and procedure occurs any time the responsibility for care of the patient is transferred to another member of the care team.               Sagar Gonzales RN  2017 2:02 PM

## 2017-12-01 NOTE — LETTER
12/1/2017       RE: Ravi Sanodval  [address]   [facility] [address]     Dear Colleague,    Thank you for referring your patient, Ravi Sandoval, to the [facility] ENT REBECCA at [facility]. Please see a copy of my visit note below.    Facial Plastic and Reconstructive Surgery    Ravi Sandoval presents to excise the lesion in his neck.  He has noted growth of it, and also finds it uncomfortable.  He also would like to know what it is.     PROCEDURE NOTE:  Procedure: Excisional biopsy  Indications: Enlarging lesion with associated symptoms  Surgeon: Alysa Contreras    Informed consent was obtained. Time out procedure was performed. The skin was preped and drapped in usual sterile fashion. 2% lidocaine 1 ml, followed by 1% lidocaine with 1:100,000 epinephrine was infused for local anesthesia.    A 15 blade scalpel was used to incise the skin. Sharp dissection then followed. 1.5 cm mass was removed in it's entirety. The skin then was elevated for primary closure. It was approximated with 4.0 monocryl and the skin was closed with 5.0 nylon. The specimen was sent for permanent evaluation.     He tolerated the procedure well with no complications.     Again, thank you for allowing me to participate in the care of your patient.      Sincerely,    Alysa Contreras MD

## 2017-12-01 NOTE — PROGRESS NOTES
Facial Plastic and Reconstructive Surgery    Ravi Sandoval presents to excise the lesion in his neck.  He has noted growth of it, and also finds it uncomfortable.  He also would like to know what it is.     PROCEDURE NOTE:  Procedure: Excisional biopsy  Indications: Enlarging lesion with associated symptoms  Surgeon: Alysa Contreras    Informed consent was obtained. Time out procedure was performed. The skin was preped and drapped in usual sterile fashion. 2% lidocaine 1 ml, followed by 1% lidocaine with 1:100,000 epinephrine was infused for local anesthesia.    A 15 blade scalpel was used to incise the skin. Sharp dissection then followed. 1.5 cm mass was removed in it's entirety. The skin then was elevated for primary closure. It was approximated with 4.0 monocryl and the skin was closed with 5.0 nylon. The specimen was sent for permanent evaluation.     He tolerated the procedure well with no complications.

## 2017-12-08 ENCOUNTER — OFFICE VISIT (OUTPATIENT)
Dept: OTOLARYNGOLOGY | Facility: CLINIC | Age: 72
End: 2017-12-08

## 2017-12-08 DIAGNOSIS — Z98.890 POSTOPERATIVE STATE: Primary | ICD-10-CM

## 2017-12-08 NOTE — PROGRESS NOTES
Facial Plastic and Reconstructive Surgery    Ravi Sandoval comes in for post op check.   He is doing well  Path demonstrated an epidermal inclusion cyst.    Sutures were removed with no problem.   I will see him back as needed.

## 2017-12-08 NOTE — LETTER
12/8/2017       RE: Ravi Sandoval  [address]   [facility] [address]     Dear Colleague,    Thank you for referring your patient, Ravi Sandoval, to the [facility] ENT REBECCA at [facility]. Please see a copy of my visit note below.    Facial Plastic and Reconstructive Surgery    Ravi Sandoval comes in for post op check.   He is doing well  Path demonstrated an epidermal inclusion cyst.    Sutures were removed with no problem.   I will see him back as needed.     Again, thank you for allowing me to participate in the care of your patient.      Sincerely,    Alysa Contreras MD

## 2018-01-01 ENCOUNTER — APPOINTMENT (OUTPATIENT)
Dept: OCCUPATIONAL THERAPY | Facility: CLINIC | Age: 73
DRG: 843 | End: 2018-01-01
Payer: MEDICARE

## 2018-01-01 ENCOUNTER — NURSING HOME VISIT (OUTPATIENT)
Dept: GERIATRICS | Facility: CLINIC | Age: 73
End: 2018-01-01
Payer: MEDICARE

## 2018-01-01 ENCOUNTER — TRANSFERRED RECORDS (OUTPATIENT)
Dept: HEALTH INFORMATION MANAGEMENT | Facility: CLINIC | Age: 73
End: 2018-01-01

## 2018-01-01 ENCOUNTER — APPOINTMENT (OUTPATIENT)
Dept: PHYSICAL THERAPY | Facility: CLINIC | Age: 73
DRG: 843 | End: 2018-01-01
Attending: INTERNAL MEDICINE
Payer: MEDICARE

## 2018-01-01 ENCOUNTER — TELEPHONE (OUTPATIENT)
Dept: GERIATRICS | Facility: CLINIC | Age: 73
End: 2018-01-01

## 2018-01-01 ENCOUNTER — APPOINTMENT (OUTPATIENT)
Dept: GENERAL RADIOLOGY | Facility: CLINIC | Age: 73
DRG: 843 | End: 2018-01-01
Attending: EMERGENCY MEDICINE
Payer: MEDICARE

## 2018-01-01 ENCOUNTER — APPOINTMENT (OUTPATIENT)
Dept: PHYSICAL THERAPY | Facility: CLINIC | Age: 73
DRG: 843 | End: 2018-01-01
Payer: MEDICARE

## 2018-01-01 ENCOUNTER — APPOINTMENT (OUTPATIENT)
Dept: OCCUPATIONAL THERAPY | Facility: CLINIC | Age: 73
DRG: 843 | End: 2018-01-01
Attending: INTERNAL MEDICINE
Payer: MEDICARE

## 2018-01-01 ENCOUNTER — HOSPITAL LABORATORY (OUTPATIENT)
Dept: OTHER | Facility: CLINIC | Age: 73
End: 2018-01-01

## 2018-01-01 ENCOUNTER — HOSPITAL ENCOUNTER (INPATIENT)
Facility: CLINIC | Age: 73
LOS: 18 days | Discharge: NURSING, REHAB OR LONG-TERM CARE | DRG: 843 | End: 2018-12-11
Attending: EMERGENCY MEDICINE | Admitting: HOSPITALIST
Payer: MEDICARE

## 2018-01-01 ENCOUNTER — APPOINTMENT (OUTPATIENT)
Dept: CT IMAGING | Facility: CLINIC | Age: 73
DRG: 843 | End: 2018-01-01
Attending: HOSPITALIST
Payer: MEDICARE

## 2018-01-01 ENCOUNTER — HOSPITAL ENCOUNTER (EMERGENCY)
Facility: CLINIC | Age: 73
Discharge: HOME | End: 2018-05-28
Attending: EMERGENCY MEDICINE | Admitting: EMERGENCY MEDICINE
Payer: MEDICARE

## 2018-01-01 ENCOUNTER — APPOINTMENT (OUTPATIENT)
Dept: CT IMAGING | Facility: CLINIC | Age: 73
DRG: 843 | End: 2018-01-01
Attending: INTERNAL MEDICINE
Payer: MEDICARE

## 2018-01-01 ENCOUNTER — APPOINTMENT (OUTPATIENT)
Dept: GENERAL RADIOLOGY | Facility: CLINIC | Age: 73
DRG: 843 | End: 2018-01-01
Attending: INTERNAL MEDICINE
Payer: MEDICARE

## 2018-01-01 ENCOUNTER — APPOINTMENT (OUTPATIENT)
Dept: ULTRASOUND IMAGING | Facility: CLINIC | Age: 73
DRG: 843 | End: 2018-01-01
Attending: INTERNAL MEDICINE
Payer: MEDICARE

## 2018-01-01 ENCOUNTER — APPOINTMENT (OUTPATIENT)
Dept: CT IMAGING | Facility: CLINIC | Age: 73
End: 2018-01-01
Attending: EMERGENCY MEDICINE
Payer: MEDICARE

## 2018-01-01 VITALS
OXYGEN SATURATION: 90 % | WEIGHT: 138 LBS | TEMPERATURE: 95.7 F | HEART RATE: 59 BPM | DIASTOLIC BLOOD PRESSURE: 90 MMHG | HEIGHT: 66 IN | RESPIRATION RATE: 16 BRPM | BODY MASS INDEX: 22.18 KG/M2 | SYSTOLIC BLOOD PRESSURE: 169 MMHG

## 2018-01-01 VITALS
TEMPERATURE: 97.8 F | RESPIRATION RATE: 18 BRPM | HEART RATE: 56 BPM | DIASTOLIC BLOOD PRESSURE: 79 MMHG | OXYGEN SATURATION: 97 % | HEIGHT: 67 IN | SYSTOLIC BLOOD PRESSURE: 127 MMHG

## 2018-01-01 VITALS
DIASTOLIC BLOOD PRESSURE: 67 MMHG | OXYGEN SATURATION: 93 % | WEIGHT: 138.6 LBS | HEIGHT: 67 IN | BODY MASS INDEX: 21.75 KG/M2 | HEART RATE: 69 BPM | RESPIRATION RATE: 20 BRPM | SYSTOLIC BLOOD PRESSURE: 121 MMHG | TEMPERATURE: 98.5 F

## 2018-01-01 VITALS
RESPIRATION RATE: 16 BRPM | HEART RATE: 58 BPM | BODY MASS INDEX: 21.75 KG/M2 | HEIGHT: 67 IN | TEMPERATURE: 97.3 F | WEIGHT: 138.6 LBS | OXYGEN SATURATION: 94 % | SYSTOLIC BLOOD PRESSURE: 161 MMHG | DIASTOLIC BLOOD PRESSURE: 83 MMHG

## 2018-01-01 VITALS
RESPIRATION RATE: 20 BRPM | HEART RATE: 60 BPM | SYSTOLIC BLOOD PRESSURE: 135 MMHG | TEMPERATURE: 97.7 F | BODY MASS INDEX: 22.41 KG/M2 | OXYGEN SATURATION: 96 % | HEIGHT: 67 IN | WEIGHT: 142.8 LBS | DIASTOLIC BLOOD PRESSURE: 70 MMHG

## 2018-01-01 VITALS
DIASTOLIC BLOOD PRESSURE: 73 MMHG | TEMPERATURE: 96.5 F | HEIGHT: 67 IN | HEART RATE: 57 BPM | OXYGEN SATURATION: 95 % | RESPIRATION RATE: 16 BRPM | SYSTOLIC BLOOD PRESSURE: 150 MMHG | BODY MASS INDEX: 21.75 KG/M2 | WEIGHT: 138.6 LBS

## 2018-01-01 VITALS
DIASTOLIC BLOOD PRESSURE: 68 MMHG | WEIGHT: 144 LBS | BODY MASS INDEX: 23.14 KG/M2 | HEART RATE: 87 BPM | TEMPERATURE: 97.4 F | HEIGHT: 66 IN | OXYGEN SATURATION: 92 % | SYSTOLIC BLOOD PRESSURE: 130 MMHG | RESPIRATION RATE: 16 BRPM

## 2018-01-01 DIAGNOSIS — I27.20 PULMONARY HYPERTENSION (H): ICD-10-CM

## 2018-01-01 DIAGNOSIS — J91.0 MALIGNANT PLEURAL EFFUSION (H): ICD-10-CM

## 2018-01-01 DIAGNOSIS — J84.10 PULMONARY FIBROSIS (H): ICD-10-CM

## 2018-01-01 DIAGNOSIS — J44.1 CHRONIC OBSTRUCTIVE PULMONARY DISEASE WITH ACUTE EXACERBATION (H): ICD-10-CM

## 2018-01-01 DIAGNOSIS — I10 ESSENTIAL HYPERTENSION: ICD-10-CM

## 2018-01-01 DIAGNOSIS — Z79.4 TYPE 2 DIABETES MELLITUS WITH COMPLICATION, WITH LONG-TERM CURRENT USE OF INSULIN (H): ICD-10-CM

## 2018-01-01 DIAGNOSIS — E11.9 DM TYPE 2, GOAL HBA1C < 7% (H): ICD-10-CM

## 2018-01-01 DIAGNOSIS — K59.00 CONSTIPATION, UNSPECIFIED CONSTIPATION TYPE: ICD-10-CM

## 2018-01-01 DIAGNOSIS — C34.90 SMALL CELL LUNG CANCER IN ADULT (H): ICD-10-CM

## 2018-01-01 DIAGNOSIS — I25.10 CORONARY ARTERY DISEASE INVOLVING NATIVE CORONARY ARTERY OF NATIVE HEART WITHOUT ANGINA PECTORIS: ICD-10-CM

## 2018-01-01 DIAGNOSIS — A04.72 C. DIFFICILE COLITIS: Primary | ICD-10-CM

## 2018-01-01 DIAGNOSIS — I26.99 OTHER ACUTE PULMONARY EMBOLISM WITHOUT ACUTE COR PULMONALE (H): ICD-10-CM

## 2018-01-01 DIAGNOSIS — E11.8 TYPE 2 DIABETES MELLITUS WITH COMPLICATION, WITH LONG-TERM CURRENT USE OF INSULIN (H): ICD-10-CM

## 2018-01-01 DIAGNOSIS — E03.8 OTHER SPECIFIED HYPOTHYROIDISM: ICD-10-CM

## 2018-01-01 DIAGNOSIS — R06.03 ACUTE RESPIRATORY DISTRESS: ICD-10-CM

## 2018-01-01 DIAGNOSIS — R06.09 DYSPNEA ON EXERTION: ICD-10-CM

## 2018-01-01 DIAGNOSIS — I50.22 CHRONIC SYSTOLIC HEART FAILURE (H): ICD-10-CM

## 2018-01-01 DIAGNOSIS — C7A.8 NEUROENDOCRINE CARCINOMA METASTATIC TO MULTIPLE SITES (H): ICD-10-CM

## 2018-01-01 DIAGNOSIS — R53.81 PHYSICAL DECONDITIONING: ICD-10-CM

## 2018-01-01 DIAGNOSIS — J01.01 ACUTE RECURRENT MAXILLARY SINUSITIS: ICD-10-CM

## 2018-01-01 DIAGNOSIS — J84.10 PULMONARY FIBROSIS, UNSPECIFIED (H): ICD-10-CM

## 2018-01-01 DIAGNOSIS — C34.90 SMALL CELL LUNG CANCER IN ADULT (H): Primary | ICD-10-CM

## 2018-01-01 DIAGNOSIS — I10 BENIGN ESSENTIAL HYPERTENSION: ICD-10-CM

## 2018-01-01 DIAGNOSIS — C7B.8 NEUROENDOCRINE CARCINOMA METASTATIC TO MULTIPLE SITES (H): ICD-10-CM

## 2018-01-01 DIAGNOSIS — R10.84 ABDOMINAL PAIN, GENERALIZED: ICD-10-CM

## 2018-01-01 DIAGNOSIS — R53.81 PHYSICAL DECONDITIONING: Primary | ICD-10-CM

## 2018-01-01 DIAGNOSIS — J18.9 PNEUMONIA OF RIGHT LOWER LOBE DUE TO INFECTIOUS ORGANISM: ICD-10-CM

## 2018-01-01 LAB
ACID FAST STN SPEC QL: NORMAL
ACID FAST STN SPEC QL: NORMAL
ALBUMIN SERPL-MCNC: 2.2 G/DL (ref 3.4–5)
ALBUMIN SERPL-MCNC: 2.4 G/DL (ref 3.4–5)
ALBUMIN SERPL-MCNC: 2.5 G/DL (ref 3.4–5)
ALBUMIN SERPL-MCNC: 2.6 G/DL (ref 3.4–5)
ALBUMIN SERPL-MCNC: 2.9 G/DL (ref 3.4–5)
ALBUMIN SERPL-MCNC: 2.9 G/DL (ref 3.4–5)
ALBUMIN SERPL-MCNC: 3 G/DL (ref 3.4–5)
ALBUMIN UR-MCNC: NEGATIVE MG/DL
ALP SERPL-CCNC: 137 U/L (ref 40–150)
ALP SERPL-CCNC: 41 U/L (ref 40–150)
ALP SERPL-CCNC: 46 U/L (ref 40–150)
ALP SERPL-CCNC: 51 U/L (ref 40–150)
ALP SERPL-CCNC: 53 U/L (ref 40–150)
ALT SERPL W P-5'-P-CCNC: 19 U/L (ref 0–70)
ALT SERPL W P-5'-P-CCNC: 25 U/L (ref 0–70)
ALT SERPL W P-5'-P-CCNC: 29 U/L (ref 0–70)
ALT SERPL W P-5'-P-CCNC: 44 U/L (ref 0–70)
ALT SERPL W P-5'-P-CCNC: 64 U/L (ref 0–70)
ANION GAP SERPL CALCULATED.3IONS-SCNC: 10 MMOL/L (ref 3–14)
ANION GAP SERPL CALCULATED.3IONS-SCNC: 4 MMOL/L (ref 3–14)
ANION GAP SERPL CALCULATED.3IONS-SCNC: 6 MMOL/L (ref 3–14)
ANION GAP SERPL CALCULATED.3IONS-SCNC: 6 MMOL/L (ref 3–14)
ANION GAP SERPL CALCULATED.3IONS-SCNC: 7 MMOL/L (ref 3–14)
ANION GAP SERPL CALCULATED.3IONS-SCNC: 8 MMOL/L (ref 3–14)
ANION GAP SERPL CALCULATED.3IONS-SCNC: 9 MMOL/L (ref 3–14)
APPEARANCE FLD: NORMAL
APPEARANCE UR: CLEAR
AST SERPL W P-5'-P-CCNC: 189 U/L (ref 0–45)
AST SERPL W P-5'-P-CCNC: 19 U/L (ref 0–45)
AST SERPL W P-5'-P-CCNC: 22 U/L (ref 0–45)
AST SERPL W P-5'-P-CCNC: 31 U/L (ref 0–45)
AST SERPL W P-5'-P-CCNC: 38 U/L (ref 0–45)
BACTERIA SPEC CULT: ABNORMAL
BACTERIA SPEC CULT: NO GROWTH
BACTERIA SPEC CULT: NO GROWTH
BASE DEFICIT BLDV-SCNC: 1.3 MMOL/L
BASOPHILS # BLD AUTO: 0 10E9/L (ref 0–0.2)
BASOPHILS NFR BLD AUTO: 0 %
BASOPHILS NFR BLD AUTO: 0 %
BASOPHILS NFR BLD AUTO: 0.1 %
BASOPHILS NFR BLD AUTO: 0.2 %
BASOPHILS NFR BLD AUTO: 0.3 %
BILIRUB DIRECT SERPL-MCNC: 0.1 MG/DL (ref 0–0.2)
BILIRUB SERPL-MCNC: 0.3 MG/DL (ref 0.2–1.3)
BILIRUB SERPL-MCNC: 0.5 MG/DL (ref 0.2–1.3)
BILIRUB SERPL-MCNC: 0.7 MG/DL (ref 0.2–1.3)
BILIRUB UR QL STRIP: NEGATIVE
BUN SERPL-MCNC: 10 MG/DL (ref 7–30)
BUN SERPL-MCNC: 10 MG/DL (ref 7–30)
BUN SERPL-MCNC: 12 MG/DL (ref 7–30)
BUN SERPL-MCNC: 14 MG/DL (ref 7–30)
BUN SERPL-MCNC: 15 MG/DL (ref 7–30)
BUN SERPL-MCNC: 17 MG/DL (ref 7–30)
BUN SERPL-MCNC: 17 MG/DL (ref 7–30)
BUN SERPL-MCNC: 22 MG/DL (ref 7–30)
BUN SERPL-MCNC: 22 MG/DL (ref 7–30)
BUN SERPL-MCNC: 23 MG/DL (ref 7–30)
BUN SERPL-MCNC: 24 MG/DL (ref 7–30)
BUN SERPL-MCNC: 24 MG/DL (ref 7–30)
BUN SERPL-MCNC: 25 MG/DL (ref 7–30)
BUN SERPL-MCNC: 28 MG/DL (ref 7–30)
C DIFF TOX B STL QL: POSITIVE
CALCIUM SERPL-MCNC: 7.5 MG/DL (ref 7.5–10.1)
CALCIUM SERPL-MCNC: 7.5 MG/DL (ref 8.5–10.1)
CALCIUM SERPL-MCNC: 7.5 MG/DL (ref 8.5–10.1)
CALCIUM SERPL-MCNC: 7.7 MG/DL (ref 8.5–10.1)
CALCIUM SERPL-MCNC: 7.9 MG/DL (ref 8.5–10.1)
CALCIUM SERPL-MCNC: 7.9 MG/DL (ref 8.5–10.1)
CALCIUM SERPL-MCNC: 8 MG/DL (ref 8.5–10.1)
CALCIUM SERPL-MCNC: 8.1 MG/DL (ref 8.5–10.1)
CALCIUM SERPL-MCNC: 8.4 MG/DL (ref 8.5–10.1)
CALCIUM SERPL-MCNC: 8.7 MG/DL (ref 8.5–10.1)
CHLORIDE SERPL-SCNC: 101 MMOL/L (ref 94–109)
CHLORIDE SERPL-SCNC: 104 MMOL/L (ref 94–109)
CHLORIDE SERPL-SCNC: 105 MMOL/L (ref 94–109)
CHLORIDE SERPL-SCNC: 106 MMOL/L (ref 94–109)
CHLORIDE SERPL-SCNC: 107 MMOL/L (ref 94–109)
CHLORIDE SERPL-SCNC: 108 MMOL/L (ref 94–109)
CHLORIDE SERPL-SCNC: 108 MMOL/L (ref 94–109)
CHLORIDE SERPLBLD-SCNC: 105 MMOL/L (ref 94–109)
CHLORIDE SERPLBLD-SCNC: 106 MMOL/L (ref 94–109)
CO2 SERPL-SCNC: 22 MMOL/L (ref 20–32)
CO2 SERPL-SCNC: 23 MMOL/L (ref 20–32)
CO2 SERPL-SCNC: 24 MMOL/L (ref 20–32)
CO2 SERPL-SCNC: 25 MMOL/L (ref 20–32)
CO2 SERPL-SCNC: 25 MMOL/L (ref 20–32)
CO2 SERPL-SCNC: 26 MMOL/L (ref 20–32)
CO2 SERPL-SCNC: 28 MMOL/L (ref 20–32)
CO2 SERPL-SCNC: 28 MMOL/L (ref 20–32)
CO2 SERPL-SCNC: 30 MMOL/L (ref 20–32)
COLOR FLD: NORMAL
COLOR UR AUTO: ABNORMAL
COPATH REPORT: NORMAL
CREAT SERPL-MCNC: 0.61 MG/DL (ref 0.66–1.25)
CREAT SERPL-MCNC: 0.62 MG/DL (ref 0.66–1.25)
CREAT SERPL-MCNC: 0.62 MG/DL (ref 0.66–1.25)
CREAT SERPL-MCNC: 0.63 MG/DL (ref 0.66–1.25)
CREAT SERPL-MCNC: 0.64 MG/DL (ref 0.66–1.25)
CREAT SERPL-MCNC: 0.64 MG/DL (ref 0.66–1.25)
CREAT SERPL-MCNC: 0.67 MG/DL (ref 0.66–1.25)
CREAT SERPL-MCNC: 0.72 MG/DL (ref 0.66–1.25)
CREAT SERPL-MCNC: 0.74 MG/DL (ref 0.66–1.25)
CREAT SERPL-MCNC: 0.74 MG/DL (ref 0.66–1.25)
CREAT SERPL-MCNC: 0.75 MG/DL (ref 0.66–1.25)
CREAT SERPL-MCNC: 0.76 MG/DL (ref 0.66–1.25)
CREAT SERPL-MCNC: 0.77 MG/DL (ref 0.66–1.25)
CREAT SERPL-MCNC: 0.78 MG/DL (ref 0.66–1.25)
CREAT SERPL-MCNC: 0.81 MG/DL (ref 0.66–1.25)
DIFFERENTIAL METHOD BLD: ABNORMAL
EOSINOPHIL # BLD AUTO: 0 10E9/L (ref 0–0.7)
EOSINOPHIL # BLD AUTO: 0.1 10E9/L (ref 0–0.7)
EOSINOPHIL NFR BLD AUTO: 0 %
EOSINOPHIL NFR BLD AUTO: 0.2 %
EOSINOPHIL NFR BLD AUTO: 0.6 %
EOSINOPHIL NFR BLD AUTO: 1.2 %
ERYTHROCYTE [DISTWIDTH] IN BLOOD BY AUTOMATED COUNT: 16.7 % (ref 10–15)
ERYTHROCYTE [DISTWIDTH] IN BLOOD BY AUTOMATED COUNT: 16.9 % (ref 10–15)
ERYTHROCYTE [DISTWIDTH] IN BLOOD BY AUTOMATED COUNT: 17 % (ref 10–15)
ERYTHROCYTE [DISTWIDTH] IN BLOOD BY AUTOMATED COUNT: 17.1 % (ref 10–15)
ERYTHROCYTE [DISTWIDTH] IN BLOOD BY AUTOMATED COUNT: 17.1 % (ref 10–15)
ERYTHROCYTE [DISTWIDTH] IN BLOOD BY AUTOMATED COUNT: 17.2 % (ref 10–15)
ERYTHROCYTE [DISTWIDTH] IN BLOOD BY AUTOMATED COUNT: 17.3 % (ref 10–15)
ERYTHROCYTE [DISTWIDTH] IN BLOOD BY AUTOMATED COUNT: 17.6 % (ref 10–15)
ERYTHROCYTE [DISTWIDTH] IN BLOOD BY AUTOMATED COUNT: 17.7 % (ref 10–15)
ERYTHROCYTE [DISTWIDTH] IN BLOOD BY AUTOMATED COUNT: 17.7 % (ref 10–15)
ERYTHROCYTE [DISTWIDTH] IN BLOOD BY AUTOMATED COUNT: 17.8 % (ref 10–15)
ERYTHROCYTE [DISTWIDTH] IN BLOOD BY AUTOMATED COUNT: 17.8 % (ref 10–15)
ERYTHROCYTE [DISTWIDTH] IN BLOOD BY AUTOMATED COUNT: 18.1 % (ref 10–15)
ERYTHROCYTE [DISTWIDTH] IN BLOOD BY AUTOMATED COUNT: 18.4 % (ref 10–15)
FUNGUS SPEC CULT: NORMAL
GFR SERPL CREATININE-BSD FRML MDRD: >90 ML/MIN/1.73M2
GFR SERPL CREATININE-BSD FRML MDRD: >90 ML/MIN/1.7M2
GFR SERPL CREATININE-BSD FRML MDRD: >90 ML/MIN/{1.73_M2}
GLUCOSE BLDC GLUCOMTR-MCNC: 100 MG/DL (ref 70–99)
GLUCOSE BLDC GLUCOMTR-MCNC: 105 MG/DL (ref 70–99)
GLUCOSE BLDC GLUCOMTR-MCNC: 110 MG/DL (ref 70–99)
GLUCOSE BLDC GLUCOMTR-MCNC: 110 MG/DL (ref 70–99)
GLUCOSE BLDC GLUCOMTR-MCNC: 119 MG/DL (ref 70–99)
GLUCOSE BLDC GLUCOMTR-MCNC: 124 MG/DL (ref 70–99)
GLUCOSE BLDC GLUCOMTR-MCNC: 127 MG/DL (ref 70–99)
GLUCOSE BLDC GLUCOMTR-MCNC: 129 MG/DL (ref 70–99)
GLUCOSE BLDC GLUCOMTR-MCNC: 133 MG/DL (ref 70–99)
GLUCOSE BLDC GLUCOMTR-MCNC: 139 MG/DL (ref 70–99)
GLUCOSE BLDC GLUCOMTR-MCNC: 150 MG/DL (ref 70–99)
GLUCOSE BLDC GLUCOMTR-MCNC: 151 MG/DL (ref 70–99)
GLUCOSE BLDC GLUCOMTR-MCNC: 153 MG/DL (ref 70–99)
GLUCOSE BLDC GLUCOMTR-MCNC: 157 MG/DL (ref 70–99)
GLUCOSE BLDC GLUCOMTR-MCNC: 162 MG/DL (ref 70–99)
GLUCOSE BLDC GLUCOMTR-MCNC: 163 MG/DL (ref 70–99)
GLUCOSE BLDC GLUCOMTR-MCNC: 164 MG/DL (ref 70–99)
GLUCOSE BLDC GLUCOMTR-MCNC: 168 MG/DL (ref 70–99)
GLUCOSE BLDC GLUCOMTR-MCNC: 174 MG/DL (ref 70–99)
GLUCOSE BLDC GLUCOMTR-MCNC: 176 MG/DL (ref 70–99)
GLUCOSE BLDC GLUCOMTR-MCNC: 178 MG/DL (ref 70–99)
GLUCOSE BLDC GLUCOMTR-MCNC: 178 MG/DL (ref 70–99)
GLUCOSE BLDC GLUCOMTR-MCNC: 182 MG/DL (ref 70–99)
GLUCOSE BLDC GLUCOMTR-MCNC: 182 MG/DL (ref 70–99)
GLUCOSE BLDC GLUCOMTR-MCNC: 184 MG/DL (ref 70–99)
GLUCOSE BLDC GLUCOMTR-MCNC: 185 MG/DL (ref 70–99)
GLUCOSE BLDC GLUCOMTR-MCNC: 188 MG/DL (ref 70–99)
GLUCOSE BLDC GLUCOMTR-MCNC: 194 MG/DL (ref 70–99)
GLUCOSE BLDC GLUCOMTR-MCNC: 196 MG/DL (ref 70–99)
GLUCOSE BLDC GLUCOMTR-MCNC: 197 MG/DL (ref 70–99)
GLUCOSE BLDC GLUCOMTR-MCNC: 201 MG/DL (ref 70–99)
GLUCOSE BLDC GLUCOMTR-MCNC: 202 MG/DL (ref 70–99)
GLUCOSE BLDC GLUCOMTR-MCNC: 205 MG/DL (ref 70–99)
GLUCOSE BLDC GLUCOMTR-MCNC: 209 MG/DL (ref 70–99)
GLUCOSE BLDC GLUCOMTR-MCNC: 211 MG/DL (ref 70–99)
GLUCOSE BLDC GLUCOMTR-MCNC: 216 MG/DL (ref 70–99)
GLUCOSE BLDC GLUCOMTR-MCNC: 222 MG/DL (ref 70–99)
GLUCOSE BLDC GLUCOMTR-MCNC: 224 MG/DL (ref 70–99)
GLUCOSE BLDC GLUCOMTR-MCNC: 225 MG/DL (ref 70–99)
GLUCOSE BLDC GLUCOMTR-MCNC: 226 MG/DL (ref 70–99)
GLUCOSE BLDC GLUCOMTR-MCNC: 228 MG/DL (ref 70–99)
GLUCOSE BLDC GLUCOMTR-MCNC: 239 MG/DL (ref 70–99)
GLUCOSE BLDC GLUCOMTR-MCNC: 240 MG/DL (ref 70–99)
GLUCOSE BLDC GLUCOMTR-MCNC: 242 MG/DL (ref 70–99)
GLUCOSE BLDC GLUCOMTR-MCNC: 244 MG/DL (ref 70–99)
GLUCOSE BLDC GLUCOMTR-MCNC: 246 MG/DL (ref 70–99)
GLUCOSE BLDC GLUCOMTR-MCNC: 247 MG/DL (ref 70–99)
GLUCOSE BLDC GLUCOMTR-MCNC: 247 MG/DL (ref 70–99)
GLUCOSE BLDC GLUCOMTR-MCNC: 250 MG/DL (ref 70–99)
GLUCOSE BLDC GLUCOMTR-MCNC: 251 MG/DL (ref 70–99)
GLUCOSE BLDC GLUCOMTR-MCNC: 252 MG/DL (ref 70–99)
GLUCOSE BLDC GLUCOMTR-MCNC: 253 MG/DL (ref 70–99)
GLUCOSE BLDC GLUCOMTR-MCNC: 257 MG/DL (ref 70–99)
GLUCOSE BLDC GLUCOMTR-MCNC: 260 MG/DL (ref 70–99)
GLUCOSE BLDC GLUCOMTR-MCNC: 265 MG/DL (ref 70–99)
GLUCOSE BLDC GLUCOMTR-MCNC: 267 MG/DL (ref 70–99)
GLUCOSE BLDC GLUCOMTR-MCNC: 272 MG/DL (ref 70–99)
GLUCOSE BLDC GLUCOMTR-MCNC: 283 MG/DL (ref 70–99)
GLUCOSE BLDC GLUCOMTR-MCNC: 284 MG/DL (ref 70–99)
GLUCOSE BLDC GLUCOMTR-MCNC: 287 MG/DL (ref 70–99)
GLUCOSE BLDC GLUCOMTR-MCNC: 288 MG/DL (ref 70–99)
GLUCOSE BLDC GLUCOMTR-MCNC: 288 MG/DL (ref 70–99)
GLUCOSE BLDC GLUCOMTR-MCNC: 295 MG/DL (ref 70–99)
GLUCOSE BLDC GLUCOMTR-MCNC: 302 MG/DL (ref 70–99)
GLUCOSE BLDC GLUCOMTR-MCNC: 305 MG/DL (ref 70–99)
GLUCOSE BLDC GLUCOMTR-MCNC: 315 MG/DL (ref 70–99)
GLUCOSE BLDC GLUCOMTR-MCNC: 323 MG/DL (ref 70–99)
GLUCOSE BLDC GLUCOMTR-MCNC: 324 MG/DL (ref 70–99)
GLUCOSE BLDC GLUCOMTR-MCNC: 345 MG/DL (ref 70–99)
GLUCOSE BLDC GLUCOMTR-MCNC: 347 MG/DL (ref 70–99)
GLUCOSE BLDC GLUCOMTR-MCNC: 349 MG/DL (ref 70–99)
GLUCOSE BLDC GLUCOMTR-MCNC: 355 MG/DL (ref 70–99)
GLUCOSE BLDC GLUCOMTR-MCNC: 371 MG/DL (ref 70–99)
GLUCOSE BLDC GLUCOMTR-MCNC: 384 MG/DL (ref 70–99)
GLUCOSE BLDC GLUCOMTR-MCNC: 40 MG/DL (ref 70–99)
GLUCOSE BLDC GLUCOMTR-MCNC: 45 MG/DL (ref 70–99)
GLUCOSE BLDC GLUCOMTR-MCNC: 57 MG/DL (ref 70–99)
GLUCOSE BLDC GLUCOMTR-MCNC: 64 MG/DL (ref 70–99)
GLUCOSE BLDC GLUCOMTR-MCNC: 67 MG/DL (ref 70–99)
GLUCOSE BLDC GLUCOMTR-MCNC: 71 MG/DL (ref 70–99)
GLUCOSE BLDC GLUCOMTR-MCNC: 82 MG/DL (ref 70–99)
GLUCOSE BLDC GLUCOMTR-MCNC: 82 MG/DL (ref 70–99)
GLUCOSE BLDC GLUCOMTR-MCNC: 85 MG/DL (ref 70–99)
GLUCOSE BLDC GLUCOMTR-MCNC: 89 MG/DL (ref 70–99)
GLUCOSE BLDC GLUCOMTR-MCNC: 89 MG/DL (ref 70–99)
GLUCOSE BLDC GLUCOMTR-MCNC: 91 MG/DL (ref 70–99)
GLUCOSE BLDC GLUCOMTR-MCNC: 92 MG/DL (ref 70–99)
GLUCOSE BLDC GLUCOMTR-MCNC: 95 MG/DL (ref 70–99)
GLUCOSE BLDC GLUCOMTR-MCNC: 96 MG/DL (ref 70–99)
GLUCOSE BLDC GLUCOMTR-MCNC: 97 MG/DL (ref 70–99)
GLUCOSE BLDC GLUCOMTR-MCNC: 99 MG/DL (ref 70–99)
GLUCOSE FLD-MCNC: 215 MG/DL
GLUCOSE SERPL-MCNC: 116 MG/DL (ref 70–99)
GLUCOSE SERPL-MCNC: 132 MG/DL (ref 70–99)
GLUCOSE SERPL-MCNC: 151 MG/DL (ref 70–99)
GLUCOSE SERPL-MCNC: 180 MG/DL (ref 70–99)
GLUCOSE SERPL-MCNC: 197 MG/DL (ref 70–99)
GLUCOSE SERPL-MCNC: 203 MG/DL (ref 70–99)
GLUCOSE SERPL-MCNC: 231 MG/DL (ref 70–99)
GLUCOSE SERPL-MCNC: 295 MG/DL (ref 70–99)
GLUCOSE SERPL-MCNC: 44 MG/DL (ref 70–99)
GLUCOSE SERPL-MCNC: 44 MG/DL (ref 70–99)
GLUCOSE SERPL-MCNC: 50 MG/DL (ref 70–99)
GLUCOSE SERPL-MCNC: 71 MG/DL (ref 70–99)
GLUCOSE SERPL-MCNC: 91 MG/DL (ref 70–99)
GLUCOSE SERPL-MCNC: 99 MG/DL (ref 70–99)
GLUCOSE UR STRIP-MCNC: >1000 MG/DL
GRAM STN SPEC: ABNORMAL
GRAM STN SPEC: NORMAL
GRAM STN SPEC: NORMAL
HBA1C MFR BLD: 10.7 % (ref 0–5.6)
HCO3 BLDV-SCNC: 25 MMOL/L (ref 21–28)
HCT VFR BLD AUTO: 33.4 % (ref 40–53)
HCT VFR BLD AUTO: 33.8 % (ref 40–53)
HCT VFR BLD AUTO: 33.8 % (ref 40–53)
HCT VFR BLD AUTO: 35 % (ref 40–53)
HCT VFR BLD AUTO: 35.5 % (ref 40–53)
HCT VFR BLD AUTO: 35.9 % (ref 40–53)
HCT VFR BLD AUTO: 36.5 % (ref 40–53)
HCT VFR BLD AUTO: 36.9 % (ref 40–53)
HCT VFR BLD AUTO: 36.9 % (ref 40–53)
HCT VFR BLD AUTO: 37.3 % (ref 40–53)
HCT VFR BLD AUTO: 37.7 % (ref 40–53)
HCT VFR BLD AUTO: 38.6 % (ref 40–53)
HCT VFR BLD AUTO: 40 % (ref 40–53)
HCT VFR BLD AUTO: 40.5 % (ref 40–53)
HCT VFR BLD AUTO: 41.2 % (ref 40–53)
HCT VFR BLD AUTO: 41.9 % (ref 40–53)
HEMOGLOBIN: 11 G/DL (ref 13.3–17.7)
HEMOGLOBIN: 11.1 G/DL (ref 13.3–17.7)
HGB BLD-MCNC: 11.1 G/DL (ref 13.3–17.7)
HGB BLD-MCNC: 11.3 G/DL (ref 13.3–17.7)
HGB BLD-MCNC: 11.8 G/DL (ref 13.3–17.7)
HGB BLD-MCNC: 11.9 G/DL (ref 13.3–17.7)
HGB BLD-MCNC: 12 G/DL (ref 13.3–17.7)
HGB BLD-MCNC: 12 G/DL (ref 13.3–17.7)
HGB BLD-MCNC: 12.1 G/DL (ref 13.3–17.7)
HGB BLD-MCNC: 12.3 G/DL (ref 13.3–17.7)
HGB BLD-MCNC: 12.4 G/DL (ref 13.3–17.7)
HGB BLD-MCNC: 12.7 G/DL (ref 13.3–17.7)
HGB BLD-MCNC: 13.4 G/DL (ref 13.3–17.7)
HGB BLD-MCNC: 13.5 G/DL (ref 13.3–17.7)
HGB BLD-MCNC: 13.6 G/DL (ref 13.3–17.7)
HGB BLD-MCNC: 14 G/DL (ref 13.3–17.7)
HGB UR QL STRIP: NEGATIVE
IMM GRANULOCYTES # BLD: 0.1 10E9/L (ref 0–0.4)
IMM GRANULOCYTES # BLD: 0.2 10E9/L (ref 0–0.4)
IMM GRANULOCYTES # BLD: 0.3 10E9/L (ref 0–0.4)
IMM GRANULOCYTES # BLD: 0.5 10E9/L (ref 0–0.4)
IMM GRANULOCYTES NFR BLD: 0.5 %
IMM GRANULOCYTES NFR BLD: 0.6 %
IMM GRANULOCYTES NFR BLD: 0.7 %
IMM GRANULOCYTES NFR BLD: 0.8 %
IMM GRANULOCYTES NFR BLD: 0.8 %
IMM GRANULOCYTES NFR BLD: 1 %
IMM GRANULOCYTES NFR BLD: 1.3 %
IMM GRANULOCYTES NFR BLD: 3.8 %
INR PPP: 0.98 (ref 0.86–1.14)
INTERPRETATION ECG - MUSE: NORMAL
INTERPRETATION ECG - MUSE: NORMAL
KETONES UR STRIP-MCNC: NEGATIVE MG/DL
LACTATE BLD-SCNC: 1.2 MMOL/L (ref 0.7–2)
LACTATE BLD-SCNC: 1.6 MMOL/L (ref 0.7–2)
LACTATE BLD-SCNC: 2.5 MMOL/L (ref 0.7–2)
LDH FLD L TO P-CCNC: 253 U/L
LDH SERPL L TO P-CCNC: 286 U/L (ref 85–227)
LEUKOCYTE ESTERASE UR QL STRIP: NEGATIVE
LIPASE SERPL-CCNC: 96 U/L (ref 73–393)
LMWH PPP CHRO-ACNC: 0.93 IU/ML
LMWH PPP CHRO-ACNC: 1.17 IU/ML
LYMPHOCYTES # BLD AUTO: 0.9 10E9/L (ref 0.8–5.3)
LYMPHOCYTES # BLD AUTO: 1.1 10E9/L (ref 0.8–5.3)
LYMPHOCYTES # BLD AUTO: 1.2 10E9/L (ref 0.8–5.3)
LYMPHOCYTES # BLD AUTO: 1.4 10E9/L (ref 0.8–5.3)
LYMPHOCYTES # BLD AUTO: 1.7 10E9/L (ref 0.8–5.3)
LYMPHOCYTES # BLD AUTO: 1.9 10E9/L (ref 0.8–5.3)
LYMPHOCYTES # BLD AUTO: 2.3 10E9/L (ref 0.8–5.3)
LYMPHOCYTES # BLD AUTO: 2.5 10E9/L (ref 0.8–5.3)
LYMPHOCYTES NFR BLD AUTO: 11.9 %
LYMPHOCYTES NFR BLD AUTO: 12 %
LYMPHOCYTES NFR BLD AUTO: 14.6 %
LYMPHOCYTES NFR BLD AUTO: 18.3 %
LYMPHOCYTES NFR BLD AUTO: 24.8 %
LYMPHOCYTES NFR BLD AUTO: 5.4 %
LYMPHOCYTES NFR BLD AUTO: 7.9 %
LYMPHOCYTES NFR BLD AUTO: 8 %
LYMPHOCYTES NFR FLD MANUAL: 30 %
Lab: ABNORMAL
Lab: ABNORMAL
Lab: NORMAL
Lab: NORMAL
MAGNESIUM SERPL-MCNC: 2.1 MG/DL (ref 1.6–2.3)
MCH RBC QN AUTO: 26 PG (ref 26.5–33)
MCH RBC QN AUTO: 27.6 PG (ref 26.5–33)
MCH RBC QN AUTO: 28 PG (ref 26.5–33)
MCH RBC QN AUTO: 28.1 PG (ref 26.5–33)
MCH RBC QN AUTO: 28.1 PG (ref 26.5–33)
MCH RBC QN AUTO: 28.3 PG (ref 26.5–33)
MCH RBC QN AUTO: 28.3 PG (ref 26.5–33)
MCH RBC QN AUTO: 28.4 PG (ref 26.5–33)
MCH RBC QN AUTO: 28.4 PG (ref 26.5–33)
MCH RBC QN AUTO: 28.5 PG (ref 26.5–33)
MCH RBC QN AUTO: 28.5 PG (ref 26.5–33)
MCH RBC QN AUTO: 28.6 PG (ref 26.5–33)
MCH RBC QN AUTO: 28.6 PG (ref 26.5–33)
MCH RBC QN AUTO: 28.7 PG (ref 26.5–33)
MCHC RBC AUTO-ENTMCNC: 31.8 G/DL (ref 31.5–36.5)
MCHC RBC AUTO-ENTMCNC: 32.2 G/DL (ref 31.5–36.5)
MCHC RBC AUTO-ENTMCNC: 32.3 G/DL (ref 31.5–36.5)
MCHC RBC AUTO-ENTMCNC: 32.5 G/DL (ref 31.5–36.5)
MCHC RBC AUTO-ENTMCNC: 32.8 G/DL (ref 31.5–36.5)
MCHC RBC AUTO-ENTMCNC: 32.9 G/DL (ref 31.5–36.5)
MCHC RBC AUTO-ENTMCNC: 33.3 G/DL (ref 31.5–36.5)
MCHC RBC AUTO-ENTMCNC: 33.5 G/DL (ref 31.5–36.5)
MCHC RBC AUTO-ENTMCNC: 33.5 G/DL (ref 31.5–36.5)
MCHC RBC AUTO-ENTMCNC: 33.6 G/DL (ref 31.5–36.5)
MCHC RBC AUTO-ENTMCNC: 33.7 G/DL (ref 31.5–36.5)
MCHC RBC AUTO-ENTMCNC: 34 G/DL (ref 31.5–36.5)
MCV RBC AUTO: 82 FL (ref 78–100)
MCV RBC AUTO: 84 FL (ref 78–100)
MCV RBC AUTO: 84 FL (ref 78–100)
MCV RBC AUTO: 85 FL (ref 78–100)
MCV RBC AUTO: 86 FL (ref 78–100)
MCV RBC AUTO: 87 FL (ref 78–100)
MCV RBC AUTO: 88 FL (ref 78–100)
MONOCYTES # BLD AUTO: 0.5 10E9/L (ref 0–1.3)
MONOCYTES # BLD AUTO: 0.6 10E9/L (ref 0–1.3)
MONOCYTES # BLD AUTO: 0.6 10E9/L (ref 0–1.3)
MONOCYTES # BLD AUTO: 0.7 10E9/L (ref 0–1.3)
MONOCYTES # BLD AUTO: 0.8 10E9/L (ref 0–1.3)
MONOCYTES # BLD AUTO: 0.8 10E9/L (ref 0–1.3)
MONOCYTES # BLD AUTO: 1.1 10E9/L (ref 0–1.3)
MONOCYTES # BLD AUTO: 1.2 10E9/L (ref 0–1.3)
MONOCYTES NFR BLD AUTO: 1.5 %
MONOCYTES NFR BLD AUTO: 10.6 %
MONOCYTES NFR BLD AUTO: 5.1 %
MONOCYTES NFR BLD AUTO: 5.2 %
MONOCYTES NFR BLD AUTO: 5.8 %
MONOCYTES NFR BLD AUTO: 6.6 %
MONOCYTES NFR BLD AUTO: 6.9 %
MONOCYTES NFR BLD AUTO: 9.7 %
MONOS+MACROS NFR FLD MANUAL: 7 %
NEUTROPHILS # BLD AUTO: 10 10E9/L (ref 1.6–8.3)
NEUTROPHILS # BLD AUTO: 11.3 10E9/L (ref 1.6–8.3)
NEUTROPHILS # BLD AUTO: 32.3 10E9/L (ref 1.6–8.3)
NEUTROPHILS # BLD AUTO: 6.2 10E9/L (ref 1.6–8.3)
NEUTROPHILS # BLD AUTO: 7.7 10E9/L (ref 1.6–8.3)
NEUTROPHILS # BLD AUTO: 8.4 10E9/L (ref 1.6–8.3)
NEUTROPHILS # BLD AUTO: 9.2 10E9/L (ref 1.6–8.3)
NEUTROPHILS # BLD AUTO: 9.5 10E9/L (ref 1.6–8.3)
NEUTROPHILS NFR BLD AUTO: 62.1 %
NEUTROPHILS NFR BLD AUTO: 67.4 %
NEUTROPHILS NFR BLD AUTO: 79.5 %
NEUTROPHILS NFR BLD AUTO: 80.2 %
NEUTROPHILS NFR BLD AUTO: 82.2 %
NEUTROPHILS NFR BLD AUTO: 84.6 %
NEUTROPHILS NFR BLD AUTO: 84.9 %
NEUTROPHILS NFR BLD AUTO: 92 %
NEUTS BAND NFR FLD MANUAL: 5 %
NITRATE UR QL: NEGATIVE
NRBC # BLD AUTO: 0 10*3/UL
NRBC BLD AUTO-RTO: 0 /100
NT-PROBNP SERPL-MCNC: 202 PG/ML (ref 0–1800)
O2/TOTAL GAS SETTING VFR VENT: NORMAL %
OTHER CELLS FLD MANUAL: 58 %
OXYHGB MFR BLDV: 54 %
PCO2 BLDV: 46 MM HG (ref 40–50)
PH BLDV: 7.34 PH (ref 7.32–7.43)
PH FLD: 8 PH
PH UR STRIP: 7.5 PH (ref 5–7)
PHOSPHATE SERPL-MCNC: 3.4 MG/DL (ref 2.5–4.5)
PHOSPHATE SERPL-MCNC: 3.7 MG/DL (ref 2.5–4.5)
PHOSPHATE SERPL-MCNC: 4.8 MG/DL (ref 2.5–4.5)
PLATELET # BLD AUTO: 102 10^9/L (ref 150–450)
PLATELET # BLD AUTO: 103 10E9/L (ref 150–450)
PLATELET # BLD AUTO: 109 10E9/L (ref 150–450)
PLATELET # BLD AUTO: 118 10E9/L (ref 150–450)
PLATELET # BLD AUTO: 127 10E9/L (ref 150–450)
PLATELET # BLD AUTO: 136 10E9/L (ref 150–450)
PLATELET # BLD AUTO: 137 10E9/L (ref 150–450)
PLATELET # BLD AUTO: 144 10E9/L (ref 150–450)
PLATELET # BLD AUTO: 149 10E9/L (ref 150–450)
PLATELET # BLD AUTO: 150 10E9/L (ref 150–450)
PLATELET # BLD AUTO: 152 10E9/L (ref 150–450)
PLATELET # BLD AUTO: 153 10E9/L (ref 150–450)
PLATELET # BLD AUTO: 158 10E9/L (ref 150–450)
PLATELET # BLD AUTO: 163 10E9/L (ref 150–450)
PLATELET # BLD AUTO: 179 10E9/L (ref 150–450)
PLATELET # BLD AUTO: 83 10E9/L (ref 150–450)
PLATELET # BLD AUTO: 83 10E9/L (ref 150–450)
PO2 BLDV: 30 MM HG (ref 25–47)
POTASSIUM SERPL-SCNC: 3.3 MMOL/L (ref 3.4–5.3)
POTASSIUM SERPL-SCNC: 3.5 MMOL/L (ref 3.4–5.3)
POTASSIUM SERPL-SCNC: 3.6 MMOL/L (ref 3.4–5.3)
POTASSIUM SERPL-SCNC: 3.8 MMOL/L (ref 3.4–5.3)
POTASSIUM SERPL-SCNC: 3.9 MMOL/L (ref 3.4–5.3)
POTASSIUM SERPL-SCNC: 3.9 MMOL/L (ref 3.4–5.3)
POTASSIUM SERPL-SCNC: 4 MMOL/L (ref 3.4–5.3)
POTASSIUM SERPL-SCNC: 4.1 MMOL/L (ref 3.4–5.3)
POTASSIUM SERPL-SCNC: 4.1 MMOL/L (ref 3.4–5.3)
POTASSIUM SERPL-SCNC: 4.2 MMOL/L (ref 3.4–5.3)
POTASSIUM SERPL-SCNC: 4.3 MMOL/L (ref 3.4–5.3)
POTASSIUM SERPL-SCNC: 4.4 MMOL/L (ref 3.4–5.3)
POTASSIUM SERPL-SCNC: 5.2 MMOL/L (ref 3.4–5.3)
PROCALCITONIN SERPL-MCNC: 1.24 NG/ML
PROCALCITONIN SERPL-MCNC: <0.05 NG/ML
PROT FLD-MCNC: 2.9 G/DL
PROT SERPL-MCNC: 4.9 G/DL (ref 6.8–8.8)
PROT SERPL-MCNC: 5.3 G/DL (ref 6.8–8.8)
PROT SERPL-MCNC: 6.1 G/DL (ref 6.8–8.8)
PROT SERPL-MCNC: 6.3 G/DL (ref 6.8–8.8)
PROT SERPL-MCNC: 6.6 G/DL (ref 6.8–8.8)
PROT SERPL-MCNC: 6.6 G/DL (ref 6.8–8.8)
RADIOLOGIST FLAGS: ABNORMAL
RBC # BLD AUTO: 3.87 10E12/L (ref 4.4–5.9)
RBC # BLD AUTO: 3.97 10E12/L (ref 4.4–5.9)
RBC # BLD AUTO: 4.15 10E12/L (ref 4.4–5.9)
RBC # BLD AUTO: 4.2 10E12/L (ref 4.4–5.9)
RBC # BLD AUTO: 4.3 10E12/L (ref 4.4–5.9)
RBC # BLD AUTO: 4.33 10E12/L (ref 4.4–5.9)
RBC # BLD AUTO: 4.34 10E12/L (ref 4.4–5.9)
RBC # BLD AUTO: 4.34 10E12/L (ref 4.4–5.9)
RBC # BLD AUTO: 4.52 10E12/L (ref 4.4–5.9)
RBC # BLD AUTO: 4.61 10E12/L (ref 4.4–5.9)
RBC # BLD AUTO: 4.7 10E12/L (ref 4.4–5.9)
RBC # BLD AUTO: 4.71 10E12/L (ref 4.4–5.9)
RBC # BLD AUTO: 4.86 10E12/L (ref 4.4–5.9)
RBC # BLD AUTO: 4.89 10E12/L (ref 4.4–5.9)
RBC #/AREA URNS AUTO: <1 /HPF (ref 0–2)
SODIUM SERPL-SCNC: 135 MMOL/L (ref 133–144)
SODIUM SERPL-SCNC: 136 MMOL/L (ref 133–144)
SODIUM SERPL-SCNC: 138 MMOL/L (ref 133–144)
SODIUM SERPL-SCNC: 138 MMOL/L (ref 133–144)
SODIUM SERPL-SCNC: 139 MMOL/L (ref 133–144)
SODIUM SERPL-SCNC: 140 MMOL/L (ref 133–144)
SODIUM SERPL-SCNC: 140 MMOL/L (ref 133–144)
SODIUM SERPL-SCNC: 141 MMOL/L (ref 133–144)
SODIUM SERPL-SCNC: 142 MMOL/L (ref 133–144)
SOURCE: ABNORMAL
SP GR UR STRIP: 1.01 (ref 1–1.03)
SPECIMEN SOURCE FLD: NORMAL
SPECIMEN SOURCE: ABNORMAL
SPECIMEN SOURCE: NORMAL
UROBILINOGEN UR STRIP-MCNC: 2 MG/DL (ref 0–2)
WBC # BLD AUTO: 10 10E9/L (ref 4–11)
WBC # BLD AUTO: 10.3 10E9/L (ref 4–11)
WBC # BLD AUTO: 11.6 10E9/L (ref 4–11)
WBC # BLD AUTO: 11.6 10E9/L (ref 4–11)
WBC # BLD AUTO: 11.8 10E9/L (ref 4–11)
WBC # BLD AUTO: 12.5 10E9/L (ref 4–11)
WBC # BLD AUTO: 12.6 10E9/L (ref 4–11)
WBC # BLD AUTO: 13.4 10E9/L (ref 4–11)
WBC # BLD AUTO: 13.8 10E9/L (ref 4–11)
WBC # BLD AUTO: 15.7 10E9/L (ref 4–11)
WBC # BLD AUTO: 16.8 10E9/L (ref 4–11)
WBC # BLD AUTO: 31.9 10E9/L (ref 4–11)
WBC # BLD AUTO: 31.9 10E9/L (ref 4–11)
WBC # BLD AUTO: 35.1 10E9/L (ref 4–11)
WBC # BLD AUTO: 36.1 10E9/L (ref 4–11)
WBC # BLD AUTO: 7.5 10^9/L (ref 4–11)
WBC # BLD AUTO: 9 10E9/L (ref 4–11)
WBC # BLD AUTO: 9.6 10E9/L (ref 4–11)
WBC # BLD AUTO: 9.8 10E9/L (ref 4–11)
WBC # FLD AUTO: 1786 /UL
WBC #/AREA URNS AUTO: 1 /HPF (ref 0–5)

## 2018-01-01 PROCEDURE — A9270 NON-COVERED ITEM OR SERVICE: HCPCS | Mod: GY | Performed by: HOSPITALIST

## 2018-01-01 PROCEDURE — 99232 SBSQ HOSP IP/OBS MODERATE 35: CPT | Performed by: INTERNAL MEDICINE

## 2018-01-01 PROCEDURE — 3E03305 INTRODUCTION OF OTHER ANTINEOPLASTIC INTO PERIPHERAL VEIN, PERCUTANEOUS APPROACH: ICD-10-PCS | Performed by: NURSE PRACTITIONER

## 2018-01-01 PROCEDURE — 25000132 ZZH RX MED GY IP 250 OP 250 PS 637: Mod: GY | Performed by: HOSPITALIST

## 2018-01-01 PROCEDURE — 25000132 ZZH RX MED GY IP 250 OP 250 PS 637: Mod: GY | Performed by: INTERNAL MEDICINE

## 2018-01-01 PROCEDURE — 85004 AUTOMATED DIFF WBC COUNT: CPT | Performed by: HOSPITALIST

## 2018-01-01 PROCEDURE — A9270 NON-COVERED ITEM OR SERVICE: HCPCS | Performed by: PHYSICIAN ASSISTANT

## 2018-01-01 PROCEDURE — 94640 AIRWAY INHALATION TREATMENT: CPT | Mod: 76

## 2018-01-01 PROCEDURE — 25000132 ZZH RX MED GY IP 250 OP 250 PS 637: Mod: GY | Performed by: NURSE PRACTITIONER

## 2018-01-01 PROCEDURE — 25000131 ZZH RX MED GY IP 250 OP 636 PS 637: Mod: GY | Performed by: STUDENT IN AN ORGANIZED HEALTH CARE EDUCATION/TRAINING PROGRAM

## 2018-01-01 PROCEDURE — 85027 COMPLETE CBC AUTOMATED: CPT | Performed by: HOSPITALIST

## 2018-01-01 PROCEDURE — 88341 IMHCHEM/IMCYTCHM EA ADD ANTB: CPT | Mod: 26 | Performed by: INTERNAL MEDICINE

## 2018-01-01 PROCEDURE — 25000125 ZZHC RX 250: Performed by: INTERNAL MEDICINE

## 2018-01-01 PROCEDURE — 25000132 ZZH RX MED GY IP 250 OP 250 PS 637: Mod: GY | Performed by: PHYSICIAN ASSISTANT

## 2018-01-01 PROCEDURE — 12000000 ZZH R&B MED SURG/OB

## 2018-01-01 PROCEDURE — 85610 PROTHROMBIN TIME: CPT | Performed by: INTERNAL MEDICINE

## 2018-01-01 PROCEDURE — 00000146 ZZHCL STATISTIC GLUCOSE BY METER IP

## 2018-01-01 PROCEDURE — 36415 COLL VENOUS BLD VENIPUNCTURE: CPT | Performed by: INTERNAL MEDICINE

## 2018-01-01 PROCEDURE — 83735 ASSAY OF MAGNESIUM: CPT | Performed by: INTERNAL MEDICINE

## 2018-01-01 PROCEDURE — A9270 NON-COVERED ITEM OR SERVICE: HCPCS | Performed by: INTERNAL MEDICINE

## 2018-01-01 PROCEDURE — A9270 NON-COVERED ITEM OR SERVICE: HCPCS | Mod: GY | Performed by: INTERNAL MEDICINE

## 2018-01-01 PROCEDURE — 99207 ZZC APP CREDIT; MD BILLING SHARED VISIT: CPT | Performed by: PHYSICIAN ASSISTANT

## 2018-01-01 PROCEDURE — 40000133 ZZH STATISTIC OT WARD VISIT: Performed by: REHABILITATION PRACTITIONER

## 2018-01-01 PROCEDURE — 87102 FUNGUS ISOLATION CULTURE: CPT | Performed by: INTERNAL MEDICINE

## 2018-01-01 PROCEDURE — 40000133 ZZH STATISTIC OT WARD VISIT

## 2018-01-01 PROCEDURE — 97530 THERAPEUTIC ACTIVITIES: CPT | Mod: GP | Performed by: PHYSICAL THERAPIST

## 2018-01-01 PROCEDURE — 85520 HEPARIN ASSAY: CPT | Performed by: HOSPITALIST

## 2018-01-01 PROCEDURE — 94640 AIRWAY INHALATION TREATMENT: CPT

## 2018-01-01 PROCEDURE — 97535 SELF CARE MNGMENT TRAINING: CPT | Mod: GO

## 2018-01-01 PROCEDURE — A9270 NON-COVERED ITEM OR SERVICE: HCPCS | Mod: GY | Performed by: PHYSICIAN ASSISTANT

## 2018-01-01 PROCEDURE — 25000128 H RX IP 250 OP 636: Performed by: PHYSICIAN ASSISTANT

## 2018-01-01 PROCEDURE — 97116 GAIT TRAINING THERAPY: CPT | Mod: GP | Performed by: PHYSICAL THERAPIST

## 2018-01-01 PROCEDURE — 40000983 ZZH STATISTIC HFNC ADULT NON-CPAP

## 2018-01-01 PROCEDURE — 36415 COLL VENOUS BLD VENIPUNCTURE: CPT | Performed by: HOSPITALIST

## 2018-01-01 PROCEDURE — 71260 CT THORAX DX C+: CPT

## 2018-01-01 PROCEDURE — 25000125 ZZHC RX 250: Performed by: PHYSICIAN ASSISTANT

## 2018-01-01 PROCEDURE — 80069 RENAL FUNCTION PANEL: CPT | Performed by: INTERNAL MEDICINE

## 2018-01-01 PROCEDURE — 80048 BASIC METABOLIC PNL TOTAL CA: CPT | Performed by: PHYSICIAN ASSISTANT

## 2018-01-01 PROCEDURE — 25000132 ZZH RX MED GY IP 250 OP 250 PS 637: Performed by: PHYSICIAN ASSISTANT

## 2018-01-01 PROCEDURE — A9270 NON-COVERED ITEM OR SERVICE: HCPCS | Mod: GY | Performed by: NURSE PRACTITIONER

## 2018-01-01 PROCEDURE — 25000125 ZZHC RX 250: Performed by: HOSPITALIST

## 2018-01-01 PROCEDURE — 99309 SBSQ NF CARE MODERATE MDM 30: CPT | Performed by: NURSE PRACTITIONER

## 2018-01-01 PROCEDURE — 99233 SBSQ HOSP IP/OBS HIGH 50: CPT | Performed by: NURSE PRACTITIONER

## 2018-01-01 PROCEDURE — 74177 CT ABD & PELVIS W/CONTRAST: CPT

## 2018-01-01 PROCEDURE — 99306 1ST NF CARE HIGH MDM 50: CPT | Performed by: INTERNAL MEDICINE

## 2018-01-01 PROCEDURE — 25000128 H RX IP 250 OP 636: Performed by: HOSPITALIST

## 2018-01-01 PROCEDURE — 40000275 ZZH STATISTIC RCP TIME EA 10 MIN

## 2018-01-01 PROCEDURE — 82805 BLOOD GASES W/O2 SATURATION: CPT | Performed by: EMERGENCY MEDICINE

## 2018-01-01 PROCEDURE — 84145 PROCALCITONIN (PCT): CPT | Performed by: EMERGENCY MEDICINE

## 2018-01-01 PROCEDURE — 99207 ZZC MOONLIGHTING INDICATOR: CPT | Performed by: PHYSICIAN ASSISTANT

## 2018-01-01 PROCEDURE — 40000193 ZZH STATISTIC PT WARD VISIT

## 2018-01-01 PROCEDURE — 99232 SBSQ HOSP IP/OBS MODERATE 35: CPT | Performed by: HOSPITALIST

## 2018-01-01 PROCEDURE — 87106 FUNGI IDENTIFICATION YEAST: CPT | Performed by: INTERNAL MEDICINE

## 2018-01-01 PROCEDURE — 83605 ASSAY OF LACTIC ACID: CPT | Performed by: HOSPITALIST

## 2018-01-01 PROCEDURE — 88342 IMHCHEM/IMCYTCHM 1ST ANTB: CPT | Mod: 26 | Performed by: INTERNAL MEDICINE

## 2018-01-01 PROCEDURE — 97530 THERAPEUTIC ACTIVITIES: CPT | Mod: GO | Performed by: REHABILITATION PRACTITIONER

## 2018-01-01 PROCEDURE — 97535 SELF CARE MNGMENT TRAINING: CPT | Mod: GO | Performed by: OCCUPATIONAL THERAPIST

## 2018-01-01 PROCEDURE — 93005 ELECTROCARDIOGRAM TRACING: CPT

## 2018-01-01 PROCEDURE — 99233 SBSQ HOSP IP/OBS HIGH 50: CPT | Performed by: INTERNAL MEDICINE

## 2018-01-01 PROCEDURE — 25000128 H RX IP 250 OP 636: Performed by: INTERNAL MEDICINE

## 2018-01-01 PROCEDURE — 25000132 ZZH RX MED GY IP 250 OP 250 PS 637: Performed by: INTERNAL MEDICINE

## 2018-01-01 PROCEDURE — 40000193 ZZH STATISTIC PT WARD VISIT: Performed by: PHYSICAL THERAPIST

## 2018-01-01 PROCEDURE — 84157 ASSAY OF PROTEIN OTHER: CPT | Performed by: INTERNAL MEDICINE

## 2018-01-01 PROCEDURE — 00000155 ZZHCL STATISTIC H-CELL BLOCK W/STAIN: Performed by: INTERNAL MEDICINE

## 2018-01-01 PROCEDURE — 85025 COMPLETE CBC W/AUTO DIFF WBC: CPT | Performed by: EMERGENCY MEDICINE

## 2018-01-01 PROCEDURE — 87070 CULTURE OTHR SPECIMN AEROBIC: CPT | Performed by: INTERNAL MEDICINE

## 2018-01-01 PROCEDURE — 80048 BASIC METABOLIC PNL TOTAL CA: CPT | Performed by: INTERNAL MEDICINE

## 2018-01-01 PROCEDURE — 25000131 ZZH RX MED GY IP 250 OP 636 PS 637: Performed by: INTERNAL MEDICINE

## 2018-01-01 PROCEDURE — 84132 ASSAY OF SERUM POTASSIUM: CPT | Performed by: INTERNAL MEDICINE

## 2018-01-01 PROCEDURE — 88341 IMHCHEM/IMCYTCHM EA ADD ANTB: CPT | Performed by: INTERNAL MEDICINE

## 2018-01-01 PROCEDURE — 84145 PROCALCITONIN (PCT): CPT | Performed by: HOSPITALIST

## 2018-01-01 PROCEDURE — 84155 ASSAY OF PROTEIN SERUM: CPT | Performed by: INTERNAL MEDICINE

## 2018-01-01 PROCEDURE — 99223 1ST HOSP IP/OBS HIGH 75: CPT | Performed by: NURSE PRACTITIONER

## 2018-01-01 PROCEDURE — 80053 COMPREHEN METABOLIC PANEL: CPT | Performed by: HOSPITALIST

## 2018-01-01 PROCEDURE — 25000125 ZZHC RX 250: Performed by: EMERGENCY MEDICINE

## 2018-01-01 PROCEDURE — 99285 EMERGENCY DEPT VISIT HI MDM: CPT | Mod: 25

## 2018-01-01 PROCEDURE — 81001 URINALYSIS AUTO W/SCOPE: CPT | Performed by: EMERGENCY MEDICINE

## 2018-01-01 PROCEDURE — 87206 SMEAR FLUORESCENT/ACID STAI: CPT | Performed by: INTERNAL MEDICINE

## 2018-01-01 PROCEDURE — 87205 SMEAR GRAM STAIN: CPT | Performed by: INTERNAL MEDICINE

## 2018-01-01 PROCEDURE — 97110 THERAPEUTIC EXERCISES: CPT | Mod: GP

## 2018-01-01 PROCEDURE — 25000131 ZZH RX MED GY IP 250 OP 636 PS 637: Mod: GY | Performed by: INTERNAL MEDICINE

## 2018-01-01 PROCEDURE — 99207 ZZC MOONLIGHTING INDICATOR: CPT | Performed by: INTERNAL MEDICINE

## 2018-01-01 PROCEDURE — 85027 COMPLETE CBC AUTOMATED: CPT | Performed by: INTERNAL MEDICINE

## 2018-01-01 PROCEDURE — 80053 COMPREHEN METABOLIC PANEL: CPT | Performed by: EMERGENCY MEDICINE

## 2018-01-01 PROCEDURE — 83605 ASSAY OF LACTIC ACID: CPT | Performed by: EMERGENCY MEDICINE

## 2018-01-01 PROCEDURE — 97530 THERAPEUTIC ACTIVITIES: CPT | Mod: GP

## 2018-01-01 PROCEDURE — 25000131 ZZH RX MED GY IP 250 OP 636 PS 637: Performed by: PHYSICIAN ASSISTANT

## 2018-01-01 PROCEDURE — 88305 TISSUE EXAM BY PATHOLOGIST: CPT | Mod: 26 | Performed by: INTERNAL MEDICINE

## 2018-01-01 PROCEDURE — 87116 MYCOBACTERIA CULTURE: CPT | Performed by: INTERNAL MEDICINE

## 2018-01-01 PROCEDURE — 97530 THERAPEUTIC ACTIVITIES: CPT | Mod: GO

## 2018-01-01 PROCEDURE — 85027 COMPLETE CBC AUTOMATED: CPT | Performed by: PHYSICIAN ASSISTANT

## 2018-01-01 PROCEDURE — 85048 AUTOMATED LEUKOCYTE COUNT: CPT | Performed by: HOSPITALIST

## 2018-01-01 PROCEDURE — 97116 GAIT TRAINING THERAPY: CPT | Mod: GP

## 2018-01-01 PROCEDURE — 97165 OT EVAL LOW COMPLEX 30 MIN: CPT | Mod: GO | Performed by: REHABILITATION PRACTITIONER

## 2018-01-01 PROCEDURE — 40000809 ZZH STATISTIC NO DOCUMENTATION TO SUPPORT CHARGE

## 2018-01-01 PROCEDURE — 40000863 ZZH STATISTIC RADIOLOGY XRAY, US, CT, MAR, NM

## 2018-01-01 PROCEDURE — 88112 CYTOPATH CELL ENHANCE TECH: CPT | Mod: 26 | Performed by: INTERNAL MEDICINE

## 2018-01-01 PROCEDURE — 87076 CULTURE ANAEROBE IDENT EACH: CPT | Performed by: INTERNAL MEDICINE

## 2018-01-01 PROCEDURE — 83605 ASSAY OF LACTIC ACID: CPT | Performed by: INTERNAL MEDICINE

## 2018-01-01 PROCEDURE — 97535 SELF CARE MNGMENT TRAINING: CPT | Mod: GO | Performed by: REHABILITATION PRACTITIONER

## 2018-01-01 PROCEDURE — 96361 HYDRATE IV INFUSION ADD-ON: CPT

## 2018-01-01 PROCEDURE — 85025 COMPLETE CBC W/AUTO DIFF WBC: CPT | Performed by: INTERNAL MEDICINE

## 2018-01-01 PROCEDURE — 99233 SBSQ HOSP IP/OBS HIGH 50: CPT | Performed by: HOSPITALIST

## 2018-01-01 PROCEDURE — 99239 HOSP IP/OBS DSCHRG MGMT >30: CPT | Performed by: HOSPITALIST

## 2018-01-01 PROCEDURE — 99232 SBSQ HOSP IP/OBS MODERATE 35: CPT | Performed by: STUDENT IN AN ORGANIZED HEALTH CARE EDUCATION/TRAINING PROGRAM

## 2018-01-01 PROCEDURE — 40000133 ZZH STATISTIC OT WARD VISIT: Performed by: OCCUPATIONAL THERAPIST

## 2018-01-01 PROCEDURE — 88342 IMHCHEM/IMCYTCHM 1ST ANTB: CPT | Performed by: INTERNAL MEDICINE

## 2018-01-01 PROCEDURE — 82945 GLUCOSE OTHER FLUID: CPT | Performed by: INTERNAL MEDICINE

## 2018-01-01 PROCEDURE — 99223 1ST HOSP IP/OBS HIGH 75: CPT | Mod: AI | Performed by: PHYSICIAN ASSISTANT

## 2018-01-01 PROCEDURE — 0W993ZZ DRAINAGE OF RIGHT PLEURAL CAVITY, PERCUTANEOUS APPROACH: ICD-10-PCS | Performed by: RADIOLOGY

## 2018-01-01 PROCEDURE — 83880 ASSAY OF NATRIURETIC PEPTIDE: CPT | Performed by: HOSPITALIST

## 2018-01-01 PROCEDURE — 84132 ASSAY OF SERUM POTASSIUM: CPT | Performed by: HOSPITALIST

## 2018-01-01 PROCEDURE — 80053 COMPREHEN METABOLIC PANEL: CPT | Performed by: INTERNAL MEDICINE

## 2018-01-01 PROCEDURE — 83615 LACTATE (LD) (LDH) ENZYME: CPT | Performed by: INTERNAL MEDICINE

## 2018-01-01 PROCEDURE — 85520 HEPARIN ASSAY: CPT | Performed by: INTERNAL MEDICINE

## 2018-01-01 PROCEDURE — 99310 SBSQ NF CARE HIGH MDM 45: CPT | Performed by: NURSE PRACTITIONER

## 2018-01-01 PROCEDURE — 83986 ASSAY PH BODY FLUID NOS: CPT | Performed by: INTERNAL MEDICINE

## 2018-01-01 PROCEDURE — 25000128 H RX IP 250 OP 636: Performed by: EMERGENCY MEDICINE

## 2018-01-01 PROCEDURE — 71046 X-RAY EXAM CHEST 2 VIEWS: CPT

## 2018-01-01 PROCEDURE — 87205 SMEAR GRAM STAIN: CPT | Performed by: PHYSICIAN ASSISTANT

## 2018-01-01 PROCEDURE — 00000102 ZZHCL STATISTIC CYTO WRIGHT STAIN TC: Performed by: INTERNAL MEDICINE

## 2018-01-01 PROCEDURE — 83690 ASSAY OF LIPASE: CPT | Performed by: EMERGENCY MEDICINE

## 2018-01-01 PROCEDURE — 25000125 ZZHC RX 250: Performed by: RADIOLOGY

## 2018-01-01 PROCEDURE — 83036 HEMOGLOBIN GLYCOSYLATED A1C: CPT | Performed by: EMERGENCY MEDICINE

## 2018-01-01 PROCEDURE — 40000986 XR CHEST 1 VW

## 2018-01-01 PROCEDURE — 80076 HEPATIC FUNCTION PANEL: CPT | Performed by: INTERNAL MEDICINE

## 2018-01-01 PROCEDURE — 82565 ASSAY OF CREATININE: CPT | Performed by: HOSPITALIST

## 2018-01-01 PROCEDURE — G0463 HOSPITAL OUTPT CLINIC VISIT: HCPCS

## 2018-01-01 PROCEDURE — 97161 PT EVAL LOW COMPLEX 20 MIN: CPT | Mod: GP | Performed by: PHYSICAL THERAPIST

## 2018-01-01 PROCEDURE — 84100 ASSAY OF PHOSPHORUS: CPT | Performed by: INTERNAL MEDICINE

## 2018-01-01 PROCEDURE — 71250 CT THORAX DX C-: CPT

## 2018-01-01 PROCEDURE — 88112 CYTOPATH CELL ENHANCE TECH: CPT | Performed by: INTERNAL MEDICINE

## 2018-01-01 PROCEDURE — 88305 TISSUE EXAM BY PATHOLOGIST: CPT | Performed by: INTERNAL MEDICINE

## 2018-01-01 PROCEDURE — 32555 ASPIRATE PLEURA W/ IMAGING: CPT

## 2018-01-01 PROCEDURE — 25000131 ZZH RX MED GY IP 250 OP 636 PS 637: Mod: GY | Performed by: PHYSICIAN ASSISTANT

## 2018-01-01 PROCEDURE — 87040 BLOOD CULTURE FOR BACTERIA: CPT | Performed by: EMERGENCY MEDICINE

## 2018-01-01 PROCEDURE — 89051 BODY FLUID CELL COUNT: CPT | Performed by: INTERNAL MEDICINE

## 2018-01-01 PROCEDURE — 96365 THER/PROPH/DIAG IV INF INIT: CPT

## 2018-01-01 PROCEDURE — 36415 COLL VENOUS BLD VENIPUNCTURE: CPT | Performed by: PHYSICIAN ASSISTANT

## 2018-01-01 PROCEDURE — 84145 PROCALCITONIN (PCT): CPT | Performed by: PHYSICIAN ASSISTANT

## 2018-01-01 RX ORDER — DIGOXIN 125 MCG
125 TABLET ORAL DAILY
Status: DISCONTINUED | OUTPATIENT
Start: 2018-01-01 | End: 2018-01-01 | Stop reason: HOSPADM

## 2018-01-01 RX ORDER — NYSTATIN 100000/ML
5 SUSPENSION, ORAL (FINAL DOSE FORM) ORAL 4 TIMES DAILY
COMMUNITY
Start: 2018-01-01 | End: 2018-01-01

## 2018-01-01 RX ORDER — METHYLPREDNISOLONE SODIUM SUCCINATE 125 MG/2ML
125 INJECTION, POWDER, LYOPHILIZED, FOR SOLUTION INTRAMUSCULAR; INTRAVENOUS
Status: DISCONTINUED | OUTPATIENT
Start: 2018-01-01 | End: 2018-01-01 | Stop reason: HOSPADM

## 2018-01-01 RX ORDER — FUROSEMIDE 20 MG
20 TABLET ORAL EVERY OTHER DAY
Status: DISCONTINUED | OUTPATIENT
Start: 2018-01-01 | End: 2018-01-01 | Stop reason: HOSPADM

## 2018-01-01 RX ORDER — ZOLPIDEM TARTRATE 5 MG/1
5 TABLET ORAL AT BEDTIME
COMMUNITY
End: 2019-01-01

## 2018-01-01 RX ORDER — DEXTROSE MONOHYDRATE 25 G/50ML
25-50 INJECTION, SOLUTION INTRAVENOUS
Status: DISCONTINUED | OUTPATIENT
Start: 2018-01-01 | End: 2018-01-01

## 2018-01-01 RX ORDER — ALBUTEROL SULFATE 0.83 MG/ML
2.5 SOLUTION RESPIRATORY (INHALATION)
Refills: 0 | DISCHARGE
Start: 2018-01-01 | End: 2019-01-01

## 2018-01-01 RX ORDER — MULTIPLE VITAMINS W/ MINERALS TAB 9MG-400MCG
1 TAB ORAL DAILY
Qty: 90 TABLET | Refills: 3 | DISCHARGE
Start: 2018-01-01 | End: 2019-01-01

## 2018-01-01 RX ORDER — NYSTATIN 100000/ML
500000 SUSPENSION, ORAL (FINAL DOSE FORM) ORAL 4 TIMES DAILY
Qty: 200 ML | Refills: 0 | DISCHARGE
Start: 2018-01-01 | End: 2018-01-01

## 2018-01-01 RX ORDER — ONDANSETRON 4 MG/1
4 TABLET, FILM COATED ORAL EVERY 6 HOURS PRN
COMMUNITY
End: 2019-01-01

## 2018-01-01 RX ORDER — TADALAFIL 20 MG/1
20 TABLET ORAL DAILY
Status: ON HOLD | COMMUNITY
End: 2018-01-01

## 2018-01-01 RX ORDER — COLCHICINE 0.6 MG/1
0.6 TABLET ORAL EVERY EVENING
Status: DISCONTINUED | OUTPATIENT
Start: 2018-01-01 | End: 2018-01-01 | Stop reason: HOSPADM

## 2018-01-01 RX ORDER — IBUPROFEN 600 MG/1
600 TABLET, FILM COATED ORAL ONCE
Status: COMPLETED | OUTPATIENT
Start: 2018-01-01 | End: 2018-01-01

## 2018-01-01 RX ORDER — AMOXICILLIN 250 MG
1 CAPSULE ORAL 2 TIMES DAILY
Qty: 180 TABLET | Refills: 0 | DISCHARGE
Start: 2018-01-01 | End: 2018-01-01

## 2018-01-01 RX ORDER — POTASSIUM CL/LIDO/0.9 % NACL 10MEQ/0.1L
10 INTRAVENOUS SOLUTION, PIGGYBACK (ML) INTRAVENOUS
Status: DISCONTINUED | OUTPATIENT
Start: 2018-01-01 | End: 2018-01-01 | Stop reason: HOSPADM

## 2018-01-01 RX ORDER — PANTOPRAZOLE SODIUM 40 MG/1
40 TABLET, DELAYED RELEASE ORAL
Status: DISCONTINUED | OUTPATIENT
Start: 2018-01-01 | End: 2018-01-01

## 2018-01-01 RX ORDER — ACETAMINOPHEN 325 MG/1
650 TABLET ORAL EVERY 4 HOURS PRN
Status: DISCONTINUED | OUTPATIENT
Start: 2018-01-01 | End: 2018-01-01 | Stop reason: HOSPADM

## 2018-01-01 RX ORDER — ONDANSETRON 4 MG/1
4 TABLET, ORALLY DISINTEGRATING ORAL EVERY 6 HOURS PRN
Status: DISCONTINUED | OUTPATIENT
Start: 2018-01-01 | End: 2018-01-01 | Stop reason: HOSPADM

## 2018-01-01 RX ORDER — DIPHENHYDRAMINE HYDROCHLORIDE 50 MG/ML
50 INJECTION INTRAMUSCULAR; INTRAVENOUS
Status: DISCONTINUED | OUTPATIENT
Start: 2018-01-01 | End: 2018-01-01 | Stop reason: HOSPADM

## 2018-01-01 RX ORDER — PROCHLORPERAZINE 25 MG
12.5 SUPPOSITORY, RECTAL RECTAL EVERY 12 HOURS PRN
Status: DISCONTINUED | OUTPATIENT
Start: 2018-01-01 | End: 2018-01-01 | Stop reason: HOSPADM

## 2018-01-01 RX ORDER — LIDOCAINE HYDROCHLORIDE 10 MG/ML
10 INJECTION, SOLUTION EPIDURAL; INFILTRATION; INTRACAUDAL; PERINEURAL ONCE
Status: COMPLETED | OUTPATIENT
Start: 2018-01-01 | End: 2018-01-01

## 2018-01-01 RX ORDER — ACETAMINOPHEN 650 MG/1
650 SUPPOSITORY RECTAL EVERY 4 HOURS PRN
Status: DISCONTINUED | OUTPATIENT
Start: 2018-01-01 | End: 2018-01-01 | Stop reason: HOSPADM

## 2018-01-01 RX ORDER — DEXTROSE MONOHYDRATE 25 G/50ML
25-50 INJECTION, SOLUTION INTRAVENOUS
Status: DISCONTINUED | OUTPATIENT
Start: 2018-01-01 | End: 2018-01-01 | Stop reason: HOSPADM

## 2018-01-01 RX ORDER — ANALGESIC BALM 1.74; 4.06 G/29G; G/29G
OINTMENT TOPICAL EVERY 6 HOURS PRN
Status: DISCONTINUED | OUTPATIENT
Start: 2018-01-01 | End: 2018-01-01 | Stop reason: HOSPADM

## 2018-01-01 RX ORDER — NICOTINE POLACRILEX 4 MG
15-30 LOZENGE BUCCAL
Status: DISCONTINUED | OUTPATIENT
Start: 2018-01-01 | End: 2018-01-01

## 2018-01-01 RX ORDER — AMLODIPINE BESYLATE 2.5 MG/1
2.5 TABLET ORAL DAILY
COMMUNITY
End: 2019-01-01

## 2018-01-01 RX ORDER — EPINEPHRINE 1 MG/ML
0.3 INJECTION, SOLUTION, CONCENTRATE INTRAVENOUS EVERY 5 MIN PRN
Status: DISCONTINUED | OUTPATIENT
Start: 2018-01-01 | End: 2018-01-01 | Stop reason: HOSPADM

## 2018-01-01 RX ORDER — FUROSEMIDE 20 MG
20 TABLET ORAL DAILY
Qty: 14 TABLET | Refills: 0 | DISCHARGE
Start: 2018-01-01 | End: 2019-01-01

## 2018-01-01 RX ORDER — TADALAFIL 20 MG/1
20 TABLET ORAL DAILY
Status: DISCONTINUED | OUTPATIENT
Start: 2018-01-01 | End: 2018-01-01

## 2018-01-01 RX ORDER — PROCHLORPERAZINE MALEATE 5 MG
5-10 TABLET ORAL EVERY 6 HOURS PRN
Status: DISCONTINUED | OUTPATIENT
Start: 2018-01-01 | End: 2018-01-01 | Stop reason: HOSPADM

## 2018-01-01 RX ORDER — PREDNISONE 10 MG/1
10 TABLET ORAL DAILY
COMMUNITY
End: 2019-01-01

## 2018-01-01 RX ORDER — ACETAMINOPHEN 325 MG/1
650 TABLET ORAL EVERY 6 HOURS PRN
COMMUNITY

## 2018-01-01 RX ORDER — TADALAFIL 20 MG/1
20 TABLET ORAL DAILY
COMMUNITY
End: 2019-01-01

## 2018-01-01 RX ORDER — TADALAFIL 5 MG/1
20 TABLET ORAL DAILY
Status: DISCONTINUED | OUTPATIENT
Start: 2018-01-01 | End: 2018-01-01 | Stop reason: HOSPADM

## 2018-01-01 RX ORDER — POTASSIUM CHLORIDE 1500 MG/1
20-40 TABLET, EXTENDED RELEASE ORAL
Status: DISCONTINUED | OUTPATIENT
Start: 2018-01-01 | End: 2018-01-01 | Stop reason: HOSPADM

## 2018-01-01 RX ORDER — ASPIRIN 81 MG/1
81 TABLET, CHEWABLE ORAL AT BEDTIME
Status: DISCONTINUED | OUTPATIENT
Start: 2018-01-01 | End: 2018-01-01 | Stop reason: HOSPADM

## 2018-01-01 RX ORDER — LORAZEPAM 2 MG/ML
.5-1 INJECTION INTRAMUSCULAR EVERY 6 HOURS PRN
Status: DISCONTINUED | OUTPATIENT
Start: 2018-01-01 | End: 2018-01-01 | Stop reason: HOSPADM

## 2018-01-01 RX ORDER — ONDANSETRON 4 MG/1
4 TABLET, ORALLY DISINTEGRATING ORAL EVERY 6 HOURS PRN
DISCHARGE
Start: 2018-01-01 | End: 2018-01-01

## 2018-01-01 RX ORDER — BUDESONIDE 0.5 MG/2ML
0.5 INHALANT ORAL 2 TIMES DAILY
Qty: 180 AMPULE | Refills: 0 | DISCHARGE
Start: 2018-01-01 | End: 2019-01-01

## 2018-01-01 RX ORDER — FUROSEMIDE 10 MG/ML
20 INJECTION INTRAMUSCULAR; INTRAVENOUS ONCE
Status: COMPLETED | OUTPATIENT
Start: 2018-01-01 | End: 2018-01-01

## 2018-01-01 RX ORDER — AMOXICILLIN 875 MG
875 TABLET ORAL 2 TIMES DAILY
Start: 2018-01-01 | End: 2018-01-01

## 2018-01-01 RX ORDER — PREDNISONE 10 MG/1
TABLET ORAL
Qty: 30 TABLET | Refills: 0 | DISCHARGE
Start: 2018-01-01 | End: 2018-01-01

## 2018-01-01 RX ORDER — NYSTATIN 100000/ML
500000 SUSPENSION, ORAL (FINAL DOSE FORM) ORAL 4 TIMES DAILY
Status: DISCONTINUED | OUTPATIENT
Start: 2018-01-01 | End: 2018-01-01 | Stop reason: HOSPADM

## 2018-01-01 RX ORDER — POTASSIUM CHLORIDE 1.5 G/1.58G
20-40 POWDER, FOR SOLUTION ORAL
Status: DISCONTINUED | OUTPATIENT
Start: 2018-01-01 | End: 2018-01-01 | Stop reason: HOSPADM

## 2018-01-01 RX ORDER — LORAZEPAM 0.5 MG/1
.5-1 TABLET ORAL EVERY 6 HOURS PRN
Status: DISCONTINUED | OUTPATIENT
Start: 2018-01-01 | End: 2018-01-01 | Stop reason: HOSPADM

## 2018-01-01 RX ORDER — OXYCODONE HYDROCHLORIDE 5 MG/1
5 TABLET ORAL EVERY 4 HOURS PRN
Status: DISCONTINUED | OUTPATIENT
Start: 2018-01-01 | End: 2018-01-01 | Stop reason: HOSPADM

## 2018-01-01 RX ORDER — ROSUVASTATIN CALCIUM 10 MG/1
10 TABLET, COATED ORAL EVERY EVENING
Status: DISCONTINUED | OUTPATIENT
Start: 2018-01-01 | End: 2018-01-01 | Stop reason: HOSPADM

## 2018-01-01 RX ORDER — SODIUM CHLORIDE 9 MG/ML
1000 INJECTION, SOLUTION INTRAVENOUS CONTINUOUS PRN
Status: DISCONTINUED | OUTPATIENT
Start: 2018-01-01 | End: 2018-01-01 | Stop reason: HOSPADM

## 2018-01-01 RX ORDER — POTASSIUM CHLORIDE 7.45 MG/ML
10 INJECTION INTRAVENOUS
Status: DISCONTINUED | OUTPATIENT
Start: 2018-01-01 | End: 2018-01-01 | Stop reason: HOSPADM

## 2018-01-01 RX ORDER — NALOXONE HYDROCHLORIDE 0.4 MG/ML
.1-.4 INJECTION, SOLUTION INTRAMUSCULAR; INTRAVENOUS; SUBCUTANEOUS
Status: DISCONTINUED | OUTPATIENT
Start: 2018-01-01 | End: 2018-01-01 | Stop reason: HOSPADM

## 2018-01-01 RX ORDER — POTASSIUM CHLORIDE 29.8 MG/ML
20 INJECTION INTRAVENOUS
Status: DISCONTINUED | OUTPATIENT
Start: 2018-01-01 | End: 2018-01-01 | Stop reason: HOSPADM

## 2018-01-01 RX ORDER — MEPERIDINE HYDROCHLORIDE 25 MG/ML
25 INJECTION INTRAMUSCULAR; INTRAVENOUS; SUBCUTANEOUS EVERY 30 MIN PRN
Status: DISCONTINUED | OUTPATIENT
Start: 2018-01-01 | End: 2018-01-01 | Stop reason: HOSPADM

## 2018-01-01 RX ORDER — PREDNISONE 20 MG/1
40 TABLET ORAL DAILY
Status: DISCONTINUED | OUTPATIENT
Start: 2018-01-01 | End: 2018-01-01

## 2018-01-01 RX ORDER — AMLODIPINE BESYLATE 2.5 MG/1
2.5 TABLET ORAL DAILY
Status: DISCONTINUED | OUTPATIENT
Start: 2018-01-01 | End: 2018-01-01 | Stop reason: HOSPADM

## 2018-01-01 RX ORDER — ALBUTEROL SULFATE 0.83 MG/ML
2.5 SOLUTION RESPIRATORY (INHALATION)
Status: DISCONTINUED | OUTPATIENT
Start: 2018-01-01 | End: 2018-01-01 | Stop reason: HOSPADM

## 2018-01-01 RX ORDER — METOPROLOL SUCCINATE 25 MG/1
25 TABLET, EXTENDED RELEASE ORAL 2 TIMES DAILY
Status: DISCONTINUED | OUTPATIENT
Start: 2018-01-01 | End: 2018-01-01 | Stop reason: HOSPADM

## 2018-01-01 RX ORDER — NICOTINE POLACRILEX 4 MG
15-30 LOZENGE BUCCAL
Status: DISCONTINUED | OUTPATIENT
Start: 2018-01-01 | End: 2018-01-01 | Stop reason: HOSPADM

## 2018-01-01 RX ORDER — TAMSULOSIN HYDROCHLORIDE 0.4 MG/1
0.4 CAPSULE ORAL DAILY
Status: DISCONTINUED | OUTPATIENT
Start: 2018-01-01 | End: 2018-01-01 | Stop reason: HOSPADM

## 2018-01-01 RX ORDER — DEXAMETHASONE 4 MG/1
8 TABLET ORAL DAILY
Status: COMPLETED | OUTPATIENT
Start: 2018-01-01 | End: 2018-01-01

## 2018-01-01 RX ORDER — PREDNISONE 20 MG/1
60 TABLET ORAL DAILY
Status: COMPLETED | OUTPATIENT
Start: 2018-01-01 | End: 2018-01-01

## 2018-01-01 RX ORDER — PREDNISONE 20 MG/1
60 TABLET ORAL DAILY
Status: DISCONTINUED | OUTPATIENT
Start: 2018-01-01 | End: 2018-01-01

## 2018-01-01 RX ORDER — ONDANSETRON 8 MG/1
16 TABLET, FILM COATED ORAL ONCE
Status: COMPLETED | OUTPATIENT
Start: 2018-01-01 | End: 2018-01-01

## 2018-01-01 RX ORDER — PANTOPRAZOLE SODIUM 40 MG/1
40 TABLET, DELAYED RELEASE ORAL
Status: DISCONTINUED | OUTPATIENT
Start: 2018-01-01 | End: 2018-01-01 | Stop reason: HOSPADM

## 2018-01-01 RX ORDER — LEVOFLOXACIN 5 MG/ML
500 INJECTION, SOLUTION INTRAVENOUS EVERY 24 HOURS
Status: DISCONTINUED | OUTPATIENT
Start: 2018-01-01 | End: 2018-01-01

## 2018-01-01 RX ORDER — METHYLPREDNISOLONE SODIUM SUCCINATE 40 MG/ML
40 INJECTION, POWDER, LYOPHILIZED, FOR SOLUTION INTRAMUSCULAR; INTRAVENOUS ONCE
Status: COMPLETED | OUTPATIENT
Start: 2018-01-01 | End: 2018-01-01

## 2018-01-01 RX ORDER — IPRATROPIUM BROMIDE AND ALBUTEROL SULFATE 2.5; .5 MG/3ML; MG/3ML
1 SOLUTION RESPIRATORY (INHALATION)
Status: DISCONTINUED | OUTPATIENT
Start: 2018-01-01 | End: 2018-01-01

## 2018-01-01 RX ORDER — PIPERACILLIN SODIUM, TAZOBACTAM SODIUM 3; .375 G/15ML; G/15ML
3.38 INJECTION, POWDER, LYOPHILIZED, FOR SOLUTION INTRAVENOUS ONCE
Status: COMPLETED | OUTPATIENT
Start: 2018-01-01 | End: 2018-01-01

## 2018-01-01 RX ORDER — IOPAMIDOL 755 MG/ML
71 INJECTION, SOLUTION INTRAVASCULAR ONCE
Status: COMPLETED | OUTPATIENT
Start: 2018-01-01 | End: 2018-01-01

## 2018-01-01 RX ORDER — MULTIPLE VITAMINS W/ MINERALS TAB 9MG-400MCG
1 TAB ORAL DAILY
Status: DISCONTINUED | OUTPATIENT
Start: 2018-01-01 | End: 2018-01-01 | Stop reason: HOSPADM

## 2018-01-01 RX ORDER — VANCOMYCIN HYDROCHLORIDE 125 MG/1
125 CAPSULE ORAL 4 TIMES DAILY
COMMUNITY
End: 2019-01-01

## 2018-01-01 RX ORDER — IPRATROPIUM BROMIDE AND ALBUTEROL SULFATE 2.5; .5 MG/3ML; MG/3ML
3 SOLUTION RESPIRATORY (INHALATION)
Status: DISCONTINUED | OUTPATIENT
Start: 2018-01-01 | End: 2018-01-01 | Stop reason: HOSPADM

## 2018-01-01 RX ORDER — ALBUTEROL SULFATE 90 UG/1
1-2 AEROSOL, METERED RESPIRATORY (INHALATION)
Status: DISCONTINUED | OUTPATIENT
Start: 2018-01-01 | End: 2018-01-01 | Stop reason: HOSPADM

## 2018-01-01 RX ORDER — METHYLPREDNISOLONE SODIUM SUCCINATE 40 MG/ML
40 INJECTION, POWDER, LYOPHILIZED, FOR SOLUTION INTRAMUSCULAR; INTRAVENOUS EVERY 8 HOURS
Status: DISCONTINUED | OUTPATIENT
Start: 2018-01-01 | End: 2018-01-01

## 2018-01-01 RX ORDER — LEVOTHYROXINE SODIUM 125 UG/1
125 TABLET ORAL DAILY
COMMUNITY
End: 2019-01-01

## 2018-01-01 RX ORDER — IOPAMIDOL 755 MG/ML
59 INJECTION, SOLUTION INTRAVASCULAR ONCE
Status: COMPLETED | OUTPATIENT
Start: 2018-01-01 | End: 2018-01-01

## 2018-01-01 RX ORDER — IPRATROPIUM BROMIDE AND ALBUTEROL SULFATE 2.5; .5 MG/3ML; MG/3ML
1 SOLUTION RESPIRATORY (INHALATION) 3 TIMES DAILY PRN
Status: DISCONTINUED | OUTPATIENT
Start: 2018-01-01 | End: 2018-01-01

## 2018-01-01 RX ORDER — METHYLPREDNISOLONE SODIUM SUCCINATE 40 MG/ML
40 INJECTION, POWDER, LYOPHILIZED, FOR SOLUTION INTRAMUSCULAR; INTRAVENOUS EVERY 6 HOURS
Status: DISCONTINUED | OUTPATIENT
Start: 2018-01-01 | End: 2018-01-01

## 2018-01-01 RX ORDER — PROCHLORPERAZINE MALEATE 5 MG
5 TABLET ORAL EVERY 6 HOURS PRN
Status: DISCONTINUED | OUTPATIENT
Start: 2018-01-01 | End: 2018-01-01 | Stop reason: DRUGHIGH

## 2018-01-01 RX ORDER — PREDNISONE 10 MG/1
10 TABLET ORAL DAILY
Status: DISCONTINUED | OUTPATIENT
Start: 2018-01-01 | End: 2018-01-01

## 2018-01-01 RX ORDER — LISINOPRIL 20 MG/1
20 TABLET ORAL DAILY
Status: DISCONTINUED | OUTPATIENT
Start: 2018-01-01 | End: 2018-01-01 | Stop reason: HOSPADM

## 2018-01-01 RX ORDER — DEXAMETHASONE 2 MG/1
6 TABLET ORAL ONCE
Status: COMPLETED | OUTPATIENT
Start: 2018-01-01 | End: 2018-01-01

## 2018-01-01 RX ORDER — ONDANSETRON 2 MG/ML
4 INJECTION INTRAMUSCULAR; INTRAVENOUS EVERY 6 HOURS PRN
Status: DISCONTINUED | OUTPATIENT
Start: 2018-01-01 | End: 2018-01-01 | Stop reason: HOSPADM

## 2018-01-01 RX ORDER — AMOXICILLIN 250 MG
1 CAPSULE ORAL 2 TIMES DAILY
Status: DISCONTINUED | OUTPATIENT
Start: 2018-01-01 | End: 2018-01-01 | Stop reason: HOSPADM

## 2018-01-01 RX ORDER — LEVOFLOXACIN 500 MG/1
500 TABLET, FILM COATED ORAL DAILY
Status: COMPLETED | OUTPATIENT
Start: 2018-01-01 | End: 2018-01-01

## 2018-01-01 RX ORDER — IPRATROPIUM BROMIDE AND ALBUTEROL SULFATE 2.5; .5 MG/3ML; MG/3ML
1 SOLUTION RESPIRATORY (INHALATION) 3 TIMES DAILY PRN
COMMUNITY
End: 2019-01-01

## 2018-01-01 RX ORDER — BUDESONIDE 0.5 MG/2ML
0.5 INHALANT ORAL 2 TIMES DAILY
Status: DISCONTINUED | OUTPATIENT
Start: 2018-01-01 | End: 2018-01-01 | Stop reason: HOSPADM

## 2018-01-01 RX ORDER — POLYMYXIN B SULFATE AND TRIMETHOPRIM 1; 10000 MG/ML; [USP'U]/ML
1-2 SOLUTION OPHTHALMIC EVERY 6 HOURS
Qty: 4 ML | Refills: 0
Start: 2018-01-01 | End: 2018-01-01

## 2018-01-01 RX ADMIN — Medication 1 MG: at 02:25

## 2018-01-01 RX ADMIN — SENNOSIDES AND DOCUSATE SODIUM 1 TABLET: 8.6; 5 TABLET ORAL at 09:11

## 2018-01-01 RX ADMIN — METHYLPREDNISOLONE SODIUM SUCCINATE 40 MG: 40 INJECTION, POWDER, FOR SOLUTION INTRAMUSCULAR; INTRAVENOUS at 06:15

## 2018-01-01 RX ADMIN — INSULIN GLARGINE 5 UNITS: 100 INJECTION, SOLUTION SUBCUTANEOUS at 21:32

## 2018-01-01 RX ADMIN — APIXABAN 10 MG: 5 TABLET, FILM COATED ORAL at 21:38

## 2018-01-01 RX ADMIN — NYSTATIN 500000 UNITS: 500000 SUSPENSION ORAL at 21:18

## 2018-01-01 RX ADMIN — IPRATROPIUM BROMIDE AND ALBUTEROL SULFATE 3 ML: 2.5; .5 SOLUTION RESPIRATORY (INHALATION) at 07:22

## 2018-01-01 RX ADMIN — NYSTATIN 500000 UNITS: 500000 SUSPENSION ORAL at 07:54

## 2018-01-01 RX ADMIN — AMLODIPINE BESYLATE 2.5 MG: 2.5 TABLET ORAL at 08:47

## 2018-01-01 RX ADMIN — LEVOTHYROXINE SODIUM 125 MCG: 125 TABLET ORAL at 09:15

## 2018-01-01 RX ADMIN — LEVOFLOXACIN 500 MG: 5 INJECTION, SOLUTION INTRAVENOUS at 15:27

## 2018-01-01 RX ADMIN — BUDESONIDE 0.5 MG: 0.5 INHALANT RESPIRATORY (INHALATION) at 19:35

## 2018-01-01 RX ADMIN — LISINOPRIL 20 MG: 20 TABLET ORAL at 09:09

## 2018-01-01 RX ADMIN — ROSUVASTATIN CALCIUM 10 MG: 10 TABLET, FILM COATED ORAL at 19:52

## 2018-01-01 RX ADMIN — ANALGESIC BALM: 1.74; 4.06 OINTMENT TOPICAL at 22:07

## 2018-01-01 RX ADMIN — TAMSULOSIN HYDROCHLORIDE 0.4 MG: 0.4 CAPSULE ORAL at 09:48

## 2018-01-01 RX ADMIN — SENNOSIDES AND DOCUSATE SODIUM 1 TABLET: 8.6; 5 TABLET ORAL at 10:09

## 2018-01-01 RX ADMIN — APIXABAN 10 MG: 5 TABLET, FILM COATED ORAL at 09:14

## 2018-01-01 RX ADMIN — IPRATROPIUM BROMIDE AND ALBUTEROL SULFATE 3 ML: 2.5; .5 SOLUTION RESPIRATORY (INHALATION) at 15:33

## 2018-01-01 RX ADMIN — IPRATROPIUM BROMIDE AND ALBUTEROL SULFATE 3 ML: 2.5; .5 SOLUTION RESPIRATORY (INHALATION) at 15:37

## 2018-01-01 RX ADMIN — IPRATROPIUM BROMIDE AND ALBUTEROL SULFATE 3 ML: 2.5; .5 SOLUTION RESPIRATORY (INHALATION) at 19:04

## 2018-01-01 RX ADMIN — OMEPRAZOLE 40 MG: 20 CAPSULE, DELAYED RELEASE ORAL at 08:43

## 2018-01-01 RX ADMIN — LEVOTHYROXINE SODIUM 125 MCG: 125 TABLET ORAL at 08:46

## 2018-01-01 RX ADMIN — ACETAMINOPHEN 650 MG: 325 TABLET, FILM COATED ORAL at 02:25

## 2018-01-01 RX ADMIN — ASPIRIN 81 MG 81 MG: 81 TABLET ORAL at 21:17

## 2018-01-01 RX ADMIN — ASPIRIN 81 MG 81 MG: 81 TABLET ORAL at 21:18

## 2018-01-01 RX ADMIN — LISINOPRIL 20 MG: 20 TABLET ORAL at 09:10

## 2018-01-01 RX ADMIN — APIXABAN 5 MG: 5 TABLET, FILM COATED ORAL at 21:15

## 2018-01-01 RX ADMIN — Medication 1 ML: at 21:33

## 2018-01-01 RX ADMIN — INSULIN ASPART 6 UNITS: 100 INJECTION, SOLUTION INTRAVENOUS; SUBCUTANEOUS at 18:39

## 2018-01-01 RX ADMIN — METOPROLOL SUCCINATE 25 MG: 25 TABLET, EXTENDED RELEASE ORAL at 08:46

## 2018-01-01 RX ADMIN — METOPROLOL SUCCINATE 25 MG: 25 TABLET, EXTENDED RELEASE ORAL at 09:05

## 2018-01-01 RX ADMIN — TADALAFIL 20 MG: 5 TABLET, FILM COATED ORAL at 09:05

## 2018-01-01 RX ADMIN — PANTOPRAZOLE SODIUM 40 MG: 40 TABLET, DELAYED RELEASE ORAL at 06:41

## 2018-01-01 RX ADMIN — TAMSULOSIN HYDROCHLORIDE 0.4 MG: 0.4 CAPSULE ORAL at 08:57

## 2018-01-01 RX ADMIN — DEXAMETHASONE 8 MG: 4 TABLET ORAL at 07:53

## 2018-01-01 RX ADMIN — INSULIN ASPART 6 UNITS: 100 INJECTION, SOLUTION INTRAVENOUS; SUBCUTANEOUS at 08:49

## 2018-01-01 RX ADMIN — IPRATROPIUM BROMIDE AND ALBUTEROL SULFATE 3 ML: 2.5; .5 SOLUTION RESPIRATORY (INHALATION) at 11:14

## 2018-01-01 RX ADMIN — IPRATROPIUM BROMIDE AND ALBUTEROL SULFATE 3 ML: 2.5; .5 SOLUTION RESPIRATORY (INHALATION) at 15:14

## 2018-01-01 RX ADMIN — ROSUVASTATIN CALCIUM 10 MG: 10 TABLET, FILM COATED ORAL at 20:30

## 2018-01-01 RX ADMIN — BUDESONIDE 0.5 MG: 0.5 INHALANT RESPIRATORY (INHALATION) at 19:04

## 2018-01-01 RX ADMIN — METOPROLOL SUCCINATE 25 MG: 25 TABLET, EXTENDED RELEASE ORAL at 09:18

## 2018-01-01 RX ADMIN — ASPIRIN 81 MG 81 MG: 81 TABLET ORAL at 21:01

## 2018-01-01 RX ADMIN — TADALAFIL 20 MG: 5 TABLET, FILM COATED ORAL at 08:23

## 2018-01-01 RX ADMIN — FLUOXETINE 40 MG: 20 CAPSULE ORAL at 08:23

## 2018-01-01 RX ADMIN — ROSUVASTATIN CALCIUM 10 MG: 10 TABLET, FILM COATED ORAL at 21:24

## 2018-01-01 RX ADMIN — LISINOPRIL 20 MG: 20 TABLET ORAL at 08:57

## 2018-01-01 RX ADMIN — ROSUVASTATIN CALCIUM 10 MG: 10 TABLET, FILM COATED ORAL at 21:00

## 2018-01-01 RX ADMIN — INSULIN ASPART 3 UNITS: 100 INJECTION, SOLUTION INTRAVENOUS; SUBCUTANEOUS at 18:40

## 2018-01-01 RX ADMIN — ROSUVASTATIN CALCIUM 10 MG: 10 TABLET, FILM COATED ORAL at 21:19

## 2018-01-01 RX ADMIN — DIGOXIN 125 MCG: 125 TABLET ORAL at 08:46

## 2018-01-01 RX ADMIN — BUDESONIDE 0.5 MG: 0.5 INHALANT RESPIRATORY (INHALATION) at 19:24

## 2018-01-01 RX ADMIN — ANALGESIC BALM: 1.74; 4.06 OINTMENT TOPICAL at 21:21

## 2018-01-01 RX ADMIN — BUDESONIDE 0.5 MG: 0.5 INHALANT RESPIRATORY (INHALATION) at 08:25

## 2018-01-01 RX ADMIN — APIXABAN 10 MG: 5 TABLET, FILM COATED ORAL at 09:15

## 2018-01-01 RX ADMIN — POTASSIUM CHLORIDE 40 MEQ: 1500 TABLET, EXTENDED RELEASE ORAL at 09:14

## 2018-01-01 RX ADMIN — METOPROLOL SUCCINATE 25 MG: 25 TABLET, EXTENDED RELEASE ORAL at 21:44

## 2018-01-01 RX ADMIN — FLUOXETINE 40 MG: 20 CAPSULE ORAL at 08:56

## 2018-01-01 RX ADMIN — METHYLPREDNISOLONE SODIUM SUCCINATE 40 MG: 40 INJECTION, POWDER, FOR SOLUTION INTRAMUSCULAR; INTRAVENOUS at 17:12

## 2018-01-01 RX ADMIN — LEVOTHYROXINE SODIUM 125 MCG: 125 TABLET ORAL at 08:57

## 2018-01-01 RX ADMIN — BUDESONIDE 0.5 MG: 0.5 INHALANT RESPIRATORY (INHALATION) at 07:17

## 2018-01-01 RX ADMIN — IPRATROPIUM BROMIDE AND ALBUTEROL SULFATE 3 ML: 2.5; .5 SOLUTION RESPIRATORY (INHALATION) at 11:52

## 2018-01-01 RX ADMIN — SODIUM CHLORIDE 62 ML: 9 INJECTION, SOLUTION INTRAVENOUS at 18:46

## 2018-01-01 RX ADMIN — AMLODIPINE BESYLATE 2.5 MG: 2.5 TABLET ORAL at 08:57

## 2018-01-01 RX ADMIN — LEVOTHYROXINE SODIUM 125 MCG: 125 TABLET ORAL at 09:18

## 2018-01-01 RX ADMIN — FUROSEMIDE 20 MG: 20 TABLET ORAL at 09:02

## 2018-01-01 RX ADMIN — ETOPOSIDE 135 MG: 20 INJECTION, SOLUTION, CONCENTRATE INTRAVENOUS at 15:05

## 2018-01-01 RX ADMIN — DIGOXIN 125 MCG: 125 TABLET ORAL at 08:37

## 2018-01-01 RX ADMIN — IPRATROPIUM BROMIDE AND ALBUTEROL SULFATE 3 ML: 2.5; .5 SOLUTION RESPIRATORY (INHALATION) at 19:24

## 2018-01-01 RX ADMIN — LEVOTHYROXINE SODIUM 125 MCG: 125 TABLET ORAL at 08:38

## 2018-01-01 RX ADMIN — INSULIN GLARGINE 16 UNITS: 100 INJECTION, SOLUTION SUBCUTANEOUS at 22:13

## 2018-01-01 RX ADMIN — ROSUVASTATIN CALCIUM 10 MG: 10 TABLET, FILM COATED ORAL at 20:27

## 2018-01-01 RX ADMIN — NYSTATIN 500000 UNITS: 500000 SUSPENSION ORAL at 18:22

## 2018-01-01 RX ADMIN — LIDOCAINE HYDROCHLORIDE 10 ML: 10 INJECTION, SOLUTION EPIDURAL; INFILTRATION; INTRACAUDAL; PERINEURAL at 13:30

## 2018-01-01 RX ADMIN — BUDESONIDE 0.5 MG: 0.5 INHALANT RESPIRATORY (INHALATION) at 07:27

## 2018-01-01 RX ADMIN — ASPIRIN 81 MG 81 MG: 81 TABLET ORAL at 21:54

## 2018-01-01 RX ADMIN — COLCHICINE 0.6 MG: 0.6 TABLET, FILM COATED ORAL at 20:50

## 2018-01-01 RX ADMIN — LEVOFLOXACIN 500 MG: 500 TABLET, FILM COATED ORAL at 15:25

## 2018-01-01 RX ADMIN — TADALAFIL 20 MG: 5 TABLET, FILM COATED ORAL at 09:46

## 2018-01-01 RX ADMIN — NYSTATIN 500000 UNITS: 500000 SUSPENSION ORAL at 13:23

## 2018-01-01 RX ADMIN — FUROSEMIDE 20 MG: 20 TABLET ORAL at 08:38

## 2018-01-01 RX ADMIN — ACETAMINOPHEN 650 MG: 325 TABLET, FILM COATED ORAL at 21:36

## 2018-01-01 RX ADMIN — ACETAMINOPHEN 650 MG: 325 TABLET, FILM COATED ORAL at 22:03

## 2018-01-01 RX ADMIN — LISINOPRIL 20 MG: 20 TABLET ORAL at 09:19

## 2018-01-01 RX ADMIN — IPRATROPIUM BROMIDE AND ALBUTEROL SULFATE 3 ML: 2.5; .5 SOLUTION RESPIRATORY (INHALATION) at 11:10

## 2018-01-01 RX ADMIN — IPRATROPIUM BROMIDE AND ALBUTEROL SULFATE 3 ML: 2.5; .5 SOLUTION RESPIRATORY (INHALATION) at 19:16

## 2018-01-01 RX ADMIN — INSULIN ASPART 2 UNITS: 100 INJECTION, SOLUTION INTRAVENOUS; SUBCUTANEOUS at 17:46

## 2018-01-01 RX ADMIN — MULTIPLE VITAMINS W/ MINERALS TAB 1 TABLET: TAB at 08:46

## 2018-01-01 RX ADMIN — LISINOPRIL 20 MG: 20 TABLET ORAL at 09:02

## 2018-01-01 RX ADMIN — PANTOPRAZOLE SODIUM 40 MG: 40 TABLET, DELAYED RELEASE ORAL at 09:09

## 2018-01-01 RX ADMIN — IPRATROPIUM BROMIDE AND ALBUTEROL SULFATE 3 ML: 2.5; .5 SOLUTION RESPIRATORY (INHALATION) at 15:52

## 2018-01-01 RX ADMIN — PREDNISONE 60 MG: 20 TABLET ORAL at 08:45

## 2018-01-01 RX ADMIN — NYSTATIN 500000 UNITS: 500000 SUSPENSION ORAL at 19:09

## 2018-01-01 RX ADMIN — INSULIN GLARGINE 5 UNITS: 100 INJECTION, SOLUTION SUBCUTANEOUS at 21:37

## 2018-01-01 RX ADMIN — LEVOTHYROXINE SODIUM 125 MCG: 125 TABLET ORAL at 09:13

## 2018-01-01 RX ADMIN — INSULIN ASPART 1 UNITS: 100 INJECTION, SOLUTION INTRAVENOUS; SUBCUTANEOUS at 18:17

## 2018-01-01 RX ADMIN — DIGOXIN 125 MCG: 125 TABLET ORAL at 09:03

## 2018-01-01 RX ADMIN — FLUOXETINE 40 MG: 20 CAPSULE ORAL at 14:11

## 2018-01-01 RX ADMIN — NYSTATIN 500000 UNITS: 500000 SUSPENSION ORAL at 10:08

## 2018-01-01 RX ADMIN — COLCHICINE 0.6 MG: 0.6 TABLET, FILM COATED ORAL at 20:57

## 2018-01-01 RX ADMIN — MULTIPLE VITAMINS W/ MINERALS TAB 1 TABLET: TAB at 09:11

## 2018-01-01 RX ADMIN — APIXABAN 10 MG: 5 TABLET, FILM COATED ORAL at 20:27

## 2018-01-01 RX ADMIN — INSULIN GLARGINE 14 UNITS: 100 INJECTION, SOLUTION SUBCUTANEOUS at 21:40

## 2018-01-01 RX ADMIN — PREDNISONE 10 MG: 10 TABLET ORAL at 09:05

## 2018-01-01 RX ADMIN — IBUPROFEN 600 MG: 600 TABLET, FILM COATED ORAL at 22:47

## 2018-01-01 RX ADMIN — FLUOXETINE 40 MG: 20 CAPSULE ORAL at 08:46

## 2018-01-01 RX ADMIN — IPRATROPIUM BROMIDE AND ALBUTEROL SULFATE 3 ML: 2.5; .5 SOLUTION RESPIRATORY (INHALATION) at 19:32

## 2018-01-01 RX ADMIN — METHYLPREDNISOLONE SODIUM SUCCINATE 40 MG: 40 INJECTION, POWDER, FOR SOLUTION INTRAMUSCULAR; INTRAVENOUS at 18:52

## 2018-01-01 RX ADMIN — TADALAFIL 20 MG: 5 TABLET, FILM COATED ORAL at 09:00

## 2018-01-01 RX ADMIN — LEVOTHYROXINE SODIUM 125 MCG: 125 TABLET ORAL at 09:05

## 2018-01-01 RX ADMIN — METHYLPREDNISOLONE SODIUM SUCCINATE 40 MG: 40 INJECTION, POWDER, FOR SOLUTION INTRAMUSCULAR; INTRAVENOUS at 06:36

## 2018-01-01 RX ADMIN — IPRATROPIUM BROMIDE AND ALBUTEROL SULFATE 3 ML: 2.5; .5 SOLUTION RESPIRATORY (INHALATION) at 15:32

## 2018-01-01 RX ADMIN — FLUOXETINE 40 MG: 20 CAPSULE ORAL at 09:48

## 2018-01-01 RX ADMIN — ASPIRIN 81 MG 81 MG: 81 TABLET ORAL at 22:05

## 2018-01-01 RX ADMIN — IPRATROPIUM BROMIDE AND ALBUTEROL SULFATE 3 ML: 2.5; .5 SOLUTION RESPIRATORY (INHALATION) at 15:40

## 2018-01-01 RX ADMIN — TAMSULOSIN HYDROCHLORIDE 0.4 MG: 0.4 CAPSULE ORAL at 09:05

## 2018-01-01 RX ADMIN — ANALGESIC BALM: 1.74; 4.06 OINTMENT TOPICAL at 06:52

## 2018-01-01 RX ADMIN — DIGOXIN 125 MCG: 125 TABLET ORAL at 09:11

## 2018-01-01 RX ADMIN — SENNOSIDES AND DOCUSATE SODIUM 1 TABLET: 8.6; 5 TABLET ORAL at 08:59

## 2018-01-01 RX ADMIN — AMLODIPINE BESYLATE 2.5 MG: 2.5 TABLET ORAL at 07:53

## 2018-01-01 RX ADMIN — DIGOXIN 125 MCG: 125 TABLET ORAL at 14:11

## 2018-01-01 RX ADMIN — ROSUVASTATIN CALCIUM 10 MG: 10 TABLET, FILM COATED ORAL at 20:00

## 2018-01-01 RX ADMIN — IPRATROPIUM BROMIDE AND ALBUTEROL SULFATE 3 ML: 2.5; .5 SOLUTION RESPIRATORY (INHALATION) at 19:59

## 2018-01-01 RX ADMIN — FLUOXETINE 40 MG: 20 CAPSULE ORAL at 08:38

## 2018-01-01 RX ADMIN — PANTOPRAZOLE SODIUM 40 MG: 40 TABLET, DELAYED RELEASE ORAL at 07:28

## 2018-01-01 RX ADMIN — LISINOPRIL 20 MG: 20 TABLET ORAL at 07:53

## 2018-01-01 RX ADMIN — DIGOXIN 125 MCG: 125 TABLET ORAL at 09:48

## 2018-01-01 RX ADMIN — TAMSULOSIN HYDROCHLORIDE 0.4 MG: 0.4 CAPSULE ORAL at 09:17

## 2018-01-01 RX ADMIN — DIGOXIN 125 MCG: 125 TABLET ORAL at 09:04

## 2018-01-01 RX ADMIN — INSULIN ASPART 1 UNITS: 100 INJECTION, SOLUTION INTRAVENOUS; SUBCUTANEOUS at 17:29

## 2018-01-01 RX ADMIN — LEVOTHYROXINE SODIUM 125 MCG: 125 TABLET ORAL at 14:12

## 2018-01-01 RX ADMIN — NYSTATIN 500000 UNITS: 500000 SUSPENSION ORAL at 09:19

## 2018-01-01 RX ADMIN — PREDNISONE 40 MG: 20 TABLET ORAL at 08:43

## 2018-01-01 RX ADMIN — METHYLPREDNISOLONE SODIUM SUCCINATE 40 MG: 40 INJECTION, POWDER, FOR SOLUTION INTRAMUSCULAR; INTRAVENOUS at 12:39

## 2018-01-01 RX ADMIN — IPRATROPIUM BROMIDE AND ALBUTEROL SULFATE 3 ML: 2.5; .5 SOLUTION RESPIRATORY (INHALATION) at 19:21

## 2018-01-01 RX ADMIN — INSULIN GLARGINE 18 UNITS: 100 INJECTION, SOLUTION SUBCUTANEOUS at 21:18

## 2018-01-01 RX ADMIN — APIXABAN 10 MG: 5 TABLET, FILM COATED ORAL at 08:48

## 2018-01-01 RX ADMIN — LISINOPRIL 20 MG: 20 TABLET ORAL at 08:44

## 2018-01-01 RX ADMIN — TAMSULOSIN HYDROCHLORIDE 0.4 MG: 0.4 CAPSULE ORAL at 09:11

## 2018-01-01 RX ADMIN — METOPROLOL SUCCINATE 25 MG: 25 TABLET, EXTENDED RELEASE ORAL at 09:07

## 2018-01-01 RX ADMIN — COLCHICINE 0.6 MG: 0.6 TABLET, FILM COATED ORAL at 21:18

## 2018-01-01 RX ADMIN — METHYLPREDNISOLONE SODIUM SUCCINATE 40 MG: 40 INJECTION, POWDER, FOR SOLUTION INTRAMUSCULAR; INTRAVENOUS at 14:54

## 2018-01-01 RX ADMIN — AMLODIPINE BESYLATE 2.5 MG: 2.5 TABLET ORAL at 09:10

## 2018-01-01 RX ADMIN — BUDESONIDE 0.5 MG: 0.5 INHALANT RESPIRATORY (INHALATION) at 08:05

## 2018-01-01 RX ADMIN — Medication 1 MG: at 22:01

## 2018-01-01 RX ADMIN — OMEPRAZOLE 40 MG: 20 CAPSULE, DELAYED RELEASE ORAL at 09:05

## 2018-01-01 RX ADMIN — INSULIN GLARGINE 14 UNITS: 100 INJECTION, SOLUTION SUBCUTANEOUS at 21:57

## 2018-01-01 RX ADMIN — IPRATROPIUM BROMIDE AND ALBUTEROL SULFATE 3 ML: 2.5; .5 SOLUTION RESPIRATORY (INHALATION) at 07:17

## 2018-01-01 RX ADMIN — MULTIPLE VITAMINS W/ MINERALS TAB 1 TABLET: TAB at 13:13

## 2018-01-01 RX ADMIN — FLUOXETINE 40 MG: 20 CAPSULE ORAL at 07:53

## 2018-01-01 RX ADMIN — INSULIN ASPART 3 UNITS: 100 INJECTION, SOLUTION INTRAVENOUS; SUBCUTANEOUS at 13:24

## 2018-01-01 RX ADMIN — NYSTATIN 500000 UNITS: 500000 SUSPENSION ORAL at 13:42

## 2018-01-01 RX ADMIN — FLUOXETINE 40 MG: 20 CAPSULE ORAL at 09:10

## 2018-01-01 RX ADMIN — APIXABAN 5 MG: 5 TABLET, FILM COATED ORAL at 10:09

## 2018-01-01 RX ADMIN — ROSUVASTATIN CALCIUM 10 MG: 10 TABLET, FILM COATED ORAL at 21:18

## 2018-01-01 RX ADMIN — ACETAMINOPHEN 650 MG: 325 TABLET, FILM COATED ORAL at 22:01

## 2018-01-01 RX ADMIN — INSULIN GLARGINE 14 UNITS: 100 INJECTION, SOLUTION SUBCUTANEOUS at 22:12

## 2018-01-01 RX ADMIN — IPRATROPIUM BROMIDE AND ALBUTEROL SULFATE 3 ML: 2.5; .5 SOLUTION RESPIRATORY (INHALATION) at 19:14

## 2018-01-01 RX ADMIN — PANTOPRAZOLE SODIUM 40 MG: 40 TABLET, DELAYED RELEASE ORAL at 06:17

## 2018-01-01 RX ADMIN — INSULIN ASPART 2 UNITS: 100 INJECTION, SOLUTION INTRAVENOUS; SUBCUTANEOUS at 18:00

## 2018-01-01 RX ADMIN — FLUOXETINE 40 MG: 20 CAPSULE ORAL at 09:14

## 2018-01-01 RX ADMIN — FUROSEMIDE 20 MG: 20 TABLET ORAL at 09:13

## 2018-01-01 RX ADMIN — NYSTATIN 500000 UNITS: 500000 SUSPENSION ORAL at 13:14

## 2018-01-01 RX ADMIN — INSULIN ASPART 1 UNITS: 100 INJECTION, SOLUTION INTRAVENOUS; SUBCUTANEOUS at 12:31

## 2018-01-01 RX ADMIN — INSULIN ASPART 6 UNITS: 100 INJECTION, SOLUTION INTRAVENOUS; SUBCUTANEOUS at 13:37

## 2018-01-01 RX ADMIN — LEVOFLOXACIN 500 MG: 5 INJECTION, SOLUTION INTRAVENOUS at 16:22

## 2018-01-01 RX ADMIN — TADALAFIL 20 MG: 5 TABLET, FILM COATED ORAL at 09:52

## 2018-01-01 RX ADMIN — LISINOPRIL 20 MG: 20 TABLET ORAL at 09:47

## 2018-01-01 RX ADMIN — COLCHICINE 0.6 MG: 0.6 TABLET, FILM COATED ORAL at 20:29

## 2018-01-01 RX ADMIN — BUDESONIDE 0.5 MG: 0.5 INHALANT RESPIRATORY (INHALATION) at 19:01

## 2018-01-01 RX ADMIN — DIGOXIN 125 MCG: 125 TABLET ORAL at 09:05

## 2018-01-01 RX ADMIN — PREDNISONE 10 MG: 10 TABLET ORAL at 13:51

## 2018-01-01 RX ADMIN — TADALAFIL 20 MG: 5 TABLET, FILM COATED ORAL at 08:47

## 2018-01-01 RX ADMIN — SENNOSIDES AND DOCUSATE SODIUM 1 TABLET: 8.6; 5 TABLET ORAL at 21:54

## 2018-01-01 RX ADMIN — BUDESONIDE 0.5 MG: 0.5 INHALANT RESPIRATORY (INHALATION) at 19:43

## 2018-01-01 RX ADMIN — TAMSULOSIN HYDROCHLORIDE 0.4 MG: 0.4 CAPSULE ORAL at 09:02

## 2018-01-01 RX ADMIN — METHYLPREDNISOLONE SODIUM SUCCINATE 40 MG: 40 INJECTION, POWDER, FOR SOLUTION INTRAMUSCULAR; INTRAVENOUS at 15:25

## 2018-01-01 RX ADMIN — APIXABAN 5 MG: 5 TABLET, FILM COATED ORAL at 20:50

## 2018-01-01 RX ADMIN — TAMSULOSIN HYDROCHLORIDE 0.4 MG: 0.4 CAPSULE ORAL at 08:24

## 2018-01-01 RX ADMIN — COLCHICINE 0.6 MG: 0.6 TABLET, FILM COATED ORAL at 19:57

## 2018-01-01 RX ADMIN — IPRATROPIUM BROMIDE AND ALBUTEROL SULFATE 3 ML: 2.5; .5 SOLUTION RESPIRATORY (INHALATION) at 19:43

## 2018-01-01 RX ADMIN — BUDESONIDE 0.5 MG: 0.5 INHALANT RESPIRATORY (INHALATION) at 19:22

## 2018-01-01 RX ADMIN — IPRATROPIUM BROMIDE AND ALBUTEROL SULFATE 3 ML: 2.5; .5 SOLUTION RESPIRATORY (INHALATION) at 08:06

## 2018-01-01 RX ADMIN — NYSTATIN 500000 UNITS: 500000 SUSPENSION ORAL at 18:16

## 2018-01-01 RX ADMIN — AMLODIPINE BESYLATE 2.5 MG: 2.5 TABLET ORAL at 08:38

## 2018-01-01 RX ADMIN — TADALAFIL 20 MG: 5 TABLET, FILM COATED ORAL at 09:10

## 2018-01-01 RX ADMIN — BUDESONIDE 0.5 MG: 0.5 INHALANT RESPIRATORY (INHALATION) at 07:43

## 2018-01-01 RX ADMIN — IOPAMIDOL 59 ML: 755 INJECTION, SOLUTION INTRAVENOUS at 10:28

## 2018-01-01 RX ADMIN — ACETAMINOPHEN 650 MG: 325 TABLET, FILM COATED ORAL at 21:15

## 2018-01-01 RX ADMIN — METOPROLOL SUCCINATE 25 MG: 25 TABLET, EXTENDED RELEASE ORAL at 20:27

## 2018-01-01 RX ADMIN — INSULIN ASPART 2 UNITS: 100 INJECTION, SOLUTION INTRAVENOUS; SUBCUTANEOUS at 12:39

## 2018-01-01 RX ADMIN — METOPROLOL SUCCINATE 25 MG: 25 TABLET, EXTENDED RELEASE ORAL at 21:38

## 2018-01-01 RX ADMIN — ROSUVASTATIN CALCIUM 10 MG: 10 TABLET, FILM COATED ORAL at 21:15

## 2018-01-01 RX ADMIN — IPRATROPIUM BROMIDE AND ALBUTEROL SULFATE 3 ML: 2.5; .5 SOLUTION RESPIRATORY (INHALATION) at 20:05

## 2018-01-01 RX ADMIN — IPRATROPIUM BROMIDE AND ALBUTEROL SULFATE 3 ML: 2.5; .5 SOLUTION RESPIRATORY (INHALATION) at 11:50

## 2018-01-01 RX ADMIN — TADALAFIL 20 MG: 5 TABLET, FILM COATED ORAL at 09:18

## 2018-01-01 RX ADMIN — BUDESONIDE 0.5 MG: 0.5 INHALANT RESPIRATORY (INHALATION) at 08:06

## 2018-01-01 RX ADMIN — PREDNISONE 40 MG: 20 TABLET ORAL at 09:03

## 2018-01-01 RX ADMIN — BUDESONIDE 0.5 MG: 0.5 INHALANT RESPIRATORY (INHALATION) at 20:05

## 2018-01-01 RX ADMIN — ENOXAPARIN SODIUM 40 MG: 40 INJECTION SUBCUTANEOUS at 14:37

## 2018-01-01 RX ADMIN — DIGOXIN 125 MCG: 125 TABLET ORAL at 08:57

## 2018-01-01 RX ADMIN — IOPAMIDOL 71 ML: 755 INJECTION, SOLUTION INTRAVENOUS at 18:46

## 2018-01-01 RX ADMIN — COLCHICINE 0.6 MG: 0.6 TABLET, FILM COATED ORAL at 19:51

## 2018-01-01 RX ADMIN — IPRATROPIUM BROMIDE AND ALBUTEROL SULFATE 3 ML: 2.5; .5 SOLUTION RESPIRATORY (INHALATION) at 08:00

## 2018-01-01 RX ADMIN — NYSTATIN 500000 UNITS: 500000 SUSPENSION ORAL at 14:01

## 2018-01-01 RX ADMIN — BUDESONIDE 0.5 MG: 0.5 INHALANT RESPIRATORY (INHALATION) at 07:51

## 2018-01-01 RX ADMIN — PREDNISONE 40 MG: 20 TABLET ORAL at 08:59

## 2018-01-01 RX ADMIN — METOPROLOL SUCCINATE 25 MG: 25 TABLET, EXTENDED RELEASE ORAL at 21:15

## 2018-01-01 RX ADMIN — BUDESONIDE 0.5 MG: 0.5 INHALANT RESPIRATORY (INHALATION) at 19:26

## 2018-01-01 RX ADMIN — LEVOFLOXACIN 500 MG: 5 INJECTION, SOLUTION INTRAVENOUS at 15:52

## 2018-01-01 RX ADMIN — IPRATROPIUM BROMIDE AND ALBUTEROL SULFATE 3 ML: 2.5; .5 SOLUTION RESPIRATORY (INHALATION) at 10:59

## 2018-01-01 RX ADMIN — AMLODIPINE BESYLATE 2.5 MG: 2.5 TABLET ORAL at 09:19

## 2018-01-01 RX ADMIN — COLCHICINE 0.6 MG: 0.6 TABLET, FILM COATED ORAL at 21:38

## 2018-01-01 RX ADMIN — APIXABAN 10 MG: 5 TABLET, FILM COATED ORAL at 09:04

## 2018-01-01 RX ADMIN — IPRATROPIUM BROMIDE AND ALBUTEROL SULFATE 3 ML: 2.5; .5 SOLUTION RESPIRATORY (INHALATION) at 19:29

## 2018-01-01 RX ADMIN — IPRATROPIUM BROMIDE AND ALBUTEROL SULFATE 3 ML: 2.5; .5 SOLUTION RESPIRATORY (INHALATION) at 15:23

## 2018-01-01 RX ADMIN — Medication 1 MG: at 22:16

## 2018-01-01 RX ADMIN — IPRATROPIUM BROMIDE AND ALBUTEROL SULFATE 3 ML: 2.5; .5 SOLUTION RESPIRATORY (INHALATION) at 07:28

## 2018-01-01 RX ADMIN — AMLODIPINE BESYLATE 2.5 MG: 2.5 TABLET ORAL at 09:13

## 2018-01-01 RX ADMIN — AMLODIPINE BESYLATE 2.5 MG: 2.5 TABLET ORAL at 09:47

## 2018-01-01 RX ADMIN — AMLODIPINE BESYLATE 2.5 MG: 2.5 TABLET ORAL at 09:16

## 2018-01-01 RX ADMIN — DEXAMETHASONE 8 MG: 4 TABLET ORAL at 09:18

## 2018-01-01 RX ADMIN — FUROSEMIDE 20 MG: 20 TABLET ORAL at 08:44

## 2018-01-01 RX ADMIN — PANTOPRAZOLE SODIUM 40 MG: 40 TABLET, DELAYED RELEASE ORAL at 10:08

## 2018-01-01 RX ADMIN — IPRATROPIUM BROMIDE AND ALBUTEROL SULFATE 3 ML: 2.5; .5 SOLUTION RESPIRATORY (INHALATION) at 11:08

## 2018-01-01 RX ADMIN — IPRATROPIUM BROMIDE AND ALBUTEROL SULFATE 3 ML: 2.5; .5 SOLUTION RESPIRATORY (INHALATION) at 12:01

## 2018-01-01 RX ADMIN — INSULIN GLARGINE 16 UNITS: 100 INJECTION, SOLUTION SUBCUTANEOUS at 21:49

## 2018-01-01 RX ADMIN — ROSUVASTATIN CALCIUM 10 MG: 10 TABLET, FILM COATED ORAL at 20:57

## 2018-01-01 RX ADMIN — PREDNISONE 60 MG: 20 TABLET ORAL at 09:02

## 2018-01-01 RX ADMIN — IPRATROPIUM BROMIDE AND ALBUTEROL SULFATE 3 ML: 2.5; .5 SOLUTION RESPIRATORY (INHALATION) at 15:59

## 2018-01-01 RX ADMIN — BUDESONIDE 0.5 MG: 0.5 INHALANT RESPIRATORY (INHALATION) at 07:46

## 2018-01-01 RX ADMIN — TADALAFIL 20 MG: 5 TABLET, FILM COATED ORAL at 10:09

## 2018-01-01 RX ADMIN — INSULIN ASPART 2 UNITS: 100 INJECTION, SOLUTION INTRAVENOUS; SUBCUTANEOUS at 09:06

## 2018-01-01 RX ADMIN — PREDNISONE 10 MG: 10 TABLET ORAL at 09:06

## 2018-01-01 RX ADMIN — INSULIN GLARGINE 14 UNITS: 100 INJECTION, SOLUTION SUBCUTANEOUS at 22:06

## 2018-01-01 RX ADMIN — SODIUM CHLORIDE 150 MG: 9 INJECTION, SOLUTION INTRAVENOUS at 13:07

## 2018-01-01 RX ADMIN — PANTOPRAZOLE SODIUM 40 MG: 40 TABLET, DELAYED RELEASE ORAL at 06:31

## 2018-01-01 RX ADMIN — APIXABAN 10 MG: 5 TABLET, FILM COATED ORAL at 20:57

## 2018-01-01 RX ADMIN — METOPROLOL SUCCINATE 25 MG: 25 TABLET, EXTENDED RELEASE ORAL at 08:56

## 2018-01-01 RX ADMIN — LISINOPRIL 20 MG: 20 TABLET ORAL at 09:13

## 2018-01-01 RX ADMIN — NYSTATIN 500000 UNITS: 500000 SUSPENSION ORAL at 09:45

## 2018-01-01 RX ADMIN — APIXABAN 5 MG: 5 TABLET, FILM COATED ORAL at 21:00

## 2018-01-01 RX ADMIN — METOPROLOL SUCCINATE 25 MG: 25 TABLET, EXTENDED RELEASE ORAL at 20:29

## 2018-01-01 RX ADMIN — AMLODIPINE BESYLATE 2.5 MG: 2.5 TABLET ORAL at 08:24

## 2018-01-01 RX ADMIN — LEVOTHYROXINE SODIUM 125 MCG: 125 TABLET ORAL at 08:23

## 2018-01-01 RX ADMIN — AMLODIPINE BESYLATE 2.5 MG: 2.5 TABLET ORAL at 08:46

## 2018-01-01 RX ADMIN — METOPROLOL SUCCINATE 25 MG: 25 TABLET, EXTENDED RELEASE ORAL at 09:15

## 2018-01-01 RX ADMIN — ROSUVASTATIN CALCIUM 10 MG: 10 TABLET, FILM COATED ORAL at 21:17

## 2018-01-01 RX ADMIN — METOPROLOL SUCCINATE 25 MG: 25 TABLET, EXTENDED RELEASE ORAL at 08:24

## 2018-01-01 RX ADMIN — INSULIN GLARGINE 16 UNITS: 100 INJECTION, SOLUTION SUBCUTANEOUS at 21:56

## 2018-01-01 RX ADMIN — ASPIRIN 81 MG 81 MG: 81 TABLET ORAL at 21:57

## 2018-01-01 RX ADMIN — IPRATROPIUM BROMIDE AND ALBUTEROL SULFATE 3 ML: 2.5; .5 SOLUTION RESPIRATORY (INHALATION) at 08:02

## 2018-01-01 RX ADMIN — METOPROLOL SUCCINATE 25 MG: 25 TABLET, EXTENDED RELEASE ORAL at 08:38

## 2018-01-01 RX ADMIN — DIGOXIN 125 MCG: 125 TABLET ORAL at 09:18

## 2018-01-01 RX ADMIN — INSULIN GLARGINE 5 UNITS: 100 INJECTION, SOLUTION SUBCUTANEOUS at 21:45

## 2018-01-01 RX ADMIN — COLCHICINE 0.6 MG: 0.6 TABLET, FILM COATED ORAL at 20:26

## 2018-01-01 RX ADMIN — METOPROLOL SUCCINATE 25 MG: 25 TABLET, EXTENDED RELEASE ORAL at 09:47

## 2018-01-01 RX ADMIN — LISINOPRIL 20 MG: 20 TABLET ORAL at 14:11

## 2018-01-01 RX ADMIN — AMLODIPINE BESYLATE 2.5 MG: 2.5 TABLET ORAL at 10:09

## 2018-01-01 RX ADMIN — METOPROLOL SUCCINATE 25 MG: 25 TABLET, EXTENDED RELEASE ORAL at 07:53

## 2018-01-01 RX ADMIN — DIGOXIN 125 MCG: 125 TABLET ORAL at 08:44

## 2018-01-01 RX ADMIN — PREDNISONE 40 MG: 20 TABLET ORAL at 08:49

## 2018-01-01 RX ADMIN — ETOPOSIDE 135 MG: 20 INJECTION, SOLUTION, CONCENTRATE INTRAVENOUS at 15:26

## 2018-01-01 RX ADMIN — ACETAMINOPHEN 650 MG: 325 TABLET, FILM COATED ORAL at 06:17

## 2018-01-01 RX ADMIN — INSULIN GLARGINE 14 UNITS: 100 INJECTION, SOLUTION SUBCUTANEOUS at 21:31

## 2018-01-01 RX ADMIN — METOPROLOL SUCCINATE 25 MG: 25 TABLET, EXTENDED RELEASE ORAL at 22:13

## 2018-01-01 RX ADMIN — IPRATROPIUM BROMIDE AND ALBUTEROL SULFATE 3 ML: 2.5; .5 SOLUTION RESPIRATORY (INHALATION) at 11:42

## 2018-01-01 RX ADMIN — ASPIRIN 81 MG 81 MG: 81 TABLET ORAL at 21:43

## 2018-01-01 RX ADMIN — Medication 1 MG: at 21:57

## 2018-01-01 RX ADMIN — APIXABAN 5 MG: 5 TABLET, FILM COATED ORAL at 09:19

## 2018-01-01 RX ADMIN — IPRATROPIUM BROMIDE AND ALBUTEROL SULFATE 3 ML: 2.5; .5 SOLUTION RESPIRATORY (INHALATION) at 15:58

## 2018-01-01 RX ADMIN — COLCHICINE 0.6 MG: 0.6 TABLET, FILM COATED ORAL at 20:30

## 2018-01-01 RX ADMIN — FUROSEMIDE 20 MG: 20 TABLET ORAL at 09:06

## 2018-01-01 RX ADMIN — APIXABAN 5 MG: 5 TABLET, FILM COATED ORAL at 22:13

## 2018-01-01 RX ADMIN — IPRATROPIUM BROMIDE AND ALBUTEROL SULFATE 3 ML: 2.5; .5 SOLUTION RESPIRATORY (INHALATION) at 16:16

## 2018-01-01 RX ADMIN — AMLODIPINE BESYLATE 2.5 MG: 2.5 TABLET ORAL at 08:44

## 2018-01-01 RX ADMIN — LISINOPRIL 20 MG: 20 TABLET ORAL at 09:16

## 2018-01-01 RX ADMIN — PREDNISONE 30 MG: 20 TABLET ORAL at 10:08

## 2018-01-01 RX ADMIN — TAMSULOSIN HYDROCHLORIDE 0.4 MG: 0.4 CAPSULE ORAL at 09:13

## 2018-01-01 RX ADMIN — PANTOPRAZOLE SODIUM 40 MG: 40 TABLET, DELAYED RELEASE ORAL at 06:34

## 2018-01-01 RX ADMIN — LEVOFLOXACIN 500 MG: 500 TABLET, FILM COATED ORAL at 16:37

## 2018-01-01 RX ADMIN — IPRATROPIUM BROMIDE AND ALBUTEROL SULFATE 3 ML: 2.5; .5 SOLUTION RESPIRATORY (INHALATION) at 12:13

## 2018-01-01 RX ADMIN — INSULIN ASPART 1 UNITS: 100 INJECTION, SOLUTION INTRAVENOUS; SUBCUTANEOUS at 18:07

## 2018-01-01 RX ADMIN — METOPROLOL SUCCINATE 25 MG: 25 TABLET, EXTENDED RELEASE ORAL at 20:50

## 2018-01-01 RX ADMIN — METOPROLOL SUCCINATE 25 MG: 25 TABLET, EXTENDED RELEASE ORAL at 09:04

## 2018-01-01 RX ADMIN — METOPROLOL SUCCINATE 25 MG: 25 TABLET, EXTENDED RELEASE ORAL at 10:09

## 2018-01-01 RX ADMIN — IPRATROPIUM BROMIDE AND ALBUTEROL SULFATE 3 ML: 2.5; .5 SOLUTION RESPIRATORY (INHALATION) at 11:31

## 2018-01-01 RX ADMIN — LISINOPRIL 20 MG: 20 TABLET ORAL at 09:03

## 2018-01-01 RX ADMIN — APIXABAN 5 MG: 5 TABLET, FILM COATED ORAL at 07:53

## 2018-01-01 RX ADMIN — IPRATROPIUM BROMIDE AND ALBUTEROL SULFATE 3 ML: 2.5; .5 SOLUTION RESPIRATORY (INHALATION) at 11:38

## 2018-01-01 RX ADMIN — ENOXAPARIN SODIUM 40 MG: 40 INJECTION SUBCUTANEOUS at 16:25

## 2018-01-01 RX ADMIN — FLUOXETINE 40 MG: 20 CAPSULE ORAL at 09:06

## 2018-01-01 RX ADMIN — PANTOPRAZOLE SODIUM 40 MG: 40 TABLET, DELAYED RELEASE ORAL at 09:48

## 2018-01-01 RX ADMIN — LEVOTHYROXINE SODIUM 125 MCG: 125 TABLET ORAL at 10:08

## 2018-01-01 RX ADMIN — INSULIN ASPART 4 UNITS: 100 INJECTION, SOLUTION INTRAVENOUS; SUBCUTANEOUS at 09:11

## 2018-01-01 RX ADMIN — APIXABAN 5 MG: 5 TABLET, FILM COATED ORAL at 21:54

## 2018-01-01 RX ADMIN — IPRATROPIUM BROMIDE AND ALBUTEROL SULFATE 3 ML: 2.5; .5 SOLUTION RESPIRATORY (INHALATION) at 12:03

## 2018-01-01 RX ADMIN — LISINOPRIL 20 MG: 20 TABLET ORAL at 08:38

## 2018-01-01 RX ADMIN — FUROSEMIDE 20 MG: 10 INJECTION, SOLUTION INTRAVENOUS at 17:04

## 2018-01-01 RX ADMIN — INSULIN ASPART 6 UNITS: 100 INJECTION, SOLUTION INTRAVENOUS; SUBCUTANEOUS at 18:47

## 2018-01-01 RX ADMIN — PREDNISONE 30 MG: 20 TABLET ORAL at 13:18

## 2018-01-01 RX ADMIN — TADALAFIL 20 MG: 5 TABLET, FILM COATED ORAL at 08:43

## 2018-01-01 RX ADMIN — INSULIN GLARGINE 18 UNITS: 100 INJECTION, SOLUTION SUBCUTANEOUS at 22:27

## 2018-01-01 RX ADMIN — FLUOXETINE 40 MG: 20 CAPSULE ORAL at 10:08

## 2018-01-01 RX ADMIN — APIXABAN 5 MG: 5 TABLET, FILM COATED ORAL at 09:11

## 2018-01-01 RX ADMIN — IPRATROPIUM BROMIDE AND ALBUTEROL SULFATE 3 ML: 2.5; .5 SOLUTION RESPIRATORY (INHALATION) at 07:27

## 2018-01-01 RX ADMIN — APIXABAN 10 MG: 5 TABLET, FILM COATED ORAL at 20:29

## 2018-01-01 RX ADMIN — FLUOXETINE 40 MG: 20 CAPSULE ORAL at 08:48

## 2018-01-01 RX ADMIN — NYSTATIN 500000 UNITS: 500000 SUSPENSION ORAL at 14:41

## 2018-01-01 RX ADMIN — ANALGESIC BALM: 1.74; 4.06 OINTMENT TOPICAL at 22:13

## 2018-01-01 RX ADMIN — IPRATROPIUM BROMIDE AND ALBUTEROL SULFATE 3 ML: 2.5; .5 SOLUTION RESPIRATORY (INHALATION) at 20:15

## 2018-01-01 RX ADMIN — COLCHICINE 0.6 MG: 0.6 TABLET, FILM COATED ORAL at 20:54

## 2018-01-01 RX ADMIN — IPRATROPIUM BROMIDE AND ALBUTEROL SULFATE 3 ML: 2.5; .5 SOLUTION RESPIRATORY (INHALATION) at 07:55

## 2018-01-01 RX ADMIN — LISINOPRIL 20 MG: 20 TABLET ORAL at 09:05

## 2018-01-01 RX ADMIN — FLUOXETINE 40 MG: 20 CAPSULE ORAL at 09:05

## 2018-01-01 RX ADMIN — APIXABAN 5 MG: 5 TABLET, FILM COATED ORAL at 08:37

## 2018-01-01 RX ADMIN — FUROSEMIDE 20 MG: 20 TABLET ORAL at 10:09

## 2018-01-01 RX ADMIN — SODIUM CHLORIDE, PRESERVATIVE FREE 85 ML: 5 INJECTION INTRAVENOUS at 10:28

## 2018-01-01 RX ADMIN — METHYLPREDNISOLONE SODIUM SUCCINATE 40 MG: 40 INJECTION, POWDER, FOR SOLUTION INTRAMUSCULAR; INTRAVENOUS at 22:45

## 2018-01-01 RX ADMIN — NYSTATIN 500000 UNITS: 500000 SUSPENSION ORAL at 22:13

## 2018-01-01 RX ADMIN — NYSTATIN 500000 UNITS: 500000 SUSPENSION ORAL at 12:31

## 2018-01-01 RX ADMIN — INSULIN GLARGINE 10 UNITS: 100 INJECTION, SOLUTION SUBCUTANEOUS at 21:26

## 2018-01-01 RX ADMIN — TAMSULOSIN HYDROCHLORIDE 0.4 MG: 0.4 CAPSULE ORAL at 08:43

## 2018-01-01 RX ADMIN — COLCHICINE 0.6 MG: 0.6 TABLET, FILM COATED ORAL at 21:23

## 2018-01-01 RX ADMIN — INSULIN ASPART 4 UNITS: 100 INJECTION, SOLUTION INTRAVENOUS; SUBCUTANEOUS at 18:48

## 2018-01-01 RX ADMIN — INSULIN ASPART 4 UNITS: 100 INJECTION, SOLUTION INTRAVENOUS; SUBCUTANEOUS at 12:37

## 2018-01-01 RX ADMIN — TAMSULOSIN HYDROCHLORIDE 0.4 MG: 0.4 CAPSULE ORAL at 08:49

## 2018-01-01 RX ADMIN — IPRATROPIUM BROMIDE AND ALBUTEROL SULFATE 3 ML: 2.5; .5 SOLUTION RESPIRATORY (INHALATION) at 07:43

## 2018-01-01 RX ADMIN — LEVOTHYROXINE SODIUM 125 MCG: 125 TABLET ORAL at 09:46

## 2018-01-01 RX ADMIN — METHYLPREDNISOLONE SODIUM SUCCINATE 40 MG: 40 INJECTION, POWDER, FOR SOLUTION INTRAMUSCULAR; INTRAVENOUS at 06:33

## 2018-01-01 RX ADMIN — APIXABAN 10 MG: 5 TABLET, FILM COATED ORAL at 08:46

## 2018-01-01 RX ADMIN — IPRATROPIUM BROMIDE AND ALBUTEROL SULFATE 3 ML: 2.5; .5 SOLUTION RESPIRATORY (INHALATION) at 11:37

## 2018-01-01 RX ADMIN — FLUOXETINE 40 MG: 20 CAPSULE ORAL at 09:09

## 2018-01-01 RX ADMIN — IPRATROPIUM BROMIDE AND ALBUTEROL SULFATE 3 ML: 2.5; .5 SOLUTION RESPIRATORY (INHALATION) at 07:51

## 2018-01-01 RX ADMIN — TAMSULOSIN HYDROCHLORIDE 0.4 MG: 0.4 CAPSULE ORAL at 14:12

## 2018-01-01 RX ADMIN — COLCHICINE 0.6 MG: 0.6 TABLET, FILM COATED ORAL at 20:00

## 2018-01-01 RX ADMIN — ROSUVASTATIN CALCIUM 10 MG: 10 TABLET, FILM COATED ORAL at 21:43

## 2018-01-01 RX ADMIN — ACETAMINOPHEN 650 MG: 325 TABLET, FILM COATED ORAL at 21:25

## 2018-01-01 RX ADMIN — IPRATROPIUM BROMIDE AND ALBUTEROL SULFATE 3 ML: 2.5; .5 SOLUTION RESPIRATORY (INHALATION) at 15:41

## 2018-01-01 RX ADMIN — IPRATROPIUM BROMIDE AND ALBUTEROL SULFATE 3 ML: 2.5; .5 SOLUTION RESPIRATORY (INHALATION) at 08:05

## 2018-01-01 RX ADMIN — APIXABAN 10 MG: 5 TABLET, FILM COATED ORAL at 21:17

## 2018-01-01 RX ADMIN — COLCHICINE 0.6 MG: 0.6 TABLET, FILM COATED ORAL at 21:15

## 2018-01-01 RX ADMIN — DEXAMETHASONE 6 MG: 2 TABLET ORAL at 12:29

## 2018-01-01 RX ADMIN — METHYLPREDNISOLONE SODIUM SUCCINATE 40 MG: 40 INJECTION, POWDER, FOR SOLUTION INTRAMUSCULAR; INTRAVENOUS at 07:05

## 2018-01-01 RX ADMIN — TADALAFIL 20 MG: 5 TABLET, FILM COATED ORAL at 09:16

## 2018-01-01 RX ADMIN — INSULIN GLARGINE 18 UNITS: 100 INJECTION, SOLUTION SUBCUTANEOUS at 21:56

## 2018-01-01 RX ADMIN — METHYLPREDNISOLONE SODIUM SUCCINATE 40 MG: 40 INJECTION, POWDER, FOR SOLUTION INTRAMUSCULAR; INTRAVENOUS at 12:40

## 2018-01-01 RX ADMIN — TADALAFIL 20 MG: 5 TABLET, FILM COATED ORAL at 09:06

## 2018-01-01 RX ADMIN — ONDANSETRON 16 MG: 8 TABLET, FILM COATED ORAL at 12:29

## 2018-01-01 RX ADMIN — METOPROLOL SUCCINATE 25 MG: 25 TABLET, EXTENDED RELEASE ORAL at 08:48

## 2018-01-01 RX ADMIN — IPRATROPIUM BROMIDE AND ALBUTEROL SULFATE 3 ML: 2.5; .5 SOLUTION RESPIRATORY (INHALATION) at 07:46

## 2018-01-01 RX ADMIN — LEVOTHYROXINE SODIUM 125 MCG: 125 TABLET ORAL at 07:53

## 2018-01-01 RX ADMIN — DIGOXIN 125 MCG: 125 TABLET ORAL at 09:13

## 2018-01-01 RX ADMIN — BUDESONIDE 0.5 MG: 0.5 INHALANT RESPIRATORY (INHALATION) at 20:15

## 2018-01-01 RX ADMIN — PANTOPRAZOLE SODIUM 40 MG: 40 TABLET, DELAYED RELEASE ORAL at 06:58

## 2018-01-01 RX ADMIN — DIGOXIN 125 MCG: 125 TABLET ORAL at 10:09

## 2018-01-01 RX ADMIN — LEVOTHYROXINE SODIUM 125 MCG: 125 TABLET ORAL at 09:10

## 2018-01-01 RX ADMIN — INSULIN GLARGINE 16 UNITS: 100 INJECTION, SOLUTION SUBCUTANEOUS at 22:01

## 2018-01-01 RX ADMIN — ANALGESIC BALM: 1.74; 4.06 OINTMENT TOPICAL at 21:42

## 2018-01-01 RX ADMIN — TAMSULOSIN HYDROCHLORIDE 0.4 MG: 0.4 CAPSULE ORAL at 10:09

## 2018-01-01 RX ADMIN — FLUOXETINE 40 MG: 20 CAPSULE ORAL at 09:19

## 2018-01-01 RX ADMIN — MULTIPLE VITAMINS W/ MINERALS TAB 1 TABLET: TAB at 07:53

## 2018-01-01 RX ADMIN — IPRATROPIUM BROMIDE AND ALBUTEROL SULFATE 3 ML: 2.5; .5 SOLUTION RESPIRATORY (INHALATION) at 19:26

## 2018-01-01 RX ADMIN — LISINOPRIL 20 MG: 20 TABLET ORAL at 08:23

## 2018-01-01 RX ADMIN — LEVOFLOXACIN 500 MG: 5 INJECTION, SOLUTION INTRAVENOUS at 16:07

## 2018-01-01 RX ADMIN — METOPROLOL SUCCINATE 25 MG: 25 TABLET, EXTENDED RELEASE ORAL at 20:58

## 2018-01-01 RX ADMIN — IPRATROPIUM BROMIDE AND ALBUTEROL SULFATE 3 ML: 2.5; .5 SOLUTION RESPIRATORY (INHALATION) at 10:42

## 2018-01-01 RX ADMIN — MULTIPLE VITAMINS W/ MINERALS TAB 1 TABLET: TAB at 09:06

## 2018-01-01 RX ADMIN — ASPIRIN 81 MG 81 MG: 81 TABLET ORAL at 22:01

## 2018-01-01 RX ADMIN — METOPROLOL SUCCINATE 25 MG: 25 TABLET, EXTENDED RELEASE ORAL at 08:44

## 2018-01-01 RX ADMIN — APIXABAN 10 MG: 5 TABLET, FILM COATED ORAL at 09:26

## 2018-01-01 RX ADMIN — OMEPRAZOLE 40 MG: 20 CAPSULE, DELAYED RELEASE ORAL at 09:06

## 2018-01-01 RX ADMIN — BUDESONIDE 0.5 MG: 0.5 INHALANT RESPIRATORY (INHALATION) at 07:20

## 2018-01-01 RX ADMIN — INSULIN ASPART 3 UNITS: 100 INJECTION, SOLUTION INTRAVENOUS; SUBCUTANEOUS at 09:09

## 2018-01-01 RX ADMIN — METHYLPREDNISOLONE SODIUM SUCCINATE 40 MG: 40 INJECTION, POWDER, FOR SOLUTION INTRAMUSCULAR; INTRAVENOUS at 22:27

## 2018-01-01 RX ADMIN — ASPIRIN 81 MG 81 MG: 81 TABLET ORAL at 21:19

## 2018-01-01 RX ADMIN — IPRATROPIUM BROMIDE AND ALBUTEROL SULFATE 3 ML: 2.5; .5 SOLUTION RESPIRATORY (INHALATION) at 07:29

## 2018-01-01 RX ADMIN — INSULIN GLARGINE 14 UNITS: 100 INJECTION, SOLUTION SUBCUTANEOUS at 21:17

## 2018-01-01 RX ADMIN — ASPIRIN 81 MG 81 MG: 81 TABLET ORAL at 22:28

## 2018-01-01 RX ADMIN — METOPROLOL SUCCINATE 25 MG: 25 TABLET, EXTENDED RELEASE ORAL at 21:17

## 2018-01-01 RX ADMIN — ASPIRIN 81 MG 81 MG: 81 TABLET ORAL at 21:26

## 2018-01-01 RX ADMIN — INSULIN ASPART 2 UNITS: 100 INJECTION, SOLUTION INTRAVENOUS; SUBCUTANEOUS at 12:40

## 2018-01-01 RX ADMIN — METOPROLOL SUCCINATE 25 MG: 25 TABLET, EXTENDED RELEASE ORAL at 09:14

## 2018-01-01 RX ADMIN — TAMSULOSIN HYDROCHLORIDE 0.4 MG: 0.4 CAPSULE ORAL at 08:38

## 2018-01-01 RX ADMIN — INSULIN ASPART 1 UNITS: 100 INJECTION, SOLUTION INTRAVENOUS; SUBCUTANEOUS at 13:13

## 2018-01-01 RX ADMIN — FLUOXETINE 40 MG: 20 CAPSULE ORAL at 09:16

## 2018-01-01 RX ADMIN — PIPERACILLIN SODIUM,TAZOBACTAM SODIUM 3.38 G: 3; .375 INJECTION, POWDER, FOR SOLUTION INTRAVENOUS at 12:01

## 2018-01-01 RX ADMIN — Medication 1 MG: at 21:49

## 2018-01-01 RX ADMIN — PANTOPRAZOLE SODIUM 40 MG: 40 TABLET, DELAYED RELEASE ORAL at 06:32

## 2018-01-01 RX ADMIN — IPRATROPIUM BROMIDE AND ALBUTEROL SULFATE 3 ML: 2.5; .5 SOLUTION RESPIRATORY (INHALATION) at 19:01

## 2018-01-01 RX ADMIN — MULTIPLE VITAMINS W/ MINERALS TAB 1 TABLET: TAB at 08:48

## 2018-01-01 RX ADMIN — LEVOTHYROXINE SODIUM 125 MCG: 125 TABLET ORAL at 09:06

## 2018-01-01 RX ADMIN — LISINOPRIL 20 MG: 20 TABLET ORAL at 08:46

## 2018-01-01 RX ADMIN — IPRATROPIUM BROMIDE AND ALBUTEROL SULFATE 3 ML: 2.5; .5 SOLUTION RESPIRATORY (INHALATION) at 08:25

## 2018-01-01 RX ADMIN — TAMSULOSIN HYDROCHLORIDE 0.4 MG: 0.4 CAPSULE ORAL at 08:46

## 2018-01-01 RX ADMIN — LISINOPRIL 20 MG: 20 TABLET ORAL at 10:09

## 2018-01-01 RX ADMIN — NYSTATIN 500000 UNITS: 500000 SUSPENSION ORAL at 18:49

## 2018-01-01 RX ADMIN — METHYLPREDNISOLONE SODIUM SUCCINATE 40 MG: 40 INJECTION, POWDER, FOR SOLUTION INTRAMUSCULAR; INTRAVENOUS at 00:17

## 2018-01-01 RX ADMIN — DIGOXIN 125 MCG: 125 TABLET ORAL at 07:52

## 2018-01-01 RX ADMIN — MULTIPLE VITAMINS W/ MINERALS TAB 1 TABLET: TAB at 09:18

## 2018-01-01 RX ADMIN — NYSTATIN 500000 UNITS: 500000 SUSPENSION ORAL at 08:59

## 2018-01-01 RX ADMIN — IPRATROPIUM BROMIDE AND ALBUTEROL SULFATE 3 ML: 2.5; .5 SOLUTION RESPIRATORY (INHALATION) at 19:38

## 2018-01-01 RX ADMIN — NYSTATIN 500000 UNITS: 500000 SUSPENSION ORAL at 22:01

## 2018-01-01 RX ADMIN — BUDESONIDE 0.5 MG: 0.5 INHALANT RESPIRATORY (INHALATION) at 07:29

## 2018-01-01 RX ADMIN — AMLODIPINE BESYLATE 2.5 MG: 2.5 TABLET ORAL at 09:03

## 2018-01-01 RX ADMIN — TAMSULOSIN HYDROCHLORIDE 0.4 MG: 0.4 CAPSULE ORAL at 09:12

## 2018-01-01 RX ADMIN — PANTOPRAZOLE SODIUM 40 MG: 40 TABLET, DELAYED RELEASE ORAL at 06:37

## 2018-01-01 RX ADMIN — IPRATROPIUM BROMIDE AND ALBUTEROL SULFATE 3 ML: 2.5; .5 SOLUTION RESPIRATORY (INHALATION) at 07:20

## 2018-01-01 RX ADMIN — MULTIPLE VITAMINS W/ MINERALS TAB 1 TABLET: TAB at 08:37

## 2018-01-01 RX ADMIN — MULTIPLE VITAMINS W/ MINERALS TAB 1 TABLET: TAB at 09:04

## 2018-01-01 RX ADMIN — ETOPOSIDE 135 MG: 20 INJECTION, SOLUTION, CONCENTRATE INTRAVENOUS at 13:52

## 2018-01-01 RX ADMIN — LISINOPRIL 20 MG: 20 TABLET ORAL at 08:48

## 2018-01-01 RX ADMIN — CARBOPLATIN 495 MG: 10 INJECTION, SOLUTION INTRAVENOUS at 13:38

## 2018-01-01 RX ADMIN — INSULIN ASPART 5 UNITS: 100 INJECTION, SOLUTION INTRAVENOUS; SUBCUTANEOUS at 17:08

## 2018-01-01 RX ADMIN — COLCHICINE 0.6 MG: 0.6 TABLET, FILM COATED ORAL at 21:17

## 2018-01-01 RX ADMIN — AMLODIPINE BESYLATE 2.5 MG: 2.5 TABLET ORAL at 09:05

## 2018-01-01 RX ADMIN — INSULIN ASPART 1 UNITS: 100 INJECTION, SOLUTION INTRAVENOUS; SUBCUTANEOUS at 13:01

## 2018-01-01 RX ADMIN — ROSUVASTATIN CALCIUM 10 MG: 10 TABLET, FILM COATED ORAL at 19:57

## 2018-01-01 RX ADMIN — COLCHICINE 0.6 MG: 0.6 TABLET, FILM COATED ORAL at 21:19

## 2018-01-01 RX ADMIN — INSULIN ASPART 6 UNITS: 100 INJECTION, SOLUTION INTRAVENOUS; SUBCUTANEOUS at 09:17

## 2018-01-01 RX ADMIN — ASPIRIN 81 MG 81 MG: 81 TABLET ORAL at 22:27

## 2018-01-01 RX ADMIN — APIXABAN 5 MG: 5 TABLET, FILM COATED ORAL at 20:54

## 2018-01-01 RX ADMIN — ROSUVASTATIN CALCIUM 10 MG: 10 TABLET, FILM COATED ORAL at 20:49

## 2018-01-01 RX ADMIN — TADALAFIL 20 MG: 5 TABLET, FILM COATED ORAL at 12:28

## 2018-01-01 RX ADMIN — ASPIRIN 81 MG 81 MG: 81 TABLET ORAL at 22:13

## 2018-01-01 RX ADMIN — POTASSIUM CHLORIDE 20 MEQ: 1500 TABLET, EXTENDED RELEASE ORAL at 11:04

## 2018-01-01 RX ADMIN — TADALAFIL 20 MG: 5 TABLET, FILM COATED ORAL at 08:45

## 2018-01-01 RX ADMIN — IPRATROPIUM BROMIDE AND ALBUTEROL SULFATE 3 ML: 2.5; .5 SOLUTION RESPIRATORY (INHALATION) at 11:24

## 2018-01-01 RX ADMIN — PANTOPRAZOLE SODIUM 40 MG: 40 TABLET, DELAYED RELEASE ORAL at 07:05

## 2018-01-01 RX ADMIN — AMLODIPINE BESYLATE 2.5 MG: 2.5 TABLET ORAL at 14:11

## 2018-01-01 RX ADMIN — TADALAFIL 20 MG: 5 TABLET, FILM COATED ORAL at 09:15

## 2018-01-01 RX ADMIN — INSULIN ASPART 5 UNITS: 100 INJECTION, SOLUTION INTRAVENOUS; SUBCUTANEOUS at 17:07

## 2018-01-01 RX ADMIN — METOPROLOL SUCCINATE 25 MG: 25 TABLET, EXTENDED RELEASE ORAL at 20:30

## 2018-01-01 RX ADMIN — APIXABAN 10 MG: 5 TABLET, FILM COATED ORAL at 21:19

## 2018-01-01 RX ADMIN — Medication 1 MG: at 22:18

## 2018-01-01 RX ADMIN — DIGOXIN 125 MCG: 125 TABLET ORAL at 08:48

## 2018-01-01 RX ADMIN — FLUOXETINE 40 MG: 20 CAPSULE ORAL at 08:43

## 2018-01-01 RX ADMIN — PREDNISONE 40 MG: 20 TABLET ORAL at 08:22

## 2018-01-01 RX ADMIN — ROSUVASTATIN CALCIUM 10 MG: 10 TABLET, FILM COATED ORAL at 20:29

## 2018-01-01 RX ADMIN — BUDESONIDE 0.5 MG: 0.5 INHALANT RESPIRATORY (INHALATION) at 19:14

## 2018-01-01 RX ADMIN — ASPIRIN 81 MG 81 MG: 81 TABLET ORAL at 21:37

## 2018-01-01 RX ADMIN — LEVOTHYROXINE SODIUM 125 MCG: 125 TABLET ORAL at 08:44

## 2018-01-01 RX ADMIN — INSULIN ASPART 2 UNITS: 100 INJECTION, SOLUTION INTRAVENOUS; SUBCUTANEOUS at 09:02

## 2018-01-01 RX ADMIN — METHYLPREDNISOLONE SODIUM SUCCINATE 40 MG: 40 INJECTION, POWDER, FOR SOLUTION INTRAMUSCULAR; INTRAVENOUS at 14:58

## 2018-01-01 RX ADMIN — NYSTATIN 500000 UNITS: 500000 SUSPENSION ORAL at 19:51

## 2018-01-01 RX ADMIN — METOPROLOL SUCCINATE 25 MG: 25 TABLET, EXTENDED RELEASE ORAL at 09:06

## 2018-01-01 RX ADMIN — LEVOTHYROXINE SODIUM 125 MCG: 125 TABLET ORAL at 08:48

## 2018-01-01 RX ADMIN — AMLODIPINE BESYLATE 2.5 MG: 2.5 TABLET ORAL at 09:02

## 2018-01-01 RX ADMIN — ROSUVASTATIN CALCIUM 10 MG: 10 TABLET, FILM COATED ORAL at 21:37

## 2018-01-01 RX ADMIN — TAMSULOSIN HYDROCHLORIDE 0.4 MG: 0.4 CAPSULE ORAL at 09:21

## 2018-01-01 RX ADMIN — SODIUM CHLORIDE 1000 ML: 9 INJECTION, SOLUTION INTRAVENOUS at 10:49

## 2018-01-01 RX ADMIN — MULTIPLE VITAMINS W/ MINERALS TAB 1 TABLET: TAB at 08:24

## 2018-01-01 RX ADMIN — NYSTATIN 500000 UNITS: 500000 SUSPENSION ORAL at 09:12

## 2018-01-01 RX ADMIN — INSULIN ASPART 3 UNITS: 100 INJECTION, SOLUTION INTRAVENOUS; SUBCUTANEOUS at 18:17

## 2018-01-01 RX ADMIN — LEVOFLOXACIN 500 MG: 5 INJECTION, SOLUTION INTRAVENOUS at 16:05

## 2018-01-01 RX ADMIN — IPRATROPIUM BROMIDE AND ALBUTEROL SULFATE 3 ML: 2.5; .5 SOLUTION RESPIRATORY (INHALATION) at 08:08

## 2018-01-01 RX ADMIN — BUDESONIDE 0.5 MG: 0.5 INHALANT RESPIRATORY (INHALATION) at 19:32

## 2018-01-01 RX ADMIN — PANTOPRAZOLE SODIUM 40 MG: 40 TABLET, DELAYED RELEASE ORAL at 06:33

## 2018-01-01 RX ADMIN — OMEPRAZOLE 40 MG: 20 CAPSULE, DELAYED RELEASE ORAL at 14:11

## 2018-01-01 RX ADMIN — FUROSEMIDE 20 MG: 20 TABLET ORAL at 09:18

## 2018-01-01 RX ADMIN — FILGRASTIM 300 MCG: 300 INJECTION, SOLUTION INTRAVENOUS; SUBCUTANEOUS at 19:52

## 2018-01-01 RX ADMIN — BUDESONIDE 0.5 MG: 0.5 INHALANT RESPIRATORY (INHALATION) at 11:14

## 2018-01-01 RX ADMIN — METOPROLOL SUCCINATE 25 MG: 25 TABLET, EXTENDED RELEASE ORAL at 21:55

## 2018-01-01 RX ADMIN — IPRATROPIUM BROMIDE AND ALBUTEROL SULFATE 3 ML: 2.5; .5 SOLUTION RESPIRATORY (INHALATION) at 15:46

## 2018-01-01 RX ADMIN — LEVOFLOXACIN 500 MG: 5 INJECTION, SOLUTION INTRAVENOUS at 17:09

## 2018-01-01 RX ADMIN — METOPROLOL SUCCINATE 25 MG: 25 TABLET, EXTENDED RELEASE ORAL at 20:54

## 2018-01-01 RX ADMIN — INSULIN GLARGINE 18 UNITS: 100 INJECTION, SOLUTION SUBCUTANEOUS at 22:28

## 2018-01-01 RX ADMIN — METOPROLOL SUCCINATE 25 MG: 25 TABLET, EXTENDED RELEASE ORAL at 21:18

## 2018-01-01 RX ADMIN — INSULIN ASPART 1 UNITS: 100 INJECTION, SOLUTION INTRAVENOUS; SUBCUTANEOUS at 18:39

## 2018-01-01 RX ADMIN — APIXABAN 10 MG: 5 TABLET, FILM COATED ORAL at 21:55

## 2018-01-01 RX ADMIN — APIXABAN 5 MG: 5 TABLET, FILM COATED ORAL at 08:23

## 2018-01-01 RX ADMIN — TAMSULOSIN HYDROCHLORIDE 0.4 MG: 0.4 CAPSULE ORAL at 07:52

## 2018-01-01 RX ADMIN — BUDESONIDE 0.5 MG: 0.5 INHALANT RESPIRATORY (INHALATION) at 08:03

## 2018-01-01 RX ADMIN — METHYLPREDNISOLONE SODIUM SUCCINATE 40 MG: 40 INJECTION, POWDER, FOR SOLUTION INTRAMUSCULAR; INTRAVENOUS at 00:24

## 2018-01-01 RX ADMIN — METOPROLOL SUCCINATE 25 MG: 25 TABLET, EXTENDED RELEASE ORAL at 21:01

## 2018-01-01 RX ADMIN — MULTIPLE VITAMINS W/ MINERALS TAB 1 TABLET: TAB at 09:48

## 2018-01-01 RX ADMIN — BUDESONIDE 0.5 MG: 0.5 INHALANT RESPIRATORY (INHALATION) at 19:29

## 2018-01-01 RX ADMIN — BUDESONIDE 0.5 MG: 0.5 INHALANT RESPIRATORY (INHALATION) at 19:38

## 2018-01-01 RX ADMIN — ASPIRIN 81 MG 81 MG: 81 TABLET ORAL at 21:55

## 2018-01-01 RX ADMIN — IPRATROPIUM BROMIDE AND ALBUTEROL SULFATE 3 ML: 2.5; .5 SOLUTION RESPIRATORY (INHALATION) at 14:54

## 2018-01-01 RX ADMIN — DIGOXIN 125 MCG: 125 TABLET ORAL at 09:09

## 2018-01-01 RX ADMIN — IPRATROPIUM BROMIDE AND ALBUTEROL SULFATE 3 ML: 2.5; .5 SOLUTION RESPIRATORY (INHALATION) at 19:35

## 2018-01-01 RX ADMIN — METOPROLOL SUCCINATE 25 MG: 25 TABLET, EXTENDED RELEASE ORAL at 21:25

## 2018-01-01 RX ADMIN — FLUOXETINE 40 MG: 20 CAPSULE ORAL at 09:04

## 2018-01-01 RX ADMIN — LEVOFLOXACIN 500 MG: 5 INJECTION, SOLUTION INTRAVENOUS at 16:25

## 2018-01-01 RX ADMIN — PREDNISONE 30 MG: 20 TABLET ORAL at 09:11

## 2018-01-01 RX ADMIN — BUDESONIDE 0.5 MG: 0.5 INHALANT RESPIRATORY (INHALATION) at 20:02

## 2018-01-01 RX ADMIN — IPRATROPIUM BROMIDE AND ALBUTEROL SULFATE 3 ML: 2.5; .5 SOLUTION RESPIRATORY (INHALATION) at 11:30

## 2018-01-01 RX ADMIN — TADALAFIL 20 MG: 5 TABLET, FILM COATED ORAL at 07:52

## 2018-01-01 RX ADMIN — TADALAFIL 20 MG: 5 TABLET, FILM COATED ORAL at 08:56

## 2018-01-01 RX ADMIN — IPRATROPIUM BROMIDE AND ALBUTEROL SULFATE 3 ML: 2.5; .5 SOLUTION RESPIRATORY (INHALATION) at 15:43

## 2018-01-01 RX ADMIN — DIGOXIN 125 MCG: 125 TABLET ORAL at 09:14

## 2018-01-01 RX ADMIN — COLCHICINE 0.6 MG: 0.6 TABLET, FILM COATED ORAL at 21:00

## 2018-01-01 RX ADMIN — SODIUM CHLORIDE 1000 ML: 9 INJECTION, SOLUTION INTRAVENOUS at 11:53

## 2018-01-01 RX ADMIN — ACETAMINOPHEN 650 MG: 325 TABLET, FILM COATED ORAL at 01:57

## 2018-01-01 RX ADMIN — METOPROLOL SUCCINATE 25 MG: 25 TABLET, EXTENDED RELEASE ORAL at 21:19

## 2018-01-01 RX ADMIN — BUDESONIDE 0.5 MG: 0.5 INHALANT RESPIRATORY (INHALATION) at 07:28

## 2018-01-01 RX ADMIN — AMLODIPINE BESYLATE 2.5 MG: 2.5 TABLET ORAL at 09:06

## 2018-01-01 RX ADMIN — BUDESONIDE 0.5 MG: 0.5 INHALANT RESPIRATORY (INHALATION) at 08:00

## 2018-01-01 RX ADMIN — NYSTATIN 500000 UNITS: 500000 SUSPENSION ORAL at 21:57

## 2018-01-01 RX ADMIN — MULTIPLE VITAMINS W/ MINERALS TAB 1 TABLET: TAB at 10:09

## 2018-01-01 RX ADMIN — INSULIN ASPART 2 UNITS: 100 INJECTION, SOLUTION INTRAVENOUS; SUBCUTANEOUS at 18:11

## 2018-01-01 RX ADMIN — METOPROLOL SUCCINATE 25 MG: 25 TABLET, EXTENDED RELEASE ORAL at 09:11

## 2018-01-01 RX ADMIN — IPRATROPIUM BROMIDE AND ALBUTEROL SULFATE 3 ML: 2.5; .5 SOLUTION RESPIRATORY (INHALATION) at 15:47

## 2018-01-01 RX ADMIN — SENNOSIDES AND DOCUSATE SODIUM 1 TABLET: 8.6; 5 TABLET ORAL at 22:13

## 2018-01-01 RX ADMIN — PREDNISONE 30 MG: 20 TABLET ORAL at 09:46

## 2018-01-01 RX ADMIN — NYSTATIN 500000 UNITS: 500000 SUSPENSION ORAL at 21:54

## 2018-01-01 RX ADMIN — APIXABAN 5 MG: 5 TABLET, FILM COATED ORAL at 21:18

## 2018-01-01 RX ADMIN — Medication 5400 UNITS: at 15:23

## 2018-01-01 RX ADMIN — IPRATROPIUM BROMIDE AND ALBUTEROL SULFATE 3 ML: 2.5; .5 SOLUTION RESPIRATORY (INHALATION) at 20:02

## 2018-01-01 RX ADMIN — ROSUVASTATIN CALCIUM 10 MG: 10 TABLET, FILM COATED ORAL at 20:54

## 2018-01-01 RX ADMIN — HEPARIN SODIUM 1200 UNITS/HR: 10000 INJECTION, SOLUTION INTRAVENOUS at 15:12

## 2018-01-01 RX ADMIN — APIXABAN 5 MG: 5 TABLET, FILM COATED ORAL at 09:47

## 2018-01-01 RX ADMIN — BUDESONIDE 0.5 MG: 0.5 INHALANT RESPIRATORY (INHALATION) at 20:03

## 2018-01-01 RX ADMIN — APIXABAN 5 MG: 5 TABLET, FILM COATED ORAL at 09:02

## 2018-01-01 RX ADMIN — ASPIRIN 81 MG 81 MG: 81 TABLET ORAL at 21:15

## 2018-01-01 RX ADMIN — INSULIN ASPART 5 UNITS: 100 INJECTION, SOLUTION INTRAVENOUS; SUBCUTANEOUS at 17:44

## 2018-01-01 RX ADMIN — DIGOXIN 125 MCG: 125 TABLET ORAL at 08:23

## 2018-01-01 RX ADMIN — FILGRASTIM 342 MCG: 300 INJECTION, SOLUTION INTRAVENOUS; SUBCUTANEOUS at 18:14

## 2018-01-01 RX ADMIN — ENOXAPARIN SODIUM 40 MG: 40 INJECTION SUBCUTANEOUS at 15:24

## 2018-01-01 RX ADMIN — COLCHICINE 0.6 MG: 0.6 TABLET, FILM COATED ORAL at 21:43

## 2018-01-01 RX ADMIN — IPRATROPIUM BROMIDE AND ALBUTEROL SULFATE 3 ML: 2.5; .5 SOLUTION RESPIRATORY (INHALATION) at 20:03

## 2018-01-01 RX ADMIN — BUDESONIDE 0.5 MG: 0.5 INHALANT RESPIRATORY (INHALATION) at 07:22

## 2018-01-01 ASSESSMENT — ACTIVITIES OF DAILY LIVING (ADL)
TRANSFERRING: 2 - ASSISTIVE PERSON
COMMUNICATION: 0 - UNDERSTANDS/COMMUNICATES WITHOUT DIFFICULTY
ADLS_ACUITY_SCORE: 11
ADLS_ACUITY_SCORE: 11
ADLS_ACUITY_SCORE: 13
ADLS_ACUITY_SCORE: 11
DRESS: 2 - ASSISTIVE PERSON
ADLS_ACUITY_SCORE: 11
ADLS_ACUITY_SCORE: 11
DRESS: 0-->INDEPENDENT
ADLS_ACUITY_SCORE: 11
TOILETING: 2 - ASSISTIVE PERSON
ADLS_ACUITY_SCORE: 11
CURRENT_FUNCTIONAL_LEVEL_COMMENT: SBA
ADLS_ACUITY_SCORE: 11
ADLS_ACUITY_SCORE: 12
ADLS_ACUITY_SCORE: 11
PREVIOUS_RESPONSIBILITIES: DRIVING
ADLS_ACUITY_SCORE: 11
ADLS_ACUITY_SCORE: 11
ADLS_ACUITY_SCORE: 15
ADLS_ACUITY_SCORE: 11
ADLS_ACUITY_SCORE: 13
EATING: 0 - INDEPENDENT
ADLS_ACUITY_SCORE: 11
ADLS_ACUITY_SCORE: 11
TOILETING: 0-->INDEPENDENT
ADLS_ACUITY_SCORE: 11
SWALLOWING: 0 - SWALLOWS FOODS/LIQUIDS WITHOUT DIFFICULTY
BATHING: 0-->INDEPENDENT
AMBULATION: 2 - ASSISTIVE PERSON
ADLS_ACUITY_SCORE: 11
ADLS_ACUITY_SCORE: 15
ADLS_ACUITY_SCORE: 11
ADLS_ACUITY_SCORE: 15
ADLS_ACUITY_SCORE: 11
RETIRED_COMMUNICATION: 0-->UNDERSTANDS/COMMUNICATES WITHOUT DIFFICULTY
SWALLOWING: 0-->SWALLOWS FOODS/LIQUIDS WITHOUT DIFFICULTY
ADLS_ACUITY_SCORE: 11
ADLS_ACUITY_SCORE: 15
ADLS_ACUITY_SCORE: 11
ADLS_ACUITY_SCORE: 11
ADLS_ACUITY_SCORE: 15
BATHING: 2 - ASSISTIVE PERSON
ADLS_ACUITY_SCORE: 15
ADLS_ACUITY_SCORE: 11
ADLS_ACUITY_SCORE: 15
ADLS_ACUITY_SCORE: 11
ADLS_ACUITY_SCORE: 15
ADLS_ACUITY_SCORE: 12
ADLS_ACUITY_SCORE: 12
ADLS_ACUITY_SCORE: 11
ADLS_ACUITY_SCORE: 15
ADLS_ACUITY_SCORE: 11
ADLS_ACUITY_SCORE: 11
AMBULATION: 0-->INDEPENDENT
ADLS_ACUITY_SCORE: 13
ADLS_ACUITY_SCORE: 11
ADLS_ACUITY_SCORE: 11
FALL_HISTORY_WITHIN_LAST_SIX_MONTHS: NO
ADLS_ACUITY_SCORE: 11
RETIRED_EATING: 0-->INDEPENDENT
ADLS_ACUITY_SCORE: 11
ADLS_ACUITY_SCORE: 11
ADLS_ACUITY_SCORE: 15
ADLS_ACUITY_SCORE: 11
ADLS_ACUITY_SCORE: 15
ADLS_ACUITY_SCORE: 11
WHICH_OF_THE_ABOVE_FUNCTIONAL_RISKS_HAD_A_RECENT_ONSET_OR_CHANGE?: TRANSFERRING;AMBULATION
ADLS_ACUITY_SCORE: 11
COGNITION: 0 - NO COGNITION ISSUES REPORTED
ADLS_ACUITY_SCORE: 11
ADLS_ACUITY_SCORE: 13
ADLS_ACUITY_SCORE: 12
ADLS_ACUITY_SCORE: 12
ADLS_ACUITY_SCORE: 15
ADLS_ACUITY_SCORE: 11
ADLS_ACUITY_SCORE: 12
ADLS_ACUITY_SCORE: 11
ADLS_ACUITY_SCORE: 12
ADLS_ACUITY_SCORE: 11
ADLS_ACUITY_SCORE: 12
ADLS_ACUITY_SCORE: 15
ADLS_ACUITY_SCORE: 15
ADLS_ACUITY_SCORE: 11
ADLS_ACUITY_SCORE: 10
ADLS_ACUITY_SCORE: 11
ADLS_ACUITY_SCORE: 11
ADLS_ACUITY_SCORE: 12
ADLS_ACUITY_SCORE: 11
TRANSFERRING: 0-->INDEPENDENT
ADLS_ACUITY_SCORE: 11

## 2018-01-01 ASSESSMENT — ENCOUNTER SYMPTOMS
DIARRHEA: 0
FEVER: 0
SHORTNESS OF BREATH: 1
FEVER: 0
BACK PAIN: 0
DIARRHEA: 0
ABDOMINAL PAIN: 1
COUGH: 1
SHORTNESS OF BREATH: 1
VOMITING: 0
CHILLS: 0

## 2018-01-01 ASSESSMENT — MIFFLIN-ST. JEOR
SCORE: 1314.38
SCORE: 1353.31
SCORE: 1295.77
SCORE: 1333.43
SCORE: 1338.8
SCORE: 1314.38
SCORE: 1314.38

## 2018-02-05 NOTE — ED NOTES
Per Dr. Powers plan is to keep patient off bipap at this time. Pt is on 8 liters NRB and tolerating well       20:40 21:00

## 2018-05-28 NOTE — ED PROVIDER NOTES
History     Chief Complaint:  Abdominal Pain     HPI   Ravi Sandoval is a 73 year old male who presents to the emergency department today for evaluation of abdominal pain. The patient had been having central abdominal pain since 1600 this evening that felt like there was some sort of blockage. He took a nitroglycerin thinking it could be a heart attack, but then had a bowel movement and urinated and his pain has mostly resolved. He has no history of constipation, and is actually taking imodium daily for a history of diarrhea, which has long since resolved. He denies diarrhea, urinary symptoms, fever, chills, back pain, and has been eating well. His wife reports that he was a little gray looking earlier, but that has since resolved.  Of note, he originally noted shortness of breath, but states that this has been slowly getting worse over the past 20 years from pulmonary fibrosis, and there have been no sudden changes.    Allergies:  Tetracycline     Medications:    AMLODIPINE BESYLATE PO  ASPIRIN PO  bromfenac (PROLENSA) 0.07 % ophthalmic solution  Colchicine (COLCRYS PO)  digoxin (LANOXIN) 125 MCG tablet  FLUoxetine HCl (PROZAC PO)  furosemide (LASIX) 20 MG tablet  glipiZIDE (GLUCOTROL) 5 MG tablet  levothyroxine (SYNTHROID) 100 MCG tablet  LISINOPRIL PO  loperamide (IMODIUM) 2 MG capsule  metoprolol (TOPROL-XL) 25 MG 24 hr tablet  OMEPRAZOLE PO  PREDNISONE PO  rosuvastatin (CRESTOR) 10 MG tablet  tadalafil, PAH, (ADCIRCA) 20 MG TABS  tamsulosin (FLOMAX) 0.4 MG capsule    Past Medical History:    Anemia   ASHD (arteriosclerotic heart disease)   BPH (benign prostatic hyperplasia)   Cardiac arrest (H)   Cataracts, bilateral   Chronic diarrhea   COPD (chronic obstructive pulmonary disease) (H)   Depression   Disease of lung   Dysplasia of prostate   Gastroesophageal reflux disease   General weakness   Heart attack   Histiocytosis 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  Smoking Status: Current Ever Day Smoker  Smokeless Tobacco: Never Used  Alcohol Use: Negative  Marital Status:       Review of Systems   Constitutional: Negative for chills and fever.   Respiratory: Positive for shortness of breath (Not new).    Gastrointestinal: Positive for abdominal pain. Negative for diarrhea.   Genitourinary: Negative.    Musculoskeletal: Negative for back pain.   All other systems reviewed and are negative.    Physical Exam     Patient Vitals for the past 24 hrs:   BP Temp Temp src Pulse Resp SpO2 Height   05/28/18 1830 - - - - - 97 % -   05/28/18 1744 - - - - - 96 % -   05/28/18 1739 127/79 97.8  F (36.6  C) Oral 56 18 (!) 75 % 1.702 m (5' 7\")      Physical Exam  Constitutional: White elderly male supine  HENT: No signs of trauma.   Eyes: EOM are normal. Pupils are equal, round, and reactive to light.   Neck: Normal range of motion. No JVD present. No cervical adenopathy.  Cardiovascular: Regular rhythm.  Exam reveals no gallop and no friction rub.    No murmur heard.  Pulmonary/Chest: Bilateral breath sounds normal. No wheezes, rhonchi or rales.  Abdominal: Soft. No rebound or guarding. Minimal tenderness. 2+ femoral pulses. Bilateral non-tender inguinal hernias.  : Normal male genitourinary.  Rectal: Soft brown stool, no impaction.  Musculoskeletal: No edema. No tenderness. Clubbing of fingertips both hands.   Lymphadenopathy: No lymphadenopathy.   Neurological: Alert and oriented to person, place, and time. Normal strength. Coordination normal.   Skin: " Skin is warm and dry. No rash noted. No erythema.      Emergency Department Course     ECG:  ECG taken at 1800, ECG read at 1805  Normal sinus rhythm  Left anterior fascicular block  Left ventricular hypertrophy with repolarization abnormality  Rate 61 bpm. NE interval 168 ms. QRS duration 112 ms. QT/QTc 470/473 ms. P-R-T axes 30 -61 71.    Imaging:  Radiology findings were communicated with the patient who voiced understanding of the findings.    CT Abdomen Pelvis w Contrast  1. No acute abnormality.  2. Basilar pulmonary fibrosis and pulmonary nodules, similar to  previous chest CT.  3. Low-attenuation liver lesion, not significantly changed. Etiology  is not known and as previously stated, MRI could be performed for  further characterization.  4. Gallstones.  5. Urinary bladder diverticulum.  Reading per radiology    Laboratory:  Laboratory findings were communicated with the patient who voiced understanding of the findings.    UA: Glucose >1000, pH 7.5  CBC: WBC 9.6, HGB 12.0,   CMP: Glucose 295, Calcium 8.1, Albumin 2.9, Protein total 6.1, , Creatinine 0.78  Lipase: 96    Emergency Department Course:    1739 Nursing notes and vitals reviewed.    1745 I performed an exam of the patient as documented above.     1802 IV was inserted and blood was drawn for laboratory testing, results above.     1832 The patient was sent for a CT while in the emergency department, results above.      1951 The patient provided a urine sample here in the emergency department. This was sent for laboratory testing, findings above.     2030 I personally reviewed the lab and imaging results with the patient and answered all related questions prior to discharge.    Impression & Plan      Medical Decision Making:  Ravi Sandoval is a 73 year old male who presents to the emergency department today for evaluation of abdominal pain. His wife stated he became very grey and sweaty. Right before paramedics came, however, he had a  large bowel movement and was able to urinate and feels like his symptoms largely resolved then. He takes imodium because he has had problems with diarrhea, but that has been resolved lately as well. On exam he has minimal abdominal tenderness, he has strong pulses, and the rest of his exam was unremarkable. He has some chronic breathing issues due to pulmonary fibrosis. Workup includes blood, urine, and CT of the abdomen to rule out any tumor or aneurysm. This is all returned negative. The patient has remained stable, is feeling good, and will be discharged home. He will plan to follow up with his primary doctor and will recheck in the Emergency Department if his symptoms worsen.     Diagnosis:    ICD-10-CM    1. Abdominal pain, generalized R10.84    2. Constipation, unspecified constipation type K59.00    3. Pulmonary fibrosis, unspecified (H) J84.10      Disposition:   Discharge    Scribe Disclosure:  JERI Thanhquin Salas, am serving as a scribe at 5:51 PM on 5/28/2018 to document services personally performed by Raymond Powers MD based on my observations and the provider's statements to me.      EMERGENCY DEPARTMENT       Raymond Powers MD  05/28/18 6144

## 2018-05-28 NOTE — ED AVS SNAPSHOT
Emergency Department    [address] [place] [address]    Phone:  [phone]    Fax:  [phone]                                       Ravi Sandoval   MRN: [id number]    Department:   Emergency Department   Date of Visit:  5/28/2018           After Visit Summary Signature Page     I have received my discharge instructions, and my questions have been answered. I have discussed any challenges I see with this plan with the nurse or doctor.    ..........................................................................................................................................  Patient/Patient Representative Signature      ..........................................................................................................................................  Patient Representative Print Name and Relationship to Patient    ..................................................               ................................................  Date                                            Time    ..........................................................................................................................................  Reviewed by Signature/Title    ...................................................              ..............................................  Date                                                            Time

## 2018-05-28 NOTE — ED NOTES
Bed: ED09  Expected date: 5/28/18  Expected time: 5:18 PM  Means of arrival: Ambulance  Comments:  [place] 73M abd pain.  ETA [id number]

## 2018-05-28 NOTE — ED AVS SNAPSHOT
Emergency Department    [address] [facility] [address]    Phone:  [phone]    Fax:  [phone]                                       Ravi Sandoval   MRN: [id number]    Department:   Emergency Department   Date of Visit:  5/28/2018           Patient Information     Date Of Birth          1945        Your diagnoses for this visit were:     Abdominal pain, generalized     Constipation, unspecified constipation type     Pulmonary fibrosis, unspecified (H)        You were seen by Raymond Powers MD.      Follow-up Information     Schedule an appointment as soon as possible for a visit with Florencio Patricia MD.    Specialty:  Family Practice    Why:  As needed;  stop immodium    Contact information:    [facility]  [address]  [facility] [address]  [phone]          Discharge Instructions         Abdominal Pain    Abdominal pain is pain in the stomach or belly area. Everyone has this pain from time to time. In many cases it goes away on its own. But abdominal pain can sometimes be due to a serious problem, such as appendicitis. So it s important to know when to seek help.  Causes of abdominal pain  There are many possible causes of abdominal pain. Common causes in adults include:    Constipation, diarrhea, or gas    Stomach acid flowing back up into the esophagus (acid reflux or heartburn)    Severe acid reflux, called GERD (gastroesophageal reflux disease)    A sore in the lining of the stomach or small intestine (peptic ulcer)    Inflammation of the gallbladder, liver, or pancreas    Gallstones or kidney stones    Appendicitis     Intestinal blockage     An internal organ pushing through a muscle or other tissue (hernia)    Urinary tract infections    In women, menstrual cramps, fibroids, or endometriosis    Inflammation or infection of the intestines  Diagnosing the cause of abdominal pain  Your healthcare provider will do a physical exam help find the  cause of your pain. If needed, tests will be ordered. Belly pain has many possible causes. So it can be hard to find the reason for your pain. Giving details about your pain can help. Tell your provider where and when you feel the pain, and what makes it better or worse. Also let your provider know if you have other symptoms such as:    Fever    Tiredness    Upset stomach (nausea)    Vomiting    Changes in bathroom habits  Treating abdominal pain  Some causes of pain need emergency medical treatment right away. These include appendicitis or a bowel blockage. Other problems can be treated with rest, fluids, or medicines. Your healthcare provider can give you specific instructions for treatment or self-care based on what is causing your pain.  If you have vomiting or diarrhea, sip water or other clear fluids. When you are ready to eat solid foods again, start with small amounts of easy-to-digest, low-fat foods. These include apple sauce, toast, or crackers.   When to seek medical care  Call 911 or go to the hospital right away if you:    Can t pass stool and are vomiting    Are vomiting blood or have bloody diarrhea or black, tarry diarrhea    Have chest, neck, or shoulder pain    Feel like you might pass out    Have pain in your shoulder blades with nausea    Have sudden, severe belly pain    Have new, severe pain unlike any you have felt before    Have a belly that is rigid, hard, and tender to touch  Call your healthcare provider if you have:    Pain for more than 5 days    Bloating for more than 2 days    Diarrhea for more than 5 days    A fever of 100.4 F (38 C) or higher, or as directed by your healthcare provider    Pain that gets worse    Weight loss for no reason    Continued lack of appetite    Blood in your stool  How to prevent abdominal pain  Here are some tips to help prevent abdominal pain:    Eat smaller amounts of food at one time.    Avoid greasy, fried, or other high-fat foods.    Avoid foods that  give you gas.    Exercise regularly.    Drink plenty of fluids.  To help prevent GERD symptoms:    Quit smoking.    Reduce alcohol and certain foods that increase stomach acid.    Avoid aspirin and over-the-counter pain and fever medicines (NSAIDS or nonsteroidal anti-inflammatory drugs), if possible    Lose extra weight.    Finish eating at least 2 hours before you go to bed or lie down.    Raise the head of your bed.  Date Last Reviewed: 7/1/2016 2000-2017 [employer]. [address] [place] [address], [place] [address]. All rights reserved. This information is not intended as a substitute for professional medical care. Always follow your healthcare professional's instructions.          Discharge References/Attachments     ABDOMINAL PAIN, ADULT (ENGLISH)      24 Hour Appointment Hotline       To make an appointment at any [facility], call [phone] ([phone]). If you don't have a family doctor or clinic, we will help you find one. [facility] are conveniently located to serve the needs of you and your family.             Review of your medicines      Our records show that you are taking the medicines listed below. If these are incorrect, please call your family doctor or clinic.        Dose / Directions Last dose taken    AMLODIPINE BESYLATE PO   Dose:  2.5 mg        Take 2.5 mg by mouth daily   Refills:  0        ASPIRIN PO   Dose:  81 mg        Take 81 mg by mouth At Bedtime   Refills:  0        COLCRYS PO   Dose:  0.6 mg        Take 0.6 mg by mouth daily TAKES IN THE EVENING   Refills:  0        digoxin 125 MCG tablet   Commonly known as:  LANOXIN   Dose:  125 mcg   Quantity:  90 tablet        Take 1 tablet (125 mcg) by mouth daily   Refills:  3        furosemide 20 MG tablet   Commonly known as:  LASIX   Dose:  20 mg   Quantity:  30 tablet        Take 1 tablet (20 mg) by mouth every other day   Refills:  1        glipiZIDE 5 MG tablet   Commonly known as:  GLUCOTROL   Dose:  5 mg    Quantity:  30 tablet        Take 1 tablet (5 mg) by mouth every morning (before breakfast)   Refills:  0        levothyroxine 100 MCG tablet   Commonly known as:  SYNTHROID   Dose:  100 mcg   Quantity:  30 tablet        Take 1 tablet (100 mcg) by mouth daily   Refills:  11        LISINOPRIL PO   Dose:  20 mg        Take 20 mg by mouth daily   Refills:  0        loperamide 2 MG capsule   Commonly known as:  IMODIUM   Dose:  2 mg   Quantity:  20 capsule        Take 1 capsule (2 mg) by mouth 4 times daily as needed for diarrhea   Refills:  0        metoprolol succinate 25 MG 24 hr tablet   Commonly known as:  TOPROL-XL   Dose:  25 mg   Quantity:  180 tablet        Take 1 tablet (25 mg) by mouth 2 times daily   Refills:  0        OMEPRAZOLE PO   Dose:  40 mg        Take 40 mg by mouth daily   Refills:  0        PREDNISONE PO   Dose:  10 mg        Take 10 mg by mouth daily   Refills:  0        PROLENSA 0.07 % ophthalmic solution   Dose:  1 drop   Generic drug:  bromfenac        1 drop   Refills:  0        PROZAC PO   Dose:  40 mg        Take 40 mg by mouth daily   Refills:  0        rosuvastatin 10 MG tablet   Commonly known as:  CRESTOR   Dose:  10 mg   Quantity:  90 tablet        Take 1 tablet (10 mg) by mouth daily   Refills:  3        tadalafil (PAH) 20 MG Tabs   Commonly known as:  ADCIRCA   Dose:  40 mg   Quantity:  60 tablet        Take 2 tablets (40 mg) by mouth daily   Refills:  3        tamsulosin 0.4 MG capsule   Commonly known as:  FLOMAX   Dose:  0.4 mg   Quantity:  30 capsule        Take 1 capsule (0.4 mg) by mouth daily   Refills:  3                Procedures and tests performed during your visit     CBC with platelets differential    CT Abdomen Pelvis w Contrast    Comprehensive metabolic panel    EKG 12-lead, tracing only    Give 20 ounces of water 15 minutes before CT of abdomen    Lipase    UA with Microscopic reflex to Culture      Orders Needing Specimen Collection     None      Pending Results      No orders found from 5/26/2018 to 5/29/2018.            Pending Culture Results     No orders found from 5/26/2018 to 5/29/2018.            Pending Results Instructions     If you had any lab results that were not finalized at the time of your Discharge, you can call the ED Lab Result RN at [phone]. You will be contacted by this team for any positive Lab results or changes in treatment. The nurses are available 7 days a week from 10A to 6:30P.  You can leave a message 24 hours per day and they will return your call.        Test Results From Your Hospital Stay        5/28/2018  6:28 PM      Component Results     Component Value Ref Range & Units Status    WBC 9.6 4.0 - 11.0 10e9/L Final    RBC Count 4.61 4.4 - 5.9 10e12/L Final    Hemoglobin 12.0 (L) 13.3 - 17.7 g/dL Final    Hematocrit 37.7 (L) 40.0 - 53.0 % Final    MCV 82 78 - 100 fl Final    MCH 26.0 (L) 26.5 - 33.0 pg Final    MCHC 31.8 31.5 - 36.5 g/dL Final    RDW 18.4 (H) 10.0 - 15.0 % Final    Platelet Count 136 (L) 150 - 450 10e9/L Final    Diff Method Automated Method  Final    % Neutrophils 80.2 % Final    % Lymphocytes 12.0 % Final    % Monocytes 6.9 % Final    % Eosinophils 0.2 % Final    % Basophils 0.1 % Final    % Immature Granulocytes 0.6 % Final    Nucleated RBCs 0 0 /100 Final    Absolute Neutrophil 7.7 1.6 - 8.3 10e9/L Final    Absolute Lymphocytes 1.2 0.8 - 5.3 10e9/L Final    Absolute Monocytes 0.7 0.0 - 1.3 10e9/L Final    Absolute Eosinophils 0.0 0.0 - 0.7 10e9/L Final    Absolute Basophils 0.0 0.0 - 0.2 10e9/L Final    Abs Immature Granulocytes 0.1 0 - 0.4 10e9/L Final    Absolute Nucleated RBC 0.0  Final         5/28/2018  6:30 PM      Component Results     Component Value Ref Range & Units Status    Sodium 139 133 - 144 mmol/L Final    Potassium 3.9 3.4 - 5.3 mmol/L Final    Chloride 104 94 - 109 mmol/L Final    Carbon Dioxide 26 20 - 32 mmol/L Final    Anion Gap 9 3 - 14 mmol/L Final    Glucose 295 (H) 70 - 99 mg/dL Final    Urea  Nitrogen 10 7 - 30 mg/dL Final    Creatinine 0.78 0.66 - 1.25 mg/dL Final    GFR Estimate >90 >60 mL/min/1.7m2 Final    Non  GFR Calc    GFR Estimate If Black >90 >60 mL/min/1.7m2 Final    African American GFR Calc    Calcium 8.1 (L) 8.5 - 10.1 mg/dL Final    Bilirubin Total 0.7 0.2 - 1.3 mg/dL Final    Albumin 2.9 (L) 3.4 - 5.0 g/dL Final    Protein Total 6.1 (L) 6.8 - 8.8 g/dL Final    Alkaline Phosphatase 137 40 - 150 U/L Final    ALT 64 0 - 70 U/L Final     (H) 0 - 45 U/L Final         5/28/2018  6:27 PM      Component Results     Component Value Ref Range & Units Status    Lipase 96 73 - 393 U/L Final         5/28/2018  8:01 PM      Component Results     Component Value Ref Range & Units Status    Color Urine Light Yellow  Final    Appearance Urine Clear  Final    Glucose Urine >1000 (A) NEG^Negative mg/dL Final    Bilirubin Urine Negative NEG^Negative Final    Ketones Urine Negative NEG^Negative mg/dL Final    Specific Gravity Urine 1.014 1.003 - 1.035 Final    Blood Urine Negative NEG^Negative Final    pH Urine 7.5 (H) 5.0 - 7.0 pH Final    Protein Albumin Urine Negative NEG^Negative mg/dL Final    Urobilinogen mg/dL 2.0 0.0 - 2.0 mg/dL Final    Nitrite Urine Negative NEG^Negative Final    Leukocyte Esterase Urine Negative NEG^Negative Final    Source Midstream Urine  Final    WBC Urine 1 0 - 5 /HPF Final    RBC Urine <1 0 - 2 /HPF Final         5/28/2018  7:03 PM      Narrative     CT ABDOMEN PELVIS W CONTRAST 5/28/2018 6:50 PM    HISTORY: Abdominal pain.    COMPARISON: Chest CT, 2/22/2017. Right upper quadrant ultrasound,  2/24/2017.    TECHNIQUE:  Abdomen and pelvis CT was performed following intravenous  administration of 71 mL Isovue-370.  Radiation dose for this scan was  reduced using automated exposure control, adjustment of the mA and/or  kV according to patient size, or iterative reconstruction technique.    FINDINGS: Extensive fibrosis in the lung bases. Pulmonary nodules  at  the left lung base, the largest of which measures 0.9 cm (image 4,  series 3). No pleural effusion. Normal heart size.    Low-attenuation lesion in the liver appears stable in comparison with  previous CT measuring approximately 2.8 x 1.5 cm (image 12, series 3).  Liver otherwise appears normal. Gallstones in the dependent  gallbladder lumen. Spleen, pancreas, adrenal glands and kidneys appear  normal.    Normal caliber bowel. Normal appendix. No free air. No free or  loculated fluid collection.    Urinary bladder is distended with normal wall thickness. There is a  bladder diverticulum at the right lateral posterior urinary bladder  margin (image 61, series 3). No free fluid in the pelvis.        Impression     IMPRESSION:   1. No acute abnormality.  2. Basilar pulmonary fibrosis and pulmonary nodules, similar to  previous chest CT.  3. Low-attenuation liver lesion, not significantly changed. Etiology  is not known and as previously stated, MRI could be performed for  further characterization.  4. Gallstones.  5. Urinary bladder diverticulum.    ROXANNA GAMBINO MD                Clinical Quality Measure: Blood Pressure Screening     Your blood pressure was checked while you were in the emergency department today. The last reading we obtained was  BP: 127/79 . Please read the guidelines below about what these numbers mean and what you should do about them.  If your systolic blood pressure (the top number) is less than 120 and your diastolic blood pressure (the bottom number) is less than 80, then your blood pressure is normal. There is nothing more that you need to do about it.  If your systolic blood pressure (the top number) is 120-139 or your diastolic blood pressure (the bottom number) is 80-89, your blood pressure may be higher than it should be. You should have your blood pressure rechecked within a year by a primary care provider.  If your systolic blood pressure (the top number) is 140 or greater or your  diastolic blood pressure (the bottom number) is 90 or greater, you may have high blood pressure. High blood pressure is treatable, but if left untreated over time it can put you at risk for heart attack, stroke, or kidney failure. You should have your blood pressure rechecked by a primary care provider within the next 4 weeks.  If your provider in the emergency department today gave you specific instructions to follow-up with your doctor or provider even sooner than that, you should follow that instruction and not wait for up to 4 weeks for your follow-up visit.        Thank you for choosing [facility]       Thank you for choosing [facility] for your care. Our goal is always to provide you with excellent care. Hearing back from our patients is one way we can continue to improve our services. Please take a few minutes to complete the written survey that you may receive in the mail after you visit with us. Thank you!        [employer] Information     [employer] gives you secure access to your electronic health record. If you see a primary care provider, you can also send messages to your care team and make appointments. If you have questions, please call your primary care clinic.  If you do not have a primary care provider, please call [phone] and they will assist you.        Care EveryWhere ID     This is your Care EveryWhere ID. This could be used by other organizations to access your [facility] medical records  [id number]        Equal Access to Services     PAVAN LOYD : Gogo Cruz, waaxda luqadaha, qaybta kaalmada yury, brannon ragsdale. So [facility] [phone].    ATENCIÓN: Si habla español, tiene a mccall disposición servicios gratuitos de asistencia lingüística. Llame al [phone].    We comply with applicable federal civil rights laws and [place] laws. We do not discriminate on the basis of race, color, national origin, age, disability, sex, sexual orientation, or  gender identity.            After Visit Summary       This is your record. Keep this with you and show to your community pharmacist(s) and doctor(s) at your next visit.

## 2018-05-29 NOTE — DISCHARGE INSTRUCTIONS

## 2018-11-23 PROBLEM — J84.10 PULMONARY FIBROSIS (H): Status: ACTIVE | Noted: 2018-01-01

## 2018-11-23 NOTE — ED NOTES
"[facility]  ED Nurse Handoff Report    ED Chief complaint: Shortness of Breath (increased sob X 6 days, upper respiratory infection X 1 mon was treated with abx 1 mon ago symptoms have lingered. home O2 4-5L, was sating 89%on 10L NC today at home. hx pulm fibrosis. )      ED Diagnosis:   Final diagnoses:   Pneumonia of right lower lobe due to infectious organism (H)       Code Status: see epic    Allergies:   Allergies   Allergen Reactions     Tetracycline        Activity level - Baseline/Home:  Independent    Activity Level - Current:   Stand with Assist     Needed?: No    Isolation: No  Infection: Not Applicable  Bariatric?: No    Vital Signs:   Vitals:    11/23/18 1017 11/23/18 1107 11/23/18 1154   BP: (!) 171/93 170/90 (!) 189/97   Pulse: 60     Resp: 24 16 14   Temp: 97.5  F (36.4  C)     TempSrc: Temporal     SpO2: 95% 99% 99%   Weight: 68 kg (150 lb)     Height: 1.702 m (5' 7\")         Cardiac Rhythm: ,   Cardiac  Cardiac Rhythm: Normal sinus rhythm    Pain level: 0-10 Pain Scale: 0    Is this patient confused?: No   [place] - Suicide Severity Rating Scale Completed?  Yes  If yes, what color did the patient score?  White    Patient Report: Initial Complaint: pt hx of pulmonary fibrosis. Uses 4-5L home O2 at baseline. States he had an URI 1 month ago and was treated with an antibiotic but symptoms have persisted c/o increased SOB/CHOUDHURY X 6 days. Sating 89% on 10L via NC for ems on arrival. Placed on non re-breather at 10L O2 and sating mid 90s.   Focused Assessment: tachypnea, sating mid - high 90s on 10 L non rebreather. Lung sounds very diminished to not audible throughout. Denies any cough does c/o sinus congestion. No fevers. Hypertensive. Resting comfortably in bed.    Tests Performed: labs, bc x2, lactic, vbg, cxr  Abnormal Results: lactic ^, cxr shows right lobe pneumonia  Treatments provided: 2L NS, zosyn iv    Family Comments: wife and daughter at bedside    OBS " brochure/video discussed/provided to patient/family: N/A              Name of person given brochure if not patient: na              Relationship to patient: na    ED Medications:   Medications   0.9% sodium chloride BOLUS (1,000 mLs Intravenous New Bag 11/23/18 1153)   piperacillin-tazobactam (ZOSYN) 3.375 g vial to attach to  mL bag (3.375 g Intravenous New Bag 11/23/18 1201)   0.9% sodium chloride BOLUS (0 mLs Intravenous Stopped 11/23/18 1138)       Drips infusing?:  No    For the majority of the shift this patient was Green.   Interventions performed were none.    Severe Sepsis OR Septic Shock Diagnosis Present: No    To be done/followed up on inpatient unit:  nothing currently     ED NURSE PHONE NUMBER: [phone]

## 2018-11-23 NOTE — H&P
Admitted:     11/23/2018      PRIMARY CARE PROVIDER:  Florencio Patricia MD      PRIMARY PULMONOLOGIST:  Dr. Hay.      CHIEF COMPLAINT:  Shortness of breath.      History was obtained from the patient, extensive chart review, and patient's family who was present in the room.      HISTORY OF PRESENT ILLNESS:  Ravi Sandoval is a 73-year-old male with a past medical history of histiocytosis X, pulmonary fibrosis, severe pulmonary hypertension and COPD, who is chronically oxygen dependent on 4 liters.  He presented to the Emergency Department today with increasing shortness of breath.  He has significant lung disease, followed by [facility], and maintained on 10 mg of prednisone.  Historically, he has not been prescribed bronchodilators as they have been of little assistance.  He notes gradually increasing shortness of breath over the past month.  He was seen by his primary care provider on 10/24 and complained of sinus symptoms.  He was treated with a 10-day course of amoxicillin for suspected sinusitis.  He notes that his symptoms have not improved.  He continues to feel short of breath.  He has a cough though notes that is not productive, though feels as though he is trying to cough things up.  He has been afebrile.  He chronically uses oxygen 4 liters.  He  does note that yesterday he ran out of portable oxygen for an hour while out for Thanksgiving dinner.  Today, he had increasing shortness of breath, so EMS was summoned.  They noted him to be 89% despite 10 liters of oxygen.  He was brought to [facility] for further evaluation.      In the Emergency Department, he was evaluated by Dr. Gaviria.  He was hypertensive.  O2 saturations were stable on 10 liters nonrebreather.  Laboratory evaluation was largely unremarkable.  A chest x-ray showed evidence of a fibrotic-appearing lungs with a superimposed right lower lobe pneumonia.  He was initiated on Zosyn, and admission was requested for further  evaluation.      Presently, patient is evaluated in the Emergency Department.  He is breathing comfortably and remains on a nonrebreather.  He recently saw his pulmonologist approximately 2 weeks ago.  His wife states that he was prescribed new nebulizers; however, there was a delay in getting the nebulizer machine.  They have not started this therapy and are unsure of the medications.  He denies any chest pain.  No worsening lower extremity edema.  No significant weight gain.  He is an uncontrolled type 2 diabetic.  His primary care provider increased his glipizide in October due to A1c of greater than 10.  He initially adjusted this medication; however, he recently decreased it as he was unsure if it was contributing to his shortness of breath.      PAST MEDICAL HISTORY:   1.  Histiocytosis X.   2.  Pulmonary fibrosis.   3.  COPD.   4.  Severe pulmonary hypertension.   5.  Coronary artery disease with history of STEMI in 2010, status post stent to the RCA; 70% LAD lesion at that time.   6.  BPH.   7.  Hypertension.   8.  Uncontrolled type 2 diabetes with an A1c of 10.8.   9.  Depression.   10.  Hypothyroidism.   11.  Dyslipidemia.      PRIOR TO ADMISSION MEDICATIONS:    Prior to Admission medications    Medication Sig Last Dose Taking? Auth Provider   AMLODIPINE BESYLATE PO Take 2.5 mg by mouth daily 11/22/2018 at Unknown time Yes Reported, Patient   ASPIRIN PO Take 81 mg by mouth At Bedtime 11/22/2018 at Unknown time Yes Reported, Patient   Colchicine (COLCRYS PO) Take 0.6 mg by mouth daily TAKES IN THE EVENING 11/22/2018 at Unknown time Yes Reported, Patient   digoxin (LANOXIN) 125 MCG tablet Take 1 tablet (125 mcg) by mouth daily 11/22/2018 at Unknown time Yes Herson Rouse MD   FLUoxetine HCl (PROZAC PO) Take 40 mg by mouth daily 11/22/2018 at Unknown time Yes Reported, Patient   furosemide (LASIX) 20 MG tablet Take 1 tablet (20 mg) by mouth every other day 11/22/2018 at Unknown time Yes Tia  Giancarlo CLAYTON MD   glipiZIDE (GLUCOTROL) 5 MG tablet Take 1 tablet (5 mg) by mouth every morning (before breakfast)  Patient taking differently: Take 5 mg by mouth 2 times daily (before meals)  11/22/2018 at Unknown time Yes Giancarlo Weber MD   levothyroxine (SYNTHROID/LEVOTHROID) 125 MCG tablet Take 125 mcg by mouth daily 11/22/2018 at Unknown time Yes Unknown, Entered By History   LISINOPRIL PO Take 20 mg by mouth daily 11/22/2018 at Unknown time Yes Reported, Patient   loperamide (IMODIUM) 2 MG capsule Take 1 capsule (2 mg) by mouth 4 times daily as needed for diarrhea 11/22/2018 at Unknown time Yes Giancarlo Weber MD   metoprolol (TOPROL-XL) 25 MG 24 hr tablet Take 1 tablet (25 mg) by mouth 2 times daily 11/22/2018 at Unknown time Yes Herson Rouse MD   OMEPRAZOLE PO Take 40 mg by mouth daily 11/22/2018 at Unknown time Yes Unknown, Entered By History   PREDNISONE PO Take 10 mg by mouth daily  11/22/2018 at Unknown time Yes Reported, Patient   rosuvastatin (CRESTOR) 10 MG tablet Take 1 tablet (10 mg) by mouth daily 11/22/2018 at Unknown time Yes Hamlet Jimenez MD   tadalafil, PAH, (ADCIRCA) 20 MG TABS Take 2 tablets (40 mg) by mouth daily  Patient taking differently: Take 40 mg by mouth daily Takes only 20 mg daily 11/22/2018 at Unknown time Yes Jennifer Schroeder MD   tamsulosin (FLOMAX) 0.4 MG capsule Take 1 capsule (0.4 mg) by mouth daily 11/22/2018 at Unknown time Yes Jennifer Schroeder MD   ipratropium - albuterol 0.5 mg/2.5 mg/3 mL (DUONEB) 0.5-2.5 (3) MG/3ML neb solution Take 1 vial by nebulization 3 times daily as needed for shortness of breath / dyspnea, wheezing or other (use up to 3 times a day for shortness of breath, cough)   Unknown, Entered By History           ALLERGIES:  TETRACYCLINE.        PAST SURGICAL HISTORY:     1.  Cataract.   2.  Colonoscopy.   3.  Stent.      FAMILY HISTORY:  Mother had skin cancer.  Father had pulmonary hypertension.      SOCIAL HISTORY:  He is an every day  smoker; has smoked for 55 years, currently down to 2-3 cigarettes per day.  Does not drink alcohol.  He and his wife live in their own home.  He uses a walker on occasion.      REVIEW OF SYSTEMS:  A 10-point review of systems was completed.  Pertinent positives noted in HPI; other systems negative.      PHYSICAL EXAMINATION:   GENERAL:  Ravi Sandoval is a well-developed, well-nourished 73-year-old male who is lying in bed.   VITAL SIGNS:  Blood pressure is 189/97, heart rate 62, temperature 97.5, O2 sat 99% on 10 liters.   HEENT:  Head is normocephalic, atraumatic.  Oropharynx not examined.   CARDIOVASCULAR:  Regular rate and rhythm, no murmurs.   PULMONARY:  Increased effort, no significant wheezing.  Fibrotic sounding lungs at bases.  Diminished air movement.   ABDOMEN:  Nontender, nondistended.   EXTREMITIES:  Lower extremities without edema.   SKIN:  Chronic skin changes and ecchymosis.   NEUROLOGIC:  Alert and oriented.  Cranial nerves II through XII grossly intact.      LABORATORY EVALUATION:  Labs reviewed in Epic.      IMAGING:  I personally reviewed chest x-ray and agree with fibrotic changes with a possible right upper lobe infiltrate.      ASSESSMENT:  Ravi Sandoval is an 73-year-old male with a past medical history of histiocytosis, chronic obstructive pulmonary disease (COPD), and pulmonary fibrosis, who is chronically oxygen dependent, who presented to the Emergency Department with increasing O2 demands and shortness of breath.  He is being admitted for suspected pneumonia.   1.  Chronic hypoxic respiratory failure in the setting of pulmonary fibrosis and COPD.  Notes increasing shortness of breath for the past 1 month.  Was treated outpatient with amoxicillin at the end of October for a sinusitis, requiring increasing O2 demands up from his baseline of 4 liters.  Afebrile with no significant leukocytosis.  Lactic acid is elevated at 2.5.  Procalcitonin is pending.  Will admit under inpatient status.  We  will plan to treat with Levaquin.  He notes historically Zithromax has not been helpful in the past.  Will continue his prior to admission prednisone 10 mg daily.  Family notes that he was due to start some new nebulizers from his pulmonologist.  Will ask pharmacy to verify these medications and get them initiated.  In the meantime, p.r.n. albuterol nebulizers will be available.  Wean O2 and monitor.  If slow to improve, could consider further imaging with CT and/or a Pulmonology consultation.   2.  Severe pulmonary hypertension.  Does not appear to be significantly volume overloaded.  We will continue prior to admission tadalafil, digoxin and every other day Lasix when verified by pharmacy.   3.  Uncontrolled type 2 diabetes.  A1c in October was 10.8.  He is supposed to take glipizide 10 mg daily though notes he has not been taking it as directed.  Does not check his blood sugars.  For now, we will hold the oral agents.  Will place on Lantus 5 units at bedtime and sliding scale insulin with meals.  Monitor and adjust as indicated.   4.  Hypertension.  Blood pressure elevated in the Emergency Department.  Appears prior to admission regimen is Norvasc 2.5 mg daily, lisinopril 20 mg daily and metoprolol 25 mg twice daily.  Will resume and reconcile by pharmacy.  P.r.n. hydralazine will be available as needed.   5.  Hypothyroidism.  Continue prior to admission Synthroid.   6.  Coronary artery disease with previous bare metal stent to the right coronary artery (RCA) in 2010.  Denies any symptoms of chest pain.  Continue prior to admission aspirin, beta blocker, statin and Ace when verified by pharmacy.   7.  Deep venous thrombosis prophylaxis with Lovenox.   8.  Code status.  Discussed with the patient and family.  He states that he is FULL CODE.        This patient was discussed with Dr. Stone of the Hospitalist Service who independently interviewed and examined inpatient.  He is in agreement with the above plan.          CANELO VALLE MD       As dictated by ARMEN SINGH PA-C            D: 2018   T: 2018   MT:       Name:     KYLE JACOBSON   MRN:      [id number]        Account:      [id number]   :      1945        Admitted:     2018                   Document: [id number]

## 2018-11-23 NOTE — ED PROVIDER NOTES
History   Chief Complaint:  Shortness of Breath    HPI   Ravi Sandoval is a 73 year old male, current smoker with a complex medical history notable for pulmonary fibrosis, COPD, and chronic respiratory failure with hypoxia who presents via EMS with shortness of breath. The patient reports that one month ago he was treated for an upper respiratory infection with antibiotics. He is now on 4 L oxygen at home during the day and at night, but is unsure if he is using it correctly. He also notes running out of his portable tank while traveling yesterday. The patient attests to recent increased shortness of breath despite being on the oxygen. When EMS arrived today, they noted the patient's sats to be at 89%, even on 10 L oxygen. The patient adds that he has had decreased phlegm output as well. He does have a complex past medical history notable for pulmonary fibrosis. The patient denies chest pain, fever, vomiting, and diarrhea.    Allergies:  Tetracycline     Medications:    Amlodipine besylate  Aspirin  Colchicine   Digoxin   Lasix  Prozac  Glipizide  Synthroid  Lisinopril  Imodium  Metoprolol  Omeprazole  Crestor  Tadalafil  Flomax  Prednisone     Past Medical History:    Skin lesion  Cyst of neck  Skin cancer of nose  Acute on chronic systolic CHF  Lactic acid acidosis   Anemia  Pulmonary hypertension  Anemia  ASHD  Benign prostatic hyperplasia  Cardiac arrest  Cataracts, bilateral  Chronic diarrhea  Chronic respiratory failure with hypoxia  COPD   Depression  Disease of lung   Dysplasia of prostate  GERD  General weakness  Heart attack  Herpes zoster  Histiocytosis X  Hyperlipidemia  Hypertension  Hypothyroidism  Postinflammatory pulmonary fibrosis  Pulmonary hypertensive venous disease  Thyroid disease  Tobacco use  Type II diabetes mellitus without complications    Past Surgical History:    Biopsy, lung nodule  Cardiac stent  Cataract IOL x2    Family History:    Pulmonary hypertension  Skin cancer  [facility]  "Eduardo disease    Social History:  Smoking status: Current every day smoker  Alcohol use: No  Marital Status:   [2]    Review of Systems   Constitutional: Negative for fever.   Respiratory: Positive for cough (decreased production from baseline) and shortness of breath.    Cardiovascular: Negative for chest pain.   Gastrointestinal: Negative for diarrhea and vomiting.   All other systems reviewed and are negative.    Physical Exam   Patient Vitals for the past 24 hrs:   BP Temp Temp src Pulse Heart Rate Resp SpO2 Height Weight   11/23/18 1200 - - - - 58 17 98 % - -   11/23/18 1154 (!) 189/97 - - - 62 14 99 % - -   11/23/18 1107 170/90 - - - 58 16 99 % - -   11/23/18 1017 (!) 171/93 97.5  F (36.4  C) Temporal 60 - 24 95 % 1.702 m (5' 7\") 68 kg (150 lb)     Physical Exam  Vitals: reviewed by me  General: Pt seen on [facility], cooperative, and alert to conversation.  Chronically ill-appearing.  Eyes: Tracking well, clear conjunctiva BL  ENT: MMM, midline trachea.   Lungs: Tachypnea, moderate respiratory distress, bilateral lung sounds are present, but does have fine crackles, less air movement in the right base noted.  CV: Rate as above, regular rhythm.    Abd: Soft, non tender, no guarding, no rebound. Non distended  MSK: no peripheral edema or joint effusion.  No evidence of trauma  Skin: No rash, normal turgor and temperature  Neuro: Clear speech and no facial droop.  Psych: Not RIS, no e/o AH/VH      Emergency Department Course   ECG (10:23:24):  Rate 59 bpm. ND interval 182. QRS duration 116. QT/QTc 450/445. P-R-T axes 28 -51 59. Sinus bradycardia. Left axis deviation. Left ventricular hypertrophy with QRS widening. No significant change compared to EKG dated 05/28/2018. Interpreted at 1026 by Mnafred Gaviria MD.    Imaging:  Radiographic findings were communicated with the patient who voiced understanding of the findings.    XR Chest 2 Views:  Right lower lobe pneumonia " superimposed on  fibrotic-appearing lungs.   As read by Radiology.     Laboratory:  CBC: WNL (WBC 10.0, HGB 13.6, )  CMP: Glucose 151 (H), Calcium 8.4 (L), Albumin 3.0 (L), Protein Total 6.6 (L) o/w WNL (Creatinine 0.75)  Lactic Acid: 2.5 (H)  Blood Gas Venous and Oxyhemoglobin: pH 7.34, PCO2 46, PO2 30, Bicarbonate 25, FIO2 10 L, Oxyhemoglobin 54, Base Deficit 1.3   Procalcitonin: <0.05    Interventions:  1049: NS 1L IV Bolus  1153: NS 1L IV Bolus  1201: Zosyn 3.375 g IV    Emergency Department Course:  The patient presents to the ED via EMS.     Past medical records, nursing notes, and vitals reviewed.  1025: I performed an exam of the patient and obtained history, as documented above.   IV inserted and blood drawn.  EKG obtained, results above.  The patient was sent for a chest x-ray while in the emergency department, findings above.    1135: I rechecked the patient. Explained findings to the patient.    1137: I spoke to Dr. Stone of the hospitalist service who accepts the patient for admission.     Findings and plan explained to the patient who consents to admission.     Discussed the patient with Dr. Stone, who will admit the patient to an inpatient medical bed for further monitoring, evaluation, and treatment.     Impression & Plan    CMS Diagnoses  The patient has signs of Severe Sepsis as evidenced by:    1. 2 SIRS criteria, AND  2. Suspected infection, AND   3. Organ dysfunction: Lactic Acid > 1.9      3 Hour Severe Sepsis Bundle Completion:  1. Initial Lactic Acid Result:   Recent Labs   Lab Test  11/23/18   1015  03/13/17   0735  03/12/17   2000   LACT  2.5*  1.6  1.5     2. Blood Cultures before Antibiotics: Yes  3. Broad Spectrum Antibiotics Administered: Yes     Anti-infectives     None        4. See MAR  ml of IV fluids.  Ideal body weight: 66.1 kg (145 lb 11.6 oz)  Adjusted ideal body weight: 66.9 kg (147 lb 7 oz)    Severe Sepsis reassessment:  1. Repeat Lactic Acid Level: pending   2.  MAP>65 after initial IVF bolus, will continue to monitor fluid status and vital signs       Medical Decision Making:  Ravi Sandoval is a 73 year old male with significant comorbidities including pulmonary fibrosis who presents to the emergency room with what appears to be acute on chronic respiratory failure. I believe this is likely explained by a pneumonia, and this pneumonia is concerning even in the absence of tachycardia and fever. I think the pneumonia is mostly concerning because it is involving a significant increase in the patient's oxygen utilization. He is on 4 L normally at home, but here in the ER is requiring up to 10 L on a non-rebreather to maintain sats in the mid-90's and above. We were able to titrate him down, and his VBG is overall reassuring against any type of retention, but I do think given his needs that he would not be an ideal outpatient candidate for this pneumonia. I will also add that he did receive treatment for a pneumonia one month ago, unclear if this is a new pneumonia entirely or a prolonged pneumonia consistent with the initial presentation. I will give Zosyn in accordance with [facility]'s health-care associated pneumonia protocol, and admit to the care of Dr. Stone, who generously agrees to accept care of the patient. Patient is protecting his airway, no evidence of tearing out, no indication he needs BiPAP at this time, will monitor carefully.     Critical care time 35 minutes     Diagnosis:    ICD-10-CM   1. Pneumonia of right lower lobe due to infectious organism (H) J18.1       Disposition:  Admitted to inpatient medicine in the care of Dr. Stone.      Florencio Rodriguez  11/23/2018    EMERGENCY DEPARTMENT  I, Florencio Rodriguez, am serving as a scribe at 10:25 AM on 11/23/2018 to document services personally performed by Manfred Gaviria MD based on my observations and the provider's statements to me.        Manfred Gaviria MD  11/23/18  [id number]

## 2018-11-23 NOTE — PROGRESS NOTES
RECEIVING UNIT ED HANDOFF REVIEW    ED Nurse Handoff Report was reviewed by: Venus Marin on November 23, 2018 at 12:31 PM

## 2018-11-23 NOTE — PLAN OF CARE
Problem: Patient Care Overview  Goal: Plan of Care/Patient Progress Review  Outcome: No Change  Admission    Patient arrives to room 614-1 via cart from ED.  Care plan note:    Arrived from ED @ 1300: BP elevated, O2 mid 90s 10L non-breather mask. LS dim. CHOUDHURY. A&Ox4. SBA/FR. Tolerating mod carb diet, good appetite. Blood sugar 127. Nonblanchable redness on coccyx. Turn/repo q2h. Denies pain. On IV Levaquin q24h for RLL Pna. BC x2 pending.     Inpatient nursing criteria listed below were met:    PCD's Documented: Yes  Skin issues/needs documented :Yes  Isolation education started/completed NA  Patient allergies verified with patient: Yes  Verified completion of [place] Risk Assessment Tool:  Yes  Verified completion of Guardianship screening tool: Yes  Fall Prevention: Care plan updated, Education given and documented Yes  Care Plan initiated: Yes  Home medications documented in belongings flowsheet: NA  Patient belongings documented in belongings flowsheet: Yes  Reminder note (belongings/ medications) placed in discharge instructions:Yes  Admission profile/ required documentation complete: Yes

## 2018-11-23 NOTE — PHARMACY-ADMISSION MEDICATION HISTORY
Admission medication history interview status for the 11/23/2018  admission is complete. See EPIC admission navigator for prior to admission medications     Medication history source reliability:Good    Actions taken by pharmacist (provider contacted, etc):interviewed patient and spouse, called [facility] to clarify neb order and synthroid dose.     Additional medication history information not noted on PTA med list : Pt was on glipizide 10mg bid, but cut back about a week ago due to current symptoms to 5mg bid--should probably go back to 10mg bid since low blood sugar was not cause of current medical issues. pt wife thinks dose of tadalafil currently is only 20mg/day, but unsure. She said she would check at home and call back if different.    Medication reconciliation/reorder completed by provider prior to medication history? No    Time spent in this activity: 15 minutes    Prior to Admission medications    Medication Sig Last Dose Taking? Auth Provider   AMLODIPINE BESYLATE PO Take 2.5 mg by mouth daily 11/22/2018 at Unknown time Yes Reported, Patient   ASPIRIN PO Take 81 mg by mouth At Bedtime 11/22/2018 at Unknown time Yes Reported, Patient   Colchicine (COLCRYS PO) Take 0.6 mg by mouth daily TAKES IN THE EVENING 11/22/2018 at Unknown time Yes Reported, Patient   digoxin (LANOXIN) 125 MCG tablet Take 1 tablet (125 mcg) by mouth daily 11/22/2018 at Unknown time Yes Herson Rouse MD   FLUoxetine HCl (PROZAC PO) Take 40 mg by mouth daily 11/22/2018 at Unknown time Yes Reported, Patient   furosemide (LASIX) 20 MG tablet Take 1 tablet (20 mg) by mouth every other day 11/22/2018 at Unknown time Yes Giancarlo Weber MD   glipiZIDE (GLUCOTROL) 5 MG tablet Take 1 tablet (5 mg) by mouth every morning (before breakfast)  Patient taking differently: Take 5 mg by mouth 2 times daily (before meals)  11/22/2018 at Unknown time Yes Giancarlo Weber MD   levothyroxine (SYNTHROID/LEVOTHROID) 125 MCG tablet Take 125 mcg  by mouth daily 11/22/2018 at Unknown time Yes Unknown, Entered By History   LISINOPRIL PO Take 20 mg by mouth daily 11/22/2018 at Unknown time Yes Reported, Patient   loperamide (IMODIUM) 2 MG capsule Take 1 capsule (2 mg) by mouth 4 times daily as needed for diarrhea 11/22/2018 at Unknown time Yes Giancarlo Weber MD   metoprolol (TOPROL-XL) 25 MG 24 hr tablet Take 1 tablet (25 mg) by mouth 2 times daily 11/22/2018 at Unknown time Yes Herson Rouse MD   OMEPRAZOLE PO Take 40 mg by mouth daily 11/22/2018 at Unknown time Yes Unknown, Entered By History   PREDNISONE PO Take 10 mg by mouth daily  11/22/2018 at Unknown time Yes Reported, Patient   rosuvastatin (CRESTOR) 10 MG tablet Take 1 tablet (10 mg) by mouth daily 11/22/2018 at Unknown time Yes Hamlet Jimenez MD   tadalafil, PAH, (ADCIRCA) 20 MG TABS Take 2 tablets (40 mg) by mouth daily  Patient taking differently: Take 40 mg by mouth daily Takes only 20 mg daily 11/22/2018 at Unknown time Yes Jennifer Schroeder MD   tamsulosin (FLOMAX) 0.4 MG capsule Take 1 capsule (0.4 mg) by mouth daily 11/22/2018 at Unknown time Yes Jennifer Schroeder MD   ipratropium - albuterol 0.5 mg/2.5 mg/3 mL (DUONEB) 0.5-2.5 (3) MG/3ML neb solution Take 1 vial by nebulization 3 times daily as needed for shortness of breath / dyspnea, wheezing or other (use up to 3 times a day for shortness of breath, cough)   Unknown, Entered By History

## 2018-11-24 NOTE — PLAN OF CARE
Problem: Patient Care Overview  Goal: Plan of Care/Patient Progress Review  Outcome: No Change  BP elevated, other VSS, O2 low 90s on 10 L Oxymizer cannula at rest. LS dim. CHOUDHURY. Desat to low 80s with activity. A&Ox4. UW1/FR. IVSL. On IV Levaquin q24h for CAP. Tolerating mod carb diet, good appetite, blood sugars 99 and 188. Voiding adequately, using urinal in bed. No BM this shift, BS+. Denies pain. Sputum sample sent, result pending. Mepilex on coccyx CDI, turn/repo q2h. WOC following on Monday. BC NGTD. Pulmonology following. RN will cont to monitor.

## 2018-11-24 NOTE — PLAN OF CARE
"Problem: Patient Care Overview  Goal: Plan of Care/Patient Progress Review  Outcome: No Change  Arrived from ED @ 1300: BP elevated, O2 mid 90s 10L non-breather mask. LS dim. CHOUDHURY. A&Ox4. SBA/FR. Tolerating mod carb diet, good appetite. Blood sugar 127. Nonblanchable redness on coccyx. Turn/repo q2h. Denies pain. On IV Levaquin q24h for RLL Pna. BC x2 pending.     Addendum: 4925-4851: BP elevated, other VSS, O2 low 90s on 10L non-breather mask, switched to oxymizer briefly when eating, desat to low 80s, currently back on 10 L non-breather mask. CHOUDHURY. A&Ox4. Calm and cooperative. Pt's spouse called and stated pt sometimes gets \"very impatient\" and doesn't always follow rules.       "

## 2018-11-24 NOTE — PLAN OF CARE
Problem: Patient Care Overview  Goal: Plan of Care/Patient Progress Review  Outcome: No Change  A&Ox4. VSS on 10L with non-rebreather mask. Denies any pain. Lung sounds are clear but diminished. Very dyspnic on exertion. Ax1 to ambulate. Bowel sounds active, passing gas. Voiding in urinal. BG was 288 this evening, coverage given.

## 2018-11-24 NOTE — PLAN OF CARE
Problem: Patient Care Overview  Goal: Plan of Care/Patient Progress Review  Outcome: Improving  A/Ox4. Oxymask 6L; 94%. CHOUDHURY. Sats drop when he moves around; O2 adjustments accordingly. VSS. SBA. . CHO diet. Urinal at bedside. K 4.0. Denies SOB. Discharge TBD.

## 2018-11-24 NOTE — CONSULTS
PULMONARY NOTE    I'm aware of the request for a Pulmonary consultation on this patient. I will be by tomorrow morning to complete the consultation evaluation. If concerns of an immediate nature arise earlier, please give me a call.    Thank you.    Cole Garcia MD  Pulmonary & Critical Care Medicine  [facility]   Pager: [phone]  Office:[phone]

## 2018-11-25 NOTE — PLAN OF CARE
Problem: Patient Care Overview  Goal: Plan of Care/Patient Progress Review  Vtials WDL. Pain controlled with frequent repo. Suspected pressure ulcer on bottom, WOC consult in.  LS dim throughout. Diet Regular. Up with assist of 1; desats with activity. BS active and audible.

## 2018-11-25 NOTE — PLAN OF CARE
Problem: Patient Care Overview  Goal: Plan of Care/Patient Progress Review  Outcome: No Change  BP elevated, other VSS, O2 low 90s on 10 L Oxymizer cannula at rest. LS dim. CHOUDHURY. Desat to low 80s with activity. Pulmonology saw the pt, increased Prednisone dose from 10 mg to 40 mg once daily. Using IS and flutter valve. A&Ox4. UW1/FR. IVSL. On IV Levaquin q24h for CAP. Tolerating mod carb diet, good appetite, blood sugars 129 and 305. Sat on the chair for meals. SBA/FR. Voiding adequately, using urinal in bed. No BM this shift, BS+. Denies pain. Sputum sample sent, result pending. New Mepilex on coccyx, CDI. Turn/repo q2h. WOC following on Monday. BC NGTD. K 3.3, replaced, recheck @ 1570 and tomorrow. RN will cont to monitor.

## 2018-11-25 NOTE — PLAN OF CARE
Problem: Patient Care Overview  Goal: Plan of Care/Patient Progress Review  Outcome: No Change  Resumed care for pt at 0400. 10L oxy-cannula, sats in the mid 90s, continuous pulse ox on. Other VSS. PIV S/L. Pulm consult today 11/25. WOC to see pt Monday for coccyx sore. Mod carb diet.

## 2018-11-25 NOTE — PROGRESS NOTES
A/Ox4. VSS. oxycannula 10L; 90-93%. Denies SOB, chest pain. SBA. . CHO diet. Bedside urinal use. Pulmonology consult today. Discharge in 2-3 days.

## 2018-11-25 NOTE — PROGRESS NOTES
[facility]    Hospitalist Progress Note    Assessment & Plan   Ravi Sandoval is a 73 year old male past medical history of histiocytosis X, pulmonary fibrosis, severe pulmonary hypertension and COPD, who is chronically oxygen dependent on 4 liters who was admitted on 11/23/2018 for treatment of chronic hypoxic respiratory failure.     Summary:     1.  Chronic hypoxic respiratory failure in the setting of pulmonary fibrosis and COPD.  Notes increasing shortness of breath for the past 1 month.  Was treated outpatient with amoxicillin at the end of October for a sinusitis, requiring increasing O2 demands up from his baseline of 4 liters.  Afebrile with no significant leukocytosis.  Lactic acid is elevated at 2.5.  Procalcitonin <0.05.                         -Pulmonary Consult                         -Continue Levaquin for CAP.                           -Continue PTA Prednisone 10 mg daily.  Will defer to pulmonary if IV steroids would be beneficial.                         -Scheduled to [facility] as an OP pending prior auth.                           -Wean O2 and monitor. Currently still hypoxia on 10 L nasal canula     2.  Severe pulmonary hypertension. Appears compensated.                           -Continue PTA tadalafil, digoxin and every other day Lasix.     3.  Uncontrolled type 2 diabetes.  A1c in October was 10.8.  He is supposed to take glipizide 10 mg daily though notes he has not been taking it as directed.  Does not check his blood sugars.  Oral agents on hold.                           -Continue Lantus 5 units at bedtime and sliding scale insulin with meals.  Monitor and adjust as indicated (awaiting pulmonary recs re: steroids).      4.  Hypertension.  Blood pressure elevated in the Emergency Department but now with optimal control.                          -Continue PTA Norvasc 2.5 mg daily, lisinopril 20 mg daily and metoprolol 25 mg twice daily. P.r.n. hydralazine will be available  as needed.      5.  Hypothyroidism.  Continue prior to admission Synthroid.      6.  Coronary artery disease with previous bare metal stent to the right coronary artery (RCA) in 2010.  Denies any symptoms of chest pain.  Continue prior to admission aspirin, beta blocker, statin and ACE.        # Pain Assessment:  Current Pain Score 11/24/2018   Patient currently in pain? denies   Pain score (0-10) -   Ravi wells pain level was assessed and he currently denies pain.           DVT Prophylaxis: Lovenox  Code Status: Full Code      Robbie Cruz MD   Text Page (7am to 6pm)    Interval History   Breathing slightly better, but not anywhere near baseline. 10L oxy-cannula, sats in the mid 90s.    -Data reviewed today: I reviewed all new labs and imaging results over the last 24 hours. I personally reviewed no images or EKG's today.    Physical Exam   Temp: 97.7  F (36.5  C) Temp src: Oral BP: 143/78   Heart Rate: 63 Resp: 20 SpO2: 90 % O2 Device: Oxymizer cannula Oxygen Delivery: 10 LPM  Vitals:    11/23/18 1017 11/24/18 0718   Weight: 68 kg (150 lb) 67 kg (147 lb 11.3 oz)     Vital Signs with Ranges  Temp:  [97.7  F (36.5  C)-98.2  F (36.8  C)] 97.7  F (36.5  C)  Heart Rate:  [63-81] 63  Resp:  [18-20] 20  BP: (133-143)/(61-86) 143/78  SpO2:  [90 %-96 %] 90 %  I/O last 3 completed shifts:  In: 970 [P.O.:970]  Out: 950 [Urine:950]    Constitutional:           A&O x3, mild to moderate distress, frail appearing  Respiratory:               Expiratory wheezing and fibrotic lung sounds at bases with increased respiratory effect, diminished air movement  Cardiovascular: RRR s1 s2 no murmurs, no lower extremity edema  GI: soft, NT, ND, bs present  Skin/Integumen: warm and dry  Other:             Normal affect, VIDES, answer questions appropriately. Shortness of breath noted with minimal movement.    Medications       amLODIPine (NORVASC) tablet 2.5 mg  2.5 mg Oral Daily     aspirin (ASA) chewable tablet 81 mg  81 mg Oral At Bedtime      colchicine (COLCYRS) tablet 0.6 mg  0.6 mg Oral QPM     digoxin  125 mcg Oral Daily     enoxaparin  40 mg Subcutaneous Q24H     FLUoxetine (PROzac) capsule 40 mg  40 mg Oral Daily     furosemide  20 mg Oral Every Other Day     insulin aspart  1-7 Units Subcutaneous TID AC     insulin aspart  1-5 Units Subcutaneous At Bedtime     insulin glargine  5 Units Subcutaneous At Bedtime     ipratropium - albuterol 0.5 mg/2.5 mg/3 mL  1 vial Nebulization Q4H While awake     levofloxacin  500 mg Intravenous Q24H     levothyroxine  125 mcg Oral Daily     lisinopril (PRINIVIL/ZESTRIL) tablet 20 mg  20 mg Oral Daily     metoprolol succinate  25 mg Oral BID     omeprazole (priLOSEC) CR capsule 40 mg  40 mg Oral Daily     predniSONE (DELTASONE) tablet 10 mg  10 mg Oral Daily     rosuvastatin  10 mg Oral QPM     tadalafil  20 mg Oral Daily     tamsulosin  0.4 mg Oral Daily       Data     Recent Labs  Lab 11/25/18  0817 11/24/18  0822 11/23/18  1015   WBC 9.0 9.8 10.0   HGB 12.3* 12.7* 13.6   MCV 84 85 86   * 137* 153    140 141   POTASSIUM 3.3* 3.5 4.0   CHLORIDE 105 106 107   CO2 26 26 26   BUN 12 10 15   CR 0.62* 0.63* 0.75   ANIONGAP 7 8 8   AV 7.7* 7.9* 8.4*   * 99 151*   ALBUMIN  --   --  3.0*   PROTTOTAL  --   --  6.6*   BILITOTAL  --   --  0.5   ALKPHOS  --   --  51   ALT  --   --  25   AST  --   --  31       Imaging:   No results found for this or any previous visit (from the past 24 hour(s)).

## 2018-11-25 NOTE — CONSULTS
Pulmonary Medicine Consultation      Date of Admission: 11/23/2018  Primary Attending:  Blake Stone MD  Consulting Physician: Cole Garcia MD    History:    73-year-old who is a current smoker with oxygen dependent severe COPD pulmonary hypertension, histiocytosis X, coronary artery disease. On baseline 10mg/d pred. Admitted with inc dyspnea and o2 sat 89% on 10L. He follows with [facility] and [facility]. At baseline, using 4-6 liters at home. Continues to smoke half a pack of cigarettes a day.  His pulmonary function tests have remained relatively stable. Previously, in clinic, it was felt that his wife was having a hard time understanding his overall worsening.  It was her belief that oxygen would prevent his worsening and resolve all of his symptoms. Feeling a little better today. Very fatigued.    Review of Systems - A 10-system ROS is negative except for items mentioned above and in HPI.       Prior medical history:  Past Medical History:   Diagnosis Date     ACP (advance care planning)      Anemia      ASHD (arteriosclerotic heart disease)      BPH (benign prostatic hyperplasia)      Cardiac arrest (H)      Cataracts, bilateral      Chronic diarrhea      Chronic respiratory failure with hypoxia (H)      COPD (chronic obstructive pulmonary disease) (H)     HOME O2     Depression      Disease of lung      Dysplasia of prostate      Gastroesophageal reflux disease      General weakness      Heart attack (H)      Herpes zoster      Histiocytosis X (H)      Hyperlipidemia      Hypertension      Hypothyroidism      Oxygen dependent      Postinflammatory pulmonary fibrosis (H)      Prostatic hypertrophy, benign      Pulmonary HTN (H)      Pulmonary hypertensive venous disease (H)      Thyroid disease      Tobacco use      Type 2 diabetes mellitus without complications (H)        Past Surgical History:   Procedure Laterality Date     BIOPSY, LUNG NODULE       CARDIAC SURGERY      HX OF STENT      COLONOSCOPY N/A 5/27/2016    Procedure: COMBINED COLONOSCOPY, SINGLE OR MULTIPLE BIOPSY/POLYPECTOMY BY BIOPSY;  Surgeon: Sera Coffman MD;  Location:  GI     PHACOEMULSIFICATION CLEAR CORNEA WITH STANDARD INTRAOCULAR LENS IMPLANT Right 5/31/2017    Procedure: PHACOEMULSIFICATION CLEAR CORNEA WITH STANDARD INTRAOCULAR LENS IMPLANT;  RIGHT EYE PHACOEMULSIFICATION CLEAR CORNEA WITH STANDARD INTRAOCULAR LENS IMPLANT ;  Surgeon: Trever Delgado MD;  Location:  [facility]     PHACOEMULSIFICATION CLEAR CORNEA WITH STANDARD INTRAOCULAR LENS IMPLANT Left 6/7/2017    Procedure: PHACOEMULSIFICATION CLEAR CORNEA WITH STANDARD INTRAOCULAR LENS IMPLANT;  LEFT EYE PHACOEMULSIFICATION CLEAR CORNEA WITH STANDARD INTRAOCULAR LENS IMPLANT ;  Surgeon: Trever Delgado MD;  Location:  [facility]       Patient Active Problem List   Diagnosis     Pulmonary hypertension (H)     Anemia     Acute respiratory distress     Lactic acid acidosis     Acute on chronic systolic congestive heart failure (H)     Skin cancer of nose     Cyst of neck     Skin lesion     Pulmonary fibrosis (H)       Social History     Social History     Social History     Marital status:      Spouse name: N/A     Number of children: N/A     Years of education: N/A     Occupational History     Not on file.     Social History Main Topics     Smoking status: Current Every Day Smoker     Packs/day: 0.50     Years: 55.00     Types: Cigarettes     Smokeless tobacco: Never Used     Alcohol use No     Drug use: No     Sexual activity: Not on file     Other Topics Concern     Caffeine Concern Yes     4 cups coffee/day     Sleep Concern Yes     Special Diet No     Exercise No     Seat Belt Yes     Social History Narrative         Family History  Family History   Problem Relation Age of Onset     Cardiovascular Father      Pulmonary HTN     Cancer Mother      Skin CA     Neurologic Disorder Sister      Creutzfeldt Eduardo Disease         Medications  No current outpatient  prescriptions on file.     Current Facility-Administered Medications Ordered in Epic   Medication Dose Route Frequency Last Rate Last Dose     acetaminophen (TYLENOL) Suppository 650 mg  650 mg Rectal Q4H PRN         acetaminophen (TYLENOL) tablet 650 mg  650 mg Oral Q4H PRN         amLODIPine (NORVASC) tablet 2.5 mg  2.5 mg Oral Daily   2.5 mg at 11/25/18 0906     aspirin (ASA) chewable tablet 81 mg  81 mg Oral At Bedtime   81 mg at 11/24/18 2143     colchicine (COLCYRS) tablet 0.6 mg  0.6 mg Oral QPM   0.6 mg at 11/24/18 2143     glucose gel 15-30 g  15-30 g Oral Q15 Min PRN        Or     dextrose 50 % injection 25-50 mL  25-50 mL Intravenous Q15 Min PRN        Or     glucagon injection 1 mg  1 mg Subcutaneous Q15 Min PRN         digoxin (LANOXIN) tablet 125 mcg  125 mcg Oral Daily   125 mcg at 11/25/18 0905     enoxaparin (LOVENOX) injection 40 mg  40 mg Subcutaneous Q24H   40 mg at 11/24/18 1524     FLUoxetine (PROzac) capsule 40 mg  40 mg Oral Daily   40 mg at 11/25/18 0905     furosemide (LASIX) tablet 20 mg  20 mg Oral Every Other Day   20 mg at 11/24/18 0906     insulin aspart (NovoLOG) inj (RAPID ACTING)  1-7 Units Subcutaneous TID AC   5 Units at 11/24/18 1708     insulin aspart (NovoLOG) inj (RAPID ACTING)  1-5 Units Subcutaneous At Bedtime   2 Units at 11/24/18 2144     insulin glargine (LANTUS PEN) injection 5 Units  5 Units Subcutaneous At Bedtime   5 Units at 11/24/18 2145     ipratropium - albuterol 0.5 mg/2.5 mg/3 mL (DUONEB) neb solution 3 mL  1 vial Nebulization Q4H While awake   3 mL at 11/25/18 0808     levofloxacin (LEVAQUIN) infusion 500 mg  500 mg Intravenous Q24H 100 mL/hr at 11/24/18 1527 500 mg at 11/24/18 1527     levothyroxine (SYNTHROID/LEVOTHROID) tablet 125 mcg  125 mcg Oral Daily   125 mcg at 11/25/18 0905     lisinopril (PRINIVIL/ZESTRIL) tablet 20 mg  20 mg Oral Daily   20 mg at 11/25/18 0905     melatonin tablet 1 mg  1 mg Oral At Bedtime PRN         metoprolol succinate  (TOPROL-XL) 24 hr tablet 25 mg  25 mg Oral BID   25 mg at 11/25/18 0905     naloxone (NARCAN) injection 0.1-0.4 mg  0.1-0.4 mg Intravenous Q2 Min PRN         omeprazole (priLOSEC) CR capsule 40 mg  40 mg Oral Daily   40 mg at 11/25/18 0906     ondansetron (ZOFRAN-ODT) ODT tab 4 mg  4 mg Oral Q6H PRN        Or     ondansetron (ZOFRAN) injection 4 mg  4 mg Intravenous Q6H PRN         potassium chloride (KLOR-CON) Packet 20-40 mEq  20-40 mEq Oral or Feeding Tube Q2H PRN         potassium chloride 10 mEq in 100 mL intermittent infusion with 10 mg lidocaine  10 mEq Intravenous Q1H PRN         potassium chloride 10 mEq in 100 mL sterile water intermittent infusion (premix)  10 mEq Intravenous Q1H PRN         potassium chloride 20 mEq in 50 mL intermittent infusion  20 mEq Intravenous Q1H PRN         potassium chloride SA (K-DUR/KLOR-CON M) CR tablet 20-40 mEq  20-40 mEq Oral Q2H PRN   40 mEq at 11/25/18 0914     predniSONE (DELTASONE) tablet 10 mg  10 mg Oral Daily   10 mg at 11/25/18 0905     prochlorperazine (COMPAZINE) injection 5 mg  5 mg Intravenous Q6H PRN        Or     prochlorperazine (COMPAZINE) tablet 5 mg  5 mg Oral Q6H PRN        Or     prochlorperazine (COMPAZINE) Suppository 12.5 mg  12.5 mg Rectal Q12H PRN         rosuvastatin (CRESTOR) tablet 10 mg  10 mg Oral QPM   10 mg at 11/24/18 2143     tadalafil (CIALIS/ADCIRCA) tablet  20 mg Oral Daily   20 mg at 11/25/18 0906     tamsulosin (FLOMAX) capsule 0.4 mg  0.4 mg Oral Daily   0.4 mg at 11/25/18 0905     No current Epic-ordered outpatient prescriptions on file.       Allergies   Allergen Reactions     Tetracycline          Physical Examination:   Vitals:    11/25/18 0009 11/25/18 0430 11/25/18 0808 11/25/18 0824   BP: 134/86   143/78   BP Location: Right arm   Right arm   Pulse:       Resp: 18   20   Temp: 98  F (36.7  C)   97.7  F (36.5  C)   TempSrc: Axillary   Oral   SpO2: 93% 95% 90% 90%   Weight:       Height:         Body mass index is 23.13  kg/(m^2).  Temp (24hrs), Av  F (36.7  C), Min:97.7  F (36.5  C), Max:98.2  F (36.8  C)        Constitutional:  Appears comfortable. Frail  HENT:  mucous membranes moist.  Eyes: no icterus, no pallor.   Neck: rachea midline  Cardiovascular: Regular rate and rythym,  Respiratory/Chest: dec, basilar crackles  Gastrointestinal: Abdomen was soft,   Musculoskeletal: clubbing  Neurological: No focal motor or sensory deficits  Skin: No skin rash      CMP  Recent Labs  Lab 18  0818  0818  1015    140 141   POTASSIUM 3.3* 3.5 4.0   CHLORIDE 105 106 107   CO2 26 26 26   ANIONGAP 7 8 8   * 99 151*   BUN 12 10 15   CR 0.62* 0.63* 0.75   GFRESTIMATED >90 >90 >90   GFRESTBLACK >90 >90 >90   AV 7.7* 7.9* 8.4*   PROTTOTAL  --   --  6.6*   ALBUMIN  --   --  3.0*   BILITOTAL  --   --  0.5   ALKPHOS  --   --  51   AST  --   --  31   ALT  --   --  25     CBC  Recent Labs  Lab 18  0818  1015   WBC 9.0 9.8 10.0   RBC 4.34* 4.52 4.86   HGB 12.3* 12.7* 13.6   HCT 36.5* 38.6* 41.9   MCV 84 85 86   MCH 28.3 28.1 28.0   MCHC 33.7 32.9 32.5   RDW 16.7* 16.9* 17.7*   * 137* 153     INRNo lab results found in last 7 days.  Arterial Blood Gas  Recent Labs  Lab 18  1047   O2PER 10L       Recent Labs  Lab 18  1105 18  1153 18  1143   CULT Canceled, Test credited  >10 Squamous epithelial cells/low power field indicates oral contamination. Please recollect.*  Notification of test cancellation was given Sukhdeep Logan RN @ 18 CS No growth after 2 days No growth after 2 days       Diagnostic Studies:  Chest Radiology:     Superimposed on fibrotic-appearing lung abnormalities is  abnormal opacity in the right lower lobe suggestive of developing  pneumonia. Trace right pleural effusion. No pneumothorax. Heart size  normal.        PFTs 2018: spirometry with minimal obstruction, ratio 67%, FEV1 104%, %. Diffusion ~  20%.        Assessment/Plan:     73-year-old who is a current smoker with oxygen dependent severe COPD pulmonary hypertension, histiocytosis X, coronary artery disease. On baseline 10mg/d pred. Admitted with inc dyspnea and o2 sat 89% on 10L. He follows with [facility] and [facility]. At baseline, using 4-6 liters at home. Continues to smoke half a pack of cigarettes a day.  His pulmonary function tests have remained relatively stable. Previously, in clinic, it was felt that his wife was having a hard time understanding his overall worsening.  It was her belief that oxygen would prevent his worsening and resolve all of his symptoms. Seems to be having progression of his disease. Unsure if acute exacerbation but does have infiltrate on cxr. Reasonable to burst with abx and pred.  -continue abx  -suggest 40mg/d of pred  -continue scheduled nebs  -wean o2 as able  -suggest palliative care consult. This was not discussed with the patient.  -IS and flutter valve  -Dr Johnson will followup tomorrow      Diagnoses  Abnl CT/CXR  R91.8  Abnl PFTs  R94.2  COPD exacerb J44.1  Hemoptysis  R04.2  Hypoxemia  R09.02  Nicotine depend F17.210  Pulmonary fibrosis J84.10  Resp fail acute J96.00  Resp fail chronic J96.10    Cole Garcia MD  Pulmonary, Critical Care and Sleep Medicine  [facility]   Pager: [phone]  Office:[phone]

## 2018-11-26 NOTE — PLAN OF CARE
Problem: Patient Care Overview  Goal: Plan of Care/Patient Progress Review  Outcome: No Change  A&Ox4.  VSS, O2 85%-91% on 10L oximizer continuous pulse ox.  Humidification provided for oximizer. Encouraged deep breathing in through nose out through mouth as well as using accapella frequently. LS clear diminsihed.  BS normoactive, +flatus.  Mepliex intact on coccyx. Regular diet.  IV SL.

## 2018-11-26 NOTE — PROGRESS NOTES
Got called by RN to assess pt for SOB, increased WOB after getting up to the bathroom. Pt put on 15 L oxymask. Spo2 from 84% to 92%. Pt on 10 L Oxymizer cannula. Spo2 low 90s. RN notified. BBS expiratory wheezes. Pt given neb tx as ordered. Pt wanted to be on 10 L oxymizer cannula. Encouraged pt to be on HFNC during the day. HFNC put by bedside to assess in the morning. Will continue to follow and watch closely.

## 2018-11-26 NOTE — PROGRESS NOTES
[facility]    Hospitalist Progress Note    Assessment & Plan   Ravi Sandoval is a 73 year old male past medical history of histiocytosis X, pulmonary fibrosis, severe pulmonary hypertension and COPD/Emphysema, who is chronically oxygen dependent on 4-6 liters who was admitted on 11/23/2018 for worsening SOB.     Summary:     1.  Acute on Chronic hypoxic respiratory failure in the setting of pulmonary fibrosis and COPD.  Notes increasing shortness of breath for the past 1 month.  Was treated outpatient with amoxicillin at the end of October for a sinusitis, requiring increasing O2 demands up from his baseline of 4 liters.  Afebrile with no significant leukocytosis.  Lactic acid is elevated at 2.5.  Procalcitonin <0.05.                         -Pulmonary Consulted and following                          -Continue Levaquin for CAP.                           -He is on Prednisone 40mg daily. Patient is hypoxic and SOB, on high flow oxygen, xray shows    Fibrosis and opacity on right lower side, bilateral crackles, occasional wheezing and decrease air entry.    I will start him on IV Solumerol 40 mg Q 6 hrs, DuoNeb every 4-6 hrs, Start on Pulmicort.  I will go ahead    And do the CTA chest PE protocol to evaluate for Fibrosis, PE and pleural effusion.                          -Scheduled to PRN Antonio as an OP pending prior auth.                           -Wean O2 and monitor. Currently on high flow oxygen and symptomatic.      2.  Severe pulmonary hypertension. Appears compensated.                           -Continue PTA tadalafil, digoxin and every other day Lasix.     3.  Uncontrolled type 2 diabetes.  A1c in October was 10.8.  He is supposed to take glipizide 10 mg daily though notes he has not been taking it as directed.  Does not check his blood sugars.  Oral agents on hold.                           -increase Lantus to 10 units at bedtime and sliding scale insulin with meals.  Monitor and adjust as  indicated (awaiting pulmonary recs re: steroids).      4.  Hypertension.  Blood pressure elevated in the Emergency Department but now with optimal control.                          -Continue PTA Norvasc 2.5 mg daily, lisinopril 20 mg daily and metoprolol 25 mg twice daily. P.r.n. hydralazine will be available as needed.      5.  Hypothyroidism.  Continue prior to admission Synthroid.      6.  Coronary artery disease with previous bare metal stent to the right coronary artery (RCA) in 2010.  Denies any symptoms of chest pain.  Continue prior to admission aspirin, beta blocker, statin and ACE.          DVT Prophylaxis: Lovenox  Code Status: Full Code    CTA chest PE protocol today  Stop Prednisone and start on IV Solumedrol  Stop Prilosec and start Protonix   Pulmicort neb bid  CTA chest reviewed by myself, official reading is pending, shows large Pleural  Effusion on right side with compressive atelectasis, no obvious PE I can identify at this time,  Will do thoracentesis and send fluid for analysis.           Rory Cruz MD   Text Page (7am to 6pm)    Interval History   Breathing slightly better, but not anywhere near baseline. 10L oxy-cannula, sats in the mid 90s.    -Data reviewed today: I reviewed all new labs and imaging results over the last 24 hours. I personally reviewed no images or EKG's today.    Physical Exam   Temp: 97.8  F (36.6  C) Temp src: Oral BP: (!) 156/97   Heart Rate: 82 Resp: 24 SpO2: 92 % O2 Device: High Flow Nasal Cannula (HFNC) Oxygen Delivery: 12 LPM  Vitals:    11/23/18 1017 11/24/18 0718 11/26/18 0647   Weight: 68 kg (150 lb) 67 kg (147 lb 11.3 oz) 67 kg (147 lb 11.3 oz)     Vital Signs with Ranges  Temp:  [97.6  F (36.4  C)-98.2  F (36.8  C)] 97.8  F (36.6  C)  Heart Rate:  [] 82  Resp:  [20-24] 24  BP: (131-162)/() 156/97  FiO2 (%):  [70 %] 70 %  SpO2:  [90 %-93 %] 92 %  I/O last 3 completed shifts:  In: 700 [P.O.:700]  Out: 600 [Urine:600]    Constitutional:           A&O  x3, mild to moderate distress, frail appearing  Respiratory:              crackles positive, decrease air entry on right side, occasional wheezing.   Cardiovascular: RRR s1 s2 no murmurs, no lower extremity edema  GI: soft, NT, ND, bs present  Skin/Integumen: warm and dry  Other:             Normal affect, VIDES, answer questions appropriately. Shortness of breath noted with minimal movement.    Medications       amLODIPine (NORVASC) tablet 2.5 mg  2.5 mg Oral Daily     aspirin (ASA) chewable tablet 81 mg  81 mg Oral At Bedtime     budesonide  0.5 mg Nebulization BID     colchicine (COLCYRS) tablet 0.6 mg  0.6 mg Oral QPM     digoxin  125 mcg Oral Daily     enoxaparin  40 mg Subcutaneous Q24H     FLUoxetine (PROzac) capsule 40 mg  40 mg Oral Daily     furosemide  20 mg Oral Every Other Day     insulin aspart  1-7 Units Subcutaneous TID AC     insulin aspart  1-5 Units Subcutaneous At Bedtime     insulin glargine  10 Units Subcutaneous At Bedtime     ipratropium - albuterol 0.5 mg/2.5 mg/3 mL  3 mL Nebulization 4x daily     ipratropium - albuterol 0.5 mg/2.5 mg/3 mL  1 vial Nebulization Q4H While awake     levofloxacin  500 mg Intravenous Q24H     levothyroxine  125 mcg Oral Daily     lisinopril (PRINIVIL/ZESTRIL) tablet 20 mg  20 mg Oral Daily     methylPREDNISolone  40 mg Intravenous Q6H     metoprolol succinate  25 mg Oral BID     [START ON 11/27/2018] pantoprazole  40 mg Oral QAM AC     rosuvastatin  10 mg Oral QPM     tadalafil  20 mg Oral Daily     tamsulosin  0.4 mg Oral Daily       Data     Recent Labs  Lab 11/26/18  0819 11/25/18  1536 11/25/18  0817 11/24/18  0822 11/23/18  1015   WBC 13.8*  --  9.0 9.8 10.0   HGB 14.0  --  12.3* 12.7* 13.6   MCV 84  --  84 85 86     --  127* 137* 153     --  138 140 141   POTASSIUM 3.8 4.4 3.3* 3.5 4.0   CHLORIDE 107  --  105 106 107   CO2 24  --  26 26 26   BUN 14  --  12 10 15   CR 0.74  --  0.62* 0.63* 0.75   ANIONGAP 9  --  7 8 8   AV 8.7  --  7.7* 7.9*  8.4*   *  --  132* 99 151*   ALBUMIN 2.9*  --   --   --  3.0*   PROTTOTAL 6.6*  --   --   --  6.6*   BILITOTAL 0.5  --   --   --  0.5   ALKPHOS 46  --   --   --  51   ALT 19  --   --   --  25   AST 19  --   --   --  31       Imaging:   No results found for this or any previous visit (from the past 24 hour(s)).

## 2018-11-26 NOTE — PROVIDER NOTIFICATION
MD Notification    Notified Person: MD    Notified Person Name:  Dr. Cruz    Notification Date/Time:  11/26/2018  1125    Notification Interaction:  Talked with MD    Purpose of Notification:  Received a call from the Radiologist.  CT chest showed a small nonocclusive PE in RLL, moderate to large right pleural effusion and multiple pleural-based masses concerning for malignancy.  Sepsis BPA fired with lactic acid 1.6.    Orders Received:  US thoracentesis already ordered.  Will also start Heparin infusion.    Comments:  LS diminished; O2sats 90-93% 15L per oxymask but drops to 80's with any exertion.  HR tachy; afebrile; RR 24.  Patient denies CP/N/V/pain.  Spouse at bedside.  Will continue to monitor.     1135:  Dr. Johnson (Pulmonologist) was updated.  Will proceed with US thoracentesis and start heparin drip after.

## 2018-11-26 NOTE — PROGRESS NOTES
Attempted to see pt x 2 today and both times off to a procedure.  P:  I discussed with RALPH Sultana to continue with good PIP measures, use a Mepilex if necessary and we will attempt to assess tomorrow.

## 2018-11-26 NOTE — PROGRESS NOTES
"PULMONOLOGY PROGRESS NOTE  Date of service: 2018  [facility]    CC/Reason for Visit: COPD, respiratory failure    SUBJECTIVE      HPI: Events of last night reviewed. Earlier this AM patient developed increased SOB after getting up to go to the bathroom. Patient changed from an Oxymizer to HFNC. CT Chest ordered, patient is currently out of the room at Radiology. Visited with patient's wife at bedside.    ROS: A Problem Pertinent review of systems was negative except for items noted in HPI.    Past Medical, Family, and Social/Substance History has been reviewed: No interval changes.    OBJECTIVE   BP (!) 156/97 (BP Location: Right arm)  Pulse 54  Temp 97.8  F (36.6  C) (Oral)  Resp 24  Ht 1.702 m (5' 7\")  Wt 67 kg (147 lb 11.3 oz)  SpO2 92%  BMI 23.13 kg/m2 12 lpm Oxymask    Temp (24hrs), Av.9  F (36.6  C), Min:97.6  F (36.4  C), Max:98.2  F (36.8  C)       Intake/Output Summary (Last 24 hours) at 18 0835  Last data filed at 18 0000   Gross per 24 hour   Intake              700 ml   Output              400 ml   Net              300 ml     Patient Vitals for the past 96 hrs:   Weight   18 0647 67 kg (147 lb 11.3 oz)   18 0718 67 kg (147 lb 11.3 oz)   18 1017 68 kg (150 lb)       CONSTITUTIONAL/GENERAL APPEARANCE: Alert. No Apparent Distress.  PSYCHIATRIC: Pleasant and appropriate mood and affect. Oriented x 3.  EARS, NOSE,THROAT,MOUTH: External ears and nose overall normal. Normal oral mucosa.   NECK: Neck appearance normal. No neck masses and the thyroid is not enlarged.   RESPIRATORY: Non-labored effort. Decreased BS bilaterally, exp wheezes and crackles.  CARDIOVASCULAR: S1, S2, regular rate and rhythm.      LABORATORY ASSESSMENT        Recent Labs  Lab 18  1047   O2PER 10L       Recent Labs  Lab 18  0819 18  0817   WBC 13.8* 9.0   RBC 4.89 4.34*   HGB 14.0 12.3*   HCT 41.2 36.5*   MCV 84 84   MCH 28.6 28.3   MCHC 34.0 33.7   RDW " 17.0* 16.7*    127*       Recent Labs  Lab 11/26/18  0819 11/25/18  1536 11/25/18  0817 11/24/18  0822     --  138 140   POTASSIUM 3.8 4.4 3.3* 3.5   CHLORIDE 107  --  105 106   CO2 24  --  26 26   ANIONGAP 9  --  7 8   BUN 14  --  12 10   CR 0.74  --  0.62* 0.63*   GFRESTIMATED >90  --  >90 >90   GFRESTBLACK >90  --  >90 >90   AV 8.7  --  7.7* 7.9*     No lab results found in last 7 days.  No lab results found in last 7 days.    Additional labs and/or comments:    IMAGING      CT Chest - pending.    CXR 11/23 -  IMPRESSION: Right lower lobe pneumonia superimposed on  fibrotic-appearing lungs.    PFT & OTHER TESTING       ASSESSMENT / PLAN      Active Problems:    Pulmonary fibrosis (H)      ASSESSMENT: 74 yo male current smoker with hx severe O2 and steroid-dependent COPD (followd by [facility]), pulmonary fibrosis, severe Pulmonary HTN, Histiocytosis X and coronary artery disease admitted with acute on chronic respiratory failure secondary to possible community acquired pneumonia. CXR on admission showed a RLL opacity superimposed on fibrotic-appearing lungs. At baseline patient is on Home O2 at 4-6 lpm and Prednisone 10 mg/day. He continues to smoke half a pack of cigarettes a day. Agree with current bronchodilators, antibiotics and steroids. Reason for decompensation earlier this AM unclear, await results of CT Chest. Patient is currently a full code and has a poor prognosis - would recommend Palliative Care consult to address code status and goals of care (patient's wife is open to this conversation and potentially meeting with Hospice).    Diagnoses  Abnl CT/CXR  R91.8  Abnl PFTs  R94.2  COPD exacerb J44.1  Hemoptysis  R04.2  Hypoxemia  R09.02  Nicotine depend F17.210  Pulmonary fibrosis J84.10  Resp fail acute J96.00  Resp fail chronic J96.10    PLAN:   1. Adjust oxygen, keep SaO2 > 90%  2. Bronchodilators - DuoNebs  3. Antibiotics - Levaquin  4. Steroids - Prednisone  5. Await CT  Chest.  6. Recommend Palliative Care consult.    Updated wife at bedside.      Herberth Johnson MD    [facility]  [phone] (pager)  [phone] (office)

## 2018-11-26 NOTE — PROGRESS NOTES
RADIOLOGY PROCEDURE NOTE  Patient name: Ravi Sandoval  MRN: [id number]  : 1945    Pre-procedure diagnosis: Right pleural effusion  Post-procedure diagnosis: Same    Procedure Date/Time: 2018  1:33 PM  Procedure: Thoracentesis  Estimated blood loss: None  Specimen(s) collected with description: Pleural fluid.  The patient tolerated the procedure well with no immediate complications.    See imaging dictation for procedural details and findings.    Provider name: Florencio Armendariz  Assistant(s):None

## 2018-11-26 NOTE — PLAN OF CARE
Problem: Patient Care Overview  Goal: Plan of Care/Patient Progress Review  Outcome: No Change  A&Ox4.  VSS, O2 85%-91% on 10L oximizer continuous pulse ox.  Pt states it has been harder to maintain O2 above 90 with increased prednisone dose.  Encouraged deep breathing in through nose out through mouth as well as using accapella frequently.  LS clear diminsihed.  BS normoactive, +flatus.  Mepliex intact on coccyx.  Regular diet.  IV SL.

## 2018-11-26 NOTE — PROGRESS NOTES
"Care Suites Post Procedure Summary (without sedation)     Immediately prior to starting the procedure a Time Out was conducted with procedural staff and re-confirmed the patient s name, procedure, and site/side.      Consent obtained from patient after discussing the risks, benefits and alternatives.      Procedure: Thoracentesis    Procedure Interventions:    Fluid (cc) removed: Yes. Removed 1500 ml of fluid from pts right lung.    Tube/Drain placed: NA.    Patient tolerance: Patient tolerated the procedure well with no immediate complications. Site dressed by tech with band aid. No bleeding or drainage at completion.  Routine CXR to follow. Pt did have large emesis at the very end of procedure. He stated that he \"felt better\" following the emesis. This nausea had been bothering him all morning. No other concerns, pt's VSS.    Post-procedure report given to: Primary RN and pt transferred to station 66 by cart.    (See Doc Flow-sheets and MAR for additional information)      "

## 2018-11-27 NOTE — PROVIDER NOTIFICATION
MD Notification    Notified Person: MD    Notified Person Name: Dr. Huitron    Notification Date/Time: 11/26/18 1980 / 5975    Notification Interaction: Paged / Phone    Purpose of Notification: Critical Hep 10a 1.17.  Started Heparin at 1200u/hr today for PE.    Orders Received: No new orders, pharmacy will dose, call back if signs of bleeding are noted.    Comments:

## 2018-11-27 NOTE — PROGRESS NOTES
[facility]    Hospitalist Progress Note    Assessment & Plan   Ravi Sandoval is a 73 year old male past medical history of histiocytosis X, pulmonary fibrosis, severe pulmonary hypertension and COPD/Emphysema, who is chronically oxygen dependent on 4-6 liters who was admitted on 11/23/2018 for worsening SOB.     Summary:     1.  Acute on Chronic hypoxic respiratory failure in the setting of pulmonary fibrosis and COPD.  Notes increasing shortness of breath for the past 1 month.  Was treated outpatient with amoxicillin at the end of October for a sinusitis, requiring increasing O2 demands up from his baseline of 4 liters.  Afebrile with no significant leukocytosis.  Lactic acid is elevated at 2.5.  Procalcitonin <0.05.                         -Pulmonary Consulted and following                          -Continue Levaquin for CAP.                           -He is on Prednisone 40mg daily. Patient is hypoxic and SOB, on high flow oxygen, xray shows    Fibrosis and opacity on right lower side, bilateral crackles, occasional wheezing and decrease air entry.    I will start him on IV Solumerol 40 mg Q 6 hrs, DuoNeb every 4-6 hrs, Start on Pulmicort.       CTA chest PE protocol done on 11/26, shows large right sided pleural effusion, non occlusive thrombus    And pleural based densities, she had thoracentesis done about 1500  Ml of pleural fluid remove, exudative,    Cytology is pending, with regards to densities and adenopathy, I asked the pulmonary to evaluate him.                         -Scheduled to PRN Antonio as an OP pending prior auth.                           -Wean O2 and monitor. Currently on high flow oxygen and is now feeling much better today.    Good air entry bilaterally, minimal crackles, no wheezing.      2.  Severe pulmonary hypertension. Appears compensated.                           -Continue PTA tadalafil, digoxin and every other day Lasix.     3.  Uncontrolled type 2 diabetes.  A1c  in October was 10.8.  He is supposed to take glipizide 10 mg daily though notes he has not been taking it as directed.  Does not check his blood sugars.  Oral agents on hold. Blood sugar still on higher side.                          -increase Lantus to 14 units at bedtime, start him on Novolog 8 units with  meals and sliding scale insulin with meals for correction.  Monitor and adjust as indicated (awaiting pulmonary recs re: steroids).      4.  Hypertension.  Blood pressure elevated in the Emergency Department but now with optimal control.                          -Continue PTA Norvasc 2.5 mg daily, lisinopril 20 mg daily and metoprolol 25 mg twice daily. P.r.n. hydralazine will be available as needed.      5.  Hypothyroidism.  Continue prior to admission Synthroid.      6.  Coronary artery disease with previous bare metal stent to the right coronary artery (RCA) in 2010.  Denies any symptoms of chest pain.  Continue prior to admission aspirin, beta blocker, statin and ACE.    7. Acute Pulmonary Embolism: on IV heparin at this time, will switch him to Eliquis now.           DVT Prophylaxis: Lovenox  Code Status: Full Code    CTA chest PE protocol done on 11/26, shows large pleural effusion and PE, s/p thoracentesis  And now on anticoagulation,  Overall feeling much better.   Try to wean his oxygen down.  IS and flutter  Pulmonary to follow for abnormal CT finding         Rory Cruz MD   Text Page (7am to 6pm)    Interval History   Patient feeling much better than yesterday with compare to his breathing, no fever, chills, chest pain,   Headache or dizziness, breathing much easier now.     No other significant event overnight.     -Data reviewed today: I reviewed all new labs and imaging results over the last 24 hours. I personally reviewed no images or EKG's today.    Physical Exam   Temp: 97.8  F (36.6  C) Temp src: Oral BP: 129/74   Heart Rate: 65 Resp: 16 SpO2: 95 % O2 Device: High Flow Nasal Cannula (HFNC)  Oxygen Delivery: Other (Comments) (45 LPM)  Vitals:    11/23/18 1017 11/24/18 0718 11/26/18 0647   Weight: 68 kg (150 lb) 67 kg (147 lb 11.3 oz) 67 kg (147 lb 11.3 oz)     Vital Signs with Ranges  Temp:  [97.8  F (36.6  C)-98.9  F (37.2  C)] 97.8  F (36.6  C)  Heart Rate:  [65-88] 65  Resp:  [16-22] 16  BP: (129-168)/(74-90) 129/74  FiO2 (%):  [68.5 %-70.6 %] 70.6 %  SpO2:  [90 %-98 %] 95 %  I/O last 3 completed shifts:  In: 400 [P.O.:400]  Out: 300 [Urine:300]    Constitutional:           A&O x3, mild to moderate distress, frail appearing  Respiratory:              crackles positive, improve air entry on right side, occasional wheezing.   Cardiovascular: RRR s1 s2 no murmurs, no lower extremity edema  GI: soft, NT, ND, bs present  Skin/Integumen: warm and dry  Other:             Normal affect, VIDES, answer questions appropriately. Shortness of breath noted with minimal movement.    Medications       amLODIPine (NORVASC) tablet 2.5 mg  2.5 mg Oral Daily     apixaban ANTICOAGULANT  10 mg Oral BID    Followed by     [START ON 12/4/2018] apixaban ANTICOAGULANT  5 mg Oral BID     aspirin (ASA) chewable tablet 81 mg  81 mg Oral At Bedtime     budesonide  0.5 mg Nebulization BID     colchicine (COLCYRS) tablet 0.6 mg  0.6 mg Oral QPM     digoxin  125 mcg Oral Daily     FLUoxetine (PROzac) capsule 40 mg  40 mg Oral Daily     furosemide  20 mg Oral Every Other Day     insulin aspart  8 Units Subcutaneous TID w/meals     insulin aspart  1-7 Units Subcutaneous TID AC     insulin aspart  1-5 Units Subcutaneous At Bedtime     insulin glargine  14 Units Subcutaneous At Bedtime     ipratropium - albuterol 0.5 mg/2.5 mg/3 mL  3 mL Nebulization 4x daily     levofloxacin  500 mg Intravenous Q24H     levothyroxine  125 mcg Oral Daily     lisinopril (PRINIVIL/ZESTRIL) tablet 20 mg  20 mg Oral Daily     methylPREDNISolone  40 mg Intravenous Q6H     metoprolol succinate  25 mg Oral BID     pantoprazole  40 mg Oral QAM AC     rosuvastatin   10 mg Oral QPM     sodium chloride (PF)  3 mL Intracatheter Q8H     tadalafil  20 mg Oral Daily     tamsulosin  0.4 mg Oral Daily       Data     Recent Labs  Lab 11/27/18  0820 11/26/18  1609 11/26/18  1130 11/26/18  0819 11/25/18  1536 11/25/18  0817  11/23/18  1015   WBC 11.6* 12.6*  --  13.8*  --  9.0  < > 10.0   HGB 12.0* 13.4  --  14.0  --  12.3*  < > 13.6   MCV 86 85  --  84  --  84  < > 86    149*  --  158  --  127*  < > 153   INR  --   --  0.98  --   --   --   --   --      --   --  140  --  138  < > 141   POTASSIUM 4.3  --   --  3.8 4.4 3.3*  < > 4.0   CHLORIDE 108  --   --  107  --  105  < > 107   CO2 23  --   --  24  --  26  < > 26   BUN 25  --   --  14  --  12  < > 15   CR 0.72  --   --  0.74  --  0.62*  < > 0.75   ANIONGAP 8  --   --  9  --  7  < > 8   AV 8.0*  --   --  8.7  --  7.7*  < > 8.4*   *  --   --  203*  --  132*  < > 151*   ALBUMIN 2.5*  --   --  2.9*  --   --   --  3.0*   PROTTOTAL  --   --  6.3* 6.6*  --   --   --  6.6*   BILITOTAL  --   --   --  0.5  --   --   --  0.5   ALKPHOS  --   --   --  46  --   --   --  51   ALT  --   --   --  19  --   --   --  25   AST  --   --   --  19  --   --   --  31   < > = values in this interval not displayed.    Imaging:   Recent Results (from the past 24 hour(s))   US Thoracentesis    Narrative    ULTRASOUND-GUIDED THORACENTESIS  11/26/2018 2:01 PM     HISTORY: Shortness of breath, hypoxic with large pleural effusion on  right side.     FINDINGS: Ultrasound was used to evaluate for the presence and best  approach for drainage of a pleural effusion. Written and oral informed  consent was obtained. A pause for the cause procedure to verify the  correct patient and correct procedure. The skin overlying the right  chest posteriorly was prepped and draped in the usual sterile fashion.  The subcutaneous tissues were anesthetized with 10 mL 1% lidocaine. A  catheter was advanced into the pleural space and 1500 mL of ivonne  colored fluid was  drained. Patient was monitored by nurse under my  direct supervision throughout the exam. Ultrasound images were  permanently stored.  There were no immediate complications. Patient  left the ultrasound suite in satisfactory condition.      Impression    IMPRESSION: Technically successful thoracentesis without immediate  complications.    MIRANDA GIBSON MD   XR Chest 1 View    Narrative    CHEST ONE VIEW November 26, 2018 2:23 PM     HISTORY: Pleural effusion, post thoracentesis.     COMPARISON: 11/23/2018.      Impression    IMPRESSION: No pneumothorax or pleural effusion on either side.  Chronic appearing interstitial abnormalities are present. Heart size  normal.    YANG DIAZ MD

## 2018-11-27 NOTE — PROGRESS NOTES
SPIRITUAL HEALTH SERVICES Progress Note  [facility]    Visited pt Ravi, referral from staff. Pt Ravi addressed that he has fibromyalgia in his lung.  Pt reported that he came to the hospital last Friday and feels much better.   Pt said that he has a wife and two sons. Pt has a sense of humor, and his spirit is bright.   Pt is [employer] and goes to [facility]. Pt stated that his  came to the hospital and visited him.      provided presence in care, listening, and reading scripture.     I will follow up the pt for the duration of stay.     Angela Wallace  Chaplain Resident

## 2018-11-27 NOTE — PLAN OF CARE
Problem: Patient Care Overview  Goal: Plan of Care/Patient Progress Review  Outcome: No Change  9440-0930: A&Ox4.  Bedrest.  Up to BSC with Ax1.  Voiding ivonne urine per urinal.  Turn/repo.  Mod CHO diet.  , 216.  Running Hep gtt at 1050 units/hr, decreased from 1200 units/hr following critical hep 10a result.  Next hep 10a is at 0730.  VSS on HiFlow with 70% FiO2 at 45 LPM.  Cont pulse ox reads 88-96%.  LS diminished.  CHOUDHURY.  Reported vague right-sided chest pain that he felt was secondary to GERD.  Known PE.  Pain resolved following PRN Tylenol.  Pain was not reproducible.  Bilateral buttock shear wounds ROBERTO.  WOC consult.  Discharge pending progress.

## 2018-11-27 NOTE — PLAN OF CARE
Problem: Patient Care Overview  Goal: Plan of Care/Patient Progress Review  Outcome: Improving  VSS, O2 mid 90s on HFNC FiO2 50% (weaned from 70% this morning) 30L ( weaned from 35L this morning), RT following. LS dim. CHOUDHURY. On IV Solu Medrol 40 mg q6h and Duonebs x4 daily. A&Ox4. SBA. Tolerating mod carb diet, good appetite. Blood sugars 239 and 197. Voiding adequately, using urinal. S/p Thoracentesis 11/26, cytology pending, dressing CDI. On IV Levaquin q24h. Hep gtt stopped and switched Eliquis. Nonblanchable redness on coccyxx, turn/repo q2h, WOC following. Pulmonolgy following. Discharge pending progress. RN will cont to monitor.

## 2018-11-27 NOTE — PROGRESS NOTES
[facility] Nurse Inpatient Adult Pressure Injury (PI) Assessment     Initial Assessment of PI(s) on pt's:   Mirrored, medial gluteal fold/ coccyx area    Data:   Patient History:      per MD note(s): 73 year old male past medical history of histiocytosis X, pulmonary fibrosis, severe pulmonary hypertension and COPD, who is chronically oxygen dependent on 4 liters who was admitted on 2018 for treatment of chronic hypoxic respiratory failure.     Cristi Assessment and sub scores:   Cristi Score  Av.8  Min: 18  Max: 19    Positioning: Pillows,     Mattress:  Standard , Atmos Air mattress    Moisture Management:  N/a pt uses bathroom/ urinal, is continent    Catheter secured? Not applicable    Current Diet / Nutrition:           Active Diet Order      Combination Diet [id number] Calories: Moderate Consistent CHO (4-6 CHO units/meal)      Labs:   Recent Labs   Lab Test  18   0820   18   1130   18   1015   10/29/10   0900   ALBUMIN  2.5*   --    --    < >  3.0*   < >   --    HGB  12.0*   < >   --    < >  13.6   < >   --    INR   --    --   0.98   --    --    < >   --    WBC  11.6*   < >   --    < >  10.0   < >   --    A1C   --    --    --    --   10.7*   < >   --    CRP   --    --    --    --    --    --   77.0*    < > = values in this interval not displayed.                                                                                                                          Pressure Injury Assessment  (location):   Mirrored, medial gluteal fold/ coccyx area    Wound History:   Pt with chronic respiratory issues.  Sits very upright in bed.  Says he has had wounds on his butt for years, that he uses a chair cushion that blows up and is pretty adamant that he will do what he wants. Said the wound on his butt was present when he came into the hospital.  Pt with thin, fragile looking skin  Mirrored looking wounds along gluteal cleft. There is a large effected area on the  "left side, 4.3cm x 1cm x 0.0cm.  Epidermis a little wrinkled, moist white macerated as well as purple, nonblanchable, tender to the touch.  The right side is smaller, about 2cm x 1cm, light purple, nonblanchable, no pain.         Intervention:     Patient's chart evaluated.      Cristi Interventions:  Current Cristi Interventions and Care Plan reviewed and updated, appropriate at this time.    Wounds assessed as noted above    Orders  Written    Supplies  reviewed, discussed with RN and discussed with patient    Discussed plan of care with Patient and Nurse    All patient / family questions answered:  YES           Assessment:     Pressure Injury (PI) located on bilateral gluteal cleft/ coccyx: both wounds are DTPI and both are community acquired.  Pt seems pretty fixed in his ways and seems to be a yadira who will just do what he wants to do.  I spent some time discussing with pt to not use a donut cushion for offloading and the necessity of repositioning himself, that he needs to take responsibility and that the surface he sits or lays on will not provide enough relief from pressure, he must reposition himself.    Will recommend TID cleaning of tissue and use baby powder to dry out tissue vs trying to keep dressings in the gluteal cleft.             Plan:     Nursing to notify the Provider(s) and re-consult the [facility] Nurse if wound(s) deteriorate(s)or if the wound care plan needs reevaluation.    If pt is refusing to turn or reposition they must be educated on the  potential injury from not off loading pressure.  Then this \"educated refusal\" needs to be documented as an \"educated refusal to turn/ reposition\" and document if alert, etc.    Plan for wound care to wound located on medial gluteal cleft: TID and prn  1. Clean skin with disposable wipes  2. Dust with baby powder  3. Position pt only side to side, no direct supine positioning in bed.     Pressure Injury Prevention Measures (PIP):  If pt is refusing to turn or " "reposition they must be educated on the  potential injury from not off loading pressure.  Then this \"educated refusal\" needs to be documented as an \"educated refusal to turn/ reposition\" and document if alert, etc. Additionally, you MUST notify the charge nurse, nurse manager and the provider of the pt's refusal to reposition  Follow Cristi Risk recommendations  ? Nutrition following?  ? Moisture and Incontinence issues addressed?   1. Positioning:  Pt must be repositioned for good skin health.      Bed:   o Avoid supine positioning  o Try to keep HOB below 30 degrees  o Reposition every 1-2 hours  o Use pillows in the lower back and upper thighs to support positioning with a pillows, creating slight gap at coccyx for even better offloading.   o For specialty mattress options defer to bed algorithm found in [facility] WO Resource Page  o Consider use of Z-FLOW PADS for maintaining good positioning (small#567730/ medium #052242/ large #[id number])   o Keep heels elevated- one pillow under each leg, runningfrom knee to heel, checking heels are free    Chair:    o Fully off load every 1-2 hours, either return to bed or stand at least 5 minutes  o Encourage pt to shift weight every 15 mintues  o If a PI risk then sit on a chair cushion (#[id number]), pillows do not provide off loading  o Do NOT use a donut for sitting. This concentrates pressure in a smaller area and creates a higher potential for injury vs spreading the pressure   o Upon return to bed, position on side  o Do not sit on a Z-Flow Pad This is NOT a chair cushion, it is for positioning     2.  Patient's skin must be kept clean and dry- skin is only clean when you see the base of a skin fold and your wipe is clean.   o Moist, macerated tissue is more susceptible to injury  ? Dust with baby powder BID to help with chaffing, decrease warmth from friction and increase comfort. OR antifungal powder if rash  ? Use [employer] (#629625)  between folds  o Follow " Incontinence Protocol found in EPIC Resource pages  o NO BRIEFS WITH CATHETERS  3.  Be aware of the bony prominences!  o Elevate heels at all times- one pillow under each leg from knee to heel w heels floating  o Consider using heel lift boots, Bon or Rooke- and still elevated heels  o Use Mepilex Dressings for PI prevention  o Apply skin prep to bony prominences such as elbows, heels, hips.  o Consider wearing long sleeves and/or pants at all times  4.  perform full skin inspection 2 x / day- unless ordered NOT to reposition or to not remove device from an area.  If area is not assessed document date of order  to not assess  and MD who ordered.        WOC Nurse will return: weekly and prn

## 2018-11-27 NOTE — PLAN OF CARE
Problem: Patient Care Overview  Goal: Plan of Care/Patient Progress Review  Outcome: No Change  Patient is A&Ox4.  VSS; LS diminished with infrequent n/p cough; was very dyspneic this am - O2sats dropped to 70's-80's on 15L oxymask when transferring to [facility] - now on hi-flow with O2sats 92-97%.  Patient c/o N/V (had emesis in US per [facility] RN report) which was self-limiting.  Prednisone was changed to solumedrol.  BG up to 350's - Lantus was increased.  CT chest showed small nonocclusive PE, pleural effusions and multiple masses.  US thoracentesis was performed (drained 1500cc of fluid); site is c/d/i.  Patient did report slight improvement in breathing after the thoracentesis.  Heparin drip was started and is infusing at 1500 units/hr.  Sepsis BPA fired this am - lactate was 1.6 - MD aware.  Pulmonology following.  Up with SBA but kept on bedrest r/t SOB; independently repositions in bed.

## 2018-11-27 NOTE — PROGRESS NOTES
Schedule neb given. Pt wean from High flow nasal cannula 40L 70% to 30L 50%. Lung sound coarse/wheezing. Pt feels is breathing is a lot better.

## 2018-11-27 NOTE — PROGRESS NOTES
"PULMONOLOGY PROGRESS NOTE  Date of service: 2018  [facility]    CC/Reason for Visit: COPD, respiratory failure    SUBJECTIVE      HPI: Events reviewed since last seen. CT Chest yesterday showed a single nonocclusive RLL PE, a moderate to large right pleural effusion and multiple pleural-based masses. Right ultrasound-guided thoracentesis was done with removal of 1500 mL of ivonne colored fluid. Patient tolerated procedure well. Stable night. States breathing is slightly better today.    ROS: A Problem Pertinent review of systems was negative except for items noted in HPI.    Past Medical, Family, and Social/Substance History has been reviewed: No interval changes.    OBJECTIVE   /74 (BP Location: Right arm)  Pulse 54  Temp 97.8  F (36.6  C) (Oral)  Resp 16  Ht 1.702 m (5' 7\")  Wt 67 kg (147 lb 11.3 oz)  SpO2 95%  BMI 23.13 kg/m2 HFNC 70% FiO2.    Temp (24hrs), Av.9  F (36.6  C), Min:97.6  F (36.4  C), Max:98.2  F (36.8  C)       Intake/Output Summary (Last 24 hours) at 18 0835  Last data filed at 18 0000   Gross per 24 hour   Intake              700 ml   Output              400 ml   Net              300 ml     Patient Vitals for the past 96 hrs:   Weight   18 0647 67 kg (147 lb 11.3 oz)   18 0718 67 kg (147 lb 11.3 oz)       CONSTITUTIONAL/GENERAL APPEARANCE: Alert. No Apparent Distress.  PSYCHIATRIC: Pleasant and appropriate mood and affect. Oriented x 3.  EARS, NOSE,THROAT,MOUTH: External ears and nose overall normal. Normal oral mucosa.   NECK: Neck appearance normal. No neck masses and the thyroid is not enlarged.   RESPIRATORY: Non-labored effort. Decreased BS with few exp wheezes and crackles.  CARDIOVASCULAR: S1, S2, regular rate and rhythm.      LABORATORY ASSESSMENT        Recent Labs  Lab 18  1047   O2PER 10L       Recent Labs  Lab 18  0820 18  1609   WBC 11.6* 12.6*   RBC 4.34* 4.70   HGB 12.0* 13.4   HCT 37.3* 40.0   MCV 86 " 85   MCH 27.6 28.5   MCHC 32.2 33.5   RDW 17.3* 17.1*    149*       Recent Labs  Lab 11/27/18  0820 11/26/18  0819 11/25/18  1536 11/25/18  0817    140  --  138   POTASSIUM 4.3 3.8 4.4 3.3*   CHLORIDE 108 107  --  105   CO2 23 24  --  26   ANIONGAP 8 9  --  7   BUN 25 14  --  12   CR 0.72 0.74  --  0.62*   GFRESTIMATED >90 >90  --  >90   GFRESTBLACK >90 >90  --  >90   AV 8.0* 8.7  --  7.7*     No lab results found in last 7 days.    Recent Labs  Lab 11/26/18  1130   INR 0.98       Additional labs and/or comments: Pleural fluid labs c/w exudate, cultures and cytology pending.    IMAGING      CXR 11/26 -  IMPRESSION: No pneumothorax or pleural effusion on either side.  Chronic appearing interstitial abnormalities are present. Heart size  Normal.    CT Chest 11/26 -  IMPRESSION:   1. A single nonocclusive thrombus is seen in the right lower lobe.  2. No thoracic aortic dissection or aneurysm.   3. Moderate to large right pleural effusion and multiple pleural-based  masses concerning for malignancy.    PFT & OTHER TESTING       ASSESSMENT / PLAN      Active Problems:    Pulmonary fibrosis (H)      ASSESSMENT: 74 yo male current smoker with hx severe O2 and steroid-dependent COPD (followd by [facility]), pulmonary fibrosis, severe Pulmonary HTN, Histiocytosis X and coronary artery disease admitted with acute on chronic respiratory failure secondary to possible community acquired pneumonia. CXR on admission showed a RLL opacity superimposed on fibrotic-appearing lungs. At baseline patient is on Home O2 at 4-6 lpm and Prednisone 10 mg/day. He continues to smoke half a pack of cigarettes a day. Hospital course complicated by respiratory distress 11/26. CT Chest showed a single nonocclusive RLL PE, a moderate to large right pleural effusion and multiple pleural-based masses. Right ultrasound-guided thoracentesis was done with removal of 1500 mL of exudative fluid, cultures and cytology pending. Patient  is now anticoagulated. Would continue current bronchodilators, antibiotics and steroids. Await results of remaining pleural fluid studies. Patient is currently a full code and has a poor prognosis - consider Palliative Care consult to address code status and goals of care (patient's wife is open to this conversation and potentially meeting with Hospice).    Diagnoses  Abnl CT/CXR  R91.8  Abnl PFTs  R94.2  COPD exacerb J44.1  Hemoptysis  R04.2  Hypoxemia  R09.02  Nicotine depend F17.210  Pulmonary fibrosis J84.10  Resp fail acute J96.00  Resp fail chronic J96.10    PLAN:   1. Adjust oxygen, keep SaO2 > 90%  2. Bronchodilators - DuoNeb + Pulmicort nebs.  3. Antibiotics - Levaquin  4. Steroids - SoluMedrol.  5. Anticoagulation - Heparin gtt, Eliquis.  6. Await remaining pleural fluid studies.  7. Recommend Palliative Care consult.    Updated wife at bedside.      Herberth Johnson MD    [facility]  [phone] (pager)  [phone] (office)

## 2018-11-28 NOTE — PLAN OF CARE
Problem: Patient Care Overview  Goal: Plan of Care/Patient Progress Review  A&Ox4.  Up w/ SBA to BR, not out of bed this shift, repositions self in bed.  Denies pain.  VSS, using High flowO2, FIO2 at 60% and 40LPM, O2 sats 95-99%.  CHOUDHURY.  LS diminished.  .  Non-blanchable redness to coccyx, open to air.  Thoracentesis site to R back CDI.  Voiding per urinal.  Pulmonary following.  IV Levaquin and solumedrol, micah nebs.  Discharge pending, nursing will continue to monitor.

## 2018-11-28 NOTE — PROGRESS NOTES
[facility]    Hospitalist Progress Note    Assessment & Plan   Ravi Sandoval is a 73 year old male past medical history of histiocytosis X, pulmonary fibrosis, severe pulmonary hypertension and COPD/Emphysema, who is chronically oxygen dependent on 4-6 liters who was admitted on 11/23/2018 for worsening SOB.     Summary:     1.  Acute on Chronic hypoxic respiratory failure in the setting of pulmonary fibrosis and COPD.  Notes increasing shortness of breath for the past 1 month.  Was treated outpatient with amoxicillin at the end of October for a sinusitis, requiring increasing O2 demands up from his baseline of 4 liters.  Afebrile with no significant leukocytosis.  Lactic acid was elevated at 2.5 now normal last LA 1.6.  Procalcitonin <0.05.                         -Pulmonary Consulted and following                          -Continue Levaquin for CAP.                           -He was on Prednisone 40mg daily. Patient was hypoxic and SOB, on high flow oxygen, xray shows    Fibrosis and opacity on right lower side, bilateral crackles, occasional wheezing and decrease air entry.    I l started him on IV Solumerol 40 mg Q 6 hrs, DuoNeb every 4-6 hrs, Start on Pulmicort.       CTA chest PE protocol done on 11/26, shows large right sided pleural effusion, non occlusive thrombus    And pleural based densities, he had thoracentesis done about 1500  Ml of pleural fluid remove, exudative,    Cytology is still pending, with regards to densities and adenopathy, I asked the pulmonary to evaluate him.                         -Scheduled to PRN DuoNebs as an OP pending prior auth.                           -Wean O2 and monitor. Currently on high flow oxygen and is now feeling much better today.    Good air entry bilaterally, minimal crackles, no wheezing, decrease Solumedrol to 40 mg Q 8 hrs now.    RT and nursing staff to wean him off the high flow and see how he does.      2.  Severe pulmonary hypertension.  Appears compensated.                           -Continue PTA tadalafil, digoxin and every other day Lasix.     3.  Uncontrolled type 2 diabetes.  A1c in October was 10.8.  He is supposed to take glipizide 10 mg daily though notes he has not been taking it as directed.  Does not check his blood sugars.  Oral agents on hold. Blood sugar still on higher side secondary to steroids                          -increase Lantus to 18 units at bedtime, increase Novolog to 12 units with  meals and sliding scale insulin with meals for correction.      Decrease solumedrol today as well.    4.  Hypertension.  Blood pressure elevated in the Emergency Department but now with optimal control.                          -Continue PTA Norvasc 2.5 mg daily, lisinopril 20 mg daily and metoprolol 25 mg twice daily. P.r.n. hydralazine will be available as needed.      5.  Hypothyroidism.  Continue prior to admission Synthroid.      6.  Coronary artery disease with previous bare metal stent to the right coronary artery (RCA) in 2010.  Denies any symptoms of chest pain.  Continue prior to admission aspirin, beta blocker, statin and ACE.    7. Acute Pulmonary Embolism:  Was on IV heparin now l switch him to Eliquis at this time.    8. Mild Hemoptysis: very light blood tinge sputum this morning, we need to monitor, as it insignificant at this time so will  Continue with the Eliquis at this time, if get worse we will need to hold the anticoagulations.      Now overall improve  Decrease Solumedrol to 40 mg every 8 hrs.  Increase Lantuss to 18 units and Corinna log to 12 units with meals.  IS and flutter  Palliative care consult for goals today    CTA chest PE protocol done on 11/26, shows large pleural effusion and PE, s/p thoracentesis same day  And now on anticoagulation,  Overall feeling much better.   Try to wean his oxygen down.  IS and flutter  Pulmonary to follow for abnormal CT finding          DVT Prophylaxis: Eliquis   Code Status: Full  Code          Rory Cruz MD   Text Page (7am to 6pm)    Interval History   Breathing remain stable and improve, has some cough this morning and blood tinge sputum. No fever, chills, chest pain,  Headache or dizziness.     No other significant event overnight.     -Data reviewed today: I reviewed all new labs and imaging results over the last 24 hours. I personally reviewed no images or EKG's today.    Physical Exam   Temp: 98  F (36.7  C) Temp src: Oral BP: 130/71 Pulse: 75 Heart Rate: 73 Resp: 18 SpO2: 93 % O2 Device: High Flow Nasal Cannula (HFNC) Oxygen Delivery: Other (Comments) (40 lpm)  Vitals:    11/24/18 0718 11/26/18 0647 11/28/18 0704   Weight: 67 kg (147 lb 11.3 oz) 67 kg (147 lb 11.3 oz) 66.5 kg (146 lb 9.7 oz)     Vital Signs with Ranges  Temp:  [97.7  F (36.5  C)-98  F (36.7  C)] 98  F (36.7  C)  Pulse:  [75-90] 75  Heart Rate:  [73-90] 73  Resp:  [16-18] 18  BP: (113-137)/(69-81) 130/71  FiO2 (%):  [40 %-70 %] 50 %  SpO2:  [89 %-99 %] 93 %  I/O last 3 completed shifts:  In: 840 [P.O.:840]  Out: 675 [Urine:675]    Constitutional:           A&O x3, mild to moderate distress, frail appearing  Respiratory:              crackles positive, improve air entry on right side, no wheezing.   Cardiovascular: RRR s1 s2 no murmurs, no lower extremity edema  GI: soft, NT, ND, bs present  Skin/Integumen: warm and dry  Other:             Normal affect, VIDES, answer questions appropriately. Shortness of breath noted with minimal movement.    Medications       amLODIPine (NORVASC) tablet 2.5 mg  2.5 mg Oral Daily     apixaban ANTICOAGULANT  10 mg Oral BID    Followed by     [START ON 12/4/2018] apixaban ANTICOAGULANT  5 mg Oral BID     aspirin (ASA) chewable tablet 81 mg  81 mg Oral At Bedtime     budesonide  0.5 mg Nebulization BID     colchicine (COLCYRS) tablet 0.6 mg  0.6 mg Oral QPM     digoxin  125 mcg Oral Daily     FLUoxetine (PROzac) capsule 40 mg  40 mg Oral Daily     furosemide  20 mg Oral Every Other Day      insulin aspart  12 Units Subcutaneous TID w/meals     insulin aspart  1-7 Units Subcutaneous TID AC     insulin aspart  1-5 Units Subcutaneous At Bedtime     insulin glargine  18 Units Subcutaneous At Bedtime     ipratropium - albuterol 0.5 mg/2.5 mg/3 mL  3 mL Nebulization 4x daily     levofloxacin  500 mg Intravenous Q24H     levothyroxine  125 mcg Oral Daily     lisinopril (PRINIVIL/ZESTRIL) tablet 20 mg  20 mg Oral Daily     methylPREDNISolone  40 mg Intravenous Q8H     metoprolol succinate  25 mg Oral BID     pantoprazole  40 mg Oral QAM AC     rosuvastatin  10 mg Oral QPM     sodium chloride (PF)  3 mL Intracatheter Q8H     tadalafil  20 mg Oral Daily     tamsulosin  0.4 mg Oral Daily       Data     Recent Labs  Lab 11/27/18  0820 11/26/18  1609 11/26/18  1130 11/26/18  0819 11/25/18  1536 11/25/18  0817  11/23/18  1015   WBC 11.6* 12.6*  --  13.8*  --  9.0  < > 10.0   HGB 12.0* 13.4  --  14.0  --  12.3*  < > 13.6   MCV 86 85  --  84  --  84  < > 86    149*  --  158  --  127*  < > 153   INR  --   --  0.98  --   --   --   --   --      --   --  140  --  138  < > 141   POTASSIUM 4.3  --   --  3.8 4.4 3.3*  < > 4.0   CHLORIDE 108  --   --  107  --  105  < > 107   CO2 23  --   --  24  --  26  < > 26   BUN 25  --   --  14  --  12  < > 15   CR 0.72  --   --  0.74  --  0.62*  < > 0.75   ANIONGAP 8  --   --  9  --  7  < > 8   AV 8.0*  --   --  8.7  --  7.7*  < > 8.4*   *  --   --  203*  --  132*  < > 151*   ALBUMIN 2.5*  --   --  2.9*  --   --   --  3.0*   PROTTOTAL  --   --  6.3* 6.6*  --   --   --  6.6*   BILITOTAL  --   --   --  0.5  --   --   --  0.5   ALKPHOS  --   --   --  46  --   --   --  51   ALT  --   --   --  19  --   --   --  25   AST  --   --   --  19  --   --   --  31   < > = values in this interval not displayed.    Imaging:   No results found for this or any previous visit (from the past 24 hour(s)).

## 2018-11-28 NOTE — PROGRESS NOTES
"PULMONOLOGY PROGRESS NOTE  Date of service: 2018  [facility]    CC/Reason for Visit: COPD, respiratory failure    SUBJECTIVE      HPI: Events of last night reviewed. Stable night. No new respiratory problems. Patient reports coughing up some bloody sputum this AM. States breathing is about the same today.    ROS: A Problem Pertinent review of systems was negative except for items noted in HPI.    Past Medical, Family, and Social/Substance History has been reviewed: No interval changes.    OBJECTIVE   /71  Pulse 75  Temp 98  F (36.7  C) (Oral)  Resp 18  Ht 1.702 m (5' 7\")  Wt 66.5 kg (146 lb 9.7 oz)  SpO2 93%  BMI 22.96 kg/m2 HFNC 50% FiO2.    Temp (24hrs), Av.9  F (36.6  C), Min:97.6  F (36.4  C), Max:98.2  F (36.8  C)       Intake/Output Summary (Last 24 hours) at 18 0835  Last data filed at 18 0000   Gross per 24 hour   Intake              700 ml   Output              400 ml   Net              300 ml     Patient Vitals for the past 96 hrs:   Weight   18 0704 66.5 kg (146 lb 9.7 oz)   18 0647 67 kg (147 lb 11.3 oz)       CONSTITUTIONAL/GENERAL APPEARANCE: Alert. No Apparent Distress.  PSYCHIATRIC: Pleasant and appropriate mood and affect. Oriented x 3.  EARS, NOSE,THROAT,MOUTH: External ears and nose overall normal. Normal oral mucosa.   NECK: Neck appearance normal. No neck masses and the thyroid is not enlarged.   RESPIRATORY: Non-labored effort. Decreased BS, fairly clear anteriorly, few crackles at bases.  CARDIOVASCULAR: S1, S2, regular rate and rhythm.      LABORATORY ASSESSMENT        Recent Labs  Lab 18  1047   O2PER 10L       Recent Labs  Lab 18  0820 18  1609   WBC 11.6* 12.6*   RBC 4.34* 4.70   HGB 12.0* 13.4   HCT 37.3* 40.0   MCV 86 85   MCH 27.6 28.5   MCHC 32.2 33.5   RDW 17.3* 17.1*    149*       Recent Labs  Lab 18  0820 18  0819 18  1536 18  0817    140  --  138   POTASSIUM 4.3 " 3.8 4.4 3.3*   CHLORIDE 108 107  --  105   CO2 23 24  --  26   ANIONGAP 8 9  --  7   BUN 25 14  --  12   CR 0.72 0.74  --  0.62*   GFRESTIMATED >90 >90  --  >90   GFRESTBLACK >90 >90  --  >90   AV 8.0* 8.7  --  7.7*     No lab results found in last 7 days.    Recent Labs  Lab 11/26/18  1130   INR 0.98       Additional labs and/or comments: Pleural fluid labs c/w exudate, cultures negative and cytology pending.  (cell counts showed 18% ATYPICAL CELL CLUSTERS)    IMAGING      CXR 11/26 -  IMPRESSION: No pneumothorax or pleural effusion on either side.  Chronic appearing interstitial abnormalities are present. Heart size  Normal.    CT Chest 11/26 -  IMPRESSION:   1. A single nonocclusive thrombus is seen in the right lower lobe.  2. No thoracic aortic dissection or aneurysm.   3. Moderate to large right pleural effusion and multiple pleural-based  masses concerning for malignancy.    PFT & OTHER TESTING       ASSESSMENT / PLAN      Active Problems:    Pulmonary fibrosis (H)      ASSESSMENT: 72 yo male current smoker with hx severe O2 and steroid-dependent COPD (followd by [facility]), pulmonary fibrosis, severe Pulmonary HTN, Histiocytosis X and coronary artery disease admitted with acute on chronic respiratory failure secondary to possible community acquired pneumonia. CXR on admission showed a RLL opacity superimposed on fibrotic-appearing lungs. At baseline patient is on Home O2 at 4-6 lpm and Prednisone 10 mg/day. He continues to smoke half a pack of cigarettes a day. Hospital course complicated by respiratory distress 11/26. CT Chest showed a single nonocclusive RLL PE, a moderate to large right pleural effusion and multiple pleural-based masses. Right ultrasound-guided thoracentesis was done with removal of 1500 mL of exudative fluid, cultures negative and cytology pending. Patient is now anticoagulated. Would continue current bronchodilators, antibiotics and steroids. Await results of pleural fluid  cytology. Patient is currently a full code and has a poor prognosis - once pleural fluid cytology results available would recommend Palliative Care consult to address code status and goals of care.    Diagnoses  Abnl CT/CXR  R91.8  Abnl PFTs  R94.2  COPD exacerb J44.1  Hemoptysis  R04.2  Hypoxemia  R09.02  Nicotine depend F17.210  Pulmonary fibrosis J84.10  Resp fail acute J96.00  Resp fail chronic J96.10    PLAN:   1. Adjust oxygen, keep SaO2 > 90%  2. Bronchodilators - DuoNeb + Pulmicort nebs.  3. Antibiotics - Levaquin  4. Steroids - SoluMedrol.  5. Anticoagulation - Eliquis.  6. Await pleural fluid cytology.    Updated wife at bedside.    Dr. Fletcher will be following the patient starting tomorrow.      Herberth Johnson MD    [facility] / [facility]  [phone] (pager)  [phone] (office)

## 2018-11-28 NOTE — PROGRESS NOTES
Pt remains on HFNC 40 lpm and 60%, SpO2 97%, neb tx were given as ordered, BBS diminished. Will continue to follow the pt.     RT Carmelina.

## 2018-11-28 NOTE — PLAN OF CARE
Problem: Patient Care Overview  Goal: Plan of Care/Patient Progress Review  Outcome: No Change  VSS, O2 mid 90s on HFNC FiO2 50% 40L, desat to high 80s at rest when RT attempted to wean down to 45% 35L. Desat to low 80s with minimal exertion . LS dim. CHOUDHURY. Intermittent productive coughs with thick red streaked sputum. Decreased IV Solu Medrol 40 mg q6h to q8h. On Duonebs x4 daily. A&Ox4. SBA. Tolerating mod carb diet, good appetite. Blood sugars 265 and 178. Voiding adequately, using urinal. BM x1. S/p Thoracentesis 11/26, cytology pending, dressing CDI. On IV Levaquin q24h. Nonblanchable redness on coccyxx, open to air, turn/repo q2h. Pulmonolgy and palliative care following. Discharge pending progress. RN will cont to monitor.     Addendum: 6940-1572: VSS, O2 mid 90s at rest on HFNC FiO2 50%40L. Blood sugar 209. Pt and spouse meeting palliative care tomorrow @ 1030.

## 2018-11-28 NOTE — PLAN OF CARE
Problem: Patient Care Overview  Goal: Plan of Care/Patient Progress Review  Outcome: No Change  A & O times four. VSS. Denied any pain/discomfort. On high flow oxygen 60%. Lung sightly coarse on his RLL. Blood glucose monitored and insulin given per order. Non-blanchable redness on his coccyx. Encouraged changing position every two hours. Dangled and stand at the bed side. Voids per urinal. Thoracentesis site Band-Aid intact  Continue to monitor.

## 2018-11-28 NOTE — PROGRESS NOTES
Brief Palliative Care Note.    Consult received, chart reviewed and discussed with both Dr Cruz and Dr Johnson. Mr Sandoval's wife indicated she would like to be involved/present for conversation with the Palliative Care team therefore I contacted her to schedule a time to meet. Kelly requested to meet with me on Thursday 11/29 at 10:30am.     Peg AREVALO CNP  Pager: [phone]  Palliative Medicine  November 28, 2018

## 2018-11-29 NOTE — PROGRESS NOTES
Pt remains on HFNC 40 lpm and 50%, SpO2 93 - 97%, neb tx were given as ordered, BBS diminished. Will continue to follow the pt.      RT Carmelina.

## 2018-11-29 NOTE — PLAN OF CARE
Problem: Patient Care Overview  Goal: Plan of Care/Patient Progress Review  Outcome: No Change  A/O, VSS on Highflow 02. Skin fragile, bruised, frail. LS diminished, fine crackles in bases. Non productive cough. POD 1 from Rt thoracentesis, dressing CDI. Up with 1. Voiding in urinal. IV steroids and anbx. Coccyx pink and blanchable, intact. Pt turns himself in bed. Discharge pending. Palliative consult in AM tomorrow ..    Update 0546.. No acute changes overnight. Highflow 02 continues. Pt did not get out of bed, Extreme CHOUDHURY with minimal activity. Sats around 95% at rest. Dressing CDI still. Discharge pending

## 2018-11-29 NOTE — PLAN OF CARE
Problem: Patient Care Overview  Goal: Plan of Care/Patient Progress Review  Outcome: No Change  VSS, O2 mid 90s on HFNC FiO2 40% 40L, Desat to low 80s with minimal exertion. LS dim. CHOUDHURY. Intermittent productive coughs with thick red streaked sputum. On IV Solu Medrol 40 mg q8h. On Duonebs x4 daily. A&Ox4. SBA. Tolerating mod carb diet, good appetite. Blood sugars 201 and 267 . Voiding adequately, using urinal. On IV Levaquin q24h. Redness on coccyxx improving, mepilex in place for protection, turn/repo q2h, pt also able to reposition independently. Pulmonolgy following. Palliative care met with pt and wife today. Discharge pending progress. RN will cont to monitor.      Addendum: [id number]: Blood sugar 257. Turn/repo q2h. Denies pain.

## 2018-11-29 NOTE — CONSULTS
[facility]    Palliative Care Consultation     Ravi Sandoval  MRN# [id number]  Date of Admission:  11/23/2018  Date of Service (when I saw the patient): 11/29/18  Reason for consult: Consulted by Dr. Cruz for Goals of care    Assessment & Plan   aRvi Sandoval is a 73 year old male with past medical history of histiocytosis X, pulmonary fibrosis, severe pulmonary hypertension and COPD/Emphysema, who is chronically oxygen dependent on 4-6 liters who was admitted on 11/23/2018 for worsening SOB.    Symptoms/Recommendations   1. Goals of Care. Met with Mr Sandoval along with his family this morning (see details of discussion below). Mr Sandoval understands he has pulmonary fibrosis, COPD and a new blood clot. He also understands his lungs at baseline are very sick. He is aware that we are worried he may have a new lung cancer and is awaiting results from the fluid that was drained on Monday. I shared my worry with Mr Sandoval that we might not get his lungs much better than this, and that we cannot continue this level of oxygen outside of the hospital (except at LTACH). If pathology confirms he has cancer, I anticipate his breathing will only get worse and time will be quite short. We reviewed the option of discharging to LTACH (potentially) if his goal is to have more time, but understanding that he likely will not have any meaningful improvement. We also reviewed transitioning to a comfort focused plan of care. I explained that we would administer medications to alleviate any air hunger, and then would transition from the HiFNC to a regular oxygen mask. I explained that he may die quickly (hours/days?) or he may stabilize and potentially could transfer home with hospice for continued end of life cares. Mr Sandoval and his family asked several questions, request more time to consider options and await pathology results. We also discussed code status. I shared my strong recommendation that Mr Sandoval change his code  status to DNR/DNI. He verbalized understanding, stating he didn't like it but understood. Encouraged him to think about this and make a decision as he could certainly decompensate quickly.     Support/Coping  -pt's wife, son, daughter in law, and 2 other family members present during meeting  -Will involve Palliative LICSW, Brandie Abreu, and/or Palliative , Mago Holland    Decisional Support, Goals of Care, Counseling & Coordination  Decisional Capacity Intact?  -Yes  Health Care Directive on File?  -No  Code Status/Resuscitation Preferences?  -FULL  Plan of Care?  -await pathology results determine next plan of care    Discussion  Introduced the scope of our practice to Mr Sandoval and his family. Discussed our potential roles for symptom management, support/coping, and decisional support (aka goals of care).     Mr Sandoval was resting comfortably in bed with family at his bedside. He tells me he had not been doing very well prior to this hospital stay due to dyspnea (spent much of his time sitting/laying down and napping), but over the two weeks prior to his hospitalization he became even more short of breath, had no appetite, and became progressively weaker. He understands from the doctors involved in his care that his lungs are quite sick at baseline. He also understands that he may have a new cancer in his lungs. He tells me after he had the fluid drained off his lung earlier this week his SOB improved significantly. We discussed his current need for very high oxygen requirements. I explained that oxygen via HiFNC is not something that can be continued outside of the hospital except at LTACH. I further shared my worry that his lungs are so sick, we may never be able to get him off this level of oxygen (particularly if a new cancer is confirmed). In light of this information we discussed his options. We discussed the possibility of discharging to LTACH to give him more time to see how much improvement he  "might have acknowledging that he may not get better. We also discussed the option of shifting the focus of our care to his comfort. We specifically discussed discontinuing labs, vitals, etc. And administering medication to alleviate any symptoms of air hunger. We discussed transitioning him off the HiFNC to a oxymask to see if he could tolerate this lower level of oxygen. I shared my worry that he may die soon after we transition him to a lower level of oxygen (hours/days?) or he may stabilize and we could possibly discharge him home with hospice cares. We discussed the hospice services/benefit and I specifically explained that on hospice we would be focusing on comfort and not returning to the hospital. I shared that on this plan of care I anticipate time will likely still be short (days/? Week). Mr Sandoval was disappointed to hear that he may not get well enough to be home with \"regular\" oxygen as he has previously been. His hope/goal had been to watch his grandson play hockey this year. Mr Sandoval and his family are eager to learn the results from the pleural fluid which was sent to pathology earlier this week. They are hoping this may provide some clarity as to why he had such a dramatic decline so suddenly. I also discussed code status with Mr Sandoval. I shared my strong recommendation that he consider changing his code status to DNR/DNI as I do not feel these measures would be successful, nor would they be a bridge to recovery. Mr Sandoval verbalized understanding and requested some time to discuss further with his family.     Thank you for involving us in the care of this patient and family. We will continue to follow. Please do not hesitate to contact me with questions or concerns or the on-call provider for our team if evening or weekend.    Peg AREVALO, [facility]  Palliative Medicine   Pager [phone]    Attestation:  Total time on the floor involved in the patient's care: 80 minutes  Total time spent in " counseling/care coordination: >50%    Chief Complaint   Acute hypoxic respiratory failure    History is obtained from the patient, his family, staff, and extensive chart review.     Adopted from H&P  Ravi Sandoval is a 73-year-old male with a past medical history of histiocytosis X, pulmonary fibrosis, severe pulmonary hypertension and COPD, who is chronically oxygen dependent on 4 liters.  He presented to the Emergency Department today with increasing shortness of breath.  He has significant lung disease, followed by [facility], and maintained on 10 mg of prednisone.  Historically, he has not been prescribed bronchodilators as they have been of little assistance.  He notes gradually increasing shortness of breath over the past month.  He was seen by his primary care provider on 10/24 and complained of sinus symptoms.  He was treated with a 10-day course of amoxicillin for suspected sinusitis.  He notes that his symptoms have not improved.  He continues to feel short of breath.  He has a cough though notes that is not productive, though feels as though he is trying to cough things up.  He has been afebrile.  He chronically uses oxygen 4 liters.  He  does note that yesterday he ran out of portable oxygen for an hour while out for Thanksgiving dinner.  Today, he had increasing shortness of breath, so EMS was summoned.  They noted him to be 89% despite 10 liters of oxygen.  He was brought to [facility] for further evaluation.       In the Emergency Department, he was evaluated by Dr. Gaviria.  He was hypertensive.  O2 saturations were stable on 10 liters nonrebreather.  Laboratory evaluation was largely unremarkable.  A chest x-ray showed evidence of a fibrotic-appearing lungs with a superimposed right lower lobe pneumonia.  He was initiated on Zosyn, and admission was requested for further evaluation.       Presently, patient is evaluated in the Emergency Department.  He is breathing comfortably  and remains on a nonrebreather.  He recently saw his pulmonologist approximately 2 weeks ago.  His wife states that he was prescribed new nebulizers; however, there was a delay in getting the nebulizer machine.  They have not started this therapy and are unsure of the medications.  He denies any chest pain.  No worsening lower extremity edema.  No significant weight gain.  He is an uncontrolled type 2 diabetic.  His primary care provider increased his glipizide in October due to A1c of greater than 10.  He initially adjusted this medication; however, he recently decreased it as he was unsure if it was contributing to his shortness of breath.     Past Medical History    I have reviewed this patient's medical history and updated it with pertinent information if needed.   Past Medical History:   Diagnosis Date     ACP (advance care planning)      Anemia      ASHD (arteriosclerotic heart disease)      BPH (benign prostatic hyperplasia)      Cardiac arrest (H)      Cataracts, bilateral      Chronic diarrhea      Chronic respiratory failure with hypoxia (H)      COPD (chronic obstructive pulmonary disease) (H)     HOME O2     Depression      Disease of lung      Dysplasia of prostate      Gastroesophageal reflux disease      General weakness      Heart attack (H)      Herpes zoster      Histiocytosis X (H)      Hyperlipidemia      Hypertension      Hypothyroidism      Oxygen dependent      Postinflammatory pulmonary fibrosis (H)      Prostatic hypertrophy, benign      Pulmonary HTN (H)      Pulmonary hypertensive venous disease (H)      Thyroid disease      Tobacco use      Type 2 diabetes mellitus without complications (H)        Past Surgical History   I have reviewed this patient's surgical history and updated it with pertinent information if needed.  Past Surgical History:   Procedure Laterality Date     BIOPSY, LUNG NODULE       CARDIAC SURGERY      HX OF STENT     COLONOSCOPY N/A 5/27/2016    Procedure: COMBINED  COLONOSCOPY, SINGLE OR MULTIPLE BIOPSY/POLYPECTOMY BY BIOPSY;  Surgeon: Sera Coffman MD;  Location:  GI     PHACOEMULSIFICATION CLEAR CORNEA WITH STANDARD INTRAOCULAR LENS IMPLANT Right 5/31/2017    Procedure: PHACOEMULSIFICATION CLEAR CORNEA WITH STANDARD INTRAOCULAR LENS IMPLANT;  RIGHT EYE PHACOEMULSIFICATION CLEAR CORNEA WITH STANDARD INTRAOCULAR LENS IMPLANT ;  Surgeon: Trever Delgado MD;  Location:  [facility]     PHACOEMULSIFICATION CLEAR CORNEA WITH STANDARD INTRAOCULAR LENS IMPLANT Left 6/7/2017    Procedure: PHACOEMULSIFICATION CLEAR CORNEA WITH STANDARD INTRAOCULAR LENS IMPLANT;  LEFT EYE PHACOEMULSIFICATION CLEAR CORNEA WITH STANDARD INTRAOCULAR LENS IMPLANT ;  Surgeon: Trever Delgado MD;  Location: [facility]       Social History   Living situation: previously lived with his wife in their apartment in [place]    Family system: , has at least 1 son and a daughter in law, 2 other family members present - unclear their relation    Self-identified support system: family    Employment/education: did not assess    Activities/interests: did not assess    Use of community resources: previously had home care    Catholic affiliation: Sabianism     Involvement in dara community: states he is involved with his Protestant, has received visits from his     Impact of illness on patient: feeling very SOB, does not tolerate much activity. He spend much of his day sitting on the couch.    Family History   I have reviewed this patient's family history and updated it with pertinent information if needed.   Family History   Problem Relation Age of Onset     Cardiovascular Father      Pulmonary HTN     Cancer Mother      Skin CA     Neurologic Disorder Sister      Creutzfeldt Eduardo Disease       Allergies   Allergies   Allergen Reactions     Tetracycline        Medications   Current Facility-Administered Medications Ordered in Epic   Medication Dose Route Frequency Last Rate Last Dose     acetaminophen  (TYLENOL) Suppository 650 mg  650 mg Rectal Q4H PRN         acetaminophen (TYLENOL) tablet 650 mg  650 mg Oral Q4H PRN   650 mg at 11/26/18 2136     amLODIPine (NORVASC) tablet 2.5 mg  2.5 mg Oral Daily   2.5 mg at 11/29/18 0913     apixaban ANTICOAGULANT (ELIQUIS) tablet 10 mg  10 mg Oral BID   10 mg at 11/29/18 0914    Followed by     [START ON 12/4/2018] apixaban ANTICOAGULANT (ELIQUIS) tablet 5 mg  5 mg Oral BID         aspirin (ASA) chewable tablet 81 mg  81 mg Oral At Bedtime   81 mg at 11/28/18 2227     budesonide (PULMICORT) neb solution 0.5 mg  0.5 mg Nebulization BID   0.5 mg at 11/29/18 0728     colchicine (COLCYRS) tablet 0.6 mg  0.6 mg Oral QPM   0.6 mg at 11/28/18 2057     glucose gel 15-30 g  15-30 g Oral Q15 Min PRN        Or     dextrose 50 % injection 25-50 mL  25-50 mL Intravenous Q15 Min PRN        Or     glucagon injection 1 mg  1 mg Subcutaneous Q15 Min PRN         digoxin (LANOXIN) tablet 125 mcg  125 mcg Oral Daily   125 mcg at 11/29/18 0914     FLUoxetine (PROzac) capsule 40 mg  40 mg Oral Daily   40 mg at 11/29/18 0910     furosemide (LASIX) tablet 20 mg  20 mg Oral Every Other Day   20 mg at 11/28/18 0913     insulin aspart (NovoLOG) inj (RAPID ACTING)  12 Units Subcutaneous TID w/meals   12 Units at 11/29/18 0905     insulin aspart (NovoLOG) inj (RAPID ACTING)  1-7 Units Subcutaneous TID AC   2 Units at 11/29/18 0906     insulin aspart (NovoLOG) inj (RAPID ACTING)  1-5 Units Subcutaneous At Bedtime   1 Units at 11/27/18 2131     insulin glargine (LANTUS PEN) injection 18 Units  18 Units Subcutaneous At Bedtime   18 Units at 11/28/18 2227     ipratropium - albuterol 0.5 mg/2.5 mg/3 mL (DUONEB) neb solution 3 mL  3 mL Nebulization 4x daily   3 mL at 11/29/18 0728     levofloxacin (LEVAQUIN) infusion 500 mg  500 mg Intravenous Q24H 100 mL/hr at 11/28/18 1605 500 mg at 11/28/18 1605     levothyroxine (SYNTHROID/LEVOTHROID) tablet 125 mcg  125 mcg Oral Daily   125 mcg at 11/29/18 0913      lisinopril (PRINIVIL/ZESTRIL) tablet 20 mg  20 mg Oral Daily   20 mg at 11/29/18 0910     melatonin tablet 1 mg  1 mg Oral At Bedtime PRN         methylPREDNISolone sodium succinate (solu-MEDROL) injection 40 mg  40 mg Intravenous Q8H   40 mg at 11/29/18 0615     metoprolol succinate (TOPROL-XL) 24 hr tablet 25 mg  25 mg Oral BID   25 mg at 11/29/18 0911     naloxone (NARCAN) injection 0.1-0.4 mg  0.1-0.4 mg Intravenous Q2 Min PRN         ondansetron (ZOFRAN-ODT) ODT tab 4 mg  4 mg Oral Q6H PRN        Or     ondansetron (ZOFRAN) injection 4 mg  4 mg Intravenous Q6H PRN         pantoprazole (PROTONIX) EC tablet 40 mg  40 mg Oral QAM AC   40 mg at 11/29/18 0617     potassium chloride (KLOR-CON) Packet 20-40 mEq  20-40 mEq Oral or Feeding Tube Q2H PRN         potassium chloride 10 mEq in 100 mL intermittent infusion with 10 mg lidocaine  10 mEq Intravenous Q1H PRN         potassium chloride 10 mEq in 100 mL sterile water intermittent infusion (premix)  10 mEq Intravenous Q1H PRN         potassium chloride 20 mEq in 50 mL intermittent infusion  20 mEq Intravenous Q1H PRN         potassium chloride SA (K-DUR/KLOR-CON M) CR tablet 20-40 mEq  20-40 mEq Oral Q2H PRN   20 mEq at 11/25/18 1104     prochlorperazine (COMPAZINE) injection 5 mg  5 mg Intravenous Q6H PRN        Or     prochlorperazine (COMPAZINE) tablet 5 mg  5 mg Oral Q6H PRN        Or     prochlorperazine (COMPAZINE) Suppository 12.5 mg  12.5 mg Rectal Q12H PRN         rosuvastatin (CRESTOR) tablet 10 mg  10 mg Oral QPM   10 mg at 11/28/18 2057     sodium chloride (PF) 0.9% PF flush 3 mL  3 mL Intracatheter Q1H PRN   3 mL at 11/29/18 0917     sodium chloride (PF) 0.9% PF flush 3 mL  3 mL Intracatheter Q8H   3 mL at 11/29/18 0915     tadalafil (CIALIS/ADCIRCA) tablet  20 mg Oral Daily   20 mg at 11/29/18 0915     tamsulosin (FLOMAX) capsule 0.4 mg  0.4 mg Oral Daily   0.4 mg at 11/29/18 0912     No current [facility]-ordered outpatient prescriptions on file.        Review of Systems   The comprehensive review of systems is negative other than noted in the assessment/plan.    Physical Exam   Temp: 97.5  F (36.4  C) Temp src: Oral BP: 135/71 Pulse: 73 Heart Rate: 63 Resp: 18 SpO2: 91 % O2 Device: High Flow Nasal Cannula (HFNC) Oxygen Delivery: Other (Comments) (40 lpm)  Vitals:    11/24/18 0718 11/26/18 0647 11/28/18 0704   Weight: 67 kg (147 lb 11.3 oz) 67 kg (147 lb 11.3 oz) 66.5 kg (146 lb 9.7 oz)     CONSTITUTIONAL: NAD, A&Ox3. Calm and cooperative.  HEENT: NCAT, MMM  NECK: Supple  CARDIOVASCULAR: exam deferred   RESPIRATORY: mildly labored work of breathing on oxygen via HiFNC  GASTROINTESTINAL: exam deferred  MUSCULOSKELETAL: Moving freely in bed   SKIN: Warm and intact. No concerning lesions or rashes on exposed skin surfaces   NEUROLOGIC: Appropriately responsive during interview  PSYCH: Affect congruent     Data   Results for orders placed or performed during the hospital encounter of 11/23/18 (from the past 24 hour(s))   Glucose by meter   Result Value Ref Range    Glucose 178 (H) 70 - 99 mg/dL   Glucose by meter   Result Value Ref Range    Glucose 209 (H) 70 - 99 mg/dL   Glucose by meter   Result Value Ref Range    Glucose 139 (H) 70 - 99 mg/dL   Glucose by meter   Result Value Ref Range    Glucose 182 (H) 70 - 99 mg/dL   Glucose by meter   Result Value Ref Range    Glucose 201 (H) 70 - 99 mg/dL   CBC with platelets differential   Result Value Ref Range    WBC 11.8 (H) 4.0 - 11.0 10e9/L    RBC Count 4.33 (L) 4.4 - 5.9 10e12/L    Hemoglobin 12.4 (L) 13.3 - 17.7 g/dL    Hematocrit 36.9 (L) 40.0 - 53.0 %    MCV 85 78 - 100 fl    MCH 28.6 26.5 - 33.0 pg    MCHC 33.6 31.5 - 36.5 g/dL    RDW 17.2 (H) 10.0 - 15.0 %    Platelet Count 144 (L) 150 - 450 10e9/L    Diff Method Automated Method     % Neutrophils 84.6 %    % Lymphocytes 8.0 %    % Monocytes 6.6 %    % Eosinophils 0.0 %    % Basophils 0.0 %    % Immature Granulocytes 0.8 %    Nucleated RBCs 0 0 /100     Absolute Neutrophil 10.0 (H) 1.6 - 8.3 10e9/L    Absolute Lymphocytes 0.9 0.8 - 5.3 10e9/L    Absolute Monocytes 0.8 0.0 - 1.3 10e9/L    Absolute Eosinophils 0.0 0.0 - 0.7 10e9/L    Absolute Basophils 0.0 0.0 - 0.2 10e9/L    Abs Immature Granulocytes 0.1 0 - 0.4 10e9/L    Absolute Nucleated RBC 0.0    Renal panel   Result Value Ref Range    Sodium 138 133 - 144 mmol/L    Potassium 4.1 3.4 - 5.3 mmol/L    Chloride 106 94 - 109 mmol/L    Carbon Dioxide 26 20 - 32 mmol/L    Anion Gap 6 3 - 14 mmol/L    Glucose 180 (H) 70 - 99 mg/dL    Urea Nitrogen 22 7 - 30 mg/dL    Creatinine 0.61 (L) 0.66 - 1.25 mg/dL    GFR Estimate >90 >60 mL/min/1.7m2    GFR Estimate If Black >90 >60 mL/min/1.7m2    Calcium 7.7 (L) 8.5 - 10.1 mg/dL    Phosphorus 3.7 2.5 - 4.5 mg/dL    Albumin 2.6 (L) 3.4 - 5.0 g/dL

## 2018-11-29 NOTE — PROGRESS NOTES
[facility]    Hospitalist Progress Note      Assessment & Plan   Ravi Sandoval is a 73 year old male past medical history of histiocytosis X, pulmonary fibrosis, severe pulmonary hypertension and COPD/Emphysema, who is chronically oxygen dependent on 4-6 liters who was admitted on 11/23/2018 for worsening SOB.      Summary:  1.  Acute on Chronic hypoxic respiratory failure in the setting of pulmonary fibrosis and COPD.  Notes increasing shortness of breath for the past 1 month.  Was treated outpatient with amoxicillin at the end of October for a sinusitis, requiring increasing O2 demands up from his baseline of 4 liters.  Afebrile with no significant leukocytosis.  Lactic acid was elevated at 2.5 now normal last LA 1.6.  Procalcitonin <0.05.  - Pulmonary Consulted and following   - Continue Levaquin for CAP.    - He was on Prednisone 40mg daily. Patient was hypoxic and SOB, on high flow oxygen, xray shows Fibrosis and opacity on right lower side, bilateral crackles, occasional wheezing and decrease air entry.  - Initially on IV Solumerol 40 mg Q 6 hrs, DuoNeb every 4-6 hrs, Start on Pulmicort.    - CTA chest PE protocol done on 11/26, shows large right sided pleural effusion, non occlusive thrombus and pleural based densities  - Thoracentesis done : 1500  Ml of pleural fluid remove, exudative,     --->Cytology is still pending, with regards to densities and adenopathy --> Pulmonary following  - Scheduled to PRN DuoNebs as an OP pending prior auth.    - IS and flutter  - Wean O2 and monitor. Currently on high flow oxygen          Good air entry bilaterally, minimal crackles, no wheezing, Solumedrol to 40 mg Q 8 hrs.          RT and nursing staff to wean him off the high flow - continuing to need 40%; wean as able      2.  Severe pulmonary hypertension. Appears compensated.    -Continue PTA tadalafil, digoxin and every other day Lasix.      3.  Uncontrolled type 2 diabetes.  A1c in October was 10.8.   He is supposed to take glipizide 10 mg daily though notes he has not been taking it as directed.  - Does not check his blood sugars at home.  Oral agents on hold. Blood sugar still on higher side secondary to steroids    - Lantus to 18 units at bedtime, Novolog to 12 units with  meals and sliding scale insulin with meals for correction.     - Decrease solumedrol today as well.     4.  Hypertension.  Blood pressure elevated in the Emergency Department but now with optimal control.   - Continue PTA Norvasc 2.5 mg daily, lisinopril 20 mg daily and metoprolol 25 mg twice daily. P.r.n. hydralazine will be available as needed.       5.  Hypothyroidism.  Continue prior to admission Synthroid.       6.  Coronary artery disease with previous bare metal stent to the right coronary artery (RCA) in 2010.  Denies any symptoms of chest pain.    - Continue prior to admission aspirin, beta blocker, statin and ACE.     7. Acute Pulmonary Embolism  -  Was on IV heparin, now continues on Eliquis     8. Mild Hemoptysis: very light blood tinge sputum this morning, we need to monitor, as it is insignificant at this time so will  Continue with the Eliquis, if returns or worsens we will need to hold the anticoagulations.     9. Goals of care:   - Palliative care met with patient and family today; appreciate assistance and efforts. Patient aware of options and recommendation to be DNR/DNI given severity of his pulmonary disease, especially. They are considering options.         DVT Prophylaxis: Eliquis   Code Status: Full Code    Yovani Cain MD    Text Page (7am to 6pm, M-F)    Interval History   Pleasant yadira. Eating on my arrival. No new complaints.  Somewhat down about palliative meeting but states he is aware of the severity of his disease and reality that things may not improve much.     -Data reviewed today: I reviewed all new labs and imaging results over the last 24 hours. I personally reviewed no images or EKG's  today.    Physical Exam   Temp: 97.5  F (36.4  C) Temp src: Oral BP: 135/71 Pulse: 73 Heart Rate: 63 Resp: 18 SpO2: 91 % O2 Device: High Flow Nasal Cannula (HFNC) Oxygen Delivery: Other (Comments) (40 lpm)  Vitals:    11/24/18 0718 11/26/18 0647 11/28/18 0704   Weight: 67 kg (147 lb 11.3 oz) 67 kg (147 lb 11.3 oz) 66.5 kg (146 lb 9.7 oz)     Vital Signs with Ranges  Temp:  [97.5  F (36.4  C)-98.7  F (37.1  C)] 97.5  F (36.4  C)  Pulse:  [73] 73  Heart Rate:  [63-73] 63  Resp:  [16-18] 18  BP: (118-152)/(68-88) 135/71  FiO2 (%):  [40 %-50 %] 40 %  SpO2:  [91 %-97 %] 91 %  I/O last 3 completed shifts:  In: 596 [P.O.:590; I.V.:6]  Out: 825 [Urine:825]    Constitutional: Awake, alert, cooperative, no apparent distress  Respiratory: Upper airway breath sounds. Decreased air movement in general. No wheeze heard, few bibasilar crackles noted.  Cardiovascular: Regular rate and rhythm, normal S1 and S2, and no murmur noted  GI: Normal bowel sounds, soft, non-distended, non-tender  Skin/Integumen: No rashes, no cyanosis, no edema    Medications       amLODIPine (NORVASC) tablet 2.5 mg  2.5 mg Oral Daily     apixaban ANTICOAGULANT  10 mg Oral BID    Followed by     [START ON 12/4/2018] apixaban ANTICOAGULANT  5 mg Oral BID     aspirin (ASA) chewable tablet 81 mg  81 mg Oral At Bedtime     budesonide  0.5 mg Nebulization BID     colchicine (COLCYRS) tablet 0.6 mg  0.6 mg Oral QPM     digoxin  125 mcg Oral Daily     FLUoxetine (PROzac) capsule 40 mg  40 mg Oral Daily     furosemide  20 mg Oral Every Other Day     insulin aspart  12 Units Subcutaneous TID w/meals     insulin aspart  1-7 Units Subcutaneous TID AC     insulin aspart  1-5 Units Subcutaneous At Bedtime     insulin glargine  18 Units Subcutaneous At Bedtime     ipratropium - albuterol 0.5 mg/2.5 mg/3 mL  3 mL Nebulization 4x daily     levofloxacin  500 mg Intravenous Q24H     levothyroxine  125 mcg Oral Daily     lisinopril (PRINIVIL/ZESTRIL) tablet 20 mg  20 mg Oral  Daily     methylPREDNISolone  40 mg Intravenous Q8H     metoprolol succinate ER  25 mg Oral BID     pantoprazole  40 mg Oral QAM AC     rosuvastatin  10 mg Oral QPM     sodium chloride (PF)  3 mL Intracatheter Q8H     tadalafil  20 mg Oral Daily     tamsulosin  0.4 mg Oral Daily       Data     Recent Labs  Lab 11/29/18  0813 11/27/18  0820 11/26/18  1609 11/26/18  1130 11/26/18  0819  11/23/18  1015   WBC 11.8* 11.6* 12.6*  --  13.8*  < > 10.0   HGB 12.4* 12.0* 13.4  --  14.0  < > 13.6   MCV 85 86 85  --  84  < > 86   * 152 149*  --  158  < > 153   INR  --   --   --  0.98  --   --   --     139  --   --  140  < > 141   POTASSIUM 4.1 4.3  --   --  3.8  < > 4.0   CHLORIDE 106 108  --   --  107  < > 107   CO2 26 23  --   --  24  < > 26   BUN 22 25  --   --  14  < > 15   CR 0.61* 0.72  --   --  0.74  < > 0.75   ANIONGAP 6 8  --   --  9  < > 8   AV 7.7* 8.0*  --   --  8.7  < > 8.4*   * 231*  --   --  203*  < > 151*   ALBUMIN 2.6* 2.5*  --   --  2.9*  --  3.0*   PROTTOTAL  --   --   --  6.3* 6.6*  --  6.6*   BILITOTAL  --   --   --   --  0.5  --  0.5   ALKPHOS  --   --   --   --  46  --  51   ALT  --   --   --   --  19  --  25   AST  --   --   --   --  19  --  31   < > = values in this interval not displayed.    No results found for this or any previous visit (from the past 24 hour(s)).

## 2018-11-30 NOTE — PROGRESS NOTES
[facility]    Hospitalist Progress Note      Assessment & Plan   Ravi Sandoval is a 73 year old male past medical history of histiocytosis X, pulmonary fibrosis, severe pulmonary hypertension and COPD/Emphysema, who is chronically oxygen dependent on 4-6 liters who was admitted on 11/23/2018 for worsening SOB.      Summary:  1.  Acute on Chronic hypoxic respiratory failure in the setting of pulmonary fibrosis and COPD.  Notes increasing shortness of breath for the past 1 month.  Was treated outpatient with amoxicillin at the end of October for a sinusitis, requiring increasing O2 demands up from his baseline of 4 liters.  Afebrile with no significant leukocytosis.  Lactic acid was elevated at 2.5 now normal last LA 1.6.  Procalcitonin <0.05.  - Pulmonary Consulted and following   - Continue Levaquin for CAP.    - He was on Prednisone 40mg daily. Patient was hypoxic and SOB, on high flow oxygen, xray shows Fibrosis and opacity on right lower side, bilateral crackles, occasional wheezing and decrease air entry.  - Initially on IV Solumerol 40 mg Q 6 hrs, DuoNeb every 4-6 hrs, Start on Pulmicort.      - CTA chest PE protocol done on 11/26,  large R pleural effusion, non occlusive thrombus and pleural based densities  - Thoracentesis done : 1500  Ml of pleural fluid remove, exudative, 11/30 on day 4, in broth only pos for gram GPB resembling diptheroids    --->Cytology is still pending, with regards to densities and adenopathy --> Pulmonary following  - Scheduled to PRN DuoNebs as an OP pending prior auth.    - IS and flutter  - Wean O2 and monitor. Currently on high flow oxygen          Good air entry bilaterally, minimal crackles, no wheezing, Solumedrol 40 mg Q 8 hrs.          RT and nursing staff to wean him off the high flow -  wean as able  - Appreciate Pulm recs/updates      2.  Severe pulmonary hypertension   - Appears compensated.    -Continue PTA tadalafil, digoxin and every other day  Lasix.      3.  Uncontrolled type 2 diabetes   - A1c in October was 10.8.  He is supposed to take glipizide 10 mg daily though notes he has not been taking it as directed.  - Does not check his blood sugars at home.  Oral agents on hold. Blood sugar still on higher side secondary to steroids    - Lantus to 18 units at bedtime, Novolog to 12 units with  meals and sliding scale insulin with meals for correction.     - Titrate solumedrol as able      4.  Hypertension   - Elevated in ED, improved since  - Continue PTA Norvasc 2.5 mg daily, lisinopril 20 mg daily and metoprolol 25 mg twice daily.  - P.r.n. hydralazine will be available as needed.       5.  Hypothyroidism    - PTA Synthroid.       6.  History of coronary artery disease   - previous BMS to RCA in 2010.    - PTA aspirin, beta blocker, statin and ACE.      7. Acute Pulmonary Embolism  - Continues on eliquis (was on heparin gtt previously)      8. Mild Hemoptysis:   - very light blood tinge sputum previously, improving as of 11/30, continue to monitor  - Continue with the Eliquis, if returns or worsens we will need to hold the anticoagulations.      9. Goals of care:   - Palliative care met with patient and family 11/29 and today 11/30; appreciate assistance and efforts. Patient aware of options and recommendation to be DNR/DNI given severity of his pulmonary disease, especially. They are considering options.         DVT Prophylaxis: Eliquis   Code Status: Full Code. Appreciate ongoing discussion had between patient, wife Kelly, and Palliative Team.   Dispo: pending clinical course, oxygen requirements may be restricting potential placement    ADDENDUM:  Appreciate pulmonary recs: decreasing solumedrol, switch to prednisone 12/1 and continue taper. IV levaquin --> PO for 2 more days. Plans will tentatively discharge home on Monday; see Pulmonary MD note for details.  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     Yovani Cain MD    Text Page (7am to 6pm, M-F)    Interval History   No new complaints. Wife here, Kelly, updated both of them with ongoing results and plan. Continues to need HFNC. Coughing with blood tinge decreasing.     -Data reviewed today: I reviewed all new labs and imaging results over the last 24 hours. I personally reviewed no images or EKG's today.    Physical Exam   Temp: 98  F (36.7  C) Temp src: Oral BP: (!) 155/93 Pulse: 82 Heart Rate: 65 Resp: 18 SpO2: 95 % O2 Device: High Flow Nasal Cannula (HFNC) Oxygen Delivery: Other (Comments) (40 lpm)  Vitals:    11/26/18 0647 11/28/18 0704 11/30/18 0500   Weight: 67 kg (147 lb 11.3 oz) 66.5 kg (146 lb 9.7 oz) 67 kg (147 lb 11.3 oz)     Vital Signs with Ranges  Temp:  [97.4  F (36.3  C)-98  F (36.7  C)] 98  F (36.7  C)  Pulse:  [74-82] 82  Heart Rate:  [65] 65  Resp:  [18] 18  BP: (128-155)/(77-93) 155/93  FiO2 (%):  [50 %] 50 %  SpO2:  [91 %-96 %] 95 %  I/O last 3 completed shifts:  In: 856 [P.O.:850; I.V.:6]  Out: 950 [Urine:950]    Constitutional: Pleasant. Awake, alert, cooperative, no apparent distress  Respiratory: no wheeze heard, a few bibasilar crackles noted  Cardiovascular: Regular rate and rhythm, s1 s2 noted  GI: Normal bowel sounds, soft, non-distended, non-tender  Skin/Integumen: No rashes, no cyanosis, no edema    Medications       amLODIPine (NORVASC) tablet 2.5 mg  2.5 mg Oral Daily     apixaban ANTICOAGULANT  10 mg Oral BID    Followed by     [START ON 12/4/2018] apixaban ANTICOAGULANT  5 mg Oral BID     aspirin (ASA) chewable tablet 81 mg  81 mg Oral At Bedtime     budesonide  0.5 mg Nebulization BID     colchicine (COLCYRS) tablet 0.6 mg  0.6 mg Oral QPM     digoxin  125 mcg Oral Daily     FLUoxetine (PROzac) capsule 40 mg  40 mg Oral Daily     furosemide  20 mg Oral Every Other Day     insulin aspart  12 Units Subcutaneous TID w/meals     insulin aspart  1-7 Units Subcutaneous TID AC     insulin aspart  1-5 Units  Subcutaneous At Bedtime     insulin glargine  18 Units Subcutaneous At Bedtime     ipratropium - albuterol 0.5 mg/2.5 mg/3 mL  3 mL Nebulization 4x daily     levofloxacin  500 mg Intravenous Q24H     levothyroxine  125 mcg Oral Daily     lisinopril (PRINIVIL/ZESTRIL) tablet 20 mg  20 mg Oral Daily     methylPREDNISolone  40 mg Intravenous Q8H     metoprolol succinate ER  25 mg Oral BID     multivitamin w/minerals  1 tablet Oral Daily     pantoprazole  40 mg Oral QAM AC     rosuvastatin  10 mg Oral QPM     sodium chloride (PF)  3 mL Intracatheter Q8H     tadalafil  20 mg Oral Daily     tamsulosin  0.4 mg Oral Daily       Data     Recent Labs  Lab 11/29/18  0813 11/27/18  0820 11/26/18  1609 11/26/18  1130 11/26/18  0819   WBC 11.8* 11.6* 12.6*  --  13.8*   HGB 12.4* 12.0* 13.4  --  14.0   MCV 85 86 85  --  84   * 152 149*  --  158   INR  --   --   --  0.98  --     139  --   --  140   POTASSIUM 4.1 4.3  --   --  3.8   CHLORIDE 106 108  --   --  107   CO2 26 23  --   --  24   BUN 22 25  --   --  14   CR 0.61* 0.72  --   --  0.74   ANIONGAP 6 8  --   --  9   AV 7.7* 8.0*  --   --  8.7   * 231*  --   --  203*   ALBUMIN 2.6* 2.5*  --   --  2.9*   PROTTOTAL  --   --   --  6.3* 6.6*   BILITOTAL  --   --   --   --  0.5   ALKPHOS  --   --   --   --  46   ALT  --   --   --   --  19   AST  --   --   --   --  19       No results found for this or any previous visit (from the past 24 hour(s)).

## 2018-11-30 NOTE — PROGRESS NOTES
"CLINICAL NUTRITION SERVICES  -  ASSESSMENT NOTE      Recommendations Ordered by Registered Dietitian (RD):   Medical Food Supplement - will send ProStat between meals for add'l 15 gm protein/serving  Multivitamin/Mineral - ordered daily Thera-Vit-M per PI protocol     Malnutrition:   % Weight Loss:  None noted  % Intake:  No decreased intake noted  Subcutaneous Fat Loss:  None observed  Muscle Loss:  None observed  Fluid Retention:  None noted    Malnutrition Diagnosis: Patient does not meet two of the above criteria necessary for diagnosing malnutrition          REASON FOR ASSESSMENT  Ravi Sandoval is a 73 year old male seen by Registered Dietitian for LOS      NUTRITION HISTORY  - Information obtained from the pt.  He states he eats 3 times/day (\"not necessarily 3 meals\"), had been eating per usual PTA.      CURRENT NUTRITION ORDERS  Diet Order:     Mod Consistent Carbohydrate     Current Intake/Tolerance:  Pt has been eating 100% consistently, is happy with the meals. He orders milk with meals.    PHYSICAL FINDINGS  Observed  No nutrition-related physical findings observed  Obtained from Chart/Interdisciplinary Team  11/27:  Pressure Injury (PI) located on bilateral gluteal cleft/ coccyx: both wounds are DTPI and both are community acquired.      ANTHROPOMETRICS  Height: 5' 7\"  Weight: 147 lbs 11.33 oz (67 kg) - 11/30  Body mass index is 23.13 kg/(m^2).  Weight Status:  Normal BMI  IBW: 67.3 kg +/- 10%  % IBW: 100%  Weight History: Stable per pt.   Wt Readings from Last 10 Encounters:   11/30/18 67 kg (147 lb 11.3 oz)   06/05/17 64.4 kg (142 lb)   05/30/17 64.4 kg (142 lb)   04/18/17 63.5 kg (140 lb)   03/15/17 62.7 kg (138 lb 3.2 oz)   02/25/17 61.7 kg (136 lb 0.4 oz)   02/21/17 62.1 kg (136 lb 12.8 oz)   02/07/17 61.4 kg (135 lb 4.8 oz)   05/27/16 63.5 kg (140 lb)   09/04/14 68.1 kg (150 lb 3.2 oz)       LABS  A1C 10.7    MEDICATIONS  Medications reviewed      ASSESSED NUTRITION NEEDS PER APPROVED PRACTICE " GUIDELINES:  Dosing Weight 67 kg - 11/30  Estimated Energy Needs: 3424-7485 kcals (25-30 Kcal/Kg)  Justification: maintenance  Estimated Protein Needs: 100-120 grams protein (1.5-1.8 g pro/Kg)  Justification: wound healing      MALNUTRITION:  % Weight Loss:  None noted  % Intake:  No decreased intake noted  Subcutaneous Fat Loss:  None observed  Muscle Loss:  None observed  Fluid Retention:  None noted    Malnutrition Diagnosis: Patient does not meet two of the above criteria necessary for diagnosing malnutrition      NUTRITION DIAGNOSIS:  Increased nutrient needs related to healing as evidenced by Pressure Injury located on bilateral gluteal cleft/ coccyx:      NUTRITION INTERVENTIONS  Recommendations / Nutrition Prescription  Continue current diet      Implementation  Nutrition education: Provided education on increased protein needs, sources  Medical Food Supplement - will send ProStat between meals for add'l 15 gm protein/serving  Multivitamin/Mineral - ordered daily Thera-Vit-M per PI protocol      Nutrition Goals  Pt will consume at least 100 gm protein daily      MONITORING AND EVALUATION:  Progress towards goals will be monitored and evaluated per protocol and Practice Guidelines      Natasha Perez RD  Pager [phone] (M-F)            [phone] (W/[facility] Hol)

## 2018-11-30 NOTE — PROGRESS NOTES
"SPIRITUAL HEALTH SERVICES Progress Note  [facility]    Visit with pt, per palliative consult.  Pt was alone, doing crossword puzzles when I arrived.  He said that he's waiting for test results to make a plan.  \"They keep saying maybe today,\" he said.  He said that he has good support from his [employer], [place] [facility], and notes that clergy have visited him twice during this hospitalization.  Provided reflective listening and support.  SH team available if needs arise.                                                                                                                                                 Renetta Holland M.A.  Staff   Pager [phone]  Phone [phone]      "

## 2018-11-30 NOTE — PROGRESS NOTES
Pt. Was on HF NC 40 LPM and 50% and sat 97%.  At 14:00 put him on Oxymask 8 LPM and sat 96%. BBS diminished  Neb Tx. Given as scheduled. Will continue to follow.

## 2018-11-30 NOTE — PROGRESS NOTES
[facility]    Palliative Care Progress Note    Ravi Sandoval  MRN# [id number]  Date of Admission:  11/23/2018  Date of Service (when I saw the patient): 11/30/2018    Assessment & Plan   Ravi Sandoval is a 73 year old male with PMH significant for histiocytosis X, pulmonary fibrosis, severe pulmonary hypertension and COPD/Emphysema; chronically oxygen dependent on 4-6 liters in addition to steroids, who presents with worsening SOB consistent with acute on chronic respiratory failure. He is found to have a new PE and pleural based nodules concerning for malignancy. He is s/p therapeutic and diagnostic right thoracentesis on 11/26; fluid is exudative, cytology pending. We are following for goals of care and pt and family support. See initial consult completed by Milly Monson NP on 11/29.      Symptoms/Recommendations   1. Goals of care. Pt is feeling ok today and is pleased with the support he is receiving in the hospital. He and family are still awaiting cytology results from his pleural fluid. We hope this will be back today. Ravi is waiting to make serious decisions based on the results (positive for malignancy or not?). In either event, we make note that his condition continues to be very serious, whether there is malignancy or not. We talk about the challenges of high flow O2 support only being able to be accommodated in hospital settings. LTACH could be a plan and discharge dispo, if more time to live and recovery (to a limited extent given his grim prognosis) is desired. The other option of focusing more on comfort, changing out O2 support to allow pt to return home, was also discussed in detail. We talk about supporting him with opioids for air hunger, should comfort care be selected. We talk about the uncertainty of time if O2 needs are reduced. Ravi has been living with lung disease for 20 years. He was initially told he had only 5 years to live, and here he is today. He has always  "understood his disease to be slow growing, so he feels that that may still be the case. He is leaning toward a comfort plan of care overall. In discussion of code status today, we detailed the pros and cons for CPR and mechanical ventilation. I shared with him the serious lifestyle implications that may ensue. I strongly recommended DNR/DNI (as did my colleague Milly Monson NP), despite not having pathology results back quite yet. He is rather laissez faire about this discussion, and feels that he has excellent cardiologists on his side who most certainly \"have some tricks up their sleeves.\" He is not ready to make this decision today and wants to think about it some more. This clearly warrants more discussion, as he continues to incorporate the seriousness of his condition. I then learned from his wife in the crews that his desire to remain full code may be a reflection of a past experience when his sister  on max ventilator support and cares were withdrawn; he did not agree with how that was handled.      Support/Coping  -Wife Kelly present today. Supportive children   -Pt's wife reports concern that Ravi may be in denial. I am not so much worried that he is in denial, but he is likely just incorporating the information at his own speed. Apparently his sister  with maximal ventilator support and this was very traumatic for him; he could not understand why cares were \"withdrawn.\" This experience may be impacting his own personal decision making as he faces his mortality.      Decisional Support, Goals of Care, Counseling & Coordination  Decisional Capacity Intact?  -Yes  Health Care Directive on File?  -No  Code Status/Resuscitation Preferences?  -Full, counseled on code status, DNR/DNI recommended. Pt will continue to think about this     Case reviewed with Dr. Cain.     Thank you for involving us in the care of this patient and family. We will continue to follow. Please do not hesitate to contact me with " questions or concerns or the on-call provider for our team if evening or weekend.    Ena AREVALO, [facility]  Palliative Medicine   Pager [phone]    Attestation:  Total time on the floor involved in the patient's care: 45 minutes  Total time spent in counseling/care coordination: >50%    Interval History   No acute events overnight. O2 needs remain stable. Wife Kelly requesting to speak with me today.     Medications   Current Facility-Administered Medications Ordered in Epic   Medication Dose Route Frequency Last Rate Last Dose     acetaminophen (TYLENOL) Suppository 650 mg  650 mg Rectal Q4H PRN         acetaminophen (TYLENOL) tablet 650 mg  650 mg Oral Q4H PRN   650 mg at 11/26/18 2136     amLODIPine (NORVASC) tablet 2.5 mg  2.5 mg Oral Daily   2.5 mg at 11/30/18 0916     apixaban ANTICOAGULANT (ELIQUIS) tablet 10 mg  10 mg Oral BID   10 mg at 11/30/18 0914    Followed by     [START ON 12/4/2018] apixaban ANTICOAGULANT (ELIQUIS) tablet 5 mg  5 mg Oral BID         aspirin (ASA) chewable tablet 81 mg  81 mg Oral At Bedtime   81 mg at 11/29/18 2117     budesonide (PULMICORT) neb solution 0.5 mg  0.5 mg Nebulization BID   0.5 mg at 11/30/18 0800     colchicine (COLCYRS) tablet 0.6 mg  0.6 mg Oral QPM   0.6 mg at 11/29/18 2117     glucose gel 15-30 g  15-30 g Oral Q15 Min PRN        Or     dextrose 50 % injection 25-50 mL  25-50 mL Intravenous Q15 Min PRN        Or     glucagon injection 1 mg  1 mg Subcutaneous Q15 Min PRN         digoxin (LANOXIN) tablet 125 mcg  125 mcg Oral Daily   125 mcg at 11/30/18 0913     FLUoxetine (PROzac) capsule 40 mg  40 mg Oral Daily   40 mg at 11/30/18 0916     furosemide (LASIX) tablet 20 mg  20 mg Oral Every Other Day   20 mg at 11/30/18 0913     insulin aspart (NovoLOG) inj (RAPID ACTING)  12 Units Subcutaneous TID w/meals   12 Units at 11/30/18 0911     insulin aspart (NovoLOG) inj (RAPID ACTING)  1-7 Units Subcutaneous TID AC   4 Units at 11/30/18 0911     insulin aspart  (NovoLOG) inj (RAPID ACTING)  1-5 Units Subcutaneous At Bedtime   2 Units at 11/29/18 2118     insulin glargine (LANTUS PEN) injection 18 Units  18 Units Subcutaneous At Bedtime   18 Units at 11/29/18 2118     ipratropium - albuterol 0.5 mg/2.5 mg/3 mL (DUONEB) neb solution 3 mL  3 mL Nebulization 4x daily   3 mL at 11/30/18 1213     levofloxacin (LEVAQUIN) infusion 500 mg  500 mg Intravenous Q24H 100 mL/hr at 11/29/18 1552 500 mg at 11/29/18 1552     levothyroxine (SYNTHROID/LEVOTHROID) tablet 125 mcg  125 mcg Oral Daily   125 mcg at 11/30/18 0913     lisinopril (PRINIVIL/ZESTRIL) tablet 20 mg  20 mg Oral Daily   20 mg at 11/30/18 0916     melatonin tablet 1 mg  1 mg Oral At Bedtime PRN         methylPREDNISolone sodium succinate (solu-MEDROL) injection 40 mg  40 mg Intravenous Q8H   40 mg at 11/30/18 0636     metoprolol succinate (TOPROL-XL) 24 hr tablet 25 mg  25 mg Oral BID   25 mg at 11/30/18 0914     multivitamin w/minerals (THERA-VIT-M) tablet 1 tablet  1 tablet Oral Daily         naloxone (NARCAN) injection 0.1-0.4 mg  0.1-0.4 mg Intravenous Q2 Min PRN         ondansetron (ZOFRAN-ODT) ODT tab 4 mg  4 mg Oral Q6H PRN        Or     ondansetron (ZOFRAN) injection 4 mg  4 mg Intravenous Q6H PRN         pantoprazole (PROTONIX) EC tablet 40 mg  40 mg Oral QAM AC   40 mg at 11/30/18 0637     potassium chloride (KLOR-CON) Packet 20-40 mEq  20-40 mEq Oral or Feeding Tube Q2H PRN         potassium chloride 10 mEq in 100 mL intermittent infusion with 10 mg lidocaine  10 mEq Intravenous Q1H PRN         potassium chloride 10 mEq in 100 mL sterile water intermittent infusion (premix)  10 mEq Intravenous Q1H PRN         potassium chloride 20 mEq in 50 mL intermittent infusion  20 mEq Intravenous Q1H PRN         potassium chloride SA (K-DUR/KLOR-CON M) CR tablet 20-40 mEq  20-40 mEq Oral Q2H PRN   20 mEq at 11/25/18 1104     prochlorperazine (COMPAZINE) injection 5 mg  5 mg Intravenous Q6H PRN        Or     prochlorperazine  (COMPAZINE) tablet 5 mg  5 mg Oral Q6H PRN        Or     prochlorperazine (COMPAZINE) Suppository 12.5 mg  12.5 mg Rectal Q12H PRN         rosuvastatin (CRESTOR) tablet 10 mg  10 mg Oral QPM   10 mg at 11/29/18 2117     sodium chloride (PF) 0.9% PF flush 3 mL  3 mL Intracatheter Q1H PRN   3 mL at 11/29/18 0917     sodium chloride (PF) 0.9% PF flush 3 mL  3 mL Intracatheter Q8H   3 mL at 11/29/18 1818     tadalafil (CIALIS/ADCIRCA) tablet  20 mg Oral Daily   20 mg at 11/30/18 0916     tamsulosin (FLOMAX) capsule 0.4 mg  0.4 mg Oral Daily   0.4 mg at 11/30/18 0913     No current Epic-ordered outpatient prescriptions on file.       Physical Exam   Temp: 98  F (36.7  C) Temp src: Oral BP: (!) 155/93 Pulse: 82 Heart Rate: 65 Resp: 18 SpO2: 97 % O2 Device: High Flow Nasal Cannula (HFNC) Oxygen Delivery: Other (Comments) (40 lpm)  Vitals:    11/26/18 0647 11/28/18 0704 11/30/18 0500   Weight: 67 kg (147 lb 11.3 oz) 66.5 kg (146 lb 9.7 oz) 67 kg (147 lb 11.3 oz)     CONSTITUTIONAL: Chronically ill man seen sitting up in bed in [facility], A&Ox3. Calm and cooperative. Wife present   HEENT: NCAT  RESPIRATORY: NL respiratory effort on high flow O2 fio2 50%  NEUROLOGIC: Appropriately responsive during interview  PSYCH: Affect congruent     Data   Results for orders placed or performed during the hospital encounter of 11/23/18 (from the past 24 hour(s))   Glucose by meter   Result Value Ref Range    Glucose 257 (H) 70 - 99 mg/dL   Glucose by meter   Result Value Ref Range    Glucose 253 (H) 70 - 99 mg/dL   Glucose by meter   Result Value Ref Range    Glucose 211 (H) 70 - 99 mg/dL   Glucose by meter   Result Value Ref Range    Glucose 242 (H) 70 - 99 mg/dL   Glucose by meter   Result Value Ref Range    Glucose 176 (H) 70 - 99 mg/dL

## 2018-11-30 NOTE — PLAN OF CARE
Problem: Patient Care Overview  Goal: Plan of Care/Patient Progress Review  Outcome: No Change  A/O, VSS on highflow 02. Denies pain. LS diminished, fine crackles in bases. Severe CHOUDHURY. Palliative consult today. Turns self/Posistions Independently in bed. Up with 1 to BSC.  at HS. IV steroids continued. Dressing on Rt thoracentesis site CDI...    Update 0519..  No acute events overnight, Pt rested throughout night. Voiding in urinal at bedside. Denies pain. LS unchanged and highflow O2 continued. Pt still turning himself in bed side to side. Wound on coccyx intact and blanchable. Discharge pending progress and goals of care. Pt has been very pleasant and cooperative

## 2018-11-30 NOTE — PROGRESS NOTES
Pulmonology Consultation      Ravi Sandoval MRN# [id number]   YOB: 1945 Age: 73 year old   Date of Admission: 11/23/2018     Reason for follow up: I was asked by hospitalist to evaluate this patient for pleural effusion and pulmonary fibrosis.           Assessment and Plan:   Pulmonary fibrosis  Pleural effusion and pleural based nodules   Histiocytosis X  COPD  Lung cancer  Multiple medical problems      I spoke to Yonatan, the Pathologist reading his pleural study, which is still pending due to special stains which will not be back until Monday.   Preliminary assessment by pathologist is that the atypical cells with high mitotic rate are most likely neuroendocrine malignancy (small cell lung cancer is probable) but no formal report will be available before the special stains are completed next Monday.     I discussed this with patient. The likely final diagnosis will be neoplasm. I explained that it is likely a neuroendocrine cancer like small cell lung cancer, is stage 4, and that he is more likely to sucomb to a pneumonia than the cancer if chemo is attempted. He is at high risk of respiratory failure with pneumonia due to underlying severe lung disease.   He could discharge to [facility] if outpatient follow up with Oncology or Palliative care is arranged, if he felt well enough. He says he has LOX up to 10 L available on his home set up. His wife says she's willing for him to come home with palliative care but would like a few days to see him get stronger first. He has been up to chair but not to walk since admission. I suggest we attempt walking in crews with o2 over next 2 days and he may be able to go home.   Po abx should finish, and po steroids. Would taper the steroids down to 20 mg per day and then consult with Oncology about further taper. It will palliate his COPD.   Suggest we wait for discharge until Monday, as the final report will probably be done then and he is pretty weak right now, with  "discharge on oxygen late Monday or early Tuesday likely once diagnosis is confirmed by final cytology report.        We will see prn. Please call if questions.     KRGromerMD             Chief Complaint:   Await pleural fluid cytology; lung and pleural masses    History is obtained from the patient and electronic health record         History of Present Illness:   This patient is a 73 year old male who presents with a pending pleural fluid cytology;     His hospitalist's note nicely summarizes the recent course of care:     \"Ravi Sandoval is a 73 year old male past medical history of histiocytosis X, pulmonary fibrosis, severe pulmonary hypertension and COPD/Emphysema, who is chronically oxygen dependent on 4-6 liters who was admitted on 11/23/2018 for worsening SOB.      Summary:  1.  Acute on Chronic hypoxic respiratory failure in the setting of pulmonary fibrosis and COPD.  Notes increasing shortness of breath for the past 1 month.  Was treated outpatient with amoxicillin at the end of October for a sinusitis, requiring increasing O2 demands up from his baseline of 4 liters.  Afebrile with no significant leukocytosis.  Lactic acid was elevated at 2.5 now normal last LA 1.6.  Procalcitonin <0.05.  - Pulmonary Consulted and following   - Continue Levaquin for CAP.    - He was on Prednisone 40mg daily. Patient was hypoxic and SOB, on high flow oxygen, xray shows Fibrosis and opacity on right lower side, bilateral crackles, occasional wheezing and decrease air entry.  - Initially on IV Solumerol 40 mg Q 6 hrs, DuoNeb every 4-6 hrs, Start on Pulmicort.    - CTA chest PE protocol done on 11/26, shows large right sided pleural effusion, non occlusive thrombus and pleural based densities  - Thoracentesis done : 1500  Ml of pleural fluid remove, exudative,     --->Cytology is still pending, with regards to densities and adenopathy --> Pulmonary following  - Scheduled to [facility] as an OP pending prior auth.    - IS and " flutter  - Wean O2 and monitor. Currently on high flow oxygen          Good air entry bilaterally, minimal crackles, no wheezing, Solumedrol to 40 mg Q 8 hrs.          RT and nursing staff to wean him off the high flow - continuing to need 40%; wean as able      2.  Severe pulmonary hypertension. Appears compensated.    -Continue PTA tadalafil, digoxin and every other day Lasix.      3.  Uncontrolled type 2 diabetes.  A1c in October was 10.8.  He is supposed to take glipizide 10 mg daily though notes he has not been taking it as directed.  - Does not check his blood sugars at home.  Oral agents on hold. Blood sugar still on higher side secondary to steroids    - Lantus to 18 units at bedtime, Novolog to 12 units with  meals and sliding scale insulin with meals for correction.     - Decrease solumedrol today as well.      4.  Hypertension.  Blood pressure elevated in the Emergency Department but now with optimal control.   - Continue PTA Norvasc 2.5 mg daily, lisinopril 20 mg daily and metoprolol 25 mg twice daily. P.r.n. hydralazine will be available as needed.       5.  Hypothyroidism.  Continue prior to admission Synthroid.       6.  Coronary artery disease with previous bare metal stent to the right coronary artery (RCA) in 2010.  Denies any symptoms of chest pain.    - Continue prior to admission aspirin, beta blocker, statin and ACE.      7. Acute Pulmonary Embolism  -  Was on IV heparin, now continues on Eliquis      8. Mild Hemoptysis: very light blood tinge sputum this morning, we need to monitor, as it is insignificant at this time so will  Continue with the Eliquis, if returns or worsens we will need to hold the anticoagulations.      9. Goals of care:   - Palliative care met with patient and family today; appreciate assistance and efforts. Patient aware of options and recommendation to be DNR/DNI given severity of his pulmonary disease, especially. They are considering options.           DVT Prophylaxis:  "Eliquis   Code Status: Full Code\"                   Past Medical History:     Past Medical History:   Diagnosis Date     ACP (advance care planning)      Anemia      ASHD (arteriosclerotic heart disease)      BPH (benign prostatic hyperplasia)      Cardiac arrest (H)      Cataracts, bilateral      Chronic diarrhea      Chronic respiratory failure with hypoxia (H)      COPD (chronic obstructive pulmonary disease) (H)     HOME O2     Depression      Disease of lung      Dysplasia of prostate      Gastroesophageal reflux disease      General weakness      Heart attack (H)      Herpes zoster      Histiocytosis X (H)      Hyperlipidemia      Hypertension      Hypothyroidism      Oxygen dependent      Postinflammatory pulmonary fibrosis (H)      Prostatic hypertrophy, benign      Pulmonary HTN (H)      Pulmonary hypertensive venous disease (H)      Thyroid disease      Tobacco use      Type 2 diabetes mellitus without complications (H)              Past Surgical History:     Past Surgical History:   Procedure Laterality Date     BIOPSY, LUNG NODULE       CARDIAC SURGERY      HX OF STENT     COLONOSCOPY N/A 5/27/2016    Procedure: COMBINED COLONOSCOPY, SINGLE OR MULTIPLE BIOPSY/POLYPECTOMY BY BIOPSY;  Surgeon: Sera Coffman MD;  Location: [facility]     PHACOEMULSIFICATION CLEAR CORNEA WITH STANDARD INTRAOCULAR LENS IMPLANT Right 5/31/2017    Procedure: PHACOEMULSIFICATION CLEAR CORNEA WITH STANDARD INTRAOCULAR LENS IMPLANT;  RIGHT EYE PHACOEMULSIFICATION CLEAR CORNEA WITH STANDARD INTRAOCULAR LENS IMPLANT ;  Surgeon: Trever Delgado MD;  Location: [facility]     PHACOEMULSIFICATION CLEAR CORNEA WITH STANDARD INTRAOCULAR LENS IMPLANT Left 6/7/2017    Procedure: PHACOEMULSIFICATION CLEAR CORNEA WITH STANDARD INTRAOCULAR LENS IMPLANT;  LEFT EYE PHACOEMULSIFICATION CLEAR CORNEA WITH STANDARD INTRAOCULAR LENS IMPLANT ;  Surgeon: Trever Delgado MD;  Location: [facility]               Social History:     Social History   Substance " Use Topics     Smoking status: Current Every Day Smoker     Packs/day: 0.50     Years: 55.00     Types: Cigarettes     Smokeless tobacco: Never Used     Alcohol use No             Family History:     Family History   Problem Relation Age of Onset     Cardiovascular Father      Pulmonary HTN     Cancer Mother      Skin CA     Neurologic Disorder Sister      Creutzfeldt Eduardo Disease             Immunizations:     There is no immunization history on file for this patient.          Allergies:     Allergies   Allergen Reactions     Tetracycline              Medications:     Current Facility-Administered Medications Ordered in Epic   Medication Dose Route Frequency Last Rate Last Dose     acetaminophen (TYLENOL) Suppository 650 mg  650 mg Rectal Q4H PRN         acetaminophen (TYLENOL) tablet 650 mg  650 mg Oral Q4H PRN   650 mg at 11/26/18 2136     amLODIPine (NORVASC) tablet 2.5 mg  2.5 mg Oral Daily   2.5 mg at 11/30/18 0916     apixaban ANTICOAGULANT (ELIQUIS) tablet 10 mg  10 mg Oral BID   10 mg at 11/30/18 0914    Followed by     [START ON 12/4/2018] apixaban ANTICOAGULANT (ELIQUIS) tablet 5 mg  5 mg Oral BID         aspirin (ASA) chewable tablet 81 mg  81 mg Oral At Bedtime   81 mg at 11/29/18 2117     budesonide (PULMICORT) neb solution 0.5 mg  0.5 mg Nebulization BID   0.5 mg at 11/30/18 0800     colchicine (COLCYRS) tablet 0.6 mg  0.6 mg Oral QPM   0.6 mg at 11/29/18 2117     glucose gel 15-30 g  15-30 g Oral Q15 Min PRN        Or     dextrose 50 % injection 25-50 mL  25-50 mL Intravenous Q15 Min PRN        Or     glucagon injection 1 mg  1 mg Subcutaneous Q15 Min PRN         digoxin (LANOXIN) tablet 125 mcg  125 mcg Oral Daily   125 mcg at 11/30/18 0913     FLUoxetine (PROzac) capsule 40 mg  40 mg Oral Daily   40 mg at 11/30/18 0916     furosemide (LASIX) tablet 20 mg  20 mg Oral Every Other Day   20 mg at 11/30/18 0913     insulin aspart (NovoLOG) inj (RAPID ACTING)  12 Units Subcutaneous TID w/meals   12  Units at 11/30/18 1313     insulin aspart (NovoLOG) inj (RAPID ACTING)  1-7 Units Subcutaneous TID AC   1 Units at 11/30/18 1313     insulin aspart (NovoLOG) inj (RAPID ACTING)  1-5 Units Subcutaneous At Bedtime   2 Units at 11/29/18 2118     insulin glargine (LANTUS PEN) injection 18 Units  18 Units Subcutaneous At Bedtime   18 Units at 11/29/18 2118     ipratropium - albuterol 0.5 mg/2.5 mg/3 mL (DUONEB) neb solution 3 mL  3 mL Nebulization 4x daily   3 mL at 11/30/18 1213     levofloxacin (LEVAQUIN) infusion 500 mg  500 mg Intravenous Q24H 100 mL/hr at 11/29/18 1552 500 mg at 11/29/18 1552     levothyroxine (SYNTHROID/LEVOTHROID) tablet 125 mcg  125 mcg Oral Daily   125 mcg at 11/30/18 0913     lisinopril (PRINIVIL/ZESTRIL) tablet 20 mg  20 mg Oral Daily   20 mg at 11/30/18 0916     melatonin tablet 1 mg  1 mg Oral At Bedtime PRN         methylPREDNISolone sodium succinate (solu-MEDROL) injection 40 mg  40 mg Intravenous Q8H   40 mg at 11/30/18 0636     metoprolol succinate (TOPROL-XL) 24 hr tablet 25 mg  25 mg Oral BID   25 mg at 11/30/18 0914     multivitamin w/minerals (THERA-VIT-M) tablet 1 tablet  1 tablet Oral Daily   1 tablet at 11/30/18 1313     naloxone (NARCAN) injection 0.1-0.4 mg  0.1-0.4 mg Intravenous Q2 Min PRN         ondansetron (ZOFRAN-ODT) ODT tab 4 mg  4 mg Oral Q6H PRN        Or     ondansetron (ZOFRAN) injection 4 mg  4 mg Intravenous Q6H PRN         pantoprazole (PROTONIX) EC tablet 40 mg  40 mg Oral QAM AC   40 mg at 11/30/18 0637     potassium chloride (KLOR-CON) Packet 20-40 mEq  20-40 mEq Oral or Feeding Tube Q2H PRN         potassium chloride 10 mEq in 100 mL intermittent infusion with 10 mg lidocaine  10 mEq Intravenous Q1H PRN         potassium chloride 10 mEq in 100 mL sterile water intermittent infusion (premix)  10 mEq Intravenous Q1H PRN         potassium chloride 20 mEq in 50 mL intermittent infusion  20 mEq Intravenous Q1H PRN         potassium chloride SA (K-DUR/KLOR-CON M) CR  tablet 20-40 mEq  20-40 mEq Oral Q2H PRN   20 mEq at 11/25/18 1104     prochlorperazine (COMPAZINE) injection 5 mg  5 mg Intravenous Q6H PRN        Or     prochlorperazine (COMPAZINE) tablet 5 mg  5 mg Oral Q6H PRN        Or     prochlorperazine (COMPAZINE) Suppository 12.5 mg  12.5 mg Rectal Q12H PRN         rosuvastatin (CRESTOR) tablet 10 mg  10 mg Oral QPM   10 mg at 11/29/18 2117     sodium chloride (PF) 0.9% PF flush 3 mL  3 mL Intracatheter Q1H PRN   3 mL at 11/29/18 0917     sodium chloride (PF) 0.9% PF flush 3 mL  3 mL Intracatheter Q8H   3 mL at 11/29/18 1818     tadalafil (CIALIS/ADCIRCA) tablet  20 mg Oral Daily   20 mg at 11/30/18 0916     tamsulosin (FLOMAX) capsule 0.4 mg  0.4 mg Oral Daily   0.4 mg at 11/30/18 0913     No current [facility]-ordered outpatient prescriptions on file.             Review of Systems:   The 5 point Review of Systems is negative other than noted in the HPI            Physical Exam:   Vitals were reviewed    Pleasant pale man seated upright in chair on high flow o2 eating lunch. Wife arrived during interview.     >50% counselling regarding new diagnosis of cancer.            Data:     Lab Results   Component Value Date    WBC 11.8 (H) 11/29/2018    WBC 11.6 (H) 11/27/2018    WBC 12.6 (H) 11/26/2018    HGB 12.4 (L) 11/29/2018    HGB 12.0 (L) 11/27/2018    HGB 13.4 11/26/2018    HCT 36.9 (L) 11/29/2018    HCT 37.3 (L) 11/27/2018    HCT 40.0 11/26/2018     (L) 11/29/2018     11/27/2018     (L) 11/26/2018     11/29/2018     11/27/2018     11/26/2018    POTASSIUM 4.1 11/29/2018    POTASSIUM 4.3 11/27/2018    POTASSIUM 3.8 11/26/2018    CHLORIDE 106 11/29/2018    CHLORIDE 108 11/27/2018    CHLORIDE 107 11/26/2018    CO2 26 11/29/2018    CO2 23 11/27/2018    CO2 24 11/26/2018    BUN 22 11/29/2018    BUN 25 11/27/2018    BUN 14 11/26/2018    CR 0.61 (L) 11/29/2018    CR 0.72 11/27/2018    CR 0.74 11/26/2018     (H) 11/29/2018     (H)  11/27/2018     (H) 11/26/2018    SED 46 (H) 10/29/2010    NTBNPI 769 03/12/2017    NTBNPI 750 02/22/2017    NTBNPI 835 10/28/2010    NTBNP 489 (H) 04/18/2017    NTBNP 553 (H) 02/07/2017    TROPI  03/12/2017     <0.015  The 99th percentile for upper reference range is 0.045 ug/L.  Troponin values in   the range of 0.045 - 0.120 ug/L may be associated with risks of adverse   clinical events.      TROPI 0.024 02/23/2017    TROPI 0.038 02/23/2017    AST 19 11/26/2018    AST 31 11/23/2018     (H) 05/28/2018    ALT 19 11/26/2018    ALT 25 11/23/2018    ALT 64 05/28/2018    ALKPHOS 46 11/26/2018    ALKPHOS 51 11/23/2018    ALKPHOS 137 05/28/2018    BILITOTAL 0.5 11/26/2018    BILITOTAL 0.5 11/23/2018    BILITOTAL 0.7 05/28/2018    INR 0.98 11/26/2018    INR 0.91 02/22/2017    INR 0.98 02/12/2014       All imaging studies reviewed by me.

## 2018-11-30 NOTE — PROVIDER NOTIFICATION
MD Notification    Notified Person: MD    Notified Person Name:Ant    Notification Date/Time:11/30 at 1057    Notification Interaction:text  Purpose of Notification:significant lab gram positive bacilli resembling diptheroid in broth only from pleural fluid 11/26    Orders Received:pending    Comments:

## 2018-11-30 NOTE — PLAN OF CARE
Problem: Patient Care Overview  Goal: Plan of Care/Patient Progress Review  Outcome: Improving  Pt alert and oriented x4. O2 per high flow, now weaned to oxymask at 8l. Pt ambulated in crews,desatted to 86%. Occasional blood tinged sputum. Bibasilar rales. Denies pain.Up with 1/belt and O2. Palliative following,waiting for final culture reports of lung fluid. Discharge when results known and pt tolerating activity.

## 2018-12-01 NOTE — PROGRESS NOTES
Pt remains on 6 lpm oximyzer, SpO2 94%, BBS diminished, neb tx were given as ordered, HFNC remains off all night.     Giles Melgoza, RT.

## 2018-12-01 NOTE — PROGRESS NOTES
[facility]    Hospitalist Progress Note      Assessment & Plan   Ravi Sandoval is a 73 year old male past medical history of histiocytosis X, pulmonary fibrosis, severe pulmonary hypertension and COPD/Emphysema, who is chronically oxygen dependent on 4-6 liters who was admitted on 11/23/2018 for worsening SOB. He had been requiring HFNC, but on 12/1 is now down to 6-8L oxymizer and has been able to ambulate the unit.       Summary:  1.  Acute on Chronic hypoxic respiratory failure in the setting of pulmonary fibrosis and COPD.  Non-occlusive thrombus on CT PE  Pleural effusion  - Notes increasing shortness of breath for the past 1 month.  Was treated outpatient with amoxicillin at the end of October for a sinusitis, requiring increasing O2 demands up from his baseline of 4 liters.  Afebrile with no significant leukocytosis.  Lactic acid was elevated at 2.5 now normal last LA 1.6.  Procalcitonin <0.05.  - Pulmonary Consulted and following   - He was on Prednisone 40mg daily. Patient was hypoxic and SOB, on high flow oxygen, xray shows Fibrosis and opacity on right lower side, bilateral crackles, occasional wheezing and decrease air entry.  - Initially on IV Solumerol 40 mg Q 6 hrs, DuoNeb every 4-6 hrs, Start on Pulmicort.       - CTA chest PE protocol done on 11/26,  large R pleural effusion, non occlusive thrombus and pleural based densities  - Thoracentesis done : 1500  Ml of pleural fluid remove, exudative, 11/30 on day 4, in broth only pos for gram GPB resembling diptheroids    --->Cytology is still pending, with regards to densities and adenopathy --> Pulmonary following   - IS and flutter  - Wean O2 and monitor. HFNC weaned to 6-10L oxymizer          RT and nursing staff continuing to wean with ambulation also  - Appreciate Pulm recs/updates:             - tapering steroids- PO prednisone replaced solumedrol 12/1            - Levaquin now PO ends after today ( previously IV since 11/23)                 2.  Severe pulmonary hypertension   - Appears compensated.    -Continue PTA tadalafil, digoxin and every other day Lasix.      3.  Uncontrolled type 2 diabetes   - A1c in October was 10.8.  He is supposed to take glipizide 10 mg daily though notes he has not been taking it as directed.  - Does not check his blood sugars at home.  Oral agents on hold. Blood sugar still on higher side secondary to steroids    - Lantus to 18 units at bedtime, Novolog to 12 units with  meals and sliding scale insulin with meals for correction.     - Steroid adjustment as above, should improve sugars      4.  Hypertension   - Elevated in ED, improved since  - Continue PTA Norvasc 2.5 mg daily, lisinopril 20 mg daily and metoprolol 25 mg twice daily.  - P.r.n. hydralazine will be available as needed.       5.  Hypothyroidism    - PTA Synthroid.       6.  History of coronary artery disease   - previous BMS to RCA in 2010.    - PTA aspirin, beta blocker, statin and ACE.      7. Acute Pulmonary Embolism  - Continues on eliquis (was on heparin gtt previously)      8. Mild Hemoptysis:   - very light blood tinge sputum previously, improving as of 11/30 - 12/1, continue to monitor  - Continue with the Eliquis, if returns or worsens we will need to hold the anticoagulations.      9. Goals of care:   - Palliative care met with patient and family over last few days; appreciate assistance and efforts. Patient aware of options and recommendation to be DNR/DNI given severity of his pulmonary disease, especially. They are considering options.         DVT Prophylaxis: Eliquis   Code Status: Full Code. Appreciate ongoing discussion had between patient, wife Kelly, and Palliative Team.   Dispo: pending clinical course, oxygen requirements may be restricting potential placement    Yovani Cain MD    Text Page (7am to 6pm, M-F)    Interval History   Improvement reported, able to walk the floor with oxymizer 6L with RN and OT. Clearly and  understandably distraught (possiby delayed processing/acceptance or denial) of unfortunate pathology results that are preliminarily showing a likely cancer in the pleural fluid. Awaiting final results on Monday. Otherwise no new events or complaints. Continues to have some expectoration of clots, but is decreasing.     -Data reviewed today: I reviewed all new labs and imaging results over the last 24 hours. I personally reviewed no images or EKG's today.    Physical Exam   Temp: 97.6  F (36.4  C) Temp src: Oral BP: (!) 151/91   Heart Rate: 65 Resp: 20 SpO2: 94 % O2 Device: Oxymizer cannula Oxygen Delivery: 6 LPM  Vitals:    11/28/18 0704 11/30/18 0500 12/01/18 0500   Weight: 66.5 kg (146 lb 9.7 oz) 67 kg (147 lb 11.3 oz) 69 kg (152 lb 1.9 oz)     Vital Signs with Ranges  Temp:  [97.5  F (36.4  C)-97.7  F (36.5  C)] 97.6  F (36.4  C)  Heart Rate:  [65-99] 65  Resp:  [20] 20  BP: (115-151)/(73-91) 151/91  FiO2 (%):  [50 %] 50 %  SpO2:  [84 %-97 %] 94 %  I/O last 3 completed shifts:  In: 1060 [P.O.:1060]  Out: 1350 [Urine:1350]    Constitutional:   Very pleasant. Awake, alert, cooperative, no apparent distress  Respiratory: no wheeze heard, a few bibasilar crackles noted, more so on L  Cardiovascular: Regular rate and rhythm, s1 s2 noted  GI: Normal bowel sounds, soft, non-distended, non-tender  Skin/Integumen: No rashes, no cyanosis, no edema  Neuro: AAOx3, no focal deficits    Medications       amLODIPine (NORVASC) tablet 2.5 mg  2.5 mg Oral Daily     apixaban ANTICOAGULANT  10 mg Oral BID    Followed by     [START ON 12/4/2018] apixaban ANTICOAGULANT  5 mg Oral BID     aspirin (ASA) chewable tablet 81 mg  81 mg Oral At Bedtime     budesonide  0.5 mg Nebulization BID     colchicine (COLCYRS) tablet 0.6 mg  0.6 mg Oral QPM     digoxin  125 mcg Oral Daily     FLUoxetine (PROzac) capsule 40 mg  40 mg Oral Daily     furosemide  20 mg Oral Every Other Day     insulin aspart  12 Units Subcutaneous TID w/meals     insulin  aspart  1-7 Units Subcutaneous TID AC     insulin aspart  1-5 Units Subcutaneous At Bedtime     insulin glargine  18 Units Subcutaneous At Bedtime     ipratropium - albuterol 0.5 mg/2.5 mg/3 mL  3 mL Nebulization 4x daily     levofloxacin  500 mg Oral Daily     levothyroxine  125 mcg Oral Daily     lisinopril (PRINIVIL/ZESTRIL) tablet 20 mg  20 mg Oral Daily     metoprolol succinate ER  25 mg Oral BID     multivitamin w/minerals  1 tablet Oral Daily     pantoprazole  40 mg Oral QAM AC     [START ON 12/3/2018] predniSONE  40 mg Oral Daily     predniSONE  60 mg Oral Daily     rosuvastatin  10 mg Oral QPM     sodium chloride (PF)  3 mL Intracatheter Q8H     tadalafil  20 mg Oral Daily     tamsulosin  0.4 mg Oral Daily       Data     Recent Labs  Lab 11/29/18  0813 11/27/18  0820 11/26/18  1609 11/26/18  1130 11/26/18  0819   WBC 11.8* 11.6* 12.6*  --  13.8*   HGB 12.4* 12.0* 13.4  --  14.0   MCV 85 86 85  --  84   * 152 149*  --  158   INR  --   --   --  0.98  --     139  --   --  140   POTASSIUM 4.1 4.3  --   --  3.8   CHLORIDE 106 108  --   --  107   CO2 26 23  --   --  24   BUN 22 25  --   --  14   CR 0.61* 0.72  --   --  0.74   ANIONGAP 6 8  --   --  9   AV 7.7* 8.0*  --   --  8.7   * 231*  --   --  203*   ALBUMIN 2.6* 2.5*  --   --  2.9*   PROTTOTAL  --   --   --  6.3* 6.6*   BILITOTAL  --   --   --   --  0.5   ALKPHOS  --   --   --   --  46   ALT  --   --   --   --  19   AST  --   --   --   --  19       No results found for this or any previous visit (from the past 24 hour(s)).

## 2018-12-01 NOTE — PLAN OF CARE
Problem: Patient Care Overview  Goal: Plan of Care/Patient Progress Review  Outcome: Improving  Pt alert and oriented x4, pleasant and cooperative. Up with1/walker and belt with O2 at 8l/oximyzer. Occasional productive cough blood tinged sputum per pt. Desats to low 80's with ambulation. No c/o pain. BGM's 174 and 218. Good appetite. ?discharge to home 1-2 days.

## 2018-12-01 NOTE — PROGRESS NOTES
12/01/18 1500   Quick Adds   Type of Visit Initial Occupational Therapy Evaluation   Living Environment   Lives With spouse   Living Arrangements apartment   Home Accessibility grab bars present (bathtub);grab bars present (toilet)   Living Environment Comment Pt lives with his wife, has a low activity level, is O2 dependent, drives.   Self-Care   Dominant Hand right   Usual Activity Tolerance fair   Current Activity Tolerance poor   Regular Exercise no   General Information   Onset of Illness/Injury or Date of Surgery - Date 11/23/18   Referring Physician Amelia Fletcher MD   Patient/Family Goals Statement Pt would like to get strong enough to discharge home   Additional Occupational Profile Info/Pertinent History of Current Problem Pt awaiting results of testing that will help him plan his next step (home with hospice, LTACH for tx, etc.)   Precautions/Limitations fall precautions;oxygen therapy device and L/min  (6 L @ rest, 8 when up)   Cognitive Status Examination   Orientation orientation to person, place and time   Level of Consciousness alert   Able to Follow Commands WNL/WFL   Personal Safety (Cognitive) WNL/WFL   Cognitive Comment No cognitive concerns identified   Visual Perception   Visual Perception Comments Pt reports no recent changes   Sensory Examination   Sensory Comments Pt has baseline numbness in R hand   Pain Assessment   Patient Currently in Pain No   Range of Motion (ROM)   ROM Comment BUE is WFL   Strength   Strength Comments BUE is 3+/5 grossly   Hand Strength   Hand Strength Comments Decreased hand strength   Coordination   Upper Extremity Coordination Right UE impaired   Coordination Comments Difficulty with R hand fm skills   Mobility   Bed Mobility Comments Pt is Mod I with bed mobility   Transfer Skill: Sit to Stand   Level of [place]: Sit/Stand contact guard   Transfer Skill: Toilet Transfer   Level of [place]: Toilet minimum assist (75% patients effort)   Tub/Shower  "Transfer   Tub/Shower Transfer Comments Pt has walk in shower at home, typically stands   Balance   Balance Comments No LOB noted, using 4WW   Upper Body Dressing   Level of [place]: Dress Upper Body minimum assist (75% patients effort)   Lower Body Dressing   Level of [place]: Dress Lower Body minimum assist (75% patients effort)   Grooming   Level of [place]: Grooming minimum assist (75% patients effort)   Eating/Self Feeding   Level of [place]: Eating independent   Physical Assist/Nonphysical Assist: Eating set-up required   Instrumental Activities of Daily Living (IADL)   Previous Responsibilities driving   IADL Comments Pt does not currently perform other IADLs at home   Activities of Daily Living Analysis   Impairments Contributing to Impaired Activities of Daily Living strength decreased  (SOB/dyspnea, deconditioning)   General Therapy Interventions   Planned Therapy Interventions ADL retraining;strengthening   Clinical Impression   Criteria for Skilled Therapeutic Interventions Met yes, treatment indicated   OT Diagnosis Decreased I with ADL tasks   Influenced by the following impairments Pt is limited by dyspnea, deconditioning, and decreased strength   Assessment of Occupational Performance 3-5 Performance Deficits   Identified Performance Deficits Pt is functioning below baseline in dressing, grooming tasks, toileting, and bathing/showering tasks   Clinical Decision Making (Complexity) Low complexity   Therapy Frequency daily   Predicted Duration of Therapy Intervention (days/wks) 7 days   Anticipated Discharge Disposition Home with Assist;Home with Home Therapy;Other (see comments)   Risks and Benefits of Treatment have been explained. Yes   Patient, Family & other staff in agreement with plan of care Yes   Clinical Impression Comments Skilled OT intervention is warranted to address presenting deficits   [facility] AM-PAC  \"6 Clicks\" Daily Activity Inpatient Short Form   1. " Putting on and taking off regular lower body clothing? 3 - A Little   2. Bathing (including washing, rinsing, drying)? 3 - A Little   3. Toileting, which includes using toilet, bedpan or urinal? 3 - A Little   4. Putting on and taking off regular upper body clothing? 3 - A Little   5. Taking care of personal grooming such as brushing teeth? 3 - A Little   6. Eating meals? 4 - None   Daily Activity Raw Score (Score out of 24.Lower scores equate to lower levels of function) 19   Total Evaluation Time   Total Evaluation Time (Minutes) 10

## 2018-12-01 NOTE — PLAN OF CARE
Problem: Patient Care Overview  Goal: Plan of Care/Patient Progress Review  Outcome: No Change  Alert and oriented.  Up with one and walker/gait belt.  Denies pain.  Lung sounds diminished with crackles in bases.  Desats into 80's with activity.  Currently on 6lpm oxymizer.  Productive cough of blood tinged sputum.  Dressing CDI to coccyx.  BLood glucose 239 and 226

## 2018-12-01 NOTE — PROGRESS NOTES
"[facility]  Palliative Care Daily Progress Note       Recommendations & Counseling     We will continue to follow and discuss goals and care planning.    It's pretty evident he can't really engage in a discussion about this without the pathology results and some firmer answers. I note with interest that he told me Dr Fletcher told him yesterday w/r/t the cytology that \"we don't know anything\" when it appears she shared with the patient that, although not yet final, path is highly suggestive of cancer (probably a neuroendocrine tumor such as small cell lung cancer). I framed that for him, and he did remember that, but, again, really does not seem capable of thinking about anything further until the path is final etc. I gave a mild, general caution that assuming it is cancer, given his frailty and severe hypoxemic respiratory failure there may not be much in the way of treatment options.     We will follow  His wife was not with him when I visited today    Thank you for involving us in the patient's care.   Armen Kyle MD / Palliative Medicine / Pager [phone]           Assessment      Diagnoses & symptoms:          IPF, pulm HTN, hx of PE, and severe hypoxemic respiratory failure    Pleural effusion, suspected malignant, path pending as above    Mood/coping/meaning: He reports he is doing ok. Tells me his wife has found palliative visits comforting. He has friends visiting with him today              Interval History:   No major medical events  Resps - improved from admission  Doing some ambulation           Review of Systems:   Palliative Symptom Review (0=no symptom/no concern, 1=mild, 2=moderate, 3=severe):      Pain: 0      Fatigue: 0      Nausea: 0      Constipation: 0      SOB 1-2            Medications:   I have reviewed this patient's medication profile and medications during this hospitalization  Noted meds:  steroids           Physical Exam:   Vitals were reviewed  Temp: " 97.6  F (36.4  C) Temp src: Oral BP: (!) 151/91   Heart Rate: 65 Resp: 20 SpO2: 94 % O2 Device: Oxymizer cannula Oxygen Delivery: 6 LPM  Alert chronically ill man NAD  Head NCAT  Mouth dry no lesions  On 6L O2 oxymizer  Lungs sl rapid, unlabored, crackles bilat  CV regular s1s2  Abd soft nondistendd  Minimal LE edema  Neuropsych exam normal             Data Reviewed:   Reviewed recent pertinent imaging, comments:CT with PE, pl based masses  Reviewed recent labs, comments: Cr normal

## 2018-12-01 NOTE — PROVIDER NOTIFICATION
MD Notification    Notified Person: MD    Notified Person Name:Ant    Notification Date/Time:12/1 at 0845    Notification Interaction:text    Purpose of Notification:bgm 82,pt on scheduled novolog    Orders Received:yes,hold this am's dose    Comments:

## 2018-12-01 NOTE — PLAN OF CARE
Problem: Patient Care Overview  Goal: Plan of Care/Patient Progress Review  OT eval completed and treatment initiated. Previous level of function: Patient lives in an apartment with his wife, is I with mobility at baseline, has recently become O2 dependent at home. Pt drives and manages his medications/finances, but his wife performs all other IADLs at this time.     Discharge Planner OT   Patient plan for discharge: Unclear, pending official diagnosis. However, pt indicates he'd like to be strong enough to discharge home.  Current status: Pt moving with 4WW and CGA. Walks with nurse and th on 8L O2, sats above 80 the entire time (sats do dip to lower 80's, pt able to sit and perform PLB to recover), has reportedly been increasing his distances and tolerance. Once recovered is able to decrease to 6L O2.  Barriers to return to prior living situation: Functional endurance and O2 needs.  Recommendations for discharge: Pending test results and medical/tx recommendations. However, from a rehab standpoint anticipate pt will be able to go home with A of family and in home OT if he wishes to continue therapy.  Rationale for recommendations: Skilled occupational therapy intervention is warranted at this time to address functional deficits and improve functional endurance to a more reasonable/manageable level so pt may participate I'ly in daily self-care routines.       Entered by: Sonya Medina 12/01/2018 3:58 PM

## 2018-12-02 NOTE — PROGRESS NOTES
[facility]    Hospitalist Progress Note      Assessment & Plan      Ravi Sandoval is a 73 year old male past medical history of histiocytosis X, pulmonary fibrosis, severe pulmonary hypertension and COPD/Emphysema, who is chronically oxygen dependent on 4-6 liters who was admitted on 11/23/2018 for worsening SOB. He had been requiring HFNC, but on 12/1 is now down to 6-8L oxymizer and has been able to ambulate the unit.       Summary:    1.  Acute on Chronic hypoxic respiratory failure in the setting of pulmonary fibrosis and COPD.  Non-occlusive thrombus on CT PE  Pleural effusion  - Notes increasing shortness of breath for the past 1 month.  Was treated outpatient with amoxicillin at the end of October for a sinusitis, requiring increasing O2 demands up from his baseline of 4 liters.  Afebrile with no significant leukocytosis.  Lactic acid was elevated at 2.5 now normal last LA 1.6.  Procalcitonin <0.05.  - Pulmonary Consulted and following   - He was on Prednisone 40mg daily. Patient was hypoxic and SOB, on high flow oxygen, xray shows Fibrosis and opacity on right lower side, bilateral crackles, occasional wheezing and decrease air entry.  - Initially on IV Solumerol 40 mg Q 6 hrs, DuoNeb every 4-6 hrs, Start on Pulmicort.       - CTA chest PE protocol done on 11/26,  large R pleural effusion, non occlusive thrombus and pleural based densities  - Thoracentesis done : 1500  Ml of pleural fluid remove, exudative, 11/30 on day 4, in broth only pos for gram GPB resembling diptheroids    --->Cytology is still pending, with regards to densities and adenopathy --> Pulmonary following   - IS and flutter  - Wean O2 and monitor. HFNC weaned to 6-10L oxymizer          RT and nursing staff continuing to wean with ambulation also  - Appreciate Pulm recs/updates:             - tapering steroids- PO prednisone replaced solumedrol 12/1            - Levaquin now PO ends after today ( previously IV since 11/23)                 2.  Severe pulmonary hypertension   - Appears compensated.    -Continue PTA tadalafil, digoxin and every other day Lasix.      3.  Uncontrolled type 2 diabetes   - A1c in October was 10.8.  He is supposed to take glipizide 10 mg daily though notes he has not been taking it as directed.  - Does not check his blood sugars at home.  Oral agents on hold. Blood sugar still on higher side secondary to steroids. Low BG 45 on 12/02   - Reduce Lantus to 14 units at bedtime, redcue Novolog to 6 with  meals and sliding scale insulin with meals for correction.     - Steroid adjustment as above, should improve sugars      4.  Hypertension   - Elevated in ED, improved since  - Continue PTA Norvasc 2.5 mg daily, lisinopril 20 mg daily and metoprolol 25 mg twice daily.  - P.r.n. hydralazine will be available as needed.       5.  Hypothyroidism    - PTA Synthroid.       6.  History of coronary artery disease   - previous BMS to RCA in 2010.    - PTA aspirin, beta blocker, statin and ACE.      7. Acute Pulmonary Embolism  - Continues on eliquis (was on heparin gtt previously)      8. Mild Hemoptysis:   - very light blood tinge sputum previously, improving as of 11/30 - 12/1, continue to monitor  - Continue with the Eliquis, if returns or worsens we will need to hold the anticoagulations.      9. Goals of care:   - Palliative care met with patient and family over last few days; appreciate assistance and efforts. Patient aware of options and recommendation to be DNR/DNI given severity of his pulmonary disease, especially. They are considering options.         DVT Prophylaxis: Eliquis   Code Status: Full Code. Appreciate ongoing discussion had between patient, wife Kelly, and Palliative Team.   Dispo: pending clinical course, oxygen requirements may be restricting potential placement    Rufino Noble MD  Text Page (7am to 6pm, M-F)    Interval History     No acute events overnight  Still with some hemoptysis  No fevers, some  dyspnea with exertion  No CP, no new complaints today    -Data reviewed today: I reviewed all new labs and imaging results over the last 24 hours. I personally reviewed no images or EKG's today.    Physical Exam   Temp: 97.4  F (36.3  C) Temp src: Oral BP: 125/80 Pulse: 73 Heart Rate: 71 Resp: 18 SpO2: 96 % O2 Device: Oxymizer cannula Oxygen Delivery: 5 LPM  Vitals:    11/30/18 0500 12/01/18 0500 12/02/18 0700   Weight: 67 kg (147 lb 11.3 oz) 69 kg (152 lb 1.9 oz) 68.9 kg (151 lb 14.4 oz)     Vital Signs with Ranges  Temp:  [97.4  F (36.3  C)-97.5  F (36.4  C)] 97.4  F (36.3  C)  Pulse:  [73-76] 73  Heart Rate:  [71-92] 71  Resp:  [18-20] 18  BP: (125-149)/(73-98) 125/80  SpO2:  [91 %-96 %] 96 %  I/O last 3 completed shifts:  In: 760 [P.O.:760]  Out: 1375 [Urine:1375]    Constitutional:   Very pleasant. Awake, alert, cooperative, no apparent distress  Respiratory: no wheeze heard, a few bibasilar crackles noted, more so on L  Cardiovascular: Regular rate and rhythm, s1 s2 noted  GI: Normal bowel sounds, soft, non-distended, non-tender  Skin/Integumen: No rashes, no cyanosis, no edema  Neuro: AAOx3, no focal deficits    Medications       amLODIPine (NORVASC) tablet 2.5 mg  2.5 mg Oral Daily     apixaban ANTICOAGULANT  10 mg Oral BID    Followed by     [START ON 12/4/2018] apixaban ANTICOAGULANT  5 mg Oral BID     aspirin (ASA) chewable tablet 81 mg  81 mg Oral At Bedtime     budesonide  0.5 mg Nebulization BID     colchicine (COLCYRS) tablet 0.6 mg  0.6 mg Oral QPM     digoxin  125 mcg Oral Daily     FLUoxetine (PROzac) capsule 40 mg  40 mg Oral Daily     furosemide  20 mg Oral Every Other Day     insulin aspart  6 Units Subcutaneous TID w/meals     insulin aspart  1-7 Units Subcutaneous TID AC     insulin aspart  1-5 Units Subcutaneous At Bedtime     insulin glargine  14 Units Subcutaneous At Bedtime     ipratropium - albuterol 0.5 mg/2.5 mg/3 mL  3 mL Nebulization 4x daily     levothyroxine  125 mcg Oral Daily      lisinopril (PRINIVIL/ZESTRIL) tablet 20 mg  20 mg Oral Daily     metoprolol succinate ER  25 mg Oral BID     multivitamin w/minerals  1 tablet Oral Daily     pantoprazole  40 mg Oral QAM AC     [START ON 12/3/2018] predniSONE  40 mg Oral Daily     rosuvastatin  10 mg Oral QPM     sodium chloride (PF)  3 mL Intracatheter Q8H     tadalafil  20 mg Oral Daily     tamsulosin  0.4 mg Oral Daily       Data     Recent Labs  Lab 12/02/18  0909 11/29/18  0813 11/27/18  0820  11/26/18  1130 11/26/18  0819   WBC 15.7* 11.8* 11.6*  < >  --  13.8*   HGB 13.5 12.4* 12.0*  < >  --  14.0   MCV 86 85 86  < >  --  84    144* 152  < >  --  158   INR  --   --   --   --  0.98  --    NA  --  138 139  --   --  140   POTASSIUM  --  4.1 4.3  --   --  3.8   CHLORIDE  --  106 108  --   --  107   CO2  --  26 23  --   --  24   BUN  --  22 25  --   --  14   CR  --  0.61* 0.72  --   --  0.74   ANIONGAP  --  6 8  --   --  9   AV  --  7.7* 8.0*  --   --  8.7   GLC  --  180* 231*  --   --  203*   ALBUMIN  --  2.6* 2.5*  --   --  2.9*   PROTTOTAL  --   --   --   --  6.3* 6.6*   BILITOTAL  --   --   --   --   --  0.5   ALKPHOS  --   --   --   --   --  46   ALT  --   --   --   --   --  19   AST  --   --   --   --   --  19   < > = values in this interval not displayed.    No results found for this or any previous visit (from the past 24 hour(s)).

## 2018-12-02 NOTE — PROVIDER NOTIFICATION
MD Notification    Notified Person: MD    Notified Person Name:Aide    Notification Date/Time:  12/2 at 1145  Notification Interaction:text    Purpose of Notification:bgm of 43    Orders Received:    Comments:

## 2018-12-02 NOTE — PLAN OF CARE
Problem: Patient Care Overview  Goal: Plan of Care/Patient Progress Review  Outcome: No Change  Alert and oriented.  Up with assist of one and walker/gait belt.  Denies pain.  Lung sounds diminished with fine crackles in bases.  89%-91% 5 lpm oxymizer.  Increased SOB with activity.  Productive cough of blood tinged sputum.

## 2018-12-02 NOTE — PLAN OF CARE
Problem: Patient Care Overview  Goal: Plan of Care/Patient Progress Review  OT: Attempted to see pt for OT treatment, pt is visiting with family and asks that therapist return later, explained that it may be difficult to return due to scheduling, pt and family continue to ask to have visiting time.

## 2018-12-02 NOTE — PROGRESS NOTES
SPIRITUAL HEALTH SERVICES Progress Note  [facility]   64    Made on-call visit due to referral. Pt was with his son Ravi and a grandson Angelito. Pt seemed to be struggling with breathing but was in good spirit. He made jokes and laughed at jokes. Shared family stories and dara journey. Shared his wife was home decorating [place]. Shared family is involved including those not at this visit. Reflections were done on scripture reading and prayers shared. Patient would like a follow up  visit during the week.      Kassi Shelley   Intern

## 2018-12-03 NOTE — PROGRESS NOTES
[facility]    Hospitalist Progress Note      Assessment & Plan      Ravi Sandoval is a 73 year old male past medical history of histiocytosis X, pulmonary fibrosis, severe pulmonary hypertension and COPD/Emphysema, who is chronically oxygen dependent on 4-6 liters who was admitted on 11/23/2018 for worsening SOB. He had been requiring HFNC, but on 12/1 is now down to 6-8L oxymizer and has been able to ambulate the unit.       Summary:    1.  Acute on Chronic hypoxic respiratory failure in the setting of pulmonary fibrosis and COPD.  Non-occlusive thrombus on CT PE  Pleural effusion  - Notes increasing shortness of breath for the past 1 month.  Was treated outpatient with amoxicillin at the end of October for a sinusitis, requiring increasing O2 demands up from his baseline of 4 liters.  Afebrile with no significant leukocytosis.  Lactic acid was elevated at 2.5 now normal last LA 1.6.  Procalcitonin <0.05.  - Pulmonary Consulted and following   - He was on Prednisone 40mg daily. Patient was hypoxic and SOB, on high flow oxygen, xray shows Fibrosis and opacity on right lower side, bilateral crackles, occasional wheezing and decrease air entry.  - Initially on IV Solumerol 40 mg Q 6 hrs, DuoNeb every 4-6 hrs, Start on Pulmicort.       - CTA chest PE protocol done on 11/26,  large R pleural effusion, non occlusive thrombus and pleural based densities  - Thoracentesis done : 1500  Ml of pleural fluid remove, exudative, 11/30 on day 4, in broth only pos for gram GPB resembling diptheroids    --->Cytology with high grade neuroendocrine kasg, with regards to densities and adenopathy --> Pulmonary signed off -> consult oncology  - IS and flutter  - Wean O2 and monitor. HFNC weaned to 6-10L oxymizer          RT and nursing staff continuing to wean with ambulation also  - Appreciate Pulm recs/updates:             - tapering steroids- PO prednisone replaced solumedrol 12/1            - Levaquin now PO ends  after today ( previously IV since 11/23)                2.  Severe pulmonary hypertension   - Appears compensated.    -Continue PTA tadalafil, digoxin and every other day Lasix.      3.  Uncontrolled type 2 diabetes   - A1c in October was 10.8.  He is supposed to take glipizide 10 mg daily though notes he has not been taking it as directed.  - Does not check his blood sugars at home.  Oral agents on hold. Blood sugar still on higher side secondary to steroids. Low BG 45 on 12/02   - Reduce Lantus to 14 units at bedtime, redcue Novolog to 6 with  meals and sliding scale insulin with meals for correction.     - Steroid adjustment as above, should improve sugars      4.  Hypertension   - Elevated in ED, improved since  - Continue PTA Norvasc 2.5 mg daily, lisinopril 20 mg daily and metoprolol 25 mg twice daily.  - P.r.n. hydralazine will be available as needed.       5.  Hypothyroidism    - PTA Synthroid.       6.  History of coronary artery disease   - previous BMS to RCA in 2010.    - PTA aspirin, beta blocker, statin and ACE.      7. Acute Pulmonary Embolism  - Continues on eliquis (was on heparin gtt previously)      8. Mild Hemoptysis:   - very light blood tinge sputum previously, improving as of 11/30 - 12/1, continue to monitor  - Continue with the Eliquis, if returns or worsens we will need to hold the anticoagulations.      9. Goals of care:   - Palliative care met with patient and family over last few days; appreciate assistance and efforts. Patient aware of options and recommendation to be DNR/DNI given severity of his pulmonary disease, especially. They are considering options.         DVT Prophylaxis: Eliquis   Code Status: Full Code. Appreciate ongoing discussion had between patient, wife Kelly, and Palliative Team.   Dispo: pending clinical course, oxygen requirements may be restricting potential placement    Rufino Noble MD  Text Page (7am to 6pm, M-F)    Interval History     No acute events  overnight  Breathing stable      -Data reviewed today: I reviewed all new labs and imaging results over the last 24 hours. I personally reviewed no images or EKG's today.    Physical Exam   Temp: 97.7  F (36.5  C) Temp src: Oral BP: 133/81 Pulse: 79 Heart Rate: 77 Resp: 18 SpO2: 93 % O2 Device: Oxymizer cannula Oxygen Delivery: 5 LPM  Vitals:    12/01/18 0500 12/02/18 0700 12/03/18 0500   Weight: 69 kg (152 lb 1.9 oz) 68.9 kg (151 lb 14.4 oz) 68.4 kg (150 lb 12.7 oz)     Vital Signs with Ranges  Temp:  [97.7  F (36.5  C)-97.8  F (36.6  C)] 97.7  F (36.5  C)  Pulse:  [79] 79  Heart Rate:  [] 77  Resp:  [16-20] 18  BP: (114-138)/(69-81) 133/81  SpO2:  [70 %-93 %] 93 %  I/O last 3 completed shifts:  In: 680 [P.O.:680]  Out: 1125 [Urine:1125]    Constitutional:   Very pleasant. Awake, alert, cooperative, no apparent distress  Respiratory: no wheeze heard, a few bibasilar crackles noted, more so on L  Cardiovascular: Regular rate and rhythm, s1 s2 noted  GI: Normal bowel sounds, soft, non-distended, non-tender  Skin/Integumen: No rashes, no cyanosis, no edema  Neuro: AAOx3, no focal deficits    Medications       amLODIPine (NORVASC) tablet 2.5 mg  2.5 mg Oral Daily     apixaban ANTICOAGULANT  10 mg Oral BID    Followed by     [START ON 12/4/2018] apixaban ANTICOAGULANT  5 mg Oral BID     aspirin (ASA) chewable tablet 81 mg  81 mg Oral At Bedtime     budesonide  0.5 mg Nebulization BID     colchicine (COLCYRS) tablet 0.6 mg  0.6 mg Oral QPM     digoxin  125 mcg Oral Daily     FLUoxetine (PROzac) capsule 40 mg  40 mg Oral Daily     furosemide  20 mg Oral Every Other Day     insulin aspart  6 Units Subcutaneous TID w/meals     insulin aspart  1-7 Units Subcutaneous TID AC     insulin aspart  1-5 Units Subcutaneous At Bedtime     insulin glargine  14 Units Subcutaneous At Bedtime     ipratropium - albuterol 0.5 mg/2.5 mg/3 mL  3 mL Nebulization 4x daily     levothyroxine  125 mcg Oral Daily     lisinopril  (PRINIVIL/ZESTRIL) tablet 20 mg  20 mg Oral Daily     metoprolol succinate ER  25 mg Oral BID     multivitamin w/minerals  1 tablet Oral Daily     pantoprazole  40 mg Oral QAM AC     predniSONE  40 mg Oral Daily     rosuvastatin  10 mg Oral QPM     sodium chloride (PF)  3 mL Intracatheter Q8H     tadalafil  20 mg Oral Daily     tamsulosin  0.4 mg Oral Daily       Data     Recent Labs  Lab 12/02/18  0909 11/29/18  0813 11/27/18  0820   WBC 15.7* 11.8* 11.6*   HGB 13.5 12.4* 12.0*   MCV 86 85 86    144* 152   NA  --  138 139   POTASSIUM  --  4.1 4.3   CHLORIDE  --  106 108   CO2  --  26 23   BUN  --  22 25   CR  --  0.61* 0.72   ANIONGAP  --  6 8   AV  --  7.7* 8.0*   GLC  --  180* 231*   ALBUMIN  --  2.6* 2.5*       No results found for this or any previous visit (from the past 24 hour(s)).

## 2018-12-03 NOTE — PROGRESS NOTES
"[facility]    Palliative Care Progress Note    Ravi Sandoval  MRN# [id number]  Date of Admission:  11/23/2018  Date of Service (when I saw the patient): 12/03/2018    Assessment & Plan   Ravi Sandoval is a 73 year old male with past medical history of histiocytosis X, pulmonary fibrosis, severe pulmonary hypertension and COPD/Emphysema, who is chronically oxygen dependent on 4-6 liters who was admitted on 11/23/2018 for worsening SOB.    Discussion.  Checked in with Mr Sandoval this afternoon, his wife was at bedside. Shared with Mr Sandoval that unfortunately his pathology did come back positive for malignancy. Mr Sandoval was quite angry about this result and stated, \"well it took long enough for the result to come back, I am skeptical that it is correct.\" His wife then asked to discuss plans for discharging to home as she has concerns with how she will be able to care for him. Mr Sandoval is not worried, feels he is back to his previous baseline and will be able to care for himself. As we were discussing options, Mr Sandoval stated, \"I know what you want, you just want to put me in hospice.\" I explained to Mr Sandoval that I am not trying to push him into any decision, I am just hoping to find out what is important to him and how he wants to spend his time. He adamantly stated he was not ready for hospice at this time. We further explored the possibility of having home care nurses and potentially some private pay nursing aids to help support them at home. Kelly had many questions about what will happen in light of the cancer diagnosis (how quickly it will progress, will he become weaker rapidly, etc). I shared with them that an oncologist would be by to address these questions. Mr Sandoval seemed to settle by the end of our conversation. I recommended to Mr Sandoval that we allow time for this information to \"sink in\" and for further discussions with the oncologist before making anymore decisions about how to care for him going " "forward and he was agreeable with this plan, stating, \"I think that is a good idea.\"    Support/Coping  -pt's wife at bedside   -Will involve Palliative LICSW, Brandie Abreu, and/or Palliative , Mago Holland    Decisional Support, Goals of Care, Counseling & Coordination  Decisional Capacity Intact?  -Yes  Health Care Directive on File?  -No  Code Status/Resuscitation Preferences?  -FULL  Plan of Care?  -continue with current medical cares.    Thank you for involving us in the care of this patient and family. We will continue to follow. Please do not hesitate to contact me with questions or concerns or the on-call provider for our team if evening or weekend.    Peg AREVALO, CNP  Palliative Medicine   Pager [phone]    Attestation:  Total time on the floor involved in the patient's care: 50 minutes  Total time spent in counseling/care coordination: >50%    Interval History   Mr Sandoval seen resting comfortably in bed, off HiFNC and able to ambulate short distances in crews with nursing. Reports feeling he is back to his baseline breathing wise.     Medications   Current Facility-Administered Medications Ordered in Epic   Medication Dose Route Frequency Last Rate Last Dose     acetaminophen (TYLENOL) Suppository 650 mg  650 mg Rectal Q4H PRN         acetaminophen (TYLENOL) tablet 650 mg  650 mg Oral Q4H PRN   650 mg at 11/26/18 2136     amLODIPine (NORVASC) tablet 2.5 mg  2.5 mg Oral Daily   2.5 mg at 12/03/18 0847     apixaban ANTICOAGULANT (ELIQUIS) tablet 10 mg  10 mg Oral BID   10 mg at 12/03/18 0848    Followed by     [START ON 12/4/2018] apixaban ANTICOAGULANT (ELIQUIS) tablet 5 mg  5 mg Oral BID         aspirin (ASA) chewable tablet 81 mg  81 mg Oral At Bedtime   81 mg at 12/02/18 2205     budesonide (PULMICORT) neb solution 0.5 mg  0.5 mg Nebulization BID   0.5 mg at 12/03/18 0803     colchicine (COLCYRS) tablet 0.6 mg  0.6 mg Oral QPM   0.6 mg at 12/02/18 2029     glucose gel 15-30 g  15-30 " g Oral Q15 Min PRN        Or     dextrose 50 % injection 25-50 mL  25-50 mL Intravenous Q15 Min PRN        Or     glucagon injection 1 mg  1 mg Subcutaneous Q15 Min PRN         digoxin (LANOXIN) tablet 125 mcg  125 mcg Oral Daily   125 mcg at 12/03/18 0848     FLUoxetine (PROzac) capsule 40 mg  40 mg Oral Daily   40 mg at 12/03/18 0848     furosemide (LASIX) tablet 20 mg  20 mg Oral Every Other Day   20 mg at 12/02/18 0902     insulin aspart (NovoLOG) inj (RAPID ACTING)  6 Units Subcutaneous TID w/meals   6 Units at 12/03/18 0849     insulin aspart (NovoLOG) inj (RAPID ACTING)  1-7 Units Subcutaneous TID AC   1 Units at 12/02/18 1729     insulin aspart (NovoLOG) inj (RAPID ACTING)  1-5 Units Subcutaneous At Bedtime   4 Units at 12/02/18 2206     insulin glargine (LANTUS PEN) injection 14 Units  14 Units Subcutaneous At Bedtime   14 Units at 12/02/18 2206     ipratropium - albuterol 0.5 mg/2.5 mg/3 mL (DUONEB) neb solution 3 mL  3 mL Nebulization 4x daily   3 mL at 12/03/18 1152     levothyroxine (SYNTHROID/LEVOTHROID) tablet 125 mcg  125 mcg Oral Daily   125 mcg at 12/03/18 0848     lisinopril (PRINIVIL/ZESTRIL) tablet 20 mg  20 mg Oral Daily   20 mg at 12/03/18 0848     melatonin tablet 1 mg  1 mg Oral At Bedtime PRN         methyl salicylate-menthol (PATRIZIA-IRVING) ointment   Topical Q6H PRN         metoprolol succinate (TOPROL-XL) 24 hr tablet 25 mg  25 mg Oral BID   25 mg at 12/03/18 0848     multivitamin w/minerals (THERA-VIT-M) tablet 1 tablet  1 tablet Oral Daily   1 tablet at 12/03/18 0848     naloxone (NARCAN) injection 0.1-0.4 mg  0.1-0.4 mg Intravenous Q2 Min PRN         ondansetron (ZOFRAN-ODT) ODT tab 4 mg  4 mg Oral Q6H PRN        Or     ondansetron (ZOFRAN) injection 4 mg  4 mg Intravenous Q6H PRN         pantoprazole (PROTONIX) EC tablet 40 mg  40 mg Oral QAM AC   40 mg at 12/03/18 0641     potassium chloride (KLOR-CON) Packet 20-40 mEq  20-40 mEq Oral or Feeding Tube Q2H PRN         potassium chloride 10  mEq in 100 mL intermittent infusion with 10 mg lidocaine  10 mEq Intravenous Q1H PRN         potassium chloride 10 mEq in 100 mL sterile water intermittent infusion (premix)  10 mEq Intravenous Q1H PRN         potassium chloride 20 mEq in 50 mL intermittent infusion  20 mEq Intravenous Q1H PRN         potassium chloride SA (K-DUR/KLOR-CON M) CR tablet 20-40 mEq  20-40 mEq Oral Q2H PRN   20 mEq at 11/25/18 1104     predniSONE (DELTASONE) tablet 40 mg  40 mg Oral Daily   40 mg at 12/03/18 0849     prochlorperazine (COMPAZINE) injection 5 mg  5 mg Intravenous Q6H PRN        Or     prochlorperazine (COMPAZINE) tablet 5 mg  5 mg Oral Q6H PRN        Or     prochlorperazine (COMPAZINE) Suppository 12.5 mg  12.5 mg Rectal Q12H PRN         rosuvastatin (CRESTOR) tablet 10 mg  10 mg Oral QPM   10 mg at 12/02/18 2029     sodium chloride (PF) 0.9% PF flush 3 mL  3 mL Intracatheter Q1H PRN   3 mL at 11/29/18 0917     sodium chloride (PF) 0.9% PF flush 3 mL  3 mL Intracatheter Q8H   3 mL at 12/03/18 0858     tadalafil (CIALIS/ADCIRCA) tablet  20 mg Oral Daily   20 mg at 12/03/18 0847     tamsulosin (FLOMAX) capsule 0.4 mg  0.4 mg Oral Daily   0.4 mg at 12/03/18 0849     No current [facility]-ordered outpatient prescriptions on file.       Physical Exam   Temp: 97.8  F (36.6  C) Temp src: Oral BP: 128/78 Pulse: 79 Heart Rate: 79 Resp: 16 SpO2: 91 % O2 Device: Oxymizer cannula Oxygen Delivery: 5 LPM  Vitals:    12/01/18 0500 12/02/18 0700 12/03/18 0500   Weight: 69 kg (152 lb 1.9 oz) 68.9 kg (151 lb 14.4 oz) 68.4 kg (150 lb 12.7 oz)     CONSTITUTIONAL: NAD, A&Ox3. Calm and cooperative.  HEENT: NCAT, MMM  NECK: Supple  CARDIOVASCULAR: exam deferred   RESPIRATORY: NL respiratory effort on oxygen via oxymizer   GASTROINTESTINAL: exam deferred   MUSCULOSKELETAL: Moving freely in bed   SKIN: Warm and intact. No concerning lesions or rashes on exposed skin surfaces   NEUROLOGIC: Appropriately responsive during interview  PSYCH: Affect  congruent     Data   Results for orders placed or performed during the hospital encounter of 11/23/18 (from the past 24 hour(s))   Glucose by meter   Result Value Ref Range    Glucose 345 (H) 70 - 99 mg/dL   Glucose by meter   Result Value Ref Range    Glucose 384 (H) 70 - 99 mg/dL   Glucose by meter   Result Value Ref Range    Glucose 246 (H) 70 - 99 mg/dL   Glucose by meter   Result Value Ref Range    Glucose 95 70 - 99 mg/dL   Glucose by meter   Result Value Ref Range    Glucose 82 70 - 99 mg/dL

## 2018-12-03 NOTE — PROGRESS NOTES
"Palliative Care Inpatient Clinical Social Work Assessment    Patient Information:  Ravi Sandoval darrion 73 year old man with medical history that includes pulmonary fibrosis, hypertesnion, COPD/emphysema, dependent on O2 in addition to steroids.  He was admitted with worsening COB, found to have a new PE and pleural based nodules concerning for malignancy.  Palliative Care is following for goals of care and emotional support.     Summary: Met with Ravi and his wife, Kelly in room today.  Ravi allowed me to visit, but his engagement was limited.  Kelly shared that she feels confused and has several questions this morning regarding the status of Ravi's health and what to expect going forward.  She was under the impression that if Ravi could get off high flow O2 he would be able to discharge to home, but she is not sure she can provide care for him such as transfers or helping him up if he falls.  Ravi appeared to be irritated by this comment and stated that he feels he can care for himself at home as he has been doing and he does not seem to understand what Kelly's concerns are.  Kelly then shared that they were told that \"he has cancer in his upper lung\" which Ravi disputed saying \"they saw something that looks like cancer.  They still don't know.\"      Per review of the chart and other provider's discussions Ravi has had difficulty talking about the the possibility of cancer.  I think he will need clear and direct information about this before he can continue with goals conversations.      Kelly shared that she has been Ravi's main caregiver for sometime now.  They do have 2 sons but both work full time and have families so Kelly as been the main support.      We talked about going home and hiring additional help in home but more goals conversations are warranted before a discharge plan can really be made and it seems that these conversations are hinged on confirmation of his cancer diagnosis (which I " suspect he was given but he is struggling to integrate this new information)     Relevant Symptoms/Concerns     Physical:  Ravi is off high flow O2 and seemed heartened by this.  Per discussion with Milly Monson NP, his pathology results do show that he has lung cancer.  Dr. Wynne's notes indicate a poor prognosis.    Psychological/Emotional/Existential:  Guarded during visit this morning. Did not engage much in discussion around emotional or existential distress.    Family/Social/Caregiver:   Wife, Kelly was at bedside.  Kelly expressed concern about Ravi coming home as she is his primary caregiver and has her own health concerns.  We talked about the possibility of hiring private pay services, but until a more in depth conversation can happen regarding home with home care vs hospice I did not discuss these different options in great detail today.    Developmental:     Mental Health:     End of Life:     Cultural/Worship/Spiritual:     Grief/Loss:     Concurrent Stressors:       Comments:        Goals/Decision Making/Advance Care Planning   Preferences:  Evolving. Code status is FULL.    Concerns:     Documents:  No HCD on file   Decision Making Issues:       Comments:      Resource Needs     Discharge Planning:  Per Unit/Program  and/or Care Coordinator   Other:     Comments:      Sources of Information   Patient:  x   Family:  x   Staff:  x   Chart Review:  x   Other:      Intervention (Check all that apply)    x   Assessment of palliative specific issues      x   Introduction of Palliative clinical social work interventions    x   Adjustment to illness counseling       Advanced care planning       Attended/participated in care conference       Behavioral interventions for symptom management    x   Facilitation of processing of thoughts/feelings       Family communication facilitated       Grief counseling       Goals of care discussion/facilitation       Life legacy work       Life review  facilitation       Psychoeducation       Re-framing       Resource referral       Resources Provided        Other:       Comments:      Plan:  Spoke to Milly Monson NP about our conversation. I will continue to follow.    VALENTÍN Abarca, [facility]   Palliative Care    Pgr:[phone]  Ph: [phone]

## 2018-12-03 NOTE — PROGRESS NOTES
Chart reviewed along with final path report suggesting high grade neuroendocrine malignancy.    Poor prognosis with metastatic lung malignancy, pulmonary hypertension, pulmonary fibrosis/COPD.     Continue prednisone, wean 10mg every 5 days if able (on 40mg), wean O2 as able to maintain sats at rest over 88%.     Consult oncology for recs for lung cancer.     No further recommendations at this from pulmonary standpoint. If effusion re-accumulates, may be a candidate for a tunneled pleural catheter, which can be done as an outpatient with us if needed.     Available if needed, please call with any questions but will sign off otherwise at this time.       Hermes Hay M.D.  [facility]

## 2018-12-03 NOTE — PLAN OF CARE
"Problem: Patient Care Overview  Goal: Plan of Care/Patient Progress Review  Discharge Planner OT   Patient plan for discharge: None stated today.  Current status: SB A for ADLs in room with walker. Sats to 70% however on 6L oximizer. Needing seated rest and PLB to recover to 85%.   Barriers to return to prior living situation: None.  Recommendations for discharge: Home.  Rationale for recommendations: Patient performing ADLs well, but desats with minimal activity. He reports he just \"knows\" when his o2 is low at home. Continuing OT 5x/week to maximize ADL tolerance.       Entered by: Kendra Grace 12/03/2018 9:23 AM         "

## 2018-12-03 NOTE — CONSULTS
Care Transition Initial Assessment - SW   Referral from Palliative Care Nurse to speak with patient/wife about private duty home care.  Met with: patient and spouse  Active Problems:    Pulmonary fibrosis (H)       DATA  Lives With: spouse  Living Arrangements: apartment  Patient and his spouse have been independent.  They do have cleaning help every other week.  Occupational Therapy recommends patient is able to return home without home therapy.  Identified issues/concerns regarding health management: Newly diagnosed lung cancer.  Wife concerned that patient may need help and asked about home care services.  Patient has had skilled home care before such as nursing and therapy.  Reviewed private duty home care for a home health aide explaining most agencies require 2-3 hour minimum.  They expressed interest in using [facility] for skilled services and private duty.  Gave them brochures for both skilled and private duty home care.    ASSESSMENT  Cognitive Status:  Oriented.  Was using humor during our visit.  Concerns to be addressed: Either writer or care coordinator will follow up with patient/wife regarding final discharge plan.  Will follow progress as Palliative Care and Oncology meet with patient.   Wife is seeking additional help at home.      PLAN  Financial costs for the patient includes private duty if they choose  Patient given options and choices for discharge provider choice offered.  Patient/family is agreeable to the plan?    Patient Goals and Preferences:   Patient anticipates discharging to:  Discharge home.

## 2018-12-03 NOTE — PLAN OF CARE
Problem: Patient Care Overview  Goal: Plan of Care/Patient Progress Review  Outcome: Improving  Pt states he feels better today, occ cough with small amount blood tinged phlegm, pt states he has just slight discomfort in right anterior chest with deep breath or cough. Has walked in halls a couple times with portable O2, lowest sat reading during walk was 87%, but by return to room his sat was 94%. Pt is steady on feet with use of walker. Pt continues on oximiser cannula at 5L. VSS. Blood sugars stable. Appetite has been good at meals. Pt met with palliative care, now awaiting consult with hem/oncology.

## 2018-12-03 NOTE — PLAN OF CARE
Problem: Patient Care Overview  Goal: Plan of Care/Patient Progress Review  Outcome: Improving  Pt alert and oriented. Up with 1/walker/belt. CHOUDHURY. Occasional blood tinged sputum. Denies chest pain. On 6l oxymizer.Good appetite. BGM at b/4 noon meal was 45,pt did feel symptomatic. Recheck 40 after tx,then 67 and 71. Ate entire lunch. Insulin doses decreased.Discharge plans pending pathology reports, ?1-2 days.

## 2018-12-04 NOTE — PROGRESS NOTES
[facility]    Hospitalist Progress Note      Assessment & Plan      Ravi Sandoval is a 73 year old male past medical history of histiocytosis X, pulmonary fibrosis, severe pulmonary hypertension and COPD/Emphysema, who is chronically oxygen dependent on 4-6 liters who was admitted on 11/23/2018 for worsening SOB. He had been requiring HFNC, but on 12/1 is now down to 6-8L oxymizer and has been able to ambulate the unit.       Summary:    1.  Acute on Chronic hypoxic respiratory failure in the setting of pulmonary fibrosis and COPD.  Non-occlusive thrombus on CT PE  Pleural effusion  - Notes increasing shortness of breath for the past 1 month.  Was treated outpatient with amoxicillin at the end of October for a sinusitis, requiring increasing O2 demands up from his baseline of 4 liters.  Afebrile with no significant leukocytosis.  Lactic acid was elevated at 2.5 now normal last LA 1.6.  Procalcitonin <0.05.  - Pulmonary Consulted and following   - He was on Prednisone 40mg daily. Patient was hypoxic and SOB, on high flow oxygen, xray shows Fibrosis and opacity on right lower side, bilateral crackles, occasional wheezing and decrease air entry.  - Initially on IV Solumerol 40 mg Q 6 hrs, DuoNeb every 4-6 hrs, Start on Pulmicort.       - CTA chest PE protocol done on 11/26,  large R pleural effusion, non occlusive thrombus and pleural based densities  - Thoracentesis done : 1500  Ml of pleural fluid remove, exudative, 11/30 on day 4, in broth only pos for gram GPB resembling diptheroids    --->Cytology with high grade neuroendocrine kasg, with regards to densities and adenopathy --> Pulmonary signed off -> consulted oncology  - Palliative care is following patient  - IS and flutter  - Wean O2 and monitor. HFNC weaned to 5 L oxymizer          RT and nursing staff continuing to wean with ambulation also  - Appreciate Pulm recs/updates:             - tapering steroids- PO prednisone replaced solumedrol  12/1            - Levaquin now PO ended 12/03                2.  Severe pulmonary hypertension   - Appears compensated.    -Continue PTA tadalafil, digoxin and every other day Lasix.      3.  Uncontrolled type 2 diabetes   - A1c in October was 10.8.  He is supposed to take glipizide 10 mg daily though notes he has not been taking it as directed.  - Does not check his blood sugars at home.  Oral agents on hold. Blood sugar still on higher side secondary to steroids. Low BG 45 on 12/02   - Reduce Lantus to 14 units at bedtime, redcue Novolog to 6 with meals and sliding scale insulin with meals for correction.        4.  Hypertension   - Elevated in ED, improved since  - Continue PTA Norvasc 2.5 mg daily, lisinopril 20 mg daily and metoprolol 25 mg twice daily.  - P.r.n. hydralazine will be available as needed.       5.  Hypothyroidism    - PTA Synthroid.       6.  History of coronary artery disease   - previous BMS to RCA in 2010.    - PTA aspirin, beta blocker, statin and ACE.      7. Acute Pulmonary Embolism  - Continues on eliquis (was on heparin gtt previously)      8. Mild Hemoptysis:   - very light blood tinge sputum previously, improving as of 11/30 - 12/1, continue to monitor  - Continue with the Eliquis, if returns or worsens we will need to hold the anticoagulations.      9. Goals of care:   - Palliative care met with patient and family over last few days; appreciate assistance and efforts. Patient aware of options and recommendation to be DNR/DNI given severity of his pulmonary disease, especially. They are considering options.         DVT Prophylaxis: Eliquis   Code Status: Full Code. Appreciate ongoing discussion had between patient, wife Kelly, and Palliative Team.   Dispo: likely ready in 1-2 days pending oncology input, oxygen requirements may be restricting potential placement, wife concerned about being able to provide cares at home    Rufino Noble MD  Text Page (7am to 6pm, M-F)    Interval History      No acute events overnight, patient seen and examined  No CP today  Breathing stable  No fevers or chills, no cough  No new complaints otherwise  Awaiting oncology consultation    -Data reviewed today: I reviewed all new labs and imaging results over the last 24 hours. I personally reviewed no images or EKG's today.    Physical Exam   Temp: 98  F (36.7  C) Temp src: Oral BP: 137/82 Pulse: 69 Heart Rate: 69 Resp: 18 SpO2: 90 % O2 Device: Oxymizer cannula Oxygen Delivery: 5 LPM  Vitals:    12/02/18 0700 12/03/18 0500 12/04/18 0619   Weight: 68.9 kg (151 lb 14.4 oz) 68.4 kg (150 lb 12.7 oz) 68.9 kg (151 lb 14.4 oz)     Vital Signs with Ranges  Temp:  [97.9  F (36.6  C)-98  F (36.7  C)] 98  F (36.7  C)  Pulse:  [69] 69  Heart Rate:  [69-83] 69  Resp:  [18] 18  BP: (130-137)/(77-82) 137/82  SpO2:  [90 %-92 %] 90 %  I/O last 3 completed shifts:  In: 890 [P.O.:890]  Out: 700 [Urine:700]    Constitutional:   Very pleasant. Awake, alert, cooperative, no apparent distress  Respiratory: no wheeze heard, a few bibasilar crackles noted, more so on L  Cardiovascular: Regular rate and rhythm, s1 s2 noted  GI: Normal bowel sounds, soft, non-distended, non-tender  Skin/Integumen: No rashes, no cyanosis, no edema  Neuro: AAOx3, no focal deficits    Medications       amLODIPine (NORVASC) tablet 2.5 mg  2.5 mg Oral Daily     apixaban ANTICOAGULANT  5 mg Oral BID     aspirin (ASA) chewable tablet 81 mg  81 mg Oral At Bedtime     budesonide  0.5 mg Nebulization BID     colchicine (COLCYRS) tablet 0.6 mg  0.6 mg Oral QPM     digoxin  125 mcg Oral Daily     FLUoxetine (PROzac) capsule 40 mg  40 mg Oral Daily     furosemide  20 mg Oral Every Other Day     insulin aspart  6 Units Subcutaneous TID w/meals     insulin aspart  1-7 Units Subcutaneous TID AC     insulin aspart  1-5 Units Subcutaneous At Bedtime     insulin glargine  14 Units Subcutaneous At Bedtime     ipratropium - albuterol 0.5 mg/2.5 mg/3 mL  3 mL Nebulization 4x daily      levothyroxine  125 mcg Oral Daily     lisinopril (PRINIVIL/ZESTRIL) tablet 20 mg  20 mg Oral Daily     metoprolol succinate ER  25 mg Oral BID     multivitamin w/minerals  1 tablet Oral Daily     pantoprazole  40 mg Oral QAM AC     predniSONE  40 mg Oral Daily     rosuvastatin  10 mg Oral QPM     sodium chloride (PF)  3 mL Intracatheter Q8H     tadalafil  20 mg Oral Daily     tamsulosin  0.4 mg Oral Daily       Data     Recent Labs  Lab 12/02/18  0909 11/29/18  0813   WBC 15.7* 11.8*   HGB 13.5 12.4*   MCV 86 85    144*   NA  --  138   POTASSIUM  --  4.1   CHLORIDE  --  106   CO2  --  26   BUN  --  22   CR  --  0.61*   ANIONGAP  --  6   AV  --  7.7*   GLC  --  180*   ALBUMIN  --  2.6*       No results found for this or any previous visit (from the past 24 hour(s)).

## 2018-12-04 NOTE — PLAN OF CARE
Problem: Patient Care Overview  Goal: Plan of Care/Patient Progress Review  Outcome: No Change  Alert and oriented x4.  VSS on 5L via NC but does de sat as low as the 70's with long periods of activity.  Lungs diminished,  CHOUDHURY, and accessory muscle use. Productive cough with blood tinged sputum.  Tolerating mod carb diet, declined set insulin dose at lunch.  Pt stating likely transfer to oncology unit tomorrow to begin chemotherapy

## 2018-12-04 NOTE — PLAN OF CARE
Problem: Patient Care Overview  Goal: Plan of Care/Patient Progress Review  Outcome: Improving  Pt A/Ox4. Assist x1 with walker and gait belt, fall risk. VSS on 5L oxymizer cannula. Complaints of upper right chest pain, Bengay applied and Tylenol given. Lung sounds clear with infrequent productive cough. Small amount of blood tinged sputum. Ambulated in crews this evening. /178. Oncology consulted for today. Discharge pending progress.

## 2018-12-04 NOTE — PROGRESS NOTES
"[facility] Progress Note  [facility]    Visited per follow up.    SH checked in with Pt. Pt was pleasant and said \"Well, think I have met all of you now.\" Pt was doing okay with no needs at this time. Pt laying in bed watching TV. Pt requested continual check in from  as hospital stay persists.     SH provided a kind and caring presence, listening, hospitality.     SH will follow up in the next day or so.       Zay Collins  Chaplain Resident  "

## 2018-12-04 NOTE — CONSULTS
Patient seen and examined. Full note dictated. Briefly, patient is a 73 year old man with a complicated PMH including pulmonary fibrosis, coronary artery disease and Type 2 DM. He was admitted on 11/23 with increasing dyspnea. Work up included a CT which showed a large right pleural effusion as well as a hilar mass. Thoracentesis was performed with improvement in his symptoms. Cytology shows high grade neuroendocrine carcinoma.I reviewed the findings at length with him and his wife and discussed different treatment options. He is not a candidate for surgery given location of tumor and underlying health problems. In addition, radiation would not likely be feasible given the pleural involvement. We discussed the use of chemotherapy and I explained that this type of cancer is typically quite sensitive to chemotherapy. I emphasized to him that chemotherapy would not be curative and that the main benefit with treatment would be reduction in recurrence of pleural fluid. We discussed the prognosis and I estimated his survival without treatment as less than 3 months.I emphasized to him that chemotherapy could extend his life by a few months if his cancer responds. We discussed the possibility that his cancer may not respond or he may have significant side effects. I encouraged to think these issues over. If he were to begin treatment, I recommend that this treatment be started as an inpatient. Thanks for asking our group to help in his care.

## 2018-12-04 NOTE — PLAN OF CARE
Problem: Patient Care Overview  Goal: Plan of Care/Patient Progress Review  Discharge Planner PT   Patient plan for discharge: home   Current status: IND for bed mobility supine > sit. SBA for ambulation in hallway x 100 ft. Pt fatigues quickly with SOB on 6 L O2. O2 sats in 70's following ambulation. Educated on PLB and O2 sats returned to 90's within 1 minute. Requires constant monitoring of O2 sats. IND to stand at sink to perform grooming tasks. On 6 L O2 and sats into 70's with activity. With Inc time and PLB returned to 90's. Pt required inc time due to fatigue.   Barriers to return to prior living situation: none anticipated  Recommendations for discharge: home with spouse   Rationale for recommendations: pt able to perform ADL's with SBA-IND. Biggest barrier is impaired respiratory status with activity however pt reports he knows how to manage appropriately at home. Will continue IP OT to maximize ADL performance.         Entered by: Jennifer Sanz 12/04/2018 8:27 AM

## 2018-12-04 NOTE — PROGRESS NOTES
[facility] Nurse Inpatient Adult Pressure Injury (PI) Assessment     Follow up Assessment of PI(s) on pt's:   Mirrored, medial gluteal fold/ coccyx area    Data:   Patient History:      per MD note(s): 73 year old male past medical history of histiocytosis X, pulmonary fibrosis, severe pulmonary hypertension and COPD/Emphysema, who is chronically oxygen dependent on 4-6 liters who was admitted on 2018 for worsening SOB. He had been requiring HFNC, but on  is now down to 6-8L oxymizer and has been able to ambulate the unit.   Cristi Assessment and sub scores:   Cristi Score  Av.1  Min: 18  Max: 20     Positioning: Pillows,     Mattress:  Standard , Atmos Air mattress    Moisture Management:  N/a pt uses bathroom/ urinal, is continent    Catheter secured? Not applicable    Current Diet / Nutrition:       Active Diet Order      Combination Diet [id number] Calories: Moderate Consistent CHO (4-6 CHO units/meal)  Recent Labs   Lab Test  18   0909  18   0813   18   1130   18   1015   10/29/10   0900   ALBUMIN   --   2.6*   < >   --    < >  3.0*   < >   --    HGB  13.5  12.4*   < >   --    < >  13.6   < >   --    INR   --    --    --   0.98   --    --    < >   --    WBC  15.7*  11.8*   < >   --    < >  10.0   < >   --    A1C   --    --    --    --    --   10.7*   < >   --    CRP   --    --    --    --    --    --    --   77.0*    < > = values in this interval not displayed.                                                                                                                      Pressure Injury Assessment  (location):   Mirrored, medial gluteal fold/ coccyx area    Wound History:   Pt with chronic respiratory issues.  Sits very upright in bed.  Says he has had wounds on his butt for years, that he uses a chair cushion that blows up and is pretty adamant that he will do what he wants, uses cushions that are donuts. Said the wound on his butt was present  "when he came into the hospital.  Pt with thin, fragile looking skin over entire body    Coccyx/ gluteal cleft now without any erythema, epidermis intact.          Intervention:     Patient's chart evaluated.      Cristi Interventions:  Current Cristi Interventions and Care Plan reviewed and updated, appropriate at this time.    Wounds assessed as noted above    Orders  reviewed    Supplies  reviewed, discussed with RN and discussed with patient and his wife    Discussed plan of care with Patient and Nurse    All patient / family questions answered:  YES- reinforced need for continuous pressure relief           Assessment:     Pressure Injury (PI) located on bilateral gluteal cleft/ coccyx:  DTPI and both are community acquired have now resolved.  Epidermis intact without erythema.   Encourage pt to continue with aggressive PIP measures and no dressing necessary, continue TID cleaning of tissue and use baby powder to keep tissue dry.      Will recommend spraying Jes Cleanse and Protect on arms, legs, dry skin for good skin health, see plan below           Plan:     Nursing to notify the Provider(s) and re-consult the [facility] Nurse if wound(s) deteriorate(s)or if the wound care plan needs reevaluation.    If pt is refusing to turn or reposition they must be educated on the  potential injury from not off loading pressure.  Then this \"educated refusal\" needs to be documented as an \"educated refusal to turn/ reposition\" and document if alert, etc.    Plan for wound care to wound located on medial gluteal cleft: TID and prn  1. Clean skin with disposable wipes  2. Dust with baby powder  3. Position pt only side to side, no direct supine positioning in bed.   Use chair cushion when up to the chair (NO DONUTS), fully off load every 1-2 hours and shift weight every 15minutes    Care of dry skin (arms, legs, etc) daily  1. Spray with Jes Cleanse and Protect, massage into skin and wipe off  2. Apply lotion    Pressure Injury " "Prevention Measures (PIP):  If pt is refusing to turn or reposition they must be educated on the  potential injury from not off loading pressure.  Then this \"educated refusal\" needs to be documented as an \"educated refusal to turn/ reposition\" and document if alert, etc. Additionally, you MUST notify the charge nurse, nurse manager and the provider of the pt's refusal to reposition  Follow Cristi Risk recommendations  ? Nutrition following?  ? Moisture and Incontinence issues addressed?   1. Positioning:  Pt must be repositioned for good skin health.      Bed:   o Avoid supine positioning  o Try to keep HOB below 30 degrees  o Reposition every 1-2 hours  o Use pillows in the lower back and upper thighs to support positioning with a pillows, creating slight gap at coccyx for even better offloading.   o For specialty mattress options defer to bed algorithm found in [facility] WO Resource Page  o Consider use of Z-FLOW PADS for maintaining good positioning (small#282915/ medium #773960/ large #[id number])   o Keep heels elevated- one pillow under each leg, runningfrom knee to heel, checking heels are free    Chair:    o Fully off load every 1-2 hours, either return to bed or stand at least 5 minutes  o Encourage pt to shift weight every 15 mintues  o If a PI risk then sit on a chair cushion (#704342), pillows do not provide off loading  o Do NOT use a donut for sitting. This concentrates pressure in a smaller area and creates a higher potential for injury vs spreading the pressure   o Upon return to bed, position on side  o Do not sit on a Z-Flow Pad This is NOT a chair cushion, it is for positioning     2.  Patient's skin must be kept clean and dry- skin is only clean when you see the base of a skin fold and your wipe is clean.   o Moist, macerated tissue is more susceptible to injury  ? Dust with baby powder BID to help with chaffing, decrease warmth from friction and increase comfort. OR antifungal powder if rash  ? Use " [employer] (#308674)  between folds  o Follow Incontinence Protocol found in EPIC Resource pages  o NO BRIEFS WITH CATHETERS  3.  Be aware of the bony prominences!  o Elevate heels at all times- one pillow under each leg from knee to heel w heels floating  o Consider using heel lift boots, Bon or Rooke- and still elevated heels  o Use Mepilex Dressings for PI prevention  o Apply skin prep to bony prominences such as elbows, heels, hips.  o Consider wearing long sleeves and/or pants at all times  4.  perform full skin inspection 2 x / day- unless ordered NOT to reposition or to not remove device from an area.  If area is not assessed document date of order  to not assess  and MD who ordered.        [facility] Nurse will return: no need to continue to follow

## 2018-12-05 PROBLEM — C34.90 SMALL CELL LUNG CANCER IN ADULT (H): Status: ACTIVE | Noted: 2018-01-01

## 2018-12-05 NOTE — PROGRESS NOTES
[facility]    Hospitalist Progress Note      Assessment & Plan   Ravi Sandoval is a 73 year old male past medical history of histiocytosis X, pulmonary fibrosis, severe pulmonary hypertension and COPD/Emphysema, who is chronically oxygen dependent on 4-6 liters who was admitted on 11/23/2018 for worsening SOB. He had been requiring HFNC, but on 12/1 is now down to 6-8L oxymizer and has been able to ambulate the unit.       Acute on Chronic hypoxic respiratory failure in the setting of pulmonary fibrosis and COPD.  Non-occlusive thrombus on CT PE  - Notes increasing shortness of breath for the past 1 month.  Was treated outpatient with amoxicillin at the end of October for a sinusitis, requiring increasing O2 demands up from his baseline of 4 liters.  Afebrile with no significant leukocytosis.  Lactic acid was elevated at 2.5 now normal last LA 1.6.  Procalcitonin <0.05.  - He was on Prednisone 40mg daily. Patient was hypoxic and SOB, on high flow oxygen, xray shows Fibrosis and opacity on right lower side, bilateral crackles, occasional wheezing and decrease air entry.  - CTA chest PE protocol done on 11/26,  large R pleural effusion, non occlusive thrombus and pleural based densities  - Pulmonary Consulted , now signed off   - Initially on IV Solumerol 40 mg Q 6 hrs, DuoNeb every 4-6 hrs, Start on Pulmicort.    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 Completed antibiotics as above. Cytology with high grade neuroendocrine tumor  - oncology  following, appreciate help  - patient wishes to pursue chemo, plan to start palliative chemo while in hospital- will transfer to       Severe pulmonary hypertension   - Appears compensated.    -Continue PTA tadalafil, digoxin and every other day Lasix.       Uncontrolled type 2 diabetes   - A1c in October was 10.8.  He is supposed to take glipizide 10 mg daily though notes he has not been taking it as directed.  - Does not check his blood sugars at home.  Oral agents on hold. Blood sugar still on higher side secondary to steroids. Low BG 45 on 12/02,   - continue Lantus to 14 units at bedtime,   - Increase Novolog to 8 with meals and sliding scale insulin with meals for correction. discontinued bedtime sliding scale insulin. Am BG 96 today, otherwise has been in the 200s in last 24hrs      Hypertension   - Elevated in ED, improved since  - Continue PTA Norvasc 2.5 mg daily, lisinopril 20 mg daily and metoprolol 25 mg twice daily.  - P.r.n. hydralazine will be available as needed.       Hypothyroidism    - PTA Synthroid.       6.  History of coronary artery disease   - previous BMS to RCA in 2010.    - PTA aspirin, beta blocker, statin and ACE.       Acute Pulmonary Embolism  - Continues on eliquis (was on heparin gtt previously)      Mild Hemoptysis:   - very light blood tinge sputum previously, improving as of 11/30 - 12/1, continue to monitor  - Continue with the Eliquis, if returns or worsens we will need to hold the anticoagulations.      Goals of care:   - Palliative care met with patient and family over last few days; appreciate assistance and efforts. Patient aware of options and recommendation to be DNR/DNI given severity of his pulmonary disease, especially. They are considering options.         DVT Prophylaxis: Eliquis   Code Status: Full Code. Appreciate ongoing discussion had between patient, wife Kelly, and Palliative Team.   Dispo: plan to start chemotherapy inpatient and when stable after chemo,  likely in the nex t3-4 days      Tameka Sampson MD  Text Page  (7am to 6pm)    Interval History   Breathing is improved and stable.   Denies abdominal pain, nausea or vomiting.     -Data reviewed today: I reviewed all new labs and imaging results over the last 24 hours. I personally reviewed no images or EKG's today.    Physical Exam   Temp: 97.3  F (36.3  C) Temp src: Oral BP: 139/75 Pulse: 89 Heart Rate: 76 Resp: 20 SpO2: 92 % O2 Device: Oxymizer cannula Oxygen Delivery: 6 LPM  Vitals:    12/03/18 0500 12/04/18 0619 12/05/18 0500   Weight: 68.4 kg (150 lb 12.7 oz) 68.9 kg (151 lb 14.4 oz) 68.9 kg (151 lb 14.4 oz)     Vital Signs with Ranges  Temp:  [97.3  F (36.3  C)-97.7  F (36.5  C)] 97.3  F (36.3  C)  Pulse:  [89] 89  Heart Rate:  [76-93] 76  Resp:  [20] 20  BP: (116-139)/(73-76) 139/75  SpO2:  [90 %-93 %] 92 %  I/O last 3 completed shifts:  In: 540 [P.O.:540]  Out: 1225 [Urine:1225]    Constitutional: Alert, awake and no apparent distress  Respiratory: diminished BS at the bases, upper lung fields are CTAB  Cardiovascular: regular rate and rhythm  GI: soft and non-tender  Skin/Integumen: warm and dry  Other:      Medications       amLODIPine (NORVASC) tablet 2.5 mg  2.5 mg Oral Daily     apixaban ANTICOAGULANT  5 mg Oral BID     aspirin (ASA) chewable tablet 81 mg  81 mg Oral At Bedtime     budesonide  0.5 mg Nebulization BID     colchicine (COLCYRS) tablet 0.6 mg  0.6 mg Oral QPM     digoxin  125 mcg Oral Daily     FLUoxetine (PROzac) capsule 40 mg  40 mg Oral Daily     furosemide  20 mg Oral Every Other Day     insulin aspart  6 Units Subcutaneous TID w/meals     insulin aspart  1-7 Units Subcutaneous TID AC     insulin aspart  1-5 Units Subcutaneous At Bedtime     insulin glargine  14 Units Subcutaneous At Bedtime     ipratropium - albuterol 0.5 mg/2.5 mg/3 mL  3 mL Nebulization 4x daily     levothyroxine  125 mcg Oral Daily     lisinopril (PRINIVIL/ZESTRIL) tablet 20 mg  20 mg Oral Daily      metoprolol succinate ER  25 mg Oral BID     multivitamin w/minerals  1 tablet Oral Daily     pantoprazole  40 mg Oral QAM AC     predniSONE  40 mg Oral Daily     rosuvastatin  10 mg Oral QPM     sodium chloride (PF)  3 mL Intracatheter Q8H     tadalafil  20 mg Oral Daily     tamsulosin  0.4 mg Oral Daily       Data     Recent Labs  Lab 12/02/18  0909 11/29/18  0813   WBC 15.7* 11.8*   HGB 13.5 12.4*   MCV 86 85    144*   NA  --  138   POTASSIUM  --  4.1   CHLORIDE  --  106   CO2  --  26   BUN  --  22   CR  --  0.61*   ANIONGAP  --  6   AV  --  7.7*   GLC  --  180*   ALBUMIN  --  2.6*       No results found for this or any previous visit (from the past 24 hour(s)).

## 2018-12-05 NOTE — PROGRESS NOTES
CLINICAL NUTRITION SERVICES - REASSESSMENT NOTE      Recommendations Ordered by Registered Dietitian (RD): Change ProStat to Magic Cup BID between meals per patient request    Malnutrition: (11/30)  % Weight Loss:  None noted  % Intake:  No decreased intake noted  Subcutaneous Fat Loss:  None observed  Muscle Loss:  None observed  Fluid Retention:  None noted     Malnutrition Diagnosis: Patient does not meet two of the above criteria necessary for diagnosing malnutrition       EVALUATION OF PROGRESS TOWARD GOALS   Diet:  Moderate CHO + ProStat BID between meals   Intake:  Visited with patient today.  His appetite is good and continues to eat % of meals.  However, he really doesn't like the ProStat much and would rather get the Magic Cup.  Reviewed the meal ordering computer system and noted that he has been consuming ~100-140 grams of protein over the last two days.        NEW FINDINGS:   Noted patient with a new diagnosis of metastatic high-grade neuroendocrine CA   Plans to transfer to the oncology floor for initiation of chemo    WOCN eval on 12/4 indicated that Pressure Injury on bilateral gluteal cleft/coccyx --> DTPI and bother are community acquired and have now resolved.  Patient continues to receive the Theravite with minerals daily.    Previous Goals (11/30):   Pt will consume at least 100 gm protein daily  Evaluation: Met    Previous Nutrition Diagnosis (11/30):   Increased nutrient needs related to healing as evidenced by Pressure Injury located on bilateral gluteal cleft/ coccyx  Evaluation: Resolved     CURRENT NUTRITION DIAGNOSIS  No nutrition diagnosis identified at this time     INTERVENTIONS  Recommendations / Nutrition Prescription  Continue Moderate CHO diet as tolerated  Change ProStat to Magic Cup BID between meals per patient request     Implementation  Medical Food Supplement:  Changed in EPIC as above     Goals  Patient will continue to consume % of meals + will consume %  of supplements     MONITORING AND EVALUATION:  Progress towards goals will be monitored and evaluated per protocol and Practice Guidelines    Nat Luo RD, LD, CNSC   Clinical Dietitian - [facility]

## 2018-12-05 NOTE — PROGRESS NOTES
[facility] Progress Note          Assessment and Plan:   Mr. Ravi Sandoval is a 73 year old man who was admitted on 11/23/2018 with increasing dyspnea    1. 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 I specifically reviewed the prognosis and emphasized that treatment is palliative, not curative. He expresses interest in starting treatment and will therefore plan on transfer to 8th floor for chemotherapy teaching    2. Chronic respiratory failure due to pulmonary fibrosis and to COPD    3. Diabetes mellitus    4. Thrombophilia  - on apixiban             Interval History:   Patient reports feeling weak but otherwise is comfortable              Medications:       amLODIPine (NORVASC) tablet 2.5 mg  2.5 mg Oral Daily     apixaban ANTICOAGULANT  5 mg Oral BID     aspirin (ASA) chewable tablet 81 mg  81 mg Oral At Bedtime     budesonide  0.5 mg Nebulization BID     colchicine (COLCYRS) tablet 0.6 mg  0.6 mg Oral QPM     digoxin  125 mcg Oral Daily     FLUoxetine (PROzac) capsule 40 mg  40 mg Oral Daily     furosemide  20 mg Oral Every Other Day     insulin aspart  6 Units Subcutaneous TID w/meals     insulin aspart  1-7 Units Subcutaneous TID AC     insulin aspart  1-5 Units Subcutaneous At Bedtime     insulin glargine  14 Units Subcutaneous At Bedtime     ipratropium - albuterol 0.5 mg/2.5 mg/3 mL  3 mL Nebulization 4x daily     levothyroxine  125 mcg Oral Daily     lisinopril (PRINIVIL/ZESTRIL) tablet 20 mg  20 mg Oral Daily     metoprolol succinate ER  25 mg Oral BID     multivitamin w/minerals  1 tablet Oral Daily     pantoprazole  40 mg Oral QAM AC     predniSONE  40 mg Oral Daily     rosuvastatin  10 mg Oral QPM     sodium chloride (PF)  3 mL Intracatheter Q8H     tadalafil  20 mg Oral Daily     tamsulosin  0.4 mg Oral Daily     acetaminophen, acetaminophen, glucose **OR** dextrose  "**OR** glucagon, melatonin, methyl salicylate-menthol, naloxone, ondansetron **OR** ondansetron, potassium chloride, potassium chloride with lidocaine, potassium chloride, potassium chloride, potassium chloride, prochlorperazine **OR** prochlorperazine **OR** prochlorperazine, sodium chloride (PF)               Physical Exam:   Blood pressure 139/75, pulse 89, temperature 97.3  F (36.3  C), temperature source Oral, resp. rate 20, height 1.702 m (5' 7\"), weight 68.9 kg (151 lb 14.4 oz), SpO2 92 %.  Wt Readings from Last 4 Encounters:   18 68.9 kg (151 lb 14.4 oz)   17 64.4 kg (142 lb)   17 64.4 kg (142 lb)   17 63.5 kg (140 lb)         Vital Sign Ranges  Temperature Temp  Av.5  F (36.4  C)  Min: 97.3  F (36.3  C)  Max: 97.7  F (36.5  C)   Blood pressure Systolic (24hrs), Av , Min:116 , Max:139        Diastolic (24hrs), Av, Min:73, Max:76      Pulse Pulse  Av  Min: 89  Max: 89   Respirations Resp  Av  Min: 20  Max: 20   Pulse oximetry SpO2  Av.6 %  Min: 90 %  Max: 93 %         Intake/Output Summary (Last 24 hours) at 18 0844  Last data filed at 18 0632   Gross per 24 hour   Intake              540 ml   Output             1075 ml   Net             -535 ml       Constitutional: Awake, alert, cooperative, no apparent distress   Lungs: Decreased breath sounds bilaterally, no crackles or wheezing   Cardiovascular: Regular rate and rhythm, normal S1 and S2, and no murmur noted   Abdomen: Normal bowel sounds, soft, non-distended, non-tender   Skin: No rashes, no cyanosis, no upper or lower extremity edema   Other:           Data:   Laboratory:  CMP  Recent Labs  Lab 18  0813      POTASSIUM 4.1   CHLORIDE 106   CO2 26   ANIONGAP 6   *   BUN 22   CR 0.61*   GFRESTIMATED >90   GFRESTBLACK >90   AV 7.7*   PHOS 3.7   ALBUMIN 2.6*     CBC  Recent Labs  Lab 18  0909 18  0813   WBC 15.7* 11.8*   RBC 4.71 4.33*   HGB 13.5 12.4*   HCT 40.5 " 36.9*   MCV 86 85   MCH 28.7 28.6   MCHC 33.3 33.6   RDW 17.1* 17.2*    144*     INRNo lab results found in last 7 days.    Imaging data:  Results for orders placed or performed during the hospital encounter of 11/23/18   XR Chest 2 Views    Narrative    CHEST TWO VIEWS 11/23/2018 11:08 AM     HISTORY: Shortness of breath and possible pneumonia.     COMPARISON: 3/12/2017    FINDINGS: Superimposed on fibrotic-appearing lung abnormalities is  abnormal opacity in the right lower lobe suggestive of developing  pneumonia. Trace right pleural effusion. No pneumothorax. Heart size  normal.       Impression    IMPRESSION: Right lower lobe pneumonia superimposed on  fibrotic-appearing lungs.     YANG DIAZ MD   CT Chest Pulmonary Embolism w Contrast     Value    Radiologist flags Pulmonary embolism (AA)    Narrative    CT CHEST PULMONARY EMBOLISM WITH CONTRAST November 26, 2018 10:33 AM     HISTORY: Worsening shortness of breath and hypoxia.    COMPARISON: None.    TECHNIQUE: Volumetric helical acquisition of CT images of the chest  from the lung apices to the kidneys were acquired after the  administration of  59mL Isovue-370  IV contrast. Radiation dose for  this scan was reduced using automated exposure control, adjustment of  the mA and/or kV according to patient size, or iterative  reconstruction technique.    FINDINGS: There is a nonocclusive thrombus in a right lower lobe  pulmonary artery. No other definite pulmonary embolism demonstrated.  There is now a moderate to large right pleural effusion. No  significant left pleural fluid. No pericardial effusion. Multiple  nodular densities are seen in the major fissure and along the  pericardium. These are new since the comparison study and could  represent metastatic disease. A probable right hilar lymph node is  also present measuring 3.5 x 2.1 cm. This representatives a mass along  the pericardium and measures 2.0 x 1.4 cm. A prominent mass in  the  posterior mediastinum/azygos esophageal recess is noted measuring 5.6  x 2.9 cm. The heart is not enlarged. Thoracic aorta is atherosclerotic  without evidence of dissection or aneurysm. There are moderate  coronary vascular calcifications consistent with coronary artery  disease.  There is no pneumothorax. Adrenal glands are normal.  Remainder of the visualized upper abdomen is unremarkable.      Impression    IMPRESSION:   1. A single nonocclusive thrombus is seen in the right lower lobe.  2. No thoracic aortic dissection or aneurysm.   3. Moderate to large right pleural effusion and multiple pleural-based  masses concerning for malignancy.    [Critical Result: Pulmonary embolism]    Finding was identified on 11/26/2018 11:07 AM.     The nurse on the floor, Kayy, was contacted for Dr. CANELO KIRKPATRICK~[id number] JOSE DENT at 11/26/2018  11:15 AM and verbalized understanding of the critical finding.     MIRANDA GIBSON MD   US Thoracentesis    Narrative    ULTRASOUND-GUIDED THORACENTESIS  11/26/2018 2:01 PM     HISTORY: Shortness of breath, hypoxic with large pleural effusion on  right side.     FINDINGS: Ultrasound was used to evaluate for the presence and best  approach for drainage of a pleural effusion. Written and oral informed  consent was obtained. A pause for the cause procedure to verify the  correct patient and correct procedure. The skin overlying the right  chest posteriorly was prepped and draped in the usual sterile fashion.  The subcutaneous tissues were anesthetized with 10 mL 1% lidocaine. A  catheter was advanced into the pleural space and 1500 mL of ivonne  colored fluid was drained. Patient was monitored by nurse under my  direct supervision throughout the exam. Ultrasound images were  permanently stored.  There were no immediate complications. Patient  left the ultrasound suite in satisfactory condition.      Impression    IMPRESSION: Technically successful  thoracentesis without immediate  complications.    MIRANDA GIBSON MD   XR Chest 1 View    Narrative    CHEST ONE VIEW November 26, 2018 2:23 PM     HISTORY: Pleural effusion, post thoracentesis.     COMPARISON: 11/23/2018.      Impression    IMPRESSION: No pneumothorax or pleural effusion on either side.  Chronic appearing interstitial abnormalities are present. Heart size  normal.    MD Nelson LUNDY MD

## 2018-12-05 NOTE — PROGRESS NOTES
Report called to RN 88, transferred for future chemo. Unable to reach spouse, message left on voicemail about transfer. VSS on 6L oxymizer mask. Pt desated 78% on 6L with turning/repo. Returned to 91% with rest. Baseline 5L at home. Per pulm note, maintain O2 sats >88%. Dietitian saw pt ordered magic cup between meals. BS checked 241. Mod carb diet. Mepilex to coccyx changed, wound care done. Turned, and encouraged pt to remain off of coccyx. Blanchable redness to coccyx. Palliative following.

## 2018-12-05 NOTE — PROGRESS NOTES
CTS consult was done by SALLIE on 12/3/18.  Care Coordinator conversed with Dr Brownlee yesterday and again today.  He wants to make sure pt truly understands that his cancer is not curative with starting chemo.  Palliative had met earlier with pt.  Pt was not interested in transitioning to Hospice.  Care Coordinator met with pt and his spouse this AM.  Pt wants to proceed with initiation with chemo and knows his new cancer is not curable and that chemo may help to decrease the reaccumulation of his pleural fluid.  Dr Brownlee has discussed prognosis, with life expectancy of three months without chemo and maybe six months with chemo, with pt and spouse.    Plan transfer to Oncology today to start teaching with initiation of chemo tomorrow.

## 2018-12-05 NOTE — PROGRESS NOTES
[facility]    Palliative Care Progress Note    Ravi Sandoval  MRN# [id number]  Date of Admission:  11/23/2018  Date of Service (when I saw the patient): 12/05/2018    Assessment & Plan   Ravi Sandoval is a 73 year old male with past medical history of histiocytosis X, pulmonary fibrosis, severe pulmonary hypertension and COPD/Emphysema, who is chronically oxygen dependent on 4-6 liters who was admitted on 11/23/2018 for worsening SOB.    Discussion.  1. Goals of Care. Checked in with Mr Sandoval. He confirms that his plan is to transfer to station 88 to begin chemotherapy today. No other concerns or questions for me today. He feels his breathing is stable. We will continue to follow peripherally for ongoing support.     Support/Coping  -pt's wife at bedside   -Will involve Palliative LICSW, Brandie Abreu, and/or Palliative , Mago Holland    Decisional Support, Goals of Care, Counseling & Coordination  Decisional Capacity Intact?  -Yes  Health Care Directive on File?  -No  Code Status/Resuscitation Preferences?  -FULL  Plan of Care?  -continue with current medical cares.    Thank you for involving us in the care of this patient and family. We will continue to follow. Please do not hesitate to contact me with questions or concerns or the on-call provider for our team if evening or weekend.    Pge AREVALO, [facility]  Palliative Medicine   Pager [phone]    Attestation:  Total time on the floor involved in the patient's care: 35 minutes  Total time spent in counseling/care coordination: >50%    Interval History   Mr Sandoval seen resting comfortably in bed, no change in respiratory status since yesterday. Planning to transfer to  to begin chemotherapy.    Medications   Current Facility-Administered Medications Ordered in Epic   Medication Dose Route Frequency Last Rate Last Dose     acetaminophen (TYLENOL) Suppository 650 mg  650 mg Rectal Q4H PRN         acetaminophen (TYLENOL) tablet 650 mg   650 mg Oral Q4H PRN   650 mg at 12/04/18 2115     amLODIPine (NORVASC) tablet 2.5 mg  2.5 mg Oral Daily   2.5 mg at 12/05/18 0824     apixaban ANTICOAGULANT (ELIQUIS) tablet 5 mg  5 mg Oral BID   5 mg at 12/05/18 0823     aspirin (ASA) chewable tablet 81 mg  81 mg Oral At Bedtime   81 mg at 12/04/18 2115     budesonide (PULMICORT) neb solution 0.5 mg  0.5 mg Nebulization BID   0.5 mg at 12/05/18 0806     colchicine (COLCYRS) tablet 0.6 mg  0.6 mg Oral QPM   0.6 mg at 12/04/18 2115     glucose gel 15-30 g  15-30 g Oral Q15 Min PRN        Or     dextrose 50 % injection 25-50 mL  25-50 mL Intravenous Q15 Min PRN        Or     glucagon injection 1 mg  1 mg Subcutaneous Q15 Min PRN         digoxin (LANOXIN) tablet 125 mcg  125 mcg Oral Daily   125 mcg at 12/05/18 0823     FLUoxetine (PROzac) capsule 40 mg  40 mg Oral Daily   40 mg at 12/05/18 0823     furosemide (LASIX) tablet 20 mg  20 mg Oral Every Other Day   20 mg at 12/04/18 0902     insulin aspart (NovoLOG) inj (RAPID ACTING)  8 Units Subcutaneous TID w/meals         insulin aspart (NovoLOG) inj (RAPID ACTING)  1-7 Units Subcutaneous TID AC   4 Units at 12/04/18 1848     insulin glargine (LANTUS PEN) injection 14 Units  14 Units Subcutaneous At Bedtime   14 Units at 12/04/18 2117     ipratropium - albuterol 0.5 mg/2.5 mg/3 mL (DUONEB) neb solution 3 mL  3 mL Nebulization 4x daily   3 mL at 12/05/18 1110     levothyroxine (SYNTHROID/LEVOTHROID) tablet 125 mcg  125 mcg Oral Daily   125 mcg at 12/05/18 0823     lisinopril (PRINIVIL/ZESTRIL) tablet 20 mg  20 mg Oral Daily   20 mg at 12/05/18 0823     melatonin tablet 1 mg  1 mg Oral At Bedtime PRN         methyl salicylate-menthol (PATRIZIA-IRVING) ointment   Topical Q6H PRN         metoprolol succinate (TOPROL-XL) 24 hr tablet 25 mg  25 mg Oral BID   25 mg at 12/05/18 0824     multivitamin w/minerals (THERA-VIT-M) tablet 1 tablet  1 tablet Oral Daily   1 tablet at 12/05/18 0824     naloxone (NARCAN) injection 0.1-0.4 mg   0.1-0.4 mg Intravenous Q2 Min PRN         ondansetron (ZOFRAN-ODT) ODT tab 4 mg  4 mg Oral Q6H PRN        Or     ondansetron (ZOFRAN) injection 4 mg  4 mg Intravenous Q6H PRN         pantoprazole (PROTONIX) EC tablet 40 mg  40 mg Oral QAM AC   40 mg at 12/05/18 0631     potassium chloride (KLOR-CON) Packet 20-40 mEq  20-40 mEq Oral or Feeding Tube Q2H PRN         potassium chloride 10 mEq in 100 mL intermittent infusion with 10 mg lidocaine  10 mEq Intravenous Q1H PRN         potassium chloride 10 mEq in 100 mL sterile water intermittent infusion (premix)  10 mEq Intravenous Q1H PRN         potassium chloride 20 mEq in 50 mL intermittent infusion  20 mEq Intravenous Q1H PRN         potassium chloride SA (K-DUR/KLOR-CON M) CR tablet 20-40 mEq  20-40 mEq Oral Q2H PRN   20 mEq at 11/25/18 1104     predniSONE (DELTASONE) tablet 40 mg  40 mg Oral Daily   40 mg at 12/05/18 0822     prochlorperazine (COMPAZINE) injection 5 mg  5 mg Intravenous Q6H PRN        Or     prochlorperazine (COMPAZINE) tablet 5 mg  5 mg Oral Q6H PRN        Or     prochlorperazine (COMPAZINE) Suppository 12.5 mg  12.5 mg Rectal Q12H PRN         rosuvastatin (CRESTOR) tablet 10 mg  10 mg Oral QPM   10 mg at 12/04/18 2115     sodium chloride (PF) 0.9% PF flush 3 mL  3 mL Intracatheter Q1H PRN   3 mL at 11/29/18 0917     sodium chloride (PF) 0.9% PF flush 3 mL  3 mL Intracatheter Q8H   3 mL at 12/05/18 0836     tadalafil (CIALIS/ADCIRCA) tablet  20 mg Oral Daily   20 mg at 12/05/18 0823     tamsulosin (FLOMAX) capsule 0.4 mg  0.4 mg Oral Daily   0.4 mg at 12/05/18 0824     No current Epic-ordered outpatient prescriptions on file.       Physical Exam   Temp: 97.3  F (36.3  C) Temp src: Oral BP: 139/75 Pulse: 89 Heart Rate: 76 Resp: 20 SpO2: 94 % O2 Device: Oxymizer cannula Oxygen Delivery: 6 LPM  Vitals:    12/03/18 0500 12/04/18 0619 12/05/18 0500   Weight: 68.4 kg (150 lb 12.7 oz) 68.9 kg (151 lb 14.4 oz) 68.9 kg (151 lb 14.4 oz)     CONSTITUTIONAL:  NAD, A&Ox3. Calm and cooperative.  HEENT: NCAT, MMM  NECK: Supple  CARDIOVASCULAR: exam deferred   RESPIRATORY: NL respiratory effort on oxygen via oxymizer   GASTROINTESTINAL: exam deferred   MUSCULOSKELETAL: Moving freely in bed   SKIN: Warm and intact. No concerning lesions or rashes on exposed skin surfaces   NEUROLOGIC: Appropriately responsive during interview  PSYCH: Affect flat    Data   Results for orders placed or performed during the hospital encounter of 11/23/18 (from the past 24 hour(s))   Glucose by meter   Result Value Ref Range    Glucose 133 (H) 70 - 99 mg/dL   Glucose by meter   Result Value Ref Range    Glucose 315 (H) 70 - 99 mg/dL   Glucose by meter   Result Value Ref Range    Glucose 284 (H) 70 - 99 mg/dL   Glucose by meter   Result Value Ref Range    Glucose 202 (H) 70 - 99 mg/dL   Glucose by meter   Result Value Ref Range    Glucose 91 70 - 99 mg/dL   Creatinine   Result Value Ref Range    Creatinine 0.76 0.66 - 1.25 mg/dL    GFR Estimate >90 >60 mL/min/1.7m2    GFR Estimate If Black >90 >60 mL/min/1.7m2

## 2018-12-05 NOTE — PLAN OF CARE
Problem: Patient Care Overview  Goal: Plan of Care/Patient Progress Review  Outcome: No Change  Pt A&Ox4. VSS on 6 L oxymizer cannula. Up with assist x1 walker/GB. LS clear/diminished. CHOUDHURY. infrequent productive cough. Mod carb diet, . C/o mild R shoulder pain, with some relief from repositioning. Possible transfer too 88 today to begin chemotherapy. Palliative/oncology following

## 2018-12-05 NOTE — PLAN OF CARE
Problem: Patient Care Overview  Goal: Plan of Care/Patient Progress Review  Outcome: No Change  Pt A/Ox4. Assist x1 with walker and gait belt, fall risk. VSS on 5L oxymizer cannula. Lung sounds clear, diminished. CHOUDHURY. Infrequent productive cough with brown, blood tinged sputum. , sliding scale insulin given. Plan for likely transfer to  to begin chemotherapy tomorrow.

## 2018-12-05 NOTE — CONSULTS
Consult Date:  12/04/2018      MEDICAL ONCOLOGY CONSULTATION      REASON FOR CONSULTATION:  I am asked by Dr. Rufino Noble to see Mr. 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      HISTORY OF PRESENT ILLNESS:  Mr. Ravi Sandoval is a 73-year-old man who has a history of pulmonary fibrosis and chronic obstructive lung disease.  For this diagnosis, he has had a number of different therapies over time and currently has been on chronic corticosteroids.  He reports being in his usual state of health until the last 2-3 weeks.  During this time, he reports rapid worsening of his respiratory status.  He was admitted to [facility] on 11/23/2018.  At the time of diagnosis, he had adjustment in medical therapy.  He then had a CT scan performed to evaluate for a pulmonary embolism.  The CT scan showed a large right pleural effusion along with nonocclusive thrombus and also pleural-based densities.  A thoracentesis was performed on 11/30/2018.  Cytology from the specimen showed high-grade neuroendocrine carcinoma positive for chromogranin and synaptophysin.  He has been followed closely by the Pulmonary Medicine Service and also by Palliative Care.  A medical oncology consultation is requested for additional recommendations.      PAST MEDICAL HISTORY:   1.  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   2.  Pulmonary fibrosis and COPD with chronic respiratory failure.     3.  Coronary artery disease with previous myocardial infarction.   4.  Pulmonary hypertension.   5.  Type 2 diabetes mellitus with hemoglobin A1c recorded in the chart at 10.8% in 10/2018.   6.  Hypertension.   7.  Treated hypothyroidism.      PAST SURGICAL HISTORY:   1.  Bilateral cataract extraction.   2.  Surgical excision of nonmelanoma skin cancers.   3.  Screening  colonoscopy.   4.  Coronary artery stent placement.      FAMILY HISTORY:  Includes 3 siblings.  One brother is  at age 83 due to dementia.  One sister is  in her early 60s due to Creutzfeldt-Eduardo disease.  His other sister is well.  He has 2 sons, age 49 and 46, as well as 2 grandsons and one granddaughter, all of whom are well.      SOCIAL HISTORY:  He is  and lives locally with his family.  He has worked in a variety of occupations, including at [employer] and also for the [employer].  He retired in .  He has used tobacco products over a number of years and reports occasional tobacco use.  He does not use alcohol.      REVIEW OF SYSTEMS:  Positive for the problems already mentioned.  The remainder of a 10-point review of systems is otherwise negative.      PHYSICAL EXAMINATION:   VITAL SIGNS:  From the time of the consultation show temperature 97.7, heart rate 89, blood pressure 116/73, respiratory rate 20, oxygen saturation 90% on 6 liters per nasal cannula.   GENERAL:  Shows him to be a frail-appearing man resting in bed.   HEENT:  Scalp hair is normal.  Sclerae nonicteric.  Oropharynx is dry, but otherwise clear.  Ears and nose unremarkable.   LYMPH NODE:  No palpable adenopathy in the cervical or axillary regions.   CHEST:  Shows distant breath sounds.  No rales or wheezes are appreciated.   CARDIOVASCULAR:  Regular heart rhythm without murmur.  Radial pulses palpable bilaterally.   ABDOMEN:  Soft and nontender.  Spleen tip and liver edge not palpable.   GENITOURINARY AND RECTAL:  Exam not performed.   EXTREMITIES:  Shows no significant upper or lower extremity edema.  Muscle groups are atrophic but are symmetric.   NEUROLOGIC:  Cranial nerves II-XII intact.  Motor strength graded at 4+/5 and symmetric.  Gait not assessed.  Mood and affect as well as judgment and insight appear normal.      LABORATORY STUDIES:  From 2018 show a white count of 15,700, hemoglobin 13.5, and  platelet count 179,000.  Glucose levels have ranged between .  Creatinine on 11/29/2018 was 0.61.  Sodium, potassium, chloride, and bicarbonate are normal.  ALT is 19, AST 19, alkaline phosphatase 46, and total bilirubin 0.5 when measured on 11/26/2018.      CT of the chest on 11/26/2018 shows a single nonocclusive thrombus in the right lower lobe.  No thoracic aortic dissection or aneurysm was noted.  A moderate to large right pleural effusion, as well as multiple pleural-based masses were noted.  A right hilar lymph node measured 3.5 x 2.1 cm.  A mass along the pericardium measured 2.0 x 1.4 cm.  In addition, a mass in the posterior mediastinum and azygoesophageal recess measured 5.6 x 2.9 cm.  Nodular densities were seen in the major fissure along with pericardium and are felt to be new compared with prior scans.  A nonocclusive thrombus in the right lower lobe pulmonary artery was noted along with moderate to large right pleural effusion.  No significant left pleural effusion was noted.  Adrenal glands were normal.      IMPRESSION:     1.  Malignant right pleural effusion with cytology findings consistent with high-grade neuroendocrine carcinoma.  I reviewed this finding with Mr. Sandoval and with his wife.  We discussed typical sites where this type of malignancy would arise.  Given the radiographic findings, as well as history of tobacco use, a primary lung cancer is the best clinical fit in this circumstance.  We discussed whether additional studies would be necessary to confirm this diagnosis.  I recommended against additional imaging to evaluate for primary site further, as the results would not influence any of the decision making.  He has had a rapid worsening of his respiratory status over the last 2-3 weeks, which clinically fits with the rapid development of pleural effusion.  His symptoms are now improved somewhat after the thoracentesis.    We had a lengthy discussion regarding the different  management options.  I explained that one option would consist of intermittent thoracentesis along with consideration of pleurodesis.  I explained that this intervention could help manage his recurring pleural effusions, but would not alter the natural history of the malignancy.  We then discussed other therapies directed towards malignancy.  I explained that he is not a candidate for surgery given the location of the tumor, as well as other comorbid health problems.  Further, he is not a candidate for palliative radiation, given his underlying lung problems.  We discussed the role of chemotherapy.  I explained that high-grade neuroendocrine cancers can respond favorably to combinations such as etoposide and carboplatin.  I explained that the chief goal of this therapy would be to reduce his symptoms related to the malignant pleural effusion.  I did, however, emphasize to him that this treatment is not curative and he could develop side effects that could minimize or decrease the overall benefit that he receives with chemotherapy.  We discussed timing for beginning treatment, if he were to initiate this option.  I explained that I would recommend initiating treatment while he is hospitalized, given his precarious functional status.  We next discussed prognosis.  I explained in the absence of any active therapy, median survival would likely be under 3 months.  With active therapy, median survival would likely be extended, although the average survival typically will be less than 1 year.  I encouraged him to think through these options carefully and I offered to visit with him and his wife tomorrow.   2.  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  Continue anticoagulation as appropriate.   3.  Chronic respiratory failure with acute exacerbation due to above issues.  He appears stable on supplemental oxygen.   4.  Type 2 diabetes mellitus.      RECOMMENDATIONS:   1.  Continue best supportive care.   2.  Continue discussion  regarding the potential benefit and risk of palliative chemotherapy.   3.  Will return tomorrow for further discussion.      I appreciate the chance to meet Mr. Sandoval and to help in his care.         ROXANNA MONTES DE OCA MD             D: 2018   T: 2018   MT: MIKI      Name:     KYLE SANDOVAL   MRN:      [id number]        Account:       [id number]   :      1945           Consult Date:  2018      Document: [id number]       cc: Florencio Patricia MD

## 2018-12-05 NOTE — PLAN OF CARE
Problem: Patient Care Overview  Goal: Plan of Care/Patient Progress Review  Discharge Planner OT   Patient plan for discharge: home   Current status: Pt required SBA and ww for sit <> stand requiring cues for safety with ww. Pt completed grooming task standing at the sink independently. Pt required SBA  for ambulation in hallway; pt fatigues quickly with SOB on 6 L O2. O2 sats in 80's with ambulation. Pt educated on PLB throughout session; O2 sats returning above 90% within 1 minute  Pt is IND with sit to supine. Pt required cues to maintain safety with ww during transfers as pt tends to leave ww off to the side of body; pt unable to follow through with education on ww safety.   Barriers to return to prior living situation: none  Recommendations for discharge: home   Rationale for recommendations: Patient performing ADLs well, but desats with minimal activity. Pt reported knowing how to manage O2 sats at home.       Entered by: Daly Abreu 12/05/2018 10:38 AM

## 2018-12-05 NOTE — PLAN OF CARE
Problem: Patient Care Overview  Goal: Plan of Care/Patient Progress Review  Outcome: No Change  Patient alert/orient X4, up with sba/belt/walker. Lungs clear/diminished with CHOUDHURY.  On 6 liters oxymizer.  Has productive cough with occasional blood tinge sputum.  Up in chair for meals, BGM's 91/.Transfering to station 88 for chemotherapy today when bed available.  Vss, denied pain at this time.

## 2018-12-06 NOTE — PROVIDER NOTIFICATION
Spoke with Fish Marie regarding Dexamethasone for chemo protocol.  Patient already received Prednisone 40 mg this morning so will change Dexamethasone dose for today to 6 mg.  Will hold Prednisone 12/7 and 12/8, then resume.

## 2018-12-06 NOTE — PLAN OF CARE
Problem: Pneumonia (Adult)  Goal: Signs and Symptoms of Listed Potential Problems Will be Absent, Minimized or Managed (Pneumonia)  Signs and symptoms of listed potential problems will be absent, minimized or managed by discharge/transition of care (reference Pneumonia (Adult) CPG).   Outcome: No Change  Oriented x4.  VSS.  Dyspnea with exertion.  On 6 liters via oxymizer.  New IV started on right arm today for chemotherapy.  Diminished lungs.  Pre-meds given.  IV chemotherapy (carboplatin and etoposide) started today.  Good appetite.  Voiding in bathroom.  Denied pain.  Scattered bruising throughout body.  Nursing will continue to monitor.

## 2018-12-06 NOTE — PROGRESS NOTES
[facility]    Hospitalist Progress Note      Assessment & Plan   Ravi Sandoval is a 73 year old male past medical history of histiocytosis X, pulmonary fibrosis, severe pulmonary hypertension and COPD/Emphysema, who is chronically oxygen dependent on 4-6 liters who was admitted on 11/23/2018 for worsening SOB. He had been requiring HFNC, but on 12/1 is now down to 6-8L oxymizer and has been able to ambulate the unit.       Acute on Chronic hypoxic respiratory failure in the setting of pulmonary fibrosis and COPD.  Right sided non-occlusive PE on CT  - Notes increasing shortness of breath for the past 1 month.  Was treated outpatient with amoxicillin at the end of October for a sinusitis, requiring increasing O2 demands up from his baseline of 4 liters.  Afebrile with no significant leukocytosis.  Lactic acid was elevated at 2.5 now normal last LA 1.6.  Procalcitonin <0.05.  - He was on Prednisone 40mg daily. Patient was hypoxic and SOB, on high flow oxygen, xray shows Fibrosis and opacity on right lower side, bilateral crackles, occasional wheezing and decrease air entry.  - CTA chest PE protocol done on 11/26,  large R pleural effusion, non occlusive thrombus and pleural based densities  - Pulmonary Consulted , now signed off   - Initially on IV Solumerol 40 mg Q 6 hrs, DuoNeb every 4-6 hrs, Start on Pulmicort.    - tapering steroids by 10mg q 5 days per pulm recs, currently on PO prednisone 40mg daily (started on 12/3)  - Palliative care is following patient  - IS and flutter  - Wean O2 to baseline as able- O2 of 5L  - Appreciate Pulm recs/updates:   - Levaquin now PO ended 12/03    Stage IV high grade neuroendocrine tumor with pleural involvement  Right malignant pleural effusion  - Thoracentesis done : 1500  Ml of pleural fluid remove, exudative, 11/30 on day 4, in broth only pos for gram GPB resembling diptheroids. Completed antibiotics as above. Cytology with high grade neuroendocrine tumor  -  oncology following, appreciate help- patient wises to start palliative chemotherapy  - plan to start chemotherapy with Carboplatin and Etoposide today  - follow labs daily  - management per oncology    Severe pulmonary hypertension   - Appears compensated.    -Continue PTA tadalafil, digoxin and every other day Lasix.       Uncontrolled type 2 diabetes   - A1c in October was 10.8.  He is supposed to take glipizide 10 mg daily though notes he has not been taking it as directed.  - Does not check his blood sugars at home.  Oral agents on hold. Blood sugar still on higher side secondary to steroids.   - BG tends to be lower in am and runs high in 200s most of the day  - continue with Lantus 14 units daily  - change scheduled insulin to carb counting: Aspart 1 units/carb unit TID with meals and sliding scale insulin TID.   - discontinued bedtime sliding scale insulin since tends to be lower in the morning      Hypertension   - Elevated in ED, improved since  - Continue PTA Norvasc 2.5 mg daily, lisinopril 20 mg daily and metoprolol 25 mg twice daily.  - P.r.n. hydralazine will be available as needed.       Hypothyroidism    - PTA Synthroid.       History of coronary artery disease   - previous BMS to RCA in 2010.    - PTA aspirin, beta blocker, statin and ACE.       Acute Pulmonary Embolism  - Continues on eliquis (was on heparin gtt previously)      Mild Hemoptysis:   - very light blood tinge sputum previously, improving as of 11/30 - 12/1, continue to monitor  - Continue with the Eliquis, if returns or worsens we will need to hold the anticoagulations.      Goals of care:   - Palliative care met with patient and family over last few days; appreciate assistance and efforts. Patient aware of options and recommendation to be DNR/DNI given severity of his pulmonary disease, especially.  - palliative care following, appreciate help      DVT Prophylaxis: Eliquis   Code Status: Full Code. Appreciate ongoing discussion had  between patient, wife Kelly, and Palliative Team.   Dispo: plan to start chemotherapy inpatient and when stable after chemo, likely in the nex 4-5 days      Tameka Sampson MD  Text Page  (7am to 6pm)    Interval History   Feels overall improved. Cough is better, clear sputum and occasionally pinkish, but overall improved.   appetite is good. Denies n/v. Last BM 2 days ago.     -Datareviewed today: I reviewed all new labs and imaging results over the last 24 hours. I personally reviewed no images or EKG's today.    Physical Exam   Temp: 96.4  F (35.8  C) Temp src: Oral BP: 146/77 Pulse: 78 Heart Rate: 72 Resp: 16 SpO2: 94 % O2 Device: Oxymizer cannula Oxygen Delivery: 6 LPM  Vitals:    12/04/18 0619 12/05/18 0500 12/06/18 0544   Weight: 68.9 kg (151 lb 14.4 oz) 68.9 kg (151 lb 14.4 oz) 66.9 kg (147 lb 6.4 oz)     Vital Signs with Ranges  Temp:  [96.4  F (35.8  C)-97.8  F (36.6  C)] 96.4  F (35.8  C)  Pulse:  [78] 78  Heart Rate:  [72-91] 72  Resp:  [16-20] 16  BP: (112-148)/(64-80) 146/77  SpO2:  [78 %-96 %] 94 %  I/O last 3 completed shifts:  In: 1120 [P.O.:1120]  Out: 575 [Urine:575]    Constitutional: Alert, awake and no apparent distress  Respiratory: diminished BS at the bases, upper lung fields are CTAB  Cardiovascular: regular rate and rhythm  GI: soft and non-tender  Skin/Integumen: warm and dry  Other:      Medications       amLODIPine (NORVASC) tablet 2.5 mg  2.5 mg Oral Daily     apixaban ANTICOAGULANT  5 mg Oral BID     aspirin (ASA) chewable tablet 81 mg  81 mg Oral At Bedtime     budesonide  0.5 mg Nebulization BID     colchicine (COLCYRS) tablet 0.6 mg  0.6 mg Oral QPM     digoxin  125 mcg Oral Daily     FLUoxetine (PROzac) capsule 40 mg  40 mg Oral Daily     furosemide  20 mg Oral Every Other Day     insulin aspart  1-10 Units Subcutaneous TID AC     insulin aspart   Subcutaneous TID w/meals     insulin glargine  14 Units Subcutaneous At Bedtime     ipratropium - albuterol 0.5 mg/2.5 mg/3 mL  3  mL Nebulization 4x daily     levothyroxine  125 mcg Oral Daily     lisinopril (PRINIVIL/ZESTRIL) tablet 20 mg  20 mg Oral Daily     metoprolol succinate ER  25 mg Oral BID     multivitamin w/minerals  1 tablet Oral Daily     nystatin  500,000 Units Oral 4x Daily     pantoprazole  40 mg Oral QAM AC     predniSONE  40 mg Oral Daily     rosuvastatin  10 mg Oral QPM     senna-docusate  1 tablet Oral BID     sodium chloride (PF)  3 mL Intracatheter Q8H     tadalafil  20 mg Oral Daily     tamsulosin  0.4 mg Oral Daily       Data     Recent Labs  Lab 12/06/18  0708 12/05/18  0923 12/02/18  0909   WBC 12.5*  --  15.7*   HGB 11.8*  --  13.5   MCV 87  --  86     --  179     --   --    POTASSIUM 4.2 3.9  --    CHLORIDE 108  --   --    CO2 30  --   --    BUN 24  --   --    CR 0.81 0.76  --    ANIONGAP 4  --   --    AV 7.7*  --   --    GLC 91  --   --        No results found for this or any previous visit (from the past 24 hour(s)).

## 2018-12-06 NOTE — PLAN OF CARE
Problem: Patient Care Overview  Goal: Plan of Care/Patient Progress Review  Pt A&O. VSS on 6 L oxymizer cannula. Satting 92-95% while sleeping. Given tylenol x1 for right sided thoracic pain. No c/o nausea or SOB. R PIV SL. 0200 BG- 250. Urinal at bedside. Planning for chemo today.

## 2018-12-06 NOTE — PLAN OF CARE
Problem: Chronic Respiratory Difficulty Comorbidity  Goal: Chronic Respiratory Difficulty  Patient comorbidity will be monitored for signs and symptoms of Respiratory Difficulty (Chronic) condition.  Problems will be absent, minimized or managed by discharge/transition of care.   Outcome: No Change     Patient arrived on unit around 1730, alert and oriented, denies pain. VSS except for O2 saturation drops to 70-80s with ambulation, on 6 L with oxymizer nasal cannula. Very dsypneic with exertion, even during conversation at times. Saturation in low 90s at rest, MD aware. Patient's extremities katerina, nail beds dusky and clubbed. On a mod carb diet, tolerating well. BGs corrected with insulin. Up with assist of 1, walker and gaitbelt. Patient to start chemo tomorrow.

## 2018-12-06 NOTE — PROGRESS NOTES
Progress Note     Primary Oncologist/Hematologist:  Dr. Brownlee          Assessment and Plan:     Mr. Ravi Sandoval is a 73 year old man who was admitted on 11/23/2018 with increasing dyspnea     1. Stage IV high grade neuroendocrine cancer with pleural involvement  - Dr. Brownlee has reviewed the imaging findings and treatment options.   - He understands that this is palliative, not curative.   - Treatment recommended Carboplatin AUC 5 Day 1 and Etoposide 75 mg/m2 Days 1-3 (dose reduced due to medical frailty).   - Side effects reviewed and include but are not limited to: Low blood counts, infection, bleeding, fatigue, anorexia, taste changes, weight loss, nausea, vomiting, diarrhea, constipation, hair loss, nerve damage and kidney damage  - Would plan to repeat every 3-4 weeks depending on tolerance with repeat imaging after 2 cycles  - Would likely follow up in the clinic in the first week to assess toxicity and get weekly CBCs for at least cycle 1 with him being on anticoagulation  - Would consult dietician and Palliative Care for symptom management     2. Chronic respiratory failure due to pulmonary fibrosis and to COPD  - Still on high flow oxygen  - On prednisone 40 mg daily  - Continue pulmonary toilet measures  - He might need TCU vs home with home care     3. Diabetes mellitus  - Blood sugars will be elevated with dexamethasone     4. Thrombophilia  - On apixiban  - Would hold if platelets <50,000    5. Thrush  - Add nystatin    6. Chest wall pain  - Continue with acetaminophen  - Add OxyIR 5 mg every 4 hours as needed  - Add laxatives    Fish AREVALO, CNP  [facility]  [phone]        Interval History:   Breathing is fair. Chemotherapy reviewed. He would like to proceed.              Review of Systems:   The 5 point Review of Systems is negative other than noted in the HPI             Medications:   Scheduled Medications    amLODIPine (NORVASC) tablet 2.5 mg  2.5 mg Oral Daily     apixaban  "ANTICOAGULANT  5 mg Oral BID     aspirin (ASA) chewable tablet 81 mg  81 mg Oral At Bedtime     budesonide  0.5 mg Nebulization BID     colchicine (COLCYRS) tablet 0.6 mg  0.6 mg Oral QPM     digoxin  125 mcg Oral Daily     FLUoxetine (PROzac) capsule 40 mg  40 mg Oral Daily     furosemide  20 mg Oral Every Other Day     insulin aspart  8 Units Subcutaneous TID w/meals     insulin aspart  1-7 Units Subcutaneous TID AC     insulin glargine  14 Units Subcutaneous At Bedtime     ipratropium - albuterol 0.5 mg/2.5 mg/3 mL  3 mL Nebulization 4x daily     levothyroxine  125 mcg Oral Daily     lisinopril (PRINIVIL/ZESTRIL) tablet 20 mg  20 mg Oral Daily     metoprolol succinate ER  25 mg Oral BID     multivitamin w/minerals  1 tablet Oral Daily     nystatin  500,000 Units Oral 4x Daily     pantoprazole  40 mg Oral QAM AC     predniSONE  40 mg Oral Daily     rosuvastatin  10 mg Oral QPM     senna-docusate  1 tablet Oral BID     sodium chloride (PF)  3 mL Intracatheter Q8H     tadalafil  20 mg Oral Daily     tamsulosin  0.4 mg Oral Daily     PRN Medications  acetaminophen, acetaminophen, sore throat lozenge, glucose **OR** dextrose **OR** glucagon, melatonin, methyl salicylate-menthol, naloxone, ondansetron **OR** ondansetron, oxyCODONE, phenol, potassium chloride, potassium chloride with lidocaine, potassium chloride, potassium chloride, potassium chloride, prochlorperazine **OR** prochlorperazine **OR** prochlorperazine, sodium chloride (PF)               Physical Exam:   Vitals were reviewed  Blood pressure 148/80, pulse 78, temperature 97.2  F (36.2  C), temperature source Oral, resp. rate 18, height 1.702 m (5' 7\"), weight 66.9 kg (147 lb 6.4 oz), SpO2 90 %.  Wt Readings from Last 4 Encounters:   12/06/18 66.9 kg (147 lb 6.4 oz)   06/05/17 64.4 kg (142 lb)   05/30/17 64.4 kg (142 lb)   04/18/17 63.5 kg (140 lb)       I/O last 3 completed shifts:  In: 1120 [P.O.:1120]  Out: 575 [Urine:575]      Constitutional: Awake, " alert, cooperative, no apparent distress   Lungs: NC oxygen. Diminished breath sounds.   Cardiovascular: Regular rate and rhythm, normal S1 and S2, and no murmur noted   Abdomen: Hypoactive bowel sounds, soft, non-distended, non-tender   Skin: Scattered bruising, clubbing of the digits   Other:               Data:   All laboratory data and imaging studies reviewed.    CMP  Recent Labs  Lab 12/06/18  0708 12/05/18  0923     --    POTASSIUM 4.2 3.9   CHLORIDE 108  --    CO2 30  --    ANIONGAP 4  --    GLC 91  --    BUN 24  --    CR 0.81 0.76   GFRESTIMATED >90 >90   GFRESTBLACK >90 >90   AV 7.7*  --      CBC  Recent Labs  Lab 12/06/18  0708 12/02/18  0909   WBC 12.5* 15.7*   RBC 4.15* 4.71   HGB 11.8* 13.5   HCT 35.9* 40.5   MCV 87 86   MCH 28.4 28.7   MCHC 32.9 33.3   RDW 17.7* 17.1*    179

## 2018-12-06 NOTE — PROGRESS NOTES
SALLIE  D: Met with patient and patient's wife, Kelyl. Patient said that he was doing fine. Patient was currently receiving chemotherapy. Met with patient's wife to discuss her concerns, as she had requested that social work come and talk with her.  Kelly stated that she is not able to take care of Ravi at home.  She stated that Ravi is insistent on coming home and gets argumentative when it is discussed about him not being able to come home. SALLIE discussed that patient will be discharged when he is medically stable and with a safe discharge plan.  The physician and medical team will determine when the patient is medically able to be discharged, and the physician and therapy teams will provide input on their recommendations for a safe discharge plan.  SALLIE discussed that our role in social work is to assist the patient and the patient's family in reiterating the medical recommendations for a safe discharge plan, and to help coordinate and honor the patient and families choices with regard to their preferred discharge plan.  SALLIE provided emotional support, and understanding to Kelly.  P:  Will continue to monitor and follow for a safe discharge plan.

## 2018-12-07 NOTE — PROGRESS NOTES
[facility] Progress Note          Assessment and Plan:   Mr. Ravi Sandoval is a 73 year old man who was admitted on 11/23/2018 with increasing dyspnea      1. 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   - He understands that this is palliative, not curative.   - Treatment recommended Carboplatin AUC 5 Day 1 and Etoposide 75 mg/m2 Days 1-3 (dose reduced due to medical frailty).   - Side effects reviewed and include but are not limited to: Low blood counts, infection, bleeding, fatigue, anorexia, taste changes, weight loss, nausea, vomiting, diarrhea, constipation, hair loss, nerve damage and kidney damage  - plan to assess response and tolerance after 1st cycle and then make decision about further treatment repeat  - Would likely follow up in the clinic in the first week to assess toxicity and get weekly CBCs for at least cycle 1 with him being on anticoagulation      2. Chronic respiratory failure due to pulmonary fibrosis and to COPD  - Still on high flow oxygen  - On prednisone 40 mg daily  - Continue pulmonary toilet measures  - He might need TCU vs home with home care      3. Diabetes mellitus  - Blood sugars will be elevated with dexamethasone      4. Thrombophilia  - On apixiban  - Would hold if platelets <50,000     5. Thrush  - Add nystatin     6. Chest wall pain  - Continue with acetaminophen  - Add OxyIR 5 mg every 4 hours as needed  - Add laxatives             Interval History:   Patient reports no nausea or vomiting with chemotherapy. No change in respiratory symptoms              Medications:       amLODIPine (NORVASC) tablet 2.5 mg  2.5 mg Oral Daily     apixaban ANTICOAGULANT  5 mg Oral BID     aspirin (ASA) chewable tablet 81 mg  81 mg Oral At Bedtime     budesonide  0.5 mg Nebulization BID     colchicine (COLCYRS) tablet 0.6 mg  0.6 mg Oral QPM      "dexamethasone  8 mg Oral Daily     digoxin  125 mcg Oral Daily     etoposide (TOPOSAR) chemo infusion  75 mg/m2 (Treatment Plan Recorded) Intravenous Q24H     [START ON 12/9/2018] Filgrastim  5 mcg/kg (Treatment Plan Recorded) Subcutaneous Daily     FLUoxetine (PROzac) capsule 40 mg  40 mg Oral Daily     furosemide  20 mg Oral Every Other Day     insulin aspart  1-10 Units Subcutaneous TID AC     insulin aspart   Subcutaneous TID w/meals     insulin glargine  14 Units Subcutaneous At Bedtime     ipratropium - albuterol 0.5 mg/2.5 mg/3 mL  3 mL Nebulization 4x daily     levothyroxine  125 mcg Oral Daily     lisinopril (PRINIVIL/ZESTRIL) tablet 20 mg  20 mg Oral Daily     metoprolol succinate ER  25 mg Oral BID     multivitamin w/minerals  1 tablet Oral Daily     nystatin  500,000 Units Oral 4x Daily     pantoprazole  40 mg Oral QAM AC     predniSONE  40 mg Oral Daily     rosuvastatin  10 mg Oral QPM     senna-docusate  1 tablet Oral BID     sodium chloride (PF)  3 mL Intracatheter Q8H     tadalafil  20 mg Oral Daily     tamsulosin  0.4 mg Oral Daily     acetaminophen, acetaminophen, albuterol, albuterol, sore throat lozenge, glucose **OR** dextrose **OR** glucagon, diphenhydrAMINE, EPINEPHrine, LORazepam, LORazepam, - MEDICATION INSTRUCTIONS -, melatonin, meperidine, methyl salicylate-menthol, methylPREDNISolone, naloxone, ondansetron **OR** ondansetron, oxyCODONE, phenol, potassium chloride, potassium chloride with lidocaine, potassium chloride, potassium chloride, potassium chloride, prochlorperazine **OR** prochlorperazine **OR** prochlorperazine, prochlorperazine, prochlorperazine, sodium chloride (PF), sodium chloride               Physical Exam:   Blood pressure 135/77, pulse 78, temperature 95.6  F (35.3  C), temperature source Oral, resp. rate 16, height 1.689 m (5' 6.5\"), weight 66.4 kg (146 lb 4.8 oz), SpO2 93 %.  Wt Readings from Last 4 Encounters:   12/07/18 66.4 kg (146 lb 4.8 oz)   06/05/17 64.4 kg (142 " lb)   17 64.4 kg (142 lb)   17 63.5 kg (140 lb)         Vital Sign Ranges  Temperature Temp  Av.5  F (35.8  C)  Min: 95.6  F (35.3  C)  Max: 97.8  F (36.6  C)   Blood pressure Systolic (24hrs), Av , Min:107 , Max:146        Diastolic (24hrs), Av, Min:62, Max:77      Pulse No Data Recorded   Respirations Resp  Av  Min: 16  Max: 16   Pulse oximetry SpO2  Av.8 %  Min: 90 %  Max: 94 %         Intake/Output Summary (Last 24 hours) at 18 0834  Last data filed at 18 0700   Gross per 24 hour   Intake              240 ml   Output             2095 ml   Net            -1855 ml       Constitutional: Awake, alert, cooperative, no apparent distress   Lungs: Decreased breath sounds bilaterally, no crackles or wheezing   Cardiovascular: Regular rate and rhythm, normal S1 and S2, and no murmur noted   Abdomen: Quiet bowel sounds, soft, non-distended, non-tender   Skin: No rashes, no cyanosis, no upper or lower extremity edema   Other:           Data:   Laboratory:  CMP  Recent Labs  Lab 18  0718  0708 18  0923    142  --    POTASSIUM 5.2 4.2 3.9   CHLORIDE 106 108  --    CO2 22 30  --    ANIONGAP 7 4  --    * 91  --    BUN 24 24  --    CR 0.62* 0.81 0.76   GFRESTIMATED >90 >90 >90   GFRESTBLACK >90 >90 >90   AV 7.5* 7.7*  --    MAG 2.1  --   --    PHOS 3.4  --   --    PROTTOTAL  --  4.9*  --    ALBUMIN  --  2.2*  --    BILITOTAL  --  0.3  --    ALKPHOS  --  41  --    AST  --  38  --    ALT  --  44  --      CBC  Recent Labs  Lab 18  0725 18  0708 18  0909   WBC 13.4* 12.5* 15.7*   RBC 4.30* 4.15* 4.71   HGB 12.1* 11.8* 13.5   HCT 36.9* 35.9* 40.5   MCV 86 87 86   MCH 28.1 28.4 28.7   MCHC 32.8 32.9 33.3   RDW 17.6* 17.7* 17.1*   * 150 179     INRNo lab results found in last 7 days.    Imaging data:  Results for orders placed or performed during the hospital encounter of 18   XR Chest 2 Views    Narrative    CHEST TWO  VIEWS 11/23/2018 11:08 AM     HISTORY: Shortness of breath and possible pneumonia.     COMPARISON: 3/12/2017    FINDINGS: Superimposed on fibrotic-appearing lung abnormalities is  abnormal opacity in the right lower lobe suggestive of developing  pneumonia. Trace right pleural effusion. No pneumothorax. Heart size  normal.       Impression    IMPRESSION: Right lower lobe pneumonia superimposed on  fibrotic-appearing lungs.     YANG DIAZ MD   CT Chest Pulmonary Embolism w Contrast     Value    Radiologist flags Pulmonary embolism (AA)    Narrative    CT CHEST PULMONARY EMBOLISM WITH CONTRAST November 26, 2018 10:33 AM     HISTORY: Worsening shortness of breath and hypoxia.    COMPARISON: None.    TECHNIQUE: Volumetric helical acquisition of CT images of the chest  from the lung apices to the kidneys were acquired after the  administration of  59mL Isovue-370  IV contrast. Radiation dose for  this scan was reduced using automated exposure control, adjustment of  the mA and/or kV according to patient size, or iterative  reconstruction technique.    FINDINGS: There is a nonocclusive thrombus in a right lower lobe  pulmonary artery. No other definite pulmonary embolism demonstrated.  There is now a moderate to large right pleural effusion. No  significant left pleural fluid. No pericardial effusion. Multiple  nodular densities are seen in the major fissure and along the  pericardium. These are new since the comparison study and could  represent metastatic disease. A probable right hilar lymph node is  also present measuring 3.5 x 2.1 cm. This representatives a mass along  the pericardium and measures 2.0 x 1.4 cm. A prominent mass in the  posterior mediastinum/azygos esophageal recess is noted measuring 5.6  x 2.9 cm. The heart is not enlarged. Thoracic aorta is atherosclerotic  without evidence of dissection or aneurysm. There are moderate  coronary vascular calcifications consistent with coronary  artery  disease.  There is no pneumothorax. Adrenal glands are normal.  Remainder of the visualized upper abdomen is unremarkable.      Impression    IMPRESSION:   1. A single nonocclusive thrombus is seen in the right lower lobe.  2. No thoracic aortic dissection or aneurysm.   3. Moderate to large right pleural effusion and multiple pleural-based  masses concerning for malignancy.    [Critical Result: Pulmonary embolism]    Finding was identified on 11/26/2018 11:07 AM.     The nurse on the floor, Kayy, was contacted for Dr. CANELO KIRKPATRICK~[id number] JOSE DENT at 11/26/2018  11:15 AM and verbalized understanding of the critical finding.     MIRANDA GIBSON MD   US Thoracentesis    Narrative    ULTRASOUND-GUIDED THORACENTESIS  11/26/2018 2:01 PM     HISTORY: Shortness of breath, hypoxic with large pleural effusion on  right side.     FINDINGS: Ultrasound was used to evaluate for the presence and best  approach for drainage of a pleural effusion. Written and oral informed  consent was obtained. A pause for the cause procedure to verify the  correct patient and correct procedure. The skin overlying the right  chest posteriorly was prepped and draped in the usual sterile fashion.  The subcutaneous tissues were anesthetized with 10 mL 1% lidocaine. A  catheter was advanced into the pleural space and 1500 mL of ivonne  colored fluid was drained. Patient was monitored by nurse under my  direct supervision throughout the exam. Ultrasound images were  permanently stored.  There were no immediate complications. Patient  left the ultrasound suite in satisfactory condition.      Impression    IMPRESSION: Technically successful thoracentesis without immediate  complications.    MIRANDA GIBSON MD   XR Chest 1 View    Narrative    CHEST ONE VIEW November 26, 2018 2:23 PM     HISTORY: Pleural effusion, post thoracentesis.     COMPARISON: 11/23/2018.      Impression    IMPRESSION: No pneumothorax or  pleural effusion on either side.  Chronic appearing interstitial abnormalities are present. Heart size  normal.    MD Nelson LUNDY MD

## 2018-12-07 NOTE — PROGRESS NOTES
[facility]    Hospitalist Progress Note      Assessment & Plan   Ravi Sandoval is a 73 year old male past medical history of histiocytosis X, pulmonary fibrosis, severe pulmonary hypertension and COPD/Emphysema, who is chronically oxygen dependent on 4-6 liters who was admitted on 11/23/2018 for worsening SOB. He had been requiring HFNC, but on 12/1 is now down to 6-8L oxymizer and has been able to ambulate the unit.       Acute on Chronic hypoxic respiratory failure in the setting of pulmonary fibrosis and COPD.  Right sided non-occlusive PE on CT  Notes increasing shortness of breath for the past 1 month.  Was treated outpatient with amoxicillin at the end of October for a sinusitis, requiring increasing O2 demands up from his baseline of 4 liters.  Afebrile with no significant leukocytosis.  Lactic acid was elevated at 2.5 now normal last LA 1.6.  Procalcitonin <0.05.  - He was on Prednisone 40mg daily. Patient was hypoxic and SOB, on high flow oxygen, xray shows Fibrosis and opacity on right lower side, bilateral crackles, occasional wheezing and decrease air entry.  - CTA chest PE protocol done on 11/26,  large R pleural effusion, non occlusive thrombus and pleural based densities  - Pulmonary Consulted , now signed off   - completed a course of Levaquin on 12/3  - IV Solumerol 40 mg Q 6 hrs. Transitioned to PO Prednisone   - tapering steroids by 10mg q 5 days per pulm recs. Hold Prednisone 40mg daily while he is getting decadron with his chemo.   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 Completed  antibiotics as above. Cytology with high grade neuroendocrine tumor  - oncology following, appreciate help- patient wises to start palliative chemotherapy  - chemotherapy with Carboplatin and Etoposide and decardon x 3 days started on 12/6  - follow labs daily  - management per oncology    Severe pulmonary hypertension   - Appears compensated.    -Continue PTA tadalafil, digoxin and every other day Lasix.       Uncontrolled type 2 diabetes   - A1c in October was 10.8.  He is supposed to take glipizide 10 mg daily though notes he has not been taking it as directed.  - Does not check his blood sugars at home.  Oral agents on hold. Blood sugar still on higher side secondary to steroids.   - BG tends to be <200 in am and mostly in the 200s rest of the day  - increase lantus to 16 units daily-12/7  -  Increase Aspart  To 2 units/carb unit TID with meals and sliding scale insulin TID.   - discontinued bedtime sliding scale insulin since tends to be lower in the morning      Hypertension   - Elevated in ED, improved since  - Continue PTA Norvasc 2.5 mg daily, lisinopril 20 mg daily and metoprolol 25 mg twice daily.  - P.r.n. hydralazine will be available as needed.       Hypothyroidism    - PTA Synthroid.       History of coronary artery disease   - previous BMS to RCA in 2010.    - PTA aspirin, beta blocker, statin and ACE.       Acute Pulmonary Embolism  - Continues on eliquis (was on heparin gtt previously)  - hold if platlet <50 per hem/onc      Mild Hemoptysis:   - very light blood tinge sputum previously, improving as of 11/30 - 12/1, continue to monitor  - Continue with the Eliquis, if returns or worsens we will need to hold the anticoagulations.      Goals of care:   - Palliative care met with patient and family over last few days; appreciate assistance and efforts. Patient aware of options and recommendation to be DNR/DNI given severity of his pulmonary disease, especially.  - palliative care following, appreciate  help    Communications: discussed with RN      DVT Prophylaxis: Eliquis   Code Status: Full Code. Appreciate ongoing discussion had between patient, wife Kelly, and Palliative Team.   Dispo: plan to start chemotherapy inpatient and when stable after chemo, likely in the nex 4-5 days      Tameka Sampson MD  Text Page  (7am to 6pm)    Interval History   Breathing stable and feels he is just about at his baseline.  Does not have much cough.   Denies n/v or abdominal pain.   Chemo started yesterday.     -Datareviewed today: I reviewed all new labs and imaging results over the last 24 hours. I personally reviewed no images or EKG's today.    Physical Exam   Temp: 95.6  F (35.3  C) Temp src: Oral BP: 135/77   Heart Rate: 64 Resp: 16 SpO2: 93 % O2 Device: Oxymizer cannula Oxygen Delivery: 6 LPM  Vitals:    12/06/18 0544 12/06/18 0903 12/07/18 0526   Weight: 66.9 kg (147 lb 6.4 oz) 66.2 kg (146 lb) 66.4 kg (146 lb 4.8 oz)     Vital Signs with Ranges  Temp:  [95.6  F (35.3  C)-97.8  F (36.6  C)] 95.6  F (35.3  C)  Heart Rate:  [64-86] 64  Resp:  [16] 16  BP: (107-136)/(62-77) 135/77  SpO2:  [90 %-94 %] 93 %  I/O last 3 completed shifts:  In: 240 [P.O.:240]  Out: 2095 [Urine:2095]    Constitutional: Alert, awake and no apparent distress  Respiratory: diminished BS at the bases, upper lung fields are CTAB  Cardiovascular: regular rate and rhythm, no LE edema  GI: soft and non-tender  Skin/Integumen: warm and dry, some brusings noted in b/l UE  Other:      Medications     - MEDICATION INSTRUCTIONS -       sodium chloride         amLODIPine (NORVASC) tablet 2.5 mg  2.5 mg Oral Daily     apixaban ANTICOAGULANT  5 mg Oral BID     aspirin (ASA) chewable tablet 81 mg  81 mg Oral At Bedtime     budesonide  0.5 mg Nebulization BID     colchicine (COLCYRS) tablet 0.6 mg  0.6 mg Oral QPM     dexamethasone  8 mg Oral Daily     digoxin  125 mcg Oral Daily     etoposide (TOPOSAR) chemo infusion  75 mg/m2 (Treatment Plan Recorded)  Intravenous Q24H     [START ON 12/9/2018] Filgrastim  5 mcg/kg (Treatment Plan Recorded) Subcutaneous Daily     FLUoxetine (PROzac) capsule 40 mg  40 mg Oral Daily     furosemide  20 mg Oral Every Other Day     insulin aspart  1-10 Units Subcutaneous TID AC     insulin aspart   Subcutaneous TID w/meals     insulin glargine  16 Units Subcutaneous At Bedtime     ipratropium - albuterol 0.5 mg/2.5 mg/3 mL  3 mL Nebulization 4x daily     levothyroxine  125 mcg Oral Daily     lisinopril (PRINIVIL/ZESTRIL) tablet 20 mg  20 mg Oral Daily     metoprolol succinate ER  25 mg Oral BID     multivitamin w/minerals  1 tablet Oral Daily     nystatin  500,000 Units Oral 4x Daily     [START ON 12/8/2018] pantoprazole  40 mg Oral QAM AC     [START ON 12/9/2018] predniSONE  30 mg Oral Daily     rosuvastatin  10 mg Oral QPM     senna-docusate  1 tablet Oral BID     sodium chloride (PF)  3 mL Intracatheter Q8H     tadalafil  20 mg Oral Daily     tamsulosin  0.4 mg Oral Daily       Data     Recent Labs  Lab 12/07/18  0725 12/06/18  0708 12/05/18  0923 12/02/18  0909   WBC 13.4* 12.5*  --  15.7*   HGB 12.1* 11.8*  --  13.5   MCV 86 87  --  86   * 150  --  179    142  --   --    POTASSIUM 5.2 4.2 3.9  --    CHLORIDE 106 108  --   --    CO2 22 30  --   --    BUN 24 24  --   --    CR 0.62* 0.81 0.76  --    ANIONGAP 7 4  --   --    AV 7.5* 7.7*  --   --    * 91  --   --    ALBUMIN  --  2.2*  --   --    PROTTOTAL  --  4.9*  --   --    BILITOTAL  --  0.3  --   --    ALKPHOS  --  41  --   --    ALT  --  44  --   --    AST  --  38  --   --        No results found for this or any previous visit (from the past 24 hour(s)).

## 2018-12-07 NOTE — PROGRESS NOTES
Patient is on a 6L oxymizer cannula with SpO2 in the low to mid 90's. BS diminished. All nebs were given as ordered.  Will cont to follow.  12/6/2018  Bianka Duong RRT

## 2018-12-07 NOTE — PLAN OF CARE
Problem: Patient Care Overview  Goal: Plan of Care/Patient Progress Review   Pt A&O- VSS on 6 L oxymizer cannula- satting 90-94%. 0200 BG- 252. Pt refused repositioning on sides and wound cleansing overnight- emphasized importance of pressure injury prevention. Pt slept well overnight w/ no complaints. Plan for chemo day 2.

## 2018-12-07 NOTE — PROGRESS NOTES
"Palliative Care Inpatient Clinical Social Work Visit    Patient Information:  Ravi Sandoval is a 73 year old man with PMH of COPD, pulmonary fibrosis, and now newly diagnosed lung cancer.  He started chemotherapy today.      Ravi declined to visit with me today, but did give consent for me to visit with his wife, Kelly.  We met outside of the room while his RN's hung his chemo    Relevant Symptoms/Concerns  - New information since last visit   Physical:  Started chemotherapy today. Will see how he tolerates and continue goals of care conversations.    Psychological/Emotional/Existential:     Family/Social/Caregiver:   Long visit with Ravi's wife, Kelly outside of Ravi's room.  Kelly continues to express concern that she is not able to care for Ravi at home any longer.  She reports that when she has told Ravi this he has accused her of being selfish and of not wanting him to come home any longer.  Kelly has physical limitations and uses a walker, she also describes feeling emotionally exhausted after caring for Ravi for the past 20 years and endorses feeling burnt out as a caregiver.  We talked about ways that Kelly can get support around her decision to not bring Ravi back home if he continues to express resistance.  I suggested that she talk with their two children to make sure they can support her and the care team will likely need to be involved if Ravi continues to insist that Kelly can care for him in his current state.  Kelly describes Ravi as \"someone who is used to getting his way by yelling\".      At this time, we need to see how Ravi tolerates chemotherapy, and then make a plan for discharge, either to TCU or LTC.  Kelly thinks that Ravi will want to \"fight for whatever he can\".  She shared that this includes CPR and Intubation which Ravi has been clear with her he would want.  I have not discussed this with Ravi as he has not been engaged with me during my visits.  Kelly " understands that should he be transferred to the ICU and placed on ventilator support decision making would fall to him and their two children and she is comfortable with this.      Kelly also shared that Ravi had a major heart attack about 7 years ago. She feels he is drawing on that experience as he navigates his current situation.      Developmental:     Mental Health:     End of Life:  Ravi has not been open to discussing this.  Kelly voices understanding that time is measured in months at this point   Cultural/Sikh/Spiritual:     Grief/Loss:     Concurrent Stressors:       Comments:      Strengths - New information since last visit    Physical:    Psychological/Emotional/Existential:     Family/Social/Caregiver:     Developmental:     Mental Health:     End of Life:     Cultural/Sikh/Spiritual:     Grief/Loss:        Comments:      Goals/Decision Making/Advance Care Planning - New information since last visit   Preferences:  Evolving.  Ravi has started chemotherapy.  Will see how he tolerates.    Concerns:     Documents:     Decision Making Issues:       Comments:      Resource Needs     Discharge Planning:  Per Unit/Program  and/or Care Coordinator   Other:     Comments:      Intervention (Check all that apply)    x   Assessment of palliative specific issues         Introduction of Palliative clinical social work interventions    x   Adjustment to illness counseling       Advanced care planning       Attended/participated in care conference       Behavioral interventions for symptom management    x   Facilitation of processing of thoughts/feelings    x   Family communication facilitated    x   Grief counseling       Goals of care discussion/facilitation       Life legacy work       Life review facilitation    x   Psychoeducation       Re-framing       Resource referral           Other     Comments:      Plan:  Will follow up on MOnday    VALENTÍN Abarca, [facility]SW   Palliative  Care    Pgr:[phone]  Ph: [phone]

## 2018-12-07 NOTE — PLAN OF CARE
Problem: Patient Care Overview  Goal: Plan of Care/Patient Progress Review  Outcome: No Change  A&Ox4. Sats low 90's on 6L oxymizer. Drops to mid 80's with exertion. LS with crackles in left base. Tolerated chemo this afternoon; blood return noted on R PIV. Wound care to gluteal cleft done per orders. Up with assist of 1 and gait belt to BSC. Plan pending clinical progress. Continue to monitor.

## 2018-12-07 NOTE — PLAN OF CARE
Problem: Patient Care Overview  Goal: Plan of Care/Patient Progress Review  Discharge Planner OT   Patient plan for discharge: home   Current status: Pt completed supine <> sit, sit <> stand, total body dressing task, and grooming task while sitting EOB independently. Pt required cues throughout session to use PLB with activity to assist with increasing O2 sats as pt's O2 on 6 L NC was 74% after activity.   Barriers to return to prior living situation: decreased activity tolerance   Recommendations for discharge: home with A from spouse as need with ADL/IADLs   Rationale for recommendations: Patient performing ADLs well, but desats with minimal activity. Pt reported knowing how to manage O2 sats at home.       Entered by: Daly Abreu 12/07/2018 2:11 PM

## 2018-12-08 NOTE — PROGRESS NOTES
[facility]  Hospitalist Progress Note        Perla Munroe MD  12/08/2018        Interval History:      No major complaints  Sob is better with oxygen  Pain is manageable  On chemo         Assessment and Plan:      Ravi Sandoval is a 73 year old male past medical history of histiocytosis X, pulmonary fibrosis, severe pulmonary hypertension and COPD/Emphysema, who is chronically oxygen dependent on 4-6 liters who was admitted on 11/23/2018 for worsening SOB. He had been requiring HFNC, but on 12/1 is now down to 6-8L oxymizer and has been able to ambulate the unit.       Acute on Chronic hypoxic respiratory failure in the setting of pulmonary fibrosis and COPD.  Right sided non-occlusive PE on CT  Notes increasing shortness of breath for the past 1 month.    -Was treated outpatient with amoxicillin at the end of October for a sinusitis, requiring increasing O2 demands up from his baseline of 4 liters.    - He was on Prednisone 40mg daily. Patient was hypoxic and SOB, on high flow oxygen, xray shows Fibrosis and opacity on right lower side, bilateral crackles, occasional wheezing and decrease air entry.  - CTA chest PE protocol done on 11/26,  large R pleural effusion, non occlusive thrombus and pleural based densities  - Pulmonary Consulted , now signed off   - completed a course of Levaquin on 12/3  - IV Solumerol 40 mg Q 6 hrs. Transitioned to PO Prednisone   - tapering steroids by 10mg q 5 days per pulm recs. Hold Prednisone 40mg daily while he is getting decadron with his chemo.   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 Completed antibiotics as above. Cytology with high grade neuroendocrine tumor  - oncology following, appreciate help- patient wises to start palliative chemotherapy  - chemotherapy with Carboplatin and Etoposide and decardon x 3 days started on 12/6 ( palliative)  - management per oncology  -Nystatin added for thrush  -Pain is controlled with oxycodone     Severe pulmonary hypertension   - Appears compensated.    -Continue PTA tadalafil, digoxin and every other day Lasix.       Uncontrolled type 2 diabetes   - A1c in October was 10.8.  He is supposed to take glipizide 10 mg daily though notes he has not been taking it as directed.  - Does not check his blood sugars at home.  Oral agents on hold. Blood sugar still on higher side secondary to steroids.   - BG tends to be <200 in am and mostly in the 200s rest of the day  - increase lantus to 16 units daily-12/7  -  Increase Aspart  To 2 units/carb unit TID with meals and sliding scale insulin TID.   - discontinued bedtime sliding scale insulin since tends to be lower in the morning      Hypertension   - Elevated in ED, improved since  - Continue PTA Norvasc 2.5 mg daily, lisinopril 20 mg daily and metoprolol 25 mg twice daily.  - P.r.n. hydralazine will be available as needed.       Hypothyroidism    - continue Pta Synthroid.       History of coronary artery disease   - PTA aspirin, beta blocker, statin and ACE.       Acute Pulmonary Embolism  - Continues on eliquis (was on heparin gtt previously)  - hold if platlet <50 per hem/onc      Mild Hemoptysis:   - very light blood tinge sputum previously, improving as of 11/30 - 12/1, continue to monitor  - Continue with the Eliquis, if returns or worsens we will need to hold the anticoagulations.      Goals of care:   - Palliative care met with patient and family over last few days; appreciate assistance and efforts. Patient aware of options and recommendation to be DNR/DNI given severity of his  "pulmonary disease, especially.  - palliative care following, appreciate help      DVT Prophylaxis:   -Eliquis     Code Status:   -Full Code.      Dispo:   -pending clinical course.    Discussed the care with patient and his RN                   Physical Exam:      Heart Rate: 75, Blood pressure 131/72, pulse 78, temperature 96.9  F (36.1  C), temperature source Oral, resp. rate 18, height 1.689 m (5' 6.5\"), weight 67.1 kg (148 lb), SpO2 90 %.  Vitals:    12/06/18 0903 12/07/18 0526 12/08/18 0223   Weight: 66.2 kg (146 lb) 66.4 kg (146 lb 4.8 oz) 67.1 kg (148 lb)     Vital Signs with Ranges  Temp:  [95.5  F (35.3  C)-96.9  F (36.1  C)] 96.9  F (36.1  C)  Heart Rate:  [74-86] 75  Resp:  [16-20] 18  BP: (103-131)/(55-72) 131/72  SpO2:  [88 %-97 %] 90 %  I/O's Last 24 hours  I/O last 3 completed shifts:  In: -   Out: 1100 [Urine:1100]    Constitutional: Alert awake oriented  And oxygen 6 liters    Lungs: Fair air entry on both sides of lungs   Cardiovascular: S1 and S2 no S3      Abdomen: Abdomen soft no guarding ,no rigidity, bowel sounds are positive     Skin:    Other:           Medications:          amLODIPine (NORVASC) tablet 2.5 mg  2.5 mg Oral Daily     apixaban ANTICOAGULANT  5 mg Oral BID     aspirin (ASA) chewable tablet 81 mg  81 mg Oral At Bedtime     budesonide  0.5 mg Nebulization BID     colchicine (COLCYRS) tablet 0.6 mg  0.6 mg Oral QPM     digoxin  125 mcg Oral Daily     etoposide (TOPOSAR) chemo infusion  75 mg/m2 (Treatment Plan Recorded) Intravenous Q24H     [START ON 12/9/2018] Filgrastim  5 mcg/kg (Treatment Plan Recorded) Subcutaneous Daily     FLUoxetine (PROzac) capsule 40 mg  40 mg Oral Daily     furosemide  20 mg Oral Every Other Day     insulin aspart  1-10 Units Subcutaneous TID AC     insulin aspart   Subcutaneous TID w/meals     insulin glargine  16 Units Subcutaneous At Bedtime     ipratropium - albuterol 0.5 mg/2.5 mg/3 mL  3 mL Nebulization 4x daily     levothyroxine  125 mcg Oral " Daily     lisinopril (PRINIVIL/ZESTRIL) tablet 20 mg  20 mg Oral Daily     metoprolol succinate ER  25 mg Oral BID     multivitamin w/minerals  1 tablet Oral Daily     nystatin  500,000 Units Oral 4x Daily     pantoprazole  40 mg Oral QAM AC     [START ON 12/9/2018] predniSONE  30 mg Oral Daily     rosuvastatin  10 mg Oral QPM     senna-docusate  1 tablet Oral BID     sodium chloride (PF)  3 mL Intracatheter Q8H     tadalafil  20 mg Oral Daily     tamsulosin  0.4 mg Oral Daily     PRN Meds: acetaminophen, acetaminophen, albuterol, albuterol, sore throat lozenge, glucose **OR** dextrose **OR** glucagon, diphenhydrAMINE, EPINEPHrine, LORazepam, LORazepam, - MEDICATION INSTRUCTIONS -, melatonin, meperidine, methyl salicylate-menthol, methylPREDNISolone, naloxone, ondansetron **OR** ondansetron, oxyCODONE, phenol, potassium chloride, potassium chloride with lidocaine, potassium chloride, potassium chloride, potassium chloride, prochlorperazine, [DISCONTINUED] prochlorperazine **OR** [DISCONTINUED] prochlorperazine **OR** prochlorperazine, prochlorperazine, sodium chloride (PF), sodium chloride         Data:      All new lab and imaging data was reviewed.   Recent Labs   Lab Test  12/07/18   0725  12/06/18   0708  12/02/18   0909   11/26/18   1130   02/22/17   1932   02/12/14   0928   WBC  13.4*  12.5*  15.7*   < >   --    < >  8.0   < >   --    HGB  12.1*  11.8*  13.5   < >   --    < >  12.4*   < >   --    MCV  86  87  86   < >   --    < >  79   < >   --    PLT  118*  150  179   < >   --    < >  212   < >   --    INR   --    --    --    --   0.98   --   0.91   --   0.98    < > = values in this interval not displayed.      Recent Labs   Lab Test  12/08/18   0738  12/07/18   0725  12/06/18   0708   NA  139  135  142   POTASSIUM  4.1  5.2  4.2   CHLORIDE  107  106  108   CO2  26  22  30   BUN  28  24  24   CR  0.77  0.62*  0.81   ANIONGAP  6  7  4   AV  7.5*  7.5*  7.7*   GLC  116*  197*  91     Recent Labs   Lab Test   03/12/17   1000  02/23/17   0523  02/23/17   0115   TROPI  <0.015  The 99th percentile for upper reference range is 0.045 ug/L.  Troponin values in   the range of 0.045 - 0.120 ug/L may be associated with risks of adverse   clinical events.    0.024  0.038

## 2018-12-08 NOTE — PLAN OF CARE
Problem: Pneumonia (Adult)  Goal: Signs and Symptoms of Listed Potential Problems Will be Absent, Minimized or Managed (Pneumonia)  Signs and symptoms of listed potential problems will be absent, minimized or managed by discharge/transition of care (reference Pneumonia (Adult) CPG).   Outcome: No Change  Patient is A&Ox4. VSS. Denies pain. On 6L oxygen via oxymizer cannula. CHOUDHURY. Tolerating mod CHO diet. Uses urinal at bedside. Continuing chemo precautions. R and L PIV SL. Up with assist of 1 and walker/gait belt. BLE extremities red/katerina at baseline with scattered bruising throughout. Blanchable redness on coccyx, covered with a foam dressing. Calls appropriately.

## 2018-12-08 NOTE — PLAN OF CARE
Problem: Patient Care Overview  Goal: Plan of Care/Patient Progress Review  PT: Evaluation orders received and appreciated. Attempted to initiate during scheduled PT time however pt finishing breakfast, respiratory therapist also arrived and requesting to see pt this AM after breakfast

## 2018-12-08 NOTE — PLAN OF CARE
Problem: Patient Care Overview  Goal: Plan of Care/Patient Progress Review  Outcome: No Change  VSS ex de sats with activity. 6L O2. LS dim. Voiding in urinal. Tolerated day 2 chemo today, R IV with BR post infusion. SW following for safe discharge home.

## 2018-12-08 NOTE — PROGRESS NOTES
"[facility] Progress Note          Assessment and Plan:   Mr. Ravi Sandoval is a 73 year old man who was admitted on 11/23/2018 with increasing dyspnea      1. 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   - He understands that this is palliative, not curative.   - Treatment recommended Carboplatin AUC 5 Day 1 and Etoposide 75 mg/m2 Days 1-3 (dose reduced due to medical frailty) -- today is day 3  - Side effects reviewed and include but are not limited to: Low blood counts, infection, bleeding, fatigue, anorexia, taste changes, weight loss, nausea, vomiting, diarrhea, constipation, hair loss, nerve damage and kidney damage  - plan to assess response and tolerance after 1st cycle and then make decision about further treatment repeat  - Would likely follow up in the clinic in the first week to assess toxicity and get weekly CBCs for at least cycle 1 with him being on anticoagulation; to f/u with Dr. Brownlee      2. Chronic respiratory failure due to pulmonary fibrosis and to COPD  - Still on high flow oxygen  - On prednisone 40 mg daily  - Continue pulmonary toilet measures  - He might need TCU vs home with home care      3. Diabetes mellitus  - Blood sugars will be elevated with dexamethasone      4. Thrombophilia  - On apixiban  - Would hold if platelets <50,000      5. Thrush  - Add nystatin      6. Chest wall pain  - Moderately controlled. Continue with acetaminophen  - Add OxyIR 5 mg every 4 hours as needed  - Laxatives prn - currently has soft formed stool    Full code               Interval History:                 Review of Systems:   As per subjective, otherwise 5 systems reviewed and negative.           Physical Exam:   Blood pressure 131/72, pulse 78, temperature 96.9  F (36.1  C), temperature source Oral, resp. rate 18, height 1.689 m (5' 6.5\"), weight 67.1 kg (148 lb), SpO2 90 " %.      Vital Sign Ranges  Temperature Temp  Av.9  F (35.5  C)  Min: 95.5  F (35.3  C)  Max: 96.9  F (36.1  C)   Blood pressure Systolic (24hrs), Av , Min:103 , Max:131        Diastolic (24hrs), Av, Min:55, Max:72      Pulse No Data Recorded   Respirations Resp  Av.1  Min: 16  Max: 20   Pulse oximetry SpO2  Av.3 %  Min: 88 %  Max: 97 %         Intake/Output Summary (Last 24 hours) at 18 0852  Last data filed at 18 0633   Gross per 24 hour   Intake                0 ml   Output              900 ml   Net             -900 ml       Constitutional:   No acute distress.   Skin:   No rashes; multiple ecchymoses over upper extremities.   Neck:   Supple.   Lungs:   Clear to auscultation bilaterally anteriorly.   Cardiovascular:   Regular rate and rhythm with no murmurs   Abdomen:   Soft, nontender, nondistended.   Extremities:   No cyanosis or edema.          Medications:     No current outpatient prescriptions on file.                Data:     Results for orders placed or performed during the hospital encounter of 18 (from the past 24 hour(s))   Glucose by meter   Result Value Ref Range    Glucose 157 (H) 70 - 99 mg/dL   Glucose by meter   Result Value Ref Range    Glucose 283 (H) 70 - 99 mg/dL   Glucose by meter   Result Value Ref Range    Glucose 153 (H) 70 - 99 mg/dL   Glucose by meter   Result Value Ref Range    Glucose 163 (H) 70 - 99 mg/dL   Basic metabolic panel   Result Value Ref Range    Sodium 139 133 - 144 mmol/L    Potassium 4.1 3.4 - 5.3 mmol/L    Chloride 107 94 - 109 mmol/L    Carbon Dioxide 26 20 - 32 mmol/L    Anion Gap 6 3 - 14 mmol/L    Glucose 116 (H) 70 - 99 mg/dL    Urea Nitrogen 28 7 - 30 mg/dL    Creatinine 0.77 0.66 - 1.25 mg/dL    GFR Estimate >90 >60 mL/min/1.7m2    GFR Estimate If Black >90 >60 mL/min/1.7m2    Calcium 7.5 (L) 8.5 - 10.1 mg/dL   Glucose by meter   Result Value Ref Range    Glucose 110 (H) 70 - 99 mg/dL

## 2018-12-08 NOTE — PLAN OF CARE
Problem: Patient Care Overview  Goal: Plan of Care/Patient Progress Review  Outcome: No Change  [id number]: Chemo day 2. A&Ox4. C/o R shoulder pain, bengay applied with relief. Denies nausea. VSS ex 6L O2 via oxymizer. CHOUDHURY. Tolerating mod CHO diet. Uses urinal, adequate UOP. Chemo precautions intact. PIV SL x2. BG WNL. Up with Ax1 GB walker. Tylor extremities per baseline. Scattered bruising. Will continue to monitor.

## 2018-12-08 NOTE — PLAN OF CARE
Problem: Patient Care Overview  Goal: Plan of Care/Patient Progress Review  Discharge Planner OT   Patient plan for discharge: home  Current status: patient receiving chemo, but agreeable to reviewing [employer] handout with therapist.    Barriers to return to prior living situation: decreased activity tolerance  Recommendations for discharge: home with assistance from wife for ADLs/IADLs  Rationale for recommendations: patient completing ADLs well, but desatting with activity; anticipate he will be able to discharge to home with wife to assist       Entered by: MARGARITA PLATT 12/08/2018 2:21 PM

## 2018-12-09 NOTE — PLAN OF CARE
Pt is AxOx4, VSS ex) 7L O2 (baseline 4) and desats with actvity, denies pain, IV (2) SL. mod carb diet with BGM (parameters changed for carb counts). Using urinal at bedside, up with assist x1 with walker and BG. Tylor extremities, clubbed, dusky nail beds; baseline per pt. Wound cares- gluteal cleft per orders- need to be done for (12/9/18).  Plan is to continue to monitor.

## 2018-12-09 NOTE — PROGRESS NOTES
[facility] wt pt's wife per her request.  Wife is concerned about discharge plan.  Per wife, pt. wants to go home, but wife states she is not able to assist him at home as she's had three surgeries herself within the last 3 months. Wife is requesting referrals to [facility] (first choice)  And [facility] [address].  SW notified.

## 2018-12-09 NOTE — DOWNTIME EVENT NOTE
The EMR was down for 6 hours on 12/9/2018.    Kelly Rodríguez RN  was responsible for completing the paper charting during this time period.     The following information was re-entered into the system by Kelly Rodríguez &  Lizzeth Mcgraw: N/A    The following information will remain in the paper chart: Michelle Mcgraw  12/9/2018

## 2018-12-09 NOTE — PROGRESS NOTES
[facility]  Hospitalist Progress Note        JUSTUS Cortes MD, FACP  12/09/2018        Interval History:      No major complaints; doing fairly well. Eating 75% of meals today.  Intermittent Right upper back/scapular pain - responds well to tylenol.  No SOB at rest; RT/RN not able to wean 02 down today.  Pain is manageable; no n/v/abdominal pain or constipation.         Assessment and Plan:      Raiv Sandoval is a pleasant 73 year old gentleman with past medical history of histiocytosis X, pulmonary fibrosis, severe pulmonary hypertension and COPD/Emphysema, who is chronically oxygen dependent on 4-6 liters who was admitted on 11/23/2018 for worsening SOB.   Current problems include:      Acute on Chronic hypoxic respiratory failure in the setting of pulmonary fibrosis and COPD; has had increasing shortness of breath for the past 1 month.    - continue Prednisone 40mg daily.   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  - Thoracentesis done : 1500  Ml of pleural fluid remove, exudative, 11/30 on day 4, in broth only pos for gram GPB resembling diptheroids. Completed antibiotics as above. Cytology with high grade neuroendocrine tumor  - Oncology following, appreciate help- patient wants to start Palliative chemotherapy  - chemotherapy with Carboplatin and Etoposide and decardon x 3 days started on 12/6 (palliative)    Oral thrush  - nystatin added     Severe pulmonary hypertension; appears  compensated.    - continue PTA tadalafil, digoxin and every other day Lasix.       Uncontrolled type 2 diabetes; sugars variable, and occ. Lower in the afternoons.  - A1c in October was 10.8.  He is supposed to take glipizide 10 mg daily though notes he has not been taking it as directed.  - Does not check his blood sugars at home.  Oral agents on hold. Blood sugar still on higher side secondary to steroids.   - BG tends to be <200 in am and mostly in the 200s rest of the day  - increase lantus to 16 units daily-12/7  -  Increase Aspart  To 2 units/carb unit TID with meals and sliding scale insulin TID.   - discontinued bedtime sliding scale insulin since tends to be lower in the morning  - RD to re-assess      Hypertension   - Elevated in ED, improved since  - Continue PTA amodipine 2.5 mg daily, lisinopril 20 mg daily and metoprolol 25 mg twice daily.  - PRN hydralazine is available      Hypothyroidism    - continue PTA Synthroid.       History of coronary artery disease   - PTA aspirin, beta blocker, statin and ACE.       Acute Pulmonary Embolism  - Continues on Eliquis (was on heparin gtt previously)  - hold if platlet <50 per hem/onc      Mild Hemoptysis:   - very light blood tinge sputum previously, improving as of 11/30 - 12/1, continue to monitor  - Continue with the Eliquis, if returns or worsens we will need to hold the anticoagulations.    Thrombocytopenia; unclear cause.  - recheck level 12/10    Wound, right upper back - consistent with partially unroofed nevus.  - WOCN to review this, and follow up on gluteal/coccyx wounds      Goals of care:   - Palliative care met with patient and family over last few days; appreciate assistance and efforts. Patient aware of options and recommendation to be DNR/DNI given severity of his pulmonary disease  - Palliative Care following, appreciate help      DVT Prophylaxis:   -Eliquis     Code Status: Full Code.      Disposition: pending clinical course.                  "    Physical Exam:      Heart Rate: 76, Blood pressure 141/83, pulse 78, temperature 97.6  F (36.4  C), temperature source Oral, resp. rate 18, height 1.689 m (5' 6.5\"), weight 66.8 kg (147 lb 3.2 oz), SpO2 92 %.  Vitals:    12/07/18 0526 12/08/18 0223 12/09/18 0649   Weight: 66.4 kg (146 lb 4.8 oz) 67.1 kg (148 lb) 66.8 kg (147 lb 3.2 oz)     Vital Signs with Ranges  Temp:  [95.4  F (35.2  C)-97.6  F (36.4  C)] 97.6  F (36.4  C)  Heart Rate:  [76-93] 76  Resp:  [18] 18  BP: (124-141)/(64-83) 141/83  SpO2:  [92 %-95 %] 92 %  I/O's Last 24 hours  I/O last 3 completed shifts:  In: 360 [P.O.:360]  Out: 1055 [Urine:1055]    Gen:  Alert, NAD; lying in bed, watching TV.  HEENT: AT/NC; sclerae clear. MM's sl. Dry; lips are chapped.  Oximyzer in place over nose.  Cardiovascular/ Heart: RRR; S1, S2 noted; no m/r/g  Respiratory/ Lungs: fair a/e bilaterally; decreased at bases.  Back: no focal tenderness  Abdomen:  Flat; + BS, Soft, nt, nd  Extremities:  No LE edema; diffuse clubbing of all fingers  Skin: bandage over Right upper back was carefully peeled back - a large nevus is partially unroofed, but not bleeding           Medications:          amLODIPine (NORVASC) tablet 2.5 mg  2.5 mg Oral Daily     apixaban ANTICOAGULANT  5 mg Oral BID     aspirin (ASA) chewable tablet 81 mg  81 mg Oral At Bedtime     budesonide  0.5 mg Nebulization BID     colchicine (COLCYRS) tablet 0.6 mg  0.6 mg Oral QPM     digoxin  125 mcg Oral Daily     Filgrastim  5 mcg/kg (Treatment Plan Recorded) Subcutaneous Daily     FLUoxetine (PROzac) capsule 40 mg  40 mg Oral Daily     furosemide  20 mg Oral Every Other Day     insulin aspart  1-10 Units Subcutaneous TID AC     insulin aspart   Subcutaneous TID w/meals     insulin glargine  16 Units Subcutaneous At Bedtime     ipratropium - albuterol 0.5 mg/2.5 mg/3 mL  3 mL Nebulization 4x daily     levothyroxine  125 mcg Oral Daily     lisinopril (PRINIVIL/ZESTRIL) tablet 20 mg  20 mg Oral Daily     " metoprolol succinate ER  25 mg Oral BID     multivitamin w/minerals  1 tablet Oral Daily     nystatin  500,000 Units Oral 4x Daily     pantoprazole  40 mg Oral QAM AC     predniSONE  30 mg Oral Daily     rosuvastatin  10 mg Oral QPM     senna-docusate  1 tablet Oral BID     sodium chloride (PF)  3 mL Intracatheter Q8H     tadalafil  20 mg Oral Daily     tamsulosin  0.4 mg Oral Daily     PRN Meds: acetaminophen, acetaminophen, albuterol, albuterol, sore throat lozenge, glucose **OR** dextrose **OR** glucagon, diphenhydrAMINE, EPINEPHrine, LORazepam, LORazepam, - MEDICATION INSTRUCTIONS -, melatonin, meperidine, methyl salicylate-menthol, methylPREDNISolone, naloxone, ondansetron **OR** ondansetron, oxyCODONE, phenol, potassium chloride, potassium chloride with lidocaine, potassium chloride, potassium chloride, potassium chloride, prochlorperazine, [DISCONTINUED] prochlorperazine **OR** [DISCONTINUED] prochlorperazine **OR** prochlorperazine, prochlorperazine, sodium chloride (PF), sodium chloride         Data:      All new lab and imaging data was reviewed.   Recent Labs   Lab Test 12/09/18  0656 12/07/18  0725 12/06/18  0708 12/02/18  0909  11/26/18  1130  02/22/17  1932  02/12/14  0928   WBC 11.6* 13.4* 12.5* 15.7*   < >  --    < > 8.0   < >  --    HGB  --  12.1* 11.8* 13.5   < >  --    < > 12.4*   < >  --    MCV  --  86 87 86   < >  --    < > 79   < >  --    PLT  --  118* 150 179   < >  --    < > 212   < >  --    INR  --   --   --   --   --  0.98  --  0.91  --  0.98    < > = values in this interval not displayed.      Recent Labs   Lab Test 12/08/18  0738 12/07/18  0725 12/06/18  0708    135 142   POTASSIUM 4.1 5.2 4.2   CHLORIDE 107 106 108   CO2 26 22 30   BUN 28 24 24   CR 0.77 0.62* 0.81   ANIONGAP 6 7 4   AV 7.5* 7.5* 7.7*   * 197* 91     Recent Labs   Lab Test 03/12/17  1000 02/23/17  0523 02/23/17  0115   TROPI <0.015  The 99th percentile for upper reference range is 0.045 ug/L.  Troponin  values in   the range of 0.045 - 0.120 ug/L may be associated with risks of adverse   clinical events.   0.024 0.038

## 2018-12-09 NOTE — PROGRESS NOTES
12/09/18 1421   Quick Adds   Type of Visit Initial PT Evaluation   Living Environment   Lives With spouse   Living Arrangements apartment   Home Accessibility no concerns   Self-Care   Usual Activity Tolerance fair   Current Activity Tolerance poor   Functional Level Prior   Ambulation 0-->independent   Transferring 0-->independent   Toileting 0-->independent   Bathing 0-->independent   Communication 0-->understands/communicates without difficulty   Swallowing 0-->swallows foods/liquids without difficulty   Cognition 0 - no cognition issues reported   Fall history within last six months no   Which of the above functional risks had a recent onset or change? transferring;ambulation   Prior Functional Level Comment Pt uses O2 at home, 4L   General Information   Onset of Illness/Injury or Date of Surgery - Date 11/23/18   Referring Physician Dr Sampson   Patient/Family Goals Statement pt hopes to go home   Pertinent History of Current Problem (include personal factors and/or comorbidities that impact the POC) Pt was admitted with worsening SOB, found to have stage IV neuro-endocrine CA with pleural involvement. PMH includes histiocytosis X, pumonary fibrosis, severe pulmonary HTN, COPD, chronically O2 dependent (4L), DM2.   Precautions/Limitations fall precautions;oxygen therapy device and L/min  (7L/min via oxymizer)   Weight-Bearing Status - LUE full weight-bearing   Weight-Bearing Status - RUE full weight-bearing   Weight-Bearing Status - LLE full weight-bearing   Weight-Bearing Status - RLE full weight-bearing   Cognitive Status Examination   Orientation orientation to person, place and time   Level of Consciousness alert   Follows Commands and Answers Questions 100% of the time;able to follow multistep instructions   Personal Safety and Judgment intact   Memory intact   Pain Assessment   Patient Currently in Pain No   Integumentary/Edema   Integumentary/Edema Comments chart reports wound in gluteal cleft, not  "visualized in PT   Posture    Posture Forward head position;Protracted shoulders;Kyphosis   Range of Motion (ROM)   ROM Comment LE ROM WFL   Strength   Strength Comments LE strength decreased but symmetrical, grossly 4/5   Bed Mobility   Bed Mobility Comments IND with railing   Transfer Skills   Transfer Comments sit to stand with supervision   Gait   Gait Comments gait 40' without AD and CGA, mildly unsteady. Pt on 7L O2 via oxymizer, sats 90% at rest, down to 85% sitting on EOB and 75% with gait.   Balance   Balance Comments Pt is unsteady with gait   Sensory Examination   Sensory Perception no deficits were identified   Coordination   Coordination no deficits were identified   Muscle Tone   Muscle Tone no deficits were identified   General Therapy Interventions   Planned Therapy Interventions gait training;strengthening;transfer training;progressive activity/exercise   Clinical Impression   Criteria for Skilled Therapeutic Intervention yes, treatment indicated   PT Diagnosis impaired functional mobility   Influenced by the following impairments decreased strength, generalized deconditioning   Functional limitations due to impairments fall risk, inability to ambulate independently   Clinical Presentation Stable/Uncomplicated   Clinical Presentation Rationale per medical record   Clinical Decision Making (Complexity) Low complexity   Therapy Frequency` daily   Predicted Duration of Therapy Intervention (days/wks) 5 days   Anticipated Equipment Needs at Discharge (defer to TCU)   Anticipated Discharge Disposition Transitional Care Facility   Risk & Benefits of therapy have been explained Yes   Patient, Family & other staff in agreement with plan of care Yes   [facility] [place]-Pop Up Archive TM \"6 Clicks\"   2016, Trustees of [facility], under license to [employer].  All rights reserved.   6 Clicks Short Forms Basic Mobility Inpatient Short Form   [facility] AM-PAC  \"6 Clicks\" V.2 Basic Mobility Inpatient " Short Form   1. Turning from your back to your side while in a flat bed without using bedrails? 3 - A Little   2. Moving from lying on your back to sitting on the side of a flat bed without using bedrails? 3 - A Little   3. Moving to and from a bed to a chair (including a wheelchair)? 3 - A Little   4. Standing up from a chair using your arms (e.g., wheelchair, or bedside chair)? 3 - A Little   5. To walk in hospital room? 3 - A Little   6. Climbing 3-5 steps with a railing? 2 - A Lot   Basic Mobility Raw Score (Score out of 24.Lower scores equate to lower levels of function) 17   Total Evaluation Time   Total Evaluation Time (Minutes) 15

## 2018-12-09 NOTE — PROVIDER NOTIFICATION
MD Notification    Person notified: rounding hospitalist    Person Name: Dr. Cortes    Date/Time: 12/9/18 at 1306    Interaction: web-based page    Purpose of Notification: Please call regarding insulin/BMG management questions. Pt BG have been 97 and 85 but has carb counting with meals that require 10 units of novolog.    Orders Received:    Comments:

## 2018-12-09 NOTE — PROGRESS NOTES
"[facility] Progress Note          Assessment and Plan:   Mr. Ravi Sandoval is a 73 year old man who was admitted on 2018 with increasing dyspnea      1. 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   - He understands that this is palliative, not curative.   - Treatment recommended Carboplatin AUC 5 Day 1 and Etoposide 75 mg/m2 Days 1-3 (dose reduced due to medical frailty) -- completed on 18  - plan to assess response and tolerance after 1st cycle and then make decision about further treatment repeat  - Would likely follow up in the clinic in the first week to assess toxicity and get weekly CBCs for at least cycle 1 with him being on anticoagulation; to f/u with Dr. Brownlee      2. Chronic respiratory failure due to pulmonary fibrosis and to COPD  - Still on high flow oxygen  - On prednisone 40 mg daily  - Continue pulmonary toilet measures  - He might need TCU vs home with home care      3. Diabetes mellitus  - Blood sugars will be elevated with dexamethasone      4. Thrombophilia  - On apixiban  - Would hold if platelets <50,000      5. Thrush  - Add nystatin      6. Chest wall pain  - Well controlled. Continue with acetaminophen  - OxyIR 5 mg every 4 hours as needed  - Laxatives prn - currently has soft formed stool     Full code                 Interval History:   Pt has no new complaints.  Feels tired.              Review of Systems:   As per subjective, otherwise 5 systems reviewed and negative.           Physical Exam:   Blood pressure 141/83, pulse 78, temperature 97.6  F (36.4  C), temperature source Oral, resp. rate 18, height 1.689 m (5' 6.5\"), weight 66.8 kg (147 lb 3.2 oz), SpO2 92 %.      Vital Sign Ranges  Temperature Temp  Av.5  F (35.8  C)  Min: 95.4  F (35.2  C)  Max: 97.6  F (36.4  C)   Blood pressure Systolic (24hrs), Av , Min:124 , Max:141        " Diastolic (24hrs), Av, Min:64, Max:83      Pulse No Data Recorded   Respirations Resp  Av  Min: 18  Max: 18   Pulse oximetry SpO2  Av.8 %  Min: 92 %  Max: 95 %         Intake/Output Summary (Last 24 hours) at 2018 0945  Last data filed at 2018 0917  Gross per 24 hour   Intake 240 ml   Output 1255 ml   Net -1015 ml       Constitutional:   No acute distress.   Neck:   Supple.   Lungs:   Coarse breath sounds with fine crackles bilaterally.   Cardiovascular:   Regular rate and rhythm with no murmurs.   Abdomen:   Soft, nontender, nondistended with no palpable hepatosplenomegaly.   Extremities:   No clubbing, cyanosis, or edema.            Medications:     No current outpatient medications on file.                Data:     Results for orders placed or performed during the hospital encounter of 18 (from the past 24 hour(s))   Glucose by meter   Result Value Ref Range    Glucose 194 (H) 70 - 99 mg/dL   Glucose by meter   Result Value Ref Range    Glucose 323 (H) 70 - 99 mg/dL   Glucose by meter   Result Value Ref Range    Glucose 324 (H) 70 - 99 mg/dL   Glucose by meter   Result Value Ref Range    Glucose 260 (H) 70 - 99 mg/dL   WBC and differential   Result Value Ref Range    WBC 11.6 (H) 4.0 - 11.0 10e9/L    Diff Method Automated Method     % Neutrophils 79.5 %    % Lymphocytes 14.6 %    % Monocytes 5.1 %    % Eosinophils 0.2 %    % Basophils 0.1 %    % Immature Granulocytes 0.5 %    Nucleated RBCs 0 0 /100    Absolute Neutrophil 9.2 (H) 1.6 - 8.3 10e9/L    Absolute Lymphocytes 1.7 0.8 - 5.3 10e9/L    Absolute Monocytes 0.6 0.0 - 1.3 10e9/L    Absolute Eosinophils 0.0 0.0 - 0.7 10e9/L    Absolute Basophils 0.0 0.0 - 0.2 10e9/L    Abs Immature Granulocytes 0.1 0 - 0.4 10e9/L    Absolute Nucleated RBC 0.0    Glucose by meter   Result Value Ref Range    Glucose 97 70 - 99 mg/dL

## 2018-12-09 NOTE — CONSULTS
Care Transition Initial Assessment -   Reason For Consult: discharge planning  Met with: chart review   Active Problems:    Pulmonary fibrosis (H)    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)       DATA  Lives With: spouse  Living Arrangements: apartment  Description of Support System: Supportive, Involved  Who is your support system?: Wife  Support Assessment: Adequate family and caregiver support.   Identified issues/concerns regarding health management: SW following for d.c needs     ASSESSMENT  Cognitive Status:  Alert and oriented X4  Concerns to be addressed: Patient is a 73 year old male who was admitted to the hospital for pulmonary fibrosis. Prior to hospitalization patient was living at home and was on 5 L O2. Patient was assessed by OT and they are recommending patient d.c home with Assist. Patient's spouse expressed concern about patient returning home. Patient's spouse does not feel is able to assist as she has her own medical issues. Patient's spouse requested a referral be sent to [facility] for TCU placement.      PLAN  Financial costs for the patient includes   Patient given options and choices for discharge to TCU  Patient/family is agreeable to the plan?  YES  Patient Goals and Preferences: [facility]   Patient anticipates discharging to:  TCU    VICENTE Bowers   *[phone]

## 2018-12-09 NOTE — PLAN OF CARE
A& O x 4, VSS. C/o pain, gave prn tylenol w/good relief. LS dim w/crackles at bases, in 7L oxymizer w/sats in low 90's t/o the night. Using urinal at bedside. CHOUDHURY.

## 2018-12-09 NOTE — PLAN OF CARE
Discharge Planner PT   Patient plan for discharge: had been hoping home, now unsure as feeling unsteady  Current status: PT - eval completed and treatment initiated. Pt is a 74 y/o male admitted with increased SOB on 11/23/18, diagnosed with stage IV neuro-endocrine CA with pleural involvement. At baseline, pt lives in an accessible apartment with his spouse, ambulates without assistive device and uses 4L O2 continuously at home d/t pulmonary fibrosis and COPD. Currently, pt reports feeling much weaker and unsteady today, feels it is likely d/t chemo. Pt ambulated 80' without assistive device, unsteady and weak, O2 sats down to 75% with mobility on 7L O2 via oxymizer. Also noted SWS note that pt's wife has reservations about caring for pt at home due to some health problems of her own.  Barriers to return to prior living situation: Needs to be IND with mobility, currently very deconditioned and weak  Recommendations for discharge: TCU  Rationale for recommendations: Pt would benefit from TCU stay for continued skilled PT to address weakness and mobility deficits.       Entered by: Brandie Neville 12/09/2018 2:35 PM

## 2018-12-09 NOTE — PLAN OF CARE
Problem: Patient Care Overview  Goal: Plan of Care/Patient Progress Review  Outcome: No Change  Pt a&o. Up with one. Denies pain. 7L O2. CHOUDHURY. Unable to wean O2 this shift. Pt received his last does of chemotherapy today without complication. Red blanchable area on coccyx. retirement diet, good appetite. discharge date pending. Will continue to monitor.

## 2018-12-10 NOTE — PLAN OF CARE
Pt is AxOx4, VSS ex) 8L O2 (baseline 4) and desats with actvity, denies pain, IV (2) SL. mod carb diet with BGM- pt didn't eat lunch. Using urinal at bedside, BM today. up with assist x1 with walker and GB, ambulated x1. Tylor extremities, clubbed, dusky nail beds; baseline per pt. Wound cares- gluteal cleft per orders- need to be done for (12/10/18).  Plan is possible discharge tomorrow 12/11/18 if resp status improved, lasix ordered for this afternoon; nursing will continue to monitor.

## 2018-12-10 NOTE — PROGRESS NOTES
"CLINICAL NUTRITION SERVICES - REASSESSMENT NOTE      Recommendations Ordered by Registered Dietitian (RD): Discontinue Magic Cup BID between meals   Malnutrition: (11/30)  % Weight Loss:  None noted  % Intake:  No decreased intake noted  Subcutaneous Fat Loss:  None observed  Muscle Loss:  None observed  Fluid Retention:  None noted     Malnutrition Diagnosis: Patient does not meet two of the above criteria necessary for diagnosing malnutrition       EVALUATION OF PROGRESS TOWARD GOALS   Diet:  Moderate CHO (4-6 carb units per meal) + Magic Cup BID between meals     Intake:  Visited with patient today.  He states that his appetite is somewhat decreased from last week but feels like he is still eating fairly well.  \"I don't think I'm going to order lunch though\".  Per flowsheets, taking ~75% of meals.  Breakfast this morning was a slice of [place] toast, eggs, a banana, OJ x 2, and milk.  Dinner last night consisted of cod, chips, milk, pudding, and coffee.  He is not taking the Magic Cups - \"I'm giving them to her\" (wife).        NEW FINDINGS:   Asked to evaluate patient regarding variable blood sugars, any dietary modifications needed??  Noted patient is on a Moderate CHO diet (which is an appropriate carb level for his calorie needs).  Patient is receiving steroids which is contributing to elevated blood sugars.  Would not make any dietary modifications at this time.     Previous Goals (12/5):   Patient will continue to consume % of meals + will consume % of supplements  Evaluation: Partially met     Previous Nutrition Diagnosis (12/5):   No nutrition diagnosis identified at this time  Evaluation: No change      CURRENT NUTRITION DIAGNOSIS  No nutrition diagnosis identified at this time     INTERVENTIONS  Recommendations / Nutrition Prescription  Continue Moderate CHO diet as tolerated  Discontinue Magic Cup BID between meals     Implementation  Medical Food Supplement:  Discontinue the Magic Cup as " above     Goals  Patient will continue to consume >/= 75% meals     MONITORING AND EVALUATION:  Progress towards goals will be monitored and evaluated per protocol and Practice Guidelines    Nat Luo RD, LD, CNSC   Clinical Dietitian - [facility]

## 2018-12-10 NOTE — PROGRESS NOTES
SALLIE Note:    D/I:  Patient has been accepted at [facility]. SALLIE met with patient and spouse to inform that patient had been accepted.  [facility] is patient's first choice and they would like to accept the bed. Left VM luis a Betancourt in admissions at [facility] that patient would like to confirm the bed.     VALENTÍN Seymour, LGSW

## 2018-12-10 NOTE — PROGRESS NOTES
Palliative Care Clinical Social Work Note     D:  Ravi Sandoval is a 73 year old man with newly diagnosed lung cancer on top of long history of COPD. STarted chemotherapy last week. Plan is for discharge to TCU tomorrow.    I:  Brief visit with Ravi and Kelly this morning.  Both express feeling comfortable with the plan for discharge to TCU.  Ravi did say that he'd prefer to be going home, but voiced understanding that it makes sense to leave with TCU level support rather than risk going home and decompensating. No further needs identified today.   A:  Open to brief visit.  No new needs identified.   P:  Available as needed. I do not plan to follow up unless discharge tomorrow does not go as planned.     VALENTÍN Abarca, [facility]   Palliative Care    Pgr:[phone]  Ph: [phone]

## 2018-12-10 NOTE — PROGRESS NOTES
Patient was on 8 LPM oxymizer cannula with SpO2 low 90's. Lung sounds diminished. All scheduled neb was given as ordered. Will continue to follow.

## 2018-12-10 NOTE — PROGRESS NOTES
[facility]  Hospitalist Progress Note   12/10/2018          Assessment and Plan:       Ravi Sandoval is a pleasant 73 year old gentleman with past medical history of histiocytosis X, pulmonary fibrosis, severe pulmonary hypertension and COPD/Emphysema, who is chronically oxygen dependent on 4-6 liters  admitted on 11/23/2018 for worsening SOB.     Acute on Chronic hypoxic respiratory failure multifactorial on supplemental nasal oxygen.  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  Patient continues to have increased oxygen requirements currently on 8 L nasal oxygen.  CTA chest PE protocol done on 11/26,  large R pleural effusion, non occlusive thrombus and pleural based densitiesCompleted a course of Levaquin on 12/3. Pulmonary team consulted and now signed off -treat underlying etiology..  Oncology team has administered palliative chemotherapy, completed on 12/8.  CT chest without contrast to evaluate for pleural effusion/infiltrates.  We will check proBNP,'s pro calcitonin levels [leukocytosis]  IV Lasix 20 mg x1.  Prednisone taper started on 12/9.  Wean supplemental nasal oxygen as able to.  There is incentive spirometry and flutter valve use.     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  Underwent thoracocentesis 11/30, 1500  Ml of pleural fluid remove, exudative,   in broth only pos for gram GPB resembling diptheroids.   Completed antibiotics as above. Cytology with high grade neuroendocrine tumor  Oncology following along,  underwent palliative chemotherapy with Carboplatin and Etoposide and decardon 12/6 - 12/8. Plan to assess response and tolerance after 1st cycle and then make decision about further  treatment repeat.  Oncology recommended to follow-up in clinic in 1 week, weekly CBCs.  Discussed with Dr. Klein today -plan for transition to TCU.    Acute Pulmonary Embolism  Diagnosed this admission. Right sided non-occlusive PE on CT. Continues on Eliquis (was on heparin gtt previously)  Hold if platlet <50 per hem/onc     Severe pulmonary hypertension; appears compensated.    continue PTA tadalafil, digoxin  PTA on every other day Lasix.      Uncontrolled type 2 diabetes mellitus with a hemoglobin A1c of 10.8 in October 2018.  PTA glipizide 10 mg oral daily, has not been taking as directed.  Does not check blood sugars at home.  Sugars elevated during hospitalization likely from stress/steroid therapy.  PA oral hypoglycemic therapy on hold during hospitalization.  Continue insulin Lantus 16 units at bedtime.  Insulin aspart 5 units 3 times daily with sliding scale insulin.  Monitor blood sugars closely, optimize insulin therapy.    Reactive leukocytosis likely due to Neupogen.  Continue to monitor WBC count.    Chest wall pain likely from malignancy.  Continue as needed Tylenol, oxycodone every 4 hours as needed.  Laxatives as needed.    Hypertension   Continue PTA amodipine 2.5 mg daily, lisinopril 20 mg daily and metoprolol 25 mg twice daily.  PRN hydralazine is available      Hypothyroidism    Continue PTA Synthroid.       History of coronary artery disease   Continue PTA aspirin, lisinopril 20 mg orally daily, metoprolol 25 mg twice daily.  Continue PTA Crestor 10 mg oral daily.  Consider discussing on cardiology visit the need for aspirin/Eliquis, at risk for bleeding.        Thrombocytopenia likely from chemo.  Monitor levels periodically.    Oral thrush from immunocompromise status.  Personal hygiene, nystatin mouthwash.     Wound, right upper back - consistent with partially unroofed nevus.  Care team following along.    Physical deconditioning from acute illness/chronic debility.  PT, OT recommend  TCU.      Goals of care:   Palliative care met with patient and family over last few days; appreciate assistance and efforts. Patient aware of options and recommendation to be DNR/DNI given severity of his pulmonary disease.  Would like to be a full code and have restorative care at this time.  Palliative Care following, appreciate help           Orders Placed This Encounter      Combination Diet 5452-4147 Calories: Moderate Consistent CHO (4-6 CHO units/meal)      DVT Prophylaxis: Sequential compression device.  Code Status: Full Code  Disposition: Expected discharge likely tomorrow to TCU    Patient, interdisciplinary team involved in care and agrees with plan.  Total time - Greater than 35 min. More than 50% of time spent in direct patient care, care coordination, patient counseling, and formalizing plan of care.     Nikki Rhodes MD        Interval History:      Patient lying in bed.  Complains of generalized weakness.  Unable to wean off oxygen, oxygen requirements increasing to 7 L/high flow.  Pain adequately controlled.  No nausea vomiting abdominal pain or constipation.         Physical Exam:        Physical Exam   Temp:  [96.1  F (35.6  C)-97.6  F (36.4  C)] 96.2  F (35.7  C)  Pulse:  [87] 87  Heart Rate:  [75-87] 87  Resp:  [18] 18  BP: (117-157)/(65-83) 157/83  SpO2:  [90 %-92 %] 92 %    Intake/Output Summary (Last 24 hours) at 12/10/2018 0838  Last data filed at 12/10/2018 0820  Gross per 24 hour   Intake 790 ml   Output 1980 ml   Net -1190 ml       Admission Weight: 68 kg (150 lb)  Current Weight: 65.3 kg (144 lb)(per pt request)    PHYSICAL EXAM  GENERAL: Patient chronically ill. Alert and oriented.  HEART: Regular rate and rhythm. S1S2.  Systolic murmur right sternal border.  LUNGS: Bilateral decreased breath sounds.  Faint crackles lower lobes.  No wheezing.  ABDOMEN: Soft, no abdominal tenderness, bowel sounds heard   NEURO: Moving all extremities.  No focal weakness.  EXTREMITIES: Trace pedal  edema. 2+ peripheral pulses.  SKIN: Warm, dry.  Bruising over upper extremities.  PSYCHIATRY Cooperative       Medications:          amLODIPine (NORVASC) tablet 2.5 mg  2.5 mg Oral Daily     apixaban ANTICOAGULANT  5 mg Oral BID     aspirin (ASA) chewable tablet 81 mg  81 mg Oral At Bedtime     budesonide  0.5 mg Nebulization BID     colchicine (COLCYRS) tablet 0.6 mg  0.6 mg Oral QPM     digoxin  125 mcg Oral Daily     Filgrastim  5 mcg/kg (Treatment Plan Recorded) Subcutaneous Daily     FLUoxetine (PROzac) capsule 40 mg  40 mg Oral Daily     furosemide  20 mg Oral Every Other Day     insulin aspart  1-10 Units Subcutaneous TID AC     insulin aspart   Subcutaneous TID w/meals     insulin glargine  16 Units Subcutaneous At Bedtime     ipratropium - albuterol 0.5 mg/2.5 mg/3 mL  3 mL Nebulization 4x daily     levothyroxine  125 mcg Oral Daily     lisinopril (PRINIVIL/ZESTRIL) tablet 20 mg  20 mg Oral Daily     metoprolol succinate ER  25 mg Oral BID     multivitamin w/minerals  1 tablet Oral Daily     nystatin  500,000 Units Oral 4x Daily     pantoprazole  40 mg Oral QAM AC     predniSONE  30 mg Oral Daily     rosuvastatin  10 mg Oral QPM     senna-docusate  1 tablet Oral BID     sodium chloride (PF)  3 mL Intracatheter Q8H     tadalafil  20 mg Oral Daily     tamsulosin  0.4 mg Oral Daily     acetaminophen, acetaminophen, albuterol, albuterol, sore throat lozenge, glucose **OR** dextrose **OR** glucagon, diphenhydrAMINE, EPINEPHrine, LORazepam, LORazepam, - MEDICATION INSTRUCTIONS -, melatonin, meperidine, methyl salicylate-menthol, methylPREDNISolone, naloxone, ondansetron **OR** ondansetron, oxyCODONE, phenol, potassium chloride, potassium chloride with lidocaine, potassium chloride, potassium chloride, potassium chloride, prochlorperazine, [DISCONTINUED] prochlorperazine **OR** [DISCONTINUED] prochlorperazine **OR** prochlorperazine, prochlorperazine, sodium chloride (PF), sodium chloride         Data:      All  new lab and imaging data was reviewed.

## 2018-12-10 NOTE — PROGRESS NOTES
"[facility] Progress Note          Assessment and Plan:   Mr. Ravi Sandoval is a 73 year old man who was admitted on 2018 with increasing dyspnea      1. 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   - He understands that this is palliative, not curative.   - Treatment recommended Carboplatin AUC 5 Day 1 and Etoposide 75 mg/m2 Days 1-3 (dose reduced due to medical frailty) -- completed on 18  - plan to assess response and tolerance after 1st cycle and then make decision about further treatment repeat  - Would likely follow up in the clinic in the first week to assess toxicity and get weekly CBCs for at least cycle 1 with him being on anticoagulation; to f/u with Dr. Brownlee      2. Chronic respiratory failure due to pulmonary fibrosis and to COPD  - Still on high flow oxygen  - On prednisone 40 mg daily  - Continue pulmonary toilet measures  - IM planning for TCU      3. Diabetes mellitus  - Blood sugars will be elevated with dexamethasone      4. Thrombophilia  - On apixiban  - Would hold if platelets <50,000      5. Thrush  -nystatin added      6. Chest wall pain  - Well controlled. Continue with acetaminophen  - OxyIR 5 mg every 4 hours as needed  - Laxatives prn - currently has soft formed stool    7. Leukocytosis due to neupogen, continue  Discussed with IM.     Full code                 Interval History:    Feels tired, SOB, no NV. No bone pain.  Tolerated chemo well, WBC 35k              Review of Systems:   As per subjective, otherwise 5 systems reviewed and negative.           Physical Exam:   Blood pressure 157/83, pulse 87, temperature 96.2  F (35.7  C), temperature source Oral, resp. rate 18, height 1.689 m (5' 6.5\"), weight 65.3 kg (144 lb), SpO2 92 %.      Vital Sign Ranges  Temperature Temp  Av.5  F (35.8  C)  Min: 95.4  F (35.2  C)  Max: 97.6  F (36.4  C) "   Blood pressure Systolic (24hrs), Av , Min:124 , Max:141        Diastolic (24hrs), Av, Min:64, Max:83      Pulse No Data Recorded   Respirations Resp  Av  Min: 18  Max: 18   Pulse oximetry SpO2  Av.8 %  Min: 92 %  Max: 95 %         Intake/Output Summary (Last 24 hours) at 2018 0945  Last data filed at 2018 0917  Gross per 24 hour   Intake 240 ml   Output 1255 ml   Net -1015 ml       Constitutional:   No acute distress.   Neck:   Supple.   Lungs:   Coarse breath sounds with fine crackles bilaterally.   Cardiovascular:   Regular rate and rhythm with no murmurs.   Abdomen:   Soft, nontender, nondistended with no palpable hepatosplenomegaly.   Extremities:   No clubbing, cyanosis, or edema.            Medications:     No current outpatient medications on file.                Data:     Results for orders placed or performed during the hospital encounter of 18 (from the past 24 hour(s))   Glucose by meter   Result Value Ref Range    Glucose 97 70 - 99 mg/dL   Glucose by meter   Result Value Ref Range    Glucose 85 70 - 99 mg/dL   Glucose by meter   Result Value Ref Range    Glucose 240 (H) 70 - 99 mg/dL   Glucose by meter   Result Value Ref Range    Glucose 201 (H) 70 - 99 mg/dL   Glucose by meter   Result Value Ref Range    Glucose 89 70 - 99 mg/dL   WBC and differential   Result Value Ref Range    WBC 35.1 (H) 4.0 - 11.0 10e9/L    Diff Method Automated Method     % Neutrophils 92.0 %    % Lymphocytes 5.4 %    % Monocytes 1.5 %    % Eosinophils 0.2 %    % Basophils 0.1 %    % Immature Granulocytes 0.8 %    Absolute Neutrophil 32.3 (H) 1.6 - 8.3 10e9/L    Absolute Lymphocytes 1.9 0.8 - 5.3 10e9/L    Absolute Monocytes 0.5 0.0 - 1.3 10e9/L    Absolute Eosinophils 0.1 0.0 - 0.7 10e9/L    Absolute Basophils 0.0 0.0 - 0.2 10e9/L    Abs Immature Granulocytes 0.3 0 - 0.4 10e9/L   Glucose by meter   Result Value Ref Range    Glucose 64 (L) 70 - 99 mg/dL

## 2018-12-10 NOTE — PLAN OF CARE
Discharge Planner PT   Patient plan for discharge: Pt would like to go home, considering TCU as wife feels that she is unable to care for him at home.  Current status: Pt requires SBA for supine to/from sit transition. Pt with good sitting balance at EOB and able to independently reposition and scoot forward at EOB. Pt performs sit to/from stand with SBA. Pts seated resting O2 sats 90% on 8L O2 via NC. Pt ambulated 80' with FWW and CGA, no overt LOB noted. Pt with SOB following ambulation, O2 sats at 85%, able to increase to 90% within one minute of PLB.  Barriers to return to prior living situation: Must be independent with mobility, deconditioned, fall risk   Recommendations for discharge: TCU  Rationale for recommendations: Pt will benefit from daily continued therapy to address impairments and increase mobility and functional independence prior to returning home.        Entered by: Dia George 12/10/2018 9:16 AM

## 2018-12-10 NOTE — PROVIDER NOTIFICATION
Pt is alert and oriented X4. Currently on 8L oximyzer 91-92% and unable to wean.  Other VSS.  Denied pain.  Chemo precautions maintained.  Blood sugar 240 insulin given per MAR.  Wound care done to coccyx this evening and new mepilex dressing applied.  Both PIV saline locked.  Moderate carb diet.  Assist of 1 with gaitbelt.  Will continue to monitor.

## 2018-12-10 NOTE — PLAN OF CARE
A & O x 4, VSS, LS dim, on 7L O2 via oxymizer with sats in low 90's.  Nail beds clubbed and dusky. Mepilex to coccyx and WOC reconsulted for nevus on back. Up w/assist of one and walker for walk around the nurses station. Continue to monitor wounds and O2 needs.

## 2018-12-11 NOTE — DISCHARGE SUMMARY
Discharge Summary  Hospitalist    Date of Admission:  11/23/2018  Date of Discharge:  12/11/2018  Discharging Provider: Nikki Rhodes MD    Primary Care Physician   Florencio Patricia  Primary Care Provider Phone Number: [phone]  Primary Care Provider Fax Number: [phone]    PRINCIPAL DIAGNOSIS  Acute on Chronic hypoxic respiratory failure multifactorial on supplemental nasal oxygen.  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   Acute Pulmonary Embolism  Severe pulmonary hypertension; appears compensated.    Uncontrolled type 2 diabetes mellitus with a hemoglobin A1c of 10.8 in October 2018.  Reactive leukocytosis likely due to Neupogen.  Thrombocytopenia likely from chemo.  Oral thrush from immunocompromise status.  Physical deconditioning from acute illness/chronic debility.    Past Medical History:   Diagnosis Date     ACP (advance care planning)      Anemia      ASHD (arteriosclerotic heart disease)      BPH (benign prostatic hyperplasia)      Cardiac arrest (H)      Cataracts, bilateral      Chronic diarrhea      Chronic respiratory failure with hypoxia (H)      COPD (chronic obstructive pulmonary disease) (H)     HOME O2     Depression      Disease of lung      Dysplasia of prostate      Gastroesophageal reflux disease      General weakness      Heart attack (H)      Herpes zoster      Histiocytosis X (H)      Hyperlipidemia      Hypertension      Hypothyroidism      Oxygen dependent      Postinflammatory pulmonary fibrosis (H)      Prostatic hypertrophy, benign      Pulmonary HTN (H)      Pulmonary hypertensive venous disease (H)      Thyroid disease      Tobacco use      Type 2 diabetes mellitus without complications (H)        History of Present Illness   Ravi Sandoval is an 73 year old male who presented with shortness of breath.    Hospital Course   Ravi Sandoval is a pleasant 73 year old gentleman with past medical  history of histiocytosis X, pulmonary fibrosis, severe pulmonary hypertension and COPD/Emphysema, who is chronically oxygen dependent on 4-6 liters  admitted on 11/23/2018 for worsening SOB.   Acute on Chronic hypoxic respiratory failure multifactorial on supplemental nasal oxygen.  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 CTA chest PE protocol done on 11/26,  large R pleural effusion, non occlusive thrombus and pleural based densities. Completed a course of Levaquin on 12/3. Pulmonary team consulted and now signed off -treat underlying etiology..  Oncology team has administered palliative chemotherapy, completed on 12/8.  Currently continues to require 8 L nasal oxygen on rest.  ProBNP 202.  Pro-calcitonin 1.24.  Repeat CT without contrast on 12/10 showed Extensive emphysematous changes again identified. Small to moderate right pleural effusion is smaller in volume. Multiple pulmonary nodules bilaterally. Several of these are new on the right worrisome for malignancy. Prominent lymph nodes in the mediastinum and right hilum are worrisome for malignant adenopathy.  Have received diuresis with intravenous Lasix on 12/10, PTA 20 mg oral Lasix every other day switched to 20 mg oral Lasix daily at the time of discharge.  Monitor volume status closely and optimize Lasix dose accordingly.  Patient not having cough, afebrile.  No infiltrates noted on chest imaging and hence will hold off antibiotics at this time.  [Completed 10-day course of antibiotics on 12/3]  Is currently on prednisone to taper from 40 mg to 10 mg oral daily over the next week.  PTA 10 mg oral prednisone daily to be continued after taper.  Wean supplemental nasal oxygen as able to [currently requiring 8 L nasal oxygen on  rest]  Aggressive incentive spirometry and flutter valve use as able to.  Treat underlying malignancy as below.  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  Underwent thoracocentesis 11/30, 1500  Ml of pleural fluid remove, exudative, Pleural fluid cultures negative.Cytology with high grade neuroendocrine tumor  Completed antibiotics as above.   Oncology following along,  underwent palliative chemotherapy with Carboplatin and Etoposide and decardon 12/6 - 12/8. Plan to assess response and tolerance after 1st cycle and then make decision about further treatment repeat.   Oncology recommended daily Neupogen for 1 week.  WBC count trending up likely from Neupogen therapy.  If WBC count greater than 50,000 hold Neupogen and discuss with oncology.  Oncology recommended to follow-up in clinic in 1 week [[place] oncology team]   Acute Pulmonary Embolism  Diagnosed this admission. Right sided non-occlusive PE on CT. Continues on Eliquis (was on heparin gtt previously)  Hold if platlet <50 per hem/onc or if any bleeding.  PTA also on aspirin therapy, as is need for aspirin/Eliquis if platelets trending down [currently platelets at 109]  Severe pulmonary hypertension; appears compensated.    Continue PTA tadalafil, digoxin  Lasix dose increased to 20 mg oral daily.  Reassess volume status/renal function closely to be monitored.  Continue PTA aspirin orally daily.  Uncontrolled type 2 diabetes mellitus with a hemoglobin A1c of 10.8 in October 2018.  PTA glipizide 10 mg oral daily, has not been taking as directed.  Does not check blood sugars at home.  Sugars elevated during hospitalization likely from stress/steroid therapy and have been fluctuant.  PA oral hypoglycemic therapy on hold during hospitalization and discontinued at time of discharge.  Having intermittent episodes of hypoglycemia with hyperglycemia.  Recommend 10 units of Lantus twice daily with  sliding scale insulin.  Closely monitor blood sugars and optimize insulin therapy.  Reactive leukocytosis likely due to Neupogen.  Continue to monitor WBC count.   Chest wall pain likely from malignancy.  PRN Tylenol.  Has not been receiving any narcotics in the last 24 hours  Laxatives as needed.  Hypertension   Continue PTA amodipine 2.5 mg daily, lisinopril 20 mg daily and metoprolol 25 mg twice daily.  Hypothyroidism    Continue PTA Synthroid.   History of coronary artery disease   Continue PTA aspirin, lisinopril 20 mg orally daily, metoprolol 25 mg twice daily.  Continue PTA Crestor 10 mg oral daily.  Consider discussing on cardiology visit the need for aspirin/Eliquis, at risk for bleeding.    Thrombocytopenia likely from chemo.  Monitor levels periodically.  Oral thrush from immunocompromise status.  Personal hygiene, nystatin mouthwash.  Wound, right upper back - consistent with partially unroofed nevus.  Care team following along.  Physical deconditioning from acute illness/chronic debility.  PT, OT recommend TCU.  Incidental findings on CT.  CT abdomen and pelvis without contrast showed Indeterminate hypodense lesion at the left hepatic dome is stable. Cholelithiasis.  Patient asymptomatic.  Continue to follow and no acute intervention at this time.  Nikki Rhodes MD    Pending Results   These results will be followed up by ordering provider.  Unresulted Labs Ordered in the Past 30 Days of this Admission     Date and Time Order Name Status Description    11/26/2018 1050 Fungus Culture, non-blood Preliminary     11/26/2018 1050 AFB Culture Non Blood Preliminary              Physical Exam   Vitals:    12/08/18 0223 12/09/18 0649 12/10/18 0654   Weight: 67.1 kg (148 lb) 66.8 kg (147 lb 3.2 oz) 65.3 kg (144 lb)     Vital Signs with Ranges  Temp:  [95.8  F (35.4  C)-98.2  F (36.8  C)] 97.4  F (36.3  C)  Heart Rate:  [65-94] 94  Resp:  [15-26] 16  BP: (102-137)/(64-78) 130/68  SpO2:  [90 %-93 %] 92 %  I/O  last 3 completed shifts:  In: 900 [P.O.:900]  Out: 1705 [Urine:1705]  PHYSICAL EXAM  GENERAL: Patient chronically ill. Alert and oriented.  HEART: Regular rate and rhythm. S1S2.  Systolic murmur right sternal border.  LUNGS: Bilateral decreased breath sounds. no crackles  No wheezing.  ABDOMEN: Soft, no abdominal tenderness, bowel sounds heard   NEURO: Moving all extremities.  No focal weakness.  EXTREMITIES: Trace pedal edema. 2+ peripheral pulses.  SKIN: Warm, dry.  Bruising over upper extremities.  PSYCHIATRY Cooperative    )Consultations This Hospital Stay   WOUND OSTOMY CONTINENCE NURSE  IP CONSULT  PULMONARY IP CONSULT  PHARMACY TO DOSE HEPARIN  PALLIATIVE CARE ADULT IP CONSULT  OCCUPATIONAL THERAPY ADULT IP CONSULT  CARE TRANSITION RN/SW IP CONSULT  HEMATOLOGY & ONCOLOGY IP CONSULT  PHYSICAL THERAPY ADULT IP CONSULT  CARE TRANSITION RN/SW IP CONSULT  NUTRITION SERVICES ADULT IP CONSULT  PHYSICAL THERAPY ADULT IP CONSULT  OCCUPATIONAL THERAPY ADULT IP CONSULT    Time Spent on this Encounter   INikki, personally saw the patient today and spent greater than 30 minutes discharging this patient.  More than 50% of time spent in direct patient care, care coordination, patient counseling, and formalizing plan of care.    Discharge Orders      General info for SNF    Length of Stay Estimate: Short Term Care: Estimated # of Days <30  Condition at Discharge: Stable  Level of care:skilled   Rehabilitation Potential: Fair  Admission H&P remains valid and up-to-date: Yes  Recent Chemotherapy: yes   Use Nursing Home Standing Orders: Yes     Mantoux instructions    Give two-step Mantoux (PPD) Per Facility Policy Yes     Reason for your hospital stay    You were admitted with Acute on Chronic hypoxic respiratory failure multifactorial on supplemental nasal oxygen.  History of pulmonary fibrosis, severe pulmonary hypertension, emphysema, chronically oxygen dependent and now having pulmonary embolism, stage IV  high-grade neuroendocrine cancer with pleural involvement and pleural effusion contributing to hypoxia.     Glucose monitor nursing POCT    Before meals and at bedtime     Daily weights    Call Provider for weight gain of more than 2 pounds per day or 5 pounds per week.     Wound care    Site:  Back / coccyx   Instructions: pressure ulcer precautions     Follow Up and recommended labs and tests    Follow up with Nursing home physician in 3-5 days or earlier if symptomatic.  Recheck WBC count/platelets in 3-5 days.  Hold apixaban if platelets less than 50,000.  [Reassess need for aspirin/apixaban if continues to have thrombocytopenia]  Hold Neupogen if WBC count greater than 50,000 and discussed with oncology.  Follow-up with oncology in 1 week.  Monitor blood sugars closely, optimize insulin therapy.  Discussed long-term goals of care on provider visit.  Continue to wean supplemental nasal oxygen, goal oxygen saturation around 90%.  Monitor volume status, optimize Lasix dose accordingly.  [PTA 20 mg every other day]     Additional Discharge Instructions    Prednisone taper over few days and can continue 10 mg oral prednisone daily [prior to admission dose 10 mg oral prednisone  Concern for delirium, minimize interruptions, frequent reorientation.  Avoid narcotics and benzodiazepines.  Aggressive incentive spirometry as able to tolerate.     Activity - Ambulate in hallway    Every shift     Physical Therapy Adult Consult    Evaluate and treat as clinically indicated.    Reason: Physical deconditioning.     Occupational Therapy Adult Consult    Evaluate and treat as clinically indicated.    Reason: Physical deconditioning.     Oxygen - Nasal cannula    8 Lpm by nasal cannula to keep O2 sats 90% or greater.  Can increase to up to 10 L while ambulating.     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)      apixaban ANTICOAGULANT (ELIQUIS) 5 MG tablet Take 1 tablet (5 mg) by mouth 2 times daily  Qty: 180 tablet, Refills: 0    Associated Diagnoses: Pulmonary hypertension (H)      budesonide (PULMICORT) 0.5 MG/2ML neb solution Take 2 mLs (0.5 mg) by nebulization 2 times daily  Qty: 180 ampule, Refills: 0    Associated Diagnoses: Pulmonary fibrosis (H); Acute respiratory distress      Filgrastim (NEUPOGEN) 300 MCG/0.5ML SOSY syringe Inject 0.5 mLs (300 mcg) Subcutaneous daily for 7 days  Qty: 45 mL, Refills: 0    Associated Diagnoses: 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)      insulin aspart (NOVOLOG VIAL) 100 UNITS/ML vial Give before meals and before bed:  For Pre-Meal Glucose:  140-189 give 1 unit   190-239 give 2 units   240-289 give 3 units   290-339 give 4 units   = or >340 give 5 units     For Bedtime Glucose  200 - 239 give 1 units   240 - 289 give 1.5 units  290 - 339 give 2 units  = or >340 give 2.5 units  Qty: 10 mL, Refills: 0    Associated Diagnoses: 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)      insulin glargine (LANTUS SOLOSTAR PEN) 100 UNIT/ML pen Inject 10 Units Subcutaneous 2 times daily  Qty: 3 mL, Refills: 0    Associated Diagnoses: 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)      multivitamin w/minerals (THERA-VIT-M) tablet Take 1 tablet by mouth daily  Qty: 90 tablet, Refills: 3    Associated Diagnoses: 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)      nystatin (MYCOSTATIN) 186633 UNIT/ML suspension Take 5 mLs (500,000 Units) by mouth 4 times daily for 10 days  Qty: 200 mL, Refills: 0    Associated Diagnoses: Small cell lung cancer in  adult (H)      ondansetron (ZOFRAN-ODT) 4 MG ODT tab Take 1 tablet (4 mg) by mouth every 6 hours as needed for nausea or vomiting    Associated Diagnoses: 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)      phenol (CHLORASEPTIC) 1.4 % spray Take 1 spray (1 mL) by mouth every hour as needed for sore throat (without fever)    Associated Diagnoses: 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)         CONTINUE these medications which have NOT CHANGED    Details   amLODIPine (NORVASC) 2.5 MG tablet Take 2.5 mg by mouth daily      ASPIRIN PO Take 81 mg by mouth At Bedtime      Colchicine (COLCRYS PO) Take 0.6 mg by mouth daily TAKES IN THE EVENING      digoxin (LANOXIN) 125 MCG tablet Take 1 tablet (125 mcg) by mouth daily  Qty: 90 tablet, Refills: 3    Associated Diagnoses: Chronic systolic heart failure (H)      FLUoxetine HCl (PROZAC PO) Take 40 mg by mouth daily      levothyroxine (SYNTHROID/LEVOTHROID) 125 MCG tablet Take 125 mcg by mouth daily      LISINOPRIL PO Take 20 mg by mouth daily      loperamide (IMODIUM) 2 MG capsule Take 1 capsule (2 mg) by mouth 4 times daily as needed for diarrhea  Qty: 20 capsule    Associated Diagnoses: Chronic diarrhea      metoprolol (TOPROL-XL) 25 MG 24 hr tablet Take 1 tablet (25 mg) by mouth 2 times daily  Qty: 180 tablet, Refills: 0    Comments: Pt  needs to make appt or obtain refill from his new cardiologist.  Associated Diagnoses: Pulmonary hypertension (H)      OMEPRAZOLE PO Take 40 mg by mouth daily      rosuvastatin (CRESTOR) 10 MG tablet Take 1 tablet (10 mg) by mouth daily  Qty: 90 tablet, Refills: 3    Associated Diagnoses: Hyperlipidemia LDL goal <70      tadalafil (CIALIS) 20 MG tablet Take 20 mg by mouth daily For pulmonary hypertension.      tamsulosin (FLOMAX) 0.4 MG capsule Take 1 capsule (0.4 mg) by mouth daily  Qty: 30 capsule, Refills: 3    Associated Diagnoses: Benign prostatic hyperplasia with lower urinary tract symptoms, unspecified morphology      ipratropium - albuterol 0.5 mg/2.5 mg/3 mL (DUONEB) 0.5-2.5 (3) MG/3ML neb solution Take 1 vial by nebulization 3 times daily as needed for shortness of breath / dyspnea, wheezing or other (use up to 3 times a day for shortness of breath, cough)         STOP taking these medications       glipiZIDE (GLUCOTROL) 5 MG tablet Comments:   Reason for Stopping:         tadalafil, PAH, (ADCIRCA) 20 MG TABS Comments:   Reason for Stopping:             Allergies   Allergies   Allergen Reactions     Tetracycline        Discharge Disposition   Discharged to short-term care facility  Condition at discharge: Fair    DATA  Most Recent 3 CBC's:  Recent Labs   Lab Test 12/11/18  0733 12/10/18  0716 12/09/18  0656 12/07/18  0725 12/06/18  0708   WBC 36.1* 35.1* 11.6* 13.4* 12.5*   HGB 11.9*  --   --  12.1* 11.8*   MCV 85  --   --  86 87   *  --   --  118* 150      Most Recent 3 BMP's:  Recent Labs   Lab Test 12/11/18  0733 12/08/18  0738 12/07/18  0725    139 135   POTASSIUM 3.6 4.1 5.2   CHLORIDE 101 107 106   CO2 25 26 22   BUN 23 28 24   CR 0.67 0.77 0.62*   ANIONGAP 10 6 7   AV 7.9* 7.5* 7.5*   GLC 50* 116* 197*     Most Recent 2 LFT's:  Recent Labs   Lab Test 12/11/18  0733 12/06/18  0708   AST 22 38   ALT 29 44   ALKPHOS 53 41   BILITOTAL 0.5 0.3     Most Recent INR's and Anticoagulation  Dosing History:  Anticoagulation Dose History     Recent Dosing and Labs Latest Ref Rng & Units 10/27/2010 10/28/2010 10/30/2010 11/4/2010 2/12/2014 2/22/2017 11/26/2018    INR 0.86 - 1.14 1.13 1.25(H) 1.21(H) 1.09 0.98 0.91 0.98        Most Recent 3 Troponin's:  Recent Labs   Lab Test 03/12/17  1000 02/23/17  0523 02/23/17  0115   TROPI <0.015  The 99th percentile for upper reference range is 0.045 ug/L.  Troponin values in   the range of 0.045 - 0.120 ug/L may be associated with risks of adverse   clinical events.   0.024 0.038     Most Recent Cholesterol Panel:  Recent Labs   Lab Test 01/10/14  0000   LDL 72   HDL 63   TRIG 111     Most Recent 6 Bacteria Isolates From Any Culture (See EPIC Reports for Culture Details):  Recent Labs   Lab Test 11/26/18  1330 11/25/18  0859 11/24/18  1105 11/23/18  1153 11/23/18  1143 03/12/17  1425   CULT Culture negative after 2 weeks  On day 4, isolated in broth only:  Propionibacterium (Cutibacterium) acnes  Susceptibility testing of Propionibacterium species is not done from this source. Our   antibiogram indicates that Propionibacterum species is susceptible to penicillin and   cefotaxime and most are susceptible to clindamycin.  Renetta Rosado M.D., Medical Director  *  Critical Value/Significant Value, preliminary result only, called to and read back by  Rina Rivero RN on 11.30.18 at 1050.     Culture received and in progress.  Positive AFB results are called as soon as detected.    Final report to follow in 7 to 8 weeks.   Moderate growth  Normal alexander    Moderate growth  Candida albicans / dubliniensis  Candida albicans and Candida dubliniensis are not routinely speciated  Susceptibility testing not routinely done  * Canceled, Test credited  >10 Squamous epithelial cells/low power field indicates oral contamination. Please   recollect.  *  Notification of test cancellation was given to  Alaina Logan RN @ 2000 11/24/18 CS   No growth No growth No growth      Most Recent TSH, T4 and A1c Labs:  Recent Labs   Lab Test 11/23/18  1015  02/22/17  1932   TSH  --   --  7.59*   T4  --   --  1.30   A1C 10.7*   < >  --     < > = values in this interval not displayed.     Results for orders placed or performed during the hospital encounter of 11/23/18   XR Chest 2 Views    Narrative    CHEST TWO VIEWS 11/23/2018 11:08 AM     HISTORY: Shortness of breath and possible pneumonia.     COMPARISON: 3/12/2017    FINDINGS: Superimposed on fibrotic-appearing lung abnormalities is  abnormal opacity in the right lower lobe suggestive of developing  pneumonia. Trace right pleural effusion. No pneumothorax. Heart size  normal.       Impression    IMPRESSION: Right lower lobe pneumonia superimposed on  fibrotic-appearing lungs.     YANG DIAZ MD   CT Chest Pulmonary Embolism w Contrast     Value    Radiologist flags Pulmonary embolism (AA)    Narrative    CT CHEST PULMONARY EMBOLISM WITH CONTRAST November 26, 2018 10:33 AM     HISTORY: Worsening shortness of breath and hypoxia.    COMPARISON: None.    TECHNIQUE: Volumetric helical acquisition of CT images of the chest  from the lung apices to the kidneys were acquired after the  administration of  59mL Isovue-370  IV contrast. Radiation dose for  this scan was reduced using automated exposure control, adjustment of  the mA and/or kV according to patient size, or iterative  reconstruction technique.    FINDINGS: There is a nonocclusive thrombus in a right lower lobe  pulmonary artery. No other definite pulmonary embolism demonstrated.  There is now a moderate to large right pleural effusion. No  significant left pleural fluid. No pericardial effusion. Multiple  nodular densities are seen in the major fissure and along the  pericardium. These are new since the comparison study and could  represent metastatic disease. A probable right hilar lymph node is  also present measuring 3.5 x 2.1 cm. This representatives a mass along  the  pericardium and measures 2.0 x 1.4 cm. A prominent mass in the  posterior mediastinum/azygos esophageal recess is noted measuring 5.6  x 2.9 cm. The heart is not enlarged. Thoracic aorta is atherosclerotic  without evidence of dissection or aneurysm. There are moderate  coronary vascular calcifications consistent with coronary artery  disease.  There is no pneumothorax. Adrenal glands are normal.  Remainder of the visualized upper abdomen is unremarkable.      Impression    IMPRESSION:   1. A single nonocclusive thrombus is seen in the right lower lobe.  2. No thoracic aortic dissection or aneurysm.   3. Moderate to large right pleural effusion and multiple pleural-based  masses concerning for malignancy.    [Critical Result: Pulmonary embolism]    Finding was identified on 11/26/2018 11:07 AM.     The nurse on the floor, Kayy, was contacted for Dr. CANELO KIRKPATRICK~[id number] JOSE DENT at 11/26/2018  11:15 AM and verbalized understanding of the critical finding.     MIRANDA GIBSON MD   US Thoracentesis    Narrative    ULTRASOUND-GUIDED THORACENTESIS  11/26/2018 2:01 PM     HISTORY: Shortness of breath, hypoxic with large pleural effusion on  right side.     FINDINGS: Ultrasound was used to evaluate for the presence and best  approach for drainage of a pleural effusion. Written and oral informed  consent was obtained. A pause for the cause procedure to verify the  correct patient and correct procedure. The skin overlying the right  chest posteriorly was prepped and draped in the usual sterile fashion.  The subcutaneous tissues were anesthetized with 10 mL 1% lidocaine. A  catheter was advanced into the pleural space and 1500 mL of ivonne  colored fluid was drained. Patient was monitored by nurse under my  direct supervision throughout the exam. Ultrasound images were  permanently stored.  There were no immediate complications. Patient  left the ultrasound suite in satisfactory condition.       Impression    IMPRESSION: Technically successful thoracentesis without immediate  complications.    MIRANDA GIBSON MD   XR Chest 1 View    Narrative    CHEST ONE VIEW November 26, 2018 2:23 PM     HISTORY: Pleural effusion, post thoracentesis.     COMPARISON: 11/23/2018.      Impression    IMPRESSION: No pneumothorax or pleural effusion on either side.  Chronic appearing interstitial abnormalities are present. Heart size  normal.    YANG DIAZ MD   CT Chest w/o Contrast    Narrative    CT CHEST WITHOUT CONTRAST December 10, 2018 9:59 AM    HISTORY: Shortness of breath, worsening hypoxia despite chemo and  anticoagulation.     TECHNIQUE: Scans obtained from the apices through the diaphragm  without IV contrast. Radiation dose for this scan was reduced using  automated exposure control, adjustment of the mA and/or kV according  to patient size, or iterative reconstruction technique.    COMPARISON: CT chest 11/26/2018.    FINDINGS: Unenhanced scanning cannot assess for pulmonary embolism as  was previously documented. There is a small to moderate right pleural  effusion that is smaller in volume. Enlarged right hilar adenopathy is  limited in visualization but appears grossly stable measuring  approximately 3.4 cm series 2 image 28. Enlarged precarinal lymph node  is stable measuring 1.8 x 2 cm series 2 image 26. Lobulated mass at  the inferior mediastinum right paraesophageal location abutting the  atria of the heart is stable measuring 4.8 x 2.6 cm image 47. Other  lymph nodes also appear stable.    Diffuse emphysematous changes bilaterally. There are several bilateral  indeterminate pulmonary nodule that appears stable compared to recent  CT chest from 11/26/2018. However, new multiple nodules along the  periphery of the right lung. One of these adjacent to surgical sutures  at the posterior right lower lobe is 2.5 x 3 cm series 6 image 147.  There are several other examples at the periphery of the right  lung  new since older examinations, for example when compared to a CT chest  dated 2/22/2017. There are several left-sided nodules as well without  significant change.    Upper abdomen images show a stable hypodense lesion at the left  hepatic dome measuring approximately 2.3 cm series 2 image 52. Stable  trace pericardial fluid. No new destructive-appearing bony lesions  identified. Gallstones are noted.      Impression    IMPRESSION:  1. Extensive emphysematous changes again identified.  2. Small to moderate right pleural effusion is smaller in volume.  3. Multiple pulmonary nodules bilaterally. Several of these are new on  the right worrisome for malignancy.  4. Prominent lymph nodes in the mediastinum and right hilum are  worrisome for malignant adenopathy.  5. Previously demonstrated pulmonary embolism cannot be visualized by  unenhanced scanning.  5. Indeterminate hypodense lesion at the left hepatic dome is stable.  6. Cholelithiasis.    ALESSANDRO PEARSON MD

## 2018-12-11 NOTE — PLAN OF CARE
Discharge Planner PT   Patient plan for discharge: TCU  Current status: Pt continues to require 8L O2 at rest to maintain sats > 90%. With prolonged activity de sat to 87%. Resats quickly with PLB technique and standing or seated rest. Pt is SBA for bed mobility. CGA STS transfers with FWW. Focus on standing and seated exercises as pt declining gait in hallway. Wife present and supportive   Barriers to return to prior living situation: A x 1 for mobility, fall risk  Recommendations for discharge: TCu  Rationale for recommendations: TCU to improve functional mobility and to address weakness and impaired balance and gait          Entered by: Tamara Nguyen 12/11/2018 1:20 PM     Physical Therapy Discharge Summary    Reason for therapy discharge:    Discharged to transitional care facility.    Progress towards therapy goal(s). See goals on Care Plan in [facility] electronic health record for goal details.  Goals partially met.  Barriers to achieving goals:   discharge from facility.    Therapy recommendation(s):    Continued therapy is recommended.  Rationale/Recommendations:  see above.

## 2018-12-11 NOTE — PLAN OF CARE
VSS on 7L oxymizer.  Pt denies SOB while in bed.  Dressing on coccyx CDI.  Refusing repositioning/use of pillows despite education.  Voiding adequately per urinal at bedside.  Chemo precautions maintained.

## 2018-12-11 NOTE — PLAN OF CARE
Pt is AxOx4, VSS, on 8L O2 (baseline 4). Pt desats with activity. Pt denies pain this shift. Mod carb diet, insulin per sliding scale.Using urinal at bedside. Assist x1 with walker and GB. Pt refused repositioning, education provided about the risk associated with refusal. Pt aware and continued to refuse.  Extremities [place]. Nails clubbed, and dusky per pt's baseline. Wound cares completed per order this shift.  IV SL. Plan: possible discharge tomorrow 12/11/18, if resp status improved. On Lasix.   Continue to monitor.

## 2018-12-11 NOTE — PLAN OF CARE
OT: pt is discharging to TCU today, will defer further OT to next level of care, GOALS NOT MET, see discharge summary    Occupational Therapy Discharge Summary    Reason for therapy discharge:    Discharged to transitional care facility.    Progress towards therapy goal(s). See goals on Care Plan in [facility] electronic health record for goal details.  Goals not met.  Barriers to achieving goals:   discharge from facility.    Therapy recommendation(s):    Continued therapy is recommended.  Rationale/Recommendations:  maximize safety and independence with ADLs.

## 2018-12-11 NOTE — PROGRESS NOTES
Patient is on 7L oxymizer cannula with SpO2 92%. BBS coarse/diminished and all nebs tx is given as ordered. Will continue to monitor.    Kavon Guerra RT  12/10/2018

## 2018-12-11 NOTE — PROGRESS NOTES
Patient os on 7L Oxymizer cannula with a SpO2 93%, BS coarse/diminished and all nebs tx are given as ordered. Respiratory will continue to treat and monitor.     12/11/2018  Jolly Judge, RRT

## 2018-12-11 NOTE — PROVIDER NOTIFICATION
MD Notification    Notified Person: MD    Notified Person Name: Dr Hough    Notification Date/Time: 12/11/2018      Notification Interaction: Telephone conversation.    Purpose of Notification: Hypoglycemic this AM.  post breakfast. Would you like meal time insulin held?    Orders Received: Hold meal time insulin. Push back discharge time.    Comments:

## 2018-12-11 NOTE — PROGRESS NOTES
SW Discharge Note:    D/I:  Bed is available for patient today at [facility] [address].  Patient is requesting [facility] which will come to  patient at 15:00.  [facility] will bring a portable oxygen tank. Patient and spouse updated of discharge plan and are aware that facility is non-smoking and patient will not have chemotherapy while at TCU.  Updated facility and completed the PAS.    PAS-RR    D: Per DHS regulation, SW completed and submitted PAS-RR to [place] [employer] Direct Connect via the Senior LinkAge Line.  PAS-RR confirmation # is : [id number].    I: SW spoke with patient and spouse and they are aware a PAS-RR has been submitted.  SW reviewed with patient and spouse that they may be contacted for a follow up appointment within 10 days of hospital discharge if their SNF stay is < 30 days.  Contact information for Senior LinkAge Line was also provided.    A: Patient and spouse verbalized understanding.    P: Further questions may be directed to [facility] LinkAge Line at #[phone], option #4 for PAS-RR staff.    Addendum:  Received orders and faxed to facility.     Hang Mendes, VALENTÍN, LGSW

## 2018-12-12 PROBLEM — I10 BENIGN ESSENTIAL HYPERTENSION: Status: ACTIVE | Noted: 2018-01-01

## 2018-12-12 PROBLEM — E11.9 DIABETES MELLITUS, TYPE 2 (H): Status: ACTIVE | Noted: 2018-01-01

## 2018-12-12 PROBLEM — J44.1 CHRONIC OBSTRUCTIVE PULMONARY DISEASE WITH ACUTE EXACERBATION (H): Status: ACTIVE | Noted: 2018-01-01

## 2018-12-12 PROBLEM — I25.10 CORONARY ARTERY DISEASE INVOLVING NATIVE CORONARY ARTERY OF NATIVE HEART WITHOUT ANGINA PECTORIS: Status: ACTIVE | Noted: 2018-01-01

## 2018-12-12 PROBLEM — I50.22 CHRONIC SYSTOLIC HEART FAILURE (H): Status: ACTIVE | Noted: 2017-04-18

## 2018-12-12 PROBLEM — I26.99 ACUTE PULMONARY EMBOLISM (H): Status: ACTIVE | Noted: 2018-01-01

## 2018-12-12 PROBLEM — R53.81 PHYSICAL DECONDITIONING: Status: ACTIVE | Noted: 2018-01-01

## 2018-12-12 PROBLEM — E03.8 OTHER SPECIFIED HYPOTHYROIDISM: Status: ACTIVE | Noted: 2018-01-01

## 2018-12-12 NOTE — PLAN OF CARE
A&OX4, VSS on 7L Oxymizer cannula/ Denies pain, n&V. An episode of hypoglycemia this AM. Meal time insulin held at breakfast per MD order. Up with AX1 with gaitbelt and walker. Pt stable at time of discharge to [facility]. All belongings and discharge instructions sent with pt. Pt and wife verbalizing understanding discharge instructions.

## 2018-12-12 NOTE — PROGRESS NOTES
[facility]  PRIMARY CARE PROVIDER AND CLINIC:  [facility] [address] / [place] [address]  Chief Complaint   Patient presents with     Hospital F/U     [facility] Medical Record Number:  [id number]  Place of Service where encounter took place:  [facility] [[id number]]    HPI:    Ravi Sandoval is a 73 year old  (1945),admitted to the above facility from  [facility].  Hospital stay 11/23/2018 through 12/11/2018.  Admitted to this facility for  rehab, medical management and nursing care.  HPI information obtained from: facility chart records, facility staff, patient report and [facility] chart review.  Current issues are:      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)  Chronic obstructive pulmonary disease with acute exacerbation (H)  Acute respiratory distress  Pulmonary hypertension (H)  Pulmonary fibrosis (H)  Other acute pulmonary embolism without acute cor pulmonale (H)  Patient transferred to TCU after hospitalization due to shortness of breath and hypoxia. Patient is a current smoker with h/o pulmonary fibrosis, COPD- oxygen dependent, and pulmonary hypertension. During course of hospital stay patient was found to have large pleural effusion on right side that was drained on 11/26, non occlusive pulmonary embolism (eliquis started 1/23 ). He was treated for RLL pneumonia (completed on 12/3) and COPD exacerbation (steroids, duonebs). 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 He was evaluated by oncology. He completed a course of palliative chemotherapy (carboplatin and etoposide)  on 12/8.     Type 2 diabetes mellitus with complication, with long-term current use of insulin (H)  Prior to hospitalization patient was taking glipizide. He did require insulin while in patient, likely due to steroids.   Last BG Levels:    AM: 84, 61    HS: 363  chf- he did requires additional  diuresis in hospital. Now taking lasix 20mg every day.   Coronary artery disease involving native coronary artery of native heart without angina pectoris  H/o bare metal stent in 2010.   He continues on ASA.   Benign essential hypertension  BP's in TCU: 135/70, 110/71, 108/63  He continues on  PTA amodipine 2.5 mg daily, lisinopril 20 mg daily and metoprolol 25 mg twice daily.  Other specified hypothyroidism  Taking PTA synthroid  Physical deconditioning  Lives with wife in apartment. Notes from hospital SW indicate wife is unable to care for patient at this time. He has been independent prior to admission but not very active. He was driving.           CODE STATUS/ADVANCE DIRECTIVES DISCUSSION:   CPR/Full code   Patient's living condition: lives with spouse    ALLERGIES:Tetracycline  PAST MEDICAL HISTORY:  has a past medical history of ACP (advance care planning), Anemia, ASHD (arteriosclerotic heart disease), BPH (benign prostatic hyperplasia), Cardiac arrest (H), Cataracts, bilateral, Chronic diarrhea, Chronic respiratory failure with hypoxia (H), COPD (chronic obstructive pulmonary disease) (H), Depression, Disease of lung, Dysplasia of prostate, Gastroesophageal reflux disease, General weakness, Heart attack (H), Herpes zoster, Histiocytosis X (H), Hyperlipidemia, Hypertension, Hypothyroidism, Oxygen dependent, Postinflammatory pulmonary fibrosis (H), Prostatic hypertrophy, benign, Pulmonary HTN (H), Pulmonary hypertensive venous disease (H), Thyroid disease, Tobacco use, and Type 2 diabetes mellitus without complications (H).  PAST SURGICAL HISTORY:  has a past surgical history that includes Cardiac surgery; biopsy, lung nodule; Colonoscopy (N/A, 5/27/2016); Phacoemulsification clear cornea with standard intraocular lens implant (Right, 5/31/2017); and Phacoemulsification clear cornea with standard intraocular lens implant (Left, 6/7/2017).  FAMILY HISTORY: family history includes Cancer in his mother;  Cardiovascular in his father; Neurologic Disorder in his sister.  SOCIAL HISTORY:  reports that he has been smoking cigarettes.  He has a 27.50 pack-year smoking history. he has never used smokeless tobacco. He reports that he does not drink alcohol or use drugs.    Post Discharge Medication Reconciliation Status: discharge medications reconciled, continue medications without change.  Current Outpatient Medications   Medication Sig Dispense Refill     albuterol (PROVENTIL) (2.5 MG/3ML) 0.083% neb solution Take 1 vial (2.5 mg) by nebulization once as needed (refractory bronchospasm associated with hypersensitivity)  0     amLODIPine (NORVASC) 2.5 MG tablet Take 2.5 mg by mouth daily       apixaban ANTICOAGULANT (ELIQUIS) 5 MG tablet Take 1 tablet (5 mg) by mouth 2 times daily 180 tablet 0     ASPIRIN PO Take 81 mg by mouth At Bedtime       budesonide (PULMICORT) 0.5 MG/2ML neb solution Take 2 mLs (0.5 mg) by nebulization 2 times daily 180 ampule 0     Colchicine (COLCRYS PO) Take 0.6 mg by mouth daily TAKES IN THE EVENING       digoxin (LANOXIN) 125 MCG tablet Take 1 tablet (125 mcg) by mouth daily 90 tablet 3     Filgrastim (NEUPOGEN) 300 MCG/0.5ML SOSY syringe Inject 0.5 mLs (300 mcg) Subcutaneous daily for 7 days 45 mL 0     FLUoxetine HCl (PROZAC PO) Take 40 mg by mouth daily       furosemide (LASIX) 20 MG tablet Take 1 tablet (20 mg) by mouth daily 14 tablet 0     insulin aspart (NOVOLOG VIAL) 100 UNITS/ML vial Give before meals and before bed:  For Pre-Meal Glucose:  140-189 give 1 unit   190-239 give 2 units   240-289 give 3 units   290-339 give 4 units   = or >340 give 5 units     For Bedtime Glucose  200 - 239 give 1 units   240 - 289 give 1.5 units  290 - 339 give 2 units  = or >340 give 2.5 units 10 mL 0     insulin glargine (LANTUS SOLOSTAR PEN) 100 UNIT/ML pen Inject 10 Units Subcutaneous 2 times daily 3 mL 0     ipratropium - albuterol 0.5 mg/2.5 mg/3 mL (DUONEB) 0.5-2.5 (3) MG/3ML neb solution Take 1  "vial by nebulization 3 times daily as needed for shortness of breath / dyspnea, wheezing or other (use up to 3 times a day for shortness of breath, cough)       levothyroxine (SYNTHROID/LEVOTHROID) 125 MCG tablet Take 125 mcg by mouth daily       LISINOPRIL PO Take 20 mg by mouth daily       loperamide (IMODIUM) 2 MG capsule Take 1 capsule (2 mg) by mouth 4 times daily as needed for diarrhea 20 capsule      metoprolol (TOPROL-XL) 25 MG 24 hr tablet Take 1 tablet (25 mg) by mouth 2 times daily 180 tablet 0     multivitamin w/minerals (THERA-VIT-M) tablet Take 1 tablet by mouth daily 90 tablet 3     OMEPRAZOLE PO Take 40 mg by mouth daily       ondansetron (ZOFRAN-ODT) 4 MG ODT tab Take 1 tablet (4 mg) by mouth every 6 hours as needed for nausea or vomiting       phenol (CHLORASEPTIC) 1.4 % spray Take 1 spray (1 mL) by mouth every hour as needed for sore throat (without fever)       predniSONE (DELTASONE) 10 MG tablet 4 tabs daily for 2 days, then 3 tabs daily for 2 days, then 2 tabs daily for 2 days, then 1 tab daily to be continued. 30 tablet 0     rosuvastatin (CRESTOR) 10 MG tablet Take 1 tablet (10 mg) by mouth daily 90 tablet 3     senna-docusate (SENOKOT-S/PERICOLACE) 8.6-50 MG tablet Take 1 tablet by mouth 2 times daily 180 tablet 0     tadalafil (CIALIS) 20 MG tablet Take 20 mg by mouth daily For pulmonary hypertension.       tamsulosin (FLOMAX) 0.4 MG capsule Take 1 capsule (0.4 mg) by mouth daily 30 capsule 3     nystatin (MYCOSTATIN) 885873 UNIT/ML suspension Take 5 mLs (500,000 Units) by mouth 4 times daily for 10 days (Patient not taking: Reported on 12/12/2018) 200 mL 0       ROS:  4 point ROS including Respiratory, CV, GI and , other than that noted in the HPI,  is negative    Exam:  /70   Pulse 60   Temp 97.7  F (36.5  C)   Resp 20   Ht 1.689 m (5' 6.5\")   Wt 64.8 kg (142 lb 12.8 oz)   SpO2 96%   BMI 22.70 kg/m    GENERAL APPEARANCE:  Alert, in no distress  ENT:  Mouth and posterior " oropharynx normal, moist mucous membranes, hearing acuity adequate   EYES:  EOM, conjunctivae, lids, pupils and irises normal  NECK:  No adenopathy,masses or thyromegaly  RESP:  respiratory effort and palpation of chest normal, no respiratory distress, Lung sounds faint rales in LLL.   CV:  Palpation and auscultation of heart done , rate and rhythm reg, no murmur, no rub or gallop, Edema none  ABDOMEN:  normal bowel sounds, soft, nontender, no hepatosplenomegaly or other masses  M/S:   Gait and station not observed, Digits and nails normal   SKIN:  Inspection/Palpation of skin and subcutaneous tissue scattered bruises on arms. Seborrheic dermatoses on back  NEURO: 2-12 in normal limits and at patient's baseline  PSYCH:  insight and judgement, memory intact , affect and mood normal      Lab/Diagnostic data:  CBC RESULTS:   Recent Labs   Lab Test 12/11/18  0733 12/10/18  0716  12/07/18  0725   WBC 36.1* 35.1*   < > 13.4*   RBC 4.20*  --   --  4.30*   HGB 11.9*  --   --  12.1*   HCT 35.5*  --   --  36.9*   MCV 85  --   --  86   MCH 28.3  --   --  28.1   MCHC 33.5  --   --  32.8   RDW 17.0*  --   --  17.6*   *  --   --  118*    < > = values in this interval not displayed.       Last Basic Metabolic Panel:  Recent Labs   Lab Test 12/11/18  0733 12/08/18  0738    139   POTASSIUM 3.6 4.1   CHLORIDE 101 107   AV 7.9* 7.5*   CO2 25 26   BUN 23 28   CR 0.67 0.77   GLC 50* 116*       Liver Function Studies -   Recent Labs   Lab Test 12/11/18  0733 12/06/18  0708   PROTTOTAL 5.3* 4.9*   ALBUMIN 2.4* 2.2*   BILITOTAL 0.5 0.3   ALKPHOS 53 41   AST 22 38   ALT 29 44       TSH   Date Value Ref Range Status   02/22/2017 7.59 (H) 0.40 - 4.00 mU/L Final   09/04/2014 0.16 (L) 0.40 - 4.00 mU/L Final     Comment:     Effective 7/30/2014, the reference range for this assay has changed to reflect   new instrumentation/methodology.       Lab Results   Component Value Date    A1C 10.7 11/23/2018    A1C 8.1 03/13/2017        ASSESSMENT/PLAN:  (C34.90) 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)  (primary encounter diagnosis)  Comment: diagnosed during this hospital stay. He did receive a round  Of chemotherapy in hospital. He did meet with palliative care multiple times but at this time asks for full medical treatment.   Plan: neupogen daily for one week. CBC w platelets 12/13. If WBC >50,000 will hold neupogen and call oncology (Dr Brownlee)  Follow up with oncology in one week.     (J44.1) Chronic obstructive pulmonary disease with acute exacerbation (H)  (R06.03) Acute respiratory distress  Comment: oxygen dependent. Received steroids in hospital. No cough today. He gets short of breath walking to BR. O2 sats are 90% at rest on supplemental oxygen.   Plan: duonebs, oxygen, physical therapy, prednisone taper     (I27.20) Pulmonary hypertension (H)  Comment: taking PTA tadalafil and digoxin  Plan: continue    (J84.10) Pulmonary fibrosis (H)  Comment: due to histiocytosis.     (I26.99) Other acute pulmonary embolism without acute cor pulmonale (H)  Comment: found on chest CT.   Plan: eliquis. CBC w platelets in am    (E11.8,  Z79.4) Type 2 diabetes mellitus with complication, with long-term current use of insulin (H)  Comment: elevated BGs due to steroids  Plan: continue lantus and novolog for now.     (I25.10) Coronary artery disease involving native coronary artery of native heart without angina pectoris  (I50.22) Chronic systolic heart failure (H)  Comment: lasix increased to daily.   Plan: daily wts. BMP in am    (I10) Benign essential hypertension  Comment: adequate control  Plan: monitor     (E03.8) Other specified hypothyroidism  Comment: no recent TSH  Plan: continue current plan    (R53.81) Physical deconditioning  Comment: due to respiratory disease. At this point he gets short of breath walking to BR.   Plan: physical therapy and OCCUPATIONAL THERAPY     POLST signed- full code      Electronically signed by:  Mulu Truong,  APRN CNP

## 2018-12-12 NOTE — LETTER
12/12/2018        RE: Ravi Sandoval  [address]  [place] [address]        [facility]  PRIMARY CARE PROVIDER AND CLINIC:  [facility] [address] / [place] [address]  Chief Complaint   Patient presents with     Hospital F/U     [facility] Record Number:  [id number]  Place of Service where encounter took place:  [facility] [[id number]]    HPI:    Ravi Sandoval is a 73 year old  (1945),admitted to the above facility from  [facility].  Hospital stay 11/23/2018 through 12/11/2018.  Admitted to this facility for  rehab, medical management and nursing care.  HPI information obtained from: facility chart records, facility staff, patient report and [facility] chart review.  Current issues are:      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)  Chronic obstructive pulmonary disease with acute exacerbation (H)  Acute respiratory distress  Pulmonary hypertension (H)  Pulmonary fibrosis (H)  Other acute pulmonary embolism without acute cor pulmonale (H)  Patient transferred to TCU after hospitalization due to shortness of breath and hypoxia. Patient is a current smoker with h/o pulmonary fibrosis, COPD- oxygen dependent, and pulmonary hypertension. During course of hospital stay patient was found to have large pleural effusion on right side that was drained on 11/26, non occlusive pulmonary embolism (eliquis started 1/23 ). He was treated for RLL pneumonia (completed on 12/3) and COPD exacerbation (steroids, duonebs). 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 He was evaluated by oncology. He completed a course of palliative chemotherapy (carboplatin and etoposide)  on 12/8.     Type 2 diabetes mellitus with complication, with long-term current use of insulin (H)  Prior to hospitalization patient was taking glipizide. He did require insulin while in patient, likely due to  steroids.   Last BG Levels:    AM: 84, 61    HS: 363  chf- he did requires additional diuresis in hospital. Now taking lasix 20mg every day.   Coronary artery disease involving native coronary artery of native heart without angina pectoris  H/o bare metal stent in 2010.   He continues on ASA.   Benign essential hypertension  BP's in TCU: 135/70, 110/71, 108/63  He continues on  PTA amodipine 2.5 mg daily, lisinopril 20 mg daily and metoprolol 25 mg twice daily.  Other specified hypothyroidism  Taking PTA synthroid  Physical deconditioning  Lives with wife in apartment. Notes from hospital SW indicate wife is unable to care for patient at this time. He has been independent prior to admission but not very active. He was driving.           CODE STATUS/ADVANCE DIRECTIVES DISCUSSION:   CPR/Full code   Patient's living condition: lives with spouse    ALLERGIES:Tetracycline  PAST MEDICAL HISTORY:  has a past medical history of ACP (advance care planning), Anemia, ASHD (arteriosclerotic heart disease), BPH (benign prostatic hyperplasia), Cardiac arrest (H), Cataracts, bilateral, Chronic diarrhea, Chronic respiratory failure with hypoxia (H), COPD (chronic obstructive pulmonary disease) (H), Depression, Disease of lung, Dysplasia of prostate, Gastroesophageal reflux disease, General weakness, Heart attack (H), Herpes zoster, Histiocytosis X (H), Hyperlipidemia, Hypertension, Hypothyroidism, Oxygen dependent, Postinflammatory pulmonary fibrosis (H), Prostatic hypertrophy, benign, Pulmonary HTN (H), Pulmonary hypertensive venous disease (H), Thyroid disease, Tobacco use, and Type 2 diabetes mellitus without complications (H).  PAST SURGICAL HISTORY:  has a past surgical history that includes Cardiac surgery; biopsy, lung nodule; Colonoscopy (N/A, 5/27/2016); Phacoemulsification clear cornea with standard intraocular lens implant (Right, 5/31/2017); and Phacoemulsification clear cornea with standard intraocular lens implant  (Left, 6/7/2017).  FAMILY HISTORY: family history includes Cancer in his mother; Cardiovascular in his father; Neurologic Disorder in his sister.  SOCIAL HISTORY:  reports that he has been smoking cigarettes.  He has a 27.50 pack-year smoking history. he has never used smokeless tobacco. He reports that he does not drink alcohol or use drugs.    Post Discharge Medication Reconciliation Status: discharge medications reconciled, continue medications without change.  Current Outpatient Medications   Medication Sig Dispense Refill     albuterol (PROVENTIL) (2.5 MG/3ML) 0.083% neb solution Take 1 vial (2.5 mg) by nebulization once as needed (refractory bronchospasm associated with hypersensitivity)  0     amLODIPine (NORVASC) 2.5 MG tablet Take 2.5 mg by mouth daily       apixaban ANTICOAGULANT (ELIQUIS) 5 MG tablet Take 1 tablet (5 mg) by mouth 2 times daily 180 tablet 0     ASPIRIN PO Take 81 mg by mouth At Bedtime       budesonide (PULMICORT) 0.5 MG/2ML neb solution Take 2 mLs (0.5 mg) by nebulization 2 times daily 180 ampule 0     Colchicine (COLCRYS PO) Take 0.6 mg by mouth daily TAKES IN THE EVENING       digoxin (LANOXIN) 125 MCG tablet Take 1 tablet (125 mcg) by mouth daily 90 tablet 3     Filgrastim (NEUPOGEN) 300 MCG/0.5ML SOSY syringe Inject 0.5 mLs (300 mcg) Subcutaneous daily for 7 days 45 mL 0     FLUoxetine HCl (PROZAC PO) Take 40 mg by mouth daily       furosemide (LASIX) 20 MG tablet Take 1 tablet (20 mg) by mouth daily 14 tablet 0     insulin aspart (NOVOLOG VIAL) 100 UNITS/ML vial Give before meals and before bed:  For Pre-Meal Glucose:  140-189 give 1 unit   190-239 give 2 units   240-289 give 3 units   290-339 give 4 units   = or >340 give 5 units     For Bedtime Glucose  200 - 239 give 1 units   240 - 289 give 1.5 units  290 - 339 give 2 units  = or >340 give 2.5 units 10 mL 0     insulin glargine (LANTUS SOLOSTAR PEN) 100 UNIT/ML pen Inject 10 Units Subcutaneous 2 times daily 3 mL 0     ipratropium  "- albuterol 0.5 mg/2.5 mg/3 mL (DUONEB) 0.5-2.5 (3) MG/3ML neb solution Take 1 vial by nebulization 3 times daily as needed for shortness of breath / dyspnea, wheezing or other (use up to 3 times a day for shortness of breath, cough)       levothyroxine (SYNTHROID/LEVOTHROID) 125 MCG tablet Take 125 mcg by mouth daily       LISINOPRIL PO Take 20 mg by mouth daily       loperamide (IMODIUM) 2 MG capsule Take 1 capsule (2 mg) by mouth 4 times daily as needed for diarrhea 20 capsule      metoprolol (TOPROL-XL) 25 MG 24 hr tablet Take 1 tablet (25 mg) by mouth 2 times daily 180 tablet 0     multivitamin w/minerals (THERA-VIT-M) tablet Take 1 tablet by mouth daily 90 tablet 3     OMEPRAZOLE PO Take 40 mg by mouth daily       ondansetron (ZOFRAN-ODT) 4 MG ODT tab Take 1 tablet (4 mg) by mouth every 6 hours as needed for nausea or vomiting       phenol (CHLORASEPTIC) 1.4 % spray Take 1 spray (1 mL) by mouth every hour as needed for sore throat (without fever)       predniSONE (DELTASONE) 10 MG tablet 4 tabs daily for 2 days, then 3 tabs daily for 2 days, then 2 tabs daily for 2 days, then 1 tab daily to be continued. 30 tablet 0     rosuvastatin (CRESTOR) 10 MG tablet Take 1 tablet (10 mg) by mouth daily 90 tablet 3     senna-docusate (SENOKOT-S/PERICOLACE) 8.6-50 MG tablet Take 1 tablet by mouth 2 times daily 180 tablet 0     tadalafil (CIALIS) 20 MG tablet Take 20 mg by mouth daily For pulmonary hypertension.       tamsulosin (FLOMAX) 0.4 MG capsule Take 1 capsule (0.4 mg) by mouth daily 30 capsule 3     nystatin (MYCOSTATIN) 356738 UNIT/ML suspension Take 5 mLs (500,000 Units) by mouth 4 times daily for 10 days (Patient not taking: Reported on 12/12/2018) 200 mL 0       ROS:  4 point ROS including Respiratory, CV, GI and , other than that noted in the HPI,  is negative    Exam:  /70   Pulse 60   Temp 97.7  F (36.5  C)   Resp 20   Ht 1.689 m (5' 6.5\")   Wt 64.8 kg (142 lb 12.8 oz)   SpO2 96%   BMI 22.70 " kg/m     GENERAL APPEARANCE:  Alert, in no distress  ENT:  Mouth and posterior oropharynx normal, moist mucous membranes, hearing acuity adequate   EYES:  EOM, conjunctivae, lids, pupils and irises normal  NECK:  No adenopathy,masses or thyromegaly  RESP:  respiratory effort and palpation of chest normal, no respiratory distress, Lung sounds faint rales in LLL.   CV:  Palpation and auscultation of heart done , rate and rhythm reg, no murmur, no rub or gallop, Edema none  ABDOMEN:  normal bowel sounds, soft, nontender, no hepatosplenomegaly or other masses  M/S:   Gait and station not observed, Digits and nails normal   SKIN:  Inspection/Palpation of skin and subcutaneous tissue scattered bruises on arms. Seborrheic dermatoses on back  NEURO: 2-12 in normal limits and at patient's baseline  PSYCH:  insight and judgement, memory intact , affect and mood normal      Lab/Diagnostic data:  CBC RESULTS:   Recent Labs   Lab Test 12/11/18  0733 12/10/18  0716  12/07/18  0725   WBC 36.1* 35.1*   < > 13.4*   RBC 4.20*  --   --  4.30*   HGB 11.9*  --   --  12.1*   HCT 35.5*  --   --  36.9*   MCV 85  --   --  86   MCH 28.3  --   --  28.1   MCHC 33.5  --   --  32.8   RDW 17.0*  --   --  17.6*   *  --   --  118*    < > = values in this interval not displayed.       Last Basic Metabolic Panel:  Recent Labs   Lab Test 12/11/18  0733 12/08/18  0738    139   POTASSIUM 3.6 4.1   CHLORIDE 101 107   AV 7.9* 7.5*   CO2 25 26   BUN 23 28   CR 0.67 0.77   GLC 50* 116*       Liver Function Studies -   Recent Labs   Lab Test 12/11/18  0733 12/06/18  0708   PROTTOTAL 5.3* 4.9*   ALBUMIN 2.4* 2.2*   BILITOTAL 0.5 0.3   ALKPHOS 53 41   AST 22 38   ALT 29 44       TSH   Date Value Ref Range Status   02/22/2017 7.59 (H) 0.40 - 4.00 mU/L Final   09/04/2014 0.16 (L) 0.40 - 4.00 mU/L Final     Comment:     Effective 7/30/2014, the reference range for this assay has changed to reflect   new instrumentation/methodology.       Lab Results    Component Value Date    A1C 10.7 11/23/2018    A1C 8.1 03/13/2017       ASSESSMENT/PLAN:  (C34.90) 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)  (primary encounter diagnosis)  Comment: diagnosed during this hospital stay. He did receive a round  Of chemotherapy in hospital. He did meet with palliative care multiple times but at this time asks for full medical treatment.   Plan: neupogen daily for one week. CBC w platelets 12/13. If WBC >50,000 will hold neupogen and call oncology (Dr Brownlee)  Follow up with oncology in one week.     (J44.1) Chronic obstructive pulmonary disease with acute exacerbation (H)  (R06.03) Acute respiratory distress  Comment: oxygen dependent. Received steroids in hospital. No cough today. He gets short of breath walking to BR. O2 sats are 90% at rest on supplemental oxygen.   Plan: duonebs, oxygen, physical therapy, prednisone taper     (I27.20) Pulmonary hypertension (H)  Comment: taking PTA tadalafil and digoxin  Plan: continue    (J84.10) Pulmonary fibrosis (H)  Comment: due to histiocytosis.     (I26.99) Other acute pulmonary embolism without acute cor pulmonale (H)  Comment: found on chest CT.   Plan: eliquis. CBC w platelets in am    (E11.8,  Z79.4) Type 2 diabetes mellitus with complication, with long-term current use of insulin (H)  Comment: elevated BGs due to steroids  Plan: continue lantus and novolog for now.     (I25.10) Coronary artery disease involving native coronary artery of native heart without angina pectoris  (I50.22) Chronic systolic heart failure (H)  Comment: lasix increased to daily.   Plan: daily wts. BMP in am    (I10) Benign essential hypertension  Comment: adequate control  Plan: monitor     (E03.8) Other specified hypothyroidism  Comment: no recent TSH  Plan: continue current plan    (R53.81) Physical deconditioning  Comment: due to respiratory disease. At this point he gets short of breath walking to BR.   Plan: physical therapy and OCCUPATIONAL THERAPY     POLST  signed- full code      Electronically signed by:  IRINA Saul CNP                    Sincerely,        IRINA Saul CNP

## 2018-12-14 NOTE — LETTER
12/14/2018        RE: Ravi Sandoval  [address]  [facility] [address]        PRIMARY CARE PROVIDER AND CLINIC RESPONSIBLE:  [facility] [address] / [facility] [address]        ADMISSION HISTORY AND PHYSICAL EXAMINATION     Chief Complaint   Patient presents with     Fatigue     Shortness of Breath         HISTORY OF PRESENT ILLNESS:  73 year old male, (1945), [facility] of medical care and rehab.  HPI information obtained from: {FGS HPI:[id number]}.    Ravi Sandoval is a 73 year old male with history of ***    Patient is seen and examined by Julio C Dyson/ Mulu Truong NP. Please see FREEDOM Dyson's/FREEDOM Truong's admit noted dated *** for details of admission, past medical history, family history, allergies, medication list, social history and other details pertinent with this admission. Hospital admission and dc summary reviewed.      Past Medical History:   Diagnosis Date     ACP (advance care planning)      Anemia      ASHD (arteriosclerotic heart disease)      BPH (benign prostatic hyperplasia)      Cardiac arrest (H)      Cataracts, bilateral      Chronic diarrhea      Chronic respiratory failure with hypoxia (H)      COPD (chronic obstructive pulmonary disease) (H)     HOME O2     Depression      Disease of lung      Dysplasia of prostate      Gastroesophageal reflux disease      General weakness      Heart attack (H)      Herpes zoster      Histiocytosis X (H)      Hyperlipidemia      Hypertension      Hypothyroidism      Oxygen dependent      Postinflammatory pulmonary fibrosis (H)      Prostatic hypertrophy, benign      Pulmonary HTN (H)      Pulmonary hypertensive venous disease (H)      Thyroid disease      Tobacco use      Type 2 diabetes mellitus without complications (H)        Past Surgical History:   Procedure Laterality Date     BIOPSY, LUNG NODULE       CARDIAC SURGERY      HX OF STENT     COLONOSCOPY N/A 5/27/2016     Procedure: COMBINED COLONOSCOPY, SINGLE OR MULTIPLE BIOPSY/POLYPECTOMY BY BIOPSY;  Surgeon: Sera Coffman MD;  Location: [facility]     PHACOEMULSIFICATION CLEAR CORNEA WITH STANDARD INTRAOCULAR LENS IMPLANT Right 5/31/2017    Procedure: PHACOEMULSIFICATION CLEAR CORNEA WITH STANDARD INTRAOCULAR LENS IMPLANT;  RIGHT EYE PHACOEMULSIFICATION CLEAR CORNEA WITH STANDARD INTRAOCULAR LENS IMPLANT ;  Surgeon: Trever Delgado MD;  Location:  [facility]     PHACOEMULSIFICATION CLEAR CORNEA WITH STANDARD INTRAOCULAR LENS IMPLANT Left 6/7/2017    Procedure: PHACOEMULSIFICATION CLEAR CORNEA WITH STANDARD INTRAOCULAR LENS IMPLANT;  LEFT EYE PHACOEMULSIFICATION CLEAR CORNEA WITH STANDARD INTRAOCULAR LENS IMPLANT ;  Surgeon: Trever Delgado MD;  Location: [facility]       Current Outpatient Medications   Medication Sig     acetaminophen (TYLENOL) 325 MG tablet Take 650 mg by mouth every 6 hours as needed for mild pain     albuterol (PROVENTIL) (2.5 MG/3ML) 0.083% neb solution Take 1 vial (2.5 mg) by nebulization once as needed (refractory bronchospasm associated with hypersensitivity)     amLODIPine (NORVASC) 2.5 MG tablet Take 2.5 mg by mouth daily     apixaban ANTICOAGULANT (ELIQUIS) 5 MG tablet Take 1 tablet (5 mg) by mouth 2 times daily     ASPIRIN PO Take 81 mg by mouth At Bedtime     budesonide (PULMICORT) 0.5 MG/2ML neb solution Take 2 mLs (0.5 mg) by nebulization 2 times daily     Colchicine (COLCRYS PO) Take 0.6 mg by mouth daily TAKES IN THE EVENING     digoxin (LANOXIN) 125 MCG tablet Take 1 tablet (125 mcg) by mouth daily     Filgrastim (NEUPOGEN) 300 MCG/0.5ML SOSY syringe Inject 0.5 mLs (300 mcg) Subcutaneous daily for 7 days     FLUoxetine HCl (PROZAC PO) Take 40 mg by mouth daily     furosemide (LASIX) 20 MG tablet Take 1 tablet (20 mg) by mouth daily     insulin aspart (NOVOLOG VIAL) 100 UNITS/ML vial Give before meals and before bed:  For Pre-Meal Glucose:  140-189 give 1 unit   190-239 give 2 units   240-289 give 3  units   290-339 give 4 units   = or >340 give 5 units     For Bedtime Glucose  200 - 239 give 1 units   240 - 289 give 1.5 units  290 - 339 give 2 units  = or >340 give 2.5 units     insulin glargine (LANTUS SOLOSTAR PEN) 100 UNIT/ML pen Inject 10 Units Subcutaneous 2 times daily     ipratropium - albuterol 0.5 mg/2.5 mg/3 mL (DUONEB) 0.5-2.5 (3) MG/3ML neb solution Take 1 vial by nebulization 3 times daily as needed for shortness of breath / dyspnea, wheezing or other (use up to 3 times a day for shortness of breath, cough)     levothyroxine (SYNTHROID/LEVOTHROID) 125 MCG tablet Take 125 mcg by mouth daily     LISINOPRIL PO Take 20 mg by mouth daily     loperamide (IMODIUM) 2 MG capsule Take 1 capsule (2 mg) by mouth 4 times daily as needed for diarrhea     metoprolol (TOPROL-XL) 25 MG 24 hr tablet Take 1 tablet (25 mg) by mouth 2 times daily     multivitamin w/minerals (THERA-VIT-M) tablet Take 1 tablet by mouth daily     OMEPRAZOLE PO Take 40 mg by mouth daily     ondansetron (ZOFRAN-ODT) 4 MG ODT tab Take 1 tablet (4 mg) by mouth every 6 hours as needed for nausea or vomiting     phenol (CHLORASEPTIC) 1.4 % spray Take 1 spray (1 mL) by mouth every hour as needed for sore throat (without fever)     predniSONE (DELTASONE) 10 MG tablet 4 tabs daily for 2 days, then 3 tabs daily for 2 days, then 2 tabs daily for 2 days, then 1 tab daily to be continued.     rosuvastatin (CRESTOR) 10 MG tablet Take 1 tablet (10 mg) by mouth daily     tadalafil (CIALIS) 20 MG tablet Take 20 mg by mouth daily For pulmonary hypertension.     tamsulosin (FLOMAX) 0.4 MG capsule Take 1 capsule (0.4 mg) by mouth daily     nystatin (MYCOSTATIN) 265638 UNIT/ML suspension Take 5 mLs (500,000 Units) by mouth 4 times daily for 10 days (Patient not taking: Reported on 12/12/2018)     No current facility-administered medications for this visit.        Allergies   Allergen Reactions     Tetracycline        Social History     Socioeconomic History  "    Marital status:      Spouse name: Not on file     Number of children: Not on file     Years of education: Not on file     Highest education level: Not on file   Social Needs     Financial resource strain: Not on file     Food insecurity - worry: Not on file     Food insecurity - inability: Not on file     Transportation needs - medical: Not on file     Transportation needs - non-medical: Not on file   Occupational History     Not on file   Tobacco Use     Smoking status: Current Every Day Smoker     Packs/day: 0.50     Years: 55.00     Pack years: 27.50     Types: Cigarettes     Smokeless tobacco: Never Used   Substance and Sexual Activity     Alcohol use: No     Alcohol/week: 0.0 oz     Drug use: No     Sexual activity: Not on file   Other Topics Concern     Parent/sibling w/ CABG, MI or angioplasty before 65F 55M? Not Asked      Service Not Asked     Blood Transfusions Not Asked     Caffeine Concern Yes     Comment: 4 cups coffee/day     Occupational Exposure Not Asked     Hobby Hazards Not Asked     Sleep Concern Yes     Stress Concern Not Asked     Weight Concern Not Asked     Special Diet No     Back Care Not Asked     Exercise No     Bike Helmet Not Asked     Seat Belt Yes     Self-Exams Not Asked   Social History Narrative     Not on file          Information reviewed:  Medications, vital signs, orders, nursing notes, problem list, hospital information.     ROS: All 10 point review of system completed, those pertinent positive, please see H&P, the remaining ROS is negative.    /90   Pulse 59   Temp 95.7  F (35.4  C)   Resp 16   Ht 1.664 m (5' 5.5\")   Wt 62.6 kg (138 lb)   SpO2 90%   BMI 22.62 kg/m       PHYSICAL EXAMINATION: ***  GENERAL:  No acute distress.  SKIN:  Dry and warm.  There is no rash at area of skin examined.  HEENT:  Head without trauma.  Pupils round, reactive. Exam of conjunctiva and lids are normal. Sclera without icterus. There is no oral thrush.  NECK:  " Supple. No jugular venous distension.  CHEST: No reproducible chest tenderness.   LUNGS:  Normal respiratory effort. Lungs reveal decreased breath sounds at bases. No rales or wheezes.  HEART:  Regular rate and rhythm.  No murmur, gallops or rubs auscultated.  ABDOMEN:  Soft, bowel sounds positive.  There is no tenderness or guarding.   EXTREMITIES: No edema.   NEUROLOGIC:  Alert and oriented x3.  There is no focal deficit appreciated.  PSYCH: Recent and remote memory is normal. Mood and affect are normal.    Lab/Diagnostic data:  Reviewed  ***    ASSESSMENT / PLAN:  Data Unavailable        Total time spent with patient visit was *** min including patient visit, review of past records, patients counseling and coordinating care.      Electronically signed by:  Maynor Rodriguez MD            PRIMARY CARE PROVIDER AND CLINIC RESPONSIBLE:  [facility] [id number] / [place] [address]        ADMISSION HISTORY AND PHYSICAL EXAMINATION     Chief Complaint   Patient presents with     Fatigue     Shortness of Breath         HISTORY OF PRESENT ILLNESS:  73 year old male, (1945), [facility] care and rehab.  HPI information obtained from: facility chart records, facility staff, patient report, [facility] chart review and Care Everywhere [facility] chart review.    Ravi Sandoval is a 73 year old male with history of histiocytosis X, pulmonary fibrosis, severe pulmonary hypertension and COPD/Emphysema, who is chronically oxygen dependent on 4-6 liters , hypertension, hypothyroidism, coronary artery disease and DM type II who was admitted at [facility] from 11/23 to 12/11/18 due to worsening dyspnea.  He was found to have acute on chronic hypoxic respiratory failure.  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 CTA chest PE protocol done on  11/26,  large right pleural effusion, non occlusive thrombus and pleural based densities. Completed a course of Levaquin on 12/3.  Oncology team has administered palliative chemotherapy, completed with Carboplatin and Etoposide and decardon 12/6 - 12/8.8.  He had pancytopenia.  With his leukopenia he received Neupogen therapy.  He underwent right thoracentesis on 11/30/2018 with removal of 1500 mL of pleural fluid which was exudative.  Pleural fluid culture was negative but cytology confirmed high-grade neuroendocrine tumor.  He also found to have an pulmonary embolism.  He was treated with IV heparin drip initially and later switched with apixaban.  He had a thrombocytopenia on chronically, closely monitored his platelets while he is on aspirin and apixaban.  He has known severe pulmonary hypertension with pulmonary fibrosis.  Patient was treated with Lasix, digoxinand tadalafil.  Feels weak and tired he has shortness of breath at rest and with minimal activity.  He is on oxygen with Oxymizer.  He has a right chest discomfort with pleuritic chest pain.  Slept well, appetite good and had BM last night. Patient denies abdominal pain, n&v, fever, chills, dysuria, leg pain or swelling. The rest of ROS is unremarkable. Patient is seen and examined by Mulu Truong NP. Please see FREEDOM Truong's admit noted dated 12/12/18 for details of admission, past medical history, family history, allergies, medication list, social history and other details pertinent with this admission. Hospital admission and dc summary reviewed.      Past Medical History:   Diagnosis Date     ACP (advance care planning)      Anemia      ASHD (arteriosclerotic heart disease)      BPH (benign prostatic hyperplasia)      Cardiac arrest (H)      Cataracts, bilateral      Chronic diarrhea      Chronic respiratory failure with hypoxia (H)      COPD (chronic obstructive pulmonary disease) (H)     HOME O2     Depression      Disease of lung      Dysplasia of  prostate      Gastroesophageal reflux disease      General weakness      Heart attack (H)      Herpes zoster      Histiocytosis X (H)      Hyperlipidemia      Hypertension      Hypothyroidism      Oxygen dependent      Postinflammatory pulmonary fibrosis (H)      Prostatic hypertrophy, benign      Pulmonary HTN (H)      Pulmonary hypertensive venous disease (H)      Thyroid disease      Tobacco use      Type 2 diabetes mellitus without complications (H)        Past Surgical History:   Procedure Laterality Date     BIOPSY, LUNG NODULE       CARDIAC SURGERY      HX OF STENT     COLONOSCOPY N/A 5/27/2016    Procedure: COMBINED COLONOSCOPY, SINGLE OR MULTIPLE BIOPSY/POLYPECTOMY BY BIOPSY;  Surgeon: Sera Coffman MD;  Location: [facility]     PHACOEMULSIFICATION CLEAR CORNEA WITH STANDARD INTRAOCULAR LENS IMPLANT Right 5/31/2017    Procedure: PHACOEMULSIFICATION CLEAR CORNEA WITH STANDARD INTRAOCULAR LENS IMPLANT;  RIGHT EYE PHACOEMULSIFICATION CLEAR CORNEA WITH STANDARD INTRAOCULAR LENS IMPLANT ;  Surgeon: Trever Delgado MD;  Location: [facility]     PHACOEMULSIFICATION CLEAR CORNEA WITH STANDARD INTRAOCULAR LENS IMPLANT Left 6/7/2017    Procedure: PHACOEMULSIFICATION CLEAR CORNEA WITH STANDARD INTRAOCULAR LENS IMPLANT;  LEFT EYE PHACOEMULSIFICATION CLEAR CORNEA WITH STANDARD INTRAOCULAR LENS IMPLANT ;  Surgeon: Trever Delgado MD;  Location: [facility]       Current Outpatient Medications   Medication Sig     acetaminophen (TYLENOL) 325 MG tablet Take 650 mg by mouth every 6 hours as needed for mild pain     albuterol (PROVENTIL) (2.5 MG/3ML) 0.083% neb solution Take 1 vial (2.5 mg) by nebulization once as needed (refractory bronchospasm associated with hypersensitivity)     amLODIPine (NORVASC) 2.5 MG tablet Take 2.5 mg by mouth daily     apixaban ANTICOAGULANT (ELIQUIS) 5 MG tablet Take 1 tablet (5 mg) by mouth 2 times daily     ASPIRIN PO Take 81 mg by mouth At Bedtime     budesonide (PULMICORT) 0.5 MG/2ML neb solution  Take 2 mLs (0.5 mg) by nebulization 2 times daily     Colchicine (COLCRYS PO) Take 0.6 mg by mouth daily TAKES IN THE EVENING     digoxin (LANOXIN) 125 MCG tablet Take 1 tablet (125 mcg) by mouth daily     Filgrastim (NEUPOGEN) 300 MCG/0.5ML SOSY syringe Inject 0.5 mLs (300 mcg) Subcutaneous daily for 7 days     FLUoxetine HCl (PROZAC PO) Take 40 mg by mouth daily     furosemide (LASIX) 20 MG tablet Take 1 tablet (20 mg) by mouth daily     insulin aspart (NOVOLOG VIAL) 100 UNITS/ML vial Give before meals and before bed:  For Pre-Meal Glucose:  140-189 give 1 unit   190-239 give 2 units   240-289 give 3 units   290-339 give 4 units   = or >340 give 5 units     For Bedtime Glucose  200 - 239 give 1 units   240 - 289 give 1.5 units  290 - 339 give 2 units  = or >340 give 2.5 units     insulin glargine (LANTUS SOLOSTAR PEN) 100 UNIT/ML pen Inject 10 Units Subcutaneous 2 times daily     ipratropium - albuterol 0.5 mg/2.5 mg/3 mL (DUONEB) 0.5-2.5 (3) MG/3ML neb solution Take 1 vial by nebulization 3 times daily as needed for shortness of breath / dyspnea, wheezing or other (use up to 3 times a day for shortness of breath, cough)     levothyroxine (SYNTHROID/LEVOTHROID) 125 MCG tablet Take 125 mcg by mouth daily     LISINOPRIL PO Take 20 mg by mouth daily     loperamide (IMODIUM) 2 MG capsule Take 1 capsule (2 mg) by mouth 4 times daily as needed for diarrhea     metoprolol (TOPROL-XL) 25 MG 24 hr tablet Take 1 tablet (25 mg) by mouth 2 times daily     multivitamin w/minerals (THERA-VIT-M) tablet Take 1 tablet by mouth daily     OMEPRAZOLE PO Take 40 mg by mouth daily     ondansetron (ZOFRAN-ODT) 4 MG ODT tab Take 1 tablet (4 mg) by mouth every 6 hours as needed for nausea or vomiting     phenol (CHLORASEPTIC) 1.4 % spray Take 1 spray (1 mL) by mouth every hour as needed for sore throat (without fever)     predniSONE (DELTASONE) 10 MG tablet 4 tabs daily for 2 days, then 3 tabs daily for 2 days, then 2 tabs daily for 2  days, then 1 tab daily to be continued.     rosuvastatin (CRESTOR) 10 MG tablet Take 1 tablet (10 mg) by mouth daily     tadalafil (CIALIS) 20 MG tablet Take 20 mg by mouth daily For pulmonary hypertension.     tamsulosin (FLOMAX) 0.4 MG capsule Take 1 capsule (0.4 mg) by mouth daily     nystatin (MYCOSTATIN) 667938 UNIT/ML suspension Take 5 mLs (500,000 Units) by mouth 4 times daily for 10 days (Patient not taking: Reported on 12/12/2018)     No current facility-administered medications for this visit.        Allergies   Allergen Reactions     Tetracycline        Social History     Socioeconomic History     Marital status:      Spouse name: Not on file     Number of children: Not on file     Years of education: Not on file     Highest education level: Not on file   Social Needs     Financial resource strain: Not on file     Food insecurity - worry: Not on file     Food insecurity - inability: Not on file     Transportation needs - medical: Not on file     Transportation needs - non-medical: Not on file   Occupational History     Not on file   Tobacco Use     Smoking status: Current Every Day Smoker     Packs/day: 0.50     Years: 55.00     Pack years: 27.50     Types: Cigarettes     Smokeless tobacco: Never Used   Substance and Sexual Activity     Alcohol use: No     Alcohol/week: 0.0 oz     Drug use: No     Sexual activity: Not on file   Other Topics Concern     Parent/sibling w/ CABG, MI or angioplasty before 65F 55M? Not Asked      Service Not Asked     Blood Transfusions Not Asked     Caffeine Concern Yes     Comment: 4 cups coffee/day     Occupational Exposure Not Asked     Hobby Hazards Not Asked     Sleep Concern Yes     Stress Concern Not Asked     Weight Concern Not Asked     Special Diet No     Back Care Not Asked     Exercise No     Bike Helmet Not Asked     Seat Belt Yes     Self-Exams Not Asked   Social History Narrative     Not on file          Information reviewed:  Medications, vital  "signs, orders, nursing notes, problem list, hospital information.     ROS: All 10 point review of system completed, those pertinent positive, please see H&P, the remaining ROS is negative.    /90   Pulse 59   Temp 95.7  F (35.4  C)   Resp 16   Ht 1.664 m (5' 5.5\")   Wt 62.6 kg (138 lb)   SpO2 90%   BMI 22.62 kg/m       PHYSICAL EXAMINATION:   GENERAL:  No acute distress.  SKIN:  Dry and warm.  There is no rash at area of skin examined.  HEENT:  Head without trauma.  Pupils round, reactive. Exam of conjunctiva and lids are normal. Sclera without icterus. There is oral thrush.  NECK:  Supple. No jugular venous distension.  CHEST: No reproducible chest tenderness.   LUNGS:  Normal respiratory effort. Lungs reveal decreased breath sounds diffusely.  With absent breath sounds at right base.  No rales or wheezes.  HEART:  Regular rate and rhythm.  No murmur, gallops or rubs auscultated.  ABDOMEN:  Soft, bowel sounds positive.  There is no tenderness or guarding.   EXTREMITIES: No edema.   NEUROLOGIC:  Alert and oriented x3. Moving all ext. Gait not tested.  PSYCH: Recent and remote memory is normal. Mood and affect are normal.    Lab/Diagnostic data:  Reviewed    CBC RESULTS:   Recent Labs   Lab Test 12/13/18   WBC 16.8*   RBC 3.87*   HGB 11.0*   HCT 33.4*   MCV 86   MCH 28.4   MCHC 32.9   RDW 17*   *     Last Comprehensive Metabolic Panel:  Sodium   Date Value Ref Range Status   12/13/2018 139 133 - 144 mmol/L Final     Potassium   Date Value Ref Range Status   12/13/2018 3.5 3.4 - 5.3 mmol/L Final     Chloride   Date Value Ref Range Status   12/13/2018 105 94 - 109 mmol/L Final     Carbon Dioxide   Date Value Ref Range Status   12/13/2018 25 20 - 32 mmol/L Final     Anion Gap   Date Value Ref Range Status   12/13/2018 9 3 - 14 mmol/L Final     Glucose   Date Value Ref Range Status   12/13/2018 71 70 - 99 mg/dL Final     Urea Nitrogen   Date Value Ref Range Status   12/13/2018 22 7 - 30 mg/dL Final "     Creatinine   Date Value Ref Range Status   12/13/2018 0.74 (A) 0.66 - 1.25 mg/dL Final     GFR Estimate   Date Value Ref Range Status   12/13/2018 >90 >60 mL/min/1.7m2 Final     Calcium   Date Value Ref Range Status   12/13/2018 7.5 7.5 - 10.1 mg/dL Final     Bilirubin Total   Date Value Ref Range Status   12/11/2018 0.5 0.2 - 1.3 mg/dL Final     Alkaline Phosphatase   Date Value Ref Range Status   12/11/2018 53 40 - 150 U/L Final     ALT   Date Value Ref Range Status   12/11/2018 29 0 - 70 U/L Final     AST   Date Value Ref Range Status   12/11/2018 22 0 - 45 U/L Final     Results for orders placed or performed during the hospital encounter of 11/23/18   XR Chest 2 Views    Narrative    CHEST TWO VIEWS 11/23/2018 11:08 AM     HISTORY: Shortness of breath and possible pneumonia.     COMPARISON: 3/12/2017    FINDINGS: Superimposed on fibrotic-appearing lung abnormalities is  abnormal opacity in the right lower lobe suggestive of developing  pneumonia. Trace right pleural effusion. No pneumothorax. Heart size  normal.       Impression    IMPRESSION: Right lower lobe pneumonia superimposed on  fibrotic-appearing lungs.     YANG DIAZ MD   CT Chest Pulmonary Embolism w Contrast     Value    Radiologist flags Pulmonary embolism (AA)    Narrative    CT CHEST PULMONARY EMBOLISM WITH CONTRAST November 26, 2018 10:33 AM     HISTORY: Worsening shortness of breath and hypoxia.    COMPARISON: None.    TECHNIQUE: Volumetric helical acquisition of CT images of the chest  from the lung apices to the kidneys were acquired after the  administration of  59mL Isovue-370  IV contrast. Radiation dose for  this scan was reduced using automated exposure control, adjustment of  the mA and/or kV according to patient size, or iterative  reconstruction technique.    FINDINGS: There is a nonocclusive thrombus in a right lower lobe  pulmonary artery. No other definite pulmonary embolism demonstrated.  There is now a moderate to large  right pleural effusion. No  significant left pleural fluid. No pericardial effusion. Multiple  nodular densities are seen in the major fissure and along the  pericardium. These are new since the comparison study and could  represent metastatic disease. A probable right hilar lymph node is  also present measuring 3.5 x 2.1 cm. This representatives a mass along  the pericardium and measures 2.0 x 1.4 cm. A prominent mass in the  posterior mediastinum/azygos esophageal recess is noted measuring 5.6  x 2.9 cm. The heart is not enlarged. Thoracic aorta is atherosclerotic  without evidence of dissection or aneurysm. There are moderate  coronary vascular calcifications consistent with coronary artery  disease.  There is no pneumothorax. Adrenal glands are normal.  Remainder of the visualized upper abdomen is unremarkable.      Impression    IMPRESSION:   1. A single nonocclusive thrombus is seen in the right lower lobe.  2. No thoracic aortic dissection or aneurysm.   3. Moderate to large right pleural effusion and multiple pleural-based  masses concerning for malignancy.    [Critical Result: Pulmonary embolism]    Finding was identified on 11/26/2018 11:07 AM.     The nurse on the floor, Kayy, was contacted for Dr. CANELO KIRKPATRICK~[id number] JOSE DENT at 11/26/2018  11:15 AM and verbalized understanding of the critical finding.     MIRANDA GIBSON MD   US Thoracentesis    Narrative    ULTRASOUND-GUIDED THORACENTESIS  11/26/2018 2:01 PM     HISTORY: Shortness of breath, hypoxic with large pleural effusion on  right side.     FINDINGS: Ultrasound was used to evaluate for the presence and best  approach for drainage of a pleural effusion. Written and oral informed  consent was obtained. A pause for the cause procedure to verify the  correct patient and correct procedure. The skin overlying the right  chest posteriorly was prepped and draped in the usual sterile fashion.  The subcutaneous tissues  were anesthetized with 10 mL 1% lidocaine. A  catheter was advanced into the pleural space and 1500 mL of ivonne  colored fluid was drained. Patient was monitored by nurse under my  direct supervision throughout the exam. Ultrasound images were  permanently stored.  There were no immediate complications. Patient  left the ultrasound suite in satisfactory condition.      Impression    IMPRESSION: Technically successful thoracentesis without immediate  complications.    MIRANDA GIBSON MD   XR Chest 1 View    Narrative    CHEST ONE VIEW November 26, 2018 2:23 PM     HISTORY: Pleural effusion, post thoracentesis.     COMPARISON: 11/23/2018.      Impression    IMPRESSION: No pneumothorax or pleural effusion on either side.  Chronic appearing interstitial abnormalities are present. Heart size  normal.    YANG DIAZ MD   CT Chest w/o Contrast    Narrative    CT CHEST WITHOUT CONTRAST December 10, 2018 9:59 AM    HISTORY: Shortness of breath, worsening hypoxia despite chemo and  anticoagulation.     TECHNIQUE: Scans obtained from the apices through the diaphragm  without IV contrast. Radiation dose for this scan was reduced using  automated exposure control, adjustment of the mA and/or kV according  to patient size, or iterative reconstruction technique.    COMPARISON: CT chest 11/26/2018.    FINDINGS: Unenhanced scanning cannot assess for pulmonary embolism as  was previously documented. There is a small to moderate right pleural  effusion that is smaller in volume. Enlarged right hilar adenopathy is  limited in visualization but appears grossly stable measuring  approximately 3.4 cm series 2 image 28. Enlarged precarinal lymph node  is stable measuring 1.8 x 2 cm series 2 image 26. Lobulated mass at  the inferior mediastinum right paraesophageal location abutting the  atria of the heart is stable measuring 4.8 x 2.6 cm image 47. Other  lymph nodes also appear stable.    Diffuse emphysematous changes bilaterally. There  are several bilateral  indeterminate pulmonary nodule that appears stable compared to recent  CT chest from 11/26/2018. However, new multiple nodules along the  periphery of the right lung. One of these adjacent to surgical sutures  at the posterior right lower lobe is 2.5 x 3 cm series 6 image 147.  There are several other examples at the periphery of the right lung  new since older examinations, for example when compared to a CT chest  dated 2/22/2017. There are several left-sided nodules as well without  significant change.    Upper abdomen images show a stable hypodense lesion at the left  hepatic dome measuring approximately 2.3 cm series 2 image 52. Stable  trace pericardial fluid. No new destructive-appearing bony lesions  identified. Gallstones are noted.      Impression    IMPRESSION:  1. Extensive emphysematous changes again identified.  2. Small to moderate right pleural effusion is smaller in volume.  3. Multiple pulmonary nodules bilaterally. Several of these are new on  the right worrisome for malignancy.  4. Prominent lymph nodes in the mediastinum and right hilum are  worrisome for malignant adenopathy.  5. Previously demonstrated pulmonary embolism cannot be visualized by  unenhanced scanning.  5. Indeterminate hypodense lesion at the left hepatic dome is stable.  6. Cholelithiasis.    ALESSANDRO PEARSON MD         ASSESSMENT / PLAN:     Physical deconditioning  Plan: PT/OT with increase independence, self-care, strength and endurance and other cares that help return to home/facility of origin, fall precautions. Care conference with patient and family for the progress of rehab and disposition issues will be discussed as planned. Rehab evaluation and other evaluations including CPT are at rehab logs, to be reviewed separately.  Fall risk assessment as well as cognitive evaluation will be formed during rehab stay if indicated.    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 Monitor cbc.    Other acute pulmonary embolism without acute cor pulmonale (H) and Chronic obstructive pulmonary disease  Plan: Continue apixaban, monitor CBC.    Pulmonary fibrosis (H) and Pulmonary hypertension (H)  Plan: Continue oxygen, Lasix, digoxin, Pulmicort nebulizers and other inhalers and other care.  Follow pulmonary team as an outpatient    DM type 2, goal HbA1c < 7% (H)  Plan: continue current medications Lantus and NovoLog, diet and monitor BG closely.  Lab Results   Component Value Date    A1C 10.7 11/23/2018    A1C 8.1 03/13/2017    A1C 8.3 02/23/2017     Essential hypertension and CAD  Plan: continue current medications metoprolol, digoxin, aspirin and Crestor, monitor I&O and daily weighs and labs if indicated. Follow up cardiologist and PCP as scheduled.    Other problems with same care. Primary care doctor and other specialists to address those chronic problems in next clinic appointment to be scheduled upon discharge from the TCU.      Total time spent with patient visit was 38 min including patient visit, review of past records, patients counseling and coordinating care.      Electronically signed by:  Maynor Rodriguez MD              Sincerely,        Maynor Rodriguez MD

## 2018-12-14 NOTE — LETTER
12/14/2018        RE: Ravi Sandoval  [address]  [facility] [address]        PRIMARY CARE PROVIDER AND CLINIC RESPONSIBLE:  [facility] [address] / [facility] [address]        ADMISSION HISTORY AND PHYSICAL EXAMINATION     Chief Complaint   Patient presents with     Fatigue     Shortness of Breath         HISTORY OF PRESENT ILLNESS:  73 year old male, (1945), [facility] of medical care and rehab.  HPI information obtained from: {FGS HPI:[id number]}.    Ravi Sandoval is a 73 year old male with history of ***    Patient is seen and examined by Julio C Dyson/ Mulu Truong NP. Please see FREEDOM Dyson's/FREEDOM Truong's admit noted dated *** for details of admission, past medical history, family history, allergies, medication list, social history and other details pertinent with this admission. Hospital admission and dc summary reviewed.      Past Medical History:   Diagnosis Date     ACP (advance care planning)      Anemia      ASHD (arteriosclerotic heart disease)      BPH (benign prostatic hyperplasia)      Cardiac arrest (H)      Cataracts, bilateral      Chronic diarrhea      Chronic respiratory failure with hypoxia (H)      COPD (chronic obstructive pulmonary disease) (H)     HOME O2     Depression      Disease of lung      Dysplasia of prostate      Gastroesophageal reflux disease      General weakness      Heart attack (H)      Herpes zoster      Histiocytosis X (H)      Hyperlipidemia      Hypertension      Hypothyroidism      Oxygen dependent      Postinflammatory pulmonary fibrosis (H)      Prostatic hypertrophy, benign      Pulmonary HTN (H)      Pulmonary hypertensive venous disease (H)      Thyroid disease      Tobacco use      Type 2 diabetes mellitus without complications (H)        Past Surgical History:   Procedure Laterality Date     BIOPSY, LUNG NODULE       CARDIAC SURGERY      HX OF STENT     COLONOSCOPY N/A 5/27/2016     Procedure: COMBINED COLONOSCOPY, SINGLE OR MULTIPLE BIOPSY/POLYPECTOMY BY BIOPSY;  Surgeon: Sera Coffman MD;  Location: [facility]     PHACOEMULSIFICATION CLEAR CORNEA WITH STANDARD INTRAOCULAR LENS IMPLANT Right 5/31/2017    Procedure: PHACOEMULSIFICATION CLEAR CORNEA WITH STANDARD INTRAOCULAR LENS IMPLANT;  RIGHT EYE PHACOEMULSIFICATION CLEAR CORNEA WITH STANDARD INTRAOCULAR LENS IMPLANT ;  Surgeon: Trever Delgado MD;  Location:  [facility]     PHACOEMULSIFICATION CLEAR CORNEA WITH STANDARD INTRAOCULAR LENS IMPLANT Left 6/7/2017    Procedure: PHACOEMULSIFICATION CLEAR CORNEA WITH STANDARD INTRAOCULAR LENS IMPLANT;  LEFT EYE PHACOEMULSIFICATION CLEAR CORNEA WITH STANDARD INTRAOCULAR LENS IMPLANT ;  Surgeon: Trever Delgado MD;  Location: [facility]       Current Outpatient Medications   Medication Sig     acetaminophen (TYLENOL) 325 MG tablet Take 650 mg by mouth every 6 hours as needed for mild pain     albuterol (PROVENTIL) (2.5 MG/3ML) 0.083% neb solution Take 1 vial (2.5 mg) by nebulization once as needed (refractory bronchospasm associated with hypersensitivity)     amLODIPine (NORVASC) 2.5 MG tablet Take 2.5 mg by mouth daily     apixaban ANTICOAGULANT (ELIQUIS) 5 MG tablet Take 1 tablet (5 mg) by mouth 2 times daily     ASPIRIN PO Take 81 mg by mouth At Bedtime     budesonide (PULMICORT) 0.5 MG/2ML neb solution Take 2 mLs (0.5 mg) by nebulization 2 times daily     Colchicine (COLCRYS PO) Take 0.6 mg by mouth daily TAKES IN THE EVENING     digoxin (LANOXIN) 125 MCG tablet Take 1 tablet (125 mcg) by mouth daily     Filgrastim (NEUPOGEN) 300 MCG/0.5ML SOSY syringe Inject 0.5 mLs (300 mcg) Subcutaneous daily for 7 days     FLUoxetine HCl (PROZAC PO) Take 40 mg by mouth daily     furosemide (LASIX) 20 MG tablet Take 1 tablet (20 mg) by mouth daily     insulin aspart (NOVOLOG VIAL) 100 UNITS/ML vial Give before meals and before bed:  For Pre-Meal Glucose:  140-189 give 1 unit   190-239 give 2 units   240-289 give 3  units   290-339 give 4 units   = or >340 give 5 units     For Bedtime Glucose  200 - 239 give 1 units   240 - 289 give 1.5 units  290 - 339 give 2 units  = or >340 give 2.5 units     insulin glargine (LANTUS SOLOSTAR PEN) 100 UNIT/ML pen Inject 10 Units Subcutaneous 2 times daily     ipratropium - albuterol 0.5 mg/2.5 mg/3 mL (DUONEB) 0.5-2.5 (3) MG/3ML neb solution Take 1 vial by nebulization 3 times daily as needed for shortness of breath / dyspnea, wheezing or other (use up to 3 times a day for shortness of breath, cough)     levothyroxine (SYNTHROID/LEVOTHROID) 125 MCG tablet Take 125 mcg by mouth daily     LISINOPRIL PO Take 20 mg by mouth daily     loperamide (IMODIUM) 2 MG capsule Take 1 capsule (2 mg) by mouth 4 times daily as needed for diarrhea     metoprolol (TOPROL-XL) 25 MG 24 hr tablet Take 1 tablet (25 mg) by mouth 2 times daily     multivitamin w/minerals (THERA-VIT-M) tablet Take 1 tablet by mouth daily     OMEPRAZOLE PO Take 40 mg by mouth daily     ondansetron (ZOFRAN-ODT) 4 MG ODT tab Take 1 tablet (4 mg) by mouth every 6 hours as needed for nausea or vomiting     phenol (CHLORASEPTIC) 1.4 % spray Take 1 spray (1 mL) by mouth every hour as needed for sore throat (without fever)     predniSONE (DELTASONE) 10 MG tablet 4 tabs daily for 2 days, then 3 tabs daily for 2 days, then 2 tabs daily for 2 days, then 1 tab daily to be continued.     rosuvastatin (CRESTOR) 10 MG tablet Take 1 tablet (10 mg) by mouth daily     tadalafil (CIALIS) 20 MG tablet Take 20 mg by mouth daily For pulmonary hypertension.     tamsulosin (FLOMAX) 0.4 MG capsule Take 1 capsule (0.4 mg) by mouth daily     nystatin (MYCOSTATIN) 500919 UNIT/ML suspension Take 5 mLs (500,000 Units) by mouth 4 times daily for 10 days (Patient not taking: Reported on 12/12/2018)     No current facility-administered medications for this visit.        Allergies   Allergen Reactions     Tetracycline        Social History     Socioeconomic History  "    Marital status:      Spouse name: Not on file     Number of children: Not on file     Years of education: Not on file     Highest education level: Not on file   Social Needs     Financial resource strain: Not on file     Food insecurity - worry: Not on file     Food insecurity - inability: Not on file     Transportation needs - medical: Not on file     Transportation needs - non-medical: Not on file   Occupational History     Not on file   Tobacco Use     Smoking status: Current Every Day Smoker     Packs/day: 0.50     Years: 55.00     Pack years: 27.50     Types: Cigarettes     Smokeless tobacco: Never Used   Substance and Sexual Activity     Alcohol use: No     Alcohol/week: 0.0 oz     Drug use: No     Sexual activity: Not on file   Other Topics Concern     Parent/sibling w/ CABG, MI or angioplasty before 65F 55M? Not Asked      Service Not Asked     Blood Transfusions Not Asked     Caffeine Concern Yes     Comment: 4 cups coffee/day     Occupational Exposure Not Asked     Hobby Hazards Not Asked     Sleep Concern Yes     Stress Concern Not Asked     Weight Concern Not Asked     Special Diet No     Back Care Not Asked     Exercise No     Bike Helmet Not Asked     Seat Belt Yes     Self-Exams Not Asked   Social History Narrative     Not on file          Information reviewed:  Medications, vital signs, orders, nursing notes, problem list, hospital information.     ROS: All 10 point review of system completed, those pertinent positive, please see H&P, the remaining ROS is negative.    /90   Pulse 59   Temp 95.7  F (35.4  C)   Resp 16   Ht 1.664 m (5' 5.5\")   Wt 62.6 kg (138 lb)   SpO2 90%   BMI 22.62 kg/m       PHYSICAL EXAMINATION: ***  GENERAL:  No acute distress.  SKIN:  Dry and warm.  There is no rash at area of skin examined.  HEENT:  Head without trauma.  Pupils round, reactive. Exam of conjunctiva and lids are normal. Sclera without icterus. There is no oral thrush.  NECK:  " Supple. No jugular venous distension.  CHEST: No reproducible chest tenderness.   LUNGS:  Normal respiratory effort. Lungs reveal decreased breath sounds at bases. No rales or wheezes.  HEART:  Regular rate and rhythm.  No murmur, gallops or rubs auscultated.  ABDOMEN:  Soft, bowel sounds positive.  There is no tenderness or guarding.   EXTREMITIES: No edema.   NEUROLOGIC:  Alert and oriented x3.  There is no focal deficit appreciated.  PSYCH: Recent and remote memory is normal. Mood and affect are normal.    Lab/Diagnostic data:  Reviewed  ***    ASSESSMENT / PLAN:  Data Unavailable        Total time spent with patient visit was *** min including patient visit, review of past records, patients counseling and coordinating care.      Electronically signed by:  Maynor Rodriguez MD            PRIMARY CARE PROVIDER AND CLINIC RESPONSIBLE:  [facility] [address] / [place] [address]        ADMISSION HISTORY AND PHYSICAL EXAMINATION     Chief Complaint   Patient presents with     Fatigue     Shortness of Breath         HISTORY OF PRESENT ILLNESS:  73 year old male, (1945), [facility] and [facility].  HPI information obtained from: facility chart records, facility staff, patient report, [facility] chart review and Care Everywhere [facility] chart review.    Ravi Sandoval is a 73 year old male with history of histiocytosis X, pulmonary fibrosis, severe pulmonary hypertension and COPD/Emphysema, who is chronically oxygen dependent on 4-6 liters , hypertension, hypothyroidism, coronary artery disease and DM type II who was admitted at [facility] from 11/23 to 12/11/18 due to worsening dyspnea.  He was found to have acute on chronic hypoxic respiratory failure.  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 CTA chest PE protocol done on  11/26,  large right pleural effusion, non occlusive thrombus and pleural based densities. Completed a course of Levaquin on 12/3.  Oncology team has administered palliative chemotherapy, completed with Carboplatin and Etoposide and decardon 12/6 - 12/8.8.  He had pancytopenia.  With his leukopenia he received Neupogen therapy.  He underwent right thoracentesis on 11/30/2018 with removal of 1500 mL of pleural fluid which was exudative.  Pleural fluid culture was negative but cytology confirmed high-grade neuroendocrine tumor.  He also found to have an pulmonary embolism.  He was treated with IV heparin drip initially and later switched with apixaban.  He had a thrombocytopenia on chronically, closely monitored his platelets while he is on aspirin and apixaban.  He has known severe pulmonary hypertension with pulmonary fibrosis.  Patient was treated with Lasix, digoxinand tadalafil.  Feels weak and tired he has shortness of breath at rest and with minimal activity.  He is on oxygen with Oxymizer.  He has a right chest discomfort with pleuritic chest pain.  Slept well, appetite good and had BM last night. Patient denies abdominal pain, n&v, fever, chills, dysuria, leg pain or swelling. The rest of ROS is unremarkable. Patient is seen and examined by Mulu Truong NP. Please see FREEDOM Truong's admit noted dated 12/12/18 for details of admission, past medical history, family history, allergies, medication list, social history and other details pertinent with this admission. Hospital admission and dc summary reviewed.      Past Medical History:   Diagnosis Date     ACP (advance care planning)      Anemia      ASHD (arteriosclerotic heart disease)      BPH (benign prostatic hyperplasia)      Cardiac arrest (H)      Cataracts, bilateral      Chronic diarrhea      Chronic respiratory failure with hypoxia (H)      COPD (chronic obstructive pulmonary disease) (H)     HOME O2     Depression      Disease of lung      Dysplasia of  prostate      Gastroesophageal reflux disease      General weakness      Heart attack (H)      Herpes zoster      Histiocytosis X (H)      Hyperlipidemia      Hypertension      Hypothyroidism      Oxygen dependent      Postinflammatory pulmonary fibrosis (H)      Prostatic hypertrophy, benign      Pulmonary HTN (H)      Pulmonary hypertensive venous disease (H)      Thyroid disease      Tobacco use      Type 2 diabetes mellitus without complications (H)        Past Surgical History:   Procedure Laterality Date     BIOPSY, LUNG NODULE       CARDIAC SURGERY      HX OF STENT     COLONOSCOPY N/A 5/27/2016    Procedure: COMBINED COLONOSCOPY, SINGLE OR MULTIPLE BIOPSY/POLYPECTOMY BY BIOPSY;  Surgeon: Sera Coffman MD;  Location: [facility]     PHACOEMULSIFICATION CLEAR CORNEA WITH STANDARD INTRAOCULAR LENS IMPLANT Right 5/31/2017    Procedure: PHACOEMULSIFICATION CLEAR CORNEA WITH STANDARD INTRAOCULAR LENS IMPLANT;  RIGHT EYE PHACOEMULSIFICATION CLEAR CORNEA WITH STANDARD INTRAOCULAR LENS IMPLANT ;  Surgeon: Trever Delgado MD;  Location: [facility]     PHACOEMULSIFICATION CLEAR CORNEA WITH STANDARD INTRAOCULAR LENS IMPLANT Left 6/7/2017    Procedure: PHACOEMULSIFICATION CLEAR CORNEA WITH STANDARD INTRAOCULAR LENS IMPLANT;  LEFT EYE PHACOEMULSIFICATION CLEAR CORNEA WITH STANDARD INTRAOCULAR LENS IMPLANT ;  Surgeon: Trever Delgado MD;  Location: [facility]       Current Outpatient Medications   Medication Sig     acetaminophen (TYLENOL) 325 MG tablet Take 650 mg by mouth every 6 hours as needed for mild pain     albuterol (PROVENTIL) (2.5 MG/3ML) 0.083% neb solution Take 1 vial (2.5 mg) by nebulization once as needed (refractory bronchospasm associated with hypersensitivity)     amLODIPine (NORVASC) 2.5 MG tablet Take 2.5 mg by mouth daily     apixaban ANTICOAGULANT (ELIQUIS) 5 MG tablet Take 1 tablet (5 mg) by mouth 2 times daily     ASPIRIN PO Take 81 mg by mouth At Bedtime     budesonide (PULMICORT) 0.5 MG/2ML neb solution  Take 2 mLs (0.5 mg) by nebulization 2 times daily     Colchicine (COLCRYS PO) Take 0.6 mg by mouth daily TAKES IN THE EVENING     digoxin (LANOXIN) 125 MCG tablet Take 1 tablet (125 mcg) by mouth daily     Filgrastim (NEUPOGEN) 300 MCG/0.5ML SOSY syringe Inject 0.5 mLs (300 mcg) Subcutaneous daily for 7 days     FLUoxetine HCl (PROZAC PO) Take 40 mg by mouth daily     furosemide (LASIX) 20 MG tablet Take 1 tablet (20 mg) by mouth daily     insulin aspart (NOVOLOG VIAL) 100 UNITS/ML vial Give before meals and before bed:  For Pre-Meal Glucose:  140-189 give 1 unit   190-239 give 2 units   240-289 give 3 units   290-339 give 4 units   = or >340 give 5 units     For Bedtime Glucose  200 - 239 give 1 units   240 - 289 give 1.5 units  290 - 339 give 2 units  = or >340 give 2.5 units     insulin glargine (LANTUS SOLOSTAR PEN) 100 UNIT/ML pen Inject 10 Units Subcutaneous 2 times daily     ipratropium - albuterol 0.5 mg/2.5 mg/3 mL (DUONEB) 0.5-2.5 (3) MG/3ML neb solution Take 1 vial by nebulization 3 times daily as needed for shortness of breath / dyspnea, wheezing or other (use up to 3 times a day for shortness of breath, cough)     levothyroxine (SYNTHROID/LEVOTHROID) 125 MCG tablet Take 125 mcg by mouth daily     LISINOPRIL PO Take 20 mg by mouth daily     loperamide (IMODIUM) 2 MG capsule Take 1 capsule (2 mg) by mouth 4 times daily as needed for diarrhea     metoprolol (TOPROL-XL) 25 MG 24 hr tablet Take 1 tablet (25 mg) by mouth 2 times daily     multivitamin w/minerals (THERA-VIT-M) tablet Take 1 tablet by mouth daily     OMEPRAZOLE PO Take 40 mg by mouth daily     ondansetron (ZOFRAN-ODT) 4 MG ODT tab Take 1 tablet (4 mg) by mouth every 6 hours as needed for nausea or vomiting     phenol (CHLORASEPTIC) 1.4 % spray Take 1 spray (1 mL) by mouth every hour as needed for sore throat (without fever)     predniSONE (DELTASONE) 10 MG tablet 4 tabs daily for 2 days, then 3 tabs daily for 2 days, then 2 tabs daily for 2  days, then 1 tab daily to be continued.     rosuvastatin (CRESTOR) 10 MG tablet Take 1 tablet (10 mg) by mouth daily     tadalafil (CIALIS) 20 MG tablet Take 20 mg by mouth daily For pulmonary hypertension.     tamsulosin (FLOMAX) 0.4 MG capsule Take 1 capsule (0.4 mg) by mouth daily     nystatin (MYCOSTATIN) 225891 UNIT/ML suspension Take 5 mLs (500,000 Units) by mouth 4 times daily for 10 days (Patient not taking: Reported on 12/12/2018)     No current facility-administered medications for this visit.        Allergies   Allergen Reactions     Tetracycline        Social History     Socioeconomic History     Marital status:      Spouse name: Not on file     Number of children: Not on file     Years of education: Not on file     Highest education level: Not on file   Social Needs     Financial resource strain: Not on file     Food insecurity - worry: Not on file     Food insecurity - inability: Not on file     Transportation needs - medical: Not on file     Transportation needs - non-medical: Not on file   Occupational History     Not on file   Tobacco Use     Smoking status: Current Every Day Smoker     Packs/day: 0.50     Years: 55.00     Pack years: 27.50     Types: Cigarettes     Smokeless tobacco: Never Used   Substance and Sexual Activity     Alcohol use: No     Alcohol/week: 0.0 oz     Drug use: No     Sexual activity: Not on file   Other Topics Concern     Parent/sibling w/ CABG, MI or angioplasty before 65F 55M? Not Asked      Service Not Asked     Blood Transfusions Not Asked     Caffeine Concern Yes     Comment: 4 cups coffee/day     Occupational Exposure Not Asked     Hobby Hazards Not Asked     Sleep Concern Yes     Stress Concern Not Asked     Weight Concern Not Asked     Special Diet No     Back Care Not Asked     Exercise No     Bike Helmet Not Asked     Seat Belt Yes     Self-Exams Not Asked   Social History Narrative     Not on file          Information reviewed:  Medications, vital  "signs, orders, nursing notes, problem list, hospital information.     ROS: All 10 point review of system completed, those pertinent positive, please see H&P, the remaining ROS is negative.    /90   Pulse 59   Temp 95.7  F (35.4  C)   Resp 16   Ht 1.664 m (5' 5.5\")   Wt 62.6 kg (138 lb)   SpO2 90%   BMI 22.62 kg/m       PHYSICAL EXAMINATION:   GENERAL:  No acute distress.  SKIN:  Dry and warm.  There is no rash at area of skin examined.  HEENT:  Head without trauma.  Pupils round, reactive. Exam of conjunctiva and lids are normal. Sclera without icterus. There is oral thrush.  NECK:  Supple. No jugular venous distension.  CHEST: No reproducible chest tenderness.   LUNGS:  Normal respiratory effort. Lungs reveal decreased breath sounds diffusely.  With absent breath sounds at right base.  No rales or wheezes.  HEART:  Regular rate and rhythm.  No murmur, gallops or rubs auscultated.  ABDOMEN:  Soft, bowel sounds positive.  There is no tenderness or guarding.   EXTREMITIES: No edema.   NEUROLOGIC:  Alert and oriented x3. Moving all ext. Gait not tested.  PSYCH: Recent and remote memory is normal. Mood and affect are normal.    Lab/Diagnostic data:  Reviewed    CBC RESULTS:   Recent Labs   Lab Test 12/13/18   WBC 16.8*   RBC 3.87*   HGB 11.0*   HCT 33.4*   MCV 86   MCH 28.4   MCHC 32.9   RDW 17*   *     Last Comprehensive Metabolic Panel:  Sodium   Date Value Ref Range Status   12/13/2018 139 133 - 144 mmol/L Final     Potassium   Date Value Ref Range Status   12/13/2018 3.5 3.4 - 5.3 mmol/L Final     Chloride   Date Value Ref Range Status   12/13/2018 105 94 - 109 mmol/L Final     Carbon Dioxide   Date Value Ref Range Status   12/13/2018 25 20 - 32 mmol/L Final     Anion Gap   Date Value Ref Range Status   12/13/2018 9 3 - 14 mmol/L Final     Glucose   Date Value Ref Range Status   12/13/2018 71 70 - 99 mg/dL Final     Urea Nitrogen   Date Value Ref Range Status   12/13/2018 22 7 - 30 mg/dL Final "     Creatinine   Date Value Ref Range Status   12/13/2018 0.74 (A) 0.66 - 1.25 mg/dL Final     GFR Estimate   Date Value Ref Range Status   12/13/2018 >90 >60 mL/min/1.7m2 Final     Calcium   Date Value Ref Range Status   12/13/2018 7.5 7.5 - 10.1 mg/dL Final     Bilirubin Total   Date Value Ref Range Status   12/11/2018 0.5 0.2 - 1.3 mg/dL Final     Alkaline Phosphatase   Date Value Ref Range Status   12/11/2018 53 40 - 150 U/L Final     ALT   Date Value Ref Range Status   12/11/2018 29 0 - 70 U/L Final     AST   Date Value Ref Range Status   12/11/2018 22 0 - 45 U/L Final     Results for orders placed or performed during the hospital encounter of 11/23/18   XR Chest 2 Views    Narrative    CHEST TWO VIEWS 11/23/2018 11:08 AM     HISTORY: Shortness of breath and possible pneumonia.     COMPARISON: 3/12/2017    FINDINGS: Superimposed on fibrotic-appearing lung abnormalities is  abnormal opacity in the right lower lobe suggestive of developing  pneumonia. Trace right pleural effusion. No pneumothorax. Heart size  normal.       Impression    IMPRESSION: Right lower lobe pneumonia superimposed on  fibrotic-appearing lungs.     YANG DIAZ MD   CT Chest Pulmonary Embolism w Contrast     Value    Radiologist flags Pulmonary embolism (AA)    Narrative    CT CHEST PULMONARY EMBOLISM WITH CONTRAST November 26, 2018 10:33 AM     HISTORY: Worsening shortness of breath and hypoxia.    COMPARISON: None.    TECHNIQUE: Volumetric helical acquisition of CT images of the chest  from the lung apices to the kidneys were acquired after the  administration of  59mL Isovue-370  IV contrast. Radiation dose for  this scan was reduced using automated exposure control, adjustment of  the mA and/or kV according to patient size, or iterative  reconstruction technique.    FINDINGS: There is a nonocclusive thrombus in a right lower lobe  pulmonary artery. No other definite pulmonary embolism demonstrated.  There is now a moderate to large  right pleural effusion. No  significant left pleural fluid. No pericardial effusion. Multiple  nodular densities are seen in the major fissure and along the  pericardium. These are new since the comparison study and could  represent metastatic disease. A probable right hilar lymph node is  also present measuring 3.5 x 2.1 cm. This representatives a mass along  the pericardium and measures 2.0 x 1.4 cm. A prominent mass in the  posterior mediastinum/azygos esophageal recess is noted measuring 5.6  x 2.9 cm. The heart is not enlarged. Thoracic aorta is atherosclerotic  without evidence of dissection or aneurysm. There are moderate  coronary vascular calcifications consistent with coronary artery  disease.  There is no pneumothorax. Adrenal glands are normal.  Remainder of the visualized upper abdomen is unremarkable.      Impression    IMPRESSION:   1. A single nonocclusive thrombus is seen in the right lower lobe.  2. No thoracic aortic dissection or aneurysm.   3. Moderate to large right pleural effusion and multiple pleural-based  masses concerning for malignancy.    [Critical Result: Pulmonary embolism]    Finding was identified on 11/26/2018 11:07 AM.     The nurse on the floor, Kayy, was contacted for Dr. CANELO KIRKPATRICK~[id number] JOSE DENT at 11/26/2018  11:15 AM and verbalized understanding of the critical finding.     MIRANDA GIBSON MD   US Thoracentesis    Narrative    ULTRASOUND-GUIDED THORACENTESIS  11/26/2018 2:01 PM     HISTORY: Shortness of breath, hypoxic with large pleural effusion on  right side.     FINDINGS: Ultrasound was used to evaluate for the presence and best  approach for drainage of a pleural effusion. Written and oral informed  consent was obtained. A pause for the cause procedure to verify the  correct patient and correct procedure. The skin overlying the right  chest posteriorly was prepped and draped in the usual sterile fashion.  The subcutaneous tissues  were anesthetized with 10 mL 1% lidocaine. A  catheter was advanced into the pleural space and 1500 mL of ivonne  colored fluid was drained. Patient was monitored by nurse under my  direct supervision throughout the exam. Ultrasound images were  permanently stored.  There were no immediate complications. Patient  left the ultrasound suite in satisfactory condition.      Impression    IMPRESSION: Technically successful thoracentesis without immediate  complications.    MIRANDA GIBSON MD   XR Chest 1 View    Narrative    CHEST ONE VIEW November 26, 2018 2:23 PM     HISTORY: Pleural effusion, post thoracentesis.     COMPARISON: 11/23/2018.      Impression    IMPRESSION: No pneumothorax or pleural effusion on either side.  Chronic appearing interstitial abnormalities are present. Heart size  normal.    YANG DIAZ MD   CT Chest w/o Contrast    Narrative    CT CHEST WITHOUT CONTRAST December 10, 2018 9:59 AM    HISTORY: Shortness of breath, worsening hypoxia despite chemo and  anticoagulation.     TECHNIQUE: Scans obtained from the apices through the diaphragm  without IV contrast. Radiation dose for this scan was reduced using  automated exposure control, adjustment of the mA and/or kV according  to patient size, or iterative reconstruction technique.    COMPARISON: CT chest 11/26/2018.    FINDINGS: Unenhanced scanning cannot assess for pulmonary embolism as  was previously documented. There is a small to moderate right pleural  effusion that is smaller in volume. Enlarged right hilar adenopathy is  limited in visualization but appears grossly stable measuring  approximately 3.4 cm series 2 image 28. Enlarged precarinal lymph node  is stable measuring 1.8 x 2 cm series 2 image 26. Lobulated mass at  the inferior mediastinum right paraesophageal location abutting the  atria of the heart is stable measuring 4.8 x 2.6 cm image 47. Other  lymph nodes also appear stable.    Diffuse emphysematous changes bilaterally. There  are several bilateral  indeterminate pulmonary nodule that appears stable compared to recent  CT chest from 11/26/2018. However, new multiple nodules along the  periphery of the right lung. One of these adjacent to surgical sutures  at the posterior right lower lobe is 2.5 x 3 cm series 6 image 147.  There are several other examples at the periphery of the right lung  new since older examinations, for example when compared to a CT chest  dated 2/22/2017. There are several left-sided nodules as well without  significant change.    Upper abdomen images show a stable hypodense lesion at the left  hepatic dome measuring approximately 2.3 cm series 2 image 52. Stable  trace pericardial fluid. No new destructive-appearing bony lesions  identified. Gallstones are noted.      Impression    IMPRESSION:  1. Extensive emphysematous changes again identified.  2. Small to moderate right pleural effusion is smaller in volume.  3. Multiple pulmonary nodules bilaterally. Several of these are new on  the right worrisome for malignancy.  4. Prominent lymph nodes in the mediastinum and right hilum are  worrisome for malignant adenopathy.  5. Previously demonstrated pulmonary embolism cannot be visualized by  unenhanced scanning.  5. Indeterminate hypodense lesion at the left hepatic dome is stable.  6. Cholelithiasis.    ALESSANDRO PEARSON MD         ASSESSMENT / PLAN:     Physical deconditioning  Plan: PT/OT with increase independence, self-care, strength and endurance and other cares that help return to home/facility of origin, fall precautions. Care conference with patient and family for the progress of rehab and disposition issues will be discussed as planned. Rehab evaluation and other evaluations including CPT are at rehab logs, to be reviewed separately.  Fall risk assessment as well as cognitive evaluation will be formed during rehab stay if indicated.    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 Monitor cbc.    Other acute pulmonary embolism without acute cor pulmonale (H) and Chronic obstructive pulmonary disease  Plan: Continue apixaban, monitor CBC.    Pulmonary fibrosis (H) and Pulmonary hypertension (H)  Plan: Continue oxygen, Lasix, digoxin, Pulmicort nebulizers and other inhalers and other care.  Follow pulmonary team as an outpatient    DM type 2, goal HbA1c < 7% (H)  Plan: continue current medications Lantus and NovoLog, diet and monitor BG closely.  Lab Results   Component Value Date    A1C 10.7 11/23/2018    A1C 8.1 03/13/2017    A1C 8.3 02/23/2017     Essential hypertension and CAD  Plan: continue current medications metoprolol, digoxin, aspirin and Crestor, monitor I&O and daily weighs and labs if indicated. Follow up cardiologist and PCP as scheduled.    Other problems with same care. Primary care doctor and other specialists to address those chronic problems in next clinic appointment to be scheduled upon discharge from the TCU.      Total time spent with patient visit was 38 min including patient visit, review of past records, patients counseling and coordinating care.      Electronically signed by:  Maynor Rodriguez MD              Sincerely,        Maynor Rodriguez MD

## 2018-12-14 NOTE — PROGRESS NOTES
PRIMARY CARE PROVIDER AND CLINIC RESPONSIBLE:  [facility] [id number] / [place] [address]        ADMISSION HISTORY AND PHYSICAL EXAMINATION     Chief Complaint   Patient presents with     Fatigue     Shortness of Breath         HISTORY OF PRESENT ILLNESS:  73 year old male, (1945), [facility] continuation of medical care and rehab.  HPI information obtained from: facility chart records, facility staff, patient report, [facility] chart review and Care Everywhere [facility] chart review.    Ravi Sandoval is a 73 year old male with history of histiocytosis X, pulmonary fibrosis, severe pulmonary hypertension and COPD/Emphysema, who is chronically oxygen dependent on 4-6 liters , hypertension, hypothyroidism, coronary artery disease and DM type II who was admitted at [facility] from 11/23 to 12/11/18 due to worsening dyspnea.  He was found to have acute on chronic hypoxic respiratory failure.  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 CTA chest PE protocol done on 11/26,  large right pleural effusion, non occlusive thrombus and pleural based densities. Completed a course of Levaquin on 12/3.  Oncology team has administered palliative chemotherapy, completed with Carboplatin and Etoposide and decardon 12/6 - 12/8.8.  He had pancytopenia.  With his leukopenia he received Neupogen therapy.  He underwent right thoracentesis on 11/30/2018 with removal of 1500 mL of pleural fluid which was exudative.  Pleural fluid culture was negative but cytology confirmed high-grade neuroendocrine tumor.  He also found to have an pulmonary embolism.  He was treated with IV heparin drip initially and later switched with apixaban.  He had a thrombocytopenia on chronically, closely monitored his platelets while he is on aspirin and apixaban.  He has known severe pulmonary  hypertension with pulmonary fibrosis.  Patient was treated with Lasix, digoxinand tadalafil.  Feels weak and tired he has shortness of breath at rest and with minimal activity.  He is on oxygen with Oxymizer.  He has a right chest discomfort with pleuritic chest pain.  Slept well, appetite good and had BM last night. Patient denies abdominal pain, n&v, fever, chills, dysuria, leg pain or swelling. The rest of ROS is unremarkable. Patient is seen and examined by Mulu Truong NP. Please see FREEDOM Truong's admit noted dated 12/12/18 for details of admission, past medical history, family history, allergies, medication list, social history and other details pertinent with this admission. Hospital admission and dc summary reviewed.      Past Medical History:   Diagnosis Date     ACP (advance care planning)      Anemia      ASHD (arteriosclerotic heart disease)      BPH (benign prostatic hyperplasia)      Cardiac arrest (H)      Cataracts, bilateral      Chronic diarrhea      Chronic respiratory failure with hypoxia (H)      COPD (chronic obstructive pulmonary disease) (H)     HOME O2     Depression      Disease of lung      Dysplasia of prostate      Gastroesophageal reflux disease      General weakness      Heart attack (H)      Herpes zoster      Histiocytosis X (H)      Hyperlipidemia      Hypertension      Hypothyroidism      Oxygen dependent      Postinflammatory pulmonary fibrosis (H)      Prostatic hypertrophy, benign      Pulmonary HTN (H)      Pulmonary hypertensive venous disease (H)      Thyroid disease      Tobacco use      Type 2 diabetes mellitus without complications (H)        Past Surgical History:   Procedure Laterality Date     BIOPSY, LUNG NODULE       CARDIAC SURGERY      HX OF STENT     COLONOSCOPY N/A 5/27/2016    Procedure: COMBINED COLONOSCOPY, SINGLE OR MULTIPLE BIOPSY/POLYPECTOMY BY BIOPSY;  Surgeon: Sera Coffman MD;  Location:  GI     PHACOEMULSIFICATION CLEAR CORNEA WITH STANDARD  INTRAOCULAR LENS IMPLANT Right 5/31/2017    Procedure: PHACOEMULSIFICATION CLEAR CORNEA WITH STANDARD INTRAOCULAR LENS IMPLANT;  RIGHT EYE PHACOEMULSIFICATION CLEAR CORNEA WITH STANDARD INTRAOCULAR LENS IMPLANT ;  Surgeon: Trever Delgado MD;  Location: [facility]     PHACOEMULSIFICATION CLEAR CORNEA WITH STANDARD INTRAOCULAR LENS IMPLANT Left 6/7/2017    Procedure: PHACOEMULSIFICATION CLEAR CORNEA WITH STANDARD INTRAOCULAR LENS IMPLANT;  LEFT EYE PHACOEMULSIFICATION CLEAR CORNEA WITH STANDARD INTRAOCULAR LENS IMPLANT ;  Surgeon: Trever Delgado MD;  Location: [facility]       Current Outpatient Medications   Medication Sig     acetaminophen (TYLENOL) 325 MG tablet Take 650 mg by mouth every 6 hours as needed for mild pain     albuterol (PROVENTIL) (2.5 MG/3ML) 0.083% neb solution Take 1 vial (2.5 mg) by nebulization once as needed (refractory bronchospasm associated with hypersensitivity)     amLODIPine (NORVASC) 2.5 MG tablet Take 2.5 mg by mouth daily     apixaban ANTICOAGULANT (ELIQUIS) 5 MG tablet Take 1 tablet (5 mg) by mouth 2 times daily     ASPIRIN PO Take 81 mg by mouth At Bedtime     budesonide (PULMICORT) 0.5 MG/2ML neb solution Take 2 mLs (0.5 mg) by nebulization 2 times daily     Colchicine (COLCRYS PO) Take 0.6 mg by mouth daily TAKES IN THE EVENING     digoxin (LANOXIN) 125 MCG tablet Take 1 tablet (125 mcg) by mouth daily     Filgrastim (NEUPOGEN) 300 MCG/0.5ML SOSY syringe Inject 0.5 mLs (300 mcg) Subcutaneous daily for 7 days     FLUoxetine HCl (PROZAC PO) Take 40 mg by mouth daily     furosemide (LASIX) 20 MG tablet Take 1 tablet (20 mg) by mouth daily     insulin aspart (NOVOLOG VIAL) 100 UNITS/ML vial Give before meals and before bed:  For Pre-Meal Glucose:  140-189 give 1 unit   190-239 give 2 units   240-289 give 3 units   290-339 give 4 units   = or >340 give 5 units     For Bedtime Glucose  200 - 239 give 1 units   240 - 289 give 1.5 units  290 - 339 give 2 units  = or >340 give 2.5 units      insulin glargine (LANTUS SOLOSTAR PEN) 100 UNIT/ML pen Inject 10 Units Subcutaneous 2 times daily     ipratropium - albuterol 0.5 mg/2.5 mg/3 mL (DUONEB) 0.5-2.5 (3) MG/3ML neb solution Take 1 vial by nebulization 3 times daily as needed for shortness of breath / dyspnea, wheezing or other (use up to 3 times a day for shortness of breath, cough)     levothyroxine (SYNTHROID/LEVOTHROID) 125 MCG tablet Take 125 mcg by mouth daily     LISINOPRIL PO Take 20 mg by mouth daily     loperamide (IMODIUM) 2 MG capsule Take 1 capsule (2 mg) by mouth 4 times daily as needed for diarrhea     metoprolol (TOPROL-XL) 25 MG 24 hr tablet Take 1 tablet (25 mg) by mouth 2 times daily     multivitamin w/minerals (THERA-VIT-M) tablet Take 1 tablet by mouth daily     OMEPRAZOLE PO Take 40 mg by mouth daily     ondansetron (ZOFRAN-ODT) 4 MG ODT tab Take 1 tablet (4 mg) by mouth every 6 hours as needed for nausea or vomiting     phenol (CHLORASEPTIC) 1.4 % spray Take 1 spray (1 mL) by mouth every hour as needed for sore throat (without fever)     predniSONE (DELTASONE) 10 MG tablet 4 tabs daily for 2 days, then 3 tabs daily for 2 days, then 2 tabs daily for 2 days, then 1 tab daily to be continued.     rosuvastatin (CRESTOR) 10 MG tablet Take 1 tablet (10 mg) by mouth daily     tadalafil (CIALIS) 20 MG tablet Take 20 mg by mouth daily For pulmonary hypertension.     tamsulosin (FLOMAX) 0.4 MG capsule Take 1 capsule (0.4 mg) by mouth daily     nystatin (MYCOSTATIN) 899258 UNIT/ML suspension Take 5 mLs (500,000 Units) by mouth 4 times daily for 10 days (Patient not taking: Reported on 12/12/2018)     No current facility-administered medications for this visit.        Allergies   Allergen Reactions     Tetracycline        Social History     Socioeconomic History     Marital status:      Spouse name: Not on file     Number of children: Not on file     Years of education: Not on file     Highest education level: Not on file   Social Needs  "    Financial resource strain: Not on file     Food insecurity - worry: Not on file     Food insecurity - inability: Not on file     Transportation needs - medical: Not on file     Transportation needs - non-medical: Not on file   Occupational History     Not on file   Tobacco Use     Smoking status: Current Every Day Smoker     Packs/day: 0.50     Years: 55.00     Pack years: 27.50     Types: Cigarettes     Smokeless tobacco: Never Used   Substance and Sexual Activity     Alcohol use: No     Alcohol/week: 0.0 oz     Drug use: No     Sexual activity: Not on file   Other Topics Concern     Parent/sibling w/ CABG, MI or angioplasty before 65F 55M? Not Asked      Service Not Asked     Blood Transfusions Not Asked     Caffeine Concern Yes     Comment: 4 cups coffee/day     Occupational Exposure Not Asked     Hobby Hazards Not Asked     Sleep Concern Yes     Stress Concern Not Asked     Weight Concern Not Asked     Special Diet No     Back Care Not Asked     Exercise No     Bike Helmet Not Asked     Seat Belt Yes     Self-Exams Not Asked   Social History Narrative     Not on file          Information reviewed:  Medications, vital signs, orders, nursing notes, problem list, hospital information.     ROS: All 10 point review of system completed, those pertinent positive, please see H&P, the remaining ROS is negative.    /90   Pulse 59   Temp 95.7  F (35.4  C)   Resp 16   Ht 1.664 m (5' 5.5\")   Wt 62.6 kg (138 lb)   SpO2 90%   BMI 22.62 kg/m      PHYSICAL EXAMINATION:   GENERAL:  No acute distress.  SKIN:  Dry and warm.  There is no rash at area of skin examined.  HEENT:  Head without trauma.  Pupils round, reactive. Exam of conjunctiva and lids are normal. Sclera without icterus. There is oral thrush.  NECK:  Supple. No jugular venous distension.  CHEST: No reproducible chest tenderness.   LUNGS:  Normal respiratory effort. Lungs reveal decreased breath sounds diffusely.  With absent breath sounds at " right base.  No rales or wheezes.  HEART:  Regular rate and rhythm.  No murmur, gallops or rubs auscultated.  ABDOMEN:  Soft, bowel sounds positive.  There is no tenderness or guarding.   EXTREMITIES: No edema.   NEUROLOGIC:  Alert and oriented x3. Moving all ext. Gait not tested.  PSYCH: Recent and remote memory is normal. Mood and affect are normal.    Lab/Diagnostic data:  Reviewed    CBC RESULTS:   Recent Labs   Lab Test 12/13/18   WBC 16.8*   RBC 3.87*   HGB 11.0*   HCT 33.4*   MCV 86   MCH 28.4   MCHC 32.9   RDW 17*   *     Last Comprehensive Metabolic Panel:  Sodium   Date Value Ref Range Status   12/13/2018 139 133 - 144 mmol/L Final     Potassium   Date Value Ref Range Status   12/13/2018 3.5 3.4 - 5.3 mmol/L Final     Chloride   Date Value Ref Range Status   12/13/2018 105 94 - 109 mmol/L Final     Carbon Dioxide   Date Value Ref Range Status   12/13/2018 25 20 - 32 mmol/L Final     Anion Gap   Date Value Ref Range Status   12/13/2018 9 3 - 14 mmol/L Final     Glucose   Date Value Ref Range Status   12/13/2018 71 70 - 99 mg/dL Final     Urea Nitrogen   Date Value Ref Range Status   12/13/2018 22 7 - 30 mg/dL Final     Creatinine   Date Value Ref Range Status   12/13/2018 0.74 (A) 0.66 - 1.25 mg/dL Final     GFR Estimate   Date Value Ref Range Status   12/13/2018 >90 >60 mL/min/1.7m2 Final     Calcium   Date Value Ref Range Status   12/13/2018 7.5 7.5 - 10.1 mg/dL Final     Bilirubin Total   Date Value Ref Range Status   12/11/2018 0.5 0.2 - 1.3 mg/dL Final     Alkaline Phosphatase   Date Value Ref Range Status   12/11/2018 53 40 - 150 U/L Final     ALT   Date Value Ref Range Status   12/11/2018 29 0 - 70 U/L Final     AST   Date Value Ref Range Status   12/11/2018 22 0 - 45 U/L Final     Results for orders placed or performed during the hospital encounter of 11/23/18   XR Chest 2 Views    Narrative    CHEST TWO VIEWS 11/23/2018 11:08 AM     HISTORY: Shortness of breath and possible pneumonia.      COMPARISON: 3/12/2017    FINDINGS: Superimposed on fibrotic-appearing lung abnormalities is  abnormal opacity in the right lower lobe suggestive of developing  pneumonia. Trace right pleural effusion. No pneumothorax. Heart size  normal.       Impression    IMPRESSION: Right lower lobe pneumonia superimposed on  fibrotic-appearing lungs.     YANG DIAZ MD   CT Chest Pulmonary Embolism w Contrast     Value    Radiologist flags Pulmonary embolism (AA)    Narrative    CT CHEST PULMONARY EMBOLISM WITH CONTRAST November 26, 2018 10:33 AM     HISTORY: Worsening shortness of breath and hypoxia.    COMPARISON: None.    TECHNIQUE: Volumetric helical acquisition of CT images of the chest  from the lung apices to the kidneys were acquired after the  administration of  59mL Isovue-370  IV contrast. Radiation dose for  this scan was reduced using automated exposure control, adjustment of  the mA and/or kV according to patient size, or iterative  reconstruction technique.    FINDINGS: There is a nonocclusive thrombus in a right lower lobe  pulmonary artery. No other definite pulmonary embolism demonstrated.  There is now a moderate to large right pleural effusion. No  significant left pleural fluid. No pericardial effusion. Multiple  nodular densities are seen in the major fissure and along the  pericardium. These are new since the comparison study and could  represent metastatic disease. A probable right hilar lymph node is  also present measuring 3.5 x 2.1 cm. This representatives a mass along  the pericardium and measures 2.0 x 1.4 cm. A prominent mass in the  posterior mediastinum/azygos esophageal recess is noted measuring 5.6  x 2.9 cm. The heart is not enlarged. Thoracic aorta is atherosclerotic  without evidence of dissection or aneurysm. There are moderate  coronary vascular calcifications consistent with coronary artery  disease.  There is no pneumothorax. Adrenal glands are normal.  Remainder of the  visualized upper abdomen is unremarkable.      Impression    IMPRESSION:   1. A single nonocclusive thrombus is seen in the right lower lobe.  2. No thoracic aortic dissection or aneurysm.   3. Moderate to large right pleural effusion and multiple pleural-based  masses concerning for malignancy.    [Critical Result: Pulmonary embolism]    Finding was identified on 11/26/2018 11:07 AM.     The nurse on the floor, Kayy, was contacted for Dr. CANELO KIRKPATRICK~[id number] JOSE DENT at 11/26/2018  11:15 AM and verbalized understanding of the critical finding.     MIRANDA GIBSON MD   US Thoracentesis    Narrative    ULTRASOUND-GUIDED THORACENTESIS  11/26/2018 2:01 PM     HISTORY: Shortness of breath, hypoxic with large pleural effusion on  right side.     FINDINGS: Ultrasound was used to evaluate for the presence and best  approach for drainage of a pleural effusion. Written and oral informed  consent was obtained. A pause for the cause procedure to verify the  correct patient and correct procedure. The skin overlying the right  chest posteriorly was prepped and draped in the usual sterile fashion.  The subcutaneous tissues were anesthetized with 10 mL 1% lidocaine. A  catheter was advanced into the pleural space and 1500 mL of ivonne  colored fluid was drained. Patient was monitored by nurse under my  direct supervision throughout the exam. Ultrasound images were  permanently stored.  There were no immediate complications. Patient  left the ultrasound suite in satisfactory condition.      Impression    IMPRESSION: Technically successful thoracentesis without immediate  complications.    MIRANDA GIBSON MD   XR Chest 1 View    Narrative    CHEST ONE VIEW November 26, 2018 2:23 PM     HISTORY: Pleural effusion, post thoracentesis.     COMPARISON: 11/23/2018.      Impression    IMPRESSION: No pneumothorax or pleural effusion on either side.  Chronic appearing interstitial abnormalities are present.  Heart size  normal.    YAGN DIAZ MD   CT Chest w/o Contrast    Narrative    CT CHEST WITHOUT CONTRAST December 10, 2018 9:59 AM    HISTORY: Shortness of breath, worsening hypoxia despite chemo and  anticoagulation.     TECHNIQUE: Scans obtained from the apices through the diaphragm  without IV contrast. Radiation dose for this scan was reduced using  automated exposure control, adjustment of the mA and/or kV according  to patient size, or iterative reconstruction technique.    COMPARISON: CT chest 11/26/2018.    FINDINGS: Unenhanced scanning cannot assess for pulmonary embolism as  was previously documented. There is a small to moderate right pleural  effusion that is smaller in volume. Enlarged right hilar adenopathy is  limited in visualization but appears grossly stable measuring  approximately 3.4 cm series 2 image 28. Enlarged precarinal lymph node  is stable measuring 1.8 x 2 cm series 2 image 26. Lobulated mass at  the inferior mediastinum right paraesophageal location abutting the  atria of the heart is stable measuring 4.8 x 2.6 cm image 47. Other  lymph nodes also appear stable.    Diffuse emphysematous changes bilaterally. There are several bilateral  indeterminate pulmonary nodule that appears stable compared to recent  CT chest from 11/26/2018. However, new multiple nodules along the  periphery of the right lung. One of these adjacent to surgical sutures  at the posterior right lower lobe is 2.5 x 3 cm series 6 image 147.  There are several other examples at the periphery of the right lung  new since older examinations, for example when compared to a CT chest  dated 2/22/2017. There are several left-sided nodules as well without  significant change.    Upper abdomen images show a stable hypodense lesion at the left  hepatic dome measuring approximately 2.3 cm series 2 image 52. Stable  trace pericardial fluid. No new destructive-appearing bony lesions  identified. Gallstones are noted.       Impression    IMPRESSION:  1. Extensive emphysematous changes again identified.  2. Small to moderate right pleural effusion is smaller in volume.  3. Multiple pulmonary nodules bilaterally. Several of these are new on  the right worrisome for malignancy.  4. Prominent lymph nodes in the mediastinum and right hilum are  worrisome for malignant adenopathy.  5. Previously demonstrated pulmonary embolism cannot be visualized by  unenhanced scanning.  5. Indeterminate hypodense lesion at the left hepatic dome is stable.  6. Cholelithiasis.    ALESSANDRO PEARSON MD         ASSESSMENT / PLAN:     Physical deconditioning  Plan: PT/OT with increase independence, self-care, strength and endurance and other cares that help return to home/facility of origin, fall precautions. Care conference with patient and family for the progress of rehab and disposition issues will be discussed as planned. Rehab evaluation and other evaluations including CPT are at rehab logs, to be reviewed separately.  Fall risk assessment as well as cognitive evaluation will be formed during rehab stay if indicated.    Neuroendocrine carcinoma metastatic to multiple sites (H) and Malignant pleural effusion  Plan: advanced cancer stage IV, received chemotherapy first cycle for palliative, follow up oncologist. Monitor cbc.    Other acute pulmonary embolism without acute cor pulmonale (H) and Chronic obstructive pulmonary disease  Plan: Continue apixaban, monitor CBC.    Pulmonary fibrosis (H) and Pulmonary hypertension (H)  Plan: Continue oxygen, Lasix, digoxin, Pulmicort nebulizers and other inhalers and other care.  Follow pulmonary team as an outpatient    DM type 2, goal HbA1c < 7% (H)  Plan: continue current medications Lantus and NovoLog, diet and monitor BG closely.  Lab Results   Component Value Date    A1C 10.7 11/23/2018    A1C 8.1 03/13/2017    A1C 8.3 02/23/2017     Essential hypertension and CAD  Plan: continue current medications metoprolol,  digoxin, aspirin and Crestor, monitor I&O and daily weighs and labs if indicated. Follow up cardiologist and PCP as scheduled.    Other problems with same care. Primary care doctor and other specialists to address those chronic problems in next clinic appointment to be scheduled upon discharge from the TCU.      Total time spent with patient visit was 38 min including patient visit, review of past records, patients counseling and coordinating care.      Electronically signed by:  Maynor Rodriguez MD

## 2018-12-14 NOTE — LETTER
12/14/2018        RE: Ravi Sandoval  [address]  [facility] [address]        PRIMARY CARE PROVIDER AND CLINIC RESPONSIBLE:  [facility] [address] / [facility] [id number]        ADMISSION HISTORY AND PHYSICAL EXAMINATION     Chief Complaint   Patient presents with     Fatigue     Shortness of Breath         HISTORY OF PRESENT ILLNESS:  73 year old male, (1945), [facility] medical care and rehab.  HPI information obtained from: {FGS HPI:[id number]}.    Ravi Sandoval is a 73 year old male with history of ***    Patient is seen and examined by Julio C Dyson/ Mulu Truong NP. Please see FREEDOM Dyson's/FREEDOM Truong's admit noted dated *** for details of admission, past medical history, family history, allergies, medication list, social history and other details pertinent with this admission. Hospital admission and dc summary reviewed.      Past Medical History:   Diagnosis Date     ACP (advance care planning)      Anemia      ASHD (arteriosclerotic heart disease)      BPH (benign prostatic hyperplasia)      Cardiac arrest (H)      Cataracts, bilateral      Chronic diarrhea      Chronic respiratory failure with hypoxia (H)      COPD (chronic obstructive pulmonary disease) (H)     HOME O2     Depression      Disease of lung      Dysplasia of prostate      Gastroesophageal reflux disease      General weakness      Heart attack (H)      Herpes zoster      Histiocytosis X (H)      Hyperlipidemia      Hypertension      Hypothyroidism      Oxygen dependent      Postinflammatory pulmonary fibrosis (H)      Prostatic hypertrophy, benign      Pulmonary HTN (H)      Pulmonary hypertensive venous disease (H)      Thyroid disease      Tobacco use      Type 2 diabetes mellitus without complications (H)        Past Surgical History:   Procedure Laterality Date     BIOPSY, LUNG NODULE       CARDIAC SURGERY      HX OF STENT     COLONOSCOPY N/A 5/27/2016     Procedure: COMBINED COLONOSCOPY, SINGLE OR MULTIPLE BIOPSY/POLYPECTOMY BY BIOPSY;  Surgeon: Sera Coffman MD;  Location: [facility]     PHACOEMULSIFICATION CLEAR CORNEA WITH STANDARD INTRAOCULAR LENS IMPLANT Right 5/31/2017    Procedure: PHACOEMULSIFICATION CLEAR CORNEA WITH STANDARD INTRAOCULAR LENS IMPLANT;  RIGHT EYE PHACOEMULSIFICATION CLEAR CORNEA WITH STANDARD INTRAOCULAR LENS IMPLANT ;  Surgeon: Trever Delgado MD;  Location:  [facility]     PHACOEMULSIFICATION CLEAR CORNEA WITH STANDARD INTRAOCULAR LENS IMPLANT Left 6/7/2017    Procedure: PHACOEMULSIFICATION CLEAR CORNEA WITH STANDARD INTRAOCULAR LENS IMPLANT;  LEFT EYE PHACOEMULSIFICATION CLEAR CORNEA WITH STANDARD INTRAOCULAR LENS IMPLANT ;  Surgeon: Trever Delgado MD;  Location: [facility]       Current Outpatient Medications   Medication Sig     acetaminophen (TYLENOL) 325 MG tablet Take 650 mg by mouth every 6 hours as needed for mild pain     albuterol (PROVENTIL) (2.5 MG/3ML) 0.083% neb solution Take 1 vial (2.5 mg) by nebulization once as needed (refractory bronchospasm associated with hypersensitivity)     amLODIPine (NORVASC) 2.5 MG tablet Take 2.5 mg by mouth daily     apixaban ANTICOAGULANT (ELIQUIS) 5 MG tablet Take 1 tablet (5 mg) by mouth 2 times daily     ASPIRIN PO Take 81 mg by mouth At Bedtime     budesonide (PULMICORT) 0.5 MG/2ML neb solution Take 2 mLs (0.5 mg) by nebulization 2 times daily     Colchicine (COLCRYS PO) Take 0.6 mg by mouth daily TAKES IN THE EVENING     digoxin (LANOXIN) 125 MCG tablet Take 1 tablet (125 mcg) by mouth daily     Filgrastim (NEUPOGEN) 300 MCG/0.5ML SOSY syringe Inject 0.5 mLs (300 mcg) Subcutaneous daily for 7 days     FLUoxetine HCl (PROZAC PO) Take 40 mg by mouth daily     furosemide (LASIX) 20 MG tablet Take 1 tablet (20 mg) by mouth daily     insulin aspart (NOVOLOG VIAL) 100 UNITS/ML vial Give before meals and before bed:  For Pre-Meal Glucose:  140-189 give 1 unit   190-239 give 2 units   240-289 give 3  units   290-339 give 4 units   = or >340 give 5 units     For Bedtime Glucose  200 - 239 give 1 units   240 - 289 give 1.5 units  290 - 339 give 2 units  = or >340 give 2.5 units     insulin glargine (LANTUS SOLOSTAR PEN) 100 UNIT/ML pen Inject 10 Units Subcutaneous 2 times daily     ipratropium - albuterol 0.5 mg/2.5 mg/3 mL (DUONEB) 0.5-2.5 (3) MG/3ML neb solution Take 1 vial by nebulization 3 times daily as needed for shortness of breath / dyspnea, wheezing or other (use up to 3 times a day for shortness of breath, cough)     levothyroxine (SYNTHROID/LEVOTHROID) 125 MCG tablet Take 125 mcg by mouth daily     LISINOPRIL PO Take 20 mg by mouth daily     loperamide (IMODIUM) 2 MG capsule Take 1 capsule (2 mg) by mouth 4 times daily as needed for diarrhea     metoprolol (TOPROL-XL) 25 MG 24 hr tablet Take 1 tablet (25 mg) by mouth 2 times daily     multivitamin w/minerals (THERA-VIT-M) tablet Take 1 tablet by mouth daily     OMEPRAZOLE PO Take 40 mg by mouth daily     ondansetron (ZOFRAN-ODT) 4 MG ODT tab Take 1 tablet (4 mg) by mouth every 6 hours as needed for nausea or vomiting     phenol (CHLORASEPTIC) 1.4 % spray Take 1 spray (1 mL) by mouth every hour as needed for sore throat (without fever)     predniSONE (DELTASONE) 10 MG tablet 4 tabs daily for 2 days, then 3 tabs daily for 2 days, then 2 tabs daily for 2 days, then 1 tab daily to be continued.     rosuvastatin (CRESTOR) 10 MG tablet Take 1 tablet (10 mg) by mouth daily     tadalafil (CIALIS) 20 MG tablet Take 20 mg by mouth daily For pulmonary hypertension.     tamsulosin (FLOMAX) 0.4 MG capsule Take 1 capsule (0.4 mg) by mouth daily     nystatin (MYCOSTATIN) 265978 UNIT/ML suspension Take 5 mLs (500,000 Units) by mouth 4 times daily for 10 days (Patient not taking: Reported on 12/12/2018)     No current facility-administered medications for this visit.        Allergies   Allergen Reactions     Tetracycline        Social History     Socioeconomic History  "    Marital status:      Spouse name: Not on file     Number of children: Not on file     Years of education: Not on file     Highest education level: Not on file   Social Needs     Financial resource strain: Not on file     Food insecurity - worry: Not on file     Food insecurity - inability: Not on file     Transportation needs - medical: Not on file     Transportation needs - non-medical: Not on file   Occupational History     Not on file   Tobacco Use     Smoking status: Current Every Day Smoker     Packs/day: 0.50     Years: 55.00     Pack years: 27.50     Types: Cigarettes     Smokeless tobacco: Never Used   Substance and Sexual Activity     Alcohol use: No     Alcohol/week: 0.0 oz     Drug use: No     Sexual activity: Not on file   Other Topics Concern     Parent/sibling w/ CABG, MI or angioplasty before 65F 55M? Not Asked      Service Not Asked     Blood Transfusions Not Asked     Caffeine Concern Yes     Comment: 4 cups coffee/day     Occupational Exposure Not Asked     Hobby Hazards Not Asked     Sleep Concern Yes     Stress Concern Not Asked     Weight Concern Not Asked     Special Diet No     Back Care Not Asked     Exercise No     Bike Helmet Not Asked     Seat Belt Yes     Self-Exams Not Asked   Social History Narrative     Not on file          Information reviewed:  Medications, vital signs, orders, nursing notes, problem list, hospital information.     ROS: All 10 point review of system completed, those pertinent positive, please see H&P, the remaining ROS is negative.    /90   Pulse 59   Temp 95.7  F (35.4  C)   Resp 16   Ht 1.664 m (5' 5.5\")   Wt 62.6 kg (138 lb)   SpO2 90%   BMI 22.62 kg/m       PHYSICAL EXAMINATION: ***  GENERAL:  No acute distress.  SKIN:  Dry and warm.  There is no rash at area of skin examined.  HEENT:  Head without trauma.  Pupils round, reactive. Exam of conjunctiva and lids are normal. Sclera without icterus. There is no oral thrush.  NECK:  " Supple. No jugular venous distension.  CHEST: No reproducible chest tenderness.   LUNGS:  Normal respiratory effort. Lungs reveal decreased breath sounds at bases. No rales or wheezes.  HEART:  Regular rate and rhythm.  No murmur, gallops or rubs auscultated.  ABDOMEN:  Soft, bowel sounds positive.  There is no tenderness or guarding.   EXTREMITIES: No edema.   NEUROLOGIC:  Alert and oriented x3.  There is no focal deficit appreciated.  PSYCH: Recent and remote memory is normal. Mood and affect are normal.    Lab/Diagnostic data:  Reviewed  ***    ASSESSMENT / PLAN:  Data Unavailable        Total time spent with patient visit was *** min including patient visit, review of past records, patients counseling and coordinating care.      Electronically signed by:  Maynor Rodriguez MD            PRIMARY CARE PROVIDER AND CLINIC RESPONSIBLE:  [facility] [address] / [place] [address]        ADMISSION HISTORY AND PHYSICAL EXAMINATION     Chief Complaint   Patient presents with     Fatigue     Shortness of Breath         HISTORY OF PRESENT ILLNESS:  73 year old male, (1945), [facility] of medical care and rehab.  HPI information obtained from: facility chart records, facility staff, patient report, [facility] chart review and Care Everywhere [facility] chart review.    Ravi Sandoval is a 73 year old male with history of histiocytosis X, pulmonary fibrosis, severe pulmonary hypertension and COPD/Emphysema, who is chronically oxygen dependent on 4-6 liters , hypertension, hypothyroidism, coronary artery disease and DM type II who was admitted at [facility] from 11/23 to 12/11/18 due to worsening dyspnea.  He was found to have acute on chronic hypoxic respiratory failure.  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 CTA chest PE protocol done on  11/26,  large right pleural effusion, non occlusive thrombus and pleural based densities. Completed a course of Levaquin on 12/3.  Oncology team has administered palliative chemotherapy, completed with Carboplatin and Etoposide and decardon 12/6 - 12/8.8.  He had pancytopenia.  With his leukopenia he received Neupogen therapy.  He underwent right thoracentesis on 11/30/2018 with removal of 1500 mL of pleural fluid which was exudative.  Pleural fluid culture was negative but cytology confirmed high-grade neuroendocrine tumor.  He also found to have an pulmonary embolism.  He was treated with IV heparin drip initially and later switched with apixaban.  He had a thrombocytopenia on chronically, closely monitored his platelets while he is on aspirin and apixaban.  He has known severe pulmonary hypertension with pulmonary fibrosis.  Patient was treated with Lasix, digoxinand tadalafil.  Feels weak and tired he has shortness of breath at rest and with minimal activity.  He is on oxygen with Oxymizer.  He has a right chest discomfort with pleuritic chest pain.  Slept well, appetite good and had BM last night. Patient denies abdominal pain, n&v, fever, chills, dysuria, leg pain or swelling. The rest of ROS is unremarkable. Patient is seen and examined by Mulu Truong NP. Please see FREEDOM Truong's admit noted dated 12/12/18 for details of admission, past medical history, family history, allergies, medication list, social history and other details pertinent with this admission. Hospital admission and dc summary reviewed.      Past Medical History:   Diagnosis Date     ACP (advance care planning)      Anemia      ASHD (arteriosclerotic heart disease)      BPH (benign prostatic hyperplasia)      Cardiac arrest (H)      Cataracts, bilateral      Chronic diarrhea      Chronic respiratory failure with hypoxia (H)      COPD (chronic obstructive pulmonary disease) (H)     HOME O2     Depression      Disease of lung      Dysplasia of  prostate      Gastroesophageal reflux disease      General weakness      Heart attack (H)      Herpes zoster      Histiocytosis X (H)      Hyperlipidemia      Hypertension      Hypothyroidism      Oxygen dependent      Postinflammatory pulmonary fibrosis (H)      Prostatic hypertrophy, benign      Pulmonary HTN (H)      Pulmonary hypertensive venous disease (H)      Thyroid disease      Tobacco use      Type 2 diabetes mellitus without complications (H)        Past Surgical History:   Procedure Laterality Date     BIOPSY, LUNG NODULE       CARDIAC SURGERY      HX OF STENT     COLONOSCOPY N/A 5/27/2016    Procedure: COMBINED COLONOSCOPY, SINGLE OR MULTIPLE BIOPSY/POLYPECTOMY BY BIOPSY;  Surgeon: Sera Coffman MD;  Location: [facility]     PHACOEMULSIFICATION CLEAR CORNEA WITH STANDARD INTRAOCULAR LENS IMPLANT Right 5/31/2017    Procedure: PHACOEMULSIFICATION CLEAR CORNEA WITH STANDARD INTRAOCULAR LENS IMPLANT;  RIGHT EYE PHACOEMULSIFICATION CLEAR CORNEA WITH STANDARD INTRAOCULAR LENS IMPLANT ;  Surgeon: Trever Delgado MD;  Location: [facility]     PHACOEMULSIFICATION CLEAR CORNEA WITH STANDARD INTRAOCULAR LENS IMPLANT Left 6/7/2017    Procedure: PHACOEMULSIFICATION CLEAR CORNEA WITH STANDARD INTRAOCULAR LENS IMPLANT;  LEFT EYE PHACOEMULSIFICATION CLEAR CORNEA WITH STANDARD INTRAOCULAR LENS IMPLANT ;  Surgeon: Trever Delgado MD;  Location: [facility]       Current Outpatient Medications   Medication Sig     acetaminophen (TYLENOL) 325 MG tablet Take 650 mg by mouth every 6 hours as needed for mild pain     albuterol (PROVENTIL) (2.5 MG/3ML) 0.083% neb solution Take 1 vial (2.5 mg) by nebulization once as needed (refractory bronchospasm associated with hypersensitivity)     amLODIPine (NORVASC) 2.5 MG tablet Take 2.5 mg by mouth daily     apixaban ANTICOAGULANT (ELIQUIS) 5 MG tablet Take 1 tablet (5 mg) by mouth 2 times daily     ASPIRIN PO Take 81 mg by mouth At Bedtime     budesonide (PULMICORT) 0.5 MG/2ML neb solution  Take 2 mLs (0.5 mg) by nebulization 2 times daily     Colchicine (COLCRYS PO) Take 0.6 mg by mouth daily TAKES IN THE EVENING     digoxin (LANOXIN) 125 MCG tablet Take 1 tablet (125 mcg) by mouth daily     Filgrastim (NEUPOGEN) 300 MCG/0.5ML SOSY syringe Inject 0.5 mLs (300 mcg) Subcutaneous daily for 7 days     FLUoxetine HCl (PROZAC PO) Take 40 mg by mouth daily     furosemide (LASIX) 20 MG tablet Take 1 tablet (20 mg) by mouth daily     insulin aspart (NOVOLOG VIAL) 100 UNITS/ML vial Give before meals and before bed:  For Pre-Meal Glucose:  140-189 give 1 unit   190-239 give 2 units   240-289 give 3 units   290-339 give 4 units   = or >340 give 5 units     For Bedtime Glucose  200 - 239 give 1 units   240 - 289 give 1.5 units  290 - 339 give 2 units  = or >340 give 2.5 units     insulin glargine (LANTUS SOLOSTAR PEN) 100 UNIT/ML pen Inject 10 Units Subcutaneous 2 times daily     ipratropium - albuterol 0.5 mg/2.5 mg/3 mL (DUONEB) 0.5-2.5 (3) MG/3ML neb solution Take 1 vial by nebulization 3 times daily as needed for shortness of breath / dyspnea, wheezing or other (use up to 3 times a day for shortness of breath, cough)     levothyroxine (SYNTHROID/LEVOTHROID) 125 MCG tablet Take 125 mcg by mouth daily     LISINOPRIL PO Take 20 mg by mouth daily     loperamide (IMODIUM) 2 MG capsule Take 1 capsule (2 mg) by mouth 4 times daily as needed for diarrhea     metoprolol (TOPROL-XL) 25 MG 24 hr tablet Take 1 tablet (25 mg) by mouth 2 times daily     multivitamin w/minerals (THERA-VIT-M) tablet Take 1 tablet by mouth daily     OMEPRAZOLE PO Take 40 mg by mouth daily     ondansetron (ZOFRAN-ODT) 4 MG ODT tab Take 1 tablet (4 mg) by mouth every 6 hours as needed for nausea or vomiting     phenol (CHLORASEPTIC) 1.4 % spray Take 1 spray (1 mL) by mouth every hour as needed for sore throat (without fever)     predniSONE (DELTASONE) 10 MG tablet 4 tabs daily for 2 days, then 3 tabs daily for 2 days, then 2 tabs daily for 2  days, then 1 tab daily to be continued.     rosuvastatin (CRESTOR) 10 MG tablet Take 1 tablet (10 mg) by mouth daily     tadalafil (CIALIS) 20 MG tablet Take 20 mg by mouth daily For pulmonary hypertension.     tamsulosin (FLOMAX) 0.4 MG capsule Take 1 capsule (0.4 mg) by mouth daily     nystatin (MYCOSTATIN) 971506 UNIT/ML suspension Take 5 mLs (500,000 Units) by mouth 4 times daily for 10 days (Patient not taking: Reported on 12/12/2018)     No current facility-administered medications for this visit.        Allergies   Allergen Reactions     Tetracycline        Social History     Socioeconomic History     Marital status:      Spouse name: Not on file     Number of children: Not on file     Years of education: Not on file     Highest education level: Not on file   Social Needs     Financial resource strain: Not on file     Food insecurity - worry: Not on file     Food insecurity - inability: Not on file     Transportation needs - medical: Not on file     Transportation needs - non-medical: Not on file   Occupational History     Not on file   Tobacco Use     Smoking status: Current Every Day Smoker     Packs/day: 0.50     Years: 55.00     Pack years: 27.50     Types: Cigarettes     Smokeless tobacco: Never Used   Substance and Sexual Activity     Alcohol use: No     Alcohol/week: 0.0 oz     Drug use: No     Sexual activity: Not on file   Other Topics Concern     Parent/sibling w/ CABG, MI or angioplasty before 65F 55M? Not Asked      Service Not Asked     Blood Transfusions Not Asked     Caffeine Concern Yes     Comment: 4 cups coffee/day     Occupational Exposure Not Asked     Hobby Hazards Not Asked     Sleep Concern Yes     Stress Concern Not Asked     Weight Concern Not Asked     Special Diet No     Back Care Not Asked     Exercise No     Bike Helmet Not Asked     Seat Belt Yes     Self-Exams Not Asked   Social History Narrative     Not on file          Information reviewed:  Medications, vital  "signs, orders, nursing notes, problem list, hospital information.     ROS: All 10 point review of system completed, those pertinent positive, please see H&P, the remaining ROS is negative.    /90   Pulse 59   Temp 95.7  F (35.4  C)   Resp 16   Ht 1.664 m (5' 5.5\")   Wt 62.6 kg (138 lb)   SpO2 90%   BMI 22.62 kg/m       PHYSICAL EXAMINATION:   GENERAL:  No acute distress.  SKIN:  Dry and warm.  There is no rash at area of skin examined.  HEENT:  Head without trauma.  Pupils round, reactive. Exam of conjunctiva and lids are normal. Sclera without icterus. There is oral thrush.  NECK:  Supple. No jugular venous distension.  CHEST: No reproducible chest tenderness.   LUNGS:  Normal respiratory effort. Lungs reveal decreased breath sounds diffusely.  With absent breath sounds at right base.  No rales or wheezes.  HEART:  Regular rate and rhythm.  No murmur, gallops or rubs auscultated.  ABDOMEN:  Soft, bowel sounds positive.  There is no tenderness or guarding.   EXTREMITIES: No edema.   NEUROLOGIC:  Alert and oriented x3. Moving all ext. Gait not tested.  PSYCH: Recent and remote memory is normal. Mood and affect are normal.    Lab/Diagnostic data:  Reviewed    CBC RESULTS:   Recent Labs   Lab Test 12/13/18   WBC 16.8*   RBC 3.87*   HGB 11.0*   HCT 33.4*   MCV 86   MCH 28.4   MCHC 32.9   RDW 17*   *     Last Comprehensive Metabolic Panel:  Sodium   Date Value Ref Range Status   12/13/2018 139 133 - 144 mmol/L Final     Potassium   Date Value Ref Range Status   12/13/2018 3.5 3.4 - 5.3 mmol/L Final     Chloride   Date Value Ref Range Status   12/13/2018 105 94 - 109 mmol/L Final     Carbon Dioxide   Date Value Ref Range Status   12/13/2018 25 20 - 32 mmol/L Final     Anion Gap   Date Value Ref Range Status   12/13/2018 9 3 - 14 mmol/L Final     Glucose   Date Value Ref Range Status   12/13/2018 71 70 - 99 mg/dL Final     Urea Nitrogen   Date Value Ref Range Status   12/13/2018 22 7 - 30 mg/dL Final "     Creatinine   Date Value Ref Range Status   12/13/2018 0.74 (A) 0.66 - 1.25 mg/dL Final     GFR Estimate   Date Value Ref Range Status   12/13/2018 >90 >60 mL/min/1.7m2 Final     Calcium   Date Value Ref Range Status   12/13/2018 7.5 7.5 - 10.1 mg/dL Final     Bilirubin Total   Date Value Ref Range Status   12/11/2018 0.5 0.2 - 1.3 mg/dL Final     Alkaline Phosphatase   Date Value Ref Range Status   12/11/2018 53 40 - 150 U/L Final     ALT   Date Value Ref Range Status   12/11/2018 29 0 - 70 U/L Final     AST   Date Value Ref Range Status   12/11/2018 22 0 - 45 U/L Final     Results for orders placed or performed during the hospital encounter of 11/23/18   XR Chest 2 Views    Narrative    CHEST TWO VIEWS 11/23/2018 11:08 AM     HISTORY: Shortness of breath and possible pneumonia.     COMPARISON: 3/12/2017    FINDINGS: Superimposed on fibrotic-appearing lung abnormalities is  abnormal opacity in the right lower lobe suggestive of developing  pneumonia. Trace right pleural effusion. No pneumothorax. Heart size  normal.       Impression    IMPRESSION: Right lower lobe pneumonia superimposed on  fibrotic-appearing lungs.     YANG DIAZ MD   CT Chest Pulmonary Embolism w Contrast     Value    Radiologist flags Pulmonary embolism (AA)    Narrative    CT CHEST PULMONARY EMBOLISM WITH CONTRAST November 26, 2018 10:33 AM     HISTORY: Worsening shortness of breath and hypoxia.    COMPARISON: None.    TECHNIQUE: Volumetric helical acquisition of CT images of the chest  from the lung apices to the kidneys were acquired after the  administration of  59mL Isovue-370  IV contrast. Radiation dose for  this scan was reduced using automated exposure control, adjustment of  the mA and/or kV according to patient size, or iterative  reconstruction technique.    FINDINGS: There is a nonocclusive thrombus in a right lower lobe  pulmonary artery. No other definite pulmonary embolism demonstrated.  There is now a moderate to large  right pleural effusion. No  significant left pleural fluid. No pericardial effusion. Multiple  nodular densities are seen in the major fissure and along the  pericardium. These are new since the comparison study and could  represent metastatic disease. A probable right hilar lymph node is  also present measuring 3.5 x 2.1 cm. This representatives a mass along  the pericardium and measures 2.0 x 1.4 cm. A prominent mass in the  posterior mediastinum/azygos esophageal recess is noted measuring 5.6  x 2.9 cm. The heart is not enlarged. Thoracic aorta is atherosclerotic  without evidence of dissection or aneurysm. There are moderate  coronary vascular calcifications consistent with coronary artery  disease.  There is no pneumothorax. Adrenal glands are normal.  Remainder of the visualized upper abdomen is unremarkable.      Impression    IMPRESSION:   1. A single nonocclusive thrombus is seen in the right lower lobe.  2. No thoracic aortic dissection or aneurysm.   3. Moderate to large right pleural effusion and multiple pleural-based  masses concerning for malignancy.    [Critical Result: Pulmonary embolism]    Finding was identified on 11/26/2018 11:07 AM.     The nurse on the floor, Kayy, was contacted for Dr. CANELO KIRKPATRICK~[id number] JOSE DENT at 11/26/2018  11:15 AM and verbalized understanding of the critical finding.     MIRANDA GIBSON MD   US Thoracentesis    Narrative    ULTRASOUND-GUIDED THORACENTESIS  11/26/2018 2:01 PM     HISTORY: Shortness of breath, hypoxic with large pleural effusion on  right side.     FINDINGS: Ultrasound was used to evaluate for the presence and best  approach for drainage of a pleural effusion. Written and oral informed  consent was obtained. A pause for the cause procedure to verify the  correct patient and correct procedure. The skin overlying the right  chest posteriorly was prepped and draped in the usual sterile fashion.  The subcutaneous tissues  were anesthetized with 10 mL 1% lidocaine. A  catheter was advanced into the pleural space and 1500 mL of ivonne  colored fluid was drained. Patient was monitored by nurse under my  direct supervision throughout the exam. Ultrasound images were  permanently stored.  There were no immediate complications. Patient  left the ultrasound suite in satisfactory condition.      Impression    IMPRESSION: Technically successful thoracentesis without immediate  complications.    MIRANDA GIBSON MD   XR Chest 1 View    Narrative    CHEST ONE VIEW November 26, 2018 2:23 PM     HISTORY: Pleural effusion, post thoracentesis.     COMPARISON: 11/23/2018.      Impression    IMPRESSION: No pneumothorax or pleural effusion on either side.  Chronic appearing interstitial abnormalities are present. Heart size  normal.    YANG DIAZ MD   CT Chest w/o Contrast    Narrative    CT CHEST WITHOUT CONTRAST December 10, 2018 9:59 AM    HISTORY: Shortness of breath, worsening hypoxia despite chemo and  anticoagulation.     TECHNIQUE: Scans obtained from the apices through the diaphragm  without IV contrast. Radiation dose for this scan was reduced using  automated exposure control, adjustment of the mA and/or kV according  to patient size, or iterative reconstruction technique.    COMPARISON: CT chest 11/26/2018.    FINDINGS: Unenhanced scanning cannot assess for pulmonary embolism as  was previously documented. There is a small to moderate right pleural  effusion that is smaller in volume. Enlarged right hilar adenopathy is  limited in visualization but appears grossly stable measuring  approximately 3.4 cm series 2 image 28. Enlarged precarinal lymph node  is stable measuring 1.8 x 2 cm series 2 image 26. Lobulated mass at  the inferior mediastinum right paraesophageal location abutting the  atria of the heart is stable measuring 4.8 x 2.6 cm image 47. Other  lymph nodes also appear stable.    Diffuse emphysematous changes bilaterally. There  are several bilateral  indeterminate pulmonary nodule that appears stable compared to recent  CT chest from 11/26/2018. However, new multiple nodules along the  periphery of the right lung. One of these adjacent to surgical sutures  at the posterior right lower lobe is 2.5 x 3 cm series 6 image 147.  There are several other examples at the periphery of the right lung  new since older examinations, for example when compared to a CT chest  dated 2/22/2017. There are several left-sided nodules as well without  significant change.    Upper abdomen images show a stable hypodense lesion at the left  hepatic dome measuring approximately 2.3 cm series 2 image 52. Stable  trace pericardial fluid. No new destructive-appearing bony lesions  identified. Gallstones are noted.      Impression    IMPRESSION:  1. Extensive emphysematous changes again identified.  2. Small to moderate right pleural effusion is smaller in volume.  3. Multiple pulmonary nodules bilaterally. Several of these are new on  the right worrisome for malignancy.  4. Prominent lymph nodes in the mediastinum and right hilum are  worrisome for malignant adenopathy.  5. Previously demonstrated pulmonary embolism cannot be visualized by  unenhanced scanning.  5. Indeterminate hypodense lesion at the left hepatic dome is stable.  6. Cholelithiasis.    ALESSANDRO PEARSON MD         ASSESSMENT / PLAN:     Physical deconditioning  Plan: PT/OT with increase independence, self-care, strength and endurance and other cares that help return to home/facility of origin, fall precautions. Care conference with patient and family for the progress of rehab and disposition issues will be discussed as planned. Rehab evaluation and other evaluations including CPT are at rehab logs, to be reviewed separately.  Fall risk assessment as well as cognitive evaluation will be formed during rehab stay if indicated.    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 Monitor cbc.    Other acute pulmonary embolism without acute cor pulmonale (H) and Chronic obstructive pulmonary disease  Plan: Continue apixaban, monitor CBC.    Pulmonary fibrosis (H) and Pulmonary hypertension (H)  Plan: Continue oxygen, Lasix, digoxin, Pulmicort nebulizers and other inhalers and other care.  Follow pulmonary team as an outpatient    DM type 2, goal HbA1c < 7% (H)  Plan: continue current medications Lantus and NovoLog, diet and monitor BG closely.  Lab Results   Component Value Date    A1C 10.7 11/23/2018    A1C 8.1 03/13/2017    A1C 8.3 02/23/2017     Essential hypertension and CAD  Plan: continue current medications metoprolol, digoxin, aspirin and Crestor, monitor I&O and daily weighs and labs if indicated. Follow up cardiologist and PCP as scheduled.    Other problems with same care. Primary care doctor and other specialists to address those chronic problems in next clinic appointment to be scheduled upon discharge from the TCU.      Total time spent with patient visit was 38 min including patient visit, review of past records, patients counseling and coordinating care.      Electronically signed by:  Maynor Rodriguez MD              Sincerely,        Maynor Rodriguez MD

## 2018-12-14 NOTE — LETTER
12/14/2018        RE: Ravi Sandoval  [address]  [facility] [address]              [facility] CARE PROVIDER AND CLINIC RESPONSIBLE:  [facility] [id number] / [facility] [id number]        ADMISSION HISTORY AND PHYSICAL EXAMINATION     Chief Complaint   Patient presents with     Fatigue     Shortness of Breath         HISTORY OF PRESENT ILLNESS:  73 year old male, (1945), [facility] of medical care and rehab.  HPI information obtained from: facility chart records, facility staff, patient report, [facility] chart review and Care Everywhere [facility] chart review.    Ravi Sandoval is a 73 year old male with history of histiocytosis X, pulmonary fibrosis, severe pulmonary hypertension and COPD/Emphysema, who is chronically oxygen dependent on 4-6 liters , hypertension, hypothyroidism, coronary artery disease and DM type II who was admitted at [facility] from 11/23 to 12/11/18 due to worsening dyspnea.  He was found to have acute on chronic hypoxic respiratory failure.  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 CTA chest PE protocol done on 11/26,  large right pleural effusion, non occlusive thrombus and pleural based densities. Completed a course of Levaquin on 12/3.  Oncology team has administered palliative chemotherapy, completed with Carboplatin and Etoposide and decardon 12/6 - 12/8.8.  He had pancytopenia.  With his leukopenia he received Neupogen therapy.  He underwent right thoracentesis on 11/30/2018 with removal of 1500 mL of pleural fluid which was exudative.  Pleural fluid culture was negative but cytology confirmed high-grade neuroendocrine tumor.  He also found to have an pulmonary embolism.  He was treated with IV heparin drip initially and later switched with apixaban.  He had a thrombocytopenia on chronically, closely  monitored his platelets while he is on aspirin and apixaban.  He has known severe pulmonary hypertension with pulmonary fibrosis.  Patient was treated with Lasix, digoxinand tadalafil.  Feels weak and tired he has shortness of breath at rest and with minimal activity.  He is on oxygen with Oxymizer.  He has a right chest discomfort with pleuritic chest pain.  Slept well, appetite good and had BM last night. Patient denies abdominal pain, n&v, fever, chills, dysuria, leg pain or swelling. The rest of ROS is unremarkable. Patient is seen and examined by Mulu Truong NP. Please see FREEDOM Truong's admit noted dated 12/12/18 for details of admission, past medical history, family history, allergies, medication list, social history and other details pertinent with this admission. Hospital admission and dc summary reviewed.      Past Medical History:   Diagnosis Date     ACP (advance care planning)      Anemia      ASHD (arteriosclerotic heart disease)      BPH (benign prostatic hyperplasia)      Cardiac arrest (H)      Cataracts, bilateral      Chronic diarrhea      Chronic respiratory failure with hypoxia (H)      COPD (chronic obstructive pulmonary disease) (H)     HOME O2     Depression      Disease of lung      Dysplasia of prostate      Gastroesophageal reflux disease      General weakness      Heart attack (H)      Herpes zoster      Histiocytosis X (H)      Hyperlipidemia      Hypertension      Hypothyroidism      Oxygen dependent      Postinflammatory pulmonary fibrosis (H)      Prostatic hypertrophy, benign      Pulmonary HTN (H)      Pulmonary hypertensive venous disease (H)      Thyroid disease      Tobacco use      Type 2 diabetes mellitus without complications (H)        Past Surgical History:   Procedure Laterality Date     BIOPSY, LUNG NODULE       CARDIAC SURGERY      HX OF STENT     COLONOSCOPY N/A 5/27/2016    Procedure: COMBINED COLONOSCOPY, SINGLE OR MULTIPLE BIOPSY/POLYPECTOMY BY BIOPSY;  Surgeon:  Sera Coffman MD;  Location: [facility]     PHACOEMULSIFICATION CLEAR CORNEA WITH STANDARD INTRAOCULAR LENS IMPLANT Right 5/31/2017    Procedure: PHACOEMULSIFICATION CLEAR CORNEA WITH STANDARD INTRAOCULAR LENS IMPLANT;  RIGHT EYE PHACOEMULSIFICATION CLEAR CORNEA WITH STANDARD INTRAOCULAR LENS IMPLANT ;  Surgeon: Trever Delgado MD;  Location: [facility]     PHACOEMULSIFICATION CLEAR CORNEA WITH STANDARD INTRAOCULAR LENS IMPLANT Left 6/7/2017    Procedure: PHACOEMULSIFICATION CLEAR CORNEA WITH STANDARD INTRAOCULAR LENS IMPLANT;  LEFT EYE PHACOEMULSIFICATION CLEAR CORNEA WITH STANDARD INTRAOCULAR LENS IMPLANT ;  Surgeon: Trever Delgado MD;  Location: [facility]       Current Outpatient Medications   Medication Sig     acetaminophen (TYLENOL) 325 MG tablet Take 650 mg by mouth every 6 hours as needed for mild pain     albuterol (PROVENTIL) (2.5 MG/3ML) 0.083% neb solution Take 1 vial (2.5 mg) by nebulization once as needed (refractory bronchospasm associated with hypersensitivity)     amLODIPine (NORVASC) 2.5 MG tablet Take 2.5 mg by mouth daily     apixaban ANTICOAGULANT (ELIQUIS) 5 MG tablet Take 1 tablet (5 mg) by mouth 2 times daily     ASPIRIN PO Take 81 mg by mouth At Bedtime     budesonide (PULMICORT) 0.5 MG/2ML neb solution Take 2 mLs (0.5 mg) by nebulization 2 times daily     Colchicine (COLCRYS PO) Take 0.6 mg by mouth daily TAKES IN THE EVENING     digoxin (LANOXIN) 125 MCG tablet Take 1 tablet (125 mcg) by mouth daily     Filgrastim (NEUPOGEN) 300 MCG/0.5ML SOSY syringe Inject 0.5 mLs (300 mcg) Subcutaneous daily for 7 days     FLUoxetine HCl (PROZAC PO) Take 40 mg by mouth daily     furosemide (LASIX) 20 MG tablet Take 1 tablet (20 mg) by mouth daily     insulin aspart (NOVOLOG VIAL) 100 UNITS/ML vial Give before meals and before bed:  For Pre-Meal Glucose:  140-189 give 1 unit   190-239 give 2 units   240-289 give 3 units   290-339 give 4 units   = or >340 give 5 units     For Bedtime Glucose  200 - 239 give  1 units   240 - 289 give 1.5 units  290 - 339 give 2 units  = or >340 give 2.5 units     insulin glargine (LANTUS SOLOSTAR PEN) 100 UNIT/ML pen Inject 10 Units Subcutaneous 2 times daily     ipratropium - albuterol 0.5 mg/2.5 mg/3 mL (DUONEB) 0.5-2.5 (3) MG/3ML neb solution Take 1 vial by nebulization 3 times daily as needed for shortness of breath / dyspnea, wheezing or other (use up to 3 times a day for shortness of breath, cough)     levothyroxine (SYNTHROID/LEVOTHROID) 125 MCG tablet Take 125 mcg by mouth daily     LISINOPRIL PO Take 20 mg by mouth daily     loperamide (IMODIUM) 2 MG capsule Take 1 capsule (2 mg) by mouth 4 times daily as needed for diarrhea     metoprolol (TOPROL-XL) 25 MG 24 hr tablet Take 1 tablet (25 mg) by mouth 2 times daily     multivitamin w/minerals (THERA-VIT-M) tablet Take 1 tablet by mouth daily     OMEPRAZOLE PO Take 40 mg by mouth daily     ondansetron (ZOFRAN-ODT) 4 MG ODT tab Take 1 tablet (4 mg) by mouth every 6 hours as needed for nausea or vomiting     phenol (CHLORASEPTIC) 1.4 % spray Take 1 spray (1 mL) by mouth every hour as needed for sore throat (without fever)     predniSONE (DELTASONE) 10 MG tablet 4 tabs daily for 2 days, then 3 tabs daily for 2 days, then 2 tabs daily for 2 days, then 1 tab daily to be continued.     rosuvastatin (CRESTOR) 10 MG tablet Take 1 tablet (10 mg) by mouth daily     tadalafil (CIALIS) 20 MG tablet Take 20 mg by mouth daily For pulmonary hypertension.     tamsulosin (FLOMAX) 0.4 MG capsule Take 1 capsule (0.4 mg) by mouth daily     nystatin (MYCOSTATIN) 334272 UNIT/ML suspension Take 5 mLs (500,000 Units) by mouth 4 times daily for 10 days (Patient not taking: Reported on 12/12/2018)     No current facility-administered medications for this visit.        Allergies   Allergen Reactions     Tetracycline        Social History     Socioeconomic History     Marital status:      Spouse name: Not on file     Number of children: Not on file  "    Years of education: Not on file     Highest education level: Not on file   Social Needs     Financial resource strain: Not on file     Food insecurity - worry: Not on file     Food insecurity - inability: Not on file     Transportation needs - medical: Not on file     Transportation needs - non-medical: Not on file   Occupational History     Not on file   Tobacco Use     Smoking status: Current Every Day Smoker     Packs/day: 0.50     Years: 55.00     Pack years: 27.50     Types: Cigarettes     Smokeless tobacco: Never Used   Substance and Sexual Activity     Alcohol use: No     Alcohol/week: 0.0 oz     Drug use: No     Sexual activity: Not on file   Other Topics Concern     Parent/sibling w/ CABG, MI or angioplasty before 65F 55M? Not Asked      Service Not Asked     Blood Transfusions Not Asked     Caffeine Concern Yes     Comment: 4 cups coffee/day     Occupational Exposure Not Asked     Hobby Hazards Not Asked     Sleep Concern Yes     Stress Concern Not Asked     Weight Concern Not Asked     Special Diet No     Back Care Not Asked     Exercise No     Bike Helmet Not Asked     Seat Belt Yes     Self-Exams Not Asked   Social History Narrative     Not on file          Information reviewed:  Medications, vital signs, orders, nursing notes, problem list, hospital information.     ROS: All 10 point review of system completed, those pertinent positive, please see H&P, the remaining ROS is negative.    /90   Pulse 59   Temp 95.7  F (35.4  C)   Resp 16   Ht 1.664 m (5' 5.5\")   Wt 62.6 kg (138 lb)   SpO2 90%   BMI 22.62 kg/m       PHYSICAL EXAMINATION:   GENERAL:  No acute distress.  SKIN:  Dry and warm.  There is no rash at area of skin examined.  HEENT:  Head without trauma.  Pupils round, reactive. Exam of conjunctiva and lids are normal. Sclera without icterus. There is oral thrush.  NECK:  Supple. No jugular venous distension.  CHEST: No reproducible chest tenderness.   LUNGS:  Normal " respiratory effort. Lungs reveal decreased breath sounds diffusely.  With absent breath sounds at right base.  No rales or wheezes.  HEART:  Regular rate and rhythm.  No murmur, gallops or rubs auscultated.  ABDOMEN:  Soft, bowel sounds positive.  There is no tenderness or guarding.   EXTREMITIES: No edema.   NEUROLOGIC:  Alert and oriented x3. Moving all ext. Gait not tested.  PSYCH: Recent and remote memory is normal. Mood and affect are normal.    Lab/Diagnostic data:  Reviewed    CBC RESULTS:   Recent Labs   Lab Test 12/13/18   WBC 16.8*   RBC 3.87*   HGB 11.0*   HCT 33.4*   MCV 86   MCH 28.4   MCHC 32.9   RDW 17*   *     Last Comprehensive Metabolic Panel:  Sodium   Date Value Ref Range Status   12/13/2018 139 133 - 144 mmol/L Final     Potassium   Date Value Ref Range Status   12/13/2018 3.5 3.4 - 5.3 mmol/L Final     Chloride   Date Value Ref Range Status   12/13/2018 105 94 - 109 mmol/L Final     Carbon Dioxide   Date Value Ref Range Status   12/13/2018 25 20 - 32 mmol/L Final     Anion Gap   Date Value Ref Range Status   12/13/2018 9 3 - 14 mmol/L Final     Glucose   Date Value Ref Range Status   12/13/2018 71 70 - 99 mg/dL Final     Urea Nitrogen   Date Value Ref Range Status   12/13/2018 22 7 - 30 mg/dL Final     Creatinine   Date Value Ref Range Status   12/13/2018 0.74 (A) 0.66 - 1.25 mg/dL Final     GFR Estimate   Date Value Ref Range Status   12/13/2018 >90 >60 mL/min/1.7m2 Final     Calcium   Date Value Ref Range Status   12/13/2018 7.5 7.5 - 10.1 mg/dL Final     Bilirubin Total   Date Value Ref Range Status   12/11/2018 0.5 0.2 - 1.3 mg/dL Final     Alkaline Phosphatase   Date Value Ref Range Status   12/11/2018 53 40 - 150 U/L Final     ALT   Date Value Ref Range Status   12/11/2018 29 0 - 70 U/L Final     AST   Date Value Ref Range Status   12/11/2018 22 0 - 45 U/L Final     Results for orders placed or performed during the hospital encounter of 11/23/18   XR Chest 2 Views    Narrative     CHEST TWO VIEWS 11/23/2018 11:08 AM     HISTORY: Shortness of breath and possible pneumonia.     COMPARISON: 3/12/2017    FINDINGS: Superimposed on fibrotic-appearing lung abnormalities is  abnormal opacity in the right lower lobe suggestive of developing  pneumonia. Trace right pleural effusion. No pneumothorax. Heart size  normal.       Impression    IMPRESSION: Right lower lobe pneumonia superimposed on  fibrotic-appearing lungs.     YANG DIAZ MD   CT Chest Pulmonary Embolism w Contrast     Value    Radiologist flags Pulmonary embolism (AA)    Narrative    CT CHEST PULMONARY EMBOLISM WITH CONTRAST November 26, 2018 10:33 AM     HISTORY: Worsening shortness of breath and hypoxia.    COMPARISON: None.    TECHNIQUE: Volumetric helical acquisition of CT images of the chest  from the lung apices to the kidneys were acquired after the  administration of  59mL Isovue-370  IV contrast. Radiation dose for  this scan was reduced using automated exposure control, adjustment of  the mA and/or kV according to patient size, or iterative  reconstruction technique.    FINDINGS: There is a nonocclusive thrombus in a right lower lobe  pulmonary artery. No other definite pulmonary embolism demonstrated.  There is now a moderate to large right pleural effusion. No  significant left pleural fluid. No pericardial effusion. Multiple  nodular densities are seen in the major fissure and along the  pericardium. These are new since the comparison study and could  represent metastatic disease. A probable right hilar lymph node is  also present measuring 3.5 x 2.1 cm. This representatives a mass along  the pericardium and measures 2.0 x 1.4 cm. A prominent mass in the  posterior mediastinum/azygos esophageal recess is noted measuring 5.6  x 2.9 cm. The heart is not enlarged. Thoracic aorta is atherosclerotic  without evidence of dissection or aneurysm. There are moderate  coronary vascular calcifications consistent with coronary  artery  disease.  There is no pneumothorax. Adrenal glands are normal.  Remainder of the visualized upper abdomen is unremarkable.      Impression    IMPRESSION:   1. A single nonocclusive thrombus is seen in the right lower lobe.  2. No thoracic aortic dissection or aneurysm.   3. Moderate to large right pleural effusion and multiple pleural-based  masses concerning for malignancy.    [Critical Result: Pulmonary embolism]    Finding was identified on 11/26/2018 11:07 AM.     The nurse on the floor, Kayy, was contacted for Dr. CANELO KIRKPATRICK~[phone] JOSE DENT at 11/26/2018  11:15 AM and verbalized understanding of the critical finding.     MIRANDA GIBSON MD   US Thoracentesis    Narrative    ULTRASOUND-GUIDED THORACENTESIS  11/26/2018 2:01 PM     HISTORY: Shortness of breath, hypoxic with large pleural effusion on  right side.     FINDINGS: Ultrasound was used to evaluate for the presence and best  approach for drainage of a pleural effusion. Written and oral informed  consent was obtained. A pause for the cause procedure to verify the  correct patient and correct procedure. The skin overlying the right  chest posteriorly was prepped and draped in the usual sterile fashion.  The subcutaneous tissues were anesthetized with 10 mL 1% lidocaine. A  catheter was advanced into the pleural space and 1500 mL of ivonne  colored fluid was drained. Patient was monitored by nurse under my  direct supervision throughout the exam. Ultrasound images were  permanently stored.  There were no immediate complications. Patient  left the ultrasound suite in satisfactory condition.      Impression    IMPRESSION: Technically successful thoracentesis without immediate  complications.    MIRANDA GIBSON MD   XR Chest 1 View    Narrative    CHEST ONE VIEW November 26, 2018 2:23 PM     HISTORY: Pleural effusion, post thoracentesis.     COMPARISON: 11/23/2018.      Impression    IMPRESSION: No pneumothorax or  pleural effusion on either side.  Chronic appearing interstitial abnormalities are present. Heart size  normal.    YANG DIAZ MD   CT Chest w/o Contrast    Narrative    CT CHEST WITHOUT CONTRAST December 10, 2018 9:59 AM    HISTORY: Shortness of breath, worsening hypoxia despite chemo and  anticoagulation.     TECHNIQUE: Scans obtained from the apices through the diaphragm  without IV contrast. Radiation dose for this scan was reduced using  automated exposure control, adjustment of the mA and/or kV according  to patient size, or iterative reconstruction technique.    COMPARISON: CT chest 11/26/2018.    FINDINGS: Unenhanced scanning cannot assess for pulmonary embolism as  was previously documented. There is a small to moderate right pleural  effusion that is smaller in volume. Enlarged right hilar adenopathy is  limited in visualization but appears grossly stable measuring  approximately 3.4 cm series 2 image 28. Enlarged precarinal lymph node  is stable measuring 1.8 x 2 cm series 2 image 26. Lobulated mass at  the inferior mediastinum right paraesophageal location abutting the  atria of the heart is stable measuring 4.8 x 2.6 cm image 47. Other  lymph nodes also appear stable.    Diffuse emphysematous changes bilaterally. There are several bilateral  indeterminate pulmonary nodule that appears stable compared to recent  CT chest from 11/26/2018. However, new multiple nodules along the  periphery of the right lung. One of these adjacent to surgical sutures  at the posterior right lower lobe is 2.5 x 3 cm series 6 image 147.  There are several other examples at the periphery of the right lung  new since older examinations, for example when compared to a CT chest  dated 2/22/2017. There are several left-sided nodules as well without  significant change.    Upper abdomen images show a stable hypodense lesion at the left  hepatic dome measuring approximately 2.3 cm series 2 image 52. Stable  trace pericardial  fluid. No new destructive-appearing bony lesions  identified. Gallstones are noted.      Impression    IMPRESSION:  1. Extensive emphysematous changes again identified.  2. Small to moderate right pleural effusion is smaller in volume.  3. Multiple pulmonary nodules bilaterally. Several of these are new on  the right worrisome for malignancy.  4. Prominent lymph nodes in the mediastinum and right hilum are  worrisome for malignant adenopathy.  5. Previously demonstrated pulmonary embolism cannot be visualized by  unenhanced scanning.  5. Indeterminate hypodense lesion at the left hepatic dome is stable.  6. Cholelithiasis.    ALESSANDRO PEARSON MD         ASSESSMENT / PLAN:     Physical deconditioning  Plan: PT/OT with increase independence, self-care, strength and endurance and other cares that help return to home/facility of origin, fall precautions. Care conference with patient and family for the progress of rehab and disposition issues will be discussed as planned. Rehab evaluation and other evaluations including CPT are at rehab logs, to be reviewed separately.  Fall risk assessment as well as cognitive evaluation will be formed during rehab stay if indicated.    Neuroendocrine carcinoma metastatic to multiple sites (H) and Malignant pleural effusion  Plan: advanced cancer stage IV, received chemotherapy first cycle for palliative, follow up oncologist. Monitor cbc.    Other acute pulmonary embolism without acute cor pulmonale (H) and Chronic obstructive pulmonary disease  Plan: Continue apixaban, monitor CBC.    Pulmonary fibrosis (H) and Pulmonary hypertension (H)  Plan: Continue oxygen, Lasix, digoxin, Pulmicort nebulizers and other inhalers and other care.  Follow pulmonary team as an outpatient    DM type 2, goal HbA1c < 7% (H)  Plan: continue current medications Lantus and NovoLog, diet and monitor BG closely.  Lab Results   Component Value Date    A1C 10.7 11/23/2018    A1C 8.1 03/13/2017    A1C 8.3 02/23/2017      Essential hypertension and CAD  Plan: continue current medications metoprolol, digoxin, aspirin and Crestor, monitor I&O and daily weighs and labs if indicated. Follow up cardiologist and PCP as scheduled.    Other problems with same care. Primary care doctor and other specialists to address those chronic problems in next clinic appointment to be scheduled upon discharge from the TCU.      Total time spent with patient visit was 38 min including patient visit, review of past records, patients counseling and coordinating care.      Electronically signed by:  MD tova Corrigan Abdullahi I. Ahmed, MD

## 2018-12-14 NOTE — LETTER
12/14/2018        RE: Ravi Sandoval  [address]  [facility] [address]        PRIMARY CARE PROVIDER AND CLINIC RESPONSIBLE:  [facility] [address] / [facility] [address]        ADMISSION HISTORY AND PHYSICAL EXAMINATION     Chief Complaint   Patient presents with     Fatigue     Shortness of Breath         HISTORY OF PRESENT ILLNESS:  73 year old male, (1945), [facility] medical care and rehab.  HPI information obtained from: {FGS HPI:[id number]}.    Ravi Sandoval is a 73 year old male with history of ***    Patient is seen and examined by Julio C Dyson/ Mulu Truong NP. Please see FREEDOM Dyson's/FREEDOM Truong's admit noted dated *** for details of admission, past medical history, family history, allergies, medication list, social history and other details pertinent with this admission. Hospital admission and dc summary reviewed.      Past Medical History:   Diagnosis Date     ACP (advance care planning)      Anemia      ASHD (arteriosclerotic heart disease)      BPH (benign prostatic hyperplasia)      Cardiac arrest (H)      Cataracts, bilateral      Chronic diarrhea      Chronic respiratory failure with hypoxia (H)      COPD (chronic obstructive pulmonary disease) (H)     HOME O2     Depression      Disease of lung      Dysplasia of prostate      Gastroesophageal reflux disease      General weakness      Heart attack (H)      Herpes zoster      Histiocytosis X (H)      Hyperlipidemia      Hypertension      Hypothyroidism      Oxygen dependent      Postinflammatory pulmonary fibrosis (H)      Prostatic hypertrophy, benign      Pulmonary HTN (H)      Pulmonary hypertensive venous disease (H)      Thyroid disease      Tobacco use      Type 2 diabetes mellitus without complications (H)        Past Surgical History:   Procedure Laterality Date     BIOPSY, LUNG NODULE       CARDIAC SURGERY      HX OF STENT     COLONOSCOPY N/A 5/27/2016     Procedure: COMBINED COLONOSCOPY, SINGLE OR MULTIPLE BIOPSY/POLYPECTOMY BY BIOPSY;  Surgeon: Sera oCffman MD;  Location: [facility]     PHACOEMULSIFICATION CLEAR CORNEA WITH STANDARD INTRAOCULAR LENS IMPLANT Right 5/31/2017    Procedure: PHACOEMULSIFICATION CLEAR CORNEA WITH STANDARD INTRAOCULAR LENS IMPLANT;  RIGHT EYE PHACOEMULSIFICATION CLEAR CORNEA WITH STANDARD INTRAOCULAR LENS IMPLANT ;  Surgeon: Trever Delgado MD;  Location:  [facility]     PHACOEMULSIFICATION CLEAR CORNEA WITH STANDARD INTRAOCULAR LENS IMPLANT Left 6/7/2017    Procedure: PHACOEMULSIFICATION CLEAR CORNEA WITH STANDARD INTRAOCULAR LENS IMPLANT;  LEFT EYE PHACOEMULSIFICATION CLEAR CORNEA WITH STANDARD INTRAOCULAR LENS IMPLANT ;  Surgeon: Trever Delgado MD;  Location: [facility]       Current Outpatient Medications   Medication Sig     acetaminophen (TYLENOL) 325 MG tablet Take 650 mg by mouth every 6 hours as needed for mild pain     albuterol (PROVENTIL) (2.5 MG/3ML) 0.083% neb solution Take 1 vial (2.5 mg) by nebulization once as needed (refractory bronchospasm associated with hypersensitivity)     amLODIPine (NORVASC) 2.5 MG tablet Take 2.5 mg by mouth daily     apixaban ANTICOAGULANT (ELIQUIS) 5 MG tablet Take 1 tablet (5 mg) by mouth 2 times daily     ASPIRIN PO Take 81 mg by mouth At Bedtime     budesonide (PULMICORT) 0.5 MG/2ML neb solution Take 2 mLs (0.5 mg) by nebulization 2 times daily     Colchicine (COLCRYS PO) Take 0.6 mg by mouth daily TAKES IN THE EVENING     digoxin (LANOXIN) 125 MCG tablet Take 1 tablet (125 mcg) by mouth daily     Filgrastim (NEUPOGEN) 300 MCG/0.5ML SOSY syringe Inject 0.5 mLs (300 mcg) Subcutaneous daily for 7 days     FLUoxetine HCl (PROZAC PO) Take 40 mg by mouth daily     furosemide (LASIX) 20 MG tablet Take 1 tablet (20 mg) by mouth daily     insulin aspart (NOVOLOG VIAL) 100 UNITS/ML vial Give before meals and before bed:  For Pre-Meal Glucose:  140-189 give 1 unit   190-239 give 2 units   240-289 give 3  units   290-339 give 4 units   = or >340 give 5 units     For Bedtime Glucose  200 - 239 give 1 units   240 - 289 give 1.5 units  290 - 339 give 2 units  = or >340 give 2.5 units     insulin glargine (LANTUS SOLOSTAR PEN) 100 UNIT/ML pen Inject 10 Units Subcutaneous 2 times daily     ipratropium - albuterol 0.5 mg/2.5 mg/3 mL (DUONEB) 0.5-2.5 (3) MG/3ML neb solution Take 1 vial by nebulization 3 times daily as needed for shortness of breath / dyspnea, wheezing or other (use up to 3 times a day for shortness of breath, cough)     levothyroxine (SYNTHROID/LEVOTHROID) 125 MCG tablet Take 125 mcg by mouth daily     LISINOPRIL PO Take 20 mg by mouth daily     loperamide (IMODIUM) 2 MG capsule Take 1 capsule (2 mg) by mouth 4 times daily as needed for diarrhea     metoprolol (TOPROL-XL) 25 MG 24 hr tablet Take 1 tablet (25 mg) by mouth 2 times daily     multivitamin w/minerals (THERA-VIT-M) tablet Take 1 tablet by mouth daily     OMEPRAZOLE PO Take 40 mg by mouth daily     ondansetron (ZOFRAN-ODT) 4 MG ODT tab Take 1 tablet (4 mg) by mouth every 6 hours as needed for nausea or vomiting     phenol (CHLORASEPTIC) 1.4 % spray Take 1 spray (1 mL) by mouth every hour as needed for sore throat (without fever)     predniSONE (DELTASONE) 10 MG tablet 4 tabs daily for 2 days, then 3 tabs daily for 2 days, then 2 tabs daily for 2 days, then 1 tab daily to be continued.     rosuvastatin (CRESTOR) 10 MG tablet Take 1 tablet (10 mg) by mouth daily     tadalafil (CIALIS) 20 MG tablet Take 20 mg by mouth daily For pulmonary hypertension.     tamsulosin (FLOMAX) 0.4 MG capsule Take 1 capsule (0.4 mg) by mouth daily     nystatin (MYCOSTATIN) 353356 UNIT/ML suspension Take 5 mLs (500,000 Units) by mouth 4 times daily for 10 days (Patient not taking: Reported on 12/12/2018)     No current facility-administered medications for this visit.        Allergies   Allergen Reactions     Tetracycline        Social History     Socioeconomic History  "    Marital status:      Spouse name: Not on file     Number of children: Not on file     Years of education: Not on file     Highest education level: Not on file   Social Needs     Financial resource strain: Not on file     Food insecurity - worry: Not on file     Food insecurity - inability: Not on file     Transportation needs - medical: Not on file     Transportation needs - non-medical: Not on file   Occupational History     Not on file   Tobacco Use     Smoking status: Current Every Day Smoker     Packs/day: 0.50     Years: 55.00     Pack years: 27.50     Types: Cigarettes     Smokeless tobacco: Never Used   Substance and Sexual Activity     Alcohol use: No     Alcohol/week: 0.0 oz     Drug use: No     Sexual activity: Not on file   Other Topics Concern     Parent/sibling w/ CABG, MI or angioplasty before 65F 55M? Not Asked      Service Not Asked     Blood Transfusions Not Asked     Caffeine Concern Yes     Comment: 4 cups coffee/day     Occupational Exposure Not Asked     Hobby Hazards Not Asked     Sleep Concern Yes     Stress Concern Not Asked     Weight Concern Not Asked     Special Diet No     Back Care Not Asked     Exercise No     Bike Helmet Not Asked     Seat Belt Yes     Self-Exams Not Asked   Social History Narrative     Not on file          Information reviewed:  Medications, vital signs, orders, nursing notes, problem list, hospital information.     ROS: All 10 point review of system completed, those pertinent positive, please see H&P, the remaining ROS is negative.    /90   Pulse 59   Temp 95.7  F (35.4  C)   Resp 16   Ht 1.664 m (5' 5.5\")   Wt 62.6 kg (138 lb)   SpO2 90%   BMI 22.62 kg/m       PHYSICAL EXAMINATION: ***  GENERAL:  No acute distress.  SKIN:  Dry and warm.  There is no rash at area of skin examined.  HEENT:  Head without trauma.  Pupils round, reactive. Exam of conjunctiva and lids are normal. Sclera without icterus. There is no oral thrush.  NECK:  " Supple. No jugular venous distension.  CHEST: No reproducible chest tenderness.   LUNGS:  Normal respiratory effort. Lungs reveal decreased breath sounds at bases. No rales or wheezes.  HEART:  Regular rate and rhythm.  No murmur, gallops or rubs auscultated.  ABDOMEN:  Soft, bowel sounds positive.  There is no tenderness or guarding.   EXTREMITIES: No edema.   NEUROLOGIC:  Alert and oriented x3.  There is no focal deficit appreciated.  PSYCH: Recent and remote memory is normal. Mood and affect are normal.    Lab/Diagnostic data:  Reviewed  ***    ASSESSMENT / PLAN:  Data Unavailable        Total time spent with patient visit was *** min including patient visit, review of past records, patients counseling and coordinating care.      Electronically signed by:  Maynor Rodriguez MD            PRIMARY CARE PROVIDER AND CLINIC RESPONSIBLE:  [facility] [address] / [place] [address]        ADMISSION HISTORY AND PHYSICAL EXAMINATION     Chief Complaint   Patient presents with     Fatigue     Shortness of Breath         HISTORY OF PRESENT ILLNESS:  73 year old male, (1945), [facility] and [facility].  HPI information obtained from: facility chart records, facility staff, patient report, [facility] chart review and Care Everywhere [facility] chart review.    Ravi Sandoval is a 73 year old male with history of histiocytosis X, pulmonary fibrosis, severe pulmonary hypertension and COPD/Emphysema, who is chronically oxygen dependent on 4-6 liters , hypertension, hypothyroidism, coronary artery disease and DM type II who was admitted at [facility] from 11/23 to 12/11/18 due to worsening dyspnea.  He was found to have acute on chronic hypoxic respiratory failure.  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 CTA chest PE protocol done on  11/26,  large right pleural effusion, non occlusive thrombus and pleural based densities. Completed a course of Levaquin on 12/3.  Oncology team has administered palliative chemotherapy, completed with Carboplatin and Etoposide and decardon 12/6 - 12/8.8.  He had pancytopenia.  With his leukopenia he received Neupogen therapy.  He underwent right thoracentesis on 11/30/2018 with removal of 1500 mL of pleural fluid which was exudative.  Pleural fluid culture was negative but cytology confirmed high-grade neuroendocrine tumor.  He also found to have an pulmonary embolism.  He was treated with IV heparin drip initially and later switched with apixaban.  He had a thrombocytopenia on chronically, closely monitored his platelets while he is on aspirin and apixaban.  He has known severe pulmonary hypertension with pulmonary fibrosis.  Patient was treated with Lasix, digoxinand tadalafil.  Feels weak and tired he has shortness of breath at rest and with minimal activity.  He is on oxygen with Oxymizer.  He has a right chest discomfort with pleuritic chest pain.  Slept well, appetite good and had BM last night. Patient denies abdominal pain, n&v, fever, chills, dysuria, leg pain or swelling. The rest of ROS is unremarkable. Patient is seen and examined by Mulu Truong NP. Please see FREEDOM Truong's admit noted dated 12/12/18 for details of admission, past medical history, family history, allergies, medication list, social history and other details pertinent with this admission. Hospital admission and dc summary reviewed.      Past Medical History:   Diagnosis Date     ACP (advance care planning)      Anemia      ASHD (arteriosclerotic heart disease)      BPH (benign prostatic hyperplasia)      Cardiac arrest (H)      Cataracts, bilateral      Chronic diarrhea      Chronic respiratory failure with hypoxia (H)      COPD (chronic obstructive pulmonary disease) (H)     HOME O2     Depression      Disease of lung      Dysplasia of  prostate      Gastroesophageal reflux disease      General weakness      Heart attack (H)      Herpes zoster      Histiocytosis X (H)      Hyperlipidemia      Hypertension      Hypothyroidism      Oxygen dependent      Postinflammatory pulmonary fibrosis (H)      Prostatic hypertrophy, benign      Pulmonary HTN (H)      Pulmonary hypertensive venous disease (H)      Thyroid disease      Tobacco use      Type 2 diabetes mellitus without complications (H)        Past Surgical History:   Procedure Laterality Date     BIOPSY, LUNG NODULE       CARDIAC SURGERY      HX OF STENT     COLONOSCOPY N/A 5/27/2016    Procedure: COMBINED COLONOSCOPY, SINGLE OR MULTIPLE BIOPSY/POLYPECTOMY BY BIOPSY;  Surgeon: Sera Coffman MD;  [facility]     PHACOEMULSIFICATION CLEAR CORNEA WITH STANDARD INTRAOCULAR LENS IMPLANT Right 5/31/2017    Procedure: PHACOEMULSIFICATION CLEAR CORNEA WITH STANDARD INTRAOCULAR LENS IMPLANT;  RIGHT EYE PHACOEMULSIFICATION CLEAR CORNEA WITH STANDARD INTRAOCULAR LENS IMPLANT ;  Surgeon: Trever Delgado MD;  Location: [facility]     PHACOEMULSIFICATION CLEAR CORNEA WITH STANDARD INTRAOCULAR LENS IMPLANT Left 6/7/2017    Procedure: PHACOEMULSIFICATION CLEAR CORNEA WITH STANDARD INTRAOCULAR LENS IMPLANT;  LEFT EYE PHACOEMULSIFICATION CLEAR CORNEA WITH STANDARD INTRAOCULAR LENS IMPLANT ;  Surgeon: Trever Delgado MD;  Location: [facility]       Current Outpatient Medications   Medication Sig     acetaminophen (TYLENOL) 325 MG tablet Take 650 mg by mouth every 6 hours as needed for mild pain     albuterol (PROVENTIL) (2.5 MG/3ML) 0.083% neb solution Take 1 vial (2.5 mg) by nebulization once as needed (refractory bronchospasm associated with hypersensitivity)     amLODIPine (NORVASC) 2.5 MG tablet Take 2.5 mg by mouth daily     apixaban ANTICOAGULANT (ELIQUIS) 5 MG tablet Take 1 tablet (5 mg) by mouth 2 times daily     ASPIRIN PO Take 81 mg by mouth At Bedtime     budesonide (PULMICORT) 0.5 MG/2ML neb solution  Take 2 mLs (0.5 mg) by nebulization 2 times daily     Colchicine (COLCRYS PO) Take 0.6 mg by mouth daily TAKES IN THE EVENING     digoxin (LANOXIN) 125 MCG tablet Take 1 tablet (125 mcg) by mouth daily     Filgrastim (NEUPOGEN) 300 MCG/0.5ML SOSY syringe Inject 0.5 mLs (300 mcg) Subcutaneous daily for 7 days     FLUoxetine HCl (PROZAC PO) Take 40 mg by mouth daily     furosemide (LASIX) 20 MG tablet Take 1 tablet (20 mg) by mouth daily     insulin aspart (NOVOLOG VIAL) 100 UNITS/ML vial Give before meals and before bed:  For Pre-Meal Glucose:  140-189 give 1 unit   190-239 give 2 units   240-289 give 3 units   290-339 give 4 units   = or >340 give 5 units     For Bedtime Glucose  200 - 239 give 1 units   240 - 289 give 1.5 units  290 - 339 give 2 units  = or >340 give 2.5 units     insulin glargine (LANTUS SOLOSTAR PEN) 100 UNIT/ML pen Inject 10 Units Subcutaneous 2 times daily     ipratropium - albuterol 0.5 mg/2.5 mg/3 mL (DUONEB) 0.5-2.5 (3) MG/3ML neb solution Take 1 vial by nebulization 3 times daily as needed for shortness of breath / dyspnea, wheezing or other (use up to 3 times a day for shortness of breath, cough)     levothyroxine (SYNTHROID/LEVOTHROID) 125 MCG tablet Take 125 mcg by mouth daily     LISINOPRIL PO Take 20 mg by mouth daily     loperamide (IMODIUM) 2 MG capsule Take 1 capsule (2 mg) by mouth 4 times daily as needed for diarrhea     metoprolol (TOPROL-XL) 25 MG 24 hr tablet Take 1 tablet (25 mg) by mouth 2 times daily     multivitamin w/minerals (THERA-VIT-M) tablet Take 1 tablet by mouth daily     OMEPRAZOLE PO Take 40 mg by mouth daily     ondansetron (ZOFRAN-ODT) 4 MG ODT tab Take 1 tablet (4 mg) by mouth every 6 hours as needed for nausea or vomiting     phenol (CHLORASEPTIC) 1.4 % spray Take 1 spray (1 mL) by mouth every hour as needed for sore throat (without fever)     predniSONE (DELTASONE) 10 MG tablet 4 tabs daily for 2 days, then 3 tabs daily for 2 days, then 2 tabs daily for 2  days, then 1 tab daily to be continued.     rosuvastatin (CRESTOR) 10 MG tablet Take 1 tablet (10 mg) by mouth daily     tadalafil (CIALIS) 20 MG tablet Take 20 mg by mouth daily For pulmonary hypertension.     tamsulosin (FLOMAX) 0.4 MG capsule Take 1 capsule (0.4 mg) by mouth daily     nystatin (MYCOSTATIN) 356001 UNIT/ML suspension Take 5 mLs (500,000 Units) by mouth 4 times daily for 10 days (Patient not taking: Reported on 12/12/2018)     No current facility-administered medications for this visit.        Allergies   Allergen Reactions     Tetracycline        Social History     Socioeconomic History     Marital status:      Spouse name: Not on file     Number of children: Not on file     Years of education: Not on file     Highest education level: Not on file   Social Needs     Financial resource strain: Not on file     Food insecurity - worry: Not on file     Food insecurity - inability: Not on file     Transportation needs - medical: Not on file     Transportation needs - non-medical: Not on file   Occupational History     Not on file   Tobacco Use     Smoking status: Current Every Day Smoker     Packs/day: 0.50     Years: 55.00     Pack years: 27.50     Types: Cigarettes     Smokeless tobacco: Never Used   Substance and Sexual Activity     Alcohol use: No     Alcohol/week: 0.0 oz     Drug use: No     Sexual activity: Not on file   Other Topics Concern     Parent/sibling w/ CABG, MI or angioplasty before 65F 55M? Not Asked      Service Not Asked     Blood Transfusions Not Asked     Caffeine Concern Yes     Comment: 4 cups coffee/day     Occupational Exposure Not Asked     Hobby Hazards Not Asked     Sleep Concern Yes     Stress Concern Not Asked     Weight Concern Not Asked     Special Diet No     Back Care Not Asked     Exercise No     Bike Helmet Not Asked     Seat Belt Yes     Self-Exams Not Asked   Social History Narrative     Not on file          Information reviewed:  Medications, vital  "signs, orders, nursing notes, problem list, hospital information.     ROS: All 10 point review of system completed, those pertinent positive, please see H&P, the remaining ROS is negative.    /90   Pulse 59   Temp 95.7  F (35.4  C)   Resp 16   Ht 1.664 m (5' 5.5\")   Wt 62.6 kg (138 lb)   SpO2 90%   BMI 22.62 kg/m       PHYSICAL EXAMINATION:   GENERAL:  No acute distress.  SKIN:  Dry and warm.  There is no rash at area of skin examined.  HEENT:  Head without trauma.  Pupils round, reactive. Exam of conjunctiva and lids are normal. Sclera without icterus. There is oral thrush.  NECK:  Supple. No jugular venous distension.  CHEST: No reproducible chest tenderness.   LUNGS:  Normal respiratory effort. Lungs reveal decreased breath sounds diffusely.  With absent breath sounds at right base.  No rales or wheezes.  HEART:  Regular rate and rhythm.  No murmur, gallops or rubs auscultated.  ABDOMEN:  Soft, bowel sounds positive.  There is no tenderness or guarding.   EXTREMITIES: No edema.   NEUROLOGIC:  Alert and oriented x3. Moving all ext. Gait not tested.  PSYCH: Recent and remote memory is normal. Mood and affect are normal.    Lab/Diagnostic data:  Reviewed    CBC RESULTS:   Recent Labs   Lab Test 12/13/18   WBC 16.8*   RBC 3.87*   HGB 11.0*   HCT 33.4*   MCV 86   MCH 28.4   MCHC 32.9   RDW 17*   *     Last Comprehensive Metabolic Panel:  Sodium   Date Value Ref Range Status   12/13/2018 139 133 - 144 mmol/L Final     Potassium   Date Value Ref Range Status   12/13/2018 3.5 3.4 - 5.3 mmol/L Final     Chloride   Date Value Ref Range Status   12/13/2018 105 94 - 109 mmol/L Final     Carbon Dioxide   Date Value Ref Range Status   12/13/2018 25 20 - 32 mmol/L Final     Anion Gap   Date Value Ref Range Status   12/13/2018 9 3 - 14 mmol/L Final     Glucose   Date Value Ref Range Status   12/13/2018 71 70 - 99 mg/dL Final     Urea Nitrogen   Date Value Ref Range Status   12/13/2018 22 7 - 30 mg/dL Final "     Creatinine   Date Value Ref Range Status   12/13/2018 0.74 (A) 0.66 - 1.25 mg/dL Final     GFR Estimate   Date Value Ref Range Status   12/13/2018 >90 >60 mL/min/1.7m2 Final     Calcium   Date Value Ref Range Status   12/13/2018 7.5 7.5 - 10.1 mg/dL Final     Bilirubin Total   Date Value Ref Range Status   12/11/2018 0.5 0.2 - 1.3 mg/dL Final     Alkaline Phosphatase   Date Value Ref Range Status   12/11/2018 53 40 - 150 U/L Final     ALT   Date Value Ref Range Status   12/11/2018 29 0 - 70 U/L Final     AST   Date Value Ref Range Status   12/11/2018 22 0 - 45 U/L Final     Results for orders placed or performed during the hospital encounter of 11/23/18   XR Chest 2 Views    Narrative    CHEST TWO VIEWS 11/23/2018 11:08 AM     HISTORY: Shortness of breath and possible pneumonia.     COMPARISON: 3/12/2017    FINDINGS: Superimposed on fibrotic-appearing lung abnormalities is  abnormal opacity in the right lower lobe suggestive of developing  pneumonia. Trace right pleural effusion. No pneumothorax. Heart size  normal.       Impression    IMPRESSION: Right lower lobe pneumonia superimposed on  fibrotic-appearing lungs.     YANG DIAZ MD   CT Chest Pulmonary Embolism w Contrast     Value    Radiologist flags Pulmonary embolism (AA)    Narrative    CT CHEST PULMONARY EMBOLISM WITH CONTRAST November 26, 2018 10:33 AM     HISTORY: Worsening shortness of breath and hypoxia.    COMPARISON: None.    TECHNIQUE: Volumetric helical acquisition of CT images of the chest  from the lung apices to the kidneys were acquired after the  administration of  59mL Isovue-370  IV contrast. Radiation dose for  this scan was reduced using automated exposure control, adjustment of  the mA and/or kV according to patient size, or iterative  reconstruction technique.    FINDINGS: There is a nonocclusive thrombus in a right lower lobe  pulmonary artery. No other definite pulmonary embolism demonstrated.  There is now a moderate to large  right pleural effusion. No  significant left pleural fluid. No pericardial effusion. Multiple  nodular densities are seen in the major fissure and along the  pericardium. These are new since the comparison study and could  represent metastatic disease. A probable right hilar lymph node is  also present measuring 3.5 x 2.1 cm. This representatives a mass along  the pericardium and measures 2.0 x 1.4 cm. A prominent mass in the  posterior mediastinum/azygos esophageal recess is noted measuring 5.6  x 2.9 cm. The heart is not enlarged. Thoracic aorta is atherosclerotic  without evidence of dissection or aneurysm. There are moderate  coronary vascular calcifications consistent with coronary artery  disease.  There is no pneumothorax. Adrenal glands are normal.  Remainder of the visualized upper abdomen is unremarkable.      Impression    IMPRESSION:   1. A single nonocclusive thrombus is seen in the right lower lobe.  2. No thoracic aortic dissection or aneurysm.   3. Moderate to large right pleural effusion and multiple pleural-based  masses concerning for malignancy.    [Critical Result: Pulmonary embolism]    Finding was identified on 11/26/2018 11:07 AM.     The nurse on the floor, Kayy, was contacted for Dr. CANELO KIRKPATRICK~[id number] JOSE DENT at 11/26/2018  11:15 AM and verbalized understanding of the critical finding.     MIRANDA GIBSON MD   US Thoracentesis    Narrative    ULTRASOUND-GUIDED THORACENTESIS  11/26/2018 2:01 PM     HISTORY: Shortness of breath, hypoxic with large pleural effusion on  right side.     FINDINGS: Ultrasound was used to evaluate for the presence and best  approach for drainage of a pleural effusion. Written and oral informed  consent was obtained. A pause for the cause procedure to verify the  correct patient and correct procedure. The skin overlying the right  chest posteriorly was prepped and draped in the usual sterile fashion.  The subcutaneous tissues  were anesthetized with 10 mL 1% lidocaine. A  catheter was advanced into the pleural space and 1500 mL of ivonne  colored fluid was drained. Patient was monitored by nurse under my  direct supervision throughout the exam. Ultrasound images were  permanently stored.  There were no immediate complications. Patient  left the ultrasound suite in satisfactory condition.      Impression    IMPRESSION: Technically successful thoracentesis without immediate  complications.    MIRANDA GIBSON MD   XR Chest 1 View    Narrative    CHEST ONE VIEW November 26, 2018 2:23 PM     HISTORY: Pleural effusion, post thoracentesis.     COMPARISON: 11/23/2018.      Impression    IMPRESSION: No pneumothorax or pleural effusion on either side.  Chronic appearing interstitial abnormalities are present. Heart size  normal.    YANG DIAZ MD   CT Chest w/o Contrast    Narrative    CT CHEST WITHOUT CONTRAST December 10, 2018 9:59 AM    HISTORY: Shortness of breath, worsening hypoxia despite chemo and  anticoagulation.     TECHNIQUE: Scans obtained from the apices through the diaphragm  without IV contrast. Radiation dose for this scan was reduced using  automated exposure control, adjustment of the mA and/or kV according  to patient size, or iterative reconstruction technique.    COMPARISON: CT chest 11/26/2018.    FINDINGS: Unenhanced scanning cannot assess for pulmonary embolism as  was previously documented. There is a small to moderate right pleural  effusion that is smaller in volume. Enlarged right hilar adenopathy is  limited in visualization but appears grossly stable measuring  approximately 3.4 cm series 2 image 28. Enlarged precarinal lymph node  is stable measuring 1.8 x 2 cm series 2 image 26. Lobulated mass at  the inferior mediastinum right paraesophageal location abutting the  atria of the heart is stable measuring 4.8 x 2.6 cm image 47. Other  lymph nodes also appear stable.    Diffuse emphysematous changes bilaterally. There  are several bilateral  indeterminate pulmonary nodule that appears stable compared to recent  CT chest from 11/26/2018. However, new multiple nodules along the  periphery of the right lung. One of these adjacent to surgical sutures  at the posterior right lower lobe is 2.5 x 3 cm series 6 image 147.  There are several other examples at the periphery of the right lung  new since older examinations, for example when compared to a CT chest  dated 2/22/2017. There are several left-sided nodules as well without  significant change.    Upper abdomen images show a stable hypodense lesion at the left  hepatic dome measuring approximately 2.3 cm series 2 image 52. Stable  trace pericardial fluid. No new destructive-appearing bony lesions  identified. Gallstones are noted.      Impression    IMPRESSION:  1. Extensive emphysematous changes again identified.  2. Small to moderate right pleural effusion is smaller in volume.  3. Multiple pulmonary nodules bilaterally. Several of these are new on  the right worrisome for malignancy.  4. Prominent lymph nodes in the mediastinum and right hilum are  worrisome for malignant adenopathy.  5. Previously demonstrated pulmonary embolism cannot be visualized by  unenhanced scanning.  5. Indeterminate hypodense lesion at the left hepatic dome is stable.  6. Cholelithiasis.    ALESSANDRO PEARSON MD         ASSESSMENT / PLAN:     Physical deconditioning  Plan: PT/OT with increase independence, self-care, strength and endurance and other cares that help return to home/facility of origin, fall precautions. Care conference with patient and family for the progress of rehab and disposition issues will be discussed as planned. Rehab evaluation and other evaluations including CPT are at rehab logs, to be reviewed separately.  Fall risk assessment as well as cognitive evaluation will be formed during rehab stay if indicated.    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 Monitor cbc.    Other acute pulmonary embolism without acute cor pulmonale (H) and Chronic obstructive pulmonary disease  Plan: Continue apixaban, monitor CBC.    Pulmonary fibrosis (H) and Pulmonary hypertension (H)  Plan: Continue oxygen, Lasix, digoxin, Pulmicort nebulizers and other inhalers and other care.  Follow pulmonary team as an outpatient    DM type 2, goal HbA1c < 7% (H)  Plan: continue current medications Lantus and NovoLog, diet and monitor BG closely.  Lab Results   Component Value Date    A1C 10.7 11/23/2018    A1C 8.1 03/13/2017    A1C 8.3 02/23/2017     Essential hypertension and CAD  Plan: continue current medications metoprolol, digoxin, aspirin and Crestor, monitor I&O and daily weighs and labs if indicated. Follow up cardiologist and PCP as scheduled.    Other problems with same care. Primary care doctor and other specialists to address those chronic problems in next clinic appointment to be scheduled upon discharge from the TCU.      Total time spent with patient visit was 38 min including patient visit, review of past records, patients counseling and coordinating care.      Electronically signed by:  Maynor Rodriguez MD              Sincerely,        Maynor Rodriguez MD

## 2018-12-18 NOTE — PROGRESS NOTES
[facility]    Chief Complaint   Patient presents with     RECHECK       [facility] Record Number:  [id number]  Place of Service where encounter took place:  [facility] [[id number]]    HPI:    Ravi Sandoval is a 73 year old  (1945), who is being seen today for an episodic care visit.  HPI information obtained from: facility chart records, facility staff, patient report and [facility] chart review.  Current issues are:      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)  Chronic obstructive pulmonary disease with acute exacerbation (H)  Acute respiratory distress  Pulmonary hypertension (H)  Pulmonary fibrosis (H)  Other acute pulmonary embolism without acute cor pulmonale (H)  Patient transferred to TCU after hospitalization due to shortness of breath and hypoxia. Patient is a current smoker with h/o pulmonary fibrosis, COPD- oxygen dependent, and pulmonary hypertension. During course of hospital stay patient was found to have large pleural effusion on right side that was drained on 11/26, non occlusive pulmonary embolism (eliquis started 1/23 ). He was treated for RLL pneumonia (completed on 12/3) and COPD exacerbation (steroids, duonebs). 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 He was evaluated by oncology. He completed a course of palliative chemotherapy (carboplatin and etoposide)  on 12/8.     Type 2 diabetes mellitus with complication, with long-term current use of insulin (H)  Prior to hospitalization patient was taking glipizide. He did require insulin while in patient, likely due to steroids.   Last BG Levels:  AM: 105, 96, 65  Noon: 131, 241, 229  Dinner:395, 285, 413  HS: 272, 286, 441  chf- he did requires additional diuresis in hospital. Now taking lasix 20mg every day.   Coronary artery disease involving native coronary artery of native heart without angina pectoris  H/o bare metal stent in 2010.   He continues on ASA.    Benign essential hypertension  BP's in TCU: 161/83, 120/69, 128/82  He continues on  PTA amodipine 2.5 mg daily, lisinopril 20 mg daily and metoprolol 25 mg twice daily.  Other specified hypothyroidism  Taking PTA synthroid  Physical deconditioning  Lives with wife in apartment. Notes from hospital SW indicate wife is unable to care for patient at this time. He has been independent prior to admission but not very active. He was driving.         ALLERGIES: Tetracycline  Past Medical, Surgical, Family and Social History reviewed and updated in [facility].    Current Outpatient Medications   Medication Sig Dispense Refill     acetaminophen (TYLENOL) 325 MG tablet Take 650 mg by mouth every 6 hours as needed for mild pain       albuterol (PROVENTIL) (2.5 MG/3ML) 0.083% neb solution Take 1 vial (2.5 mg) by nebulization once as needed (refractory bronchospasm associated with hypersensitivity)  0     amLODIPine (NORVASC) 2.5 MG tablet Take 2.5 mg by mouth daily       apixaban ANTICOAGULANT (ELIQUIS) 5 MG tablet Take 1 tablet (5 mg) by mouth 2 times daily 180 tablet 0     ASPIRIN PO Take 81 mg by mouth At Bedtime       budesonide (PULMICORT) 0.5 MG/2ML neb solution Take 2 mLs (0.5 mg) by nebulization 2 times daily 180 ampule 0     Colchicine (COLCRYS PO) Take 0.6 mg by mouth daily TAKES IN THE EVENING       digoxin (LANOXIN) 125 MCG tablet Take 1 tablet (125 mcg) by mouth daily 90 tablet 3     Filgrastim (NEUPOGEN) 300 MCG/0.5ML SOSY syringe Inject 0.5 mLs (300 mcg) Subcutaneous daily for 7 days 45 mL 0     FLUoxetine HCl (PROZAC PO) Take 40 mg by mouth daily       furosemide (LASIX) 20 MG tablet Take 1 tablet (20 mg) by mouth daily 14 tablet 0     insulin aspart (NOVOLOG VIAL) 100 UNITS/ML vial Give before meals and before bed:  For Pre-Meal Glucose:  140-189 give 1 unit   190-239 give 2 units   240-289 give 3 units   290-339 give 4 units   = or >340 give 5 units     For Bedtime Glucose  200 - 239 give 1 units   240 - 289 give  1.5 units  290 - 339 give 2 units  = or >340 give 2.5 units 10 mL 0     insulin glargine (LANTUS SOLOSTAR PEN) 100 UNIT/ML pen Inject 10 Units Subcutaneous 2 times daily 3 mL 0     ipratropium - albuterol 0.5 mg/2.5 mg/3 mL (DUONEB) 0.5-2.5 (3) MG/3ML neb solution Take 1 vial by nebulization 3 times daily as needed for shortness of breath / dyspnea, wheezing or other (use up to 3 times a day for shortness of breath, cough)       levothyroxine (SYNTHROID/LEVOTHROID) 125 MCG tablet Take 125 mcg by mouth daily       LISINOPRIL PO Take 20 mg by mouth daily       loperamide (IMODIUM) 2 MG capsule Take 1 capsule (2 mg) by mouth 4 times daily as needed for diarrhea 20 capsule      metoprolol (TOPROL-XL) 25 MG 24 hr tablet Take 1 tablet (25 mg) by mouth 2 times daily 180 tablet 0     multivitamin w/minerals (THERA-VIT-M) tablet Take 1 tablet by mouth daily 90 tablet 3     OMEPRAZOLE PO Take 40 mg by mouth daily       ondansetron (ZOFRAN-ODT) 4 MG ODT tab Take 1 tablet (4 mg) by mouth every 6 hours as needed for nausea or vomiting       phenol (CHLORASEPTIC) 1.4 % spray Take 1 spray (1 mL) by mouth every hour as needed for sore throat (without fever)       rosuvastatin (CRESTOR) 10 MG tablet Take 1 tablet (10 mg) by mouth daily 90 tablet 3     tadalafil (CIALIS) 20 MG tablet Take 20 mg by mouth daily For pulmonary hypertension.       tamsulosin (FLOMAX) 0.4 MG capsule Take 1 capsule (0.4 mg) by mouth daily 30 capsule 3     nystatin (MYCOSTATIN) 003347 UNIT/ML suspension Take 5 mLs (500,000 Units) by mouth 4 times daily for 10 days (Patient not taking: Reported on 12/12/2018) 200 mL 0     Medications reviewed:  Medications reconciled to facility chart and changes were made to reflect current medications as identified as above med list. Below are the changes that were made:   Medications stopped since last EPIC medication reconciliation:   Medications Discontinued During This Encounter   Medication Reason     predniSONE  "(DELTASONE) 10 MG tablet Medication Reconciliation Clean Up       Medications started since last [facility] medication reconciliation:  No orders of the defined types were placed in this encounter.      REVIEW OF SYSTEMS:  4 point ROS including Respiratory, CV, GI and , other than that noted in the HPI,  is negative    Physical Exam:  /83   Pulse 58   Temp 97.3  F (36.3  C)   Resp 16   Ht 1.689 m (5' 6.5\")   Wt 62.9 kg (138 lb 9.6 oz)   SpO2 94%   BMI 22.04 kg/m    GENERAL APPEARANCE:  Alert, in no distress  ENT:  Mouth and posterior oropharynx normal, moist mucous membranes, hearing acuity adequate   EYES:  EOM, conjunctivae, lids, pupils and irises normal  RESP:  respiratory effort and palpation of chest normal, no respiratory distress, Lung sounds faint rales in LLL.   CV:  Palpation and auscultation of heart done , rate and rhythm reg, no murmur, no rub or gallop, Edema none  ABDOMEN:  normal bowel sounds, soft, nontender, no hepatosplenomegaly or other masses  M/S:   Gait and station not observed, Digits and nails normal   SKIN:  Inspection/Palpation of skin and subcutaneous tissue scattered bruises on arms. Seborrheic dermatoses on back  NEURO: 2-12 in normal limits and at patient's baseline  PSYCH:  insight and judgement, memory intact , affect and mood normal    Recent Labs:   CBC RESULTS:   Recent Labs   Lab Test 12/14/18 12/13/18 12/11/18  0733   WBC 7.5 16.8* 36.1*   RBC  --  3.87* 4.20*   HGB  --  11.0* 11.9*   HCT  --  33.4* 35.5*   MCV  --  86 85   MCH  --  28.4 28.3   MCHC  --  32.9 33.5   RDW  --  17* 17.0*   * 103* 109*       Last Basic Metabolic Panel:  Recent Labs   Lab Test 12/13/18 12/11/18  0733    136   POTASSIUM 3.5 3.6   CHLORIDE 105 101   AV 7.5 7.9*   CO2 25 25   BUN 22 23   CR 0.74* 0.67   GLC 71 50*       Liver Function Studies -   Recent Labs   Lab Test 12/11/18  0733 12/06/18  0708   PROTTOTAL 5.3* 4.9*   ALBUMIN 2.4* 2.2*   BILITOTAL 0.5 0.3   ALKPHOS 53 41 "   AST 22 38   ALT 29 44       TSH   Date Value Ref Range Status   02/22/2017 7.59 (H) 0.40 - 4.00 mU/L Final   09/04/2014 0.16 (L) 0.40 - 4.00 mU/L Final     Comment:     Effective 7/30/2014, the reference range for this assay has changed to reflect   new instrumentation/methodology.     ]    Lab Results   Component Value Date    A1C 10.7 11/23/2018    A1C 8.1 03/13/2017         Assessment/Plan:  (C34.90) 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)  (primary encounter diagnosis)  Comment: diagnosed during this hospital stay. He did receive a round  Of chemotherapy in hospital. He did meet with palliative care multiple times but at this time asks for full medical treatment. He completed a week of neupogen.   Plan:. CBC w platelets 12/19. If WBC >50,000 will hold neupogen and call oncology (Dr Brownlee)  Follow up with oncology 12/21    (J44.1) Chronic obstructive pulmonary disease with acute exacerbation (H)  (R06.03) Acute respiratory distress  Comment: oxygen dependent. Received steroids in hospital. No cough today. He gets short of breath walking to BR. O2 sats are 87-89% at rest on 8L oxygen.   Plan: duonebs, oxygen, physical therapy, prednisone 10mg q day    (I27.20) Pulmonary hypertension (H)  Comment: taking PTA tadalafil and digoxin  Plan: continue    (J84.10) Pulmonary fibrosis (H)  Comment: due to histiocytosis.       (I26.99) Other acute pulmonary embolism without acute cor pulmonale (H)  Comment: found on chest CT.   Plan: eliquis. CBC w platelets in am    (E11.8,  Z79.4) Type 2 diabetes mellitus with complication, with long-term current use of insulin (H)  Comment: elevated BGs due to steroids  Plan: continue lantus and novolog for now.    (I50.22) Chronic systolic heart failure (H)  (I25.10) Coronary artery disease involving native coronary artery of native heart without angina pectoris  Comment: lasix increased to daily. wt  Stable  Plan: daily wts. BMP in am    (I10) Benign essential hypertension  Comment:  adequate control  Plan: no change      (R53.81) Physical deconditioning  Comment: due to respiratory disease. At this point he gets short of breath walking to BR.   Plan: physical therapy and OCCUPATIONAL THERAPY     Electronically signed by  IRINA Saul CNP

## 2018-12-18 NOTE — LETTER
12/18/2018        RE: Ravi Sandoval  [address]  [facility] [address]        [place] [facility]    Chief Complaint   Patient presents with     RECHECK       [facility] Medical Record Number:  [id number]  Place of Service where encounter took place:  [facility] [[id number]]    HPI:    Ravi Sandoval is a 73 year old  (1945), who is being seen today for an episodic care visit.  HPI information obtained from: facility chart records, facility staff, patient report and [facility] chart review.  Current issues are:      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)  Chronic obstructive pulmonary disease with acute exacerbation (H)  Acute respiratory distress  Pulmonary hypertension (H)  Pulmonary fibrosis (H)  Other acute pulmonary embolism without acute cor pulmonale (H)  Patient transferred to TCU after hospitalization due to shortness of breath and hypoxia. Patient is a current smoker with h/o pulmonary fibrosis, COPD- oxygen dependent, and pulmonary hypertension. During course of hospital stay patient was found to have large pleural effusion on right side that was drained on 11/26, non occlusive pulmonary embolism (eliquis started 1/23 ). He was treated for RLL pneumonia (completed on 12/3) and COPD exacerbation (steroids, duonebs). 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 He was evaluated by oncology. He completed a course of palliative chemotherapy (carboplatin and etoposide)  on 12/8.     Type 2 diabetes mellitus with complication, with long-term current use of insulin (H)  Prior to hospitalization patient was taking glipizide. He did require insulin while in patient, likely due to steroids.   Last BG Levels:  AM: 105, 96, 65  Noon: 131, 241, 229  Dinner:395, 285, 413  HS: 272, 286, 441  chf- he did requires additional diuresis in hospital. Now taking lasix 20mg every day.   Coronary artery disease involving native coronary artery of  native heart without angina pectoris  H/o bare metal stent in 2010.   He continues on ASA.   Benign essential hypertension  BP's in TCU: 161/83, 120/69, 128/82  He continues on  PTA amodipine 2.5 mg daily, lisinopril 20 mg daily and metoprolol 25 mg twice daily.  Other specified hypothyroidism  Taking PTA synthroid  Physical deconditioning  Lives with wife in apartment. Notes from hospital  indicate wife is unable to care for patient at this time. He has been independent prior to admission but not very active. He was driving.         ALLERGIES: Tetracycline  Past Medical, Surgical, Family and Social History reviewed and updated in [facility].    Current Outpatient Medications   Medication Sig Dispense Refill     acetaminophen (TYLENOL) 325 MG tablet Take 650 mg by mouth every 6 hours as needed for mild pain       albuterol (PROVENTIL) (2.5 MG/3ML) 0.083% neb solution Take 1 vial (2.5 mg) by nebulization once as needed (refractory bronchospasm associated with hypersensitivity)  0     amLODIPine (NORVASC) 2.5 MG tablet Take 2.5 mg by mouth daily       apixaban ANTICOAGULANT (ELIQUIS) 5 MG tablet Take 1 tablet (5 mg) by mouth 2 times daily 180 tablet 0     ASPIRIN PO Take 81 mg by mouth At Bedtime       budesonide (PULMICORT) 0.5 MG/2ML neb solution Take 2 mLs (0.5 mg) by nebulization 2 times daily 180 ampule 0     Colchicine (COLCRYS PO) Take 0.6 mg by mouth daily TAKES IN THE EVENING       digoxin (LANOXIN) 125 MCG tablet Take 1 tablet (125 mcg) by mouth daily 90 tablet 3     Filgrastim (NEUPOGEN) 300 MCG/0.5ML SOSY syringe Inject 0.5 mLs (300 mcg) Subcutaneous daily for 7 days 45 mL 0     FLUoxetine HCl (PROZAC PO) Take 40 mg by mouth daily       furosemide (LASIX) 20 MG tablet Take 1 tablet (20 mg) by mouth daily 14 tablet 0     insulin aspart (NOVOLOG VIAL) 100 UNITS/ML vial Give before meals and before bed:  For Pre-Meal Glucose:  140-189 give 1 unit   190-239 give 2 units   240-289 give 3 units   290-339 give 4  units   = or >340 give 5 units     For Bedtime Glucose  200 - 239 give 1 units   240 - 289 give 1.5 units  290 - 339 give 2 units  = or >340 give 2.5 units 10 mL 0     insulin glargine (LANTUS SOLOSTAR PEN) 100 UNIT/ML pen Inject 10 Units Subcutaneous 2 times daily 3 mL 0     ipratropium - albuterol 0.5 mg/2.5 mg/3 mL (DUONEB) 0.5-2.5 (3) MG/3ML neb solution Take 1 vial by nebulization 3 times daily as needed for shortness of breath / dyspnea, wheezing or other (use up to 3 times a day for shortness of breath, cough)       levothyroxine (SYNTHROID/LEVOTHROID) 125 MCG tablet Take 125 mcg by mouth daily       LISINOPRIL PO Take 20 mg by mouth daily       loperamide (IMODIUM) 2 MG capsule Take 1 capsule (2 mg) by mouth 4 times daily as needed for diarrhea 20 capsule      metoprolol (TOPROL-XL) 25 MG 24 hr tablet Take 1 tablet (25 mg) by mouth 2 times daily 180 tablet 0     multivitamin w/minerals (THERA-VIT-M) tablet Take 1 tablet by mouth daily 90 tablet 3     OMEPRAZOLE PO Take 40 mg by mouth daily       ondansetron (ZOFRAN-ODT) 4 MG ODT tab Take 1 tablet (4 mg) by mouth every 6 hours as needed for nausea or vomiting       phenol (CHLORASEPTIC) 1.4 % spray Take 1 spray (1 mL) by mouth every hour as needed for sore throat (without fever)       rosuvastatin (CRESTOR) 10 MG tablet Take 1 tablet (10 mg) by mouth daily 90 tablet 3     tadalafil (CIALIS) 20 MG tablet Take 20 mg by mouth daily For pulmonary hypertension.       tamsulosin (FLOMAX) 0.4 MG capsule Take 1 capsule (0.4 mg) by mouth daily 30 capsule 3     nystatin (MYCOSTATIN) 918371 UNIT/ML suspension Take 5 mLs (500,000 Units) by mouth 4 times daily for 10 days (Patient not taking: Reported on 12/12/2018) 200 mL 0     Medications reviewed:  Medications reconciled to facility chart and changes were made to reflect current medications as identified as above med list. Below are the changes that were made:   Medications stopped since last EPIC medication  "reconciliation:   Medications Discontinued During This Encounter   Medication Reason     predniSONE (DELTASONE) 10 MG tablet Medication Reconciliation Clean Up       Medications started since last EPIC medication reconciliation:  No orders of the defined types were placed in this encounter.      REVIEW OF SYSTEMS:  4 point ROS including Respiratory, CV, GI and , other than that noted in the HPI,  is negative    Physical Exam:  /83   Pulse 58   Temp 97.3  F (36.3  C)   Resp 16   Ht 1.689 m (5' 6.5\")   Wt 62.9 kg (138 lb 9.6 oz)   SpO2 94%   BMI 22.04 kg/m     GENERAL APPEARANCE:  Alert, in no distress  ENT:  Mouth and posterior oropharynx normal, moist mucous membranes, hearing acuity adequate   EYES:  EOM, conjunctivae, lids, pupils and irises normal  RESP:  respiratory effort and palpation of chest normal, no respiratory distress, Lung sounds faint rales in LLL.   CV:  Palpation and auscultation of heart done , rate and rhythm reg, no murmur, no rub or gallop, Edema none  ABDOMEN:  normal bowel sounds, soft, nontender, no hepatosplenomegaly or other masses  M/S:   Gait and station not observed, Digits and nails normal   SKIN:  Inspection/Palpation of skin and subcutaneous tissue scattered bruises on arms. Seborrheic dermatoses on back  NEURO: 2-12 in normal limits and at patient's baseline  PSYCH:  insight and judgement, memory intact , affect and mood normal    Recent Labs:   CBC RESULTS:   Recent Labs   Lab Test 12/14/18 12/13/18 12/11/18  0733   WBC 7.5 16.8* 36.1*   RBC  --  3.87* 4.20*   HGB  --  11.0* 11.9*   HCT  --  33.4* 35.5*   MCV  --  86 85   MCH  --  28.4 28.3   MCHC  --  32.9 33.5   RDW  --  17* 17.0*   * 103* 109*       Last Basic Metabolic Panel:  Recent Labs   Lab Test 12/13/18 12/11/18  0733    136   POTASSIUM 3.5 3.6   CHLORIDE 105 101   AV 7.5 7.9*   CO2 25 25   BUN 22 23   CR 0.74* 0.67   GLC 71 50*       Liver Function Studies -   Recent Labs   Lab Test " 12/11/18  0733 12/06/18  0708   PROTTOTAL 5.3* 4.9*   ALBUMIN 2.4* 2.2*   BILITOTAL 0.5 0.3   ALKPHOS 53 41   AST 22 38   ALT 29 44       TSH   Date Value Ref Range Status   02/22/2017 7.59 (H) 0.40 - 4.00 mU/L Final   09/04/2014 0.16 (L) 0.40 - 4.00 mU/L Final     Comment:     Effective 7/30/2014, the reference range for this assay has changed to reflect   new instrumentation/methodology.     ]    Lab Results   Component Value Date    A1C 10.7 11/23/2018    A1C 8.1 03/13/2017         Assessment/Plan:  (C34.90) 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)  (primary encounter diagnosis)  Comment: diagnosed during this hospital stay. He did receive a round  Of chemotherapy in hospital. He did meet with palliative care multiple times but at this time asks for full medical treatment. He completed a week of neupogen.   Plan:. CBC w platelets 12/19. If WBC >50,000 will hold neupogen and call oncology (Dr Brownlee)  Follow up with oncology 12/21    (J44.1) Chronic obstructive pulmonary disease with acute exacerbation (H)  (R06.03) Acute respiratory distress  Comment: oxygen dependent. Received steroids in hospital. No cough today. He gets short of breath walking to BR. O2 sats are 87-89% at rest on 8L oxygen.   Plan: duonebs, oxygen, physical therapy, prednisone 10mg q day    (I27.20) Pulmonary hypertension (H)  Comment: taking PTA tadalafil and digoxin  Plan: continue    (J84.10) Pulmonary fibrosis (H)  Comment: due to histiocytosis.       (I26.99) Other acute pulmonary embolism without acute cor pulmonale (H)  Comment: found on chest CT.   Plan: eliquis. CBC w platelets in am    (E11.8,  Z79.4) Type 2 diabetes mellitus with complication, with long-term current use of insulin (H)  Comment: elevated BGs due to steroids  Plan: continue lantus and novolog for now.    (I50.22) Chronic systolic heart failure (H)  (I25.10) Coronary artery disease involving native coronary artery of native heart without angina pectoris  Comment: lasix  increased to daily. wt  Stable  Plan: daily wts. BMP in am    (I10) Benign essential hypertension  Comment: adequate control  Plan: no change      (R53.81) Physical deconditioning  Comment: due to respiratory disease. At this point he gets short of breath walking to BR.   Plan: physical therapy and OCCUPATIONAL THERAPY     Electronically signed by  IRINA Saul CNP                      Sincerely,        IRINA Saul CNP

## 2018-12-20 NOTE — LETTER
12/20/2018        RE: Ravi Sandoval  [address]  [facility] [address]        [place] [facility]    Chief Complaint   Patient presents with     RECHECK       [place] Medical Record Number:  [id number]  [facility] of Service where encounter took place[facility] [[id number]]    HPI:    Ravi Sandoval is a 73 year old  (1945), who is being seen today for an episodic care visit.  HPI information obtained from: facility chart records, facility staff, patient report and [facility] chart review.  Current issues are:      c diff- stool pos for C diff. Started on oral vanco on 12/19   Sinusitis- patient reports headache, blurry vision and feeling of sand paper in eyes. And maxillary pain, productive cough. He reports having sinusitis in past and taking amoxicillin. He feels tired and no energy.   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)  Chronic obstructive pulmonary disease with acute exacerbation (H)  Acute respiratory distress  Pulmonary hypertension (H)  Pulmonary fibrosis (H)  Other acute pulmonary embolism without acute cor pulmonale (H)  Patient transferred to TCU after hospitalization due to shortness of breath and hypoxia. Patient is a current smoker with h/o pulmonary fibrosis, COPD- oxygen dependent, and pulmonary hypertension. During course of hospital stay patient was found to have large pleural effusion on right side that was drained on 11/26, non occlusive pulmonary embolism (eliquis started 1/23 ). He was treated for RLL pneumonia (completed on 12/3) and COPD exacerbation (steroids, duonebs). 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 He was evaluated by oncology. He completed a course of palliative chemotherapy (carboplatin and etoposide)  on 12/8.     Type 2 diabetes mellitus with complication, with long-term current use of insulin (H)  Prior to hospitalization patient was taking glipizide. He did require insulin while in  patient, likely due to steroids.   Last BG Levels:  AM: 83, 87, 105  Noon: 217, 219, 131  Dinner: 146, 219, 395  HS: 246, 238, 272   chf- he did requires additional diuresis in hospital. Now taking lasix 20mg every day.   Coronary artery disease involving native coronary artery of native heart without angina pectoris  H/o bare metal stent in 2010.   He continues on ASA.   Benign essential hypertension  BP's in TCU: 1150/73, 147/85, 128/78  He continues on  PTA amodipine 2.5 mg daily, lisinopril 20 mg daily and metoprolol 25 mg twice daily.  Other specified hypothyroidism  Taking PTA synthroid  Physical deconditioning  Lives with wife in apartment. Notes from hospital SW indicate wife is unable to care for patient at this time. He has been independent prior to admission but not very active. He was driving.         ALLERGIES: Tetracycline  Past Medical, Surgical, Family and Social History reviewed and updated in [facility].    Current Outpatient Medications   Medication Sig Dispense Refill     acetaminophen (TYLENOL) 325 MG tablet Take 650 mg by mouth every 6 hours as needed for mild pain       albuterol (PROVENTIL) (2.5 MG/3ML) 0.083% neb solution Take 1 vial (2.5 mg) by nebulization once as needed (refractory bronchospasm associated with hypersensitivity)  0     amLODIPine (NORVASC) 2.5 MG tablet Take 2.5 mg by mouth daily       apixaban ANTICOAGULANT (ELIQUIS) 5 MG tablet Take 1 tablet (5 mg) by mouth 2 times daily 180 tablet 0     ASPIRIN PO Take 81 mg by mouth At Bedtime       budesonide (PULMICORT) 0.5 MG/2ML neb solution Take 2 mLs (0.5 mg) by nebulization 2 times daily 180 ampule 0     Colchicine (COLCRYS PO) Take 0.6 mg by mouth daily TAKES IN THE EVENING       digoxin (LANOXIN) 125 MCG tablet Take 1 tablet (125 mcg) by mouth daily 90 tablet 3     FLUoxetine HCl (PROZAC PO) Take 40 mg by mouth daily       furosemide (LASIX) 20 MG tablet Take 1 tablet (20 mg) by mouth daily 14 tablet 0     insulin aspart (NOVOLOG  VIAL) 100 UNITS/ML vial Give before meals and before bed:  For Pre-Meal Glucose:  140-189 give 1 unit   190-239 give 2 units   240-289 give 3 units   290-339 give 4 units   = or >340 give 5 units     For Bedtime Glucose  200 - 239 give 1 units   240 - 289 give 1.5 units  290 - 339 give 2 units  = or >340 give 2.5 units 10 mL 0     insulin glargine (LANTUS SOLOSTAR PEN) 100 UNIT/ML pen Inject 10 Units Subcutaneous 2 times daily 3 mL 0     ipratropium - albuterol 0.5 mg/2.5 mg/3 mL (DUONEB) 0.5-2.5 (3) MG/3ML neb solution Take 1 vial by nebulization 3 times daily as needed for shortness of breath / dyspnea, wheezing or other (use up to 3 times a day for shortness of breath, cough)       levothyroxine (SYNTHROID/LEVOTHROID) 125 MCG tablet Take 125 mcg by mouth daily       LISINOPRIL PO Take 20 mg by mouth daily       loperamide (IMODIUM) 2 MG capsule Take 1 capsule (2 mg) by mouth 4 times daily as needed for diarrhea 20 capsule      melatonin 5 MG tablet Take 5 mg by mouth At Bedtime       metoprolol (TOPROL-XL) 25 MG 24 hr tablet Take 1 tablet (25 mg) by mouth 2 times daily 180 tablet 0     multivitamin w/minerals (THERA-VIT-M) tablet Take 1 tablet by mouth daily 90 tablet 3     OMEPRAZOLE PO Take 40 mg by mouth daily       ondansetron (ZOFRAN) 4 MG tablet Take by mouth every 6 hours as needed for nausea       phenol (CHLORASEPTIC) 1.4 % spray Take 1 spray (1 mL) by mouth every hour as needed for sore throat (without fever)       predniSONE (DELTASONE) 10 MG tablet Take 10 mg by mouth daily.       rosuvastatin (CRESTOR) 10 MG tablet Take 1 tablet (10 mg) by mouth daily 90 tablet 3     tadalafil (CIALIS) 20 MG tablet Take 20 mg by mouth daily For pulmonary hypertension.       tamsulosin (FLOMAX) 0.4 MG capsule Take 1 capsule (0.4 mg) by mouth daily 30 capsule 3     vancomycin (VANCOCIN) 125 MG capsule Take 125 mg by mouth 4 times daily For 10 days.       nystatin (MYCOSTATIN) 399053 UNIT/ML suspension Take 5 mLs  "(500,000 Units) by mouth 4 times daily for 10 days (Patient not taking: Reported on 12/20/2018) 200 mL 0     Medications reviewed:  Medications reconciled to facility chart and changes were made to reflect current medications as identified as above med list. Below are the changes that were made:   Medications stopped since last EPIC medication reconciliation:   There are no discontinued medications.    Medications started since last [facility] medication reconciliation:  Orders Placed This Encounter   Medications     melatonin 5 MG tablet     Sig: Take 5 mg by mouth At Bedtime     ondansetron (ZOFRAN) 4 MG tablet     Sig: Take by mouth every 6 hours as needed for nausea     predniSONE (DELTASONE) 10 MG tablet     Sig: Take 10 mg by mouth daily.     vancomycin (VANCOCIN) 125 MG capsule     Sig: Take 125 mg by mouth 4 times daily For 10 days.       REVIEW OF SYSTEMS:  4 point ROS including Respiratory, CV, GI and , other than that noted in the HPI,  is negative    Physical Exam:  /73   Pulse 57   Temp 96.5  F (35.8  C)   Resp 16   Ht 1.689 m (5' 6.5\")   Wt 62.9 kg (138 lb 9.6 oz)   SpO2 95%   BMI 22.04 kg/m     GENERAL APPEARANCE:  Alert, in no distress  ENT:  Mouth and posterior oropharynx normal, moist mucous membranes, hearing acuity adequate   EYES:  EOM, conjunctivae- slightly red, lids, pupils and irises normal  RESP:  respiratory effort and palpation of chest normal, no respiratory distress, Lung sounds faint rales in LLL.   CV:  Palpation and auscultation of heart done , rate and rhythm reg, no murmur, no rub or gallop, Edema none  ABDOMEN:  normal bowel sounds, soft, nontender, no hepatosplenomegaly or other masses  M/S:   Gait and station not observed, Digits and nails normal   SKIN:  Inspection/Palpation of skin and subcutaneous tissue scattered bruises on arms. Seborrheic dermatoses on back  NEURO: 2-12 in normal limits and at patient's baseline  PSYCH:  insight and judgement, memory intact , " affect and mood normal    Recent Labs:   CBC RESULTS:   Recent Labs   Lab Test 12/19/18 12/14/18 12/13/18   WBC 31.9* 7.5 16.8*   RBC 3.87*  --  3.87*   HGB 11.1*  --  11.0*   HCT 33.8*  --  33.4*   MCV 87  --  86   MCH 28.7  --  28.4   MCHC 32.8  --  32.9   RDW 17.8*  --  17*   PLT 83* 102* 103*       Last Basic Metabolic Panel:  Recent Labs   Lab Test 12/19/18 12/13/18    139   POTASSIUM 3.5 3.5   CHLORIDE 106 105   AV 7.7* 7.5   CO2 28 25   BUN 17 22   CR 0.64* 0.74*   GLC 44* 71       Liver Function Studies -   Recent Labs   Lab Test 12/11/18  0733 12/06/18  0708   PROTTOTAL 5.3* 4.9*   ALBUMIN 2.4* 2.2*   BILITOTAL 0.5 0.3   ALKPHOS 53 41   AST 22 38   ALT 29 44       TSH   Date Value Ref Range Status   02/22/2017 7.59 (H) 0.40 - 4.00 mU/L Final   09/04/2014 0.16 (L) 0.40 - 4.00 mU/L Final     Comment:     Effective 7/30/2014, the reference range for this assay has changed to reflect   new instrumentation/methodology.     ]    Lab Results   Component Value Date    A1C 10.7 11/23/2018    A1C 8.1 03/13/2017       Assessment/Plan:  c diff  Comment: having loose stools for past few days. cx came back c diff pos. Recently on antibx for pneumonia.   Plan: vanco 125mg QID for 10 d.  Sinusitis/ leukocytosis./conjunctivitis  Comment: c/o headache, facial pain, gritty eyes. With h/o lung disease and now cancer. WBC up to 31. He is on steroids and completed course of neupogen  Plan: amoxicillin 875mg BID for 10d. polytrim I drop each eye  QID for 7 d  (C34.90) 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)  (primary encounter diagnosis)  Comment: diagnosed during this hospital stay. He did receive a round  Of chemotherapy in hospital. He did meet with palliative care multiple times but at this time asks for full medical treatment. He completed a week of neupogen. See labs above  Plan:. CBC w platelets 12/19. If WBC >50,000 will hold neupogen and call oncology (Dr Brownlee)  Follow up with oncology 12/21    (J44.1) Chronic  obstructive pulmonary disease with acute exacerbation (H)  (R06.03) Acute respiratory distress  Comment: oxygen dependent. Received steroids in hospital. No cough today. He gets short of breath walking to BR. O2 sats are 87-89% at rest on 8L oxygen.   Plan: duonebs, oxygen, physical therapy, prednisone 10mg q day    (I27.20) Pulmonary hypertension (H)  Comment: taking PTA tadalafil and digoxin  Plan: continue    (J84.10) Pulmonary fibrosis (H)  Comment: due to histiocytosis.       (I26.99) Other acute pulmonary embolism without acute cor pulmonale (H)  Comment: found on chest CT.   Plan: eliquis. CBC w platelets in am    (E11.8,  Z79.4) Type 2 diabetes mellitus with complication, with long-term current use of insulin (H)  Comment: elevated BGs due to steroids  Plan: continue lantus and novolog for now.    (I50.22) Chronic systolic heart failure (H)  (I25.10) Coronary artery disease involving native coronary artery of native heart without angina pectoris  Comment: lasix increased to daily. wt  Stable  Plan: daily wts. BMP in am    (I10) Benign essential hypertension  Comment: adequate control  Plan: no change      (R53.81) Physical deconditioning  Comment: due to respiratory disease. At this point he gets short of breath walking to BR.  he is quite exhausted and cannot participate in therapy at this time.   Plan: physical therapy and OCCUPATIONAL THERAPY     Electronically signed by  IRINA Saul CNP    [phone]                  Sincerely,        IRINA Sual CNP

## 2018-12-20 NOTE — TELEPHONE ENCOUNTER
Called re: positive C diff  Order given for vancomycin 125 mg PO QID x 10 days    Beata Ellis MD

## 2018-12-20 NOTE — PROGRESS NOTES
[place] [facility]    Chief Complaint   Patient presents with     ROSEMARIE       [place] [facility] Record Number:  [id number]  Place of Service where encounter took place:  [facility] [[id number]]    HPI:    Ravi Sandoval is a 73 year old  (1945), who is being seen today for an episodic care visit.  HPI information obtained from: facility chart records, facility staff, patient report and [facility] chart review.  Current issues are:      c diff- stool pos for C diff. Started on oral vanco on 12/19   Sinusitis- patient reports headache, blurry vision and feeling of sand paper in eyes. And maxillary pain, productive cough. He reports having sinusitis in past and taking amoxicillin. He feels tired and no energy.   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)  Chronic obstructive pulmonary disease with acute exacerbation (H)  Acute respiratory distress  Pulmonary hypertension (H)  Pulmonary fibrosis (H)  Other acute pulmonary embolism without acute cor pulmonale (H)  Patient transferred to TCU after hospitalization due to shortness of breath and hypoxia. Patient is a current smoker with h/o pulmonary fibrosis, COPD- oxygen dependent, and pulmonary hypertension. During course of hospital stay patient was found to have large pleural effusion on right side that was drained on 11/26, non occlusive pulmonary embolism (eliquis started 1/23 ). He was treated for RLL pneumonia (completed on 12/3) and COPD exacerbation (steroids, duonebs). 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 He was evaluated by oncology. He completed a course of palliative chemotherapy (carboplatin and etoposide)  on 12/8.     Type 2 diabetes mellitus with complication, with long-term current use of insulin (H)  Prior to hospitalization patient was taking glipizide. He did require insulin while in patient, likely due to steroids.   Last BG Levels:  AM: 83, 87, 105  Noon: 217, 219,  131  Dinner: 146, 219, 395  HS: 246, 238, 272   chf- he did requires additional diuresis in hospital. Now taking lasix 20mg every day.   Coronary artery disease involving native coronary artery of native heart without angina pectoris  H/o bare metal stent in 2010.   He continues on ASA.   Benign essential hypertension  BP's in TCU: 1150/73, 147/85, 128/78  He continues on  PTA amodipine 2.5 mg daily, lisinopril 20 mg daily and metoprolol 25 mg twice daily.  Other specified hypothyroidism  Taking PTA synthroid  Physical deconditioning  Lives with wife in apartment. Notes from hospital SW indicate wife is unable to care for patient at this time. He has been independent prior to admission but not very active. He was driving.         ALLERGIES: Tetracycline  Past Medical, Surgical, Family and Social History reviewed and updated in [facility].    Current Outpatient Medications   Medication Sig Dispense Refill     acetaminophen (TYLENOL) 325 MG tablet Take 650 mg by mouth every 6 hours as needed for mild pain       albuterol (PROVENTIL) (2.5 MG/3ML) 0.083% neb solution Take 1 vial (2.5 mg) by nebulization once as needed (refractory bronchospasm associated with hypersensitivity)  0     amLODIPine (NORVASC) 2.5 MG tablet Take 2.5 mg by mouth daily       apixaban ANTICOAGULANT (ELIQUIS) 5 MG tablet Take 1 tablet (5 mg) by mouth 2 times daily 180 tablet 0     ASPIRIN PO Take 81 mg by mouth At Bedtime       budesonide (PULMICORT) 0.5 MG/2ML neb solution Take 2 mLs (0.5 mg) by nebulization 2 times daily 180 ampule 0     Colchicine (COLCRYS PO) Take 0.6 mg by mouth daily TAKES IN THE EVENING       digoxin (LANOXIN) 125 MCG tablet Take 1 tablet (125 mcg) by mouth daily 90 tablet 3     FLUoxetine HCl (PROZAC PO) Take 40 mg by mouth daily       furosemide (LASIX) 20 MG tablet Take 1 tablet (20 mg) by mouth daily 14 tablet 0     insulin aspart (NOVOLOG VIAL) 100 UNITS/ML vial Give before meals and before bed:  For Pre-Meal  Glucose:  140-189 give 1 unit   190-239 give 2 units   240-289 give 3 units   290-339 give 4 units   = or >340 give 5 units     For Bedtime Glucose  200 - 239 give 1 units   240 - 289 give 1.5 units  290 - 339 give 2 units  = or >340 give 2.5 units 10 mL 0     insulin glargine (LANTUS SOLOSTAR PEN) 100 UNIT/ML pen Inject 10 Units Subcutaneous 2 times daily 3 mL 0     ipratropium - albuterol 0.5 mg/2.5 mg/3 mL (DUONEB) 0.5-2.5 (3) MG/3ML neb solution Take 1 vial by nebulization 3 times daily as needed for shortness of breath / dyspnea, wheezing or other (use up to 3 times a day for shortness of breath, cough)       levothyroxine (SYNTHROID/LEVOTHROID) 125 MCG tablet Take 125 mcg by mouth daily       LISINOPRIL PO Take 20 mg by mouth daily       loperamide (IMODIUM) 2 MG capsule Take 1 capsule (2 mg) by mouth 4 times daily as needed for diarrhea 20 capsule      melatonin 5 MG tablet Take 5 mg by mouth At Bedtime       metoprolol (TOPROL-XL) 25 MG 24 hr tablet Take 1 tablet (25 mg) by mouth 2 times daily 180 tablet 0     multivitamin w/minerals (THERA-VIT-M) tablet Take 1 tablet by mouth daily 90 tablet 3     OMEPRAZOLE PO Take 40 mg by mouth daily       ondansetron (ZOFRAN) 4 MG tablet Take by mouth every 6 hours as needed for nausea       phenol (CHLORASEPTIC) 1.4 % spray Take 1 spray (1 mL) by mouth every hour as needed for sore throat (without fever)       predniSONE (DELTASONE) 10 MG tablet Take 10 mg by mouth daily.       rosuvastatin (CRESTOR) 10 MG tablet Take 1 tablet (10 mg) by mouth daily 90 tablet 3     tadalafil (CIALIS) 20 MG tablet Take 20 mg by mouth daily For pulmonary hypertension.       tamsulosin (FLOMAX) 0.4 MG capsule Take 1 capsule (0.4 mg) by mouth daily 30 capsule 3     vancomycin (VANCOCIN) 125 MG capsule Take 125 mg by mouth 4 times daily For 10 days.       nystatin (MYCOSTATIN) 647526 UNIT/ML suspension Take 5 mLs (500,000 Units) by mouth 4 times daily for 10 days (Patient not taking:  "Reported on 12/20/2018) 200 mL 0     Medications reviewed:  Medications reconciled to facility chart and changes were made to reflect current medications as identified as above med list. Below are the changes that were made:   Medications stopped since last EPIC medication reconciliation:   There are no discontinued medications.    Medications started since last [facility] medication reconciliation:  Orders Placed This Encounter   Medications     melatonin 5 MG tablet     Sig: Take 5 mg by mouth At Bedtime     ondansetron (ZOFRAN) 4 MG tablet     Sig: Take by mouth every 6 hours as needed for nausea     predniSONE (DELTASONE) 10 MG tablet     Sig: Take 10 mg by mouth daily.     vancomycin (VANCOCIN) 125 MG capsule     Sig: Take 125 mg by mouth 4 times daily For 10 days.       REVIEW OF SYSTEMS:  4 point ROS including Respiratory, CV, GI and , other than that noted in the HPI,  is negative    Physical Exam:  /73   Pulse 57   Temp 96.5  F (35.8  C)   Resp 16   Ht 1.689 m (5' 6.5\")   Wt 62.9 kg (138 lb 9.6 oz)   SpO2 95%   BMI 22.04 kg/m    GENERAL APPEARANCE:  Alert, in no distress  ENT:  Mouth and posterior oropharynx normal, moist mucous membranes, hearing acuity adequate   EYES:  EOM, conjunctivae- slightly red, lids, pupils and irises normal  RESP:  respiratory effort and palpation of chest normal, no respiratory distress, Lung sounds faint rales in LLL.   CV:  Palpation and auscultation of heart done , rate and rhythm reg, no murmur, no rub or gallop, Edema none  ABDOMEN:  normal bowel sounds, soft, nontender, no hepatosplenomegaly or other masses  M/S:   Gait and station not observed, Digits and nails normal   SKIN:  Inspection/Palpation of skin and subcutaneous tissue scattered bruises on arms. Seborrheic dermatoses on back  NEURO: 2-12 in normal limits and at patient's baseline  PSYCH:  insight and judgement, memory intact , affect and mood normal    Recent Labs:   CBC RESULTS:   Recent Labs   Lab " Test 12/19/18 12/14/18 12/13/18   WBC 31.9* 7.5 16.8*   RBC 3.87*  --  3.87*   HGB 11.1*  --  11.0*   HCT 33.8*  --  33.4*   MCV 87  --  86   MCH 28.7  --  28.4   MCHC 32.8  --  32.9   RDW 17.8*  --  17*   PLT 83* 102* 103*       Last Basic Metabolic Panel:  Recent Labs   Lab Test 12/19/18 12/13/18    139   POTASSIUM 3.5 3.5   CHLORIDE 106 105   AV 7.7* 7.5   CO2 28 25   BUN 17 22   CR 0.64* 0.74*   GLC 44* 71       Liver Function Studies -   Recent Labs   Lab Test 12/11/18  0733 12/06/18  0708   PROTTOTAL 5.3* 4.9*   ALBUMIN 2.4* 2.2*   BILITOTAL 0.5 0.3   ALKPHOS 53 41   AST 22 38   ALT 29 44       TSH   Date Value Ref Range Status   02/22/2017 7.59 (H) 0.40 - 4.00 mU/L Final   09/04/2014 0.16 (L) 0.40 - 4.00 mU/L Final     Comment:     Effective 7/30/2014, the reference range for this assay has changed to reflect   new instrumentation/methodology.     ]    Lab Results   Component Value Date    A1C 10.7 11/23/2018    A1C 8.1 03/13/2017       Assessment/Plan:  c diff  Comment: having loose stools for past few days. cx came back c diff pos. Recently on antibx for pneumonia.   Plan: vanco 125mg QID for 10 d.  Sinusitis/ leukocytosis./conjunctivitis  Comment: c/o headache, facial pain, gritty eyes. With h/o lung disease and now cancer. WBC up to 31. He is on steroids and completed course of neupogen  Plan: amoxicillin 875mg BID for 10d. polytrim I drop each eye  QID for 7 d  (C34.90) 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)  (primary encounter diagnosis)  Comment: diagnosed during this hospital stay. He did receive a round  Of chemotherapy in hospital. He did meet with palliative care multiple times but at this time asks for full medical treatment. He completed a week of neupogen. See labs above  Plan:. CBC w platelets 12/19. If WBC >50,000 will hold neupogen and call oncology (Dr Brownlee)  Follow up with oncology 12/21    (J44.1) Chronic obstructive pulmonary disease with acute exacerbation (H)  (R06.03) Acute  respiratory distress  Comment: oxygen dependent. Received steroids in hospital. No cough today. He gets short of breath walking to BR. O2 sats are 87-89% at rest on 8L oxygen.   Plan: duonebs, oxygen, physical therapy, prednisone 10mg q day    (I27.20) Pulmonary hypertension (H)  Comment: taking PTA tadalafil and digoxin  Plan: continue    (J84.10) Pulmonary fibrosis (H)  Comment: due to histiocytosis.       (I26.99) Other acute pulmonary embolism without acute cor pulmonale (H)  Comment: found on chest CT.   Plan: eliquis. CBC w platelets in am    (E11.8,  Z79.4) Type 2 diabetes mellitus with complication, with long-term current use of insulin (H)  Comment: elevated BGs due to steroids  Plan: continue lantus and novolog for now.    (I50.22) Chronic systolic heart failure (H)  (I25.10) Coronary artery disease involving native coronary artery of native heart without angina pectoris  Comment: lasix increased to daily. wt  Stable  Plan: daily wts. BMP in am    (I10) Benign essential hypertension  Comment: adequate control  Plan: no change      (R53.81) Physical deconditioning  Comment: due to respiratory disease. At this point he gets short of breath walking to BR. he is quite exhausted and cannot participate in therapy at this time.   Plan: physical therapy and OCCUPATIONAL THERAPY     Electronically signed by  IRINA Saul CNP    [phone]

## 2018-12-24 NOTE — LETTER
12/24/2018        RE: Ravi Sandoval  [address]  [facility] [address]        [place] [facility]    Chief Complaint   Patient presents with     RECHECK       [place] [facility] Record Number:  [id number]  Place of Service where encounter took place:  [facility] [[id number]]    HPI:    Ravi Sandoval is a 73 year old  (1945), who is being seen today for an episodic care visit.  HPI information obtained from: facility chart records, facility staff, patient report and [facility] chart review.  Current issues are:      c diff- stool pos for C diff. Started on oral vanco on 12/19. Today he reports much less frequent stools. Only one since yesterday.   Sinusitis-  On 12/21 patient reports headache, blurry vision and feeling of sand paper in eyes. And maxillary pain, productive cough. Amoxicillin was started 12/20.   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)  Chronic obstructive pulmonary disease with acute exacerbation (H)  Acute respiratory distress  Pulmonary hypertension (H)  Pulmonary fibrosis (H)  Other acute pulmonary embolism without acute cor pulmonale (H)  Patient transferred to TCU after hospitalization due to shortness of breath and hypoxia. Patient is a current smoker with h/o pulmonary fibrosis, COPD- oxygen dependent, and pulmonary hypertension. During course of hospital stay patient was found to have large pleural effusion on right side that was drained on 11/26, non occlusive pulmonary embolism (eliquis started 1/23 ). He was treated for RLL pneumonia (completed on 12/3) and COPD exacerbation (steroids, duonebs). 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 He was evaluated by oncology. He completed a course of palliative chemotherapy (carboplatin and etoposide)  on 12/8.    He did follow up with oncology on 12/21. No change in plan of care.   Type 2 diabetes mellitus with complication, with long-term current use of insulin  (H)  Prior to hospitalization patient was taking glipizide. He did require insulin while in patient, likely due to steroids.     BGL 12/16-12/23:   AM:  mg/dL  NOON: 123-241 mg/dL  PM:146-395 mg/dL  HS: 248-364 mg/dL    chf- he did requires additional diuresis in hospital. Now taking lasix 20mg every day.   Coronary artery disease involving native coronary artery of native heart without angina pectoris  H/o bare metal stent in 2010.   He continues on ASA.   Benign essential hypertension  BP's in TCU: 121/67, 148/79, 134/75  He continues on  PTA amodipine 2.5 mg daily, lisinopril 20 mg daily and metoprolol 25 mg twice daily.  Other specified hypothyroidism  Taking PTA synthroid  Physical deconditioning  Lives with wife in apartment. Notes from hospital SW indicate wife is unable to care for patient at this time. He has been independent prior to admission but not very active. He was driving.      ALLERGIES: Tetracycline  Past Medical, Surgical, Family and Social History reviewed and updated in [facility].    Current Outpatient Medications   Medication Sig Dispense Refill     acetaminophen (TYLENOL) 325 MG tablet Take 650 mg by mouth every 6 hours as needed for mild pain       albuterol (PROVENTIL) (2.5 MG/3ML) 0.083% neb solution Take 1 vial (2.5 mg) by nebulization once as needed (refractory bronchospasm associated with hypersensitivity)  0     amLODIPine (NORVASC) 2.5 MG tablet Take 2.5 mg by mouth daily       amoxicillin (AMOXIL) 875 MG tablet Take 1 tablet (875 mg) by mouth 2 times daily for 10 days       apixaban ANTICOAGULANT (ELIQUIS) 5 MG tablet Take 1 tablet (5 mg) by mouth 2 times daily 180 tablet 0     ASPIRIN PO Take 81 mg by mouth At Bedtime       budesonide (PULMICORT) 0.5 MG/2ML neb solution Take 2 mLs (0.5 mg) by nebulization 2 times daily 180 ampule 0     Colchicine (COLCRYS PO) Take 0.6 mg by mouth daily TAKES IN THE EVENING       digoxin (LANOXIN) 125 MCG tablet Take 1 tablet (125 mcg) by mouth  daily 90 tablet 3     FLUoxetine HCl (PROZAC PO) Take 40 mg by mouth daily       furosemide (LASIX) 20 MG tablet Take 1 tablet (20 mg) by mouth daily 14 tablet 0     insulin aspart (NOVOLOG VIAL) 100 UNITS/ML vial Give before meals and before bed:  For Pre-Meal Glucose:  140-189 give 1 unit   190-239 give 2 units   240-289 give 3 units   290-339 give 4 units   = or >340 give 5 units     For Bedtime Glucose  200 - 239 give 1 units   240 - 289 give 1.5 units  290 - 339 give 2 units  = or >340 give 2.5 units 10 mL 0     insulin glargine (LANTUS SOLOSTAR PEN) 100 UNIT/ML pen Inject 10 Units Subcutaneous 2 times daily 3 mL 0     ipratropium - albuterol 0.5 mg/2.5 mg/3 mL (DUONEB) 0.5-2.5 (3) MG/3ML neb solution Take 1 vial by nebulization 3 times daily as needed for shortness of breath / dyspnea, wheezing or other (use up to 3 times a day for shortness of breath, cough)       levothyroxine (SYNTHROID/LEVOTHROID) 125 MCG tablet Take 125 mcg by mouth daily       LISINOPRIL PO Take 20 mg by mouth daily       loperamide (IMODIUM) 2 MG capsule Take 1 capsule (2 mg) by mouth 4 times daily as needed for diarrhea 20 capsule      melatonin 5 MG tablet Take 5 mg by mouth At Bedtime       metoprolol (TOPROL-XL) 25 MG 24 hr tablet Take 1 tablet (25 mg) by mouth 2 times daily 180 tablet 0     multivitamin w/minerals (THERA-VIT-M) tablet Take 1 tablet by mouth daily 90 tablet 3     nystatin (MYCOSTATIN) 315903 UNIT/ML suspension Take 5 mLs by mouth 4 times daily       OMEPRAZOLE PO Take 40 mg by mouth daily       ondansetron (ZOFRAN) 4 MG tablet Take 4 mg by mouth every 6 hours as needed for nausea        phenol (CHLORASEPTIC) 1.4 % spray Take 1 spray by mouth every hour as needed for sore throat       predniSONE (DELTASONE) 10 MG tablet Take 10 mg by mouth daily.       rosuvastatin (CRESTOR) 10 MG tablet Take 1 tablet (10 mg) by mouth daily 90 tablet 3     tadalafil (CIALIS) 20 MG tablet Take 20 mg by mouth daily For pulmonary  "hypertension.       tamsulosin (FLOMAX) 0.4 MG capsule Take 1 capsule (0.4 mg) by mouth daily 30 capsule 3     trimethoprim-polymyxin b (POLYTRIM) 27304-4.1 UNIT/ML-% ophthalmic solution Place 1-2 drops into both eyes every 6 hours for 10 days 4 mL 0     vancomycin (VANCOCIN) 125 MG capsule Take 125 mg by mouth 4 times daily For 10 days.       zolpidem (AMBIEN) 5 MG tablet Take 5 mg by mouth At Bedtime       Medications reviewed:  Medications reconciled to facility chart and changes were made to reflect current medications as identified as above med list. Below are the changes that were made:   Medications stopped since last EPIC medication reconciliation:   There are no discontinued medications.    Medications started since last [facility] medication reconciliation:  Orders Placed This Encounter   Medications     phenol (CHLORASEPTIC) 1.4 % spray     Sig: Take 1 spray by mouth every hour as needed for sore throat     zolpidem (AMBIEN) 5 MG tablet     Sig: Take 5 mg by mouth At Bedtime     nystatin (MYCOSTATIN) 511064 UNIT/ML suspension     Sig: Take 5 mLs by mouth 4 times daily         REVIEW OF SYSTEMS:  4 point ROS including Respiratory, CV, GI and , other than that noted in the HPI,  is negative     Physical Exam:  /67   Pulse 69   Temp 98.5  F (36.9  C)   Resp 20   Ht 1.689 m (5' 6.5\")   Wt 62.9 kg (138 lb 9.6 oz)   SpO2 93%   BMI 22.04 kg/m      GENERAL APPEARANCE:  Alert, in no distress  ENT:  Mouth and posterior oropharynx normal, moist mucous membranes, hearing acuity adequate   EYES:  EOM, conjunctivae- slightly red, lids, pupils and irises normal  RESP:  respiratory effort and palpation of chest normal, no respiratory distress, Lung sounds faint rales in LLL.   CV:  Palpation and auscultation of heart done , rate and rhythm reg, no murmur, no rub or gallop, Edema none  ABDOMEN:  normal bowel sounds, soft, nontender, no hepatosplenomegaly or other masses  M/S:   Gait and station not observed, " Digits and nails normal   SKIN:  Inspection/Palpation of skin and subcutaneous tissue scattered bruises on arms. Seborrheic dermatoses on back  NEURO: 2-12 in normal limits and at patient's baseline  PSYCH:  insight and judgement, memory intact , affect and mood normal    Recent Labs:   12/21 WBC 22.8, plt 94  CBC RESULTS:   Recent Labs   Lab Test 12/19/18  0631 12/19/18   WBC 31.9* 31.9*   RBC 3.87* 3.87*   HGB 11.1* 11.1*   HCT 33.8* 33.8*   MCV 87 87   MCH 28.7 28.7   MCHC 32.8 32.8   RDW 17.8* 17.8*   PLT 83* 83*       Last Basic Metabolic Panel:  Recent Labs   Lab Test 12/19/18  0631 12/19/18    141   POTASSIUM 3.5 3.5   CHLORIDE 106 106   AV 7.7* 7.7*   CO2 28 28   BUN 17 17   CR 0.64* 0.64*   GLC 44* 44*       Liver Function Studies -   Recent Labs   Lab Test 12/11/18  0733 12/06/18  0708   PROTTOTAL 5.3* 4.9*   ALBUMIN 2.4* 2.2*   BILITOTAL 0.5 0.3   ALKPHOS 53 41   AST 22 38   ALT 29 44     Lab Results   Component Value Date    A1C 10.7 11/23/2018    A1C 8.1 03/13/2017         Assessment/Plan:  (A04.72) C. difficile colitis  (primary encounter diagnosis)  Comment: started oral vanco on 12/20. Patient reports much less frequent loose stools. Only one since yesterday morning.   Plan: complete course of vanco        (J32.9) Sinusitis  (H10.9) Conjunctivitis  Comment: started amoxicillin on 12/21 for headache facial pain, gritty eyes. Feels better today.   Plan: complete amoxicllin.     (D72.829) Leukocytosis  (C34.90) 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)  Comment: diagnosed during this hospital stay. He did receive a round  Of chemotherapy in hospital. He did meet with palliative care multiple times but at this time asks for full medical treatment. He completed a week of neupogen. See labs above  Plan:.  Follow up with oncology in two weeks.     (J44.1) Chronic obstructive pulmonary disease with acute exacerbation (H)  (R06.03) Acute respiratory distress  Comment: oxygen dependent. O2 sats drop to 70s  with activity. He is using oxygen at 10L per minute.   Plan: continue to monitor O2 sats. Meter out activity to save energy and keep oxygen levels as high as possible    (I27.20) Pulmonary hypertension (H)  Comment: taking PTA tadalafil and digoxin  Plan: continue    (J84.10) Pulmonary fibrosis (H)  Comment: due to histiocytosis.     (I26.99) Other acute pulmonary embolism without acute cor pulmonale (H)  Comment: found on chest CT.   Plan: eliquis.     (E11.8,  Z79.4) Type 2 diabetes mellitus with complication, with long-term current use of insulin (H)  Comment: elevated BGs due to steroids  Plan: continue lantus and novolog for now.    (I50.22) Chronic systolic heart failure (H)  (I25.10) Coronary artery disease involving native coronary artery of native heart without angina pectoris  Comment: lasix increased to daily. wt  Stable  Plan: daily wts    (I10) Benign essential hypertension  Comment: adequate control  Plan: monitor     (R53.81) Physical deconditioning  Comment: due to respiratory disease. At this point he gets short of breath walking to BR. he is quite exhausted and cannot participate in therapy at this time.   Plan: physical therapy and OCCUPATIONAL THERAPY         Electronically signed by  IRINA Saul CNP                      Sincerely,        IRINA Saul CNP

## 2018-12-24 NOTE — PROGRESS NOTES
[facility]    Chief Complaint   Patient presents with     ANDRESECK       [facility] Record Number:  [id number]  Place of Service where encounter took place:  [facility] [[id number]]    HPI:    Ravi Sandoval is a 73 year old  (1945), who is being seen today for an episodic care visit.  HPI information obtained from: facility chart records, facility staff, patient report and [facility] chart review.  Current issues are:      c diff- stool pos for C diff. Started on oral vanco on 12/19. Today he reports much less frequent stools. Only one since yesterday.   Sinusitis-  On 12/21 patient reports headache, blurry vision and feeling of sand paper in eyes. And maxillary pain, productive cough. Amoxicillin was started 12/20.   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)  Chronic obstructive pulmonary disease with acute exacerbation (H)  Acute respiratory distress  Pulmonary hypertension (H)  Pulmonary fibrosis (H)  Other acute pulmonary embolism without acute cor pulmonale (H)  Patient transferred to TCU after hospitalization due to shortness of breath and hypoxia. Patient is a current smoker with h/o pulmonary fibrosis, COPD- oxygen dependent, and pulmonary hypertension. During course of hospital stay patient was found to have large pleural effusion on right side that was drained on 11/26, non occlusive pulmonary embolism (eliquis started 1/23 ). He was treated for RLL pneumonia (completed on 12/3) and COPD exacerbation (steroids, duonebs). 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 He was evaluated by oncology. He completed a course of palliative chemotherapy (carboplatin and etoposide)  on 12/8.    He did follow up with oncology on 12/21. No change in plan of care.   Type 2 diabetes mellitus with complication, with long-term current use of insulin (H)  Prior to hospitalization patient was taking glipizide. He did require insulin while in  patient, likely due to steroids.     BGL 12/16-12/23:   AM:  mg/dL  NOON: 123-241 mg/dL  PM:146-395 mg/dL  HS: 248-364 mg/dL    chf- he did requires additional diuresis in hospital. Now taking lasix 20mg every day.   Coronary artery disease involving native coronary artery of native heart without angina pectoris  H/o bare metal stent in 2010.   He continues on ASA.   Benign essential hypertension  BP's in TCU: 121/67, 148/79, 134/75  He continues on  PTA amodipine 2.5 mg daily, lisinopril 20 mg daily and metoprolol 25 mg twice daily.  Other specified hypothyroidism  Taking PTA synthroid  Physical deconditioning  Lives with wife in apartment. Notes from hospital SW indicate wife is unable to care for patient at this time. He has been independent prior to admission but not very active. He was driving.      ALLERGIES: Tetracycline  Past Medical, Surgical, Family and Social History reviewed and updated in [employer]    Current Outpatient Medications   Medication Sig Dispense Refill     acetaminophen (TYLENOL) 325 MG tablet Take 650 mg by mouth every 6 hours as needed for mild pain       albuterol (PROVENTIL) (2.5 MG/3ML) 0.083% neb solution Take 1 vial (2.5 mg) by nebulization once as needed (refractory bronchospasm associated with hypersensitivity)  0     amLODIPine (NORVASC) 2.5 MG tablet Take 2.5 mg by mouth daily       amoxicillin (AMOXIL) 875 MG tablet Take 1 tablet (875 mg) by mouth 2 times daily for 10 days       apixaban ANTICOAGULANT (ELIQUIS) 5 MG tablet Take 1 tablet (5 mg) by mouth 2 times daily 180 tablet 0     ASPIRIN PO Take 81 mg by mouth At Bedtime       budesonide (PULMICORT) 0.5 MG/2ML neb solution Take 2 mLs (0.5 mg) by nebulization 2 times daily 180 ampule 0     Colchicine (COLCRYS PO) Take 0.6 mg by mouth daily TAKES IN THE EVENING       digoxin (LANOXIN) 125 MCG tablet Take 1 tablet (125 mcg) by mouth daily 90 tablet 3     FLUoxetine HCl (PROZAC PO) Take 40 mg by mouth daily       furosemide  (LASIX) 20 MG tablet Take 1 tablet (20 mg) by mouth daily 14 tablet 0     insulin aspart (NOVOLOG VIAL) 100 UNITS/ML vial Give before meals and before bed:  For Pre-Meal Glucose:  140-189 give 1 unit   190-239 give 2 units   240-289 give 3 units   290-339 give 4 units   = or >340 give 5 units     For Bedtime Glucose  200 - 239 give 1 units   240 - 289 give 1.5 units  290 - 339 give 2 units  = or >340 give 2.5 units 10 mL 0     insulin glargine (LANTUS SOLOSTAR PEN) 100 UNIT/ML pen Inject 10 Units Subcutaneous 2 times daily 3 mL 0     ipratropium - albuterol 0.5 mg/2.5 mg/3 mL (DUONEB) 0.5-2.5 (3) MG/3ML neb solution Take 1 vial by nebulization 3 times daily as needed for shortness of breath / dyspnea, wheezing or other (use up to 3 times a day for shortness of breath, cough)       levothyroxine (SYNTHROID/LEVOTHROID) 125 MCG tablet Take 125 mcg by mouth daily       LISINOPRIL PO Take 20 mg by mouth daily       loperamide (IMODIUM) 2 MG capsule Take 1 capsule (2 mg) by mouth 4 times daily as needed for diarrhea 20 capsule      melatonin 5 MG tablet Take 5 mg by mouth At Bedtime       metoprolol (TOPROL-XL) 25 MG 24 hr tablet Take 1 tablet (25 mg) by mouth 2 times daily 180 tablet 0     multivitamin w/minerals (THERA-VIT-M) tablet Take 1 tablet by mouth daily 90 tablet 3     nystatin (MYCOSTATIN) 499603 UNIT/ML suspension Take 5 mLs by mouth 4 times daily       OMEPRAZOLE PO Take 40 mg by mouth daily       ondansetron (ZOFRAN) 4 MG tablet Take 4 mg by mouth every 6 hours as needed for nausea        phenol (CHLORASEPTIC) 1.4 % spray Take 1 spray by mouth every hour as needed for sore throat       predniSONE (DELTASONE) 10 MG tablet Take 10 mg by mouth daily.       rosuvastatin (CRESTOR) 10 MG tablet Take 1 tablet (10 mg) by mouth daily 90 tablet 3     tadalafil (CIALIS) 20 MG tablet Take 20 mg by mouth daily For pulmonary hypertension.       tamsulosin (FLOMAX) 0.4 MG capsule Take 1 capsule (0.4 mg) by mouth daily 30  "capsule 3     trimethoprim-polymyxin b (POLYTRIM) 28298-0.1 UNIT/ML-% ophthalmic solution Place 1-2 drops into both eyes every 6 hours for 10 days 4 mL 0     vancomycin (VANCOCIN) 125 MG capsule Take 125 mg by mouth 4 times daily For 10 days.       zolpidem (AMBIEN) 5 MG tablet Take 5 mg by mouth At Bedtime       Medications reviewed:  Medications reconciled to facility chart and changes were made to reflect current medications as identified as above med list. Below are the changes that were made:   Medications stopped since last EPIC medication reconciliation:   There are no discontinued medications.    Medications started since last [facility] medication reconciliation:  Orders Placed This Encounter   Medications     phenol (CHLORASEPTIC) 1.4 % spray     Sig: Take 1 spray by mouth every hour as needed for sore throat     zolpidem (AMBIEN) 5 MG tablet     Sig: Take 5 mg by mouth At Bedtime     nystatin (MYCOSTATIN) 258859 UNIT/ML suspension     Sig: Take 5 mLs by mouth 4 times daily         REVIEW OF SYSTEMS:  4 point ROS including Respiratory, CV, GI and , other than that noted in the HPI,  is negative     Physical Exam:  /67   Pulse 69   Temp 98.5  F (36.9  C)   Resp 20   Ht 1.689 m (5' 6.5\")   Wt 62.9 kg (138 lb 9.6 oz)   SpO2 93%   BMI 22.04 kg/m     GENERAL APPEARANCE:  Alert, in no distress  ENT:  Mouth and posterior oropharynx normal, moist mucous membranes, hearing acuity adequate   EYES:  EOM, conjunctivae- slightly red, lids, pupils and irises normal  RESP:  respiratory effort and palpation of chest normal, no respiratory distress, Lung sounds faint rales in LLL.   CV:  Palpation and auscultation of heart done , rate and rhythm reg, no murmur, no rub or gallop, Edema none  ABDOMEN:  normal bowel sounds, soft, nontender, no hepatosplenomegaly or other masses  M/S:   Gait and station not observed, Digits and nails normal   SKIN:  Inspection/Palpation of skin and subcutaneous tissue scattered " bruises on arms. Seborrheic dermatoses on back  NEURO: 2-12 in normal limits and at patient's baseline  PSYCH:  insight and judgement, memory intact , affect and mood normal    Recent Labs:   12/21 WBC 22.8, plt 94  CBC RESULTS:   Recent Labs   Lab Test 12/19/18  0631 12/19/18   WBC 31.9* 31.9*   RBC 3.87* 3.87*   HGB 11.1* 11.1*   HCT 33.8* 33.8*   MCV 87 87   MCH 28.7 28.7   MCHC 32.8 32.8   RDW 17.8* 17.8*   PLT 83* 83*       Last Basic Metabolic Panel:  Recent Labs   Lab Test 12/19/18  0631 12/19/18    141   POTASSIUM 3.5 3.5   CHLORIDE 106 106   AV 7.7* 7.7*   CO2 28 28   BUN 17 17   CR 0.64* 0.64*   GLC 44* 44*       Liver Function Studies -   Recent Labs   Lab Test 12/11/18  0733 12/06/18  0708   PROTTOTAL 5.3* 4.9*   ALBUMIN 2.4* 2.2*   BILITOTAL 0.5 0.3   ALKPHOS 53 41   AST 22 38   ALT 29 44     Lab Results   Component Value Date    A1C 10.7 11/23/2018    A1C 8.1 03/13/2017         Assessment/Plan:  (A04.72) C. difficile colitis  (primary encounter diagnosis)  Comment: started oral vanco on 12/20. Patient reports much less frequent loose stools. Only one since yesterday morning.   Plan: complete course of vanco        (J32.9) Sinusitis  (H10.9) Conjunctivitis  Comment: started amoxicillin on 12/21 for headache facial pain, gritty eyes. Feels better today.   Plan: complete amoxicllin.     (D72.829) Leukocytosis  (C34.90) 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)  Comment: diagnosed during this hospital stay. He did receive a round  Of chemotherapy in hospital. He did meet with palliative care multiple times but at this time asks for full medical treatment. He completed a week of neupogen. See labs above  Plan:.  Follow up with oncology in two weeks.     (J44.1) Chronic obstructive pulmonary disease with acute exacerbation (H)  (R06.03) Acute respiratory distress  Comment: oxygen dependent. O2 sats drop to 70s with activity. He is using oxygen at 10L per minute.   Plan: continue to monitor O2 sats. Meter  out activity to save energy and keep oxygen levels as high as possible    (I27.20) Pulmonary hypertension (H)  Comment: taking PTA tadalafil and digoxin  Plan: continue    (J84.10) Pulmonary fibrosis (H)  Comment: due to histiocytosis.     (I26.99) Other acute pulmonary embolism without acute cor pulmonale (H)  Comment: found on chest CT.   Plan: eliquis.     (E11.8,  Z79.4) Type 2 diabetes mellitus with complication, with long-term current use of insulin (H)  Comment: elevated BGs due to steroids  Plan: continue lantus and novolog for now.    (I50.22) Chronic systolic heart failure (H)  (I25.10) Coronary artery disease involving native coronary artery of native heart without angina pectoris  Comment: lasix increased to daily. wt  Stable  Plan: daily wts    (I10) Benign essential hypertension  Comment: adequate control  Plan: monitor     (R53.81) Physical deconditioning  Comment: due to respiratory disease. At this point he gets short of breath walking to BR. he is quite exhausted and cannot participate in therapy at this time.   Plan: physical therapy and OCCUPATIONAL THERAPY         Electronically signed by  IRINA Saul CNP

## 2019-01-01 ENCOUNTER — APPOINTMENT (OUTPATIENT)
Dept: PHYSICAL THERAPY | Facility: CLINIC | Age: 74
DRG: 180 | End: 2019-01-01
Attending: INTERNAL MEDICINE
Payer: MEDICARE

## 2019-01-01 ENCOUNTER — APPOINTMENT (OUTPATIENT)
Dept: CT IMAGING | Facility: CLINIC | Age: 74
DRG: 180 | End: 2019-01-01
Attending: INTERNAL MEDICINE
Payer: MEDICARE

## 2019-01-01 ENCOUNTER — APPOINTMENT (OUTPATIENT)
Dept: ULTRASOUND IMAGING | Facility: CLINIC | Age: 74
DRG: 180 | End: 2019-01-01
Attending: INTERNAL MEDICINE
Payer: MEDICARE

## 2019-01-01 ENCOUNTER — APPOINTMENT (OUTPATIENT)
Dept: OCCUPATIONAL THERAPY | Facility: CLINIC | Age: 74
DRG: 180 | End: 2019-01-01
Attending: INTERNAL MEDICINE
Payer: MEDICARE

## 2019-01-01 ENCOUNTER — TRANSFERRED RECORDS (OUTPATIENT)
Dept: HEALTH INFORMATION MANAGEMENT | Facility: CLINIC | Age: 74
End: 2019-01-01

## 2019-01-01 ENCOUNTER — APPOINTMENT (OUTPATIENT)
Dept: GENERAL RADIOLOGY | Facility: CLINIC | Age: 74
DRG: 180 | End: 2019-01-01
Attending: INTERNAL MEDICINE
Payer: MEDICARE

## 2019-01-01 ENCOUNTER — HOSPITAL LABORATORY (OUTPATIENT)
Dept: OTHER | Facility: CLINIC | Age: 74
End: 2019-01-01

## 2019-01-01 ENCOUNTER — DISCHARGE SUMMARY NURSING HOME (OUTPATIENT)
Dept: GERIATRICS | Facility: CLINIC | Age: 74
End: 2019-01-01
Payer: MEDICARE

## 2019-01-01 ENCOUNTER — DOCUMENTATION ONLY (OUTPATIENT)
Dept: OTHER | Facility: CLINIC | Age: 74
End: 2019-01-01

## 2019-01-01 ENCOUNTER — HOSPITAL ENCOUNTER (INPATIENT)
Facility: CLINIC | Age: 74
LOS: 12 days | Discharge: HOSPICE | DRG: 180 | End: 2019-01-29
Attending: INTERNAL MEDICINE | Admitting: INTERNAL MEDICINE
Payer: MEDICARE

## 2019-01-01 ENCOUNTER — NURSING HOME VISIT (OUTPATIENT)
Dept: GERIATRICS | Facility: CLINIC | Age: 74
End: 2019-01-01
Payer: MEDICARE

## 2019-01-01 VITALS
SYSTOLIC BLOOD PRESSURE: 101 MMHG | BODY MASS INDEX: 20.72 KG/M2 | RESPIRATION RATE: 18 BRPM | TEMPERATURE: 95.4 F | OXYGEN SATURATION: 92 % | HEIGHT: 67 IN | HEART RATE: 88 BPM | DIASTOLIC BLOOD PRESSURE: 67 MMHG | WEIGHT: 132 LBS

## 2019-01-01 VITALS
TEMPERATURE: 97.6 F | SYSTOLIC BLOOD PRESSURE: 166 MMHG | HEART RATE: 59 BPM | DIASTOLIC BLOOD PRESSURE: 85 MMHG | BODY MASS INDEX: 22.54 KG/M2 | HEIGHT: 67 IN | OXYGEN SATURATION: 93 % | WEIGHT: 143.6 LBS | RESPIRATION RATE: 20 BRPM

## 2019-01-01 VITALS
HEART RATE: 65 BPM | DIASTOLIC BLOOD PRESSURE: 80 MMHG | SYSTOLIC BLOOD PRESSURE: 164 MMHG | OXYGEN SATURATION: 85 % | RESPIRATION RATE: 20 BRPM | TEMPERATURE: 96.5 F

## 2019-01-01 VITALS
RESPIRATION RATE: 16 BRPM | BODY MASS INDEX: 22.83 KG/M2 | TEMPERATURE: 97.4 F | WEIGHT: 143.6 LBS | OXYGEN SATURATION: 92 % | DIASTOLIC BLOOD PRESSURE: 87 MMHG | HEART RATE: 71 BPM | SYSTOLIC BLOOD PRESSURE: 159 MMHG

## 2019-01-01 DIAGNOSIS — Z86.711 H/O PULMONARY ARTERY THROMBOSIS: ICD-10-CM

## 2019-01-01 DIAGNOSIS — R53.81 DEBILITY: ICD-10-CM

## 2019-01-01 DIAGNOSIS — I27.20 PULMONARY HYPERTENSION (H): ICD-10-CM

## 2019-01-01 DIAGNOSIS — I10 ESSENTIAL HYPERTENSION: ICD-10-CM

## 2019-01-01 DIAGNOSIS — J96.11 CHRONIC RESPIRATORY FAILURE WITH HYPOXIA (H): Primary | ICD-10-CM

## 2019-01-01 DIAGNOSIS — J84.10 PULMONARY FIBROSIS (H): ICD-10-CM

## 2019-01-01 DIAGNOSIS — Z79.4 TYPE 2 DIABETES MELLITUS WITH COMPLICATION, WITH LONG-TERM CURRENT USE OF INSULIN (H): ICD-10-CM

## 2019-01-01 DIAGNOSIS — E11.8 TYPE 2 DIABETES MELLITUS WITH COMPLICATION, WITH LONG-TERM CURRENT USE OF INSULIN (H): ICD-10-CM

## 2019-01-01 DIAGNOSIS — J91.0 MALIGNANT PLEURAL EFFUSION (H): ICD-10-CM

## 2019-01-01 DIAGNOSIS — J44.1 CHRONIC OBSTRUCTIVE PULMONARY DISEASE WITH ACUTE EXACERBATION (H): ICD-10-CM

## 2019-01-01 DIAGNOSIS — J91.0 MALIGNANT PLEURAL EFFUSION (H): Primary | ICD-10-CM

## 2019-01-01 DIAGNOSIS — I25.10 CORONARY ARTERY DISEASE INVOLVING NATIVE CORONARY ARTERY OF NATIVE HEART WITHOUT ANGINA PECTORIS: ICD-10-CM

## 2019-01-01 DIAGNOSIS — E03.8 OTHER SPECIFIED HYPOTHYROIDISM: ICD-10-CM

## 2019-01-01 DIAGNOSIS — I26.99 OTHER ACUTE PULMONARY EMBOLISM WITHOUT ACUTE COR PULMONALE (H): ICD-10-CM

## 2019-01-01 DIAGNOSIS — R53.81 PHYSICAL DECONDITIONING: ICD-10-CM

## 2019-01-01 DIAGNOSIS — J32.9 SINUSITIS, UNSPECIFIED CHRONICITY, UNSPECIFIED LOCATION: ICD-10-CM

## 2019-01-01 DIAGNOSIS — I50.22 CHRONIC SYSTOLIC HEART FAILURE (H): ICD-10-CM

## 2019-01-01 DIAGNOSIS — C7A.8 NEUROENDOCRINE CARCINOMA METASTATIC TO MULTIPLE SITES (H): ICD-10-CM

## 2019-01-01 DIAGNOSIS — F32.A DEPRESSION, UNSPECIFIED DEPRESSION TYPE: ICD-10-CM

## 2019-01-01 DIAGNOSIS — A04.72 C. DIFFICILE COLITIS: ICD-10-CM

## 2019-01-01 DIAGNOSIS — I10 BENIGN ESSENTIAL HYPERTENSION: ICD-10-CM

## 2019-01-01 DIAGNOSIS — N40.0 BENIGN PROSTATIC HYPERPLASIA WITHOUT LOWER URINARY TRACT SYMPTOMS: ICD-10-CM

## 2019-01-01 DIAGNOSIS — J96.11 CHRONIC RESPIRATORY FAILURE WITH HYPOXIA (H): ICD-10-CM

## 2019-01-01 DIAGNOSIS — C7B.8 NEUROENDOCRINE CARCINOMA METASTATIC TO MULTIPLE SITES (H): ICD-10-CM

## 2019-01-01 DIAGNOSIS — C34.90 SMALL CELL LUNG CANCER IN ADULT (H): Primary | ICD-10-CM

## 2019-01-01 DIAGNOSIS — C34.90 SMALL CELL LUNG CANCER IN ADULT (H): ICD-10-CM

## 2019-01-01 DIAGNOSIS — R06.03 ACUTE RESPIRATORY DISTRESS: ICD-10-CM

## 2019-01-01 DIAGNOSIS — K76.9 LIVER LESION: ICD-10-CM

## 2019-01-01 DIAGNOSIS — I50.812 CHRONIC RIGHT-SIDED HEART FAILURE (H): ICD-10-CM

## 2019-01-01 DIAGNOSIS — E78.5 HYPERLIPIDEMIA LDL GOAL <70: ICD-10-CM

## 2019-01-01 LAB
ALT SERPL-CCNC: 33 U/L (ref 14–63)
ANION GAP SERPL CALCULATED.3IONS-SCNC: 10 MMOL/L (ref 3–14)
ANION GAP SERPL CALCULATED.3IONS-SCNC: 11 MMOL/L (ref 3–14)
ANION GAP SERPL CALCULATED.3IONS-SCNC: 6 MMOL/L (ref 3–14)
ANION GAP SERPL CALCULATED.3IONS-SCNC: 7 MMOL/L (ref 3–14)
ANION GAP SERPL CALCULATED.3IONS-SCNC: 7 MMOL/L (ref 3–14)
APPEARANCE FLD: NORMAL
AST SERPL-CCNC: 20 U/L (ref 15–37)
BACTERIA SPEC CULT: NO GROWTH
BACTERIA SPEC CULT: NORMAL
BASE EXCESS BLDA CALC-SCNC: 3.4 MMOL/L
BASOPHILS # BLD AUTO: 0 10E9/L (ref 0–0.2)
BASOPHILS NFR BLD AUTO: 0.1 %
BASOPHILS NFR FLD MANUAL: 1 %
BUN SERPL-MCNC: 14 MG/DL (ref 7–30)
BUN SERPL-MCNC: 18 MG/DL (ref 7–30)
BUN SERPL-MCNC: 20 MG/DL (ref 7–30)
BUN SERPL-MCNC: 21 MG/DL (ref 7–30)
BUN SERPL-MCNC: 25 MG/DL (ref 7–30)
CALCIUM SERPL-MCNC: 7.6 MG/DL (ref 8.5–10.1)
CALCIUM SERPL-MCNC: 7.8 MG/DL (ref 8.5–10.1)
CALCIUM SERPL-MCNC: 7.9 MG/DL (ref 8.5–10.1)
CALCIUM SERPL-MCNC: 7.9 MG/DL (ref 8.5–10.1)
CALCIUM SERPL-MCNC: 8.2 MG/DL (ref 8.5–10.1)
CHLORIDE SERPL-SCNC: 102 MMOL/L (ref 94–109)
CHLORIDE SERPL-SCNC: 104 MMOL/L (ref 94–109)
CHLORIDE SERPL-SCNC: 105 MMOL/L (ref 94–109)
CHLORIDE SERPL-SCNC: 106 MMOL/L (ref 94–109)
CHLORIDE SERPL-SCNC: 108 MMOL/L (ref 94–109)
CO2 SERPL-SCNC: 26 MMOL/L (ref 20–32)
CO2 SERPL-SCNC: 27 MMOL/L (ref 20–32)
CO2 SERPL-SCNC: 28 MMOL/L (ref 20–32)
COLOR FLD: YELLOW
CREAT SERPL-MCNC: 0.62 MG/DL (ref 0.66–1.25)
CREAT SERPL-MCNC: 0.63 MG/DL (ref 0.66–1.25)
CREAT SERPL-MCNC: 0.66 MG/DL (ref 0.66–1.25)
CREAT SERPL-MCNC: 0.66 MG/DL (ref 0.67–1.17)
CREAT SERPL-MCNC: 0.72 MG/DL (ref 0.66–1.25)
CREAT SERPL-MCNC: 0.76 MG/DL (ref 0.66–1.25)
CREAT SERPL-MCNC: 0.79 MG/DL (ref 0.66–1.25)
CREAT SERPL-MCNC: 0.84 MG/DL (ref 0.66–1.25)
CRP SERPL-MCNC: 7.2 MG/L (ref 0–8)
DIFFERENTIAL METHOD BLD: ABNORMAL
DIGOXIN SERPL-MCNC: 0.6 UG/L (ref 0.5–2)
DIGOXIN SERPL-MCNC: 0.7 UG/L (ref 0.5–2)
EOSINOPHIL # BLD AUTO: 0 10E9/L (ref 0–0.7)
EOSINOPHIL NFR BLD AUTO: 0 %
ERYTHROCYTE [DISTWIDTH] IN BLOOD BY AUTOMATED COUNT: 16.8 % (ref 10–15)
ERYTHROCYTE [DISTWIDTH] IN BLOOD BY AUTOMATED COUNT: 17.1 % (ref 10–15)
ERYTHROCYTE [DISTWIDTH] IN BLOOD BY AUTOMATED COUNT: 17.2 % (ref 10–15)
ERYTHROCYTE [DISTWIDTH] IN BLOOD BY AUTOMATED COUNT: 17.2 % (ref 10–15)
ERYTHROCYTE [DISTWIDTH] IN BLOOD BY AUTOMATED COUNT: 17.3 % (ref 10–15)
ERYTHROCYTE [DISTWIDTH] IN BLOOD BY AUTOMATED COUNT: 18.4 % (ref 10–15)
ERYTHROCYTE [DISTWIDTH] IN BLOOD BY AUTOMATED COUNT: 18.7 % (ref 10–15)
GFR SERPL CREATININE-BSD FRML MDRD: 85 ML/MIN/{1.73_M2}
GFR SERPL CREATININE-BSD FRML MDRD: 87 ML/MIN/{1.73_M2}
GFR SERPL CREATININE-BSD FRML MDRD: 89 ML/MIN/{1.73_M2}
GFR SERPL CREATININE-BSD FRML MDRD: >60 ML/MIN/1.73M2 (ref 60–150)
GFR SERPL CREATININE-BSD FRML MDRD: >90 ML/MIN/{1.73_M2}
GLUCOSE BLDC GLUCOMTR-MCNC: 106 MG/DL (ref 70–99)
GLUCOSE BLDC GLUCOMTR-MCNC: 125 MG/DL (ref 70–99)
GLUCOSE BLDC GLUCOMTR-MCNC: 128 MG/DL (ref 70–99)
GLUCOSE BLDC GLUCOMTR-MCNC: 129 MG/DL (ref 70–99)
GLUCOSE BLDC GLUCOMTR-MCNC: 130 MG/DL (ref 70–99)
GLUCOSE BLDC GLUCOMTR-MCNC: 133 MG/DL (ref 70–99)
GLUCOSE BLDC GLUCOMTR-MCNC: 134 MG/DL (ref 70–99)
GLUCOSE BLDC GLUCOMTR-MCNC: 134 MG/DL (ref 70–99)
GLUCOSE BLDC GLUCOMTR-MCNC: 135 MG/DL (ref 70–99)
GLUCOSE BLDC GLUCOMTR-MCNC: 136 MG/DL (ref 70–99)
GLUCOSE BLDC GLUCOMTR-MCNC: 138 MG/DL (ref 70–99)
GLUCOSE BLDC GLUCOMTR-MCNC: 141 MG/DL (ref 70–99)
GLUCOSE BLDC GLUCOMTR-MCNC: 142 MG/DL (ref 70–99)
GLUCOSE BLDC GLUCOMTR-MCNC: 152 MG/DL (ref 70–99)
GLUCOSE BLDC GLUCOMTR-MCNC: 159 MG/DL (ref 70–99)
GLUCOSE BLDC GLUCOMTR-MCNC: 160 MG/DL (ref 70–99)
GLUCOSE BLDC GLUCOMTR-MCNC: 167 MG/DL (ref 70–99)
GLUCOSE BLDC GLUCOMTR-MCNC: 174 MG/DL (ref 70–99)
GLUCOSE BLDC GLUCOMTR-MCNC: 175 MG/DL (ref 70–99)
GLUCOSE BLDC GLUCOMTR-MCNC: 186 MG/DL (ref 70–99)
GLUCOSE BLDC GLUCOMTR-MCNC: 193 MG/DL (ref 70–99)
GLUCOSE BLDC GLUCOMTR-MCNC: 200 MG/DL (ref 70–99)
GLUCOSE BLDC GLUCOMTR-MCNC: 204 MG/DL (ref 70–99)
GLUCOSE BLDC GLUCOMTR-MCNC: 205 MG/DL (ref 70–99)
GLUCOSE BLDC GLUCOMTR-MCNC: 205 MG/DL (ref 70–99)
GLUCOSE BLDC GLUCOMTR-MCNC: 218 MG/DL (ref 70–99)
GLUCOSE BLDC GLUCOMTR-MCNC: 218 MG/DL (ref 70–99)
GLUCOSE BLDC GLUCOMTR-MCNC: 222 MG/DL (ref 70–99)
GLUCOSE BLDC GLUCOMTR-MCNC: 224 MG/DL (ref 70–99)
GLUCOSE BLDC GLUCOMTR-MCNC: 225 MG/DL (ref 70–99)
GLUCOSE BLDC GLUCOMTR-MCNC: 226 MG/DL (ref 70–99)
GLUCOSE BLDC GLUCOMTR-MCNC: 229 MG/DL (ref 70–99)
GLUCOSE BLDC GLUCOMTR-MCNC: 229 MG/DL (ref 70–99)
GLUCOSE BLDC GLUCOMTR-MCNC: 230 MG/DL (ref 70–99)
GLUCOSE BLDC GLUCOMTR-MCNC: 230 MG/DL (ref 70–99)
GLUCOSE BLDC GLUCOMTR-MCNC: 238 MG/DL (ref 70–99)
GLUCOSE BLDC GLUCOMTR-MCNC: 239 MG/DL (ref 70–99)
GLUCOSE BLDC GLUCOMTR-MCNC: 241 MG/DL (ref 70–99)
GLUCOSE BLDC GLUCOMTR-MCNC: 254 MG/DL (ref 70–99)
GLUCOSE BLDC GLUCOMTR-MCNC: 256 MG/DL (ref 70–99)
GLUCOSE BLDC GLUCOMTR-MCNC: 269 MG/DL (ref 70–99)
GLUCOSE BLDC GLUCOMTR-MCNC: 282 MG/DL (ref 70–99)
GLUCOSE BLDC GLUCOMTR-MCNC: 303 MG/DL (ref 70–99)
GLUCOSE BLDC GLUCOMTR-MCNC: 306 MG/DL (ref 70–99)
GLUCOSE BLDC GLUCOMTR-MCNC: 312 MG/DL (ref 70–99)
GLUCOSE BLDC GLUCOMTR-MCNC: 328 MG/DL (ref 70–99)
GLUCOSE BLDC GLUCOMTR-MCNC: 336 MG/DL (ref 70–99)
GLUCOSE BLDC GLUCOMTR-MCNC: 71 MG/DL (ref 70–99)
GLUCOSE BLDC GLUCOMTR-MCNC: 83 MG/DL (ref 70–99)
GLUCOSE BLDC GLUCOMTR-MCNC: 90 MG/DL (ref 70–99)
GLUCOSE BLDC GLUCOMTR-MCNC: 91 MG/DL (ref 70–99)
GLUCOSE SERPL-MCNC: 279 MG/DL (ref 70–99)
GLUCOSE SERPL-MCNC: 292 MG/DL (ref 70–99)
GLUCOSE SERPL-MCNC: 293 MG/DL (ref 70–99)
GLUCOSE SERPL-MCNC: 381 MG/DL (ref 74–100)
GLUCOSE SERPL-MCNC: 83 MG/DL (ref 70–99)
GLUCOSE SERPL-MCNC: 99 MG/DL (ref 70–99)
HCO3 BLD-SCNC: 26 MMOL/L (ref 21–28)
HCT VFR BLD AUTO: 31.8 % (ref 40–53)
HCT VFR BLD AUTO: 34.8 % (ref 40–53)
HCT VFR BLD AUTO: 35.2 % (ref 40–53)
HCT VFR BLD AUTO: 36.2 % (ref 40–53)
HCT VFR BLD AUTO: 36.5 % (ref 40–53)
HCT VFR BLD AUTO: 37 % (ref 40–53)
HCT VFR BLD AUTO: 38.3 % (ref 40–53)
HGB BLD-MCNC: 10.5 G/DL (ref 13.3–17.7)
HGB BLD-MCNC: 11.5 G/DL (ref 13.3–17.7)
HGB BLD-MCNC: 11.5 G/DL (ref 13.3–17.7)
HGB BLD-MCNC: 11.8 G/DL (ref 13.3–17.7)
HGB BLD-MCNC: 11.9 G/DL (ref 13.3–17.7)
HGB BLD-MCNC: 12.1 G/DL (ref 13.3–17.7)
HGB BLD-MCNC: 12.3 G/DL (ref 13.3–17.7)
IMM GRANULOCYTES # BLD: 0.1 10E9/L (ref 0–0.4)
IMM GRANULOCYTES NFR BLD: 0.5 %
INR PPP: 1.16 (ref 0.86–1.14)
INTERPRETATION ECG - MUSE: NORMAL
LACTATE BLD-SCNC: 1.1 MMOL/L (ref 0.7–2)
LACTATE BLD-SCNC: 1.4 MMOL/L (ref 0.7–2)
LACTATE BLD-SCNC: 1.7 MMOL/L (ref 0.7–2)
LACTATE BLD-SCNC: 1.7 MMOL/L (ref 0.7–2)
LACTATE BLD-SCNC: 2.1 MMOL/L (ref 0.7–2)
LACTATE BLD-SCNC: 2.2 MMOL/L (ref 0.7–2)
LACTATE BLD-SCNC: 2.2 MMOL/L (ref 0.7–2)
LACTATE BLD-SCNC: 2.5 MMOL/L (ref 0.7–2)
LACTATE BLD-SCNC: 2.5 MMOL/L (ref 0.7–2)
LACTATE BLD-SCNC: 2.7 MMOL/L (ref 0.7–2)
LACTATE BLD-SCNC: 2.9 MMOL/L (ref 0.7–2)
LACTATE BLD-SCNC: 3.4 MMOL/L (ref 0.7–2)
LACTATE BLD-SCNC: 3.8 MMOL/L (ref 0.7–2)
LACTATE BLD-SCNC: 5 MMOL/L (ref 0.7–2)
LDH FLD L TO P-CCNC: 255 U/L
LDH SERPL L TO P-CCNC: 308 U/L (ref 85–227)
LYMPHOCYTES # BLD AUTO: 1.7 10E9/L (ref 0.8–5.3)
LYMPHOCYTES NFR BLD AUTO: 12.8 %
LYMPHOCYTES NFR FLD MANUAL: 38 %
Lab: NORMAL
MAGNESIUM SERPL-MCNC: 2 MG/DL (ref 1.6–2.3)
MCH RBC QN AUTO: 28.6 PG (ref 26.5–33)
MCH RBC QN AUTO: 28.8 PG (ref 26.5–33)
MCH RBC QN AUTO: 29 PG (ref 26.5–33)
MCH RBC QN AUTO: 29.1 PG (ref 26.5–33)
MCH RBC QN AUTO: 29.2 PG (ref 26.5–33)
MCH RBC QN AUTO: 29.3 PG (ref 26.5–33)
MCH RBC QN AUTO: 29.3 PG (ref 26.5–33)
MCHC RBC AUTO-ENTMCNC: 32.1 G/DL (ref 31.5–36.5)
MCHC RBC AUTO-ENTMCNC: 32.3 G/DL (ref 31.5–36.5)
MCHC RBC AUTO-ENTMCNC: 32.7 G/DL (ref 31.5–36.5)
MCHC RBC AUTO-ENTMCNC: 32.7 G/DL (ref 31.5–36.5)
MCHC RBC AUTO-ENTMCNC: 32.9 G/DL (ref 31.5–36.5)
MCHC RBC AUTO-ENTMCNC: 33 G/DL (ref 31.5–36.5)
MCHC RBC AUTO-ENTMCNC: 33 G/DL (ref 31.5–36.5)
MCV RBC AUTO: 88 FL (ref 78–100)
MCV RBC AUTO: 88 FL (ref 78–100)
MCV RBC AUTO: 89 FL (ref 78–100)
MCV RBC AUTO: 91 FL (ref 78–100)
MONOCYTES # BLD AUTO: 0.3 10E9/L (ref 0–1.3)
MONOCYTES NFR BLD AUTO: 2 %
MONOS+MACROS NFR FLD MANUAL: 35 %
NEUTROPHILS # BLD AUTO: 10.9 10E9/L (ref 1.6–8.3)
NEUTROPHILS NFR BLD AUTO: 84.6 %
NEUTS BAND NFR FLD MANUAL: 7 %
NRBC # BLD AUTO: 0 10*3/UL
NRBC BLD AUTO-RTO: 0 /100
OTHER CELLS FLD MANUAL: 19 %
OXYHGB MFR BLD: 91 % (ref 92–100)
PCO2 BLD: 31 MM HG (ref 35–45)
PH BLD: 7.53 PH (ref 7.35–7.45)
PLATELET # BLD AUTO: 121 10E9/L (ref 150–450)
PLATELET # BLD AUTO: 125 10E9/L (ref 150–450)
PLATELET # BLD AUTO: 128 10E9/L (ref 150–450)
PLATELET # BLD AUTO: 137 10E9/L (ref 150–450)
PLATELET # BLD AUTO: 155 10E9/L (ref 150–450)
PLATELET # BLD AUTO: 156 10E9/L (ref 150–450)
PLATELET # BLD AUTO: 169 10E9/L (ref 150–450)
PLATELET # BLD AUTO: 236 10E9/L (ref 150–450)
PO2 BLD: 56 MM HG (ref 80–105)
POTASSIUM SERPL-SCNC: 3.9 MMOL/L (ref 3.4–5.3)
POTASSIUM SERPL-SCNC: 4 MMOL/L (ref 3.4–5.3)
POTASSIUM SERPL-SCNC: 4.1 MMOL/L (ref 3.4–5.3)
POTASSIUM SERPL-SCNC: 4.1 MMOL/L (ref 3.4–5.3)
POTASSIUM SERPL-SCNC: 4.2 MMOL/L (ref 3.5–5.1)
POTASSIUM SERPL-SCNC: 4.3 MMOL/L (ref 3.4–5.3)
PROCALCITONIN SERPL-MCNC: 0.07 NG/ML
PROCALCITONIN SERPL-MCNC: 0.19 NG/ML
PROT FLD-MCNC: 2.9 G/DL
PROT SERPL-MCNC: 6.2 G/DL (ref 6.8–8.8)
RBC # BLD AUTO: 3.62 10E12/L (ref 4.4–5.9)
RBC # BLD AUTO: 3.92 10E12/L (ref 4.4–5.9)
RBC # BLD AUTO: 3.99 10E12/L (ref 4.4–5.9)
RBC # BLD AUTO: 4.07 10E12/L (ref 4.4–5.9)
RBC # BLD AUTO: 4.12 10E12/L (ref 4.4–5.9)
RBC # BLD AUTO: 4.14 10E12/L (ref 4.4–5.9)
RBC # BLD AUTO: 4.23 10E12/L (ref 4.4–5.9)
SODIUM SERPL-SCNC: 139 MMOL/L (ref 133–144)
SODIUM SERPL-SCNC: 139 MMOL/L (ref 133–144)
SODIUM SERPL-SCNC: 140 MMOL/L (ref 133–144)
SODIUM SERPL-SCNC: 140 MMOL/L (ref 133–144)
SODIUM SERPL-SCNC: 141 MMOL/L (ref 133–144)
SPECIMEN SOURCE FLD: NORMAL
SPECIMEN SOURCE: NORMAL
SPECIMEN SOURCE: NORMAL
WBC # BLD AUTO: 12.2 10E9/L (ref 4–11)
WBC # BLD AUTO: 12.8 10E9/L (ref 4–11)
WBC # BLD AUTO: 12.9 10E9/L (ref 4–11)
WBC # BLD AUTO: 13.5 10E9/L (ref 4–11)
WBC # BLD AUTO: 13.8 10E9/L (ref 4–11)
WBC # BLD AUTO: 15.8 10E9/L (ref 4–11)
WBC # BLD AUTO: 8.7 10E9/L (ref 4–11)
WBC # FLD AUTO: 2149 /UL

## 2019-01-01 PROCEDURE — 12000000 ZZH R&B MED SURG/OB

## 2019-01-01 PROCEDURE — 00000146 ZZHCL STATISTIC GLUCOSE BY METER IP

## 2019-01-01 PROCEDURE — 25000132 ZZH RX MED GY IP 250 OP 250 PS 637: Mod: GY | Performed by: HOSPITALIST

## 2019-01-01 PROCEDURE — A9270 NON-COVERED ITEM OR SERVICE: HCPCS | Mod: GY | Performed by: HOSPITALIST

## 2019-01-01 PROCEDURE — 27210190 US THORACENTESIS

## 2019-01-01 PROCEDURE — 94660 CPAP INITIATION&MGMT: CPT

## 2019-01-01 PROCEDURE — 25000128 H RX IP 250 OP 636: Performed by: HOSPITALIST

## 2019-01-01 PROCEDURE — 25000132 ZZH RX MED GY IP 250 OP 250 PS 637: Mod: GY | Performed by: INTERNAL MEDICINE

## 2019-01-01 PROCEDURE — A9270 NON-COVERED ITEM OR SERVICE: HCPCS | Mod: GY | Performed by: INTERNAL MEDICINE

## 2019-01-01 PROCEDURE — 93005 ELECTROCARDIOGRAM TRACING: CPT

## 2019-01-01 PROCEDURE — 97110 THERAPEUTIC EXERCISES: CPT | Mod: GP

## 2019-01-01 PROCEDURE — 25000125 ZZHC RX 250: Performed by: HOSPITALIST

## 2019-01-01 PROCEDURE — 40000275 ZZH STATISTIC RCP TIME EA 10 MIN

## 2019-01-01 PROCEDURE — 94640 AIRWAY INHALATION TREATMENT: CPT | Mod: 76

## 2019-01-01 PROCEDURE — 25000131 ZZH RX MED GY IP 250 OP 636 PS 637: Mod: GY | Performed by: HOSPITALIST

## 2019-01-01 PROCEDURE — 99309 SBSQ NF CARE MODERATE MDM 30: CPT | Performed by: NURSE PRACTITIONER

## 2019-01-01 PROCEDURE — 85027 COMPLETE CBC AUTOMATED: CPT | Performed by: HOSPITALIST

## 2019-01-01 PROCEDURE — 25000125 ZZHC RX 250: Performed by: INTERNAL MEDICINE

## 2019-01-01 PROCEDURE — 82565 ASSAY OF CREATININE: CPT | Performed by: INTERNAL MEDICINE

## 2019-01-01 PROCEDURE — 97110 THERAPEUTIC EXERCISES: CPT | Mod: GO | Performed by: REHABILITATION PRACTITIONER

## 2019-01-01 PROCEDURE — 36415 COLL VENOUS BLD VENIPUNCTURE: CPT | Performed by: INTERNAL MEDICINE

## 2019-01-01 PROCEDURE — 94640 AIRWAY INHALATION TREATMENT: CPT

## 2019-01-01 PROCEDURE — 99232 SBSQ HOSP IP/OBS MODERATE 35: CPT | Performed by: HOSPITALIST

## 2019-01-01 PROCEDURE — 71260 CT THORAX DX C+: CPT

## 2019-01-01 PROCEDURE — 99233 SBSQ HOSP IP/OBS HIGH 50: CPT | Performed by: HOSPITALIST

## 2019-01-01 PROCEDURE — 25000128 H RX IP 250 OP 636: Performed by: INTERNAL MEDICINE

## 2019-01-01 PROCEDURE — 25000125 ZZHC RX 250: Performed by: PHYSICIAN ASSISTANT

## 2019-01-01 PROCEDURE — 97535 SELF CARE MNGMENT TRAINING: CPT | Mod: GO | Performed by: REHABILITATION PRACTITIONER

## 2019-01-01 PROCEDURE — 80048 BASIC METABOLIC PNL TOTAL CA: CPT | Performed by: HOSPITALIST

## 2019-01-01 PROCEDURE — 80162 ASSAY OF DIGOXIN TOTAL: CPT | Performed by: INTERNAL MEDICINE

## 2019-01-01 PROCEDURE — 36415 COLL VENOUS BLD VENIPUNCTURE: CPT | Performed by: HOSPITALIST

## 2019-01-01 PROCEDURE — 99316 NF DSCHRG MGMT 30 MIN+: CPT | Performed by: NURSE PRACTITIONER

## 2019-01-01 PROCEDURE — 25000128 H RX IP 250 OP 636

## 2019-01-01 PROCEDURE — 99223 1ST HOSP IP/OBS HIGH 75: CPT | Performed by: NURSE PRACTITIONER

## 2019-01-01 PROCEDURE — 83615 LACTATE (LD) (LDH) ENZYME: CPT | Performed by: INTERNAL MEDICINE

## 2019-01-01 PROCEDURE — 40000133 ZZH STATISTIC OT WARD VISIT

## 2019-01-01 PROCEDURE — 40000141 ZZH STATISTIC PERIPHERAL IV START W/O US GUIDANCE

## 2019-01-01 PROCEDURE — 0W993ZZ DRAINAGE OF RIGHT PLEURAL CAVITY, PERCUTANEOUS APPROACH: ICD-10-PCS | Performed by: PHYSICIAN ASSISTANT

## 2019-01-01 PROCEDURE — 99223 1ST HOSP IP/OBS HIGH 75: CPT | Mod: AI | Performed by: INTERNAL MEDICINE

## 2019-01-01 PROCEDURE — 40000133 ZZH STATISTIC OT WARD VISIT: Performed by: REHABILITATION PRACTITIONER

## 2019-01-01 PROCEDURE — 40000193 ZZH STATISTIC PT WARD VISIT

## 2019-01-01 PROCEDURE — 80048 BASIC METABOLIC PNL TOTAL CA: CPT | Performed by: INTERNAL MEDICINE

## 2019-01-01 PROCEDURE — 93010 ELECTROCARDIOGRAM REPORT: CPT | Performed by: INTERNAL MEDICINE

## 2019-01-01 PROCEDURE — 71046 X-RAY EXAM CHEST 2 VIEWS: CPT

## 2019-01-01 PROCEDURE — 83605 ASSAY OF LACTIC ACID: CPT | Performed by: INTERNAL MEDICINE

## 2019-01-01 PROCEDURE — 71045 X-RAY EXAM CHEST 1 VIEW: CPT

## 2019-01-01 PROCEDURE — 97161 PT EVAL LOW COMPLEX 20 MIN: CPT | Mod: GP

## 2019-01-01 PROCEDURE — 87070 CULTURE OTHR SPECIMN AEROBIC: CPT | Performed by: INTERNAL MEDICINE

## 2019-01-01 PROCEDURE — 85027 COMPLETE CBC AUTOMATED: CPT | Performed by: NURSE PRACTITIONER

## 2019-01-01 PROCEDURE — 84145 PROCALCITONIN (PCT): CPT | Performed by: HOSPITALIST

## 2019-01-01 PROCEDURE — 83735 ASSAY OF MAGNESIUM: CPT | Performed by: HOSPITALIST

## 2019-01-01 PROCEDURE — 84155 ASSAY OF PROTEIN SERUM: CPT | Performed by: INTERNAL MEDICINE

## 2019-01-01 PROCEDURE — 99232 SBSQ HOSP IP/OBS MODERATE 35: CPT | Performed by: NURSE PRACTITIONER

## 2019-01-01 PROCEDURE — 84145 PROCALCITONIN (PCT): CPT | Performed by: INTERNAL MEDICINE

## 2019-01-01 PROCEDURE — 85049 AUTOMATED PLATELET COUNT: CPT | Performed by: INTERNAL MEDICINE

## 2019-01-01 PROCEDURE — 84157 ASSAY OF PROTEIN OTHER: CPT | Performed by: INTERNAL MEDICINE

## 2019-01-01 PROCEDURE — 86140 C-REACTIVE PROTEIN: CPT | Performed by: HOSPITALIST

## 2019-01-01 PROCEDURE — 82805 BLOOD GASES W/O2 SATURATION: CPT | Performed by: PHYSICIAN ASSISTANT

## 2019-01-01 PROCEDURE — 99207 ZZC NO CHARGE LOS: CPT | Performed by: NURSE PRACTITIONER

## 2019-01-01 PROCEDURE — 97165 OT EVAL LOW COMPLEX 30 MIN: CPT | Mod: GO | Performed by: REHABILITATION PRACTITIONER

## 2019-01-01 PROCEDURE — 80162 ASSAY OF DIGOXIN TOTAL: CPT | Performed by: HOSPITALIST

## 2019-01-01 PROCEDURE — 97110 THERAPEUTIC EXERCISES: CPT | Mod: GO

## 2019-01-01 PROCEDURE — 36415 COLL VENOUS BLD VENIPUNCTURE: CPT | Performed by: NURSE PRACTITIONER

## 2019-01-01 PROCEDURE — 85610 PROTHROMBIN TIME: CPT | Performed by: INTERNAL MEDICINE

## 2019-01-01 PROCEDURE — 83605 ASSAY OF LACTIC ACID: CPT | Performed by: HOSPITALIST

## 2019-01-01 PROCEDURE — 89051 BODY FLUID CELL COUNT: CPT | Performed by: INTERNAL MEDICINE

## 2019-01-01 PROCEDURE — 40000809 ZZH STATISTIC NO DOCUMENTATION TO SUPPORT CHARGE

## 2019-01-01 PROCEDURE — 25000131 ZZH RX MED GY IP 250 OP 636 PS 637: Mod: GY | Performed by: INTERNAL MEDICINE

## 2019-01-01 PROCEDURE — 99238 HOSP IP/OBS DSCHRG MGMT 30/<: CPT | Mod: GW | Performed by: INTERNAL MEDICINE

## 2019-01-01 PROCEDURE — 99207 ZZC APP CREDIT; MD BILLING SHARED VISIT: CPT | Performed by: PHYSICIAN ASSISTANT

## 2019-01-01 PROCEDURE — 85025 COMPLETE CBC W/AUTO DIFF WBC: CPT | Performed by: INTERNAL MEDICINE

## 2019-01-01 PROCEDURE — 87075 CULTR BACTERIA EXCEPT BLOOD: CPT | Performed by: INTERNAL MEDICINE

## 2019-01-01 RX ORDER — TADALAFIL 20 MG/1
20 TABLET ORAL DAILY
Qty: 30 TABLET | Refills: 0 | Status: ON HOLD | OUTPATIENT
Start: 2019-01-01 | End: 2019-01-01

## 2019-01-01 RX ORDER — METHYLPREDNISOLONE SODIUM SUCCINATE 125 MG/2ML
60 INJECTION, POWDER, LYOPHILIZED, FOR SOLUTION INTRAMUSCULAR; INTRAVENOUS EVERY 24 HOURS
Status: DISCONTINUED | OUTPATIENT
Start: 2019-01-01 | End: 2019-01-01

## 2019-01-01 RX ORDER — MULTIPLE VITAMINS W/ MINERALS TAB 9MG-400MCG
1 TAB ORAL DAILY
Qty: 30 TABLET | Refills: 0 | Status: SHIPPED | OUTPATIENT
Start: 2019-01-01

## 2019-01-01 RX ORDER — OMEPRAZOLE 40 MG/1
40 CAPSULE, DELAYED RELEASE ORAL DAILY
Qty: 30 CAPSULE | Refills: 0 | Status: SHIPPED | OUTPATIENT
Start: 2019-01-01 | End: 2020-01-09

## 2019-01-01 RX ORDER — VANCOMYCIN HYDROCHLORIDE 50 MG/ML
125 KIT ORAL 2 TIMES DAILY
Status: DISCONTINUED | OUTPATIENT
Start: 2019-01-01 | End: 2019-01-01

## 2019-01-01 RX ORDER — LEVOTHYROXINE SODIUM 125 UG/1
125 TABLET ORAL DAILY
Qty: 30 TABLET | Refills: 0 | Status: SHIPPED | OUTPATIENT
Start: 2019-01-01

## 2019-01-01 RX ORDER — ACETAMINOPHEN 650 MG/1
650 SUPPOSITORY RECTAL EVERY 4 HOURS PRN
Status: DISCONTINUED | OUTPATIENT
Start: 2019-01-01 | End: 2019-01-01 | Stop reason: HOSPADM

## 2019-01-01 RX ORDER — IPRATROPIUM BROMIDE AND ALBUTEROL SULFATE 2.5; .5 MG/3ML; MG/3ML
1 SOLUTION RESPIRATORY (INHALATION)
Status: DISCONTINUED | OUTPATIENT
Start: 2019-01-01 | End: 2019-01-01 | Stop reason: HOSPADM

## 2019-01-01 RX ORDER — DIGOXIN 125 MCG
125 TABLET ORAL DAILY
Qty: 30 TABLET | Refills: 3 | Status: SHIPPED | OUTPATIENT
Start: 2019-01-01

## 2019-01-01 RX ORDER — DEXTROSE MONOHYDRATE 25 G/50ML
25-50 INJECTION, SOLUTION INTRAVENOUS
Status: DISCONTINUED | OUTPATIENT
Start: 2019-01-01 | End: 2019-01-01

## 2019-01-01 RX ORDER — METOPROLOL TARTRATE 1 MG/ML
5 INJECTION, SOLUTION INTRAVENOUS EVERY 6 HOURS
Status: DISCONTINUED | OUTPATIENT
Start: 2019-01-01 | End: 2019-01-01

## 2019-01-01 RX ORDER — LORAZEPAM 2 MG/ML
0.5 INJECTION INTRAMUSCULAR ONCE
Status: COMPLETED | OUTPATIENT
Start: 2019-01-01 | End: 2019-01-01

## 2019-01-01 RX ORDER — FUROSEMIDE 10 MG/ML
40 INJECTION INTRAMUSCULAR; INTRAVENOUS ONCE
Status: COMPLETED | OUTPATIENT
Start: 2019-01-01 | End: 2019-01-01

## 2019-01-01 RX ORDER — GLIPIZIDE 10 MG/1
10 TABLET, FILM COATED, EXTENDED RELEASE ORAL DAILY
Qty: 30 TABLET | Refills: 0 | Status: SHIPPED | OUTPATIENT
Start: 2019-01-01

## 2019-01-01 RX ORDER — FLUOXETINE 40 MG/1
40 CAPSULE ORAL DAILY
Qty: 30 CAPSULE | Refills: 0 | Status: SHIPPED | OUTPATIENT
Start: 2019-01-01 | End: 2020-01-09

## 2019-01-01 RX ORDER — BISACODYL 10 MG
10 SUPPOSITORY, RECTAL RECTAL DAILY PRN
Status: DISCONTINUED | OUTPATIENT
Start: 2019-01-01 | End: 2019-01-01 | Stop reason: HOSPADM

## 2019-01-01 RX ORDER — AMLODIPINE BESYLATE 2.5 MG/1
2.5 TABLET ORAL DAILY
Qty: 30 TABLET | Refills: 0 | Status: SHIPPED | OUTPATIENT
Start: 2019-01-01

## 2019-01-01 RX ORDER — NICOTINE POLACRILEX 4 MG
15-30 LOZENGE BUCCAL
Status: DISCONTINUED | OUTPATIENT
Start: 2019-01-01 | End: 2019-01-01

## 2019-01-01 RX ORDER — ROSUVASTATIN CALCIUM 10 MG/1
10 TABLET, COATED ORAL AT BEDTIME
Status: DISCONTINUED | OUTPATIENT
Start: 2019-01-01 | End: 2019-01-01 | Stop reason: HOSPADM

## 2019-01-01 RX ORDER — PIPERACILLIN SODIUM, TAZOBACTAM SODIUM 4; .5 G/20ML; G/20ML
4.5 INJECTION, POWDER, LYOPHILIZED, FOR SOLUTION INTRAVENOUS EVERY 6 HOURS
Status: DISCONTINUED | OUTPATIENT
Start: 2019-01-01 | End: 2019-01-01

## 2019-01-01 RX ORDER — LORAZEPAM 2 MG/ML
0.5 CONCENTRATE ORAL EVERY 4 HOURS PRN
Qty: 30 ML | Refills: 0 | Status: SHIPPED | OUTPATIENT
Start: 2019-01-01 | End: 2019-01-01

## 2019-01-01 RX ORDER — FUROSEMIDE 20 MG
20 TABLET ORAL DAILY
Qty: 30 TABLET | Refills: 0 | Status: SHIPPED | OUTPATIENT
Start: 2019-01-01

## 2019-01-01 RX ORDER — BUDESONIDE 0.5 MG/2ML
0.5 INHALANT ORAL 2 TIMES DAILY
Status: DISCONTINUED | OUTPATIENT
Start: 2019-01-01 | End: 2019-01-01 | Stop reason: HOSPADM

## 2019-01-01 RX ORDER — IOPAMIDOL 755 MG/ML
80 INJECTION, SOLUTION INTRAVASCULAR ONCE
Status: COMPLETED | OUTPATIENT
Start: 2019-01-01 | End: 2019-01-01

## 2019-01-01 RX ORDER — AZITHROMYCIN 250 MG/1
500 TABLET, FILM COATED ORAL DAILY
Status: DISCONTINUED | OUTPATIENT
Start: 2019-01-01 | End: 2019-01-01

## 2019-01-01 RX ORDER — HYDRALAZINE HYDROCHLORIDE 20 MG/ML
10 INJECTION INTRAMUSCULAR; INTRAVENOUS ONCE
Status: DISCONTINUED | OUTPATIENT
Start: 2019-01-01 | End: 2019-01-01

## 2019-01-01 RX ORDER — LISINOPRIL 20 MG/1
20 TABLET ORAL DAILY
Qty: 30 TABLET | Refills: 0 | Status: SHIPPED | OUTPATIENT
Start: 2019-01-01 | End: 2020-01-09

## 2019-01-01 RX ORDER — COLCHICINE 0.6 MG/1
0.6 TABLET ORAL DAILY
Qty: 30 TABLET | Refills: 0 | Status: SHIPPED | OUTPATIENT
Start: 2019-01-01 | End: 2020-01-09

## 2019-01-01 RX ORDER — NICOTINE POLACRILEX 4 MG
15-30 LOZENGE BUCCAL
Status: DISCONTINUED | OUTPATIENT
Start: 2019-01-01 | End: 2019-01-01 | Stop reason: HOSPADM

## 2019-01-01 RX ORDER — AMOXICILLIN 250 MG
2 CAPSULE ORAL 2 TIMES DAILY PRN
Status: DISCONTINUED | OUTPATIENT
Start: 2019-01-01 | End: 2019-01-01 | Stop reason: HOSPADM

## 2019-01-01 RX ORDER — SILDENAFIL CITRATE 20 MG/1
20 TABLET ORAL 3 TIMES DAILY
Status: DISCONTINUED | OUTPATIENT
Start: 2019-01-01 | End: 2019-01-01 | Stop reason: HOSPADM

## 2019-01-01 RX ORDER — METOPROLOL SUCCINATE 25 MG/1
25 TABLET, EXTENDED RELEASE ORAL 2 TIMES DAILY
Status: DISCONTINUED | OUTPATIENT
Start: 2019-01-01 | End: 2019-01-01 | Stop reason: HOSPADM

## 2019-01-01 RX ORDER — PREDNISONE 20 MG/1
40 TABLET ORAL DAILY
Status: COMPLETED | OUTPATIENT
Start: 2019-01-01 | End: 2019-01-01

## 2019-01-01 RX ORDER — TAMSULOSIN HYDROCHLORIDE 0.4 MG/1
0.4 CAPSULE ORAL AT BEDTIME
Status: DISCONTINUED | OUTPATIENT
Start: 2019-01-01 | End: 2019-01-01 | Stop reason: HOSPADM

## 2019-01-01 RX ORDER — ACETAMINOPHEN 325 MG/1
650 TABLET ORAL EVERY 6 HOURS PRN
Status: DISCONTINUED | OUTPATIENT
Start: 2019-01-01 | End: 2019-01-01 | Stop reason: HOSPADM

## 2019-01-01 RX ORDER — ALBUTEROL SULFATE 0.83 MG/ML
2.5 SOLUTION RESPIRATORY (INHALATION)
Status: DISCONTINUED | OUTPATIENT
Start: 2019-01-01 | End: 2019-01-01 | Stop reason: HOSPADM

## 2019-01-01 RX ORDER — FUROSEMIDE 10 MG/ML
INJECTION INTRAMUSCULAR; INTRAVENOUS
Status: COMPLETED
Start: 2019-01-01 | End: 2019-01-01

## 2019-01-01 RX ORDER — LIDOCAINE HYDROCHLORIDE 10 MG/ML
10 INJECTION, SOLUTION EPIDURAL; INFILTRATION; INTRACAUDAL; PERINEURAL ONCE
Status: COMPLETED | OUTPATIENT
Start: 2019-01-01 | End: 2019-01-01

## 2019-01-01 RX ORDER — PREDNISONE 20 MG/1
40 TABLET ORAL DAILY
Status: DISCONTINUED | OUTPATIENT
Start: 2019-01-01 | End: 2019-01-01 | Stop reason: HOSPADM

## 2019-01-01 RX ORDER — SILDENAFIL CITRATE 20 MG/1
20 TABLET ORAL 3 TIMES DAILY
COMMUNITY

## 2019-01-01 RX ORDER — LIDOCAINE 40 MG/G
CREAM TOPICAL
Status: DISCONTINUED | OUTPATIENT
Start: 2019-01-01 | End: 2019-01-01 | Stop reason: HOSPADM

## 2019-01-01 RX ORDER — ONDANSETRON 2 MG/ML
4 INJECTION INTRAMUSCULAR; INTRAVENOUS EVERY 6 HOURS PRN
Status: DISCONTINUED | OUTPATIENT
Start: 2019-01-01 | End: 2019-01-01 | Stop reason: HOSPADM

## 2019-01-01 RX ORDER — DIGOXIN 125 MCG
125 TABLET ORAL DAILY
Status: DISCONTINUED | OUTPATIENT
Start: 2019-01-01 | End: 2019-01-01 | Stop reason: HOSPADM

## 2019-01-01 RX ORDER — DEXTROSE MONOHYDRATE 25 G/50ML
25-50 INJECTION, SOLUTION INTRAVENOUS
Status: DISCONTINUED | OUTPATIENT
Start: 2019-01-01 | End: 2019-01-01 | Stop reason: HOSPADM

## 2019-01-01 RX ORDER — HYDROMORPHONE HYDROCHLORIDE 1 MG/ML
1 SOLUTION ORAL
Qty: 30 ML | Refills: 0 | Status: SHIPPED | OUTPATIENT
Start: 2019-01-01 | End: 2019-01-01

## 2019-01-01 RX ORDER — ASPIRIN 81 MG/1
81 TABLET, CHEWABLE ORAL AT BEDTIME
Qty: 30 TABLET | Refills: 0 | Status: SHIPPED | OUTPATIENT
Start: 2019-01-01 | End: 2020-01-09

## 2019-01-01 RX ORDER — ASPIRIN 81 MG/1
81 TABLET, CHEWABLE ORAL AT BEDTIME
Status: DISCONTINUED | OUTPATIENT
Start: 2019-01-01 | End: 2019-01-01 | Stop reason: HOSPADM

## 2019-01-01 RX ORDER — GLIPIZIDE 10 MG/1
10 TABLET, FILM COATED, EXTENDED RELEASE ORAL DAILY
Qty: 30 TABLET | Refills: 0
Start: 2019-01-01 | End: 2019-01-01

## 2019-01-01 RX ORDER — LISINOPRIL 20 MG/1
20 TABLET ORAL DAILY
Status: DISCONTINUED | OUTPATIENT
Start: 2019-01-01 | End: 2019-01-01 | Stop reason: HOSPADM

## 2019-01-01 RX ORDER — ROSUVASTATIN CALCIUM 10 MG/1
10 TABLET, COATED ORAL DAILY
Qty: 30 TABLET | Refills: 0 | Status: SHIPPED | OUTPATIENT
Start: 2019-01-01

## 2019-01-01 RX ORDER — HYDROMORPHONE HYDROCHLORIDE 1 MG/ML
0.2 INJECTION, SOLUTION INTRAMUSCULAR; INTRAVENOUS; SUBCUTANEOUS
Status: DISCONTINUED | OUTPATIENT
Start: 2019-01-01 | End: 2019-01-01 | Stop reason: HOSPADM

## 2019-01-01 RX ORDER — BUDESONIDE 0.5 MG/2ML
0.5 INHALANT ORAL 2 TIMES DAILY
Qty: 180 AMPULE | Refills: 0 | Status: SHIPPED | OUTPATIENT
Start: 2019-01-01

## 2019-01-01 RX ORDER — IPRATROPIUM BROMIDE AND ALBUTEROL SULFATE 2.5; .5 MG/3ML; MG/3ML
1 SOLUTION RESPIRATORY (INHALATION) 3 TIMES DAILY PRN
Qty: 30 VIAL | Refills: 0 | Status: SHIPPED | OUTPATIENT
Start: 2019-01-01

## 2019-01-01 RX ORDER — ALBUTEROL SULFATE 0.83 MG/ML
2.5 SOLUTION RESPIRATORY (INHALATION)
Qty: 3 ML | Refills: 0 | Status: SHIPPED | OUTPATIENT
Start: 2019-01-01

## 2019-01-01 RX ORDER — METOPROLOL SUCCINATE 25 MG/1
25 TABLET, EXTENDED RELEASE ORAL 2 TIMES DAILY
Qty: 60 TABLET | Refills: 0 | Status: SHIPPED | OUTPATIENT
Start: 2019-01-01

## 2019-01-01 RX ORDER — TAMSULOSIN HYDROCHLORIDE 0.4 MG/1
0.4 CAPSULE ORAL DAILY
Qty: 30 CAPSULE | Refills: 0 | Status: SHIPPED | OUTPATIENT
Start: 2019-01-01

## 2019-01-01 RX ORDER — HALOPERIDOL 1 MG/1
1 TABLET ORAL 4 TIMES DAILY PRN
Qty: 120 TABLET | Refills: 0 | Status: SHIPPED | OUTPATIENT
Start: 2019-01-01 | End: 2019-02-27

## 2019-01-01 RX ORDER — NALOXONE HYDROCHLORIDE 0.4 MG/ML
.1-.4 INJECTION, SOLUTION INTRAMUSCULAR; INTRAVENOUS; SUBCUTANEOUS
Status: DISCONTINUED | OUTPATIENT
Start: 2019-01-01 | End: 2019-01-01 | Stop reason: HOSPADM

## 2019-01-01 RX ORDER — METHYLPREDNISOLONE SODIUM SUCCINATE 125 MG/2ML
125 INJECTION, POWDER, LYOPHILIZED, FOR SOLUTION INTRAMUSCULAR; INTRAVENOUS EVERY 24 HOURS
Status: DISCONTINUED | OUTPATIENT
Start: 2019-01-01 | End: 2019-01-01

## 2019-01-01 RX ORDER — POLYETHYLENE GLYCOL 3350 17 G/17G
17 POWDER, FOR SOLUTION ORAL DAILY PRN
Status: DISCONTINUED | OUTPATIENT
Start: 2019-01-01 | End: 2019-01-01 | Stop reason: HOSPADM

## 2019-01-01 RX ORDER — OXYMETAZOLINE HYDROCHLORIDE 0.05 G/100ML
2 SPRAY NASAL ONCE
Status: DISCONTINUED | OUTPATIENT
Start: 2019-01-01 | End: 2019-01-01

## 2019-01-01 RX ORDER — BISACODYL 10 MG
10 SUPPOSITORY, RECTAL RECTAL DAILY PRN
Qty: 6 SUPPOSITORY | Refills: 0 | Status: SHIPPED | OUTPATIENT
Start: 2019-01-01 | End: 2019-02-27

## 2019-01-01 RX ORDER — ACETAMINOPHEN 650 MG/1
650 SUPPOSITORY RECTAL EVERY 4 HOURS PRN
Qty: 12 SUPPOSITORY | Refills: 0 | Status: SHIPPED | OUTPATIENT
Start: 2019-01-01 | End: 2019-02-27

## 2019-01-01 RX ORDER — HYDROCODONE BITARTRATE AND ACETAMINOPHEN 5; 325 MG/1; MG/1
1-2 TABLET ORAL EVERY 4 HOURS PRN
Status: DISCONTINUED | OUTPATIENT
Start: 2019-01-01 | End: 2019-01-01 | Stop reason: HOSPADM

## 2019-01-01 RX ORDER — SODIUM CHLORIDE 9 MG/ML
INJECTION, SOLUTION INTRAVENOUS CONTINUOUS
Status: DISCONTINUED | OUTPATIENT
Start: 2019-01-01 | End: 2019-01-01

## 2019-01-01 RX ORDER — AMLODIPINE BESYLATE 2.5 MG/1
2.5 TABLET ORAL DAILY
Status: DISCONTINUED | OUTPATIENT
Start: 2019-01-01 | End: 2019-01-01 | Stop reason: HOSPADM

## 2019-01-01 RX ORDER — MORPHINE SULFATE 30 MG/1
2.5 TABLET ORAL
Qty: 20 TABLET | Refills: 0 | Status: SHIPPED | OUTPATIENT
Start: 2019-01-01 | End: 2019-01-01

## 2019-01-01 RX ORDER — PREDNISONE 10 MG/1
10 TABLET ORAL DAILY
Qty: 30 TABLET | Refills: 0 | Status: SHIPPED | OUTPATIENT
Start: 2019-01-01

## 2019-01-01 RX ORDER — LORAZEPAM 0.5 MG/1
0.25 TABLET ORAL EVERY 6 HOURS PRN
Qty: 20 TABLET | Refills: 0 | Status: SHIPPED | OUTPATIENT
Start: 2019-01-01 | End: 2019-02-27

## 2019-01-01 RX ORDER — COLCHICINE 0.6 MG/1
0.6 TABLET ORAL EVERY EVENING
Status: DISCONTINUED | OUTPATIENT
Start: 2019-01-01 | End: 2019-01-01 | Stop reason: HOSPADM

## 2019-01-01 RX ORDER — AMOXICILLIN 250 MG
1 CAPSULE ORAL 2 TIMES DAILY PRN
Status: DISCONTINUED | OUTPATIENT
Start: 2019-01-01 | End: 2019-01-01 | Stop reason: HOSPADM

## 2019-01-01 RX ORDER — ONDANSETRON 4 MG/1
4 TABLET, ORALLY DISINTEGRATING ORAL EVERY 6 HOURS PRN
Status: DISCONTINUED | OUTPATIENT
Start: 2019-01-01 | End: 2019-01-01 | Stop reason: HOSPADM

## 2019-01-01 RX ORDER — METOPROLOL TARTRATE 1 MG/ML
5 INJECTION, SOLUTION INTRAVENOUS EVERY 4 HOURS PRN
Status: DISCONTINUED | OUTPATIENT
Start: 2019-01-01 | End: 2019-01-01 | Stop reason: HOSPADM

## 2019-01-01 RX ADMIN — ROSUVASTATIN CALCIUM 10 MG: 10 TABLET, FILM COATED ORAL at 23:09

## 2019-01-01 RX ADMIN — IPRATROPIUM BROMIDE AND ALBUTEROL SULFATE 3 ML: .5; 3 SOLUTION RESPIRATORY (INHALATION) at 07:25

## 2019-01-01 RX ADMIN — TAMSULOSIN HYDROCHLORIDE 0.4 MG: 0.4 CAPSULE ORAL at 21:29

## 2019-01-01 RX ADMIN — METOPROLOL TARTRATE 5 MG: 5 INJECTION, SOLUTION INTRAVENOUS at 06:14

## 2019-01-01 RX ADMIN — METOPROLOL SUCCINATE 25 MG: 25 TABLET, EXTENDED RELEASE ORAL at 21:44

## 2019-01-01 RX ADMIN — LEVOTHYROXINE SODIUM 125 MCG: 125 TABLET ORAL at 08:48

## 2019-01-01 RX ADMIN — SILDENAFIL CITRATE 20 MG: 20 TABLET, FILM COATED ORAL at 13:48

## 2019-01-01 RX ADMIN — BUDESONIDE 0.5 MG: 0.5 INHALANT RESPIRATORY (INHALATION) at 07:28

## 2019-01-01 RX ADMIN — PREDNISONE 40 MG: 20 TABLET ORAL at 08:17

## 2019-01-01 RX ADMIN — BUDESONIDE 0.5 MG: 0.5 INHALANT RESPIRATORY (INHALATION) at 20:14

## 2019-01-01 RX ADMIN — LEVOTHYROXINE SODIUM 125 MCG: 125 TABLET ORAL at 06:46

## 2019-01-01 RX ADMIN — MELATONIN 5 MG TABLET 5 MG: at 21:53

## 2019-01-01 RX ADMIN — BUDESONIDE 0.5 MG: 0.5 INHALANT RESPIRATORY (INHALATION) at 19:41

## 2019-01-01 RX ADMIN — IPRATROPIUM BROMIDE AND ALBUTEROL SULFATE 3 ML: .5; 3 SOLUTION RESPIRATORY (INHALATION) at 07:40

## 2019-01-01 RX ADMIN — APIXABAN 5 MG: 5 TABLET, FILM COATED ORAL at 20:46

## 2019-01-01 RX ADMIN — PIPERACILLIN AND TAZOBACTAM 4.5 G: 4; .5 INJECTION, POWDER, FOR SOLUTION INTRAVENOUS at 04:35

## 2019-01-01 RX ADMIN — BUDESONIDE 0.5 MG: 0.5 INHALANT RESPIRATORY (INHALATION) at 20:08

## 2019-01-01 RX ADMIN — TAMSULOSIN HYDROCHLORIDE 0.4 MG: 0.4 CAPSULE ORAL at 23:09

## 2019-01-01 RX ADMIN — ROSUVASTATIN CALCIUM 10 MG: 10 TABLET, FILM COATED ORAL at 21:29

## 2019-01-01 RX ADMIN — METOPROLOL SUCCINATE 25 MG: 25 TABLET, EXTENDED RELEASE ORAL at 20:50

## 2019-01-01 RX ADMIN — BUDESONIDE 0.5 MG: 0.5 INHALANT RESPIRATORY (INHALATION) at 20:23

## 2019-01-01 RX ADMIN — AMLODIPINE BESYLATE 2.5 MG: 2.5 TABLET ORAL at 08:17

## 2019-01-01 RX ADMIN — IPRATROPIUM BROMIDE AND ALBUTEROL SULFATE 3 ML: .5; 3 SOLUTION RESPIRATORY (INHALATION) at 19:41

## 2019-01-01 RX ADMIN — INSULIN GLARGINE 6 UNITS: 100 INJECTION, SOLUTION SUBCUTANEOUS at 21:43

## 2019-01-01 RX ADMIN — IPRATROPIUM BROMIDE AND ALBUTEROL SULFATE 3 ML: .5; 3 SOLUTION RESPIRATORY (INHALATION) at 23:13

## 2019-01-01 RX ADMIN — ROSUVASTATIN CALCIUM 10 MG: 10 TABLET, FILM COATED ORAL at 21:10

## 2019-01-01 RX ADMIN — IPRATROPIUM BROMIDE AND ALBUTEROL SULFATE 3 ML: .5; 3 SOLUTION RESPIRATORY (INHALATION) at 20:08

## 2019-01-01 RX ADMIN — AMLODIPINE BESYLATE 2.5 MG: 2.5 TABLET ORAL at 08:18

## 2019-01-01 RX ADMIN — METOPROLOL SUCCINATE 25 MG: 25 TABLET, EXTENDED RELEASE ORAL at 08:37

## 2019-01-01 RX ADMIN — OMEPRAZOLE 40 MG: 20 CAPSULE, DELAYED RELEASE ORAL at 11:36

## 2019-01-01 RX ADMIN — APIXABAN 5 MG: 5 TABLET, FILM COATED ORAL at 08:20

## 2019-01-01 RX ADMIN — COLCHICINE 0.6 MG: 0.6 TABLET, FILM COATED ORAL at 20:51

## 2019-01-01 RX ADMIN — INSULIN GLARGINE 5 UNITS: 100 INJECTION, SOLUTION SUBCUTANEOUS at 09:31

## 2019-01-01 RX ADMIN — LEVOTHYROXINE SODIUM 125 MCG: 125 TABLET ORAL at 08:34

## 2019-01-01 RX ADMIN — APIXABAN 5 MG: 5 TABLET, FILM COATED ORAL at 08:42

## 2019-01-01 RX ADMIN — PREDNISONE 40 MG: 20 TABLET ORAL at 08:56

## 2019-01-01 RX ADMIN — IPRATROPIUM BROMIDE AND ALBUTEROL SULFATE 3 ML: .5; 3 SOLUTION RESPIRATORY (INHALATION) at 11:30

## 2019-01-01 RX ADMIN — AMLODIPINE BESYLATE 2.5 MG: 2.5 TABLET ORAL at 08:42

## 2019-01-01 RX ADMIN — INSULIN ASPART 1 UNITS: 100 INJECTION, SOLUTION INTRAVENOUS; SUBCUTANEOUS at 22:47

## 2019-01-01 RX ADMIN — LISINOPRIL 20 MG: 20 TABLET ORAL at 08:19

## 2019-01-01 RX ADMIN — SODIUM CHLORIDE 70 ML: 9 INJECTION, SOLUTION INTRAVENOUS at 09:19

## 2019-01-01 RX ADMIN — SODIUM CHLORIDE 250 ML: 9 INJECTION, SOLUTION INTRAVENOUS at 05:47

## 2019-01-01 RX ADMIN — LISINOPRIL 20 MG: 20 TABLET ORAL at 08:30

## 2019-01-01 RX ADMIN — Medication 125 MG: at 20:41

## 2019-01-01 RX ADMIN — PIPERACILLIN AND TAZOBACTAM 4.5 G: 4; .5 INJECTION, POWDER, FOR SOLUTION INTRAVENOUS at 09:57

## 2019-01-01 RX ADMIN — OMEPRAZOLE 40 MG: 20 CAPSULE, DELAYED RELEASE ORAL at 08:56

## 2019-01-01 RX ADMIN — INSULIN GLARGINE 5 UNITS: 100 INJECTION, SOLUTION SUBCUTANEOUS at 10:01

## 2019-01-01 RX ADMIN — IPRATROPIUM BROMIDE AND ALBUTEROL SULFATE 3 ML: .5; 3 SOLUTION RESPIRATORY (INHALATION) at 08:12

## 2019-01-01 RX ADMIN — COLCHICINE 0.6 MG: 0.6 TABLET, FILM COATED ORAL at 21:10

## 2019-01-01 RX ADMIN — FLUOXETINE 40 MG: 20 CAPSULE ORAL at 13:17

## 2019-01-01 RX ADMIN — LEVOTHYROXINE SODIUM 125 MCG: 125 TABLET ORAL at 09:23

## 2019-01-01 RX ADMIN — INSULIN ASPART 4 UNITS: 100 INJECTION, SOLUTION INTRAVENOUS; SUBCUTANEOUS at 09:57

## 2019-01-01 RX ADMIN — SILDENAFIL CITRATE 20 MG: 20 TABLET, FILM COATED ORAL at 08:29

## 2019-01-01 RX ADMIN — HYDROCODONE BITARTRATE AND ACETAMINOPHEN 1 TABLET: 5; 325 TABLET ORAL at 23:13

## 2019-01-01 RX ADMIN — ROSUVASTATIN CALCIUM 10 MG: 10 TABLET, FILM COATED ORAL at 21:53

## 2019-01-01 RX ADMIN — IPRATROPIUM BROMIDE AND ALBUTEROL SULFATE 3 ML: .5; 3 SOLUTION RESPIRATORY (INHALATION) at 20:48

## 2019-01-01 RX ADMIN — DIGOXIN 125 MCG: 125 TABLET ORAL at 08:30

## 2019-01-01 RX ADMIN — HYDROCODONE BITARTRATE AND ACETAMINOPHEN 1 TABLET: 5; 325 TABLET ORAL at 23:44

## 2019-01-01 RX ADMIN — OMEPRAZOLE 40 MG: 20 CAPSULE, DELAYED RELEASE ORAL at 08:41

## 2019-01-01 RX ADMIN — ACETAMINOPHEN 650 MG: 325 TABLET, FILM COATED ORAL at 00:46

## 2019-01-01 RX ADMIN — Medication 125 MG: at 21:58

## 2019-01-01 RX ADMIN — PREDNISONE 40 MG: 20 TABLET ORAL at 08:38

## 2019-01-01 RX ADMIN — PIPERACILLIN AND TAZOBACTAM 4.5 G: 4; .5 INJECTION, POWDER, FOR SOLUTION INTRAVENOUS at 11:20

## 2019-01-01 RX ADMIN — IPRATROPIUM BROMIDE AND ALBUTEROL SULFATE 3 ML: .5; 3 SOLUTION RESPIRATORY (INHALATION) at 23:54

## 2019-01-01 RX ADMIN — METOPROLOL SUCCINATE 25 MG: 25 TABLET, EXTENDED RELEASE ORAL at 21:00

## 2019-01-01 RX ADMIN — COLCHICINE 0.6 MG: 0.6 TABLET, FILM COATED ORAL at 20:55

## 2019-01-01 RX ADMIN — COLCHICINE 0.6 MG: 0.6 TABLET, FILM COATED ORAL at 19:32

## 2019-01-01 RX ADMIN — IPRATROPIUM BROMIDE AND ALBUTEROL SULFATE 3 ML: .5; 3 SOLUTION RESPIRATORY (INHALATION) at 15:10

## 2019-01-01 RX ADMIN — OMEPRAZOLE 40 MG: 20 CAPSULE, DELAYED RELEASE ORAL at 08:31

## 2019-01-01 RX ADMIN — BUDESONIDE 0.5 MG: 0.5 INHALANT RESPIRATORY (INHALATION) at 07:26

## 2019-01-01 RX ADMIN — SODIUM CHLORIDE 250 MG: 9 INJECTION, SOLUTION INTRAVENOUS at 21:13

## 2019-01-01 RX ADMIN — SILDENAFIL CITRATE 20 MG: 20 TABLET, FILM COATED ORAL at 20:12

## 2019-01-01 RX ADMIN — APIXABAN 5 MG: 5 TABLET, FILM COATED ORAL at 21:28

## 2019-01-01 RX ADMIN — ACETAMINOPHEN 650 MG: 325 TABLET, FILM COATED ORAL at 23:42

## 2019-01-01 RX ADMIN — METOPROLOL SUCCINATE 25 MG: 25 TABLET, EXTENDED RELEASE ORAL at 08:48

## 2019-01-01 RX ADMIN — DIGOXIN 125 MCG: 125 TABLET ORAL at 08:38

## 2019-01-01 RX ADMIN — IPRATROPIUM BROMIDE AND ALBUTEROL SULFATE 3 ML: .5; 3 SOLUTION RESPIRATORY (INHALATION) at 19:18

## 2019-01-01 RX ADMIN — INSULIN GLARGINE 6 UNITS: 100 INJECTION, SOLUTION SUBCUTANEOUS at 22:50

## 2019-01-01 RX ADMIN — PREDNISONE 40 MG: 20 TABLET ORAL at 08:48

## 2019-01-01 RX ADMIN — FLUOXETINE 40 MG: 20 CAPSULE ORAL at 08:56

## 2019-01-01 RX ADMIN — INSULIN GLARGINE 6 UNITS: 100 INJECTION, SOLUTION SUBCUTANEOUS at 21:53

## 2019-01-01 RX ADMIN — INSULIN GLARGINE 6 UNITS: 100 INJECTION, SOLUTION SUBCUTANEOUS at 21:40

## 2019-01-01 RX ADMIN — IPRATROPIUM BROMIDE AND ALBUTEROL SULFATE 3 ML: .5; 3 SOLUTION RESPIRATORY (INHALATION) at 07:15

## 2019-01-01 RX ADMIN — SILDENAFIL CITRATE 20 MG: 20 TABLET, FILM COATED ORAL at 08:37

## 2019-01-01 RX ADMIN — MELATONIN 5 MG TABLET 5 MG: at 23:11

## 2019-01-01 RX ADMIN — COLCHICINE 0.6 MG: 0.6 TABLET, FILM COATED ORAL at 20:41

## 2019-01-01 RX ADMIN — COLCHICINE 0.6 MG: 0.6 TABLET, FILM COATED ORAL at 21:28

## 2019-01-01 RX ADMIN — LISINOPRIL 20 MG: 20 TABLET ORAL at 08:34

## 2019-01-01 RX ADMIN — OMEPRAZOLE 40 MG: 20 CAPSULE, DELAYED RELEASE ORAL at 08:48

## 2019-01-01 RX ADMIN — ASPIRIN 81 MG 81 MG: 81 TABLET ORAL at 21:09

## 2019-01-01 RX ADMIN — APIXABAN 5 MG: 5 TABLET, FILM COATED ORAL at 09:23

## 2019-01-01 RX ADMIN — METHYLPREDNISOLONE SODIUM SUCCINATE: 125 INJECTION, POWDER, FOR SOLUTION INTRAMUSCULAR; INTRAVENOUS at 20:59

## 2019-01-01 RX ADMIN — Medication 125 MG: at 21:14

## 2019-01-01 RX ADMIN — IPRATROPIUM BROMIDE AND ALBUTEROL SULFATE 3 ML: .5; 3 SOLUTION RESPIRATORY (INHALATION) at 16:03

## 2019-01-01 RX ADMIN — SILDENAFIL CITRATE 20 MG: 20 TABLET, FILM COATED ORAL at 08:18

## 2019-01-01 RX ADMIN — INSULIN ASPART 5 UNITS: 100 INJECTION, SOLUTION INTRAVENOUS; SUBCUTANEOUS at 23:11

## 2019-01-01 RX ADMIN — MELATONIN 5 MG TABLET 5 MG: at 20:41

## 2019-01-01 RX ADMIN — SILDENAFIL CITRATE 20 MG: 20 TABLET, FILM COATED ORAL at 09:22

## 2019-01-01 RX ADMIN — IPRATROPIUM BROMIDE AND ALBUTEROL SULFATE 3 ML: .5; 3 SOLUTION RESPIRATORY (INHALATION) at 15:36

## 2019-01-01 RX ADMIN — COLCHICINE 0.6 MG: 0.6 TABLET, FILM COATED ORAL at 21:00

## 2019-01-01 RX ADMIN — FLUOXETINE 40 MG: 20 CAPSULE ORAL at 08:17

## 2019-01-01 RX ADMIN — METHYLPREDNISOLONE SODIUM SUCCINATE 125 MG: 125 INJECTION, POWDER, FOR SOLUTION INTRAMUSCULAR; INTRAVENOUS at 21:39

## 2019-01-01 RX ADMIN — METOPROLOL TARTRATE 5 MG: 5 INJECTION, SOLUTION INTRAVENOUS at 23:07

## 2019-01-01 RX ADMIN — APIXABAN 5 MG: 5 TABLET, FILM COATED ORAL at 20:52

## 2019-01-01 RX ADMIN — Medication 125 MG: at 08:59

## 2019-01-01 RX ADMIN — HYDROCODONE BITARTRATE AND ACETAMINOPHEN 1 TABLET: 5; 325 TABLET ORAL at 22:17

## 2019-01-01 RX ADMIN — DIGOXIN 125 MCG: 125 TABLET ORAL at 08:56

## 2019-01-01 RX ADMIN — ACETAMINOPHEN 650 MG: 325 TABLET, FILM COATED ORAL at 01:07

## 2019-01-01 RX ADMIN — IPRATROPIUM BROMIDE AND ALBUTEROL SULFATE 3 ML: .5; 3 SOLUTION RESPIRATORY (INHALATION) at 20:23

## 2019-01-01 RX ADMIN — LIDOCAINE HYDROCHLORIDE 10 ML: 10 INJECTION, SOLUTION EPIDURAL; INFILTRATION; INTRACAUDAL; PERINEURAL at 09:54

## 2019-01-01 RX ADMIN — PREDNISONE 40 MG: 20 TABLET ORAL at 09:22

## 2019-01-01 RX ADMIN — TAMSULOSIN HYDROCHLORIDE 0.4 MG: 0.4 CAPSULE ORAL at 21:23

## 2019-01-01 RX ADMIN — IPRATROPIUM BROMIDE AND ALBUTEROL SULFATE 3 ML: .5; 3 SOLUTION RESPIRATORY (INHALATION) at 12:19

## 2019-01-01 RX ADMIN — PIPERACILLIN AND TAZOBACTAM 4.5 G: 4; .5 INJECTION, POWDER, FOR SOLUTION INTRAVENOUS at 15:35

## 2019-01-01 RX ADMIN — IPRATROPIUM BROMIDE AND ALBUTEROL SULFATE 3 ML: .5; 3 SOLUTION RESPIRATORY (INHALATION) at 11:38

## 2019-01-01 RX ADMIN — METOPROLOL SUCCINATE 25 MG: 25 TABLET, EXTENDED RELEASE ORAL at 08:36

## 2019-01-01 RX ADMIN — SILDENAFIL CITRATE 20 MG: 20 TABLET, FILM COATED ORAL at 08:57

## 2019-01-01 RX ADMIN — DIGOXIN 125 MCG: 125 TABLET ORAL at 08:17

## 2019-01-01 RX ADMIN — LISINOPRIL 20 MG: 20 TABLET ORAL at 08:56

## 2019-01-01 RX ADMIN — TAMSULOSIN HYDROCHLORIDE 0.4 MG: 0.4 CAPSULE ORAL at 21:52

## 2019-01-01 RX ADMIN — SILDENAFIL CITRATE 20 MG: 20 TABLET, FILM COATED ORAL at 21:43

## 2019-01-01 RX ADMIN — LISINOPRIL 20 MG: 20 TABLET ORAL at 08:17

## 2019-01-01 RX ADMIN — METOPROLOL SUCCINATE 25 MG: 25 TABLET, EXTENDED RELEASE ORAL at 08:20

## 2019-01-01 RX ADMIN — IPRATROPIUM BROMIDE AND ALBUTEROL SULFATE 3 ML: .5; 3 SOLUTION RESPIRATORY (INHALATION) at 15:09

## 2019-01-01 RX ADMIN — FLUOXETINE 40 MG: 20 CAPSULE ORAL at 11:29

## 2019-01-01 RX ADMIN — AMLODIPINE BESYLATE 2.5 MG: 2.5 TABLET ORAL at 16:54

## 2019-01-01 RX ADMIN — PIPERACILLIN AND TAZOBACTAM 4.5 G: 4; .5 INJECTION, POWDER, FOR SOLUTION INTRAVENOUS at 22:57

## 2019-01-01 RX ADMIN — HYDROCODONE BITARTRATE AND ACETAMINOPHEN 1 TABLET: 5; 325 TABLET ORAL at 02:23

## 2019-01-01 RX ADMIN — Medication 125 MG: at 21:25

## 2019-01-01 RX ADMIN — LISINOPRIL 20 MG: 20 TABLET ORAL at 08:42

## 2019-01-01 RX ADMIN — SILDENAFIL CITRATE 20 MG: 20 TABLET, FILM COATED ORAL at 21:12

## 2019-01-01 RX ADMIN — ASPIRIN 81 MG 81 MG: 81 TABLET ORAL at 21:23

## 2019-01-01 RX ADMIN — COLCHICINE 0.6 MG: 0.6 TABLET, FILM COATED ORAL at 21:14

## 2019-01-01 RX ADMIN — BUDESONIDE 0.5 MG: 0.5 INHALANT RESPIRATORY (INHALATION) at 19:46

## 2019-01-01 RX ADMIN — TAMSULOSIN HYDROCHLORIDE 0.4 MG: 0.4 CAPSULE ORAL at 21:43

## 2019-01-01 RX ADMIN — OMEPRAZOLE 40 MG: 20 CAPSULE, DELAYED RELEASE ORAL at 08:17

## 2019-01-01 RX ADMIN — METOPROLOL SUCCINATE 25 MG: 25 TABLET, EXTENDED RELEASE ORAL at 09:33

## 2019-01-01 RX ADMIN — LISINOPRIL 20 MG: 20 TABLET ORAL at 08:37

## 2019-01-01 RX ADMIN — IPRATROPIUM BROMIDE AND ALBUTEROL SULFATE 3 ML: .5; 3 SOLUTION RESPIRATORY (INHALATION) at 19:32

## 2019-01-01 RX ADMIN — SILDENAFIL CITRATE 20 MG: 20 TABLET, FILM COATED ORAL at 14:18

## 2019-01-01 RX ADMIN — APIXABAN 5 MG: 5 TABLET, FILM COATED ORAL at 21:44

## 2019-01-01 RX ADMIN — METOPROLOL SUCCINATE 25 MG: 25 TABLET, EXTENDED RELEASE ORAL at 08:56

## 2019-01-01 RX ADMIN — IPRATROPIUM BROMIDE AND ALBUTEROL SULFATE 3 ML: .5; 3 SOLUTION RESPIRATORY (INHALATION) at 19:16

## 2019-01-01 RX ADMIN — ROSUVASTATIN CALCIUM 10 MG: 10 TABLET, FILM COATED ORAL at 21:44

## 2019-01-01 RX ADMIN — HYDROCODONE BITARTRATE AND ACETAMINOPHEN 1 TABLET: 5; 325 TABLET ORAL at 23:37

## 2019-01-01 RX ADMIN — MELATONIN 5 MG TABLET 5 MG: at 23:44

## 2019-01-01 RX ADMIN — APIXABAN 5 MG: 5 TABLET, FILM COATED ORAL at 08:56

## 2019-01-01 RX ADMIN — IPRATROPIUM BROMIDE AND ALBUTEROL SULFATE 3 ML: .5; 3 SOLUTION RESPIRATORY (INHALATION) at 07:32

## 2019-01-01 RX ADMIN — SILDENAFIL CITRATE 20 MG: 20 TABLET, FILM COATED ORAL at 14:08

## 2019-01-01 RX ADMIN — SILDENAFIL CITRATE 20 MG: 20 TABLET, FILM COATED ORAL at 20:59

## 2019-01-01 RX ADMIN — INSULIN ASPART 1 UNITS: 100 INJECTION, SOLUTION INTRAVENOUS; SUBCUTANEOUS at 21:49

## 2019-01-01 RX ADMIN — HYDROCODONE BITARTRATE AND ACETAMINOPHEN 1 TABLET: 5; 325 TABLET ORAL at 23:42

## 2019-01-01 RX ADMIN — IPRATROPIUM BROMIDE AND ALBUTEROL SULFATE 3 ML: .5; 3 SOLUTION RESPIRATORY (INHALATION) at 23:56

## 2019-01-01 RX ADMIN — INSULIN ASPART 6 UNITS: 100 INJECTION, SOLUTION INTRAVENOUS; SUBCUTANEOUS at 21:37

## 2019-01-01 RX ADMIN — IPRATROPIUM BROMIDE AND ALBUTEROL SULFATE 3 ML: .5; 3 SOLUTION RESPIRATORY (INHALATION) at 23:25

## 2019-01-01 RX ADMIN — APIXABAN 5 MG: 5 TABLET, FILM COATED ORAL at 08:17

## 2019-01-01 RX ADMIN — BUDESONIDE 0.5 MG: 0.5 INHALANT RESPIRATORY (INHALATION) at 08:12

## 2019-01-01 RX ADMIN — SILDENAFIL CITRATE 20 MG: 20 TABLET, FILM COATED ORAL at 08:48

## 2019-01-01 RX ADMIN — ACETAMINOPHEN 650 MG: 325 TABLET, FILM COATED ORAL at 23:20

## 2019-01-01 RX ADMIN — Medication 12.5 MG: at 23:10

## 2019-01-01 RX ADMIN — APIXABAN 5 MG: 5 TABLET, FILM COATED ORAL at 09:32

## 2019-01-01 RX ADMIN — Medication 125 MG: at 20:59

## 2019-01-01 RX ADMIN — SILDENAFIL CITRATE 20 MG: 20 TABLET, FILM COATED ORAL at 08:17

## 2019-01-01 RX ADMIN — METOPROLOL SUCCINATE 25 MG: 25 TABLET, EXTENDED RELEASE ORAL at 20:46

## 2019-01-01 RX ADMIN — FLUOXETINE 40 MG: 20 CAPSULE ORAL at 09:22

## 2019-01-01 RX ADMIN — INSULIN GLARGINE 5 UNITS: 100 INJECTION, SOLUTION SUBCUTANEOUS at 08:14

## 2019-01-01 RX ADMIN — PIPERACILLIN AND TAZOBACTAM 4.5 G: 4; .5 INJECTION, POWDER, FOR SOLUTION INTRAVENOUS at 03:29

## 2019-01-01 RX ADMIN — ROSUVASTATIN CALCIUM 10 MG: 10 TABLET, FILM COATED ORAL at 20:41

## 2019-01-01 RX ADMIN — LEVOTHYROXINE SODIUM 125 MCG: 125 TABLET ORAL at 06:55

## 2019-01-01 RX ADMIN — IPRATROPIUM BROMIDE AND ALBUTEROL SULFATE 3 ML: .5; 3 SOLUTION RESPIRATORY (INHALATION) at 23:45

## 2019-01-01 RX ADMIN — HYDROCODONE BITARTRATE AND ACETAMINOPHEN 2 TABLET: 5; 325 TABLET ORAL at 03:54

## 2019-01-01 RX ADMIN — IPRATROPIUM BROMIDE AND ALBUTEROL SULFATE 3 ML: .5; 3 SOLUTION RESPIRATORY (INHALATION) at 12:54

## 2019-01-01 RX ADMIN — INSULIN ASPART 2 UNITS: 100 INJECTION, SOLUTION INTRAVENOUS; SUBCUTANEOUS at 21:52

## 2019-01-01 RX ADMIN — LEVOTHYROXINE SODIUM 125 MCG: 125 TABLET ORAL at 11:36

## 2019-01-01 RX ADMIN — LEVOTHYROXINE SODIUM 125 MCG: 125 TABLET ORAL at 08:36

## 2019-01-01 RX ADMIN — APIXABAN 5 MG: 5 TABLET, FILM COATED ORAL at 20:41

## 2019-01-01 RX ADMIN — BUDESONIDE 0.5 MG: 0.5 INHALANT RESPIRATORY (INHALATION) at 19:53

## 2019-01-01 RX ADMIN — INSULIN GLARGINE 8 UNITS: 100 INJECTION, SOLUTION SUBCUTANEOUS at 21:52

## 2019-01-01 RX ADMIN — ACETAMINOPHEN 650 MG: 325 TABLET, FILM COATED ORAL at 00:10

## 2019-01-01 RX ADMIN — DIGOXIN 125 MCG: 125 TABLET ORAL at 09:23

## 2019-01-01 RX ADMIN — INSULIN GLARGINE 5 UNITS: 100 INJECTION, SOLUTION SUBCUTANEOUS at 11:02

## 2019-01-01 RX ADMIN — Medication 125 MG: at 21:13

## 2019-01-01 RX ADMIN — IPRATROPIUM BROMIDE AND ALBUTEROL SULFATE 3 ML: .5; 3 SOLUTION RESPIRATORY (INHALATION) at 11:18

## 2019-01-01 RX ADMIN — ASPIRIN 81 MG 81 MG: 81 TABLET ORAL at 21:53

## 2019-01-01 RX ADMIN — SILDENAFIL CITRATE 20 MG: 20 TABLET, FILM COATED ORAL at 13:35

## 2019-01-01 RX ADMIN — METOPROLOL SUCCINATE 25 MG: 25 TABLET, EXTENDED RELEASE ORAL at 08:17

## 2019-01-01 RX ADMIN — INSULIN GLARGINE 6 UNITS: 100 INJECTION, SOLUTION SUBCUTANEOUS at 21:25

## 2019-01-01 RX ADMIN — INSULIN ASPART 3 UNITS: 100 INJECTION, SOLUTION INTRAVENOUS; SUBCUTANEOUS at 21:58

## 2019-01-01 RX ADMIN — APIXABAN 5 MG: 5 TABLET, FILM COATED ORAL at 21:09

## 2019-01-01 RX ADMIN — DIGOXIN 125 MCG: 125 TABLET ORAL at 09:33

## 2019-01-01 RX ADMIN — MELATONIN 5 MG TABLET 5 MG: at 23:15

## 2019-01-01 RX ADMIN — ASPIRIN 81 MG 81 MG: 81 TABLET ORAL at 21:44

## 2019-01-01 RX ADMIN — FLUOXETINE 40 MG: 20 CAPSULE ORAL at 09:32

## 2019-01-01 RX ADMIN — Medication 125 MG: at 08:34

## 2019-01-01 RX ADMIN — FUROSEMIDE 40 MG: 10 INJECTION, SOLUTION INTRAVENOUS at 13:15

## 2019-01-01 RX ADMIN — LISINOPRIL 20 MG: 20 TABLET ORAL at 08:47

## 2019-01-01 RX ADMIN — BUDESONIDE 0.5 MG: 0.5 INHALANT RESPIRATORY (INHALATION) at 08:23

## 2019-01-01 RX ADMIN — Medication 125 MG: at 10:11

## 2019-01-01 RX ADMIN — Medication 125 MG: at 08:48

## 2019-01-01 RX ADMIN — METOPROLOL TARTRATE 5 MG: 5 INJECTION, SOLUTION INTRAVENOUS at 05:57

## 2019-01-01 RX ADMIN — OMEPRAZOLE 40 MG: 20 CAPSULE, DELAYED RELEASE ORAL at 08:35

## 2019-01-01 RX ADMIN — IPRATROPIUM BROMIDE AND ALBUTEROL SULFATE 3 ML: .5; 3 SOLUTION RESPIRATORY (INHALATION) at 08:21

## 2019-01-01 RX ADMIN — PIPERACILLIN AND TAZOBACTAM 4.5 G: 4; .5 INJECTION, POWDER, FOR SOLUTION INTRAVENOUS at 16:20

## 2019-01-01 RX ADMIN — MELATONIN 5 MG TABLET 5 MG: at 20:51

## 2019-01-01 RX ADMIN — PREDNISONE 40 MG: 20 TABLET ORAL at 14:09

## 2019-01-01 RX ADMIN — AMLODIPINE BESYLATE 2.5 MG: 2.5 TABLET ORAL at 08:30

## 2019-01-01 RX ADMIN — COLCHICINE 0.6 MG: 0.6 TABLET, FILM COATED ORAL at 20:46

## 2019-01-01 RX ADMIN — DIGOXIN 125 MCG: 125 TABLET ORAL at 08:48

## 2019-01-01 RX ADMIN — FLUOXETINE 40 MG: 20 CAPSULE ORAL at 08:41

## 2019-01-01 RX ADMIN — ACETAMINOPHEN 650 MG: 325 TABLET, FILM COATED ORAL at 23:45

## 2019-01-01 RX ADMIN — PREDNISONE 40 MG: 20 TABLET ORAL at 13:47

## 2019-01-01 RX ADMIN — IPRATROPIUM BROMIDE AND ALBUTEROL SULFATE 3 ML: .5; 3 SOLUTION RESPIRATORY (INHALATION) at 08:03

## 2019-01-01 RX ADMIN — IPRATROPIUM BROMIDE AND ALBUTEROL SULFATE 3 ML: .5; 3 SOLUTION RESPIRATORY (INHALATION) at 15:29

## 2019-01-01 RX ADMIN — ASPIRIN 81 MG 81 MG: 81 TABLET ORAL at 20:46

## 2019-01-01 RX ADMIN — IPRATROPIUM BROMIDE AND ALBUTEROL SULFATE 3 ML: .5; 3 SOLUTION RESPIRATORY (INHALATION) at 12:24

## 2019-01-01 RX ADMIN — BUDESONIDE 0.5 MG: 0.5 INHALANT RESPIRATORY (INHALATION) at 08:03

## 2019-01-01 RX ADMIN — METHYLPREDNISOLONE SODIUM SUCCINATE 62.5 MG: 125 INJECTION, POWDER, FOR SOLUTION INTRAMUSCULAR; INTRAVENOUS at 21:13

## 2019-01-01 RX ADMIN — METOPROLOL SUCCINATE 25 MG: 25 TABLET, EXTENDED RELEASE ORAL at 20:41

## 2019-01-01 RX ADMIN — INSULIN GLARGINE 8 UNITS: 100 INJECTION, SOLUTION SUBCUTANEOUS at 21:49

## 2019-01-01 RX ADMIN — IOPAMIDOL 80 ML: 755 INJECTION, SOLUTION INTRAVENOUS at 09:19

## 2019-01-01 RX ADMIN — APIXABAN 5 MG: 5 TABLET, FILM COATED ORAL at 08:37

## 2019-01-01 RX ADMIN — ASPIRIN 81 MG 81 MG: 81 TABLET ORAL at 20:41

## 2019-01-01 RX ADMIN — LISINOPRIL 20 MG: 20 TABLET ORAL at 08:36

## 2019-01-01 RX ADMIN — Medication 12.5 MG: at 21:21

## 2019-01-01 RX ADMIN — PREDNISONE 40 MG: 20 TABLET ORAL at 08:37

## 2019-01-01 RX ADMIN — INSULIN ASPART 4 UNITS: 100 INJECTION, SOLUTION INTRAVENOUS; SUBCUTANEOUS at 04:03

## 2019-01-01 RX ADMIN — LEVOTHYROXINE SODIUM 125 MCG: 125 TABLET ORAL at 06:31

## 2019-01-01 RX ADMIN — METOPROLOL SUCCINATE 25 MG: 25 TABLET, EXTENDED RELEASE ORAL at 08:30

## 2019-01-01 RX ADMIN — METOPROLOL SUCCINATE 25 MG: 25 TABLET, EXTENDED RELEASE ORAL at 21:14

## 2019-01-01 RX ADMIN — IPRATROPIUM BROMIDE AND ALBUTEROL SULFATE 3 ML: .5; 3 SOLUTION RESPIRATORY (INHALATION) at 11:09

## 2019-01-01 RX ADMIN — ROSUVASTATIN CALCIUM 10 MG: 10 TABLET, FILM COATED ORAL at 20:46

## 2019-01-01 RX ADMIN — OMEPRAZOLE 40 MG: 20 CAPSULE, DELAYED RELEASE ORAL at 08:18

## 2019-01-01 RX ADMIN — LEVOFLOXACIN 750 MG: 750 TABLET, FILM COATED ORAL at 08:36

## 2019-01-01 RX ADMIN — LEVOFLOXACIN 750 MG: 750 TABLET, FILM COATED ORAL at 08:19

## 2019-01-01 RX ADMIN — IPRATROPIUM BROMIDE AND ALBUTEROL SULFATE 3 ML: .5; 3 SOLUTION RESPIRATORY (INHALATION) at 20:14

## 2019-01-01 RX ADMIN — SODIUM CHLORIDE 500 ML: 9 INJECTION, SOLUTION INTRAVENOUS at 13:32

## 2019-01-01 RX ADMIN — INSULIN ASPART 1 UNITS: 100 INJECTION, SOLUTION INTRAVENOUS; SUBCUTANEOUS at 21:25

## 2019-01-01 RX ADMIN — LEVOFLOXACIN 750 MG: 750 TABLET, FILM COATED ORAL at 08:41

## 2019-01-01 RX ADMIN — IPRATROPIUM BROMIDE AND ALBUTEROL SULFATE 3 ML: .5; 3 SOLUTION RESPIRATORY (INHALATION) at 07:57

## 2019-01-01 RX ADMIN — MELATONIN 5 MG TABLET 5 MG: at 21:14

## 2019-01-01 RX ADMIN — TAMSULOSIN HYDROCHLORIDE 0.4 MG: 0.4 CAPSULE ORAL at 21:53

## 2019-01-01 RX ADMIN — APIXABAN 5 MG: 5 TABLET, FILM COATED ORAL at 08:48

## 2019-01-01 RX ADMIN — COLCHICINE 0.6 MG: 0.6 TABLET, FILM COATED ORAL at 20:11

## 2019-01-01 RX ADMIN — HYDROCODONE BITARTRATE AND ACETAMINOPHEN 1 TABLET: 5; 325 TABLET ORAL at 13:26

## 2019-01-01 RX ADMIN — IPRATROPIUM BROMIDE AND ALBUTEROL SULFATE 3 ML: .5; 3 SOLUTION RESPIRATORY (INHALATION) at 11:51

## 2019-01-01 RX ADMIN — IPRATROPIUM BROMIDE AND ALBUTEROL SULFATE 3 ML: .5; 3 SOLUTION RESPIRATORY (INHALATION) at 15:32

## 2019-01-01 RX ADMIN — INSULIN ASPART 2 UNITS: 100 INJECTION, SOLUTION INTRAVENOUS; SUBCUTANEOUS at 22:51

## 2019-01-01 RX ADMIN — SILDENAFIL CITRATE 20 MG: 20 TABLET, FILM COATED ORAL at 19:33

## 2019-01-01 RX ADMIN — ROSUVASTATIN CALCIUM 10 MG: 10 TABLET, FILM COATED ORAL at 21:14

## 2019-01-01 RX ADMIN — APIXABAN 5 MG: 5 TABLET, FILM COATED ORAL at 21:13

## 2019-01-01 RX ADMIN — AMLODIPINE BESYLATE 2.5 MG: 2.5 TABLET ORAL at 08:57

## 2019-01-01 RX ADMIN — LEVOTHYROXINE SODIUM 125 MCG: 125 TABLET ORAL at 08:41

## 2019-01-01 RX ADMIN — SILDENAFIL CITRATE 20 MG: 20 TABLET, FILM COATED ORAL at 20:55

## 2019-01-01 RX ADMIN — IPRATROPIUM BROMIDE AND ALBUTEROL SULFATE 3 ML: .5; 3 SOLUTION RESPIRATORY (INHALATION) at 15:37

## 2019-01-01 RX ADMIN — IPRATROPIUM BROMIDE AND ALBUTEROL SULFATE 3 ML: .5; 3 SOLUTION RESPIRATORY (INHALATION) at 15:35

## 2019-01-01 RX ADMIN — Medication 125 MG: at 21:55

## 2019-01-01 RX ADMIN — AMLODIPINE BESYLATE 2.5 MG: 2.5 TABLET ORAL at 09:23

## 2019-01-01 RX ADMIN — SILDENAFIL CITRATE 20 MG: 20 TABLET, FILM COATED ORAL at 08:41

## 2019-01-01 RX ADMIN — ROSUVASTATIN CALCIUM 10 MG: 10 TABLET, FILM COATED ORAL at 21:23

## 2019-01-01 RX ADMIN — BUDESONIDE 0.5 MG: 0.5 INHALANT RESPIRATORY (INHALATION) at 07:57

## 2019-01-01 RX ADMIN — OMEPRAZOLE 40 MG: 20 CAPSULE, DELAYED RELEASE ORAL at 09:22

## 2019-01-01 RX ADMIN — AMLODIPINE BESYLATE 2.5 MG: 2.5 TABLET ORAL at 08:37

## 2019-01-01 RX ADMIN — DIGOXIN 125 MCG: 125 TABLET ORAL at 08:37

## 2019-01-01 RX ADMIN — TAMSULOSIN HYDROCHLORIDE 0.4 MG: 0.4 CAPSULE ORAL at 20:41

## 2019-01-01 RX ADMIN — FLUOXETINE 40 MG: 20 CAPSULE ORAL at 08:30

## 2019-01-01 RX ADMIN — BUDESONIDE 0.5 MG: 0.5 INHALANT RESPIRATORY (INHALATION) at 19:32

## 2019-01-01 RX ADMIN — INSULIN GLARGINE 6 UNITS: 100 INJECTION, SOLUTION SUBCUTANEOUS at 22:00

## 2019-01-01 RX ADMIN — FLUOXETINE 40 MG: 20 CAPSULE ORAL at 08:48

## 2019-01-01 RX ADMIN — FLUOXETINE 40 MG: 20 CAPSULE ORAL at 08:37

## 2019-01-01 RX ADMIN — ACETAMINOPHEN 650 MG: 325 TABLET, FILM COATED ORAL at 21:53

## 2019-01-01 RX ADMIN — TAMSULOSIN HYDROCHLORIDE 0.4 MG: 0.4 CAPSULE ORAL at 21:10

## 2019-01-01 RX ADMIN — SODIUM CHLORIDE 500 MG: 9 INJECTION, SOLUTION INTRAVENOUS at 23:15

## 2019-01-01 RX ADMIN — IPRATROPIUM BROMIDE AND ALBUTEROL SULFATE 3 ML: .5; 3 SOLUTION RESPIRATORY (INHALATION) at 00:13

## 2019-01-01 RX ADMIN — INSULIN ASPART 4 UNITS: 100 INJECTION, SOLUTION INTRAVENOUS; SUBCUTANEOUS at 21:41

## 2019-01-01 RX ADMIN — ACETAMINOPHEN 650 MG: 325 TABLET, FILM COATED ORAL at 13:31

## 2019-01-01 RX ADMIN — IPRATROPIUM BROMIDE AND ALBUTEROL SULFATE 3 ML: .5; 3 SOLUTION RESPIRATORY (INHALATION) at 10:47

## 2019-01-01 RX ADMIN — IPRATROPIUM BROMIDE AND ALBUTEROL SULFATE 3 ML: .5; 3 SOLUTION RESPIRATORY (INHALATION) at 12:22

## 2019-01-01 RX ADMIN — SODIUM CHLORIDE 250 MG: 9 INJECTION, SOLUTION INTRAVENOUS at 21:58

## 2019-01-01 RX ADMIN — ASPIRIN 81 MG 81 MG: 81 TABLET ORAL at 20:52

## 2019-01-01 RX ADMIN — METOPROLOL SUCCINATE 25 MG: 25 TABLET, EXTENDED RELEASE ORAL at 21:23

## 2019-01-01 RX ADMIN — IPRATROPIUM BROMIDE AND ALBUTEROL SULFATE 3 ML: .5; 3 SOLUTION RESPIRATORY (INHALATION) at 23:21

## 2019-01-01 RX ADMIN — METOPROLOL SUCCINATE 25 MG: 25 TABLET, EXTENDED RELEASE ORAL at 20:55

## 2019-01-01 RX ADMIN — TAMSULOSIN HYDROCHLORIDE 0.4 MG: 0.4 CAPSULE ORAL at 20:51

## 2019-01-01 RX ADMIN — ASPIRIN 81 MG 81 MG: 81 TABLET ORAL at 21:13

## 2019-01-01 RX ADMIN — ACETAMINOPHEN 650 MG: 325 TABLET, FILM COATED ORAL at 23:14

## 2019-01-01 RX ADMIN — METOPROLOL SUCCINATE 25 MG: 25 TABLET, EXTENDED RELEASE ORAL at 21:56

## 2019-01-01 RX ADMIN — IPRATROPIUM BROMIDE AND ALBUTEROL SULFATE 3 ML: .5; 3 SOLUTION RESPIRATORY (INHALATION) at 07:26

## 2019-01-01 RX ADMIN — BUDESONIDE 0.5 MG: 0.5 INHALANT RESPIRATORY (INHALATION) at 20:48

## 2019-01-01 RX ADMIN — APIXABAN 5 MG: 5 TABLET, FILM COATED ORAL at 08:30

## 2019-01-01 RX ADMIN — LISINOPRIL 20 MG: 20 TABLET ORAL at 09:23

## 2019-01-01 RX ADMIN — METOPROLOL TARTRATE 5 MG: 5 INJECTION, SOLUTION INTRAVENOUS at 05:20

## 2019-01-01 RX ADMIN — BUDESONIDE 0.5 MG: 0.5 INHALANT RESPIRATORY (INHALATION) at 07:15

## 2019-01-01 RX ADMIN — TAMSULOSIN HYDROCHLORIDE 0.4 MG: 0.4 CAPSULE ORAL at 20:46

## 2019-01-01 RX ADMIN — Medication 125 MG: at 16:52

## 2019-01-01 RX ADMIN — SILDENAFIL CITRATE 20 MG: 20 TABLET, FILM COATED ORAL at 08:35

## 2019-01-01 RX ADMIN — Medication 125 MG: at 08:29

## 2019-01-01 RX ADMIN — BUDESONIDE 0.5 MG: 0.5 INHALANT RESPIRATORY (INHALATION) at 19:19

## 2019-01-01 RX ADMIN — FUROSEMIDE 40 MG: 10 INJECTION, SOLUTION INTRAVENOUS at 20:02

## 2019-01-01 RX ADMIN — PIPERACILLIN AND TAZOBACTAM 4.5 G: 4; .5 INJECTION, POWDER, FOR SOLUTION INTRAVENOUS at 04:45

## 2019-01-01 RX ADMIN — IPRATROPIUM BROMIDE AND ALBUTEROL SULFATE 3 ML: .5; 3 SOLUTION RESPIRATORY (INHALATION) at 07:21

## 2019-01-01 RX ADMIN — APIXABAN 5 MG: 5 TABLET, FILM COATED ORAL at 20:55

## 2019-01-01 RX ADMIN — APIXABAN 5 MG: 5 TABLET, FILM COATED ORAL at 21:53

## 2019-01-01 RX ADMIN — DIGOXIN 125 MCG: 125 TABLET ORAL at 08:42

## 2019-01-01 RX ADMIN — Medication 12.5 MG: at 23:09

## 2019-01-01 RX ADMIN — PIPERACILLIN AND TAZOBACTAM 4.5 G: 4; .5 INJECTION, POWDER, FOR SOLUTION INTRAVENOUS at 21:46

## 2019-01-01 RX ADMIN — SILDENAFIL CITRATE 20 MG: 20 TABLET, FILM COATED ORAL at 20:45

## 2019-01-01 RX ADMIN — IPRATROPIUM BROMIDE AND ALBUTEROL SULFATE 3 ML: .5; 3 SOLUTION RESPIRATORY (INHALATION) at 23:42

## 2019-01-01 RX ADMIN — BUDESONIDE 0.5 MG: 0.5 INHALANT RESPIRATORY (INHALATION) at 07:42

## 2019-01-01 RX ADMIN — APIXABAN 5 MG: 5 TABLET, FILM COATED ORAL at 21:23

## 2019-01-01 RX ADMIN — SILDENAFIL CITRATE 20 MG: 20 TABLET, FILM COATED ORAL at 21:14

## 2019-01-01 RX ADMIN — Medication 125 MG: at 09:30

## 2019-01-01 RX ADMIN — BUDESONIDE 0.5 MG: 0.5 INHALANT RESPIRATORY (INHALATION) at 07:21

## 2019-01-01 RX ADMIN — BUDESONIDE 0.5 MG: 0.5 INHALANT RESPIRATORY (INHALATION) at 19:16

## 2019-01-01 RX ADMIN — Medication 125 MG: at 08:41

## 2019-01-01 RX ADMIN — LISINOPRIL 20 MG: 20 TABLET ORAL at 15:39

## 2019-01-01 RX ADMIN — LEVOTHYROXINE SODIUM 125 MCG: 125 TABLET ORAL at 08:17

## 2019-01-01 RX ADMIN — INSULIN ASPART 2 UNITS: 100 INJECTION, SOLUTION INTRAVENOUS; SUBCUTANEOUS at 12:52

## 2019-01-01 RX ADMIN — INSULIN ASPART 2 UNITS: 100 INJECTION, SOLUTION INTRAVENOUS; SUBCUTANEOUS at 00:05

## 2019-01-01 RX ADMIN — METOPROLOL SUCCINATE 25 MG: 25 TABLET, EXTENDED RELEASE ORAL at 13:18

## 2019-01-01 RX ADMIN — TAMSULOSIN HYDROCHLORIDE 0.4 MG: 0.4 CAPSULE ORAL at 21:14

## 2019-01-01 RX ADMIN — LORAZEPAM 0.5 MG: 2 INJECTION, SOLUTION INTRAMUSCULAR; INTRAVENOUS at 23:07

## 2019-01-01 RX ADMIN — IPRATROPIUM BROMIDE AND ALBUTEROL SULFATE 3 ML: .5; 3 SOLUTION RESPIRATORY (INHALATION) at 00:43

## 2019-01-01 RX ADMIN — IPRATROPIUM BROMIDE AND ALBUTEROL SULFATE 3 ML: .5; 3 SOLUTION RESPIRATORY (INHALATION) at 23:10

## 2019-01-01 RX ADMIN — SILDENAFIL CITRATE 20 MG: 20 TABLET, FILM COATED ORAL at 20:42

## 2019-01-01 RX ADMIN — IPRATROPIUM BROMIDE AND ALBUTEROL SULFATE 3 ML: .5; 3 SOLUTION RESPIRATORY (INHALATION) at 15:27

## 2019-01-01 RX ADMIN — METOPROLOL SUCCINATE 25 MG: 25 TABLET, EXTENDED RELEASE ORAL at 08:42

## 2019-01-01 RX ADMIN — LEVOFLOXACIN 750 MG: 750 TABLET, FILM COATED ORAL at 13:48

## 2019-01-01 RX ADMIN — OMEPRAZOLE 40 MG: 20 CAPSULE, DELAYED RELEASE ORAL at 08:38

## 2019-01-01 RX ADMIN — Medication 125 MG: at 21:30

## 2019-01-01 RX ADMIN — FUROSEMIDE 40 MG: 10 INJECTION INTRAMUSCULAR; INTRAVENOUS at 20:02

## 2019-01-01 RX ADMIN — IPRATROPIUM BROMIDE AND ALBUTEROL SULFATE 3 ML: .5; 3 SOLUTION RESPIRATORY (INHALATION) at 15:26

## 2019-01-01 RX ADMIN — DIGOXIN 125 MCG: 125 TABLET ORAL at 11:30

## 2019-01-01 RX ADMIN — LEVOFLOXACIN 750 MG: 750 TABLET, FILM COATED ORAL at 08:18

## 2019-01-01 RX ADMIN — PIPERACILLIN AND TAZOBACTAM 4.5 G: 4; .5 INJECTION, POWDER, FOR SOLUTION INTRAVENOUS at 09:34

## 2019-01-01 RX ADMIN — APIXABAN 5 MG: 5 TABLET, FILM COATED ORAL at 11:29

## 2019-01-01 RX ADMIN — BUDESONIDE 0.5 MG: 0.5 INHALANT RESPIRATORY (INHALATION) at 07:32

## 2019-01-01 RX ADMIN — SILDENAFIL CITRATE 20 MG: 20 TABLET, FILM COATED ORAL at 20:51

## 2019-01-01 RX ADMIN — OMEPRAZOLE 40 MG: 20 CAPSULE, DELAYED RELEASE ORAL at 08:37

## 2019-01-01 RX ADMIN — PIPERACILLIN AND TAZOBACTAM 4.5 G: 4; .5 INJECTION, POWDER, FOR SOLUTION INTRAVENOUS at 20:58

## 2019-01-01 RX ADMIN — IPRATROPIUM BROMIDE AND ALBUTEROL SULFATE 3 ML: .5; 3 SOLUTION RESPIRATORY (INHALATION) at 19:46

## 2019-01-01 RX ADMIN — COLCHICINE 0.6 MG: 0.6 TABLET, FILM COATED ORAL at 21:44

## 2019-01-01 RX ADMIN — DIGOXIN 125 MCG: 125 TABLET ORAL at 08:20

## 2019-01-01 RX ADMIN — IPRATROPIUM BROMIDE AND ALBUTEROL SULFATE 3 ML: .5; 3 SOLUTION RESPIRATORY (INHALATION) at 19:53

## 2019-01-01 RX ADMIN — IPRATROPIUM BROMIDE AND ALBUTEROL SULFATE 3 ML: .5; 3 SOLUTION RESPIRATORY (INHALATION) at 07:28

## 2019-01-01 RX ADMIN — AMLODIPINE BESYLATE 2.5 MG: 2.5 TABLET ORAL at 11:29

## 2019-01-01 RX ADMIN — SILDENAFIL CITRATE 20 MG: 20 TABLET, FILM COATED ORAL at 14:31

## 2019-01-01 RX ADMIN — MELATONIN 5 MG TABLET 5 MG: at 21:44

## 2019-01-01 RX ADMIN — SILDENAFIL CITRATE 20 MG: 20 TABLET, FILM COATED ORAL at 13:57

## 2019-01-01 RX ADMIN — MICONAZOLE NITRATE: 2 POWDER TOPICAL at 22:53

## 2019-01-01 RX ADMIN — METOPROLOL SUCCINATE 25 MG: 25 TABLET, EXTENDED RELEASE ORAL at 21:36

## 2019-01-01 RX ADMIN — SILDENAFIL CITRATE 20 MG: 20 TABLET, FILM COATED ORAL at 13:20

## 2019-01-01 RX ADMIN — ASPIRIN 81 MG 81 MG: 81 TABLET ORAL at 21:28

## 2019-01-01 RX ADMIN — MELATONIN 5 MG TABLET 5 MG: at 23:09

## 2019-01-01 RX ADMIN — HYDROCODONE BITARTRATE AND ACETAMINOPHEN 1 TABLET: 5; 325 TABLET ORAL at 22:51

## 2019-01-01 RX ADMIN — SILDENAFIL CITRATE 20 MG: 20 TABLET, FILM COATED ORAL at 21:29

## 2019-01-01 RX ADMIN — LISINOPRIL 20 MG: 20 TABLET ORAL at 09:51

## 2019-01-01 RX ADMIN — MELATONIN 5 MG TABLET 5 MG: at 23:38

## 2019-01-01 RX ADMIN — AMLODIPINE BESYLATE 2.5 MG: 2.5 TABLET ORAL at 08:48

## 2019-01-01 RX ADMIN — SILDENAFIL CITRATE 20 MG: 20 TABLET, FILM COATED ORAL at 15:07

## 2019-01-01 RX ADMIN — IPRATROPIUM BROMIDE AND ALBUTEROL SULFATE 3 ML: .5; 3 SOLUTION RESPIRATORY (INHALATION) at 11:40

## 2019-01-01 RX ADMIN — LEVOTHYROXINE SODIUM 125 MCG: 125 TABLET ORAL at 08:20

## 2019-01-01 RX ADMIN — METOPROLOL SUCCINATE 25 MG: 25 TABLET, EXTENDED RELEASE ORAL at 09:23

## 2019-01-01 RX ADMIN — Medication 125 MG: at 09:31

## 2019-01-01 RX ADMIN — ROSUVASTATIN CALCIUM 10 MG: 10 TABLET, FILM COATED ORAL at 20:50

## 2019-01-01 ASSESSMENT — ACTIVITIES OF DAILY LIVING (ADL)
ADLS_ACUITY_SCORE: 19
ADLS_ACUITY_SCORE: 21
ADLS_ACUITY_SCORE: 17
ADLS_ACUITY_SCORE: 17
ADLS_ACUITY_SCORE: 13
ADLS_ACUITY_SCORE: 21
RETIRED_COMMUNICATION: 0-->UNDERSTANDS/COMMUNICATES WITHOUT DIFFICULTY
ADLS_ACUITY_SCORE: 17
SWALLOWING: 0-->SWALLOWS FOODS/LIQUIDS WITHOUT DIFFICULTY
ADLS_ACUITY_SCORE: 18
ADLS_ACUITY_SCORE: 21
ADLS_ACUITY_SCORE: 19
ADLS_ACUITY_SCORE: 19
ADLS_ACUITY_SCORE: 13
ADLS_ACUITY_SCORE: 17
ADLS_ACUITY_SCORE: 19
ADLS_ACUITY_SCORE: 21
ADLS_ACUITY_SCORE: 18
ADLS_ACUITY_SCORE: 17
ADLS_ACUITY_SCORE: 17
ADLS_ACUITY_SCORE: 13
ADLS_ACUITY_SCORE: 19
BATHING: 0-->INDEPENDENT
ADLS_ACUITY_SCORE: 17
ADLS_ACUITY_SCORE: 21
COGNITION: 0 - NO COGNITION ISSUES REPORTED
ADLS_ACUITY_SCORE: 17
DRESS: 0-->INDEPENDENT
TOILETING: 0-->INDEPENDENT
RETIRED_EATING: 0-->INDEPENDENT
ADLS_ACUITY_SCORE: 13
ADLS_ACUITY_SCORE: 21
ADLS_ACUITY_SCORE: 17
ADLS_ACUITY_SCORE: 21
ADLS_ACUITY_SCORE: 21
ADLS_ACUITY_SCORE: 13
ADLS_ACUITY_SCORE: 18
WHICH_OF_THE_ABOVE_FUNCTIONAL_RISKS_HAD_A_RECENT_ONSET_OR_CHANGE?: AMBULATION;TRANSFERRING
ADLS_ACUITY_SCORE: 21
ADLS_ACUITY_SCORE: 16
FALL_HISTORY_WITHIN_LAST_SIX_MONTHS: NO
ADLS_ACUITY_SCORE: 19
ADLS_ACUITY_SCORE: 18
ADLS_ACUITY_SCORE: 13
ADLS_ACUITY_SCORE: 17
ADLS_ACUITY_SCORE: 17
ADLS_ACUITY_SCORE: 19
ADLS_ACUITY_SCORE: 19
AMBULATION: 1-->ASSISTIVE EQUIPMENT
ADLS_ACUITY_SCORE: 17
ADLS_ACUITY_SCORE: 17
ADLS_ACUITY_SCORE: 21
ADLS_ACUITY_SCORE: 19
ADLS_ACUITY_SCORE: 17
ADLS_ACUITY_SCORE: 13
ADLS_ACUITY_SCORE: 15
ADLS_ACUITY_SCORE: 17
ADLS_ACUITY_SCORE: 18
ADLS_ACUITY_SCORE: 17
ADLS_ACUITY_SCORE: 21
ADLS_ACUITY_SCORE: 17
ADLS_ACUITY_SCORE: 21
ADLS_ACUITY_SCORE: 21
ADLS_ACUITY_SCORE: 17
ADLS_ACUITY_SCORE: 15
TRANSFERRING: 1-->ASSISTIVE EQUIPMENT
ADLS_ACUITY_SCORE: 19
ADLS_ACUITY_SCORE: 17
ADLS_ACUITY_SCORE: 19

## 2019-01-01 ASSESSMENT — MIFFLIN-ST. JEOR
SCORE: 1332.06
SCORE: 1318.68
SCORE: 1320.49
SCORE: 1319.13
SCORE: 1350.88
SCORE: 1332.63
SCORE: 1312.63
SCORE: 1287.38
SCORE: 1310.63

## 2019-01-01 ASSESSMENT — COLUMBIA-SUICIDE SEVERITY RATING SCALE - C-SSRS
1. IN THE PAST MONTH, HAVE YOU WISHED YOU WERE DEAD OR WISHED YOU COULD GO TO SLEEP AND NOT WAKE UP?: NO
2. HAVE YOU ACTUALLY HAD ANY THOUGHTS OF KILLING YOURSELF IN THE PAST MONTH?: NO

## 2019-01-02 PROBLEM — I26.99 OTHER ACUTE PULMONARY EMBOLISM WITHOUT ACUTE COR PULMONALE (H): Status: ACTIVE | Noted: 2019-01-01

## 2019-01-02 PROBLEM — F32.A DEPRESSION, UNSPECIFIED DEPRESSION TYPE: Status: ACTIVE | Noted: 2019-01-01

## 2019-01-02 PROBLEM — Z79.4 TYPE 2 DIABETES MELLITUS WITH COMPLICATION, WITH LONG-TERM CURRENT USE OF INSULIN (H): Status: ACTIVE | Noted: 2019-01-01

## 2019-01-02 PROBLEM — I50.812 CHRONIC RIGHT-SIDED HEART FAILURE (H): Status: ACTIVE | Noted: 2019-01-01

## 2019-01-02 PROBLEM — C7B.8 NEUROENDOCRINE CARCINOMA METASTATIC TO MULTIPLE SITES (H): Status: ACTIVE | Noted: 2019-01-01

## 2019-01-02 PROBLEM — E11.8 TYPE 2 DIABETES MELLITUS WITH COMPLICATION, WITH LONG-TERM CURRENT USE OF INSULIN (H): Status: ACTIVE | Noted: 2019-01-01

## 2019-01-02 PROBLEM — J91.0 MALIGNANT PLEURAL EFFUSION (H): Status: ACTIVE | Noted: 2019-01-01

## 2019-01-02 PROBLEM — J96.11 CHRONIC RESPIRATORY FAILURE WITH HYPOXIA (H): Status: ACTIVE | Noted: 2019-01-01

## 2019-01-02 PROBLEM — Z86.711 H/O PULMONARY ARTERY THROMBOSIS: Status: ACTIVE | Noted: 2019-01-01

## 2019-01-02 PROBLEM — R53.81 DEBILITY: Status: ACTIVE | Noted: 2019-01-01

## 2019-01-02 PROBLEM — A04.72 C. DIFFICILE COLITIS: Status: ACTIVE | Noted: 2019-01-01

## 2019-01-02 PROBLEM — C7A.8 NEUROENDOCRINE CARCINOMA METASTATIC TO MULTIPLE SITES (H): Status: ACTIVE | Noted: 2019-01-01

## 2019-01-02 PROBLEM — I10 ESSENTIAL HYPERTENSION: Status: ACTIVE | Noted: 2019-01-01

## 2019-01-02 NOTE — PROGRESS NOTES
"[facility]    Chief Complaint   Patient presents with     long term Acute       [facility] Record Number:  [id number]  Place of Service where encounter took place:  [facility] [[id number]]    HPI:    Ravi Sandoval is a 73 year old  (1945), who is being seen today for an episodic care visit.  HPI information obtained from: facility chart records, facility staff, patient report and [facility] chart review.    We met with Mr. Sandoval today for follow up.  Mr. Sandoval reports he continues to have \"Circulation\" issues in his hands, noting they are always cold and take awhile to warm up, but not new.  He continues to endorse moderate SOB/CHOUDHURY but baseline/unchanged, no sputums.  He reports his stools are more formed lately, no abdominal issues, no other complaints.     ALLERGIES: Tetracycline  Past Medical, Surgical, Family and Social History reviewed and updated in [facility].    Current Outpatient Medications   Medication Sig Dispense Refill     acetaminophen (TYLENOL) 325 MG tablet Take 650 mg by mouth every 6 hours as needed for mild pain       albuterol (PROVENTIL) (2.5 MG/3ML) 0.083% neb solution Take 1 vial (2.5 mg) by nebulization once as needed (refractory bronchospasm associated with hypersensitivity)  0     amLODIPine (NORVASC) 2.5 MG tablet Take 2.5 mg by mouth daily       apixaban ANTICOAGULANT (ELIQUIS) 5 MG tablet Take 1 tablet (5 mg) by mouth 2 times daily 180 tablet 0     ASPIRIN PO Take 81 mg by mouth At Bedtime       budesonide (PULMICORT) 0.5 MG/2ML neb solution Take 2 mLs (0.5 mg) by nebulization 2 times daily 180 ampule 0     Colchicine (COLCRYS PO) Take 0.6 mg by mouth daily TAKES IN THE EVENING       digoxin (LANOXIN) 125 MCG tablet Take 1 tablet (125 mcg) by mouth daily 90 tablet 3     FLUoxetine HCl (PROZAC PO) Take 40 mg by mouth daily       furosemide (LASIX) 20 MG tablet Take 1 tablet (20 mg) by mouth daily 14 tablet 0     insulin aspart (NOVOLOG " VIAL) 100 UNITS/ML vial Give before meals and before bed:  For Pre-Meal Glucose:  140-189 give 1 unit   190-239 give 2 units   240-289 give 3 units   290-339 give 4 units   = or >340 give 5 units     For Bedtime Glucose  <250 no insulin.   251-300 give 1 unit.   301-350 give 1.5 units.  >351 give 2 units. 10 mL 0     insulin glargine (LANTUS SOLOSTAR PEN) 100 UNIT/ML pen Inject 10 Units Subcutaneous 2 times daily 3 mL 0     ipratropium - albuterol 0.5 mg/2.5 mg/3 mL (DUONEB) 0.5-2.5 (3) MG/3ML neb solution Take 1 vial by nebulization 3 times daily as needed for shortness of breath / dyspnea, wheezing or other (use up to 3 times a day for shortness of breath, cough)       levothyroxine (SYNTHROID/LEVOTHROID) 125 MCG tablet Take 125 mcg by mouth daily       LISINOPRIL PO Take 20 mg by mouth daily       loperamide (IMODIUM) 2 MG capsule Take 1 capsule (2 mg) by mouth 4 times daily as needed for diarrhea 20 capsule      melatonin 5 MG tablet Take 5 mg by mouth At Bedtime       metoprolol (TOPROL-XL) 25 MG 24 hr tablet Take 1 tablet (25 mg) by mouth 2 times daily 180 tablet 0     multivitamin w/minerals (THERA-VIT-M) tablet Take 1 tablet by mouth daily 90 tablet 3     OMEPRAZOLE PO Take 40 mg by mouth daily       ondansetron (ZOFRAN) 4 MG tablet Take 4 mg by mouth every 6 hours as needed for nausea        phenol (CHLORASEPTIC) 1.4 % spray Take 1 spray by mouth every hour as needed for sore throat       predniSONE (DELTASONE) 10 MG tablet Take 10 mg by mouth daily.       rosuvastatin (CRESTOR) 10 MG tablet Take 1 tablet (10 mg) by mouth daily 90 tablet 3     tadalafil (CIALIS) 20 MG tablet Take 20 mg by mouth daily For pulmonary hypertension.       tamsulosin (FLOMAX) 0.4 MG capsule Take 1 capsule (0.4 mg) by mouth daily 30 capsule 3     zolpidem (AMBIEN) 5 MG tablet Take 5 mg by mouth At Bedtime       Medications reviewed:  Medications reconciled to facility chart and changes were made to reflect current medications as  identified as above med list. Below are the changes that were made:   Medications stopped since last EPIC medication reconciliation:   There are no discontinued medications.    Medications started since last [facility] medication reconciliation:  No orders of the defined types were placed in this encounter.    REVIEW OF SYSTEMS:  7 point ROS done including, light headedness/dizziness, fever/chills, pain, Resp, CV, GI, and  and is negative other than noted in HPI.      Physical Exam:  /80   Pulse 65   Temp 96.5  F (35.8  C)   Resp 20   SpO2 (!) 85%      GENERAL APPEARANCE:  Elderly chronically ill appearing male sitting up in recliner, NAD, non-toxic.  ENT:  Mouth and oropharynx normal, moist mucous membranes.   RESP:  Lungs diminished through-out.  Regular relaxed breathing effort.  No cough.   CV: S1/S2 no murmur or rubs.  Regular rhythm and rate.  No generalized edema.   ABDOMEN:   Protuberant but, soft, non-tender with active bowel sounds.  No guarding, rigidity, or rebound tenderness.  EXTREMITIES:  No lower extremity edema, no calf tenderness.   PSYCH: Alert and orientated, pleasant and cooperative.     Recent Labs: I have personally reviewed labs, which are in facility chart.     CBC RESULTS:   Recent Labs   Lab Test 01/02/19  0730 12/26/18  0820   WBC 8.7 10.3   RBC 3.99* 3.97*   HGB 11.5* 11.3*   HCT 35.2* 35.0*   MCV 88 88   MCH 28.8 28.5   MCHC 32.7 32.3   RDW 18.4* 18.1*    163       Last Basic Metabolic Panel:  Recent Labs   Lab Test 12/19/18  0631 12/19/18    141   POTASSIUM 3.5 3.5   CHLORIDE 106 106   AV 7.7* 7.7*   CO2 28 28   BUN 17 17   CR 0.64* 0.64*   GLC 44* 44*       Liver Function Studies -   Recent Labs   Lab Test 12/11/18  0733 12/06/18  0708   PROTTOTAL 5.3* 4.9*   ALBUMIN 2.4* 2.2*   BILITOTAL 0.5 0.3   ALKPHOS 53 41   AST 22 38   ALT 29 44       TSH   Date Value Ref Range Status   02/22/2017 7.59 (H) 0.40 - 4.00 mU/L Final   09/04/2014 0.16 (L) 0.40 - 4.00 mU/L Final      Comment:     Effective 7/30/2014, the reference range for this assay has changed to reflect   new instrumentation/methodology.         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  Is currently at 6 L NC, noting 4-6 is baseline.  Is followed by Oncology, was getting palliative chemotherapy.   -Continues scheduled Budesonide.  -Continues daily Prednisone.   -Has PRN Albuterol and DuoNeb's on profile.    --Continues to follow with Oncology.     H/O pulmonary artery thrombosis  -Continues on BID Apixiban and daily ASA.     Chronic right-sided heart failure (H)  Essential hypertension  Pulmonary HTN noted as severe.   Review of SBP's show erratic range 100-160's with good rate control, asymptomatic.   No current s/s of clinical CHF.   -No changes, continue to clinically monitor.     Type 2 diabetes mellitus with complication, with long-term current use of insulin (H)  Was on Glipizide but DC'd in hospital.  Currently on BID Lantus and TID + at bedtime correction Aspart.  Note AM glucoses are in the 60-80 range, which is likely to low at this point, glucoses during the day are acceptable limits.   -Continue BID Lantus.   -Continue TID correction Aspart as is.   --I did lower the at bedtime correction Aspart scale so he get's less at bedtime Insulin to see if he has higher AM glucoses.     C. difficile colitis  Treated and he reports stools are formed/soft, no abdominal complaints.     Depression, unspecified depression type  -Continues Prozac.     Debility  -Continues to work with PT/OT.     Orders:  -Insulin change as noted  above.     Electronically signed by  IRINA Lu CNP

## 2019-01-02 NOTE — LETTER
"    1/2/2019        RE: Ravi Sandoval  [address]  [place] [address]        [facility]    Chief Complaint   Patient presents with     [facility]       [facility] Record Number:  [id number]  [facility] of [facility] where encounter took place:  [facility] [[id number]]    HPI:    Ravi Sandoval is a 73 year old  (1945), who is being seen today for an episodic care visit.  HPI information obtained from: facility chart records, facility staff, patient report and [facility] chart review.    We met with Mr. Sandoval today for follow up.  Mr. Sandoval reports he continues to have \"Circulation\" issues in his hands, noting they are always cold and take awhile to warm up, but not new.  He continues to endorse moderate SOB/CHOUDHURY but baseline/unchanged, no sputums.  He reports his stools are more formed lately, no abdominal issues, no other complaints.     ALLERGIES: Tetracycline  Past Medical, Surgical, Family and Social History reviewed and updated in [facility].    Current Outpatient Medications   Medication Sig Dispense Refill     acetaminophen (TYLENOL) 325 MG tablet Take 650 mg by mouth every 6 hours as needed for mild pain       albuterol (PROVENTIL) (2.5 MG/3ML) 0.083% neb solution Take 1 vial (2.5 mg) by nebulization once as needed (refractory bronchospasm associated with hypersensitivity)  0     amLODIPine (NORVASC) 2.5 MG tablet Take 2.5 mg by mouth daily       apixaban ANTICOAGULANT (ELIQUIS) 5 MG tablet Take 1 tablet (5 mg) by mouth 2 times daily 180 tablet 0     ASPIRIN PO Take 81 mg by mouth At Bedtime       budesonide (PULMICORT) 0.5 MG/2ML neb solution Take 2 mLs (0.5 mg) by nebulization 2 times daily 180 ampule 0     Colchicine (COLCRYS PO) Take 0.6 mg by mouth daily TAKES IN THE EVENING       digoxin (LANOXIN) 125 MCG tablet Take 1 tablet (125 mcg) by mouth daily 90 tablet 3     FLUoxetine HCl (PROZAC PO) Take 40 mg by mouth daily       furosemide (LASIX) 20 MG " tablet Take 1 tablet (20 mg) by mouth daily 14 tablet 0     insulin aspart (NOVOLOG VIAL) 100 UNITS/ML vial Give before meals and before bed:  For Pre-Meal Glucose:  140-189 give 1 unit   190-239 give 2 units   240-289 give 3 units   290-339 give 4 units   = or >340 give 5 units     For Bedtime Glucose  <250 no insulin.   251-300 give 1 unit.   301-350 give 1.5 units.  >351 give 2 units. 10 mL 0     insulin glargine (LANTUS SOLOSTAR PEN) 100 UNIT/ML pen Inject 10 Units Subcutaneous 2 times daily 3 mL 0     ipratropium - albuterol 0.5 mg/2.5 mg/3 mL (DUONEB) 0.5-2.5 (3) MG/3ML neb solution Take 1 vial by nebulization 3 times daily as needed for shortness of breath / dyspnea, wheezing or other (use up to 3 times a day for shortness of breath, cough)       levothyroxine (SYNTHROID/LEVOTHROID) 125 MCG tablet Take 125 mcg by mouth daily       LISINOPRIL PO Take 20 mg by mouth daily       loperamide (IMODIUM) 2 MG capsule Take 1 capsule (2 mg) by mouth 4 times daily as needed for diarrhea 20 capsule      melatonin 5 MG tablet Take 5 mg by mouth At Bedtime       metoprolol (TOPROL-XL) 25 MG 24 hr tablet Take 1 tablet (25 mg) by mouth 2 times daily 180 tablet 0     multivitamin w/minerals (THERA-VIT-M) tablet Take 1 tablet by mouth daily 90 tablet 3     OMEPRAZOLE PO Take 40 mg by mouth daily       ondansetron (ZOFRAN) 4 MG tablet Take 4 mg by mouth every 6 hours as needed for nausea        phenol (CHLORASEPTIC) 1.4 % spray Take 1 spray by mouth every hour as needed for sore throat       predniSONE (DELTASONE) 10 MG tablet Take 10 mg by mouth daily.       rosuvastatin (CRESTOR) 10 MG tablet Take 1 tablet (10 mg) by mouth daily 90 tablet 3     tadalafil (CIALIS) 20 MG tablet Take 20 mg by mouth daily For pulmonary hypertension.       tamsulosin (FLOMAX) 0.4 MG capsule Take 1 capsule (0.4 mg) by mouth daily 30 capsule 3     zolpidem (AMBIEN) 5 MG tablet Take 5 mg by mouth At Bedtime       Medications reviewed:  Medications  reconciled to facility chart and changes were made to reflect current medications as identified as above med list. Below are the changes that were made:   Medications stopped since last EPIC medication reconciliation:   There are no discontinued medications.    Medications started since last [facility] medication reconciliation:  No orders of the defined types were placed in this encounter.    REVIEW OF SYSTEMS:  7 point ROS done including, light headedness/dizziness, fever/chills, pain, Resp, CV, GI, and  and is negative other than noted in HPI.      Physical Exam:  /80   Pulse 65   Temp 96.5  F (35.8  C)   Resp 20   SpO2 (!) 85%      GENERAL APPEARANCE:  Elderly chronically ill appearing male sitting up in recliner, NAD, non-toxic.  ENT:  Mouth and oropharynx normal, moist mucous membranes.   RESP:  Lungs diminished through-out.  Regular relaxed breathing effort.  No cough.   CV: S1/S2 no murmur or rubs.  Regular rhythm and rate.  No generalized edema.   ABDOMEN:   Protuberant but, soft, non-tender with active bowel sounds.  No guarding, rigidity, or rebound tenderness.  EXTREMITIES:  No lower extremity edema, no calf tenderness.   PSYCH: Alert and orientated, pleasant and cooperative.     Recent Labs: I have personally reviewed labs, which are in facility chart.     CBC RESULTS:   Recent Labs   Lab Test 01/02/19  0730 12/26/18  0820   WBC 8.7 10.3   RBC 3.99* 3.97*   HGB 11.5* 11.3*   HCT 35.2* 35.0*   MCV 88 88   MCH 28.8 28.5   MCHC 32.7 32.3   RDW 18.4* 18.1*    163       Last Basic Metabolic Panel:  Recent Labs   Lab Test 12/19/18  0631 12/19/18    141   POTASSIUM 3.5 3.5   CHLORIDE 106 106   AV 7.7* 7.7*   CO2 28 28   BUN 17 17   CR 0.64* 0.64*   GLC 44* 44*       Liver Function Studies -   Recent Labs   Lab Test 12/11/18  0733 12/06/18  0708   PROTTOTAL 5.3* 4.9*   ALBUMIN 2.4* 2.2*   BILITOTAL 0.5 0.3   ALKPHOS 53 41   AST 22 38   ALT 29 44       TSH   Date Value Ref Range Status    02/22/2017 7.59 (H) 0.40 - 4.00 mU/L Final   09/04/2014 0.16 (L) 0.40 - 4.00 mU/L Final     Comment:     Effective 7/30/2014, the reference range for this assay has changed to reflect   new instrumentation/methodology.         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  Is currently at 6 L NC, noting 4-6 is baseline.  Is followed by Oncology, was getting palliative chemotherapy.   -Continues scheduled Budesonide.  -Continues daily Prednisone.   -Has PRN Albuterol and DuoNeb's on profile.    --Continues to follow with Oncology.     H/O pulmonary artery thrombosis  -Continues on BID Apixiban and daily ASA.     Chronic right-sided heart failure (H)  Essential hypertension  Pulmonary HTN noted as severe.   Review of SBP's show erratic range 100-160's with good rate control, asymptomatic.   No current s/s of clinical CHF.   -No changes, continue to clinically monitor.     Type 2 diabetes mellitus with complication, with long-term current use of insulin (H)  Was on Glipizide but DC'd in hospital.  Currently on BID Lantus and TID + at bedtime correction Aspart.  Note AM glucoses are in the 60-80 range, which is likely to low at this point, glucoses during the day are acceptable limits.   -Continue BID Lantus.   -Continue TID correction Aspart as is.   --I did lower the at bedtime correction Aspart scale so he get's less at bedtime Insulin to see if he has higher AM glucoses.     C. difficile colitis  Treated and he reports stools are formed/soft, no abdominal complaints.     Depression, unspecified depression type  -Continues  Prozac.     Debility  -Continues to work with PT/OT.     Orders:  -Insulin change as noted above.     Electronically signed by  IRINA Lu CNP                      Sincerely,        IRINA Lu CNP

## 2019-01-07 PROBLEM — K76.9 LIVER LESION: Status: ACTIVE | Noted: 2019-01-01

## 2019-01-07 NOTE — LETTER
1/7/2019        RE: Ravi Sandoval  [address]  [facility] [address]        [facility]    Chief Complaint   Patient presents with     ANRDESECK       [facility] Record Number:  [id number]  [facility] where [facility] took place[facility] [[id number]]    HPI:      Ravi Sandoval is a 74 year old  (1945), who is being seen today for an episodic care visit.  HPI information obtained from: facility chart records, facility staff, patient report and [facility] chart review.    Met with Mr. Sandoval today for follow up.  Mr. Sandoval continues to have tenuous oxygen/breathing status, but reports his SOB/CHOUDHURY as baseline, no new complaints.  He continues to endorse his hands are cold all the time, no other medical complaints.  He does endorse family issues, in that reports his wife does not want him coming back home, she wants him to go to an [facility].      ALLERGIES: Tetracycline  Past Medical, Surgical, Family and Social History reviewed and updated in [facility].    Current Outpatient Medications   Medication Sig Dispense Refill     acetaminophen (TYLENOL) 325 MG tablet Take 650 mg by mouth every 6 hours as needed for mild pain       albuterol (PROVENTIL) (2.5 MG/3ML) 0.083% neb solution Take 1 vial (2.5 mg) by nebulization once as needed (refractory bronchospasm associated with hypersensitivity)  0     amLODIPine (NORVASC) 2.5 MG tablet Take 2.5 mg by mouth daily       apixaban ANTICOAGULANT (ELIQUIS) 5 MG tablet Take 1 tablet (5 mg) by mouth 2 times daily 180 tablet 0     ASPIRIN PO Take 81 mg by mouth At Bedtime       budesonide (PULMICORT) 0.5 MG/2ML neb solution Take 2 mLs (0.5 mg) by nebulization 2 times daily 180 ampule 0     Colchicine (COLCRYS PO) Take 0.6 mg by mouth daily TAKES IN THE EVENING       digoxin (LANOXIN) 125 MCG tablet Take 1 tablet (125 mcg) by mouth daily 90 tablet 3     FLUoxetine HCl (PROZAC PO) Take 40 mg by mouth daily       furosemide (LASIX) 20  MG tablet Take 1 tablet (20 mg) by mouth daily 14 tablet 0     insulin aspart (NOVOLOG VIAL) 100 UNITS/ML vial Give before meals and before bed:  For Pre-Meal Glucose:  140-189 give 1 unit   190-239 give 2 units   240-289 give 3 units   290-339 give 4 units   = or >340 give 5 units     For Bedtime Glucose  <250 no insulin.   251-300 give 1 unit.   301-350 give 1.5 units.  >351 give 2 units. 10 mL 0     insulin glargine (LANTUS SOLOSTAR PEN) 100 UNIT/ML pen Inject 10 Units Subcutaneous 2 times daily 3 mL 0     ipratropium - albuterol 0.5 mg/2.5 mg/3 mL (DUONEB) 0.5-2.5 (3) MG/3ML neb solution Take 1 vial by nebulization 3 times daily as needed for shortness of breath / dyspnea, wheezing or other (use up to 3 times a day for shortness of breath, cough)       levothyroxine (SYNTHROID/LEVOTHROID) 125 MCG tablet Take 125 mcg by mouth daily       LISINOPRIL PO Take 20 mg by mouth daily       loperamide (IMODIUM) 2 MG capsule Take 1 capsule (2 mg) by mouth 4 times daily as needed for diarrhea 20 capsule      melatonin 5 MG tablet Take 5 mg by mouth At Bedtime       metoprolol (TOPROL-XL) 25 MG 24 hr tablet Take 1 tablet (25 mg) by mouth 2 times daily 180 tablet 0     multivitamin w/minerals (THERA-VIT-M) tablet Take 1 tablet by mouth daily 90 tablet 3     OMEPRAZOLE PO Take 40 mg by mouth daily       ondansetron (ZOFRAN) 4 MG tablet Take 4 mg by mouth every 6 hours as needed for nausea        phenol (CHLORASEPTIC) 1.4 % spray Take 1 spray by mouth every hour as needed for sore throat       predniSONE (DELTASONE) 10 MG tablet Take 10 mg by mouth daily.       rosuvastatin (CRESTOR) 10 MG tablet Take 1 tablet (10 mg) by mouth daily 90 tablet 3     tadalafil (CIALIS) 20 MG tablet Take 20 mg by mouth daily For pulmonary hypertension.       tamsulosin (FLOMAX) 0.4 MG capsule Take 1 capsule (0.4 mg) by mouth daily 30 capsule 3     zolpidem (AMBIEN) 5 MG tablet Take 5 mg by mouth At Bedtime       REVIEW OF SYSTEMS:  7 point ROS  done including, light headedness/dizziness, fever/chills, pain, Resp, CV, GI, and  and is negative other than noted in HPI.      Physical Exam:  /87   Pulse 71   Temp 97.4  F (36.3  C)   Resp 16   Wt 65.1 kg (143 lb 9.6 oz)   SpO2 92%   BMI 22.83 kg/m        GENERAL APPEARANCE:  Elderly chronically ill appearing male sitting up in recliner, NAD, non-toxic.  ENT:  Mouth and oropharynx normal, moist mucous membranes.   RESP:  Lungs diminished through-out, improved from last week.  Mildly tachypenic breathing pattern, but no distress, unchanged.  No cough.   CV: S1/S2 no murmur or rubs.  Regular rhythm and rate.  No generalized edema.   ABDOMEN:   Protuberant but, soft, non-tender with active bowel sounds.  No guarding, rigidity, or rebound tenderness.  EXTREMITIES:  No lower extremity edema, no calf tenderness.   PSYCH: Alert and orientated, pleasant and cooperative.   BACK: There is a large dime sized black mole in the mid upper back with a large skin tag, no overt s/s of infection.      Recent Labs: I have personally reviewed labs, which are in facility chart.     CBC RESULTS:   Recent Labs   Lab Test 01/02/19  0730 12/26/18  0820   WBC 8.7 10.3   RBC 3.99* 3.97*   HGB 11.5* 11.3*   HCT 35.2* 35.0*   MCV 88 88   MCH 28.8 28.5   MCHC 32.7 32.3   RDW 18.4* 18.1*    163       Last Basic Metabolic Panel:  Recent Labs   Lab Test 12/19/18  0631 12/19/18    141   POTASSIUM 3.5 3.5   CHLORIDE 106 106   AV 7.7* 7.7*   CO2 28 28   BUN 17 17   CR 0.64* 0.64*   GLC 44* 44*       Liver Function Studies -   Recent Labs   Lab Test 12/11/18  0733 12/06/18  0708   PROTTOTAL 5.3* 4.9*   ALBUMIN 2.4* 2.2*   BILITOTAL 0.5 0.3   ALKPHOS 53 41   AST 22 38   ALT 29 44       TSH   Date Value Ref Range Status   02/22/2017 7.59 (H) 0.40 - 4.00 mU/L Final   09/04/2014 0.16 (L) 0.40 - 4.00 mU/L Final     Comment:     Effective 7/30/2014, the reference range for this assay has changed to reflect   new  instrumentation/methodology.       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  His breathing status is likely optimized but remains tenuous.  Was using 4-6 L O2 prior, now up to 10 L but reports SOB/CHOUDHURY as stable/unchanged.  He attempted to use a Oxymizer but did not like the apparatus.  I spoke with the O2 company and they will send a nasal pendant concentrator which we can try to see if we can reduce his O2 demand.   -Will trial nasal pendant to see if we can lower his O2 demand.   --He will need a high flow, portable O2 tank prior to 'DC, one that can handle up to 10 L.   -Continues on Budoneside and daily Prednisone.   -Continues on DuoNeb and Albuterol PRN.   -Continues on Apixiban as long as Plt's are >50k    Pulmonary hypertension (H), severe  Essential hypertension  Review of VS's show VSS and within acceptable range.   No current s/s of clinical CHF.   -Continues on Tadalafil, Flomax, Digoxin.   -Continues on Furosemide, ASA, Norvasc, Lisinopril, Metoprolol, Statin.     Type 2 diabetes mellitus with complication, with long-term current use of insulin (H)  A1c was 10.1 in Nov.  Lately AM glucoses are still to low, despite us lowering at bedtime Aspart last week.  Asymptomatic.   -Continues Lantus 10 BID.   -Continues TID s/s Aspart.   -Lowered at bedtime s/s Aspart.     Liver lesion  Noted incidentally in hospital.   -Continues to have f/u with Onco.   -Will need re-imaging in future.     Depression, unspecified depression type  Does  endorse family issues, reports wife does not want him returning home, wants him to go to [facility], otherwise reports good mood.    -Will have SW visit him per his request.   -Continues Prozac.     Debility  -Continues with PT/OT.      Orders:  1-Wean O2 down to SaO2 goal of 88-92%, should not be in the high 90's due to pulmonary fibrosis.   2- Change at bedtime Aspart scale to;  <299 no insulin  300-350 - 1 unit  >350 - 2 units.   3- Have O2 company review current set up, 1 O2 tank at 6 L, 1 O2 tank at 4 L, is that ok?  4- Trial nasal pendant and see if we can wean O2 down.     Electronically signed by  IRINA Lu CNP                      Sincerely,        IRINA Lu CNP

## 2019-01-07 NOTE — PROGRESS NOTES
[facility]    Chief Complaint   Patient presents with     ROSEMARIE       [facility] Record Number:  [id number]  Place of Service where encounter took place:  [facility] [[id number]]    HPI:      Ravi Sandoval is a 74 year old  (1945), who is being seen today for an episodic care visit.  HPI information obtained from: facility chart records, facility staff, patient report and [facility] chart review.    Met with Mr. Sandoval today for follow up.  Mr. Sandoval continues to have tenuous oxygen/breathing status, but reports his SOB/CHOUDHURY as baseline, no new complaints.  He continues to endorse his hands are cold all the time, no other medical complaints.  He does endorse family issues, in that reports his wife does not want him coming back home, she wants him to go to an [facility].      ALLERGIES: Tetracycline  Past Medical, Surgical, Family and Social History reviewed and updated in [facility].    Current Outpatient Medications   Medication Sig Dispense Refill     acetaminophen (TYLENOL) 325 MG tablet Take 650 mg by mouth every 6 hours as needed for mild pain       albuterol (PROVENTIL) (2.5 MG/3ML) 0.083% neb solution Take 1 vial (2.5 mg) by nebulization once as needed (refractory bronchospasm associated with hypersensitivity)  0     amLODIPine (NORVASC) 2.5 MG tablet Take 2.5 mg by mouth daily       apixaban ANTICOAGULANT (ELIQUIS) 5 MG tablet Take 1 tablet (5 mg) by mouth 2 times daily 180 tablet 0     ASPIRIN PO Take 81 mg by mouth At Bedtime       budesonide (PULMICORT) 0.5 MG/2ML neb solution Take 2 mLs (0.5 mg) by nebulization 2 times daily 180 ampule 0     Colchicine (COLCRYS PO) Take 0.6 mg by mouth daily TAKES IN THE EVENING       digoxin (LANOXIN) 125 MCG tablet Take 1 tablet (125 mcg) by mouth daily 90 tablet 3     FLUoxetine HCl (PROZAC PO) Take 40 mg by mouth daily       furosemide (LASIX) 20 MG tablet Take 1 tablet (20 mg) by mouth daily 14 tablet 0     insulin aspart  (NOVOLOG VIAL) 100 UNITS/ML vial Give before meals and before bed:  For Pre-Meal Glucose:  140-189 give 1 unit   190-239 give 2 units   240-289 give 3 units   290-339 give 4 units   = or >340 give 5 units     For Bedtime Glucose  <250 no insulin.   251-300 give 1 unit.   301-350 give 1.5 units.  >351 give 2 units. 10 mL 0     insulin glargine (LANTUS SOLOSTAR PEN) 100 UNIT/ML pen Inject 10 Units Subcutaneous 2 times daily 3 mL 0     ipratropium - albuterol 0.5 mg/2.5 mg/3 mL (DUONEB) 0.5-2.5 (3) MG/3ML neb solution Take 1 vial by nebulization 3 times daily as needed for shortness of breath / dyspnea, wheezing or other (use up to 3 times a day for shortness of breath, cough)       levothyroxine (SYNTHROID/LEVOTHROID) 125 MCG tablet Take 125 mcg by mouth daily       LISINOPRIL PO Take 20 mg by mouth daily       loperamide (IMODIUM) 2 MG capsule Take 1 capsule (2 mg) by mouth 4 times daily as needed for diarrhea 20 capsule      melatonin 5 MG tablet Take 5 mg by mouth At Bedtime       metoprolol (TOPROL-XL) 25 MG 24 hr tablet Take 1 tablet (25 mg) by mouth 2 times daily 180 tablet 0     multivitamin w/minerals (THERA-VIT-M) tablet Take 1 tablet by mouth daily 90 tablet 3     OMEPRAZOLE PO Take 40 mg by mouth daily       ondansetron (ZOFRAN) 4 MG tablet Take 4 mg by mouth every 6 hours as needed for nausea        phenol (CHLORASEPTIC) 1.4 % spray Take 1 spray by mouth every hour as needed for sore throat       predniSONE (DELTASONE) 10 MG tablet Take 10 mg by mouth daily.       rosuvastatin (CRESTOR) 10 MG tablet Take 1 tablet (10 mg) by mouth daily 90 tablet 3     tadalafil (CIALIS) 20 MG tablet Take 20 mg by mouth daily For pulmonary hypertension.       tamsulosin (FLOMAX) 0.4 MG capsule Take 1 capsule (0.4 mg) by mouth daily 30 capsule 3     zolpidem (AMBIEN) 5 MG tablet Take 5 mg by mouth At Bedtime       REVIEW OF SYSTEMS:  7 point ROS done including, light headedness/dizziness, fever/chills, pain, Resp, CV, GI,  and  and is negative other than noted in HPI.      Physical Exam:  /87   Pulse 71   Temp 97.4  F (36.3  C)   Resp 16   Wt 65.1 kg (143 lb 9.6 oz)   SpO2 92%   BMI 22.83 kg/m       GENERAL APPEARANCE:  Elderly chronically ill appearing male sitting up in recliner, NAD, non-toxic.  ENT:  Mouth and oropharynx normal, moist mucous membranes.   RESP:  Lungs diminished through-out, improved from last week.  Mildly tachypenic breathing pattern, but no distress, unchanged.  No cough.   CV: S1/S2 no murmur or rubs.  Regular rhythm and rate.  No generalized edema.   ABDOMEN:   Protuberant but, soft, non-tender with active bowel sounds.  No guarding, rigidity, or rebound tenderness.  EXTREMITIES:  No lower extremity edema, no calf tenderness.   PSYCH: Alert and orientated, pleasant and cooperative.   BACK: There is a large dime sized black mole in the mid upper back with a large skin tag, no overt s/s of infection.      Recent Labs: I have personally reviewed labs, which are in facility chart.     CBC RESULTS:   Recent Labs   Lab Test 01/02/19  0730 12/26/18  0820   WBC 8.7 10.3   RBC 3.99* 3.97*   HGB 11.5* 11.3*   HCT 35.2* 35.0*   MCV 88 88   MCH 28.8 28.5   MCHC 32.7 32.3   RDW 18.4* 18.1*    163       Last Basic Metabolic Panel:  Recent Labs   Lab Test 12/19/18  0631 12/19/18    141   POTASSIUM 3.5 3.5   CHLORIDE 106 106   AV 7.7* 7.7*   CO2 28 28   BUN 17 17   CR 0.64* 0.64*   GLC 44* 44*       Liver Function Studies -   Recent Labs   Lab Test 12/11/18  0733 12/06/18  0708   PROTTOTAL 5.3* 4.9*   ALBUMIN 2.4* 2.2*   BILITOTAL 0.5 0.3   ALKPHOS 53 41   AST 22 38   ALT 29 44       TSH   Date Value Ref Range Status   02/22/2017 7.59 (H) 0.40 - 4.00 mU/L Final   09/04/2014 0.16 (L) 0.40 - 4.00 mU/L Final     Comment:     Effective 7/30/2014, the reference range for this assay has changed to reflect   new instrumentation/methodology.       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  His breathing status is likely optimized but remains tenuous.  Was using 4-6 L O2 prior, now up to 10 L but reports SOB/CHOUDHURY as stable/unchanged.  He attempted to use a Oxymizer but did not like the apparatus.  I spoke with the O2 company and they will send a nasal pendant concentrator which we can try to see if we can reduce his O2 demand.   -Will trial nasal pendant to see if we can lower his O2 demand.   --He will need a high flow, portable O2 tank prior to 'DC, one that can handle up to 10 L.   -Continues on Budoneside and daily Prednisone.   -Continues on DuoNeb and Albuterol PRN.   -Continues on Apixiban as long as Plt's are >50k    Pulmonary hypertension (H), severe  Essential hypertension  Review of VS's show VSS and within acceptable range.   No current s/s of clinical CHF.   -Continues on Tadalafil, Flomax, Digoxin.   -Continues on Furosemide, ASA, Norvasc, Lisinopril, Metoprolol, Statin.     Type 2 diabetes mellitus with complication, with long-term current use of insulin (H)  A1c was 10.1 in Nov.  Lately AM glucoses are still to low, despite us lowering at bedtime Aspart last week.  Asymptomatic.   -Continues Lantus 10 BID.   -Continues TID s/s Aspart.   -Lowered at bedtime s/s Aspart.     Liver lesion  Noted incidentally in hospital.   -Continues to have f/u with Onco.   -Will need re-imaging in future.     Depression, unspecified depression type  Does endorse family issues, reports wife does not want him returning home, wants him to go to [facility],  otherwise reports good mood.    -Will have SW visit him per his request.   -Continues Prozac.     Debility  -Continues with PT/OT.      Orders:  1-Wean O2 down to SaO2 goal of 88-92%, should not be in the high 90's due to pulmonary fibrosis.   2- Change at bedtime Aspart scale to;  <299 no insulin  300-350 - 1 unit  >350 - 2 units.   3- Have O2 company review current set up, 1 O2 tank at 6 L, 1 O2 tank at 4 L, is that ok?  4- Trial nasal pendant and see if we can wean O2 down.     Electronically signed by  IRINA Lu CNP

## 2019-01-09 NOTE — PROGRESS NOTES
[facility] DISCHARGE SUMMARY    PATIENT'S NAME: Ravi Sandoval  YOB: 1945  MEDICAL RECORD NUMBER:  [id number]  [facility] of Service where encounter took place:  [facility] [[id number]]    PRIMARY CARE PROVIDER AND CLINIC RESPONSIBLE AFTER TRANSFER: [facility] [address] / [place] [address]     TRANSFERRING PROVIDERS: IRINA Saul CNP, Maynor Rodriguez MD  DATE OF SNF ADMISSION:  December / 11 / 2018  DATE OF SNF (anticipated) DISCHARGE: January / 10 / 2019  DISCHARGE DISPOSITION: Non-[facility] Provider   RECENT HOSPITALIZATION/ED:  [facility] stay 11/23/2018 to 12/11/2018.     CODE STATUS/ADVANCE DIRECTIVES DISCUSSION:   CPR/Full code      Allergies   Allergen Reactions     Tetracycline      Condition on Discharge:  Stable.  Function:  Transfers Mod I and Amb 200 ft w/ 2ww  Cognitive Scores: SLUMS 20/30 and TUG  20.5 sec    Equipment: walker    DISCHARGE DIAGNOSIS:   1. 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)    2. Chronic respiratory failure with hypoxia (H)    3. Pulmonary fibrosis (H)    4. Pulmonary hypertension (H), severe    5. Other acute pulmonary embolism without acute cor pulmonale (H)    6. Sinusitis, unspecified chronicity, unspecified location    7. C. difficile colitis    8. Type 2 diabetes mellitus with complication, with long-term current use of insulin (H)    9. Chronic systolic heart failure (H)    10. Coronary artery disease involving native coronary artery of native heart without angina pectoris    11. Benign essential hypertension    12. Other specified hypothyroidism    13. Physical deconditioning        HPI Nursing Facility Course:  HPI information obtained from: facility chart records, facility staff, patient report and [facility] chart review.  Current issues are:      .      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)  Chronic obstructive pulmonary disease with acute  exacerbation (H)  Acute respiratory distress  Pulmonary hypertension (H)  Pulmonary fibrosis (H)  Other acute pulmonary embolism without acute cor pulmonale (H)  Patient transferred to TCU after hospitalization due to shortness of breath and hypoxia. Patient is a current smoker with h/o pulmonary fibrosis, COPD- oxygen dependent, and pulmonary hypertension. During course of hospital stay patient was found to have large pleural effusion on right side that was drained on 11/26, non occlusive pulmonary embolism (eliquis started 1/23 ). He was treated for RLL pneumonia (completed on 12/3) and COPD exacerbation (steroids, duonebs). 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 He was evaluated by oncology. He completed a course of palliative chemotherapy (carboplatin and etoposide)  on 12/8.    He did follow up with oncology on 12/21 and pulmonology on 12/28. No change in plan of care.   He has been using oxygen at 6-10L per minute while in TCU. We recommend that o2 sat goal is 88-92% due to pulmonary fibrosis.   c diff- stool pos for C diff on 12/19. Started on oral vanco on 12/19. Today he reports  Sinusitis-  On 12/21 patient reports headache, blurry vision and feeling of sand paper in eyes. And maxillary pain, productive cough. Amoxicillin was started 12/20 and completed on 12/30  Type 2 diabetes mellitus with complication, with long-term current use of insulin (H)  Prior to hospitalization patient was taking glipizide. He did require insulin while in patient, likely due to steroids. He continues on lantus 20u daily with sliding scale insulin for meals. He was not on insulin prior to TCU stay and states he will not give himself insulin shots. We will restart PTA glipizide.   Most recent blood sugars    chf- he did requires additional diuresis in hospital. Now taking lasix 20mg every day.   Wt Readings from Last 5 Encounters:   01/09/19 65.1 kg (143 lb 9.6 oz)   01/07/19 65.1 kg  (143 lb 9.6 oz)   01/03/19 64.4 kg (142 lb)   12/24/18 62.9 kg (138 lb 9.6 oz)   12/20/18 62.9 kg (138 lb 9.6 oz)     Coronary artery disease involving native coronary artery of native heart without angina pectoris  H/o bare metal stent in 2010.   He continues on ASA.   Benign essential hypertension  BP's in TCU: 166/85, 136/78, 159/87  He continues on  PTA amodipine 2.5 mg daily, lisinopril 20 mg daily and metoprolol 25 mg twice daily.  Other specified hypothyroidism  Taking PTA synthroid  Physical deconditioning  He was living with wife in apartment. Notes from hospital  indicate wife is unable to care for patient at this time. He has been independent prior to admission but not very active. He was driving.  Therapy report he is walking ' with walker. He will move to [facility].    PAST MEDICAL HISTORY:  has a past medical history of ACP (advance care planning), Anemia, ASHD (arteriosclerotic heart disease), BPH (benign prostatic hyperplasia), Cardiac arrest (H), Cataracts, bilateral, Chronic diarrhea, Chronic respiratory failure with hypoxia (H), COPD (chronic obstructive pulmonary disease) (H), Depression, Disease of lung, Dysplasia of prostate, Gastroesophageal reflux disease, General weakness, Heart attack (H), Herpes zoster, Histiocytosis X (H), Hyperlipidemia, Hypertension, Hypothyroidism, Oxygen dependent, Postinflammatory pulmonary fibrosis (H), Prostatic hypertrophy, benign, Pulmonary HTN (H), Pulmonary hypertensive venous disease (H), Thyroid disease, Tobacco use, and Type 2 diabetes mellitus without complications (H).    DISCHARGE MEDICATIONS:  Current Outpatient Medications   Medication Sig Dispense Refill     acetaminophen (TYLENOL) 325 MG tablet Take 650 mg by mouth every 6 hours as needed for mild pain       albuterol (PROVENTIL) (2.5 MG/3ML) 0.083% neb solution Take 1 vial (2.5 mg) by nebulization once as needed (refractory bronchospasm associated with hypersensitivity)  0      amLODIPine (NORVASC) 2.5 MG tablet Take 2.5 mg by mouth daily       apixaban ANTICOAGULANT (ELIQUIS) 5 MG tablet Take 1 tablet (5 mg) by mouth 2 times daily 180 tablet 0     ASPIRIN PO Take 81 mg by mouth At Bedtime       budesonide (PULMICORT) 0.5 MG/2ML neb solution Take 2 mLs (0.5 mg) by nebulization 2 times daily 180 ampule 0     Colchicine (COLCRYS PO) Take 0.6 mg by mouth daily TAKES IN THE EVENING       digoxin (LANOXIN) 125 MCG tablet Take 1 tablet (125 mcg) by mouth daily 90 tablet 3     FLUoxetine HCl (PROZAC PO) Take 40 mg by mouth daily       furosemide (LASIX) 20 MG tablet Take 1 tablet (20 mg) by mouth daily 14 tablet 0     insulin aspart (NOVOLOG VIAL) 100 UNITS/ML vial Give before meals and before bed:  For Pre-Meal Glucose:  140-189 give 1 unit   190-239 give 2 units   240-289 give 3 units   290-339 give 4 units   = or >340 give 5 units     For Bedtime Glucose  <250 no insulin.   251-300 give 1 unit.   301-350 give 1.5 units.  >351 give 2 units. 10 mL 0     insulin glargine (LANTUS SOLOSTAR PEN) 100 UNIT/ML pen Inject 20 Units Subcutaneous At Bedtime 3 mL 0     ipratropium - albuterol 0.5 mg/2.5 mg/3 mL (DUONEB) 0.5-2.5 (3) MG/3ML neb solution Take 1 vial by nebulization 3 times daily as needed for shortness of breath / dyspnea, wheezing or other (use up to 3 times a day for shortness of breath, cough)       levothyroxine (SYNTHROID/LEVOTHROID) 125 MCG tablet Take 125 mcg by mouth daily       LISINOPRIL PO Take 20 mg by mouth daily       melatonin 5 MG tablet Take 5 mg by mouth At Bedtime       metoprolol (TOPROL-XL) 25 MG 24 hr tablet Take 1 tablet (25 mg) by mouth 2 times daily 180 tablet 0     multivitamin w/minerals (THERA-VIT-M) tablet Take 1 tablet by mouth daily 90 tablet 3     OMEPRAZOLE PO Take 40 mg by mouth daily       predniSONE (DELTASONE) 10 MG tablet Take 10 mg by mouth daily.       rosuvastatin (CRESTOR) 10 MG tablet Take 1 tablet (10 mg) by mouth daily 90 tablet 3     tadalafil  "(CIALIS) 20 MG tablet Take 20 mg by mouth daily For pulmonary hypertension.       tamsulosin (FLOMAX) 0.4 MG capsule Take 1 capsule (0.4 mg) by mouth daily 30 capsule 3     zolpidem (AMBIEN) 5 MG tablet Take 5 mg by mouth At Bedtime         MEDICATION CHANGES/RATIONALE:   12/18/18 melatonin 5mg q hs  12/19/18 vanco 125mg QID for 10d  12/20/18 polytrim eye drops one drop to each eye QID for 7 d; amoxicillin 875mg BID for 10d- sinusitis; ambien 5mg q hs  1/9/19 DISCONTINUE loperamide, zofran, phenol liquid, lantus and novolg  1/9/19 start glipizide 10mg q day.   Controlled medications sent with patient:   not applicable/none     ROS:    4 point ROS including Respiratory, CV, GI and , other than that noted in the HPI,  is negative    Physical Exam:   Vitals: /85   Pulse 59   Temp 97.6  F (36.4  C)   Resp 20   Ht 1.689 m (5' 6.5\")   Wt 65.1 kg (143 lb 9.6 oz)   SpO2 93%   BMI 22.83 kg/m    BMI= Body mass index is 22.83 kg/m .     GENERAL APPEARANCE:  Alert, in no distress  ENT:  Mouth and posterior oropharynx normal, moist mucous membranes, hearing acuity adequate   EYES:  EOM, conjunctivae- slightly red, lids, pupils and irises normal  RESP:  respiratory effort and palpation of chest normal, no respiratory distress, Lung sounds faint rales in LLL.   CV:  Palpation and auscultation of heart done , rate and rhythm reg, no murmur, no rub or gallop, Edema none  ABDOMEN:  normal bowel sounds, soft, nontender, no hepatosplenomegaly or other masses  M/S:   Gait and station not observed, Digits and nails normal   SKIN:  Inspection/Palpation of skin and subcutaneous tissue scattered bruises on arms. Seborrheic dermatoses on back  NEURO: 2-12 in normal limits and at patient's baseline  PSYCH:  insight and judgement, memory intact , affect and mood normal    DISCHARGE PLAN:  Occupational Therapy, Physical Therapy, Registered Nurse, Home Health Aide,  and From:  [facility]   Patient instructed to " follow-up with:  PCP in 7 days      Current [facility] scheduled appointments:  No future appointments.    MTM referral needed and placed by this provider: No    Pending labs: none  SNF labs   CBC RESULTS:   Recent Labs   Lab Test 19  0730 18  0820   WBC 8.7 10.3   RBC 3.99* 3.97*   HGB 11.5* 11.3*   HCT 35.2* 35.0*   MCV 88 88   MCH 28.8 28.5   MCHC 32.7 32.3   RDW 18.4* 18.1*    163       Last Basic Metabolic Panel:  Recent Labs   Lab Test 18  0631 18    141   POTASSIUM 3.5 3.5   CHLORIDE 106 106   AV 7.7* 7.7*   CO2 28 28   BUN 17 17   CR 0.64* 0.64*   GLC 44* 44*       Liver Function Studies -   Recent Labs   Lab Test 18  0733 18  0708   PROTTOTAL 5.3* 4.9*   ALBUMIN 2.4* 2.2*   BILITOTAL 0.5 0.3   ALKPHOS 53 41   AST 22 38   ALT 29 44       TSH   Date Value Ref Range Status   2017 7.59 (H) 0.40 - 4.00 mU/L Final   2014 0.16 (L) 0.40 - 4.00 mU/L Final     Comment:     Effective 2014, the reference range for this assay has changed to reflect   new instrumentation/methodology.       Lab Results   Component Value Date    A1C 10.7 2018    A1C 8.1 2017     Discharge Treatments:oxygen and nebulizer     TOTAL DISCHARGE TIME:   Greater than 30 minutes  Electronically signed by:  IRINA Saul CNP         Face to Face and Medical Necessity Statement for DME Provider visit    Demographic Information on Ravi Sandoval:  Gender: male  : 1945  [address]   [place] [address]  [phone] (home) none (work)    Medical Record: [id number]  Social Security Number: [id number]  Primary Care Provider: Florencio Patricia  Insurance: Payor: MEDICARE / Plan: MEDICARE / Product Type: Medicare /     HPI:   Ravi Sandoval is a 74 year old  (1945), who is being seen today for a face to face provider visit at [facility]; medical necessity statement for DME included. This patient requires the following:  DME Ordered and  Medical Necessity Statement   Oxygen: 9 L/min via nasal cannula  continuous ; Clinical Documentation: (Obtain documented RA sat with in 2 days of discharge in acute setting or within 30 days of provider visit):  Room air with activity: 82% and room air at rest 85%: Qualifing sat level is < 88% or below on room air with activity on 1/8/19 date      Pt needing above DME with expected length of need of 99   months  due to medical necessity associated with following diagnosis:     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)  Chronic respiratory failure with hypoxia (H)  Pulmonary fibrosis (H)  Pulmonary hypertension (H)  Other acute pulmonary embolism without acute cor pulmonale (H)  Sinusitis, unspecified chronicity, unspecified location  C. difficile colitis  Type 2 diabetes mellitus with complication, with long-term current use of insulin (H)  Chronic systolic heart failure (H)  Coronary artery disease involving native coronary artery of native heart without angina pectoris  Benign essential hypertension  Other specified hypothyroidism  Physical deconditioning      PMH   has a past medical history of ACP (advance care planning), Anemia, ASHD (arteriosclerotic heart disease), BPH (benign prostatic hyperplasia), Cardiac arrest (H), Cataracts, bilateral, Chronic diarrhea, Chronic respiratory failure with hypoxia (H), COPD (chronic obstructive pulmonary disease) (H), Depression, Disease of lung, Dysplasia of prostate, Gastroesophageal reflux disease, General weakness, Heart attack (H), Herpes zoster, Histiocytosis X (H), Hyperlipidemia, Hypertension, Hypothyroidism, Oxygen dependent, Postinflammatory pulmonary fibrosis (H), Prostatic hypertrophy, benign, Pulmonary HTN (H), Pulmonary hypertensive venous disease (H), Thyroid disease, Tobacco use, and Type 2 diabetes mellitus without complications (H).    ROS:4 point ROS including Respiratory, CV, GI and , other than that noted in the HPI,  is negative    EXAM  Vitals: /85   " Pulse 59   Temp 97.6  F (36.4  C)   Resp 20   Ht 1.689 m (5' 6.5\")   Wt 65.1 kg (143 lb 9.6 oz)   SpO2 93%   BMI 22.83 kg/m  ;BMI= Body mass index is 22.83 kg/m .   GENERAL APPEARANCE:  Alert, in no distress  ENT:  Mouth and posterior oropharynx normal, moist mucous membranes, hearing acuity adequate   EYES:  EOM, conjunctivae- slightly red, lids, pupils and irises normal  RESP:  respiratory effort and palpation of chest normal, no respiratory distress, Lung sounds faint rales in LLL.   CV:  Palpation and auscultation of heart done , rate and rhythm reg, no murmur, no rub or gallop, Edema none  ABDOMEN:  normal bowel sounds, soft, nontender, no hepatosplenomegaly or other masses  M/S:   Gait and station not observed, Digits and nails normal   SKIN:  Inspection/Palpation of skin and subcutaneous tissue scattered bruises on arms. Seborrheic dermatoses on back  NEURO: 2-12 in normal limits and at patient's baseline  PSYCH:  insight and judgement, memory intact , affect and mood normal      ASSESSMENT/PLAN:  1. 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)    2. Chronic respiratory failure with hypoxia (H)    3. Pulmonary fibrosis (H)    4. Pulmonary hypertension (H), severe    5. Other acute pulmonary embolism without acute cor pulmonale (H)    6. Sinusitis, unspecified chronicity, unspecified location    7. C. difficile colitis    8. Type 2 diabetes mellitus with complication, with long-term current use of insulin (H)    9. Chronic systolic heart failure (H)    10. Coronary artery disease involving native coronary artery of native heart without angina pectoris    11. Benign essential hypertension    12. Other specified hypothyroidism    13. Physical deconditioning        Orders:  1. Continuous oxygen at 9L per minute to keep O2 sats 88-92%.     ELECTRONICALLY SIGNED BY PECOS CERTIFIED PROVIDER:  IRINA Saul CNP   NPI: [id number]  [facility]  [address] [place] [address], [address]  [place] " [address]      Face to Face and Medical Necessity Statement for DME Provider visit    Demographic Information on Ravi Sandoval:  Gender: male  : 1945  [address]   [place] [address]  [phone] (home) none (work)    Medical Record: [id number]  Social Security Number: [id number]  Primary Care Provider: Florencio Patricia  Insurance: Payor: MEDICARE / Plan: MEDICARE / Product Type: Medicare /     HPI:   Ravi Sandoval is a 74 year old  (1945), who is being seen today for a face to face provider visit at [facility] medical necessity statement for DME included. This patient requires the following:  DME Ordered and Medical Necessity Statement   Nebulizer:  for albuterol 0.083% neb solution daily prn, duoneb TID prn medication        Pt needing above DME with expected length of need of 99   months  due to medical necessity associated with following diagnosis:     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)  Chronic respiratory failure with hypoxia (H)  Pulmonary fibrosis (H)  Pulmonary hypertension (H)  Other acute pulmonary embolism without acute cor pulmonale (H)  Sinusitis, unspecified chronicity, unspecified location  C. difficile colitis  Type 2 diabetes mellitus with complication, with long-term current use of insulin (H)  Chronic systolic heart failure (H)  Coronary artery disease involving native coronary artery of native heart without angina pectoris  Benign essential hypertension  Other specified hypothyroidism  Physical deconditioning      PMH   has a past medical history of ACP (advance care planning), Anemia, ASHD (arteriosclerotic heart disease), BPH (benign prostatic hyperplasia), Cardiac arrest (H), Cataracts, bilateral, Chronic diarrhea, Chronic respiratory failure with hypoxia (H), COPD (chronic obstructive pulmonary disease) (H), Depression, Disease of lung, Dysplasia of prostate, Gastroesophageal reflux disease, General weakness, Heart attack (H), Herpes zoster, Histiocytosis X (H),  "Hyperlipidemia, Hypertension, Hypothyroidism, Oxygen dependent, Postinflammatory pulmonary fibrosis (H), Prostatic hypertrophy, benign, Pulmonary HTN (H), Pulmonary hypertensive venous disease (H), Thyroid disease, Tobacco use, and Type 2 diabetes mellitus without complications (H).    ROS:4 point ROS including Respiratory, CV, GI and , other than that noted in the HPI,  is negative    EXAM  Vitals: /85   Pulse 59   Temp 97.6  F (36.4  C)   Resp 20   Ht 1.689 m (5' 6.5\")   Wt 65.1 kg (143 lb 9.6 oz)   SpO2 93%   BMI 22.83 kg/m  ;BMI= Body mass index is 22.83 kg/m .   GENERAL APPEARANCE:  Alert, in no distress  ENT:  Mouth and posterior oropharynx normal, moist mucous membranes, hearing acuity adequate   EYES:  EOM, conjunctivae- slightly red, lids, pupils and irises normal  RESP:  respiratory effort and palpation of chest normal, no respiratory distress, Lung sounds faint rales in LLL.   CV:  Palpation and auscultation of heart done , rate and rhythm reg, no murmur, no rub or gallop, Edema none  ABDOMEN:  normal bowel sounds, soft, nontender, no hepatosplenomegaly or other masses  M/S:   Gait and station not observed, Digits and nails normal   SKIN:  Inspection/Palpation of skin and subcutaneous tissue scattered bruises on arms. Seborrheic dermatoses on back  NEURO: 2-12 in normal limits and at patient's baseline  PSYCH:  insight and judgement, memory intact , affect and mood normal    ASSESSMENT/PLAN:  1. 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)    2. Chronic respiratory failure with hypoxia (H)    3. Pulmonary fibrosis (H)    4. Pulmonary hypertension (H), severe    5. Other acute pulmonary embolism without acute cor pulmonale (H)    6. Sinusitis, unspecified chronicity, unspecified location    7. C. difficile colitis    8. Type 2 diabetes mellitus with complication, with long-term current use of insulin (H)    9. Chronic systolic heart failure (H)    10. Coronary artery disease involving native coronary " artery of native heart without angina pectoris    11. Benign essential hypertension    12. Other specified hypothyroidism    13. Physical deconditioning        Orders:  1.one nebulizer    ELECTRONICALLY SIGNED BY NOLAN CERTIFIED PROVIDER:  IRINA Saul CNP   NPI: [id number]  [facility]  [address] [place] [address], [address]  [place] [address]          Documentation of Face-to-Face and Certification for Home Health Services     Patient: Ravi Sandoval   YOB: 1945  MR Number: [id number]  Today's Date: 1/9/2019    I certify that patient: Ravi Sandoval is under my care and that I, or a nurse practitioner or physician's assistant working with me, had a face-to-face encounter that meets the physician face-to-face encounter requirements with this patient on: 1/9/2019.    This encounter with the patient was in whole, or in part, for the following medical condition, which is the primary reason for home health care:   Encounter Diagnoses   Name Primary?     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) Yes     Chronic respiratory failure with hypoxia (H)      Pulmonary fibrosis (H)      Pulmonary hypertension (H), severe      Other acute pulmonary embolism without acute cor pulmonale (H)      Sinusitis, unspecified chronicity, unspecified location      C. difficile colitis      Type 2 diabetes mellitus with complication, with long-term current use of insulin (H)      Chronic systolic heart failure (H)      Coronary artery disease involving native coronary artery of native heart without angina pectoris      Benign essential hypertension      Other specified hypothyroidism      Physical deconditioning    .    I certify that, based on my findings, the following services are medically necessary home health services: Nursing, Occupational Therapy and Physical Therapy.    My clinical findings support the need for the above services because: Nurse is needed: To assess med set up and respiratory status after  changes in medications or other medical regimen.., Occupational Therapy Services are needed to assess and treat cognitive ability and address ADL safety due to impairment in mobility. and Physical Therapy Services are needed to assess and treat the following functional impairments: poor exercise endurance.    Further, I certify that my clinical findings support that this patient is homebound (i.e. absences from home require considerable and taxing effort and are for medical reasons or Alev[facility] services or infrequently or of short duration when for other reasons) because: Requires assistance of another person or specialized equipment to access medical services because patient: Is unable to walk greater than 2-- feet without rest...    Based on the above findings. I certify that this patient is confined to the home and needs intermittent skilled nursing care, physical therapy and/or speech therapy.  The patient is under my care, and I have initiated the establishment of the plan of care.  This patient will be followed by a physician who will periodically review the plan of care.  Physician/Provider to provide follow up care: Florencio Patricia    Responsible Medicare certified PECOS Physician: Electronically signed by Dr. Maynor Rodriguez MD, and only signing for initial order. Please send all follow up questions and concerns or needed follow up signatures to the PCP Florencio Patricia.  Physician Signature: See electronic signature associated with these discharge orders.  Date: 1/9/2019

## 2019-01-09 NOTE — LETTER
1/9/2019        RE: Ravi Sandoval  [address]  [place] [address]              [facility] DISCHARGE SUMMARY    PATIENT'S NAME: Ravi Sandoval  YOB: 1945  MEDICAL RECORD NUMBER:  [id number]  Place of Service where encounter took place:  [facility] [[id number]]    PRIMARY CARE PROVIDER AND CLINIC RESPONSIBLE AFTER TRANSFER: [facility] [address] / [place] [address]     TRANSFERRING PROVIDERS: IRINA Saul CNP, Maynor Rodriguez MD  DATE OF SNF ADMISSION:  December / 11 / 2018  DATE OF SNF (anticipated) DISCHARGE: January / 10 / 2019  DISCHARGE DISPOSITION: Non-[facility] Provider   RECENT HOSPITALIZATION/ED:  [facility] stay 11/23/2018 to 12/11/2018.     CODE STATUS/ADVANCE DIRECTIVES DISCUSSION:   CPR/Full code      Allergies   Allergen Reactions     Tetracycline      Condition on Discharge:  Stable.  Function:  Transfers Mod I and Amb 200 ft w/ 2ww  Cognitive Scores: SLUMS 20/30 and TUG  20.5 sec    Equipment: walker    DISCHARGE DIAGNOSIS:   1. 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)    2. Chronic respiratory failure with hypoxia (H)    3. Pulmonary fibrosis (H)    4. Pulmonary hypertension (H), severe    5. Other acute pulmonary embolism without acute cor pulmonale (H)    6. Sinusitis, unspecified chronicity, unspecified location    7. C. difficile colitis    8. Type 2 diabetes mellitus with complication, with long-term current use of insulin (H)    9. Chronic systolic heart failure (H)    10. Coronary artery disease involving native coronary artery of native heart without angina pectoris    11. Benign essential hypertension    12. Other specified hypothyroidism    13. Physical deconditioning        HPI Nursing Facility Course:  HPI information obtained from: facility chart records, facility staff, patient report and [facility] chart review.  Current issues are:      .      Small  cell lung cancer in adult (H)  Chronic obstructive pulmonary disease with acute exacerbation (H)  Acute respiratory distress  Pulmonary hypertension (H)  Pulmonary fibrosis (H)  Other acute pulmonary embolism without acute cor pulmonale (H)  Patient transferred to TCU after hospitalization due to shortness of breath and hypoxia. Patient is a current smoker with h/o pulmonary fibrosis, COPD- oxygen dependent, and pulmonary hypertension. During course of hospital stay patient was found to have large pleural effusion on right side that was drained on 11/26, non occlusive pulmonary embolism (eliquis started 1/23 ). He was treated for RLL pneumonia (completed on 12/3) and COPD exacerbation (steroids, duonebs). 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 He was evaluated by oncology. He completed a course of palliative chemotherapy (carboplatin and etoposide)  on 12/8.    He did follow up with oncology on 12/21 and pulmonology on 12/28. No change in plan of care.   He has been using oxygen at 6-10L per minute while in TCU. We recommend that o2 sat goal is 88-92% due to pulmonary fibrosis.   c diff- stool pos for C diff on 12/19. Started on oral vanco on 12/19. Today he reports  Sinusitis-  On 12/21 patient reports headache, blurry vision and feeling of sand paper in eyes. And maxillary pain, productive cough. Amoxicillin was started 12/20 and completed on 12/30  Type 2 diabetes mellitus with complication, with long-term current use of insulin (H)  Prior to hospitalization patient was taking glipizide. He did require insulin while in patient, likely due to steroids. He continues on lantus 20u daily with sliding scale insulin for meals. He was not on insulin prior to TCU stay and states he will not give himself insulin shots. We will restart PTA glipizide.   Most recent blood sugars    chf- he did requires additional diuresis in hospital. Now taking lasix 20mg every day.   Wt Readings  from Last 5 Encounters:   01/09/19 65.1 kg (143 lb 9.6 oz)   01/07/19 65.1 kg (143 lb 9.6 oz)   01/03/19 64.4 kg (142 lb)   12/24/18 62.9 kg (138 lb 9.6 oz)   12/20/18 62.9 kg (138 lb 9.6 oz)     Coronary artery disease involving native coronary artery of native heart without angina pectoris  H/o bare metal stent in 2010.   He continues on ASA.   Benign essential hypertension  BP's in TCU: 166/85, 136/78, 159/87  He continues on  PTA amodipine 2.5 mg daily, lisinopril 20 mg daily and metoprolol 25 mg twice daily.  Other specified hypothyroidism  Taking PTA synthroid  Physical deconditioning  He was living with wife in apartment. Notes from hospital  indicate wife is unable to care for patient at this time. He has been independent prior to admission but not very active. He was driving.   Therapy report he is walking ' with walker. [facility] will [facility] to [facility].    PAST MEDICAL HISTORY:  has a past medical history of ACP (advance care planning), Anemia, ASHD (arteriosclerotic heart disease), BPH (benign prostatic hyperplasia), Cardiac arrest (H), Cataracts, bilateral, Chronic diarrhea, Chronic respiratory failure with hypoxia (H), COPD (chronic obstructive pulmonary disease) (H), Depression, Disease of lung, Dysplasia of prostate, Gastroesophageal reflux disease, General weakness, Heart attack (H), Herpes zoster, Histiocytosis X (H), Hyperlipidemia, Hypertension, Hypothyroidism, Oxygen dependent, Postinflammatory pulmonary fibrosis (H), Prostatic hypertrophy, benign, Pulmonary HTN (H), Pulmonary hypertensive venous disease (H), Thyroid disease, Tobacco use, and Type 2 diabetes mellitus without complications (H).    DISCHARGE MEDICATIONS:  Current Outpatient Medications   Medication Sig Dispense Refill     acetaminophen (TYLENOL) 325 MG tablet Take 650 mg by mouth every 6 hours as needed for mild pain       albuterol (PROVENTIL) (2.5 MG/3ML) 0.083% neb solution Take 1 vial (2.5 mg) by nebulization once as  needed (refractory bronchospasm associated with hypersensitivity)  0     amLODIPine (NORVASC) 2.5 MG tablet Take 2.5 mg by mouth daily       apixaban ANTICOAGULANT (ELIQUIS) 5 MG tablet Take 1 tablet (5 mg) by mouth 2 times daily 180 tablet 0     ASPIRIN PO Take 81 mg by mouth At Bedtime       budesonide (PULMICORT) 0.5 MG/2ML neb solution Take 2 mLs (0.5 mg) by nebulization 2 times daily 180 ampule 0     Colchicine (COLCRYS PO) Take 0.6 mg by mouth daily TAKES IN THE EVENING       digoxin (LANOXIN) 125 MCG tablet Take 1 tablet (125 mcg) by mouth daily 90 tablet 3     FLUoxetine HCl (PROZAC PO) Take 40 mg by mouth daily       furosemide (LASIX) 20 MG tablet Take 1 tablet (20 mg) by mouth daily 14 tablet 0     insulin aspart (NOVOLOG VIAL) 100 UNITS/ML vial Give before meals and before bed:  For Pre-Meal Glucose:  140-189 give 1 unit   190-239 give 2 units   240-289 give 3 units   290-339 give 4 units   = or >340 give 5 units     For Bedtime Glucose  <250 no insulin.   251-300 give 1 unit.   301-350 give 1.5 units.  >351 give 2 units. 10 mL 0     insulin glargine (LANTUS SOLOSTAR PEN) 100 UNIT/ML pen Inject 20 Units Subcutaneous At Bedtime 3 mL 0     ipratropium - albuterol 0.5 mg/2.5 mg/3 mL (DUONEB) 0.5-2.5 (3) MG/3ML neb solution Take 1 vial by nebulization 3 times daily as needed for shortness of breath / dyspnea, wheezing or other (use up to 3 times a day for shortness of breath, cough)       levothyroxine (SYNTHROID/LEVOTHROID) 125 MCG tablet Take 125 mcg by mouth daily       LISINOPRIL PO Take 20 mg by mouth daily       melatonin 5 MG tablet Take 5 mg by mouth At Bedtime       metoprolol (TOPROL-XL) 25 MG 24 hr tablet Take 1 tablet (25 mg) by mouth 2 times daily 180 tablet 0     multivitamin w/minerals (THERA-VIT-M) tablet Take 1 tablet by mouth daily 90 tablet 3     OMEPRAZOLE PO Take 40 mg by mouth daily       predniSONE (DELTASONE) 10 MG tablet Take 10 mg by mouth daily.       rosuvastatin (CRESTOR) 10 MG  "tablet Take 1 tablet (10 mg) by mouth daily 90 tablet 3     tadalafil (CIALIS) 20 MG tablet Take 20 mg by mouth daily For pulmonary hypertension.       tamsulosin (FLOMAX) 0.4 MG capsule Take 1 capsule (0.4 mg) by mouth daily 30 capsule 3     zolpidem (AMBIEN) 5 MG tablet Take 5 mg by mouth At Bedtime         MEDICATION CHANGES/RATIONALE:   12/18/18 melatonin 5mg q hs  12/19/18 vanco 125mg QID for 10d  12/20/18 polytrim eye drops one drop to each eye QID for 7 d; amoxicillin 875mg BID for 10d- sinusitis; ambien 5mg q hs  1/9/19 DISCONTINUE loperamide, zofran, phenol liquid, lantus and novolg  1/9/19 start glipizide 10mg q day.   Controlled medications sent with patient:   not applicable/none     ROS:    4 point ROS including Respiratory, CV, GI and , other than that noted in the HPI,  is negative    Physical Exam:   Vitals: /85   Pulse 59   Temp 97.6  F (36.4  C)   Resp 20   Ht 1.689 m (5' 6.5\")   Wt 65.1 kg (143 lb 9.6 oz)   SpO2 93%   BMI 22.83 kg/m     BMI= Body mass index is 22.83 kg/m .     GENERAL APPEARANCE:  Alert, in no distress  ENT:  Mouth and posterior oropharynx normal, moist mucous membranes, hearing acuity adequate   EYES:  EOM, conjunctivae- slightly red, lids, pupils and irises normal  RESP:  respiratory effort and palpation of chest normal, no respiratory distress, Lung sounds faint rales in LLL.   CV:  Palpation and auscultation of heart done , rate and rhythm reg, no murmur, no rub or gallop, Edema none  ABDOMEN:  normal bowel sounds, soft, nontender, no hepatosplenomegaly or other masses  M/S:   Gait and station not observed, Digits and nails normal   SKIN:  Inspection/Palpation of skin and subcutaneous tissue scattered bruises on arms. Seborrheic dermatoses on back  NEURO: 2-12 in normal limits and at patient's baseline  PSYCH:  insight and judgement, memory intact , affect and mood normal    DISCHARGE PLAN:  Occupational Therapy, Physical Therapy, Registered Nurse, Home Health " Aide,  and From:  [id number]   Patient instructed to follow-up with:  PCP in 7 days      Current [facility] scheduled appointments:  No future appointments.    MTM referral needed and placed by this provider: No    Pending labs: none  SNF labs   CBC RESULTS:   Recent Labs   Lab Test 19  0730 18  0820   WBC 8.7 10.3   RBC 3.99* 3.97*   HGB 11.5* 11.3*   HCT 35.2* 35.0*   MCV 88 88   MCH 28.8 28.5   MCHC 32.7 32.3   RDW 18.4* 18.1*    163       Last Basic Metabolic Panel:  Recent Labs   Lab Test 18  0631 18    141   POTASSIUM 3.5 3.5   CHLORIDE 106 106   AV 7.7* 7.7*   CO2 28 28   BUN 17 17   CR 0.64* 0.64*   GLC 44* 44*       Liver Function Studies -   Recent Labs   Lab Test 18  0733 18  0708   PROTTOTAL 5.3* 4.9*   ALBUMIN 2.4* 2.2*   BILITOTAL 0.5 0.3   ALKPHOS 53 41   AST 22 38   ALT 29 44       TSH   Date Value Ref Range Status   2017 7.59 (H) 0.40 - 4.00 mU/L Final   2014 0.16 (L) 0.40 - 4.00 mU/L Final     Comment:     Effective 2014, the reference range for this assay has changed to reflect   new instrumentation/methodology.       Lab Results   Component Value Date    A1C 10.7 2018    A1C 8.1 2017     Discharge Treatments:oxygen and nebulizer     TOTAL DISCHARGE TIME:   Greater than 30 minutes  Electronically signed by:  IRINA Saul CNP         Face to Face and Medical Necessity Statement for DME Provider visit    Demographic Information on Ravi Sandoval:  Gender: male  : 1945  [address]   [place] [address]  [phone] (home) none (work)    Medical Record: [id number]  Social Security Number: [id number]  Primary Care Provider: Florencio Patricia  Insurance: Payor: MEDICARE / Plan: MEDICARE / Product Type: Medicare /     HPI:   Ravi Sandoval is a 74 year old  (1945), who is being seen today for a face to face provider visit at [facility] TCU; medical necessity statement for DME  included. This patient requires the following:  DME Ordered and Medical Necessity Statement   Oxygen: 9 L/min via nasal cannula  continuous ; Clinical Documentation: (Obtain documented RA sat with in 2 days of discharge in acute setting or within 30 days of provider visit):  Room air with activity: 82% and room air at rest 85%: Qualifing sat level is < 88% or below on room air with activity on 1/8/19 date      Pt needing above DME with expected length of need of 99   months  due to medical necessity associated with following diagnosis:     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)  Chronic respiratory failure with hypoxia (H)  Pulmonary fibrosis (H)  Pulmonary hypertension (H)  Other acute pulmonary embolism without acute cor pulmonale (H)  Sinusitis, unspecified chronicity, unspecified location  C. difficile colitis  Type 2 diabetes mellitus with complication, with long-term current use of insulin (H)  Chronic systolic heart failure (H)  Coronary artery disease involving native coronary artery of native heart without angina pectoris  Benign essential hypertension  Other specified hypothyroidism  Physical deconditioning      PMH   has a past medical history of ACP (advance care planning), Anemia, ASHD (arteriosclerotic heart disease), BPH (benign prostatic hyperplasia), Cardiac arrest (H), Cataracts, bilateral, Chronic diarrhea, Chronic respiratory failure with hypoxia (H), COPD (chronic obstructive pulmonary disease) (H), Depression, Disease of lung, Dysplasia of prostate, Gastroesophageal reflux disease, General weakness, Heart attack (H), Herpes zoster, Histiocytosis X (H), Hyperlipidemia, Hypertension, Hypothyroidism, Oxygen dependent, Postinflammatory pulmonary fibrosis (H), Prostatic hypertrophy, benign, Pulmonary HTN (H), Pulmonary hypertensive venous disease (H), Thyroid disease, Tobacco use, and Type 2 diabetes mellitus without complications (H).    ROS:4 point ROS including Respiratory, CV, GI and , other than  "that noted in the HPI,  is negative    EXAM  Vitals: /85   Pulse 59   Temp 97.6  F (36.4  C)   Resp 20   Ht 1.689 m (5' 6.5\")   Wt 65.1 kg (143 lb 9.6 oz)   SpO2 93%   BMI 22.83 kg/m   ;BMI= Body mass index is 22.83 kg/m .   GENERAL APPEARANCE:  Alert, in no distress  ENT:  Mouth and posterior oropharynx normal, moist mucous membranes, hearing acuity adequate   EYES:  EOM, conjunctivae- slightly red, lids, pupils and irises normal  RESP:  respiratory effort and palpation of chest normal, no respiratory distress, Lung sounds faint rales in LLL.   CV:  Palpation and auscultation of heart done , rate and rhythm reg, no murmur, no rub or gallop, Edema none  ABDOMEN:  normal bowel sounds, soft, nontender, no hepatosplenomegaly or other masses  M/S:   Gait and station not observed, Digits and nails normal   SKIN:  Inspection/Palpation of skin and subcutaneous tissue scattered bruises on arms. Seborrheic dermatoses on back  NEURO: 2-12 in normal limits and at patient's baseline  PSYCH:  insight and judgement, memory intact , affect and mood normal      ASSESSMENT/PLAN:  1. 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)    2. Chronic respiratory failure with hypoxia (H)    3. Pulmonary fibrosis (H)    4. Pulmonary hypertension (H), severe    5. Other acute pulmonary embolism without acute cor pulmonale (H)    6. Sinusitis, unspecified chronicity, unspecified location    7. C. difficile colitis    8. Type 2 diabetes mellitus with complication, with long-term current use of insulin (H)    9. Chronic systolic heart failure (H)    10. Coronary artery disease involving native coronary artery of native heart without angina pectoris    11. Benign essential hypertension    12. Other specified hypothyroidism    13. Physical deconditioning        Orders:  1. Continuous oxygen at 9L per minute to keep O2 sats 88-92%.     ELECTRONICALLY SIGNED BY ELIZABETOS CERTIFIED PROVIDER:  IRINA Saul CNP   NPI: [id number]  [facility]  [address], [address]  [place] [address]      Face to Face and Medical Necessity Statement for DME Provider visit    Demographic Information on Ravi Sandoval:  Gender: male  : 1945  [address]   [facility] [address]  [phone] (home) none (work)    Medical Record: [id number]  Social Security Number: [id number]  Primary Care Provider: Florencio Patricia  Insurance: Payor: MEDICARE / Plan: MEDICARE / Product Type: Medicare /     HPI:   Ravi Sandoval is a 74 year old  (1945), who is being seen today for a face to face provider visit at [facility] medical necessity statement for DME included. This patient requires the following:  DME Ordered and Medical Necessity Statement   Nebulizer:  for albuterol 0.083% neb solution daily prn, duoneb TID prn medication        Pt needing above DME with expected length of need of 99   months  due to medical necessity associated with following diagnosis:     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)  Chronic respiratory failure with hypoxia (H)  Pulmonary fibrosis (H)  Pulmonary hypertension (H)  Other acute pulmonary embolism without acute cor pulmonale (H)  Sinusitis, unspecified chronicity, unspecified location  C. difficile colitis  Type 2 diabetes mellitus with complication, with long-term current use of insulin (H)  Chronic systolic heart failure (H)  Coronary artery disease involving native coronary artery of native heart without angina pectoris  Benign essential hypertension  Other specified hypothyroidism  Physical deconditioning      PMH   has a past medical history of ACP (advance care planning), Anemia, ASHD (arteriosclerotic heart disease), BPH (benign prostatic hyperplasia), Cardiac arrest (H), Cataracts, bilateral, Chronic diarrhea, Chronic respiratory failure with hypoxia (H), COPD (chronic obstructive pulmonary disease) (H), Depression, Disease of lung, Dysplasia of prostate, Gastroesophageal reflux disease, General  "weakness, Heart attack (H), Herpes zoster, Histiocytosis X (H), Hyperlipidemia, Hypertension, Hypothyroidism, Oxygen dependent, Postinflammatory pulmonary fibrosis (H), Prostatic hypertrophy, benign, Pulmonary HTN (H), Pulmonary hypertensive venous disease (H), Thyroid disease, Tobacco use, and Type 2 diabetes mellitus without complications (H).    ROS:4 point ROS including Respiratory, CV, GI and , other than that noted in the HPI,  is negative    EXAM  Vitals: /85   Pulse 59   Temp 97.6  F (36.4  C)   Resp 20   Ht 1.689 m (5' 6.5\")   Wt 65.1 kg (143 lb 9.6 oz)   SpO2 93%   BMI 22.83 kg/m   ;BMI= Body mass index is 22.83 kg/m .   GENERAL APPEARANCE:  Alert, in no distress  ENT:  Mouth and posterior oropharynx normal, moist mucous membranes, hearing acuity adequate   EYES:  EOM, conjunctivae- slightly red, lids, pupils and irises normal  RESP:  respiratory effort and palpation of chest normal, no respiratory distress, Lung sounds faint rales in LLL.   CV:  Palpation and auscultation of heart done , rate and rhythm reg, no murmur, no rub or gallop, Edema none  ABDOMEN:  normal bowel sounds, soft, nontender, no hepatosplenomegaly or other masses  M/S:   Gait and station not observed, Digits and nails normal   SKIN:  Inspection/Palpation of skin and subcutaneous tissue scattered bruises on arms. Seborrheic dermatoses on back  NEURO: 2-12 in normal limits and at patient's baseline  PSYCH:  insight and judgement, memory intact , affect and mood normal    ASSESSMENT/PLAN:  1. 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)    2. Chronic respiratory failure with hypoxia (H)    3. Pulmonary fibrosis (H)    4. Pulmonary hypertension (H), severe    5. Other acute pulmonary embolism without acute cor pulmonale (H)    6. Sinusitis, unspecified chronicity, unspecified location    7. C. difficile colitis    8. Type 2 diabetes mellitus with complication, with long-term current use of insulin (H)    9. Chronic systolic heart " failure (H)    10. Coronary artery disease involving native coronary artery of native heart without angina pectoris    11. Benign essential hypertension    12. Other specified hypothyroidism    13. Physical deconditioning        Orders:  1.one nebulizer    ELECTRONICALLY SIGNED BY NOLAN CERTIFIED PROVIDER:  IRINA Saul CNP   NPI: [id number]  [facility]  [facility] [address], [address]  [place] [address]          Documentation of Face-to-Face and Certification for Home Health Services     Patient: Ravi Sandoval   YOB: 1945  MR Number: [id number]  Today's Date: 1/9/2019    I certify that patient: Ravi Sandoval is under my care and that I, or a nurse practitioner or physician's assistant working with me, had a face-to-face encounter that meets the physician face-to-face encounter requirements with this patient on: 1/9/2019.    This encounter with the patient was in whole, or in part, for the following medical condition, which is the primary reason for home health care:   Encounter Diagnoses   Name Primary?     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) Yes     Chronic respiratory failure with hypoxia (H)      Pulmonary fibrosis (H)      Pulmonary hypertension (H), severe      Other acute pulmonary embolism without acute cor pulmonale (H)      Sinusitis, unspecified chronicity, unspecified location      C. difficile colitis      Type 2 diabetes mellitus with complication, with long-term current use of insulin (H)      Chronic systolic heart failure (H)      Coronary artery disease involving native coronary artery of native heart without angina pectoris      Benign essential hypertension      Other specified hypothyroidism      Physical deconditioning    .    I certify that, based on my findings, the following services are medically necessary home health services: Nursing, Occupational Therapy and Physical Therapy.    My clinical findings support the need for the above services because:  Nurse is needed: To assess med set up and respiratory status after changes in medications or other medical regimen.., Occupational Therapy Services are needed to assess and treat cognitive ability and address ADL safety due to impairment in mobility. and Physical Therapy Services are needed to assess and treat the following functional impairments: poor exercise endurance.    Further, I certify that my clinical findings support that this patient is homebound (i.e. absences from home require considerable and taxing effort and are for medical reasons or Religion services or infrequently or of short duration when for other reasons) because: Requires assistance of another person or specialized equipment to access medical services because patient: Is unable to walk greater than 2-- feet without rest...    Based on the above findings. I certify that this patient is confined to the home and needs intermittent skilled nursing care, physical therapy and/or speech therapy.  The patient is under my care, and I have initiated the establishment of the plan of care.  This patient will be followed by a physician who will periodically review the plan of care.  Physician/Provider to provide follow up care: Florencio Patricia    Responsible Medicare certified PECOS Physician: Electronically signed by Dr. Maynor Rodriguez MD, and only signing for initial order. Please send all follow up questions and concerns or needed follow up signatures to the PCP Florencio Patricia.  Physician Signature: See electronic signature associated with these discharge orders.  Date: 1/9/2019  Sincerely,        IRINA Saul CNP

## 2019-01-09 NOTE — LETTER
January 9, 2019      Ravi Sandoval  [address]   [facility] [address]        {Comm Man dear:[id number]}          Sincerely,        IRINA Saul CNP

## 2019-01-17 PROBLEM — J90 PLEURAL EFFUSION ON RIGHT: Status: ACTIVE | Noted: 2019-01-01

## 2019-01-18 NOTE — PLAN OF CARE
PT arrived to floor at 1945, RRT called for respiratory distress, see provider notes. Vital Signs stable, PT is on BIPAP. Telemetry normal sinus rhythm with 1 PVC. Alert and Oriented. Lung sounds at beginning of shift coarse crackles throughout, towards morning lungs sounded less wet and just diminished, At times pt had expiratory wheezes. Bowel sounds active and audible. Passing flatus. NPO diet. Denies nausea. Adequate urinary output per sharma catheter, towards morning urine output decreased. MD notified no new orders will continue to monitor. CMS to baseline. Up with assist of 2. Pt has denied pain. Slept comfortably during the night. Blood sugars every 4 hours, insulin given per order.

## 2019-01-18 NOTE — PROGRESS NOTES
[facility]    Medicine Progress Note - Hospitalist Service       Date of Admission:  1/17/2019  Assessment & Plan      Ravi Sandoval is a 74 year old male with PMH of CHF, COPD, DM2 and Small Cell Lung Cancer, transferred from [place] [address] for hypoxia and large right pleural effusion.      Acute Hypoxic Respiratory Failure:  Bipap dependent upon arrival.  Outside CT chest with multiple findings including old PE of RLL (no acute clot), emphysema and basilar fibrotic changes, lymphadenopathy, bilateral pulmonary nodules and mod-large right effusion.  Failure may well be multifactorial including PNA (lactate of 5 with leukocytosis, received chemo 3 weeks prior), COPD exacerbation, right effusion.  - continue zosyn and azithromycin    - continue solumedrol and nebs  - thoracentesis this AM consistent with exudative effusion, follow labs and culture  - repeat lasix 40 mg IV x1  - weaned from Bipap to hi-flow this AM    Right Sided Pleural Effusion  This is malignant vs infectious vs HF vs all the above.   - thoracentesis today    Elevated lactate  Likely due to PNA vs malignancy.  - lactate stable 2-3 since arrival, monitor    Small Cell Lung Cancer:  Diagnosed by thoracentesis in October 2018.  [facility] and [facility] carboplatin and etoposide on 12/8.  - [facility] consult    COPD  - continue PTA pulmicort and Duonebs    RLL Pulmonary Embolism, Diagnosed Nov 2018  Again noted on outside CT, no signs of acute clot.  - holding eliquis for thoracentesis, resume tomorrow    NIDDM 2 (HGBA1c is 10.7)  Managed on glipizide.  - hold PTA glipizide  - will start low dose lantus 5 units, prandial insulin 1 unit/carb unit, increase to high dose sliding scale insulin     Hypothyroid  - continue PTA levothyroxine, if prolonged BiPAP give 50% of dose as IV    HTN  - continue PTA metoprolol, amlodipine, lisinopril as now off Bipap  - stop IV metoprolol    Hyperlipidemia  - continue PTA  rosuvastatin    Depression  - continue PTA fluoxetine    Pulm HTN, Pulmonary Fibrosis:  - holding prednisone in favor of solumedrol  - continue PTA sildenafil and digoxin (will need levels monitored while on azithro)    Recent C Diff Infection  Completed course of vancomycin  - as this was within 30 days and on broad spectrum abx, will start prophylactic vanco    Gout  - continue PTA colchicine    BPH  - sharma cath for I/O's  - continue PTA Flomax      Diet: NPO for Medical/Clinical Reasons Except for: Meds, Ice Chips    DVT Prophylaxis: Pneumatic Compression Devices  Sharma Catheter: in place, indication: Strict 1-2 Hour I&O  Code Status: Full Code      Disposition Plan   Expected discharge: 4 - 7 days, recommended to prior living arrangement once resp failure resolved.  Entered: Car Pedraza MD 01/18/2019, 8:55 AM       The patient's care was discussed with the Bedside Nurse, Patient and Patient's Family.    Car Pedraza MD  Hospitalist Service  [facility]    ______________________________________________________________________    Interval History   Feels slightly improved from admission.  Minimal cough without sputum production. No chest pain/pressure, no fever/chills, no wheezing, no nausea or abdominal pain.      Data reviewed today: I reviewed all medications, new labs and imaging results over the last 24 hours. I personally reviewed no images or EKG's today.    Physical Exam   Vital Signs: Temp: 97.7  F (36.5  C) Temp src: Axillary BP: 134/73 Pulse: 58 Heart Rate: 60 Resp: 15 SpO2: 95 % O2 Device: BiPAP/CPAP Oxygen Delivery: 15 LPM  Weight: 137 lbs 2.02 oz  General Appearance: Well developed, thin male in no acute distress  Respiratory: mild left basilar crackles, mildly diminished right base, no wheezing or tachypnea on high-flow nasal cannula  Cardiovascular: RRR, normal s1/s2 without murmur  GI: abdomen soft, nontender, normal bowel sounds  Skin: no rash or bruising  Other: Alert and  appropriate, cranial nerves grossly intact     Data   Recent Labs   Lab 01/18/19  0714 01/18/19  0240 01/17/19  2120   WBC 12.9*  --   --    HGB 11.8*  --   --    MCV 89  --   --    * 125*  --    INR  --  1.16*  --      --   --    POTASSIUM 3.9  --   --    CHLORIDE 104  --   --    CO2 27  --   --    BUN 14  --   --    CR 0.79  --  0.72   ANIONGAP 10  --   --    AV 7.9*  --   --    *  --   --      No results found for this or any previous visit (from the past 24 hour(s)).

## 2019-01-18 NOTE — PLAN OF CARE
Saturations 95-97% tolerating high flow without SOB.. tolerating meals no coughing no nausea.bp stable. Sinus rhythm.  Wife went home.

## 2019-01-18 NOTE — CODE/RAPID RESPONSE
[facility]  House [facility] RRT Note  1/17/2019   Time Called: 1951  RRT called for: respiratory distress  Code Status: Full Code    Assessment & Plan   I was paged to the bedside to evaluate Mr. Ravi Sandoval for an acute episode of respiratory distress with tachycardia RR 30s, hypoxia, agitation and respiratory distress upon arrival as a direct admission. Initial VS with hypertensive SBP 180s, tachycardia low 100s, tachypneic to the 30s and hypoxic 80% on 15L NRB. BiPAP placed and during transition of devices satted as low as 50%. He improved satting 93-95% with FiO2 100% and maintained with wean down to 50% and IPAP/EPAP adjustments.     Diagnosis:  -- Acute Hypoxic Respiratory Failure  -- Respiratory Alkalosis     Interventions ordered/provided:  -- Resp: initiated BiPAP:  12/8 --> 12/5 with FiO2 4100% --> 50%  -- Labs: ABG - respiratory alkalosis with pH 7.53, pCO2 31, and pO2 56   -- Meds: Lasix 40mg IV x1 for preload reduction in setting of CHF and large pleural effusion     At the conclusion of this RRT the patient appeared more comfortable with improved RR, BP, and HR. VS post RRT /107, HR 97, and tolerating BiPAP at 93% with RR 18-22.     Given the rapid improvement of hypoxia and respiratory distress with BiPAP initiation, the patient did not require an emergent thoracentesis or transfer to a higher level of care and of note he has not held his Eliquis (PE 11/2019). If further issues arise overnight and he needs an emergent thoracentesis, this will be arranged.     Interval History     Mr. Ravi Sandoval is a 74 year old male with PMH COPD on baseline ~6.5L NC, recently dx'd Squamous cell lung cancer s/p x1 dose chemo, NIDDM2, recent PE 11/2018 on eliquis, pulmonary fibrosis and pulmonary hypertension, and CHF who was admitted on 1/17/2019 for hypoxia and large Rt pleural effusion.    His history is significant for:  Past Medical History:   Diagnosis Date     ACP (advance care planning)       Anemia      ASHD (arteriosclerotic heart disease)      BPH (benign prostatic hyperplasia)      Cardiac arrest (H)      Cataracts, bilateral      Chronic diarrhea      Chronic respiratory failure with hypoxia (H)      COPD (chronic obstructive pulmonary disease) (H)     HOME O2     Depression      Disease of lung      Dysplasia of prostate      Gastroesophageal reflux disease      General weakness      Heart attack (H)      Herpes zoster      Histiocytosis X (H)      Hyperlipidemia      Hypertension      Hypothyroidism      Oxygen dependent      Postinflammatory pulmonary fibrosis (H)      Prostatic hypertrophy, benign      Pulmonary HTN (H)      Pulmonary hypertensive venous disease (H)      Thyroid disease      Tobacco use      Type 2 diabetes mellitus without complications (H)      Past Surgical History:   Procedure Laterality Date     BIOPSY, LUNG NODULE       CARDIAC SURGERY      HX OF STENT     COLONOSCOPY N/A 5/27/2016    Procedure: COMBINED COLONOSCOPY, SINGLE OR MULTIPLE BIOPSY/POLYPECTOMY BY BIOPSY;  Surgeon: Sera Coffman MD;  Location:  GI     PHACOEMULSIFICATION CLEAR CORNEA WITH STANDARD INTRAOCULAR LENS IMPLANT Right 5/31/2017    Procedure: PHACOEMULSIFICATION CLEAR CORNEA WITH STANDARD INTRAOCULAR LENS IMPLANT;  RIGHT EYE PHACOEMULSIFICATION CLEAR CORNEA WITH STANDARD INTRAOCULAR LENS IMPLANT ;  Surgeon: Trever Delgado MD;  Location:  [facility]     PHACOEMULSIFICATION CLEAR CORNEA WITH STANDARD INTRAOCULAR LENS IMPLANT Left 6/7/2017    Procedure: PHACOEMULSIFICATION CLEAR CORNEA WITH STANDARD INTRAOCULAR LENS IMPLANT;  LEFT EYE PHACOEMULSIFICATION CLEAR CORNEA WITH STANDARD INTRAOCULAR LENS IMPLANT ;  Surgeon: Trever Delgado MD;  Location: [facility]       Allergies   Allergies   Allergen Reactions     Tetracycline        Physical Exam   Physical Exam   Constitutional: He is oriented to person, place, and time. He appears distressed.   HENT:   Head: Normocephalic and atraumatic.   Eyes: EOM  are normal.   Cardiovascular: Tachycardia present.   Pulses:       Dorsalis pedis pulses are 2+ on the right side, and 2+ on the left side.   Pulmonary/Chest: Accessory muscle usage present. Tachypnea noted. He is in respiratory distress. He has decreased breath sounds.   Abdominal: Soft.   Musculoskeletal: Normal range of motion. He exhibits no edema.   Neurological: He is alert and oriented to person, place, and time. GCS eye subscore is 4. GCS verbal subscore is 5. GCS motor subscore is 6.   Skin: Skin is dry.       Vital Signs with Ranges:  Temp:  [98  F (36.7  C)] 98  F (36.7  C)  Pulse:  [92-97] 92  Heart Rate:  [] 93  Resp:  [18-36] 22  BP: (130-181)/() 130/81  FiO2 (%):  [50 %] 50 %  SpO2:  [81 %-99 %] 94 %  No intake/output data recorded.    Data     EKG: N/A    ABG: pH 7.53, pCO2 31, and pO2 56  -  Recent Labs   Lab 01/17/19  2120   PH 7.53*   PCO2 31*   PO2 56*   HCO3 26       Troponin:    Recent Labs   Lab Test 03/12/17  1000   TROPI <0.015  The 99th percentile for upper reference range is 0.045 ug/L.  Troponin values in   the range of 0.045 - 0.120 ug/L may be associated with risks of adverse   clinical events.         IMAGING: (X-ray/CT/MRI)   No results found for this or any previous visit (from the past 24 hour(s)).    CBC with Diff:  Recent Labs   Lab Test 01/09/19  1025  11/26/18  1130   WBC 13.5*   < >  --    HGB 12.3*   < >  --    MCV 91   < >  --       < >  --    INR  --   --  0.98    < > = values in this interval not displayed.      No results found for: RETICABSCT  No results found for: RETP    Lactic Acid:    Lactic Acid   Date Value Ref Range Status   11/23/2018 2.5 (H) 0.7 - 2.0 mmol/L Final   03/13/2017 1.6 0.7 - 2.1 mmol/L Final   03/12/2017 1.5 0.7 - 2.1 mmol/L Final     Lactate for Sepsis Protocol   Date Value Ref Range Status   12/10/2018 1.2 0.7 - 2.0 mmol/L Final   11/26/2018 1.6 0.7 - 2.0 mmol/L Final        Comprehensive Metabolic Panel:  No lab results found in  last 7 days.    INR:    Recent Labs   Lab Test 11/26/18  1130   INR 0.98       D-DIMER:  No components found for: DDIMER    BNP:  No results found for: BNP    UA:  No results for input(s): COLOR, APPEARANCE, URINEGLC, URINEBILI, URINEKETONE, SG, UBLD, URINEPH, PROTEIN, UROBILINOGEN, NITRITE, LEUKEST, RBCU, WBCU in the last 168 hours.    Time Spent on this Encounter   I spent 35 minutes (1951 - 2024) of critical care time on the unit/floor managing the care of Ravi Sandoval. Upon evaluation, this patient had a high probability of imminent or life-threatening deterioration due to respiratory distress, which required my direct attention, intervention, and personal management. 100% of my time was spent at the bedside counseling the patient and/or coordinating care regarding services listed in this note.    RRT conducted in association with Ms. Robb Pinedo CNP who assisted in decision making and plan. Admitting Hospitalist physician Dr. Tavares presented to the RRT to provide a brief sign out.     Family updated on plan and should be notified if the patient requires a higher level of care or emergent thoracentesis.    Bianka Arthur PA-C  Hospitalist/House [facility]  Pager: [phone]

## 2019-01-18 NOTE — PROGRESS NOTES
Patient on BiPAP 10/5 40% spo2 93%. Alarm volume set at 10. Mepilex applied. BBS expiratory wheezes, diminished. RT will continue to monitor.    Juan's test performed prior to radial ABG draw. Collateral circulation confirmed.

## 2019-01-18 NOTE — PROGRESS NOTES
Cross Cover:  Patient on bipap for respiratory distress. Requesting something for sleep, anxiety. Unable to take seroquel due to NPO status. Will add one time 0.5mg IV ativan.    ADDENDUM:  Notified of poor urine output throughout the evening. Initially received lasix early in the evening for worsening respiratory status. Respiratory status improved, weaning down on O2 with bipap.  Plan: Attempt to wean bipap off and then attempt hydration PO. Will not start IVF at this time secondary to events earlier in the evening. Defer further cares to rounding hospitalist.

## 2019-01-18 NOTE — PROVIDER NOTIFICATION
Paged Dr. Maddox in regards to low urine output.  During the night pt's urine output has decreased, the past two hrs only 50 mL output. No new orders placed, will continue to monitor.

## 2019-01-18 NOTE — PHARMACY-ADMISSION MEDICATION HISTORY
"Admission medication history interview status for the 1/17/2019  admission is complete. See EPIC admission navigator for prior to admission medications     Medication history source reliability:does not know meds    Actions taken by pharmacist (provider contacted, etc):tatyana wife and [facility] [phone]     Additional medication history information not noted on PTA med list :None    Medication reconciliation/reorder completed by provider prior to medication history? No    Time spent in this activity: 60\"    Prior to Admission medications    Medication Sig Last Dose Taking? Auth Provider   amLODIPine (NORVASC) 2.5 MG tablet Take 1 tablet (2.5 mg) by mouth daily 1/17/2019 at am Yes Mulu Truong APRN CNP   apixaban ANTICOAGULANT (ELIQUIS) 5 MG tablet Take 1 tablet (5 mg) by mouth 2 times daily 1/17/2019 at am Yes Mulu Truong APRN CNP   aspirin (ASA) 81 MG chewable tablet Take 1 tablet (81 mg) by mouth At Bedtime 1/16/2019 at hs Yes Mulu Truong APRN CNP   colchicine (COLCRYS) 0.6 MG tablet Take 1 tablet (0.6 mg) by mouth daily TAKES IN THE EVENING 1/16/2019 at qpm Yes Mulu Truong APRN CNP   digoxin (LANOXIN) 125 MCG tablet Take 1 tablet (125 mcg) by mouth daily 1/17/2019 at am Yes Mulu Truong APRN CNP   FLUoxetine (PROZAC) 40 MG capsule Take 1 capsule (40 mg) by mouth daily 1/17/2019 at am Yes Mulu Truong APRN CNP   furosemide (LASIX) 20 MG tablet Take 1 tablet (20 mg) by mouth daily 1/17/2019 at am Yes Mulu Truong APRN CNP   glipiZIDE (GLUCOTROL XL) 10 MG 24 hr tablet Take 1 tablet (10 mg) by mouth daily 1/17/2019 at am Yes Mulu Truong APRN CNP   levothyroxine (SYNTHROID/LEVOTHROID) 125 MCG tablet Take 1 tablet (125 mcg) by mouth daily 1/17/2019 at am Yes Mulu Truong APRN CNP   lisinopril (PRINIVIL/ZESTRIL) 20 MG tablet Take 1 tablet (20 mg) by mouth daily 1/17/2019 at am " Yes Mulu Truong APRN CNP   melatonin 5 MG tablet Take 1 tablet (5 mg) by mouth At Bedtime 1/16/2019 at hs Yes Mulu Truong APRN CNP   metoprolol succinate ER (TOPROL-XL) 25 MG 24 hr tablet Take 1 tablet (25 mg) by mouth 2 times daily 1/17/2019 at am Yes Mulu Truong APRN CNP   multivitamin w/minerals (THERA-VIT-M) tablet Take 1 tablet by mouth daily 1/17/2019 at am Yes Mulu Truong APRN CNP   omeprazole (PRILOSEC) 40 MG DR capsule Take 1 capsule (40 mg) by mouth daily 1/17/2019 at am Yes Mulu Truong APRN CNP   predniSONE (DELTASONE) 10 MG tablet Take 10 mg by mouth daily. 1/17/2019 at am Yes Mulu Truong APRN CNP   rosuvastatin (CRESTOR) 10 MG tablet Take 1 tablet (10 mg) by mouth daily  Patient taking differently: Take 10 mg by mouth At Bedtime  1/16/2019 at hs Yes Mulu Truong APRN CNP   sildenafil (REVATIO) 20 MG tablet Take 20 mg by mouth 3 times daily @ 08:00, 14:00, 20:00 1/17/2019 at am Yes Unknown, Entered By History   tamsulosin (FLOMAX) 0.4 MG capsule Take 1 capsule (0.4 mg) by mouth daily  Patient taking differently: Take 0.4 mg by mouth At Bedtime  1/16/2019 at hs Yes Mulu Truong APRN CNP   acetaminophen (TYLENOL) 325 MG tablet Take 650 mg by mouth every 6 hours as needed for mild pain prn  Reported, Patient   albuterol (PROVENTIL) (2.5 MG/3ML) 0.083% neb solution Take 1 vial (2.5 mg) by nebulization once as needed (refractory bronchospasm associated with hypersensitivity)  Patient taking differently: Take 2.5 mg by nebulization daily as needed (refractory bronchospasm associated with hypersensitivity)  prn  Mulu Truong APRN CNP   budesonide (PULMICORT) 0.5 MG/2ML neb solution Take 2 mLs (0.5 mg) by nebulization 2 times daily 1 month ago  Mulu Truong APRN CNP   ipratropium - albuterol 0.5 mg/2.5 mg/3 mL (DUONEB) 0.5-2.5 (3) MG/3ML neb solution Take 1 vial (3 mLs) by  nebulization 3 times daily as needed for shortness of breath / dyspnea, wheezing or other (use up to 3 times a day for shortness of breath, cough) prn  Mulu Truong APRN CNP

## 2019-01-18 NOTE — CONSULTS
[facility] Consultation      Ravi Sandoval MRN# [id number]   YOB: 1945 Age: 74 year old   Date of Admission: 1/17/2019  Requesting physician: Manfred Ortiz  Reason for consult: Patient of Dr. Brownlee with small cell lung cancer; plan for next chemo           Assessment and Plan:   Metastatic small cell lung cancer    -follows with Dr. Brownlee     -presented with lung/mediasitnal masses, left sided pleural effusion and pleural based densitiies    -cytology from pleural fluid consistent with small cell lung cancer    -received palliative carbo/etoposide 12/6-12/9    -due for cycle 2 of chemo which will be delayed pending recovery from ongoing issues     -typically small cell lung cancer are quite chemo-sensitive and will try to resume chemo once able    Malignant pleural effusion    -cytology from right thoracentesis 11/26/2018 confirmed malignancy     -underwent repeat thoracentesis 1/18 and cytology and cultures pending     -likely recurrent effusion from known malignancy although infection/heart failure related possible    -await cytology/culture results    -if recurrent malignant effusion may consider pleurx catheter placement, need for which will be re-assessed depending on response to chemotherapy     Respiratory failure    -multifactorial and related to lung cancer, effusion, PE, pulmonary hypertension, COPD etc    -weaned from bipap to high flow O2    -per hospitalist     RLL pulmonary embolism     -restart Eliquis    Normocytic anemia    -H&H at baseline    -continue to monitor     ID    -on prophylactic oral Vanco given C.diff within 30 days and on broad spectrum Abx    -on empiric Zosyn/Zithromax    Full code    Thank you for the consult.  We will continue to follow him while he is here.    Bruce Beltran MD                 Chief Complaint:   No chief complaint on file.           History of Present Illness:   Ravi Sandoval is a 74 year old gentleman with extensive stage small cell  "lung cancer.  He follows with Dr. Brownlee and received cycle 1 of palliative carboplatin and etoposide from 2018 to 2018.  He was transferred from [place] for management of hypoxia and large right-sided pleural effusion.  He is trying to establish care in our [place] office given proximity to where he lives.         Physical Exam:   Vitals were reviewed  Blood pressure 133/83, pulse 70, temperature 97.7  F (36.5  C), temperature source Axillary, resp. rate 9, height 1.702 m (5' 7\"), weight 62.2 kg (137 lb 2 oz), SpO2 94 %.  Temperatures:  Current - Temp: 97.7  F (36.5  C); Max - Temp  Av.8  F (36.6  C)  Min: 97.7  F (36.5  C)  Max: 98  F (36.7  C)  Respiration range: Resp  Av.9  Min: 9  Max: 36  Pulse range: Pulse  Av.7  Min: 58  Max: 97  Blood pressure range: Systolic (24hrs), Av , Min:111 , Max:181   ; Diastolic (24hrs), Av, Min:65, Max:107    Pulse oximetry range: SpO2  Av.3 %  Min: 81 %  Max: 99 %    Intake/Output Summary (Last 24 hours) at 2019 1530  Last data filed at 2019 1456  Gross per 24 hour   Intake 790 ml   Output 2660 ml   Net -1870 ml       GENERAL: No acute distress.  SKIN: No rashes or jaundice.  HEENT: Normocephalic, atraumatic. Eyes anicteric. Oropharynx is clear.  LYMPH: No palpable lymphadenopathy in the cervical, supraclavicular, axillary, or inguinal regions.  HEART: Regular rate and rhythm with no murmurs.  LUNGS: Decreased intensity of breath sounds on the right side  ABDOMEN: Soft, nontender, nondistended with no palpable hepatosplenomegaly.  EXTREMITIES: No leg edema.  MUSCULOSKELETAL: No pain to percussion over entire spine.  MENTAL: Alert and oriented to person, place, and time.  NEURO: Cranial nerves II through XII grossly intact with no focal motor or sensory deficits.            Past Medical History:   I have reviewed this patient's past medical history  Past Medical History:   Diagnosis Date     ACP (advance care planning)      Anemia  "     ASHD (arteriosclerotic heart disease)      BPH (benign prostatic hyperplasia)      Cardiac arrest (H)      Cataracts, bilateral      Chronic diarrhea      Chronic respiratory failure with hypoxia (H)      COPD (chronic obstructive pulmonary disease) (H)     HOME O2     Depression      Disease of lung      Dysplasia of prostate      Gastroesophageal reflux disease      General weakness      Heart attack (H)      Herpes zoster      Histiocytosis X (H)      Hyperlipidemia      Hypertension      Hypothyroidism      Oxygen dependent      Postinflammatory pulmonary fibrosis (H)      Prostatic hypertrophy, benign      Pulmonary HTN (H)      Pulmonary hypertensive venous disease (H)      Thyroid disease      Tobacco use      Type 2 diabetes mellitus without complications (H)              Past Surgical History:   I have reviewed this patient's past surgical history  Past Surgical History:   Procedure Laterality Date     BIOPSY, LUNG NODULE       CARDIAC SURGERY      HX OF STENT     COLONOSCOPY N/A 5/27/2016    Procedure: COMBINED COLONOSCOPY, SINGLE OR MULTIPLE BIOPSY/POLYPECTOMY BY BIOPSY;  Surgeon: Sera Coffman MD;  Location:  GI     PHACOEMULSIFICATION CLEAR CORNEA WITH STANDARD INTRAOCULAR LENS IMPLANT Right 5/31/2017    Procedure: PHACOEMULSIFICATION CLEAR CORNEA WITH STANDARD INTRAOCULAR LENS IMPLANT;  RIGHT EYE PHACOEMULSIFICATION CLEAR CORNEA WITH STANDARD INTRAOCULAR LENS IMPLANT ;  Surgeon: Trever Delgado MD;  Location: [facility]     PHACOEMULSIFICATION CLEAR CORNEA WITH STANDARD INTRAOCULAR LENS IMPLANT Left 6/7/2017    Procedure: PHACOEMULSIFICATION CLEAR CORNEA WITH STANDARD INTRAOCULAR LENS IMPLANT;  LEFT EYE PHACOEMULSIFICATION CLEAR CORNEA WITH STANDARD INTRAOCULAR LENS IMPLANT ;  Surgeon: Trever Delgado MD;  Location: [facility]               Social History:   I have reviewed this patient's social history  Social History     Tobacco Use     Smoking status: Current Every Day Smoker     Packs/day:  0.50     Years: 55.00     Pack years: 27.50     Types: Cigarettes     Smokeless tobacco: Never Used   Substance Use Topics     Alcohol use: No     Alcohol/week: 0.0 oz             Family History:   I have reviewed this patient's family history  Family History   Problem Relation Age of Onset     Cardiovascular Father         Pulmonary HTN     Cancer Mother         Skin CA     Neurologic Disorder Sister         Creutzfeldt Eduardo Disease             Allergies:     Allergies   Allergen Reactions     Tetracycline              Medications:   I have reviewed this patient's current medications  Medications Prior to Admission   Medication Sig Dispense Refill Last Dose     amLODIPine (NORVASC) 2.5 MG tablet Take 1 tablet (2.5 mg) by mouth daily 30 tablet 0 1/17/2019 at am     apixaban ANTICOAGULANT (ELIQUIS) 5 MG tablet Take 1 tablet (5 mg) by mouth 2 times daily 60 tablet 0 1/17/2019 at 8 am     aspirin (ASA) 81 MG chewable tablet Take 1 tablet (81 mg) by mouth At Bedtime 30 tablet 0 1/16/2019 at hs     colchicine (COLCRYS) 0.6 MG tablet Take 1 tablet (0.6 mg) by mouth daily TAKES IN THE EVENING 30 tablet 0 1/16/2019 at qpm     digoxin (LANOXIN) 125 MCG tablet Take 1 tablet (125 mcg) by mouth daily 30 tablet 3 1/17/2019 at am     FLUoxetine (PROZAC) 40 MG capsule Take 1 capsule (40 mg) by mouth daily 30 capsule 0 1/17/2019 at am     furosemide (LASIX) 20 MG tablet Take 1 tablet (20 mg) by mouth daily 30 tablet 0 1/17/2019 at am     glipiZIDE (GLUCOTROL XL) 10 MG 24 hr tablet Take 1 tablet (10 mg) by mouth daily 30 tablet 0 1/17/2019 at am     levothyroxine (SYNTHROID/LEVOTHROID) 125 MCG tablet Take 1 tablet (125 mcg) by mouth daily 30 tablet 0 1/17/2019 at am     lisinopril (PRINIVIL/ZESTRIL) 20 MG tablet Take 1 tablet (20 mg) by mouth daily 30 tablet 0 1/17/2019 at am     melatonin 5 MG tablet Take 1 tablet (5 mg) by mouth At Bedtime 30 tablet 0 1/16/2019 at hs     metoprolol succinate ER (TOPROL-XL) 25 MG 24 hr tablet Take  1 tablet (25 mg) by mouth 2 times daily 60 tablet 0 1/17/2019 at am     multivitamin w/minerals (THERA-VIT-M) tablet Take 1 tablet by mouth daily 30 tablet 0 1/17/2019 at am     omeprazole (PRILOSEC) 40 MG DR capsule Take 1 capsule (40 mg) by mouth daily 30 capsule 0 1/17/2019 at am     predniSONE (DELTASONE) 10 MG tablet Take 10 mg by mouth daily. 30 tablet 0 1/17/2019 at am     rosuvastatin (CRESTOR) 10 MG tablet Take 1 tablet (10 mg) by mouth daily (Patient taking differently: Take 10 mg by mouth At Bedtime ) 30 tablet 0 1/16/2019 at hs     sildenafil (REVATIO) 20 MG tablet Take 20 mg by mouth 3 times daily @ 08:00, 14:00, 20:00   1/17/2019 at am     tamsulosin (FLOMAX) 0.4 MG capsule Take 1 capsule (0.4 mg) by mouth daily (Patient taking differently: Take 0.4 mg by mouth At Bedtime ) 30 capsule 0 1/16/2019 at hs     acetaminophen (TYLENOL) 325 MG tablet Take 650 mg by mouth every 6 hours as needed for mild pain   prn     albuterol (PROVENTIL) (2.5 MG/3ML) 0.083% neb solution Take 1 vial (2.5 mg) by nebulization once as needed (refractory bronchospasm associated with hypersensitivity) (Patient taking differently: Take 2.5 mg by nebulization daily as needed (refractory bronchospasm associated with hypersensitivity) ) 3 mL 0 prn     budesonide (PULMICORT) 0.5 MG/2ML neb solution Take 2 mLs (0.5 mg) by nebulization 2 times daily 180 ampule 0 1 month ago     ipratropium - albuterol 0.5 mg/2.5 mg/3 mL (DUONEB) 0.5-2.5 (3) MG/3ML neb solution Take 1 vial (3 mLs) by nebulization 3 times daily as needed for shortness of breath / dyspnea, wheezing or other (use up to 3 times a day for shortness of breath, cough) 30 vial 0 prn     Current Facility-Administered Medications Ordered in Epic   Medication Dose Route Frequency Last Rate Last Dose     acetaminophen (TYLENOL) Suppository 650 mg  650 mg Rectal Q4H PRN         acetaminophen (TYLENOL) tablet 650 mg  650 mg Oral Q6H PRN         albuterol (PROVENTIL) neb solution 2.5  mg  2.5 mg Nebulization Q2H PRN         amLODIPine (NORVASC) tablet 2.5 mg  2.5 mg Oral Daily         [START ON 1/19/2019] apixaban ANTICOAGULANT (ELIQUIS) tablet 5 mg  5 mg Oral BID         [START ON 1/19/2019] aspirin (ASA) chewable tablet 81 mg  81 mg Oral At Bedtime         azithromycin (ZITHROMAX) 250 mg in sodium chloride 0.9 % 250 mL intermittent infusion  250 mg Intravenous Q24H         bisacodyl (DULCOLAX) Suppository 10 mg  10 mg Rectal Daily PRN         budesonide (PULMICORT) neb solution 0.5 mg  0.5 mg Nebulization BID   0.5 mg at 01/18/19 0757     colchicine (COLCYRS) tablet 0.6 mg  0.6 mg Oral QPM         glucose gel 15-30 g  15-30 g Oral Q15 Min PRN        Or     dextrose 50 % injection 25-50 mL  25-50 mL Intravenous Q15 Min PRN        Or     glucagon injection 1 mg  1 mg Subcutaneous Q15 Min PRN         digoxin (LANOXIN) tablet 125 mcg  125 mcg Oral Daily         FLUoxetine (PROzac) capsule 40 mg  40 mg Oral Daily   40 mg at 01/18/19 1317     HYDROcodone-acetaminophen (NORCO) 5-325 MG per tablet 1-2 tablet  1-2 tablet Oral Q4H PRN         HYDROmorphone (PF) (DILAUDID) injection 0.2 mg  0.2 mg Intravenous Q2H PRN         hypromellose-dextran (ARTIFICAL TEARS) 0.1-0.3 % ophthalmic solution 1 drop  1 drop Both Eyes Q1H PRN         insulin aspart (NovoLOG) inj (RAPID ACTING)   Subcutaneous TID w/meals         insulin aspart (NovoLOG) inj (RAPID ACTING)  1-10 Units Subcutaneous TID AC         insulin aspart (NovoLOG) inj (RAPID ACTING)  1-7 Units Subcutaneous At Bedtime         insulin glargine (LANTUS PEN) injection 5 Units  5 Units Subcutaneous QAM AC   5 Units at 01/18/19 1102     ipratropium - albuterol 0.5 mg/2.5 mg/3 mL (DUONEB) neb solution 3 mL  1 vial Nebulization Q4H While awake   3 mL at 01/18/19 1130     levothyroxine (SYNTHROID/LEVOTHROID) tablet 125 mcg  125 mcg Oral QAM AC         lidocaine (LMX4) cream   Topical Q1H PRN         lidocaine 1 % 1 mL  1 mL Other Q1H PRN         lisinopril  (PRINIVIL/ZESTRIL) tablet 20 mg  20 mg Oral Daily         May take regular AM medications except those listed below.   Does not apply Continuous PRN         melatonin tablet 5 mg  5 mg Oral At Bedtime         methylPREDNISolone sodium succinate (solu-MEDROL) injection 62.5 mg  62.5 mg Intravenous Q24H         metoprolol succinate ER (TOPROL-XL) 24 hr tablet 25 mg  25 mg Oral BID   25 mg at 01/18/19 1318     naloxone (NARCAN) injection 0.1-0.4 mg  0.1-0.4 mg Intravenous Q2 Min PRN         No lozenges or gum should be given while patient on BIPAP/AVAPS/AVAPS AE   Does not apply Continuous PRN         omeprazole (priLOSEC) CR capsule 40 mg  40 mg Oral Daily         ondansetron (ZOFRAN-ODT) ODT tab 4 mg  4 mg Oral Q6H PRN        Or     ondansetron (ZOFRAN) injection 4 mg  4 mg Intravenous Q6H PRN         Patient may continue current oral medications   Does not apply Continuous PRN         piperacillin-tazobactam (ZOSYN) 4.5 g vial to attach to  mL bag  4.5 g Intravenous Q6H 150 mL/hr at 01/18/19 0957 4.5 g at 01/18/19 0957     polyethylene glycol (MIRALAX/GLYCOLAX) Packet 17 g  17 g Oral Daily PRN         QUEtiapine (SEROquel) half-tab 12.5-25 mg  12.5-25 mg Oral At Bedtime PRN         rosuvastatin (CRESTOR) tablet 10 mg  10 mg Oral At Bedtime         senna-docusate (SENOKOT-S/PERICOLACE) 8.6-50 MG per tablet 1 tablet  1 tablet Oral BID PRN        Or     senna-docusate (SENOKOT-S/PERICOLACE) 8.6-50 MG per tablet 2 tablet  2 tablet Oral BID PRN         sildenafil (REVATIO) tablet 20 mg  20 mg Oral TID         sodium chloride (PF) 0.9% PF flush 3 mL  3 mL Intracatheter Q1H PRN         sodium chloride (PF) 0.9% PF flush 3 mL  3 mL Intracatheter Q8H   3 mL at 01/18/19 1052     tamsulosin (FLOMAX) capsule 0.4 mg  0.4 mg Oral At Bedtime         vancomycin (FIRVANQ) oral solution 125 mg  125 mg Oral BID         No current Epic-ordered outpatient medications on file.             Review of Systems:   The 10 point Review of  Systems is negative other than noted in the HPI.            Data:   All laboratory data reviewed  Results for orders placed or performed during the hospital encounter of 01/17/19 (from the past 24 hour(s))   Procalcitonin   Result Value Ref Range    Procalcitonin 0.19 ng/ml   Blood gas arterial and oxyhgb   Result Value Ref Range    pH Arterial 7.53 (H) 7.35 - 7.45 pH    pCO2 Arterial 31 (L) 35 - 45 mm Hg    pO2 Arterial 56 (L) 80 - 105 mm Hg    Bicarbonate Arterial 26 21 - 28 mmol/L    Oxyhemoglobin Arterial 91 (L) 92 - 100 %    Base Excess Art 3.4 mmol/L   Creatinine   Result Value Ref Range    Creatinine 0.72 0.66 - 1.25 mg/dL    GFR Estimate >90 >60 mL/min/[1.73_m2]    GFR Estimate If Black >90 >60 mL/min/[1.73_m2]   Digoxin level   Result Value Ref Range    Digoxin Level 0.7 0.5 - 2.0 ug/L   Glucose by meter   Result Value Ref Range    Glucose 218 (H) 70 - 99 mg/dL   Glucose by meter   Result Value Ref Range    Glucose 239 (H) 70 - 99 mg/dL   Lactic acid whole blood   Result Value Ref Range    Lactic Acid 2.2 (H) 0.7 - 2.0 mmol/L   INR   Result Value Ref Range    INR 1.16 (H) 0.86 - 1.14   Platelet count   Result Value Ref Range    Platelet Count 125 (L) 150 - 450 10e9/L   Glucose by meter   Result Value Ref Range    Glucose 328 (H) 70 - 99 mg/dL   Basic metabolic panel   Result Value Ref Range    Sodium 141 133 - 144 mmol/L    Potassium 3.9 3.4 - 5.3 mmol/L    Chloride 104 94 - 109 mmol/L    Carbon Dioxide 27 20 - 32 mmol/L    Anion Gap 10 3 - 14 mmol/L    Glucose 279 (H) 70 - 99 mg/dL    Urea Nitrogen 14 7 - 30 mg/dL    Creatinine 0.79 0.66 - 1.25 mg/dL    GFR Estimate 87 >60 mL/min/[1.73_m2]    GFR Estimate If Black >90 >60 mL/min/[1.73_m2]    Calcium 7.9 (L) 8.5 - 10.1 mg/dL   CBC with platelets differential   Result Value Ref Range    WBC 12.9 (H) 4.0 - 11.0 10e9/L    RBC Count 4.12 (L) 4.4 - 5.9 10e12/L    Hemoglobin 11.8 (L) 13.3 - 17.7 g/dL    Hematocrit 36.5 (L) 40.0 - 53.0 %    MCV 89 78 - 100 fl     MCH 28.6 26.5 - 33.0 pg    MCHC 32.3 31.5 - 36.5 g/dL    RDW 17.2 (H) 10.0 - 15.0 %    Platelet Count 128 (L) 150 - 450 10e9/L    Diff Method Automated Method     % Neutrophils 84.6 %    % Lymphocytes 12.8 %    % Monocytes 2.0 %    % Eosinophils 0.0 %    % Basophils 0.1 %    % Immature Granulocytes 0.5 %    Nucleated RBCs 0 0 /100    Absolute Neutrophil 10.9 (H) 1.6 - 8.3 10e9/L    Absolute Lymphocytes 1.7 0.8 - 5.3 10e9/L    Absolute Monocytes 0.3 0.0 - 1.3 10e9/L    Absolute Eosinophils 0.0 0.0 - 0.7 10e9/L    Absolute Basophils 0.0 0.0 - 0.2 10e9/L    Abs Immature Granulocytes 0.1 0 - 0.4 10e9/L    Absolute Nucleated RBC 0.0    Lactic acid whole blood   Result Value Ref Range    Lactic Acid 2.9 (H) 0.7 - 2.0 mmol/L   Lactate Dehydrogenase   Result Value Ref Range    Lactate Dehydrogenase 308 (H) 85 - 227 U/L   Protein total   Result Value Ref Range    Protein Total 6.2 (L) 6.8 - 8.8 g/dL   Glucose by meter   Result Value Ref Range    Glucose 312 (H) 70 - 99 mg/dL   Cell count with differential fluid   Result Value Ref Range    Body Fluid Analysis Source Pleural fluid     % Neutrophils Fluid 7 %    % Lymphocytes Fluid 38 %    % Mono/Macro Fluid 35 %    % Basophils Fluid 1 %    % Other Cells Fluid 19 %    Color Fluid Yellow     Appearance Fluid Slightly Cloudy     WBC Fluid 2149 /uL   Lactate dehydrogenase fluid   Result Value Ref Range    LD Fluid Source Pleural fluid     Lactate Dehydrogenase Fluid 255 U/L   Fluid Culture Aerobic Bacterial   Result Value Ref Range    Specimen Description Pleural fluid Left     Culture Micro PENDING    Anaerobic bacterial culture   Result Value Ref Range    Specimen Description Pleural fluid Left     Special Requests Received in anaerobic tubes.     Culture Micro PENDING    Protein fluid   Result Value Ref Range    Protein Total Fluid Source Pleural fluid     Protein Total Fluid 2.9 g/dL   Glucose by meter   Result Value Ref Range    Glucose 204 (H) 70 - 99 mg/dL   Lactic acid  whole blood   Result Value Ref Range    Lactic Acid 2.2 (H) 0.7 - 2.0 mmol/L

## 2019-01-18 NOTE — PLAN OF CARE
Bipap on until 1100 then to high flow nasal cannula at 55% sats in the low 90s. Desaturates with talking or any ADL and repositioning. Bp hypertensive with activity. Sinus rhythm.

## 2019-01-18 NOTE — PROGRESS NOTES
RADIOLOGY PROCEDURE NOTE  Patient name: Ravi Sandoval  MRN: [id number]  : 1945    Pre-procedure diagnosis: Right pleural effusion  Post-procedure diagnosis: Same    Procedure Date/Time: 2019  9:42 AM  Procedure: Right thoracentesis  Estimated blood loss: None  Specimen(s) collected with description: Yellow fluid  The patient tolerated the procedure well with no immediate complications.  Significant findings:none    See imaging dictation for procedural details.    Provider name: Zaid Lara  Assistant(s):None

## 2019-01-18 NOTE — PROVIDER NOTIFICATION
Paged Dr. Maddox, pt wants something to help him relax and get sleep, Seroquel ordered but pt is NPO on BIPAP. Orders placed for IV Ativan.

## 2019-01-18 NOTE — PROGRESS NOTES
Spoke with Pt's wife she confirmed that the last time the patient had Eliquis was on 1/17/19 at 0800.  Ultrasound was updated.

## 2019-01-19 NOTE — PROGRESS NOTES
"Attempted to reposition patient to off load coccyx area and place pillows under heels.  Pt refused stating he can move himself if needed and is comfortable as is and that pillows make him too hot and \"cause more problems than they help.\"  States once he is asleep he will not tolerate being woken up to reposition throughout the night. Re enforced education on pressure sore prevention and notified his coccyx are is reddened currently.  Asked pt if it would be okay if we at least positioned him when HS meds were given before going to sleep.  Pt states, \"Don't beat this into the ground just to protect the hospital.\"  Does appear pt is able to roll side to side independently and position self in side-lying position but states is is somewhat difficult when connected to monitoring equipment and high flow.  Reports understanding of importance of repositioning.  "

## 2019-01-19 NOTE — PLAN OF CARE
VSS, BPs low 100's/50-60's this shift.  Tele back to SR following hour long run Afib RVR this am.  Converted after bolus and IV metoprolol.  LS dim in bases bilaterally.  Uses high flow @ 60%/30LPM while awake, BiPAP overnight. UOP low per zachary.  Redness to coccyx with Mepilex in place, skin overall very dry. Turns self independently in bed, rested between cares.

## 2019-01-19 NOTE — PROVIDER NOTIFICATION
DATE:  1/19/2019   TIME OF RECEIPT FROM LAB:  1254  LAB TEST:  Lactic Acid  LAB VALUE:  5.0  RESULTS GIVEN WITH READ-BACK TO (PROVIDER):  Paged sent to Link Herrera MD  TIME LAB VALUE REPORTED TO PROVIDER:   1300    Orders to follow

## 2019-01-19 NOTE — PROGRESS NOTES
RT- pt on Bipap 10/5 40% t/o the night. No issues. On HFNC during the day. VSS. Gel pad and meplix on. Alarm set to 10. RT will continue to follow.   Makeda Blanco, RRT

## 2019-01-19 NOTE — PROGRESS NOTES
[facility]    Oncology Progress Note    Date of Service (when I saw the patient): 01/19/2019     Assessment & Plan   Ravi Sandoval is a 74 year old male who was admitted on 1/17/2019.       Metastatic small cell lung cancer    -follows with Dr. Brownlee     -presented with lung/mediasitnal masses, left sided pleural effusion and pleural based densitiies    -cytology from pleural fluid consistent with small cell lung cancer    -received palliative carbo/etoposide 12/6-12/9    -due for cycle 2 of chemo which will be delayed pending recovery from ongoing issues     -typically small cell lung cancer are quite chemo-sensitive and will try to resume chemo once able     Malignant pleural effusion    -cytology from right thoracentesis 11/26/2018 confirmed malignancy     -underwent repeat thoracentesis 1/18 and cytology and cultures pending     -likely recurrent effusion from known malignancy although infection/heart failure related possible    -await cytology/culture results    -if recurrent malignant effusion may consider pleurx catheter placement, need for which will be re-assessed depending on response to chemotherapy      Respiratory failure    -multifactorial and related to lung cancer, effusion, PE, pulmonary hypertension, COPD etc    -weaned from bipap to high flow O2    -per hospitalist      RLL pulmonary embolism     -restart Eliquis     Normocytic anemia    -H&H at baseline    -continue to monitor      ID    -on prophylactic oral Vanco given C.diff within 30 days and on broad spectrum Abx    -on empiric Zosyn/Zithromax      DVT Prophylaxis: on Eliquis  Code Status: Full Code    Clayton Cinthya    Interval History   No new concerns, dyspnea stable, denies pain    Physical Exam   Temp: 97.8  F (36.6  C) Temp src: Axillary BP: 107/67 Pulse: 75 Heart Rate: 76 Resp: 18 SpO2: 98 % O2 Device: High Flow Nasal Cannula (HFNC)    Vitals:    01/17/19 2054   Weight: 62.2 kg (137 lb 2 oz)     Vital Signs with  Ranges  Temp:  [97.7  F (36.5  C)-98  F (36.7  C)] 97.8  F (36.6  C)  Pulse:  [] 75  Heart Rate:  [] 76  Resp:  [9-26] 18  BP: ()/(55-97) 107/67  FiO2 (%):  [55 %-80 %] 80 %  SpO2:  [86 %-98 %] 98 %  I/O last 3 completed shifts:  In: 740 [P.O.:390; I.V.:100; IV Piggyback:250]  Out: 1300 [Urine:1300]    Constitutional: awake, cooperative, no acute distress  Hematologic / Lymphatic: no cervical lymphadenopathy  GI: soft, non-distended, non-tender, no masses palpated  Musculoskeletal: no pedal edema    Medications     - MEDICATION INSTRUCTIONS -       - MEDICATION INSTRUCTIONS -       - MEDICATION INSTRUCTIONS -         amLODIPine  2.5 mg Oral Daily     apixaban ANTICOAGULANT  5 mg Oral BID     aspirin  81 mg Oral At Bedtime     azithromycin  250 mg Intravenous Q24H     budesonide  0.5 mg Nebulization BID     colchicine  0.6 mg Oral QPM     digoxin  125 mcg Oral Daily     FLUoxetine  40 mg Oral Daily     insulin aspart   Subcutaneous TID w/meals     insulin aspart  1-10 Units Subcutaneous TID AC     insulin aspart  1-7 Units Subcutaneous At Bedtime     insulin glargine  5 Units Subcutaneous QAM AC     ipratropium - albuterol 0.5 mg/2.5 mg/3 mL  1 vial Nebulization Q4H While awake     levothyroxine  125 mcg Oral QAM AC     lisinopril  20 mg Oral Daily     melatonin  5 mg Oral At Bedtime     methylPREDNISolone  62.5 mg Intravenous Q24H     metoprolol succinate ER  25 mg Oral BID     omeprazole  40 mg Oral Daily     piperacillin-tazobactam  4.5 g Intravenous Q6H     rosuvastatin  10 mg Oral At Bedtime     sildenafil  20 mg Oral TID     sodium chloride (PF)  3 mL Intracatheter Q8H     tamsulosin  0.4 mg Oral At Bedtime     vancomycin  125 mg Oral BID       Data   Results for orders placed or performed during the hospital encounter of 01/17/19 (from the past 24 hour(s))   Glucose by meter   Result Value Ref Range    Glucose 204 (H) 70 - 99 mg/dL   Lactic acid whole blood   Result Value Ref Range    Lactic  Acid 2.2 (H) 0.7 - 2.0 mmol/L   Glucose by meter   Result Value Ref Range    Glucose 167 (H) 70 - 99 mg/dL   Lactic acid whole blood   Result Value Ref Range    Lactic Acid 1.7 0.7 - 2.0 mmol/L   Glucose by meter   Result Value Ref Range    Glucose 129 (H) 70 - 99 mg/dL   Lactic acid whole blood   Result Value Ref Range    Lactic Acid 1.1 0.7 - 2.0 mmol/L   EKG 12-lead, tracing only   Result Value Ref Range    Interpretation ECG Click View Image link to view waveform and result    Lactic acid whole blood   Result Value Ref Range    Lactic Acid 3.4 (H) 0.7 - 2.0 mmol/L   Basic metabolic panel   Result Value Ref Range    Sodium 139 133 - 144 mmol/L    Potassium 4.1 3.4 - 5.3 mmol/L    Chloride 102 94 - 109 mmol/L    Carbon Dioxide 26 20 - 32 mmol/L    Anion Gap 11 3 - 14 mmol/L    Glucose 292 (H) 70 - 99 mg/dL    Urea Nitrogen 25 7 - 30 mg/dL    Creatinine 0.84 0.66 - 1.25 mg/dL    GFR Estimate 85 >60 mL/min/[1.73_m2]    GFR Estimate If Black >90 >60 mL/min/[1.73_m2]    Calcium 8.2 (L) 8.5 - 10.1 mg/dL   CBC with platelets   Result Value Ref Range    WBC 15.8 (H) 4.0 - 11.0 10e9/L    RBC Count 4.14 (L) 4.4 - 5.9 10e12/L    Hemoglobin 12.1 (L) 13.3 - 17.7 g/dL    Hematocrit 37.0 (L) 40.0 - 53.0 %    MCV 89 78 - 100 fl    MCH 29.2 26.5 - 33.0 pg    MCHC 32.7 31.5 - 36.5 g/dL    RDW 17.3 (H) 10.0 - 15.0 %    Platelet Count 155 150 - 450 10e9/L   Digoxin level   Result Value Ref Range    Digoxin Level 0.6 0.5 - 2.0 ug/L   Magnesium   Result Value Ref Range    Magnesium 2.0 1.6 - 2.3 mg/dL

## 2019-01-19 NOTE — PROGRESS NOTES
Cross Cover:  Notified by nursing that patient in atrial fib with RVR rates 120s-130s. Atrial fibrillation is new for patient. Nursing also reports lower urine output overnight and SBPs in 100s.  Check mag/potassium this AM with 0600 labs.  Gave 250ml IVF bolus and 5mg IV lopressor  - Patient converted back to sinus rhythm with rate in 80s, atrial fib likely promoted due to low volume state  - Will resume anticoagulation today  - Continue telemetry  - Defer cardiology consult to daytime rounder if patient goes back into atrial fibrillation  - Continue PTA scheduled beta blocker with hold parameters

## 2019-01-19 NOTE — PLAN OF CARE
Tolerated high flow O2 all shift. Sat above 90 all shift.hypotensive bp spread out his hypertensive med and held revatio this shift. Sinus rhythm rate 70-80. Tolerated late breakfast. Tolerating fluids no nausea.sharma adequate output. Iv bolus given for lactic acid 5.0 up to chiar with 1 assist. Had large soft brown bm

## 2019-01-19 NOTE — PLAN OF CARE
Pt states breathing is improved, still on HFNC, BP rebounding, urine output marginal, received bolus earlier. Pt denies other complaints.

## 2019-01-19 NOTE — PROVIDER NOTIFICATION
Spoke with Dr. Alvarez on call regarding new onset Afib w/ RVR.  Pt asymptomatic, BP's okay 100's/50's.  UOP low overnight after large diuresis this afternoon/nathaniel.  See new orders for small 250cc fluid bolus and IV metoprolol.

## 2019-01-20 NOTE — PLAN OF CARE
I had a long conversation with wife about patients diagnosis of lung cancer and his denial he has it. She would like to be present during oncology visit tomorrow and hoping for a palliative conference tomorrow

## 2019-01-20 NOTE — PROGRESS NOTES
[facility]    Oncology Progress Note    Date of Service (when I saw the patient): 01/20/2019     Assessment & Plan   Ravi Sandoval is a 74 year old male who was admitted on 1/17/2019.       Metastatic small cell lung cancer    -follows with Dr. Brownlee     -presented with lung/mediasitnal masses, left sided pleural effusion and pleural based densitiies    -cytology from pleural fluid consistent with small cell lung cancer    -received palliative carbo/etoposide 12/6-12/9    -due for cycle 2 of chemo which will be delayed pending recovery from ongoing issues     -typically small cell lung cancer are quite chemo-sensitive and will try to resume chemo once able     Malignant pleural effusion    -cytology from right thoracentesis 11/26/2018 confirmed malignancy     -underwent repeat thoracentesis 1/18 and cytology and cultures pending     -likely recurrent effusion from known malignancy although infection/heart failure related possible    -await cytology/culture results    -if recurrent malignant effusion may consider pleurx catheter placement, need for which will be re-assessed depending on response to chemotherapy      Respiratory failure    -multifactorial and related to lung cancer, effusion, PE, pulmonary hypertension, COPD etc    -weaned from bipap to high flow O2    - recheck chest x-ray today    -per hospitalist      RLL pulmonary embolism     -continue Eliquis     Normocytic anemia    -H&H at baseline    -continue to monitor      ID    -on prophylactic oral Vanco given C.diff within 30 days and on broad spectrum Abx    -on empiric Zosyn/Zithromax    -consider converting to PO soon      DVT Prophylaxis: on Eliquis  Code Status: Full Code    Clayton Mendes    Interval History   No new concerns, dyspnea stable, denies pain    Physical Exam   Temp: 97.6  F (36.4  C) Temp src: Axillary BP: 142/72 Pulse: 59 Heart Rate: 55 Resp: 17 SpO2: 96 % O2 Device: BiPAP/CPAP Oxygen Delivery: Other  (Comments)(25LPM)  Vitals:    01/17/19 2054   Weight: 62.2 kg (137 lb 2 oz)     Vital Signs with Ranges  Temp:  [97.2  F (36.2  C)-98.1  F (36.7  C)] 97.6  F (36.4  C)  Pulse:  [59-84] 59  Heart Rate:  [55-83] 55  Resp:  [11-24] 17  BP: (109-165)/(60-82) 142/72  FiO2 (%):  [50 %-95 %] 50 %  SpO2:  [95 %-100 %] 96 %  I/O last 3 completed shifts:  In: 1200 [P.O.:700; I.V.:100; IV Piggyback:400]  Out: 900 [Urine:900]    Constitutional: awake, cooperative, no acute distress  Hematologic / Lymphatic: no cervical lymphadenopathy  GI: soft, non-distended, non-tender, no masses palpated  Musculoskeletal: no pedal edema    Medications     - MEDICATION INSTRUCTIONS -       - MEDICATION INSTRUCTIONS -       - MEDICATION INSTRUCTIONS -         amLODIPine  2.5 mg Oral Daily     apixaban ANTICOAGULANT  5 mg Oral BID     aspirin  81 mg Oral At Bedtime     azithromycin  250 mg Intravenous Q24H     budesonide  0.5 mg Nebulization BID     colchicine  0.6 mg Oral QPM     digoxin  125 mcg Oral Daily     FLUoxetine  40 mg Oral Daily     insulin aspart   Subcutaneous TID w/meals     insulin aspart  1-10 Units Subcutaneous TID AC     insulin aspart  1-7 Units Subcutaneous At Bedtime     insulin glargine  5 Units Subcutaneous QAM AC     ipratropium - albuterol 0.5 mg/2.5 mg/3 mL  1 vial Nebulization Q4H While awake     levothyroxine  125 mcg Oral QAM AC     lisinopril  20 mg Oral Daily     melatonin  5 mg Oral At Bedtime     methylPREDNISolone  62.5 mg Intravenous Q24H     metoprolol succinate ER  25 mg Oral BID     omeprazole  40 mg Oral Daily     piperacillin-tazobactam  4.5 g Intravenous Q6H     rosuvastatin  10 mg Oral At Bedtime     sildenafil  20 mg Oral TID     sodium chloride (PF)  3 mL Intracatheter Q8H     tamsulosin  0.4 mg Oral At Bedtime     vancomycin  125 mg Oral BID       Data   Results for orders placed or performed during the hospital encounter of 01/17/19 (from the past 24 hour(s))   Lactic acid whole blood   Result  Value Ref Range    Lactic Acid 5.0 (HH) 0.7 - 2.0 mmol/L   Glucose by meter   Result Value Ref Range    Glucose 159 (H) 70 - 99 mg/dL   Lactic acid whole blood   Result Value Ref Range    Lactic Acid 2.5 (H) 0.7 - 2.0 mmol/L   Lactic acid whole blood   Result Value Ref Range    Lactic Acid 1.4 0.7 - 2.0 mmol/L   Lactic acid whole blood   Result Value Ref Range    Lactic Acid 3.4 (H) 0.7 - 2.0 mmol/L   Basic metabolic panel   Result Value Ref Range    Sodium 139 133 - 144 mmol/L    Potassium 4.3 3.4 - 5.3 mmol/L    Chloride 106 94 - 109 mmol/L    Carbon Dioxide 26 20 - 32 mmol/L    Anion Gap 7 3 - 14 mmol/L    Glucose 293 (H) 70 - 99 mg/dL    Urea Nitrogen 21 7 - 30 mg/dL    Creatinine 0.66 0.66 - 1.25 mg/dL    GFR Estimate >90 >60 mL/min/[1.73_m2]    GFR Estimate If Black >90 >60 mL/min/[1.73_m2]    Calcium 7.6 (L) 8.5 - 10.1 mg/dL

## 2019-01-20 NOTE — PLAN OF CARE
VSS, LS dim throughout.  CHOUDHURY but breathing is comfortable at rest.  High flow while awake, BiPAP overnight, tolerated well.  Rested between cares, independent with bed mobility offloading coccyx area.  Continues to refuse heels to be elevated on pillows. Refused alternative BiPAP mask recommended by RT as pt has a sore on bridge of nose. UOP low-borderline per sharma.  Up ax1.

## 2019-01-20 NOTE — PROGRESS NOTES
Patient placed on BIPAP 10/5 50%. Patient has redness and blister on the bridge of his nose. Pt placed on under the nose mask. RN notified. Will continue to follow and wean to HFNC in the am.     Allyson MONTESINOS

## 2019-01-20 NOTE — PROGRESS NOTES
[facility]    Medicine Progress Note - Hospitalist Service       Date of Admission:  1/17/2019  Assessment & Plan      Ravi Sandoval is a 74 year old male with PMH of CHF, COPD, DM2 and Small Cell Lung Cancer, transferred from [place] [address] for hypoxia and large right pleural effusion.      Acute Hypoxic Respiratory Failure: Improved,   Outside CT chest showed old PE of RLL (no acute clot), emphysema and basilar fibrotic changes, lymphadenopathy, bilateral pulmonary nodules and mod-large right effusion.    suspected PNA,   Significant wheezing is likely secondary to COPD exacerbation and fibrosis.  - deescalate Abx. regimen to oral Lto oral Prednisone  - Continue bronhodialtors  - Continue Bipap prn    Right Sided Pleural Effusion   liklely malignant effusion    Elevated lactate  Likely due to PNA vs malignancy.    Small Cell Lung Cancer:  Diagnosed by thoracentesis in October 2018.  Followed by Dr. Brownlee with [facility] and received carboplatin and etoposide on 12/8.  - [facility] consult    COPD  - continue PTA pulmicort and Duonebs    RLL Pulmonary Embolism, Diagnosed Nov 2018  Again noted on outside CT, no signs of acute clot.  - Resumed  eliquis after thoracentesis    NIDDM 2 (HGBA1c is 10.7)  Managed on glipizide.  - hold PTA glipizide  - will start low dose lantus 5 units, prandial insulin 1 unit/carb unit, increase to high dose sliding scale insulin     Hypothyroid  - continue PTA levothyroxine, if prolonged BiPAP give 50% of dose as IV    HTN  - continue PTA metoprolol, amlodipine, lisinopril     Hyperlipidemia  - continue PTA rosuvastatin    Depression  - continue PTA fluoxetine    Pulm HTN, Pulmonary Fibrosis:  - continue prednisone   - continue PTA sildenafil and digoxin.    Recent C Diff Infection  Completed course of vancomycin  - as this was within 30 days and on broad spectrum abx, will start prophylactic vanco    Gout  - continue PTA colchicine    BPH  - sharma cath for I/O's  - continue PTA  Flomax      Diet: Moderate Consistent CHO Diet    DVT Prophylaxis: Pneumatic Compression Devices  Ellison Catheter: in place, indication: Strict 1-2 Hour I&O  Code Status: Full Code      Disposition Plan   Expected discharge: in 2 - 3 days, recommended to prior living arrangement once resp failure resolved.  Entered: Link Herrera MD 01/20/2019, 12:10 PM       The patient's care was discussed with the Bedside Nurse, Patient and Patient's Family.    Link Herrera MD  Hospitalist Service  [facility]    ______________________________________________________________________    Interval History   Has ongoing cough, no fever or chills, denies chest pain , palliations , bowel or bladder comcpalinst      Data reviewed today: I reviewed all medications, new labs and imaging results over the last 24 hours. I personally reviewed no images or EKG's today.    Physical Exam   Vital Signs: Temp: 97.6  F (36.4  C) Temp src: Axillary BP: 145/84 Pulse: 73 Heart Rate: 71 Resp: 15 SpO2: 98 % O2 Device: High Flow Nasal Cannula (HFNC) Oxygen Delivery: Other (Comments)(25LPM)  Weight: 137 lbs 2.02 oz  General Appearance: Alert  Mid respiratory discomfort.   Respiratory: diminished breath sounds throughout, faint crackles at the basis, exo wheezing   Cardiovascular: RRRR no murmurs  GI:  Abdomen soft NT/ND + BS   Skin: warm dry no rashes      Data   Recent Labs   Lab 01/20/19  0624 01/19/19  0618 01/18/19  0714 01/18/19  0240   WBC  --  15.8* 12.9*  --    HGB  --  12.1* 11.8*  --    MCV  --  89 89  --    PLT  --  155 128* 125*   INR  --   --   --  1.16*    139 141  --    POTASSIUM 4.3 4.1 3.9  --    CHLORIDE 106 102 104  --    CO2 26 26 27  --    BUN 21 25 14  --    CR 0.66 0.84 0.79  --    ANIONGAP 7 11 10  --    AV 7.6* 8.2* 7.9*  --    * 292* 279*  --    PROTTOTAL  --   --  6.2*  --      Recent Results (from the past 24 hour(s))   XR Chest Port 1 View - [place]    Narrative    CHEST  ONE VIEW PORTABLE   1/20/2019 11:05 AM     HISTORY: Status post right thoracentesis.    COMPARISON: 11/26/2018      Impression    IMPRESSION: Interstitial densities throughout the periphery of both  lungs with lower lobe predominance suggestive of fibrosis. Normal  heart size and pulmonary vascularity.    JULIO C EID MD

## 2019-01-20 NOTE — PROGRESS NOTES
[facility]    Medicine Progress Note - Hospitalist Service       Date of Admission:  1/17/2019  Assessment & Plan      Ravi Sandoval is a 74 year old male with PMH of CHF, COPD, DM2 and Small Cell Lung Cancer, transferred from [place] [address] for hypoxia and large right pleural effusion.      Acute Hypoxic Respiratory Failure:  Bipap dependent upon arrival.  Outside CT chest with multiple findings including old PE of RLL (no acute clot), emphysema and basilar fibrotic changes, lymphadenopathy, bilateral pulmonary nodules and mod-large right effusion.  Failure may well be multifactorial including PNA (lactate of 5 with leukocytosis, received chemo 3 weeks prior), COPD exacerbation, right effusion.  - Currently on zosyn and azithromycin   - continue solumedrol and nebs  - thoracentesis this AM consistent with exudative effusion, follow labs and culture  - repeat lasix 40 mg IV x1  - weaned from Bipap to hi-flow this AM    Right Sided Pleural Effusion  This is malignant vs infectious vs HF vs all the above.   - thoracentesis today    Elevated lactate  Likely due to PNA vs malignancy.  - lactate stable 2-3 since arrival, monitor    Small Cell Lung Cancer:  Diagnosed by thoracentesis in October 2018.  [facility] and received carboplatin and etoposide on 12/8.  - [facility] consult    COPD  - continue PTA pulmicort and Duonebs    RLL Pulmonary Embolism, Diagnosed Nov 2018  Again noted on outside CT, no signs of acute clot.  - holding eliquis for thoracentesis, resume tomorrow    NIDDM 2 (HGBA1c is 10.7)  Managed on glipizide.  - hold PTA glipizide  - will start low dose lantus 5 units, prandial insulin 1 unit/carb unit, increase to high dose sliding scale insulin     Hypothyroid  - continue PTA levothyroxine, if prolonged BiPAP give 50% of dose as IV    HTN  - continue PTA metoprolol, amlodipine, lisinopril as now off Bipap  - stop IV metoprolol    Hyperlipidemia  - continue PTA  rosuvastatin    Depression  - continue PTA fluoxetine    Pulm HTN, Pulmonary Fibrosis:  - holding prednisone in favor of solumedrol  - continue PTA sildenafil and digoxin (will need levels monitored while on azithro)    Recent C Diff Infection  Completed course of vancomycin  - as this was within 30 days and on broad spectrum abx, will start prophylactic vanco    Gout  - continue PTA colchicine    BPH  - sharma cath for I/O's  - continue PTA Flomax      Diet: Moderate Consistent CHO Diet    DVT Prophylaxis: Pneumatic Compression Devices  Sharma Catheter: in place, indication: Strict 1-2 Hour I&O  Code Status: Full Code      Disposition Plan   Expected discharge: in 2 - 3 days, recommended to prior living arrangement once resp failure resolved.  Entered: Link Herrera MD 01/20/2019, 12:05 PM       The patient's care was discussed with the Bedside Nurse, Patient and Patient's Family.    Link Herrera MD  Hospitalist Service  [facility]    ______________________________________________________________________    Interval History   Has ongoing cough, no fever or chills, denies chest pain , palliations , bowel or bladder comcpalinst      Data reviewed today: I reviewed all medications, new labs and imaging results over the last 24 hours. I personally reviewed no images or EKG's today.    Physical Exam   Vital Signs: Temp: 97.6  F (36.4  C) Temp src: Axillary BP: 145/84 Pulse: 73 Heart Rate: 71 Resp: 15 SpO2: 98 % O2 Device: High Flow Nasal Cannula (HFNC) Oxygen Delivery: Other (Comments)(25LPM)  Weight: 137 lbs 2.02 oz  General Appearance: Alert  Mid respiratory discomfort.   Respiratory: diminished breath sounds throughout, faint crackles at the basis, exo wheezing   Cardiovascular: RRRR no murmurs  GI:  Abdomen soft NT/ND + BS   Skin: warm dry no rashes      Data   Recent Labs   Lab 01/20/19  0624 01/19/19  0618 01/18/19  0714 01/18/19  0240   WBC  --  15.8* 12.9*  --    HGB  --   12.1* 11.8*  --    MCV  --  89 89  --    PLT  --  155 128* 125*   INR  --   --   --  1.16*    139 141  --    POTASSIUM 4.3 4.1 3.9  --    CHLORIDE 106 102 104  --    CO2 26 26 27  --    BUN 21 25 14  --    CR 0.66 0.84 0.79  --    ANIONGAP 7 11 10  --    AV 7.6* 8.2* 7.9*  --    * 292* 279*  --    PROTTOTAL  --   --  6.2*  --      Recent Results (from the past 24 hour(s))   XR Chest Port 1 View - [place]    Narrative    CHEST ONE VIEW PORTABLE   1/20/2019 11:05 AM     HISTORY: Status post right thoracentesis.    COMPARISON: 11/26/2018      Impression    IMPRESSION: Interstitial densities throughout the periphery of both  lungs with lower lobe predominance suggestive of fibrosis. Normal  heart size and pulmonary vascularity.    JULIO C EID MD

## 2019-01-21 NOTE — PLAN OF CARE
VSS except HTN, given prn metoprolol with improvement. Wore HFNC overnight, tried the BiPAP but didn't feel like it was working well. Sating 92-96% on high flow while sleeping. Denies pain. LS diminished. BS active. Ellison patent. Mepliex on coccyx, patient repositioning himself well while awake. Refusing to elevate heels. Bridge of nose red, open to air. Assist of 1. Tolerating mod carb diet. Tele SR with BBB.

## 2019-01-21 NOTE — PROGRESS NOTES
Patient on high flow nasal cannula 30L and 70%, SpO2 in the mid 90's on these settings. Nebulizer given x2. Breath sounds clear and diminished bilaterally, no change with neb. Bipap on standby.

## 2019-01-21 NOTE — PROGRESS NOTES
[facility] Progress Note          Assessment and Plan:   Ravi Sandoval is a 74 year old male who was admitted on 1/17/2019.         1. Metastatic small cell lung cancer    -follows with Dr. Brownlee     -presented with lung/mediasitnal masses, left sided pleural effusion and pleural based densitiies    -cytology from pleural fluid consistent with small cell lung cancer    -received palliative carbo/etoposide 12/6-12/9    -due for cycle 2 of chemo which will be delayed pending recovery from ongoing issues      2. Malignant pleural effusion    -cytology from right thoracentesis 11/26/2018 confirmed malignancy     -underwent repeat thoracentesis 1/18 and cytology and cultures pending     -likely recurrent effusion from known malignancy although infection/heart failure related possible    -await cytology/culture results    -if recurrent malignant effusion may consider pleurx catheter placement, need for which will be re-assessed depending on response to chemotherapy     - recent CXR shows no significant recurrent effusion     3. Acute respiratory failure    -multifactorial and related to lung cancer, effusion, PE, pulmonary hypertension, pulmonary fibrosis, COPD, pneumonia etc    -intermittent bipap and high flow O2    -per hospitalist      4. RLL pulmonary embolism     -continue Eliquis     5. Normocytic anemia    -H&H at baseline    -continue to monitor      6. Clostridium difficile colitis    -on prophylactic oral Vanco given C.diff within 30 days and on broad spectrum Abx    -on empiric antibiotics (For suspected pneumonia)    -consider converting to PO soon        DVT Prophylaxis: on Eliquis  Code Status: Full Code               Interval History:   Pt reports breathing stable although not much improved.              Review of Systems:   As per subjective, otherwise 5 systems reviewed and negative.           Physical Exam:   Blood pressure 172/90, pulse 80, temperature 97.7  F (36.5  C), temperature source  "Axillary, resp. rate 15, height 1.702 m (5' 7\"), weight 62.2 kg (137 lb 2 oz), SpO2 94 %.      Vital Sign Ranges  Temperature Temp  Av.7  F (36.5  C)  Min: 97.6  F (36.4  C)  Max: 97.8  F (36.6  C)   Blood pressure Systolic (24hrs), Av , Min:130 , Max:179        Diastolic (24hrs), Av, Min:69, Max:100      Pulse Pulse  Av.4  Min: 58  Max: 86   Respirations Resp  Avg: 15.1  Min: 9  Max: 21   Pulse oximetry SpO2  Av %  Min: 90 %  Max: 99 %         Intake/Output Summary (Last 24 hours) at 2019 0944  Last data filed at 2019 0600  Gross per 24 hour   Intake 2450 ml   Output 1350 ml   Net 1100 ml       Constitutional:   No acute distress.   Skin:   Multiple ecchymoses over bilateral upper extremities.   Neck:   Supple.   Abdomen:   Soft, nontender, nondistended with no palpable hepatosplenomegaly.   Extremities:   No cyanosis or edema.            Medications:     No current outpatient medications on file.                Data:     Results for orders placed or performed during the hospital encounter of 19 (from the past 24 hour(s))   XR Chest Port 1 View - ,[place]    Narrative    CHEST ONE VIEW PORTABLE   2019 11:05 AM     HISTORY: Status post right thoracentesis.    COMPARISON: 2018      Impression    IMPRESSION: Interstitial densities throughout the periphery of both  lungs with lower lobe predominance suggestive of fibrosis. Normal  heart size and pulmonary vascularity.    JULIO C EID MD   Lactic acid whole blood   Result Value Ref Range    Lactic Acid 3.8 (H) 0.7 - 2.0 mmol/L   Glucose by meter   Result Value Ref Range    Glucose 241 (H) 70 - 99 mg/dL   Lactic acid whole blood   Result Value Ref Range    Lactic Acid 2.7 (H) 0.7 - 2.0 mmol/L   Glucose by meter   Result Value Ref Range    Glucose 226 (H) 70 - 99 mg/dL   Glucose by meter   Result Value Ref Range    Glucose 303 (H) 70 - 99 mg/dL   Lactic acid whole blood   Result Value Ref Range    Lactic Acid 3.4 (H) " 0.7 - 2.0 mmol/L   Glucose by meter   Result Value Ref Range    Glucose 256 (H) 70 - 99 mg/dL   Lactic acid whole blood   Result Value Ref Range    Lactic Acid 2.1 (H) 0.7 - 2.0 mmol/L

## 2019-01-21 NOTE — PROGRESS NOTES
[facility]    Medicine Progress Note - Hospitalist Service       Date of Admission:  1/17/2019  Assessment & Plan    Ravi Jimenes is a 74-year-old male with a history of metastatic small cell lung cancer with a malignant effusion on palliative chemotherapy, severe emphysema chronic respiratory failure on 6 L of oxygen, pulmonary fibrosis and pulmonary hypertension as well as coronary artery disease, ischemic cardiomyopathy, systolic heart failure and type 2 diabetes.  He was transferred here from an outside facility due to worsening respiratory status and a CT scan showing a moderate sized right pleural effusion.  The patient required BiPAP on admission.  He underwent a diagnostic and therapeutic thoracentesis.  He has been transitioned off of BiPAP to high flow oxygen.  The pleural effusion is likely recurrent malignant effusion.    Acute respiratory failure, hypoxic on high flow oxygen  Malignant right-sided pleural effusion status post thoracentesis  Possible acute exacerbation of COPD on admission  History of   Severe emphysema with chronic respiratory failure  Pulmonary fibrosis  Severe pulmonary hypertension    CT scan done at outside facility prior to transfer here showed a moderate sized right pleural effusion.  Patient had a thoracentesis here.  Cultures are negative.  This is likely a recurrence of his malignant pleural effusion.  Follow-up chest x-ray on January 20 did not show any large effusion.  The patient was initially on BiPAP and is now on high flow oxygen.  He had been on 6 L of oxygen continuously at home.  He is currently on levofloxacin, inhaled bronchodilators and prednisone.  We will continue with these today.    Lactic acidosis  Improving with the patient's improved respiratory status    Metastatic small cell lung cancer  Diagnosed by thoracentesis in October 2018.  Followed by Dr. Brownlee with [facility] and received carboplatin and etoposide on 12/8.    Right lower lobe pulmonary  embolism in December 2018  Continue apixaban    Coronary artery disease  Ischemic cardiomyopathy/chronic systolic heart failure  Hypertension  He does not appear to be in acute heart failure  Continue cardiac medications    Type 2 diabetes (HGBA1c is 10.7%)  Uncontrolled  Managed on glipizide at home.  This was held on admission  The patient is currently on glargine and aspart insulin  Glucometers are quite elevated-220s-300.  No overnight hypoglycemia.  Increase glargine to 8 units and switch to bedtime dosing.  Increase carbohydrate coverage 2 units per carb choice.    Hypothyroidism  Continue PTA levothyroxine    Hyperlipidemia  Continue PTA rosuvastatin    Depression  Continue PTA fluoxetine    Recent clostridium difficile diarrhea  Completed course of vancomycin  Currently on prophylactic oral vancomycin due to broad-spectrum antibiotics    Gout  Continue PTA colchicine    BPH  Continue PTA Flomax     Continue Ellison catheter    Diet: Moderate Consistent CHO Diet    DVT Prophylaxis: Apixaban  Ellison Catheter: in place, indication: Retention  Code Status: Full Code      Disposition Plan   Expected discharge: 2 - 3 days, recommended to transitional care unit once O2 use less than 6 liters/minute.  Entered: Paulino Pierce MD 01/21/2019, 11:28 AM       The patient's care was discussed with the Bedside Nurse and Patient.    Paulino Pierce MD  Hospitalist Service  [facility]    ______________________________________________________________________    Interval History   Occasional sharp pleuritic-like pain on the right side of his chest inspiration.  He is not feeling short of breath on high flow oxygen.    Data reviewed today: I reviewed all medications, new labs and imaging results over the last 24 hours. I personally reviewed no images or EKG's today.    Physical Exam   Vital Signs: Temp: 97.4  F (36.3  C) Temp src: Axillary BP: (!) 140/104 Pulse: 78 Heart Rate: 80 Resp: 12 SpO2: 97 %  O2 Device: High Flow Nasal Cannula (HFNC)    Weight: 137 lbs 2.02 oz  Constitutional: awake, alert, cooperative, no apparent distress, and appears stated age  Respiratory: Decreased breath sounds bilaterally.  No wheezing rales or rhonchi heard  Cardiovascular: Regular, S1-S2 audible no murmur noted.  GI: normal bowel sounds, soft, non-distended, non-tender  Skin: no rashes  Neurologic: Awake, alert, oriented to name, place and time.  Cranial nerves II-XII are grossly intact.  Motor is 5 out of 5 bilaterally.Romberg negative, and gait is normal.  Neuropsychiatric: General: normal, calm and normal eye contact  Level of consciousness: alert / normal    Data   Recent Labs   Lab 01/20/19  0624 01/19/19  0618 01/18/19  0714 01/18/19  0240   WBC  --  15.8* 12.9*  --    HGB  --  12.1* 11.8*  --    MCV  --  89 89  --    PLT  --  155 128* 125*   INR  --   --   --  1.16*    139 141  --    POTASSIUM 4.3 4.1 3.9  --    CHLORIDE 106 102 104  --    CO2 26 26 27  --    BUN 21 25 14  --    CR 0.66 0.84 0.79  --    ANIONGAP 7 11 10  --    AV 7.6* 8.2* 7.9*  --    * 292* 279*  --    PROTTOTAL  --   --  6.2*  --      No results found for this or any previous visit (from the past 24 hour(s)).

## 2019-01-21 NOTE — PLAN OF CARE
Vital signs stable except on high flow satting low 90's and b/p elevated, trending down with morning meds. Alert and oriented. Lung sounds diminished. Bowel sounds active, flatus present. Patient denies pain. Up assist of one to bedside cammode (patient does desat and then slowly climbs back up). Tolerating diet. CMS/neuros intact. Patient had large BM. Blanchable redness on coccyx and bridge of nose. Mepilex in place.

## 2019-01-21 NOTE — PROVIDER NOTIFICATION
MD Notification    Notified Person: MD    Notified Person Name: Dr. Pierce     Notification Date/Time: 1400, 1/21/19    Notification Interaction: paged    Purpose of Notification: Question if need to continue lactic acid lab q6h. Lactic has been trending down. Clarification of Lantus and carb coverage dose.     Orders Received: Lactic acid dc'd. Lantus and carb coverage orders updated.     Comments:

## 2019-01-22 NOTE — PLAN OF CARE
A&O.  AVSS.  IMC, SR w/ BBB on the monitor.  On HFNC at 30L, sats on mid/high 90's.  C/o of R shoulder pain/discomfort Tylenol given and warm pack used with some relief.  LS dim.  IS encouraged.  BS hypoactive, denies passing gas, per chart pt had BM yesterday.  Refused repositioning and pillows under his coccyx and legs, educated.  Mepilex on coccyx and mid back CDI.  Indwelling urinary catheter intact and patent.  Slept some in between care.

## 2019-01-22 NOTE — PROGRESS NOTES
[facility] Progress Note          Assessment and Plan:Mr. Ravi Sandoval is a 74 year old man who was admitted on 1/17/2019 with increasing dyspnea         1. Metastatic small cell lung cancer     -presented with lung/mediasitnal masses, left sided pleural effusion and pleural based densitiies    -cytology from pleural fluid consistent with small cell lung cancer    -received palliative carbo/etoposide 12/6-12/9  - his current CT shows no change in lung nodules or mediastinal lymphadenopathy  - discussed CT findings at length with patient and also with his wife by phone  - absence of any improvement on CT imaging suggests cancer is resistant to 1st line chemotherapy  - we discussed different options and I recommended against a second cycle of etoposide and carboplatinum based on absence of response and increased risk of side effects. We reviewed other chemotherapy options and also discussed treatment such as radiation therapy or checkpoint inhibitor treatment. He has very high risk of side effects with either of these options. Given his circumstances and absence of any curative treatment options, I recommend switching to best supportive care. We discussed the use of hospice services and I recommended a hospice consultation while he is hospitalized. He is somewhat ambivalent about this recommendation but his wife expressed considerable for this recommendation     2. Malignant pleural effusion    -cytology from right thoracentesis 11/26/2018 confirmed malignancy     -underwent repeat thoracentesis 1/18 and cytology and cultures pending     -we discussed different options going forward including intermittent thoracentesis versus placement of a pleurex catheter. I recommended the latter option to minimize his need for frequent trips to the hospital    3. Chronic respiratory failure    4. Thrombophilia treated with long term anticoagulation  - if pleurex catheter placed, he understands he will need  to be off anticoagulation for at least 24 hours before the procedure.    Advance care planning. He reports having a living will and expresses desire for intensive support. I explained that he is quite likely to become weaker and that advanced CPR would be highly unlikely to benefit him             Interval History:   Patient reports no pain or bleeding problems.              Medications:       amLODIPine  2.5 mg Oral Daily     apixaban ANTICOAGULANT  5 mg Oral BID     aspirin  81 mg Oral At Bedtime     budesonide  0.5 mg Nebulization BID     colchicine  0.6 mg Oral QPM     digoxin  125 mcg Oral Daily     FLUoxetine  40 mg Oral Daily     insulin aspart   Subcutaneous TID w/meals     insulin aspart  1-10 Units Subcutaneous TID AC     insulin aspart  1-7 Units Subcutaneous At Bedtime     insulin glargine  8 Units Subcutaneous At Bedtime     ipratropium - albuterol 0.5 mg/2.5 mg/3 mL  1 vial Nebulization Q4H While awake     levofloxacin  750 mg Oral Daily     levothyroxine  125 mcg Oral QAM AC     lisinopril  20 mg Oral Daily     melatonin  5 mg Oral At Bedtime     metoprolol succinate ER  25 mg Oral BID     omeprazole  40 mg Oral Daily     rosuvastatin  10 mg Oral At Bedtime     sildenafil  20 mg Oral TID     sodium chloride (PF)  3 mL Intracatheter Q8H     tamsulosin  0.4 mg Oral At Bedtime     vancomycin  125 mg Oral BID     acetaminophen, acetaminophen, albuterol, bisacodyl, glucose **OR** dextrose **OR** glucagon, glucose **OR** dextrose **OR** glucagon, HYDROcodone-acetaminophen, HYDROmorphone, hypromellose-dextran, lidocaine 4%, lidocaine (buffered or not buffered), - MEDICATION INSTRUCTIONS -, metoprolol, naloxone, - MEDICATION INSTRUCTIONS -, ondansetron **OR** ondansetron, - MEDICATION INSTRUCTIONS -, polyethylene glycol, QUEtiapine, senna-docusate **OR** senna-docusate, sodium chloride (PF)               Physical Exam:   Blood pressure 145/86, pulse 75, temperature 97.8  F (36.6  C), temperature source Oral,  "resp. rate 15, height 1.702 m (5' 7\"), weight 62.4 kg (137 lb 9.1 oz), SpO2 97 %.  Wt Readings from Last 4 Encounters:   19 62.4 kg (137 lb 9.1 oz)   19 65.1 kg (143 lb 9.6 oz)   19 65.1 kg (143 lb 9.6 oz)   19 64.4 kg (142 lb)         Vital Sign Ranges  Temperature Temp  Av.2  F (36.8  C)  Min: 97.5  F (36.4  C)  Max: 100.3  F (37.9  C)   Blood pressure Systolic (24hrs), Av , Min:112 , Max:176        Diastolic (24hrs), Av, Min:61, Max:91      Pulse Pulse  Av.1  Min: 55  Max: 81   Respirations Resp  Av.6  Min: 12  Max: 24   Pulse oximetry SpO2  Av.1 %  Min: 94 %  Max: 98 %         Intake/Output Summary (Last 24 hours) at 2019 1527  Last data filed at 2019 1400  Gross per 24 hour   Intake 1560 ml   Output 1725 ml   Net -165 ml       Constitutional: Awake, alert, cooperative. He appears weak but comfortable   Lungs: Decreased breath sounds bilaterally, no crackles or wheezing   Cardiovascular: Regular rate and rhythm, normal S1 and S2, and no murmur noted   Abdomen: Normal bowel sounds, soft, non-distended, non-tender   Skin: No rashes, no cyanosis, no upper extremity edema   Other:           Data:   Laboratory:  CMP  Recent Labs   Lab 19  0720 19  0624 19  0618 19  0714    139 139 141   POTASSIUM 4.0 4.3 4.1 3.9   CHLORIDE 108 106 102 104   CO2 26 26 26 27   ANIONGAP 6 7 11 10   GLC 99 293* 292* 279*   BUN 20 21 25 14   CR 0.62* 0.66 0.84 0.79   GFRESTIMATED >90 >90 85 87   GFRESTBLACK >90 >90 >90 >90   AV 7.9* 7.6* 8.2* 7.9*   MAG  --   --  2.0  --    PROTTOTAL  --   --   --  6.2*     CBC  Recent Labs   Lab 19  0720 19  0618 19  0714 19  0240   WBC 12.8* 15.8* 12.9*  --    RBC 3.62* 4.14* 4.12*  --    HGB 10.5* 12.1* 11.8*  --    HCT 31.8* 37.0* 36.5*  --    MCV 88 89 89  --    MCH 29.0 29.2 28.6  --    MCHC 33.0 32.7 32.3  --    RDW 16.8* 17.3* 17.2*  --    * 155 128* 125*     INR  Recent " Labs   Lab 01/18/19  0240   INR 1.16*       Imaging data:  Results for orders placed or performed during the hospital encounter of 01/17/19   US Thoracentesis    Narrative    EXAM: US THORACENTESIS       1/18/2019 10:11 AM       HISTORY: Right pleural effusion.      PROCEDURE:  Written informed consent was obtained from the patient  prior to the procedure. The risks and benefits including bleeding,  infection and pneumothorax were discussed and the patient wished to  continue. Initial ultrasound images demonstrated a right-sided pleural  fluid collection. A permanent image was saved. The skin overlying this  collection was marked, prepped, draped and anesthetized in usual  sterile fashion utilizing 5 mL of lidocaine 1%. Thoracentesis catheter  was then placed into the pleural fluid collection with return of 900  mL of ivonne-colored fluid using ultrasound guidance. Estimated blood  loss during the procedure was less than 5 mL. No specimens collected.  Patient tolerated the procedure well. Followup chest x-ray was  ordered.        Impression    IMPRESSION:  Ultrasound-guided right-sided thoracentesis.     KIRAN TSAI PA-C   XR Chest Port 1 View - [place]    Narrative    CHEST ONE VIEW PORTABLE   1/20/2019 11:05 AM     HISTORY: Status post right thoracentesis.    COMPARISON: 11/26/2018      Impression    IMPRESSION: Interstitial densities throughout the periphery of both  lungs with lower lobe predominance suggestive of fibrosis. Normal  heart size and pulmonary vascularity.    JULIO C EID MD   CT Chest w Contrast    Narrative    CT CHEST WITH CONTRAST 1/22/2019 9:30 AM    HISTORY: Lung cancer, pleural effusion.    TECHNIQUE: CT of the chest was performed following the administration  of 80 mL Isovue-370. Radiation dose for this scan was reduced using  automated exposure control, adjustment of the mA and/or kV according  to patient size, or iterative reconstruction technique.    COMPARISON: Chest CT dated  12/10/2018    FINDINGS: Again identified are diffuse emphysematous changes within  the lungs. There is a small right pleural effusion. This is unchanged  in size. Previously seen lung nodules are again identified and are not  significantly changed.    Right hilar lymphadenopathy and mediastinal lymphadenopathy is  unchanged. A lobulated mass at the inferior mediastinum right  paraesophageal location abutting the atria of the heart is unchanged  in size measuring 4.8 x 2.6 cm.    There is a small linear filling defect consistent with thrombus in a  branch of the right superior pulmonary vein on image 41 series 3.  Previously described embolus in a right lower lobe pulmonary arterial  branch is unchanged.    The upper aspects of the abdomen are included on this exam. There are  gallstones.      Impression    IMPRESSION:   1. No significant change when compared to the previous exam. Again  identified are multiple lung nodules, enlarged lymph nodes in the  right hilum and mediastinum, and a small right pleural effusion. A  lobulated mass in the inferior mediastinum is also unchanged.  2. No change in an embolus in the right lower lobe pulmonary arterial  branch as well as linear thrombus in a branch of the right superior  pulmonary vein.  3. Gallstones.    MD Nelson ROLDAN MD

## 2019-01-22 NOTE — PLAN OF CARE
A&O. VSS except blood pressure elevated, though stable. Maintaining oxygen saturations on High Flow nasal canula at 30 L per minute. Desats quickly with activity. LS diminished /crackles to bases. Tele SR with BBB and 1st degree AVB. Coccyx red, intact without drainage. New mepilex placed. Pt continues to decline assistance with repositioning in bed. Educated on need for position change at lease q2h to prevent further skin breakdown.

## 2019-01-22 NOTE — PROGRESS NOTES
[facility]    Medicine Progress Note - Hospitalist Service       Date of Admission:  1/17/2019  Assessment & Plan    Ravi Jimenes is a 74-year-old male with a history of metastatic small cell lung cancer with a malignant effusion on palliative chemotherapy, severe emphysema chronic respiratory failure on 6 L of oxygen, pulmonary fibrosis and pulmonary hypertension as well as coronary artery disease, ischemic cardiomyopathy, systolic heart failure and type 2 diabetes.  He was transferred here from an outside facility due to worsening respiratory status and a CT scan showing a moderate sized right pleural effusion.  The patient required BiPAP on admission.  He underwent a diagnostic and therapeutic thoracentesis.  He has been transitioned off of BiPAP to high flow oxygen.  The pleural effusion is likely recurrent malignant effusion.    Acute respiratory failure, hypoxic on high flow oxygen  Malignant right-sided pleural effusion status post thoracentesis  Possible acute exacerbation of COPD on admission  History of   Severe emphysema with chronic respiratory failure  Pulmonary fibrosis  Severe pulmonary hypertension    CT scan done at outside facility prior to transfer here showed a moderate sized right pleural effusion.  Patient had a thoracentesis here.  Cultures are negative.  This is likely a recurrence of his malignant pleural effusion.  Follow-up chest x-ray on January 20 did not show any large effusion.  The patient was initially on BiPAP and is now on high flow oxygen.  He had been on 6 L of oxygen continuously at home.  He is currently on levofloxacin, inhaled bronchodilators and prednisone.   Pro-calcitonin negative today and C-reactive protein is 7.  Still on 75% FIO2 via HFNC.  CT with contrast ordered today- no change- no new infiltrate, small right effusion.    Continue same treatment and wean FIO2 as able.    Lactic acidosis  Improving with the patient's improved respiratory  status    Metastatic small cell lung cancer  Diagnosed by thoracentesis in October 2018.  Followed by Dr. Brownlee with [facility] and received carboplatin and etoposide on 12/8.    Right lower lobe pulmonary embolism in December 2018  Continue apixaban    Coronary artery disease  Ischemic cardiomyopathy/chronic systolic heart failure  Hypertension  He does not appear to be in acute heart failure- no evidence of heart failure on CT chest today  Continue cardiac medications    Type 2 diabetes (HGBA1c is 10.7%)  Uncontrolled  Managed on glipizide at home.  This was held on admission  The patient is currently on glargine and aspart insulin  Insulin dosing changed on 1/21 so observe today    Hypothyroidism  Continue PTA levothyroxine    Hyperlipidemia  Continue PTA rosuvastatin    Depression  Continue PTA fluoxetine    Recent clostridium difficile diarrhea  Completed course of vancomycin  Currently on prophylactic oral vancomycin due to broad-spectrum antibiotics    Gout  Continue PTA colchicine    BPH  Continue PTA Flomax     Continue Ellison catheter    Diet: Moderate Consistent CHO Diet    DVT Prophylaxis: Apixaban  Ellison Catheter: in place, indication: Obstruction  Code Status: Full Code      Disposition Plan   Expected discharge: 2 - 3 days, recommended to transitional care unit once O2 use less than 6 liters/minute.  Entered: Paulino Pierce MD 01/22/2019, 10:42 AM       The patient's care was discussed with the Bedside Nurse and Patient.    Paulino Pierce MD  Hospitalist Service  [facility]    ______________________________________________________________________    Interval History   Still having some right sided chest discomfort.    Data reviewed today: I reviewed all medications, new labs and imaging results over the last 24 hours. I personally reviewed no images or EKG's today.    Physical Exam   Vital Signs: Temp: 98.1  F (36.7  C) Temp src: Oral BP: 154/78 Pulse: 64 Heart Rate: 72  Resp: 18 SpO2: 97 % O2 Device: High Flow Nasal Cannula (HFNC) Oxygen Delivery: Other (Comments)(30 LPM)  Weight: 137 lbs 9.07 oz  Constitutional: awake, alert, cooperative, no apparent distress, and appears stated age  Respiratory: Decreased breath sounds bilaterally.  No wheezing rales or rhonchi heard  Cardiovascular: Regular, S1-S2 audible no murmur noted.  GI: normal bowel sounds, soft, non-distended, non-tender  Skin: no rashes  Neurologic: Awake, alert, oriented to name, place and time.  Cranial nerves II-XII are grossly intact.  Motor is 5 out of 5 bilaterally  Neuropsychiatric: General: normal, calm and normal eye contact  Level of consciousness: alert / normal    Data   Recent Labs   Lab 01/22/19  0720 01/20/19  0624 01/19/19  0618 01/18/19  0714  01/18/19  0240   WBC 12.8*  --  15.8* 12.9*  --   --    HGB 10.5*  --  12.1* 11.8*  --   --    MCV 88  --  89 89  --   --    *  --  155 128*  --  125*   INR  --   --   --   --   --  1.16*    139 139 141   < >  --    POTASSIUM 4.0 4.3 4.1 3.9   < >  --    CHLORIDE 108 106 102 104   < >  --    CO2 26 26 26 27   < >  --    BUN 20 21 25 14   < >  --    CR 0.62* 0.66 0.84 0.79  --   --    ANIONGAP 6 7 11 10   < >  --    AV 7.9* 7.6* 8.2* 7.9*   < >  --    GLC 99 293* 292* 279*   < >  --    PROTTOTAL  --   --   --  6.2*  --   --     < > = values in this interval not displayed.     Recent Results (from the past 24 hour(s))   CT Chest w Contrast    Narrative    CT CHEST WITH CONTRAST 1/22/2019 9:30 AM    HISTORY: Lung cancer, pleural effusion.    TECHNIQUE: CT of the chest was performed following the administration  of 80 mL Isovue-370. Radiation dose for this scan was reduced using  automated exposure control, adjustment of the mA and/or kV according  to patient size, or iterative reconstruction technique.    COMPARISON: Chest CT dated 12/10/2018    FINDINGS: Again identified are diffuse emphysematous changes within  the lungs. There is a small right  pleural effusion. This is unchanged  in size. Previously seen lung nodules are again identified and are not  significantly changed.    Right hilar lymphadenopathy and mediastinal lymphadenopathy is  unchanged. A lobulated mass at the inferior mediastinum right  paraesophageal location abutting the atria of the heart is unchanged  in size measuring 4.8 x 2.6 cm.    There is a small linear filling defect consistent with thrombus in a  branch of the right superior pulmonary vein on image 41 series 3.  Previously described embolus in a right lower lobe pulmonary arterial  branch is unchanged.    The upper aspects of the abdomen are included on this exam. There are  gallstones.      Impression    IMPRESSION:   1. No significant change when compared to the previous exam. Again  identified are multiple lung nodules, enlarged lymph nodes in the  right hilum and mediastinum, and a small right pleural effusion. A  lobulated mass in the inferior mediastinum is also unchanged.  2. No change in an embolus in the right lower lobe pulmonary arterial  branch as well as linear thrombus in a branch of the right superior  pulmonary vein.  3. Gallstones.    NOMI ROBLEDO MD

## 2019-01-22 NOTE — PROGRESS NOTES
SPIRITUAL HEALTH SERVICES Progress Note  [facility]     attempted visit due to length of stay, and because patient had appreciated [facility] visits during earlier hospital stays according to chart notes.  Patient said he's received multiple visits from his own  during this hospitalization and feels his spiritual needs are being met, so declined visit.    No plan to follow up, but  might check in again Thursday, and patient knows that [facility] is available by request.      Alejandro Jasso   Intern

## 2019-01-22 NOTE — CONSULTS
Care Transition Initial Assessment - SW     Met with:  Family    Active Problems:    Pleural effusion on right       DATA  Lives With: facility resident   Living Arrangements: apartment  Quality of Family Relationships: involved, supportive  Description of Support System: Supportive, Involved  Who is your support system?: Wife  Support Assessment: Adequate family and caregiver support, Adequate social supports.   Identified issues/concerns regarding health management: Pt recommended to transition to comfort care. Currently on high flow oxygen. Per pt wife, pt is not agreeable to hospice at this time.  Quality of Family Relationships: involved, supportive     ASSESSMENT  Cognitive Status:  awake, alert and oriented    SW has reviewed pt records. SW spoke with pt wife (Kelly) this afternoon on the phone. She informed SW of conversation with Dr. Brownlee and comfort care is recommended. Pt wife requesting more information on facilities that hospice can take place and how to begin arranging this. SW will plan to connect more with pt wife when at [facility] next to provide SNF and residential hospice lists. SW also discussed having hospice agency provide more information, pt wife requesting [facility]. Pt wife did indicate that pt is opposed to hospice care, will need to revisit this as pt is his own decision maker. Pt currently on high flow oxygen and therefore will need to remain at [facility] until this can be weaned and this cannot be managed at a lower level of care.     PLAN  Financial costs for the patient includes None known at this time., explained that LTC or residential hospice would be private pay.  Patient given options and choices for discharge: Yes, will provide lists tomorrow.  Patient/family is agreeable to the plan?  Family wants hospice, pt does not  Patient Goals and Preferences: unclear at this time.  Patient anticipates discharging to:  Unclear at this time.    VALENTÍN Myers, [facility]  Daytime (8:00am-4:30pm):  [phone]  After-Hours  Pager (4:30pm-11:30pm): [phone]

## 2019-01-23 NOTE — PROGRESS NOTES
"[facility]: I was asked to speak with the pt about hospice care. I first met alone with patients spouse Kelly. I provided the hospice informational sheet and brochure. I explained where hospice services can take place, the medicare benefit, services for support, medication and equipment coverage. Kelly tells me she cannot take care of the pt at home so we explore the different facility options.   I do not know if the patient will be able to wean off the high flow oxygen. If he were to need a higher liter flow of oxygen hospice can typically  provide liquid oxygen up to 12L/min. If patients oxygen need will be this high I do not recommend a SNF as I do not think they would be able to meet his needs. We focus on the residential hospice home [facility] as that is close to their home and the cost of this facility.   Kelly expressed a lot of anxiety around having to move Ravi again and what that may entail. I had to explain to Kelly that people once they are \"stable\" for discharge a plan will need to be put into place.   We then go and talk to Ravi. I introduced myself and role and asked if I could explain the hospice program for which he agreed.   Ravi is emphatic that he will die at home and no other place. He tells me he is tired of facilities. He tells me he can get out of bed by himself, take himself to the bathroom and even prepare himself a meal. When confronted with questions from Kelly about his ability now he became almost belligerant/very angry towards Kelly saying she \"just wants to get rid of me\". The rest of the conversation was not productive. He was not able to  understand that he cannot just stop the oxygen and go home as this is what he was voicing he wanted.   I asked for the hospitalist to come by and explain more of the logisitics to both of them about weaning of the oxygen piece, prognosis etc.   Please call me if I can be of help with anything hospice.  Thank you for the referral. Davida " Raymond RN, BSN  [facility] Admission nurse  [phone]

## 2019-01-23 NOTE — PLAN OF CARE
Pt remains on high flow oxygen with oxygen saturation at mid to high 90's. Afebrile, VSS. Lungs with crackles in bases. Pt is calm, alert and oriented. No distress except c/o right shoulder pain. Norco given was helpful. Ellison with good urine output.

## 2019-01-23 NOTE — PROGRESS NOTES
Pt is on HFNC 30L 75%, LS diminished throughout. Neb tx given as schedule. Will continue to follow.  1/22/2019  Kavon Blanco

## 2019-01-23 NOTE — PLAN OF CARE
Off [facility] as of this AM rounds. Pt on high flow oxygen this shift, O2 sats low to mid 90s. MD would like O2 sats between 89-91%. tele SB with BBB, sharma in place with good urine output, passing flatus, up in room with assist of one, pain controled with tylenol and norco, tolerates diet, independent with positioning in bed. Mepilex on coccyx d/t blanchable redness. Met with hospice RN late this morning. Report called to [facility] on 88. Patient transferring there.

## 2019-01-23 NOTE — PROGRESS NOTES
[facility]    Medicine Progress Note - Hospitalist Service       Date of Admission:  1/17/2019  Assessment & Plan    Ravi Jimenes is a 74-year-old male with a history of metastatic small cell lung cancer with a malignant effusion on palliative chemotherapy, severe emphysema,  pulmonary fibrosis and pulmonary hypertension with chronic respiratory failure on 6 L of oxygen, as well as coronary artery disease, ischemic cardiomyopathy, systolic heart failure and type 2 diabetes.    He was transferred here from an outside facility due to worsening respiratory status and a CT scan showing a moderate sized right pleural effusion.  The patient required BiPAP on admission.  He underwent a diagnostic and therapeutic thoracentesis.  He has been transitioned off of BiPAP to high flow oxygen.  The pleural effusion is likely a recurrent malignant effusion.     Acute on chronic respiratory failure, hypoxic, on high flow oxygen  Malignant right-sided pleural effusion status post thoracentesis  Possible acute exacerbation of COPD on admission  History of   Severe emphysema and pulmonary fibrosis with chronic respiratory failure  Severe pulmonary hypertension    CT scan done at the outside facility prior to transfer here showed a moderate sized right pleural effusion.  Patient had a thoracentesis here.  Cultures are negative.  This is likely a recurrence of his malignant pleural effusion.  Follow-up chest x-ray on January 20 did not show any large effusion.  The patient was initially on BiPAP and is now on high flow oxygen.  He had been on 6 L of oxygen continuously at home.  He is currently on levofloxacin, inhaled bronchodilators and prednisone.   Pro-calcitonin on 1/22 was negative and C-reactive protein was 7.  CT chest with contrast on 1/22 showed no new infiltrate and a small right sided effusion.    This morning FIO2 was bean weaned down to 60%.  Continue same treatment and wean FIO2 as able.  Discharge when  oxygen requirement is at or near baseline.    Lactic acidosis  Improving with the patient's improved respiratory status    Metastatic small cell lung cancer  Diagnosed by thoracentesis in October 2018.  Followed by Dr. Brownlee with [facility] and received carboplatin and etoposide on 12/8.  The patient is going to meet with  hospice today.    Right lower lobe pulmonary embolism in December 2018  Continue apixaban    Coronary artery disease  Ischemic cardiomyopathy/chronic systolic heart failure  Hypertension  He does not appear to be in acute heart failure- no evidence of heart failure on CT chest 1/22  Continue cardiac medications as ordered    Type 2 diabetes (HGBA1c is 10.7%)  Uncontrolled  Managed on glipizide at home.  This was held on admission  The patient is currently on glargine and aspart insulin  Insulin dosing changed on 1/21.  He appears to having rising glucometers during the day and is dropping overnight.   Decrease glargine and increase aspart insulin carbohydrate ratio,    Hypothyroidism  Continue PTA levothyroxine    Hyperlipidemia  Continue PTA rosuvastatin    Depression  Continue PTA fluoxetine    Recent clostridium difficile diarrhea  Completed a course of vancomycin  Currently on prophylactic oral vancomycin due to broad-spectrum antibiotics    Gout  Continue PTA colchicine    BPH  Continue PTA Flomax     Continue Ellison catheter    Diet: Moderate Consistent CHO Diet    DVT Prophylaxis: Apixaban  Ellison Catheter: in place, indication: Obstruction  Code Status: Full Code      Disposition Plan   Expected discharge: 2 - 3 days, recommended to transitional care unit once O2 use at or near 6 liters/minute.  Entered: Paulino Pierce MD 01/23/2019, 10:27 AM       The patient's care was discussed with the Bedside Nurse and Patient.    Paulino Pierce MD  Hospitalist Service  [facility]    ______________________________________________________________________    Interval History    No new complaints today.    Data reviewed today: I reviewed all medications, new labs and imaging results over the last 24 hours. I personally reviewed no images or EKG's today.    Physical Exam   Vital Signs: Temp: 97.6  F (36.4  C) Temp src: Oral BP: (!) 132/91 Pulse: 83 Heart Rate: 68 Resp: 18 SpO2: 93 % O2 Device: High Flow Nasal Cannula (HFNC) Oxygen Delivery: Other (Comments)(30LPM)  Weight: 141 lbs 15.62 oz  Constitutional: awake, alert, cooperative, no apparent distress, and appears stated age  Respiratory: Decreased breath sounds bilaterally.  No wheezing rales or rhonchi heard  Cardiovascular: Regular, S1-S2 audible no murmur noted.  GI: normal bowel sounds, soft, non-distended, non-tender  Skin: no rashes  Neurologic: Awake, alert, oriented to name, place and time.  Cranial nerves II-XII are grossly intact.  Motor is 5 out of 5 bilaterally  Neuropsychiatric: General: normal, calm and normal eye contact  Level of consciousness: alert / normal    Data   Recent Labs   Lab 01/22/19  0720 01/20/19  0624 01/19/19  0618 01/18/19  0714  01/18/19  0240   WBC 12.8*  --  15.8* 12.9*  --   --    HGB 10.5*  --  12.1* 11.8*  --   --    MCV 88  --  89 89  --   --    *  --  155 128*  --  125*   INR  --   --   --   --   --  1.16*    139 139 141   < >  --    POTASSIUM 4.0 4.3 4.1 3.9   < >  --    CHLORIDE 108 106 102 104   < >  --    CO2 26 26 26 27   < >  --    BUN 20 21 25 14   < >  --    CR 0.62* 0.66 0.84 0.79  --   --    ANIONGAP 6 7 11 10   < >  --    AV 7.9* 7.6* 8.2* 7.9*   < >  --    GLC 99 293* 292* 279*   < >  --    PROTTOTAL  --   --   --  6.2*  --   --     < > = values in this interval not displayed.     No results found for this or any previous visit (from the past 24 hour(s)).

## 2019-01-23 NOTE — PROGRESS NOTES
[facility] Progress Note          Assessment and Plan:   Mr. Ravi Sandoval is a 74 year old man who was admitted on 1/17/2019 with increasing dyspnea         1. Metastatic small cell lung cancer     -presented with lung/mediasitnal masses, left sided pleural effusion and pleural based densitiies    -cytology from pleural fluid consistent with small cell lung cancer    -received palliative carbo/etoposide 12/6-12/9  - his current CT shows no change in lung nodules or mediastinal lymphadenopathy  - yesterday I discussed CT findings at length with patient and also with his wife by phone  - absence of any improvement on CT imaging suggests cancer is resistant to 1st line chemotherapy  - we discussed different options and I recommended against a second cycle of etoposide and carboplatinum based on absence of response and increased risk of side effects. We reviewed other chemotherapy options and also discussed treatment such as radiation therapy or checkpoint inhibitor treatment. He has very high risk of side effects with either of these options. Given his circumstances and absence of any curative treatment options, I recommend switching to best supportive care. We discussed the use of hospice services and I recommended a hospice consultation while he is hospitalized.   - we again discussed these issues this morning and he indicated greater interest in hospice. He expressed strong desire to be at home with hospice following but is uncertain whether this will be possible    2. Malignant pleural effusion    -cytology from right thoracentesis 11/26/2018 confirmed malignancy     -underwent repeat thoracentesis 1/18 and cytology and cultures pending     -we discussed different options going forward including intermittent thoracentesis versus placement of a pleurex catheter. At present, he appears stable and prefers to avoid additional procedures     3. Chronic respiratory failure due to pulmonary  fibrosis     4. Thrombophilia treated with long term anticoagulation  - if pleurex catheter placed, he understands he will need to be off anticoagulation for at least 24 hours before the procedure.     Advance care planning. He reports having a living will and expresses desire for intensive support. I explained that he is quite likely to become weaker and that advanced CPR would be highly unlikely to benefit him             Interval History:   Patient reports feeling weak but other wise comfortable              Medications:       amLODIPine  2.5 mg Oral Daily     apixaban ANTICOAGULANT  5 mg Oral BID     aspirin  81 mg Oral At Bedtime     budesonide  0.5 mg Nebulization BID     colchicine  0.6 mg Oral QPM     digoxin  125 mcg Oral Daily     FLUoxetine  40 mg Oral Daily     insulin aspart   Subcutaneous TID w/meals     insulin aspart  1-10 Units Subcutaneous TID AC     insulin aspart  1-7 Units Subcutaneous At Bedtime     insulin glargine  8 Units Subcutaneous At Bedtime     ipratropium - albuterol 0.5 mg/2.5 mg/3 mL  1 vial Nebulization Q4H While awake     levofloxacin  750 mg Oral Daily     levothyroxine  125 mcg Oral QAM AC     lisinopril  20 mg Oral Daily     melatonin  5 mg Oral At Bedtime     metoprolol succinate ER  25 mg Oral BID     omeprazole  40 mg Oral Daily     rosuvastatin  10 mg Oral At Bedtime     sildenafil  20 mg Oral TID     sodium chloride (PF)  3 mL Intracatheter Q8H     tamsulosin  0.4 mg Oral At Bedtime     vancomycin  125 mg Oral BID     acetaminophen, acetaminophen, albuterol, bisacodyl, glucose **OR** dextrose **OR** glucagon, glucose **OR** dextrose **OR** glucagon, HYDROcodone-acetaminophen, HYDROmorphone, hypromellose-dextran, lidocaine 4%, lidocaine (buffered or not buffered), - MEDICATION INSTRUCTIONS -, metoprolol, naloxone, - MEDICATION INSTRUCTIONS -, ondansetron **OR** ondansetron, - MEDICATION INSTRUCTIONS -, polyethylene glycol, QUEtiapine, senna-docusate **OR** senna-docusate,  "sodium chloride (PF)               Physical Exam:   Blood pressure (!) 132/91, pulse 68, temperature 97.6  F (36.4  C), temperature source Oral, resp. rate 18, height 1.702 m (5' 7\"), weight 64.4 kg (141 lb 15.6 oz), SpO2 96 %.  Wt Readings from Last 4 Encounters:   19 64.4 kg (141 lb 15.6 oz)   19 65.1 kg (143 lb 9.6 oz)   19 65.1 kg (143 lb 9.6 oz)   19 64.4 kg (142 lb)         Vital Sign Ranges  Temperature Temp  Av.6  F (36.4  C)  Min: 97.5  F (36.4  C)  Max: 97.8  F (36.6  C)   Blood pressure Systolic (24hrs), Av , Min:132 , Max:166        Diastolic (24hrs), Av, Min:77, Max:102      Pulse Pulse  Av.2  Min: 64  Max: 81   Respirations Resp  Av.7  Min: 9  Max: 20   Pulse oximetry SpO2  Av.8 %  Min: 94 %  Max: 100 %         Intake/Output Summary (Last 24 hours) at 2019 0822  Last data filed at 2019 0600  Gross per 24 hour   Intake 1080 ml   Output 1775 ml   Net -695 ml       Constitutional: Awake, alert, cooperative, no apparent distress. He appears very weak.   Lungs: Decreased breat sounds bilaterally, no crackles or wheezing   Cardiovascular: Regular rate and rhythm   Abdomen: Normal bowel sounds, soft, non-distended, non-tender   Skin: No rashes, no cyanosis, no upper or lower extremity edema   Other:           Data:   Laboratory:  CMP  Recent Labs   Lab 19  0720 19  0624 19  0618 19  0714    139 139 141   POTASSIUM 4.0 4.3 4.1 3.9   CHLORIDE 108 106 102 104   CO2 26 26 26 27   ANIONGAP 6 7 11 10   GLC 99 293* 292* 279*   BUN 20 21 25 14   CR 0.62* 0.66 0.84 0.79   GFRESTIMATED >90 >90 85 87   GFRESTBLACK >90 >90 >90 >90   AV 7.9* 7.6* 8.2* 7.9*   MAG  --   --  2.0  --    PROTTOTAL  --   --   --  6.2*     CBC  Recent Labs   Lab 19  0720 19  0618 19  0714 19  0240   WBC 12.8* 15.8* 12.9*  --    RBC 3.62* 4.14* 4.12*  --    HGB 10.5* 12.1* 11.8*  --    HCT 31.8* 37.0* 36.5*  --    MCV 88 89 89  -- "    MCH 29.0 29.2 28.6  --    MCHC 33.0 32.7 32.3  --    RDW 16.8* 17.3* 17.2*  --    * 155 128* 125*     INR  Recent Labs   Lab 01/18/19  0240   INR 1.16*       Imaging data:  Results for orders placed or performed during the hospital encounter of 01/17/19   US Thoracentesis    Narrative    EXAM: US THORACENTESIS       1/18/2019 10:11 AM       HISTORY: Right pleural effusion.      PROCEDURE:  Written informed consent was obtained from the patient  prior to the procedure. The risks and benefits including bleeding,  infection and pneumothorax were discussed and the patient wished to  continue. Initial ultrasound images demonstrated a right-sided pleural  fluid collection. A permanent image was saved. The skin overlying this  collection was marked, prepped, draped and anesthetized in usual  sterile fashion utilizing 5 mL of lidocaine 1%. Thoracentesis catheter  was then placed into the pleural fluid collection with return of 900  mL of ivonne-colored fluid using ultrasound guidance. Estimated blood  loss during the procedure was less than 5 mL. No specimens collected.  Patient tolerated the procedure well. Followup chest x-ray was  ordered.        Impression    IMPRESSION:  Ultrasound-guided right-sided thoracentesis.     KIRAN TSAI PA-C   XR Chest Port 1 View - [facility]    Narrative    CHEST ONE VIEW PORTABLE   1/20/2019 11:05 AM     HISTORY: Status post right thoracentesis.    COMPARISON: 11/26/2018      Impression    IMPRESSION: Interstitial densities throughout the periphery of both  lungs with lower lobe predominance suggestive of fibrosis. Normal  heart size and pulmonary vascularity.    JULIO C EID MD   CT Chest w Contrast    Narrative    CT CHEST WITH CONTRAST 1/22/2019 9:30 AM    HISTORY: Lung cancer, pleural effusion.    TECHNIQUE: CT of the chest was performed following the administration  of 80 mL Isovue-370. Radiation dose for this scan was reduced using  automated exposure control, adjustment  of the mA and/or kV according  to patient size, or iterative reconstruction technique.    COMPARISON: Chest CT dated 12/10/2018    FINDINGS: Again identified are diffuse emphysematous changes within  the lungs. There is a small right pleural effusion. This is unchanged  in size. Previously seen lung nodules are again identified and are not  significantly changed.    Right hilar lymphadenopathy and mediastinal lymphadenopathy is  unchanged. A lobulated mass at the inferior mediastinum right  paraesophageal location abutting the atria of the heart is unchanged  in size measuring 4.8 x 2.6 cm.    There is a small linear filling defect consistent with thrombus in a  branch of the right superior pulmonary vein on image 41 series 3.  Previously described embolus in a right lower lobe pulmonary arterial  branch is unchanged.    The upper aspects of the abdomen are included on this exam. There are  gallstones.      Impression    IMPRESSION:   1. No significant change when compared to the previous exam. Again  identified are multiple lung nodules, enlarged lymph nodes in the  right hilum and mediastinum, and a small right pleural effusion. A  lobulated mass in the inferior mediastinum is also unchanged.  2. No change in an embolus in the right lower lobe pulmonary arterial  branch as well as linear thrombus in a branch of the right superior  pulmonary vein.  3. Gallstones.    MD Nelson ROLDAN MD

## 2019-01-23 NOTE — PLAN OF CARE
Pt on high flow oxygen this shift, O2 sats mid 90's, tele NSR, sharma in place with good urine output, passing flatus, up in room with assist of one, pain controled with tylenol, tolerates diet, independent with positioning in bed

## 2019-01-23 NOTE — PROGRESS NOTES
SW:    I: SALLIE following for discharge planning. SALLIE spoke with pt wife and she is available at 1100 to meet with  Hospice. Pt is now receptive to informational meeting. SALLIE in contacted with Davida with  Hospice, will plan on meeting at 1100.    P: SW to follow.    VALENTÍN Myers, [facility] HospitalSW  Daytime (8:00am-4:30pm): [phone]  After-Hours SALLIE Pager (4:30pm-11:30pm): [phone]

## 2019-01-24 NOTE — CONSULTS
"[facility]    Palliative Care Consultation     Ravi Sandoval  MRN# [id number]  Date of Admission:  1/17/2019  Date of Service (when I saw the patient): 01/24/19  Reason for consult: Consulted by Dr. Brownlee for Goals of care    Assessment & Plan   Ravi Jimenes is a 74-year-old male with a history of metastatic small cell lung cancer with a malignant effusion on palliative chemotherapy, severe emphysema,  pulmonary fibrosis and pulmonary hypertension with chronic respiratory failure on 6 L of oxygen, as well as coronary artery disease, ischemic cardiomyopathy, systolic heart failure and type 2 diabetes. He was admitted with worsening shortness of breath and respiratory distress.      Discussion  Joint visit with Brandie Abreu [facility]. Mr Sandoval and his wife are well known to the Palliative Care service, they remembered our visits during his hospital stay in November 2018.    Brandie and I spoke with Ravi's wife Kelly on the phone earlier today. She tells us she is \"taking a break\" from being at the hospital since Ravi kicked her out of his room yesterday. She shares with us that he typically gets his way by being a bully and she cannot bring him home because he has been so abusive with his words and language towards her. I asked Kelly what she understands about Ravi's health right now and she tells me she understands he has cancer and the chemotherapy he received \"did nothing\" so he will not get better. She understands he is going to die and her guess was that he would likely not live longer than a month. I agreed with Kelly and added that if Ravi agreed to a comfort plan of care and discharged on a lower level of oxygen than he is requiring currently, I anticipate time would be even shorter (on the order of days or maybe a week). Furthermore I shared my guess that Ravi would likely not be alert and coherent for much of that time considering he would likely become hypoxic rather quickly. In " "light of this information, we asked Kelly if she felt bringing Ravi home (with hospice and paid caregivers) would be an option. Kelly seem quite distressed by this and said she needed time to think about it. We did share with Kelly that Ravi has not agreed to a comfort plan of care or hospice at this time and therefore we cannot make plans to discharge him back to the AL facility he was staying at without his permission. We also explained that we cannot discharge him on the level of oxygen he is currently requiring.     We later met with Ravi. Ravi shared with us that things were not going well. He understands from Dr Brownlee that he has cancer and it did not respond to chemotherapy so he is \"at the end of the line.\" he understands that he is going to die and therefore shares with us his desire to go home. He quickly became quite angry when he shared that his wife is not allowing this to happen. He continually asked why she gets to make this decision. I explained to the best of my ability that we are concerned about his safety and we need to make sure he has someone with him 24 hours per day. He perseverated on the fact that it is his home and his money and he can't believe he is not being allowed to return home. We acknowledged how distressing this situation is for him and apologized that we did not have a solution for this problem.       Support/Coping  -spoke with pt's wife Kelly on the phone this morning  -Will involve Palliative LICSW, Brandie Abreu, and/or Palliative , Mago Holland    Decisional Support, Goals of Care, Counseling & Coordination  Decisional Capacity Intact?  -Yes  Health Care Directive on File?  -No  Code Status/Resuscitation Preferences?  -FULL  Plan of Care?  -continue with current medical cares      Thank you for involving us in the care of this patient and family. We will continue to follow. Please do not hesitate to contact me with questions or concerns or the on-call " provider for our team if evening or weekend.    Peg AREVALO CNP  Palliative Medicine   Pager [phone]    Attestation:  Total time on the floor involved in the patient's care: 70 minutes  Total time spent in counseling/care coordination: >50%    Chief Complaint   Mets small cell lung cancer    History is obtained from the patient, his wife, staff, and extensive chart review.     Adopted from H&P  Ravi Sandoval is a 74 year old male who has diagnosis of small cell lung cancer, as well as COPD, CHF, Pulmonary HTN and Pulmonary Fibrosis, see PMH paragraph for full details.  He presented to [place] [address] for increasing SOB and cough, also having hemoptysis which is not new but he reports is increasing.  He was diagnosed with SCLC in October 2018, he presented at that time with SOB and had a thoracentesis with cytology which discovered the diagnosis.  We do not have his most recent chest imaging or thoracentesis results from that time anywhere in the care everywhere records.  He sees Dr. Brownlee with [facility], the patients wife told me that he was told that with no treatment he likely has 3 months, but palliative chemo would hopefully extend this.  They did a round of carboplatin and etoposide on 12/8.  He also had a stay at [place] ending on 1/9/19, see Dr. Truong discharge summary from that date.  He had a large right sided pleural effusion that required thoracentesis, this was done on 11/26/18.  RLL PE was also diagnosed in november 2018 so he was put on eliquis.  Past week he says he has felt ill, very weak, worsening SOB and worsening hemoptysis.  At [place] [address] CT of the Chest shows reaccumulation of RLL effusion, commented as moderate.  He has luekocytosis of 16K, lactate of 5.  Vitals are stable.  His baseline O2 needs are 6-10L NC, which he was on until he got here.  I evaluated the patient on the floor, he is dyspnic with coarse right sided lung sounds.  Within 30 minutes of arrival he desaturated into  the 60% range on 15L HFNC and had to be put on BiPAP.  He has mild chest discomfort diffusely.  He is afebrile, not diaphoretic, appears a little anxious.  I had a lengthy discussion with him and wife about code status and adamant about remaining aggressive despite prognosis.  Of note, he received a dose of rocephin at the outlying ED.      Past Medical History    I have reviewed this patient's medical history and updated it with pertinent information if needed.   Past Medical History:   Diagnosis Date     ACP (advance care planning)      Anemia      ASHD (arteriosclerotic heart disease)      BPH (benign prostatic hyperplasia)      Cardiac arrest (H)      Cataracts, bilateral      Chronic diarrhea      Chronic respiratory failure with hypoxia (H)      COPD (chronic obstructive pulmonary disease) (H)     HOME O2     Depression      Disease of lung      Dysplasia of prostate      Gastroesophageal reflux disease      General weakness      Heart attack (H)      Herpes zoster      Histiocytosis X (H)      Hyperlipidemia      Hypertension      Hypothyroidism      Oxygen dependent      Postinflammatory pulmonary fibrosis (H)      Prostatic hypertrophy, benign      Pulmonary HTN (H)      Pulmonary hypertensive venous disease (H)      Thyroid disease      Tobacco use      Type 2 diabetes mellitus without complications (H)        Past Surgical History   I have reviewed this patient's surgical history and updated it with pertinent information if needed.  Past Surgical History:   Procedure Laterality Date     BIOPSY, LUNG NODULE       CARDIAC SURGERY      HX OF STENT     COLONOSCOPY N/A 5/27/2016    Procedure: COMBINED COLONOSCOPY, SINGLE OR MULTIPLE BIOPSY/POLYPECTOMY BY BIOPSY;  Surgeon: Sera Coffman MD;  Location: [facility]     PHACOEMULSIFICATION CLEAR CORNEA WITH STANDARD INTRAOCULAR LENS IMPLANT Right 5/31/2017    Procedure: PHACOEMULSIFICATION CLEAR CORNEA WITH STANDARD INTRAOCULAR LENS IMPLANT;  RIGHT EYE  PHACOEMULSIFICATION CLEAR CORNEA WITH STANDARD INTRAOCULAR LENS IMPLANT ;  Surgeon: Trever Delgado MD;  Location: [facility]     PHACOEMULSIFICATION CLEAR CORNEA WITH STANDARD INTRAOCULAR LENS IMPLANT Left 6/7/2017    Procedure: PHACOEMULSIFICATION CLEAR CORNEA WITH STANDARD INTRAOCULAR LENS IMPLANT;  LEFT EYE PHACOEMULSIFICATION CLEAR CORNEA WITH STANDARD INTRAOCULAR LENS IMPLANT ;  Surgeon: Trever Delgado MD;  Location: [facility]       Social History   Living situation: has been residing at AL facility    Family system:  to Kelly for 52 years, 2 sons     Self-identified support system: not assessed    Employment/education: not assessed    Activities/interests: not assessed    Use of community resources: not assessed     [employer] affiliation: not assessed    Involvement in dara community: not assessed    Impact of illness on patient: pt unhappy that he is dying and that he can't be home     Family History   I have reviewed this patient's family history and updated it with pertinent information if needed.   Family History   Problem Relation Age of Onset     Cardiovascular Father         Pulmonary HTN     Cancer Mother         Skin CA     Neurologic Disorder Sister         Creutzfeldt Eduardo Disease       Allergies   Allergies   Allergen Reactions     Tetracycline        Medications   Current Facility-Administered Medications Ordered in Epic   Medication Dose Route Frequency Last Rate Last Dose     acetaminophen (TYLENOL) Suppository 650 mg  650 mg Rectal Q4H PRN         acetaminophen (TYLENOL) tablet 650 mg  650 mg Oral Q6H PRN   650 mg at 01/23/19 2320     albuterol (PROVENTIL) neb solution 2.5 mg  2.5 mg Nebulization Q2H PRN         amLODIPine (NORVASC) tablet 2.5 mg  2.5 mg Oral Daily   2.5 mg at 01/24/19 0818     apixaban ANTICOAGULANT (ELIQUIS) tablet 5 mg  5 mg Oral BID   5 mg at 01/24/19 0820     aspirin (ASA) chewable tablet 81 mg  81 mg Oral At Bedtime   81 mg at 01/23/19 2052     bisacodyl (DULCOLAX)  Suppository 10 mg  10 mg Rectal Daily PRN         budesonide (PULMICORT) neb solution 0.5 mg  0.5 mg Nebulization BID   0.5 mg at 01/24/19 0728     colchicine (COLCYRS) tablet 0.6 mg  0.6 mg Oral QPM   0.6 mg at 01/23/19 2051     glucose gel 15-30 g  15-30 g Oral Q15 Min PRN        Or     dextrose 50 % injection 25-50 mL  25-50 mL Intravenous Q15 Min PRN        Or     glucagon injection 1 mg  1 mg Subcutaneous Q15 Min PRN         glucose gel 15-30 g  15-30 g Oral Q15 Min PRN        Or     dextrose 50 % injection 25-50 mL  25-50 mL Intravenous Q15 Min PRN        Or     glucagon injection 1 mg  1 mg Subcutaneous Q15 Min PRN         digoxin (LANOXIN) tablet 125 mcg  125 mcg Oral Daily   125 mcg at 01/24/19 0820     FLUoxetine (PROzac) capsule 40 mg  40 mg Oral Daily   40 mg at 01/24/19 0817     HYDROcodone-acetaminophen (NORCO) 5-325 MG per tablet 1-2 tablet  1-2 tablet Oral Q4H PRN   1 tablet at 01/23/19 2217     HYDROmorphone (PF) (DILAUDID) injection 0.2 mg  0.2 mg Intravenous Q2H PRN         hypromellose-dextran (ARTIFICAL TEARS) 0.1-0.3 % ophthalmic solution 1 drop  1 drop Both Eyes Q1H PRN         insulin aspart (NovoLOG) inj (RAPID ACTING)   Subcutaneous TID w/meals   12 Units at 01/24/19 1351     insulin aspart (NovoLOG) inj (RAPID ACTING)  1-10 Units Subcutaneous TID AC   7 Units at 01/22/19 1848     insulin aspart (NovoLOG) inj (RAPID ACTING)  1-7 Units Subcutaneous At Bedtime   1 Units at 01/23/19 2125     insulin glargine (LANTUS PEN) injection 6 Units  6 Units Subcutaneous At Bedtime   6 Units at 01/23/19 2125     ipratropium - albuterol 0.5 mg/2.5 mg/3 mL (DUONEB) neb solution 3 mL  1 vial Nebulization Q4H While awake   3 mL at 01/24/19 1047     levothyroxine (SYNTHROID/LEVOTHROID) tablet 125 mcg  125 mcg Oral QAM AC   125 mcg at 01/24/19 0820     lidocaine (LMX4) cream   Topical Q1H PRN         lidocaine 1 % 1 mL  1 mL Other Q1H PRN         lisinopril (PRINIVIL/ZESTRIL) tablet 20 mg  20 mg Oral Daily    20 mg at 01/24/19 0819     May take regular AM medications except those listed below.   Does not apply Continuous PRN         melatonin tablet 5 mg  5 mg Oral At Bedtime   5 mg at 01/23/19 2051     metoprolol (LOPRESSOR) injection 5 mg  5 mg Intravenous Q4H PRN   5 mg at 01/21/19 0557     metoprolol succinate ER (TOPROL-XL) 24 hr tablet 25 mg  25 mg Oral BID   25 mg at 01/24/19 0820     naloxone (NARCAN) injection 0.1-0.4 mg  0.1-0.4 mg Intravenous Q2 Min PRN         No lozenges or gum should be given while patient on BIPAP/AVAPS/AVAPS AE   Does not apply Continuous PRN         omeprazole (priLOSEC) CR capsule 40 mg  40 mg Oral Daily   40 mg at 01/24/19 0818     ondansetron (ZOFRAN-ODT) ODT tab 4 mg  4 mg Oral Q6H PRN        Or     ondansetron (ZOFRAN) injection 4 mg  4 mg Intravenous Q6H PRN         Patient may continue current oral medications   Does not apply Continuous PRN         polyethylene glycol (MIRALAX/GLYCOLAX) Packet 17 g  17 g Oral Daily PRN         QUEtiapine (SEROquel) half-tab 12.5-25 mg  12.5-25 mg Oral At Bedtime PRN   12.5 mg at 01/20/19 2310     rosuvastatin (CRESTOR) tablet 10 mg  10 mg Oral At Bedtime   10 mg at 01/23/19 2050     senna-docusate (SENOKOT-S/PERICOLACE) 8.6-50 MG per tablet 1 tablet  1 tablet Oral BID PRN        Or     senna-docusate (SENOKOT-S/PERICOLACE) 8.6-50 MG per tablet 2 tablet  2 tablet Oral BID PRN         sildenafil (REVATIO) tablet 20 mg  20 mg Oral TID   20 mg at 01/24/19 1357     sodium chloride (PF) 0.9% PF flush 3 mL  3 mL Intracatheter Q1H PRN         sodium chloride (PF) 0.9% PF flush 3 mL  3 mL Intracatheter Q8H   3 mL at 01/24/19 0821     tamsulosin (FLOMAX) capsule 0.4 mg  0.4 mg Oral At Bedtime   0.4 mg at 01/23/19 2051     vancomycin (FIRVANQ) oral solution 125 mg  125 mg Oral BID   125 mg at 01/24/19 0859     No current [facility]-ordered outpatient medications on file.       Review of Systems   The comprehensive review of systems is negative other than noted in  the assessment/plan.    Physical Exam   Temp: 96.8  F (36  C) Temp src: Oral BP: 128/70 Pulse: 82 Heart Rate: 75 Resp: 16 SpO2: 92 % O2 Device: High Flow Nasal Cannula (HFNC) Oxygen Delivery: Other (Comments)(35LPM)  Vitals:    01/17/19 2054 01/22/19 0442 01/23/19 0600   Weight: 62.2 kg (137 lb 2 oz) 62.4 kg (137 lb 9.1 oz) 64.4 kg (141 lb 15.6 oz)     CONSTITUTIONAL: NAD, A&Ox3. Calm and cooperative.  HEENT: NCAT  NECK: Supple  CARDIOVASCULAR: exam deferred   RESPIRATORY: NL respiratory effort on HiFNC  GASTROINTESTINAL: exam deferred  MUSCULOSKELETAL:  Moving freely in bed   SKIN: Warm and intact. No concerning lesions or rashes on exposed skin surfaces   NEUROLOGIC: Appropriately responsive during interview  PSYCH: Affect congruent, pt quite angry    Data   Results for orders placed or performed during the hospital encounter of 01/17/19 (from the past 24 hour(s))   Glucose by meter   Result Value Ref Range    Glucose 106 (H) 70 - 99 mg/dL   Glucose by meter   Result Value Ref Range    Glucose 224 (H) 70 - 99 mg/dL   Glucose by meter   Result Value Ref Range    Glucose 141 (H) 70 - 99 mg/dL   Basic metabolic panel   Result Value Ref Range    Sodium 140 133 - 144 mmol/L    Potassium 4.1 3.4 - 5.3 mmol/L    Chloride 105 94 - 109 mmol/L    Carbon Dioxide 28 20 - 32 mmol/L    Anion Gap 7 3 - 14 mmol/L    Glucose 83 70 - 99 mg/dL    Urea Nitrogen 18 7 - 30 mg/dL    Creatinine 0.76 0.66 - 1.25 mg/dL    GFR Estimate 89 >60 mL/min/[1.73_m2]    GFR Estimate If Black >90 >60 mL/min/[1.73_m2]    Calcium 7.8 (L) 8.5 - 10.1 mg/dL   CBC with platelets   Result Value Ref Range    WBC 13.8 (H) 4.0 - 11.0 10e9/L    RBC Count 3.92 (L) 4.4 - 5.9 10e12/L    Hemoglobin 11.5 (L) 13.3 - 17.7 g/dL    Hematocrit 34.8 (L) 40.0 - 53.0 %    MCV 89 78 - 100 fl    MCH 29.3 26.5 - 33.0 pg    MCHC 33.0 31.5 - 36.5 g/dL    RDW 17.1 (H) 10.0 - 15.0 %    Platelet Count 137 (L) 150 - 450 10e9/L   Glucose by meter   Result Value Ref Range    Glucose  90 70 - 99 mg/dL   Glucose by meter   Result Value Ref Range    Glucose 174 (H) 70 - 99 mg/dL   Glucose by meter   Result Value Ref Range    Glucose 135 (H) 70 - 99 mg/dL

## 2019-01-24 NOTE — PROGRESS NOTES
Hospice: Received a phone call from patients spouse Kelly that [facility]. She tells me he was there for a week at one point. I revisited the conversation we had about him only wanting to go home. I asked about going home with hired care and she tells me that they cannot afford this. She also tells me that she will not be telling him he is going there. I wonder how he will be able to afford the [facility] but the spouse talked about the long term care policy they have that has a 30 day shruti period??   I updated Hang Mendes .  Thank you  Davida Andrade RN, BSN   Hospice Admission nurse  [phone]

## 2019-01-24 NOTE — PLAN OF CARE
A&Ox4. VSS on high flow 65% FiO2/35 LPM exp QB=436/81. No c/o shoulder pain this shift. LS dim but clear, c/o SOB with activity. Blanchable coccyx, mepilex CDI. Mod carb diet, fair intake. Terra patent, AUO. PIV SL. A1 with GB and walker. Able to shift weight independently. Discharge plan pending.

## 2019-01-24 NOTE — PLAN OF CARE
AO x 4, [place]. VSS on high flow 80%FiO2 and 35 LPM. C/o pain in shoulder, given norco x 1 and tylenol x 1. LS dim, c/o shortness of breath with activity or movement. Blanchable coccyx, mepilex CDI. Mod carb diet, fair po. Terra patent, AUO. IV SL, int vanco for c diff +. BGM, corrected with sliding scale insulin. A1 with GB and walker. Turn and repo as needed, pt. Is able to shift weight independently. Palliative consulted, plan pending.

## 2019-01-24 NOTE — PROGRESS NOTES
[facility]    Medicine Progress Note - Hospitalist Service       Date of Admission:  1/17/2019  Assessment & Plan    Ravi Jimenes is a 74-year-old male with a history of metastatic small cell lung cancer with a malignant effusion on palliative chemotherapy, severe emphysema,  pulmonary fibrosis and pulmonary hypertension with chronic respiratory failure on 6 L of oxygen, as well as coronary artery disease, ischemic cardiomyopathy, systolic heart failure and type 2 diabetes.    He was transferred here from an outside facility due to worsening respiratory status and a CT scan showing a moderate sized right pleural effusion.  The patient required BiPAP on admission.  He underwent a diagnostic and therapeutic thoracentesis.  He has been transitioned off of BiPAP to high flow oxygen.  The pleural effusion is likely a recurrent malignant effusion.     Acute on chronic respiratory failure, hypoxic, on high flow oxygen  Malignant right-sided pleural effusion status post thoracentesis  Possible acute exacerbation of COPD on admission  History of   Severe emphysema and pulmonary fibrosis with chronic respiratory failure  Severe pulmonary hypertension    CT scan done at the outside facility prior to transfer here showed a moderate sized right pleural effusion.  Patient had a thoracentesis here.  Cultures are negative.  This is likely a recurrence of his malignant pleural effusion.  Follow-up chest x-ray on January 20 did not show any large effusion.  The patient was initially on BiPAP and is now on high flow oxygen.  He had been on 6 L of oxygen continuously at home.  He is currently on levofloxacin, inhaled bronchodilators and prednisone.   Pro-calcitonin on 1/22 was negative and C-reactive protein was 7.  CT chest with contrast on 1/22 showed no new infiltrate and a small right sided effusion.    Continue same treatment and wean FIO2 as able.  Discharge when oxygen requirement is at or near  baseline.    Lactic acidosis  Improving with the patient's improved respiratory status    Metastatic small cell lung cancer  Diagnosed by thoracentesis in October 2018.  [facility] and [facility]oplatin and etoposide on 12/8.  No plan for hospice palliative will continue to revisit as needed    Right lower lobe pulmonary embolism in December 2018  Continue apixaban    Coronary artery disease  Ischemic cardiomyopathy/chronic systolic heart failure  Hypertension  He does not appear to be in acute heart failure- no evidence of heart failure on CT chest 1/22  Continue cardiac medications as ordered    Type 2 diabetes (HGBA1c is 10.7%)  Uncontrolled  Managed on glipizide at home.  This was held on admission  The patient is currently on glargine and aspart insulin  Insulin dosing changed on 1/21.  He appears to having rising glucometers during the day and is dropping overnight.   Decrease glargine and increase aspart insulin carbohydrate ratio,    Hypothyroidism  Continue PTA levothyroxine    Hyperlipidemia  Continue PTA rosuvastatin    Depression  Continue PTA fluoxetine    Recent clostridium difficile diarrhea  Completed a course of vancomycin  Currently on prophylactic oral vancomycin due to broad-spectrum antibiotics    Gout  Continue PTA colchicine    BPH  Continue PTA Flomax     Continue Ellison catheter    Diet: Moderate Consistent CHO Diet    DVT Prophylaxis: Apixaban  Ellison Catheter: in place, indication: Obstruction  Code Status: Full Code      Disposition Plan   Expected discharge: 2 - 3 days, recommended to transitional care unit once O2 use at or near 6 liters/minute.  Entered: Clayton Frank DO 01/24/2019, 4:28 PM       The patient's care was discussed with the Bedside Nurse and Patient.    Clayton Frank DO  Hospitalist Service  [facility]    ______________________________________________________________________    Interval History   Discussed limitations  in his care. Hospice meeting hampered by some conflict between patient and his wife.    Data reviewed today: I reviewed all medications, new labs and imaging results over the last 24 hours. I personally reviewed no images or EKG's today.    Physical Exam   Vital Signs: Temp: 96.5  F (35.8  C) Temp src: Oral BP: 118/63 Pulse: 82 Heart Rate: 91 Resp: 16 SpO2: 90 % O2 Device: High Flow Nasal Cannula (HFNC) Oxygen Delivery: Other (Comments)(35LPM)  Weight: 141 lbs 15.62 oz  Constitutional: awake, alert, cooperative, no apparent distress, and appears stated age  Respiratory: Decreased breath sounds bilaterally.  No wheezing rales or rhonchi heard  Cardiovascular: Regular, S1-S2 audible no murmur noted.  GI: normal bowel sounds, soft, non-distended, non-tender  Skin: no rashes  Neurologic: Awake, alert, oriented to name, place and time.  Cranial nerves II-XII are grossly intact.  Motor is 5 out of 5 bilaterally  Neuropsychiatric: General: normal, calm and normal eye contact  Level of consciousness: alert / normal    Data   Recent Labs   Lab 01/24/19  0652 01/22/19  0720 01/20/19  0624 01/19/19  0618 01/18/19  0714  01/18/19  0240   WBC 13.8* 12.8*  --  15.8* 12.9*   < >  --    HGB 11.5* 10.5*  --  12.1* 11.8*   < >  --    MCV 89 88  --  89 89   < >  --    * 121*  --  155 128*  --  125*   INR  --   --   --   --   --   --  1.16*    140 139 139 141   < >  --    POTASSIUM 4.1 4.0 4.3 4.1 3.9   < >  --    CHLORIDE 105 108 106 102 104   < >  --    CO2 28 26 26 26 27   < >  --    BUN 18 20 21 25 14   < >  --    CR 0.76 0.62* 0.66 0.84 0.79  --   --    ANIONGAP 7 6 7 11 10   < >  --    AV 7.8* 7.9* 7.6* 8.2* 7.9*   < >  --    GLC 83 99 293* 292* 279*   < >  --    PROTTOTAL  --   --   --   --  6.2*  --   --     < > = values in this interval not displayed.     No results found for this or any previous visit (from the past 24 hour(s)).

## 2019-01-24 NOTE — PROGRESS NOTES
Progress Note     Primary Oncologist/Hematologist:  Dr. Brownlee          Assessment and Plan:     Ravi Sandoval is a 74 year old man who was admitted on 1/17/2019 with increasing dyspnea      1. Metastatic small cell lung cancer   - presented with lung/mediasitnal masses, left sided pleural effusion and pleural based densitiies  - cytology from pleural fluid consistent with small cell lung cancer  - received palliative carbo/etoposide 12/6-12/9  - his current CT shows no change in lung nodules or mediastinal lymphadenopathy  - discussed CT findings at length with patient and also with his wife by phone  - absence of any improvement on CT imaging suggests cancer is resistant to 1st line chemotherapy  - best supportive care is the best option. Hospice and comfort care recommended  - complex family dynamics making discharge planning difficult  - palliative care weighing in today     2. Malignant pleural effusion  - cytology from right thoracentesis 11/26/2018 confirmed malignancy   - underwent repeat thoracentesis 1/18 and cytology and cultures pending   - pleurX an option for recurrent effusions     3. Chronic respiratory failure     4. Thrombophilia treated with long term anticoagulation  - if pleurex catheter placed, he understands he will need to be off anticoagulation for at least 24 hours before the procedure.    Fish AREVALO, CNP  [facility]  [phone]        Interval History:   He is angry about not being able to go home. Breathing is unchanged.              Review of Systems:   The 5 point Review of Systems is negative other than noted in the HPI             Medications:   Scheduled Medications    amLODIPine  2.5 mg Oral Daily     apixaban ANTICOAGULANT  5 mg Oral BID     aspirin  81 mg Oral At Bedtime     budesonide  0.5 mg Nebulization BID     colchicine  0.6 mg Oral QPM     digoxin  125 mcg Oral Daily     FLUoxetine  40 mg Oral Daily     insulin aspart   Subcutaneous TID w/meals      "insulin aspart  1-10 Units Subcutaneous TID AC     insulin aspart  1-7 Units Subcutaneous At Bedtime     insulin glargine  6 Units Subcutaneous At Bedtime     ipratropium - albuterol 0.5 mg/2.5 mg/3 mL  1 vial Nebulization Q4H While awake     levothyroxine  125 mcg Oral QAM AC     lisinopril  20 mg Oral Daily     melatonin  5 mg Oral At Bedtime     metoprolol succinate ER  25 mg Oral BID     omeprazole  40 mg Oral Daily     rosuvastatin  10 mg Oral At Bedtime     sildenafil  20 mg Oral TID     sodium chloride (PF)  3 mL Intracatheter Q8H     tamsulosin  0.4 mg Oral At Bedtime     vancomycin  125 mg Oral BID     PRN Medications  acetaminophen, acetaminophen, albuterol, bisacodyl, glucose **OR** dextrose **OR** glucagon, glucose **OR** dextrose **OR** glucagon, HYDROcodone-acetaminophen, HYDROmorphone, hypromellose-dextran, lidocaine 4%, lidocaine (buffered or not buffered), - MEDICATION INSTRUCTIONS -, metoprolol, naloxone, - MEDICATION INSTRUCTIONS -, ondansetron **OR** ondansetron, - MEDICATION INSTRUCTIONS -, polyethylene glycol, QUEtiapine, senna-docusate **OR** senna-docusate, sodium chloride (PF)               Physical Exam:   Vitals were reviewed  Blood pressure 153/81, pulse 73, temperature 96.3  F (35.7  C), temperature source Oral, resp. rate 18, height 1.702 m (5' 7\"), weight 64.4 kg (141 lb 15.6 oz), SpO2 95 %.  Wt Readings from Last 4 Encounters:   01/23/19 64.4 kg (141 lb 15.6 oz)   01/09/19 65.1 kg (143 lb 9.6 oz)   01/07/19 65.1 kg (143 lb 9.6 oz)   01/03/19 64.4 kg (142 lb)       I/O last 3 completed shifts:  In: 480 [P.O.:480]  Out: 690 [Urine:690]      Constitutional: Awake, alert, cooperative, no apparent distress   Lungs: High flow NC oxygen. Diminished breath sound.s    Cardiovascular: Regular rate and rhythm, normal S1 and S2, and no murmur noted   Abdomen: Normal bowel sounds, soft, non-distended, non-tender   Skin: No rashes, no cyanosis, no edema   Other:               Data:   All laboratory " data and imaging studies reviewed.    CMP  Recent Labs   Lab 01/24/19  0652 01/22/19  0720 01/20/19  0624 01/19/19  0618 01/18/19  0714    140 139 139 141   POTASSIUM 4.1 4.0 4.3 4.1 3.9   CHLORIDE 105 108 106 102 104   CO2 28 26 26 26 27   ANIONGAP 7 6 7 11 10   GLC 83 99 293* 292* 279*   BUN 18 20 21 25 14   CR 0.76 0.62* 0.66 0.84 0.79   GFRESTIMATED 89 >90 >90 85 87   GFRESTBLACK >90 >90 >90 >90 >90   AV 7.8* 7.9* 7.6* 8.2* 7.9*   MAG  --   --   --  2.0  --    PROTTOTAL  --   --   --   --  6.2*     CBC  Recent Labs   Lab 01/24/19  0652 01/22/19  0720 01/19/19  0618 01/18/19  0714   WBC 13.8* 12.8* 15.8* 12.9*   RBC 3.92* 3.62* 4.14* 4.12*   HGB 11.5* 10.5* 12.1* 11.8*   HCT 34.8* 31.8* 37.0* 36.5*   MCV 89 88 89 89   MCH 29.3 29.0 29.2 28.6   MCHC 33.0 33.0 32.7 32.3   RDW 17.1* 16.8* 17.3* 17.2*   * 121* 155 128*     INR  Recent Labs   Lab 01/18/19  0240   INR 1.16*

## 2019-01-24 NOTE — PLAN OF CARE
Patient arrived to unit at approximately 1430. A&Ox4. VSS on high flow O2. Denies pain. Ellison catheter in place, cares performed. Enteric precautions for C-diff- receiving Vancomycin. Assist of 1 with GB. Meplilex in place on coccyx, CDI. Mod CHO diet. Reports having a BM prior to being transferred. Discharge pending returning back to baseline supplemental O2 needs. Nursing will continue to monitor.     Addendum: BG at 1800- 106

## 2019-01-24 NOTE — PROGRESS NOTES
"BRIEF NUTRITION ASSESSMENT      REASON FOR ASSESSMENT:  LOS      CURRENT DIET AND INTAKE:  Diet:  Moderate CHO              Chart reviewed  Pt ordering full meals  Breakfast today - cereal, banana bread, whole milk, OJ, coffee  Per flowsheets, pt consuming %    Note pt/family had a meeting with hospice - plan pending      ANTHROPOMETRICS:  Height: 5'7\"  Weight: (1/23) 64.4 kg  BMI: 22.2 kg/m2  IBW:  67.3 kg  Weight Status: Normal BMI  %IBW: 96%  Weight History:   Wt Readings from Last 10 Encounters:   01/23/19 64.4 kg (141 lb 15.6 oz)   01/09/19 65.1 kg (143 lb 9.6 oz)   01/07/19 65.1 kg (143 lb 9.6 oz)   01/03/19 64.4 kg (142 lb)   12/24/18 62.9 kg (138 lb 9.6 oz)   12/20/18 62.9 kg (138 lb 9.6 oz)   12/18/18 62.9 kg (138 lb 9.6 oz)   12/13/18 62.6 kg (138 lb)   12/12/18 64.8 kg (142 lb 12.8 oz)   12/10/18 65.3 kg (144 lb)         LABS:  Labs noted      NUTRITION INTERVENTION:  Nutrition Diagnosis:  No nutrition diagnosis at this time.    Implementation:  Nutrition Education ---> Per Provider order if indicated      FOLLOW UP/MONITORING:   Will re-evaluate in 7 - 10 days, or sooner, if re-consulted.          "

## 2019-01-25 NOTE — PROGRESS NOTES
[facility]    Medicine Progress Note - Hospitalist Service       Date of Admission:  1/17/2019  Assessment & Plan    Ravi Jimenes is a 74-year-old male with a history of metastatic small cell lung cancer with a malignant effusion on palliative chemotherapy, severe emphysema,  pulmonary fibrosis and pulmonary hypertension with chronic respiratory failure on 6 L of oxygen, as well as coronary artery disease, ischemic cardiomyopathy, systolic heart failure and type 2 diabetes.    He was transferred here from an outside facility due to worsening respiratory status and a CT scan showing a moderate sized right pleural effusion.  The patient required BiPAP on admission.  He underwent a diagnostic and therapeutic thoracentesis.  He has been transitioned off of BiPAP to high flow oxygen.  The pleural effusion is likely a recurrent malignant effusion.     Acute on chronic respiratory failure, hypoxic, on high flow oxygen  Malignant right-sided pleural effusion status post thoracentesis  Possible acute exacerbation of COPD on admission  History of   Severe emphysema and pulmonary fibrosis with chronic respiratory failure  Severe pulmonary hypertension    CT scan done at the outside facility prior to transfer here showed a moderate sized right pleural effusion.  Patient had a thoracentesis here.  Cultures are negative.  This is likely a recurrence of his malignant pleural effusion.  Follow-up chest x-ray on January 20 did not show any large effusion.  The patient was initially on BiPAP and is now on high flow oxygen.  He had been on 6 L of oxygen continuously at home.  He is currently on levofloxacin, inhaled bronchodilators and prednisone.   Pro-calcitonin on 1/22 was negative and C-reactive protein was 7.  CT chest with contrast on 1/22 showed no new infiltrate and a small right sided effusion.    Continue same treatment and wean FIO2 as able.  Discharge when oxygen requirement is at or near baseline, with or  without hospice.  Patient's wife states she cannot take him home on hospice and he refuses TCU.    Lactic acidosis  Improving with the patient's improved respiratory status    Metastatic small cell lung cancer  Diagnosed by thoracentesis in October 2018.  Followed by Dr. Brownlee with [facility] and received carboplatin and etoposide on 12/8.  Patient is only open to hospice at home, but unwilling to go to a facility. Family states they are unable to care for the patient at home, and so no safe discharge plan has been determined. Patient is aware delay in decision can lead to worsening medical state and potential death in hospital. He has refused code discussions.     Right lower lobe pulmonary embolism in December 2018  Continue apixaban    Coronary artery disease  Ischemic cardiomyopathy/chronic systolic heart failure  Hypertension  He does not appear to be in acute heart failure- no evidence of heart failure on CT chest 1/22  Continue cardiac medications as ordered    Type 2 diabetes (HGBA1c is 10.7%)  Uncontrolled  Managed on glipizide at home.  This was held on admission  The patient is currently on glargine and aspart insulin  Insulin dosing changed on 1/21.  He appears to having rising glucometers during the day and is dropping overnight.   Decrease glargine and increase aspart insulin carbohydrate ratio,    Hypothyroidism  Continue PTA levothyroxine    Hyperlipidemia  Continue PTA rosuvastatin    Depression  Continue PTA fluoxetine    Recent clostridium difficile diarrhea  Completed a course of vancomycin  Currently on prophylactic oral vancomycin due to broad-spectrum antibiotics    Gout  Continue PTA colchicine    BPH  Continue PTA Flomax     Continue Ellison catheter    End of life discussion  Patient desires hospice at home but wife unable to take care of the patient. She desires a facility. They remain at an impasse. Brought up code status and the patient became angry and asked me to leave.    Diet: Moderate  Consistent CHO Diet    DVT Prophylaxis: Apixaban  Ellison Catheter: in place, indication: Obstruction  Code Status: Full Code      Disposition Plan   Expected discharge: 2 - 3 days, recommended to transitional care unit once O2 use at or near 6 liters/minute.  Entered: Clayton Frank DO 01/25/2019, 12:26 PM       The patient's care was discussed with the Bedside Nurse and Patient.    Clayton Frank DO  Hospitalist Service  [facility]    ______________________________________________________________________    Interval History   Discussed with both the wife and the patient. Basically their in disagreement about location for hospice, and as such, no definitive plans are being made today. Will restart the steroids and repeat a CXR in the AM to determine if there is much else able to be done to optimize the patient    Data reviewed today: I reviewed all medications, new labs and imaging results over the last 24 hours. I personally reviewed no images or EKG's today.    Physical Exam   Vital Signs: Temp: 96.4  F (35.8  C) Temp src: Oral BP: 135/77 Pulse: 77 Heart Rate: 84 Resp: 18 SpO2: 91 % O2 Device: Oxymask Oxygen Delivery: 10 LPM  Weight: 146 lbs 0 oz  Constitutional: awake, alert, cooperative, no apparent distress, and appears stated age  Respiratory: Decreased breath sounds bilaterally.  No wheezing rales or rhonchi heard  Cardiovascular: Regular, S1-S2 audible no murmur noted.  GI: normal bowel sounds, soft, non-distended, non-tender  Skin: no rashes  Neurologic: Awake, alert, oriented to name, place and time.  Cranial nerves II-XII are grossly intact.  Motor is 5 out of 5 bilaterally  Neuropsychiatric: General: normal, calm and normal eye contact  Level of consciousness: alert / normal    Data   Recent Labs   Lab 01/24/19  0652 01/22/19  0720 01/20/19  0624 01/19/19  0618   WBC 13.8* 12.8*  --  15.8*   HGB 11.5* 10.5*  --  12.1*   MCV 89 88  --  89   * 121*  --  155    140  139 139   POTASSIUM 4.1 4.0 4.3 4.1   CHLORIDE 105 108 106 102   CO2 28 26 26 26   BUN 18 20 21 25   CR 0.76 0.62* 0.66 0.84   ANIONGAP 7 6 7 11   AV 7.8* 7.9* 7.6* 8.2*   GLC 83 99 293* 292*     No results found for this or any previous visit (from the past 24 hour(s)).

## 2019-01-25 NOTE — PLAN OF CARE
A&Ox4. VSS on 10L O2 per oxymask, SpO2=91-92%. Brief episode of hypoxia with epistaxis (see note). LS dim but clear, c/o SOB with activity. No c/o shoulder pain this shift. Blanchable coccyx, mepilex CDI. Mod carb diet, fair intake, 8 units carb coverage insulin given with breakfast, declined lunch. Terra patent, AUO. PIV SL. A1 with GB and walker. Able to shift weight independently. Discharge plan pending.

## 2019-01-25 NOTE — PROGRESS NOTES
Patient called for RRT and was placed on 15 L oxymask with SpO2 94%. RT dismissed ans will continue to monitor.    Kavon Guerra RT  1/25/2019

## 2019-01-25 NOTE — PROGRESS NOTES
Patient is currently on HFNC 65% and 35 LPM with SpO2 92%. Will reassess to wean the oxygen % .    Kavon Guerra RT  1/24/2019

## 2019-01-25 NOTE — PLAN OF CARE
A&Ox4.  VSS.  Norco given x1.  Terra patent.  BGM.  High flow O2, CHOUDHURY.  Red groin, miconazole powder applied. Enteric precautions maintained.  Shifts weight independently.  Will continue to monitor.

## 2019-01-25 NOTE — PLAN OF CARE
A/O x4, VSS on high flow O2 6-10L. CHOUDHURY/SOB Tylenol x1 for shoulder painMepilex to coccyx-CDI. Mod CHO diet. Patent sharma. PIV SL. Up A1 w/ walker and GB. Encourage weight shift. Maintain Enteric precautions for Cdiff-no loose stools this shift. Plan: Discharge planning

## 2019-01-25 NOTE — PROGRESS NOTES
Case reviewed extensively with Dr. Frank today. Pt is decisional, and has agreed to hospice, under the terms of going home.     There is significant conflict between pt and wife; wife does not want pt return home, as she cannot be a caregiver for him. She wants him to be in a facility, to which pt is completely and adamantly opposed. Neither of them are willing to compromise.     After further discussion with Dr. Frank, the care coordination team, and Dominic Dumont, here are my recommendations:    Move forward with discharge home with hospice, only under the condition that pt pay for hired 24/7 caregiver (as wife is unable to provide this care for him).     If pt does not accept these conditions, then a facility will need to be pursued, as he needs a 24/7 caregiver at home under hospice services.     Thanks,  Ena AREVALO, [facility]  Palliative Medicine   Pager: [phone]

## 2019-01-25 NOTE — PROGRESS NOTES
Progress Note     Primary Oncologist/Hematologist:  Dr. Brownlee          Assessment and Plan:       Ravi Sandoval is a 74 year old man who was admitted on 1/17/2019 with increasing dyspnea      1. Metastatic small cell lung cancer   - presented with lung/mediasitnal masses, left sided pleural effusion and pleural based densitiies  - cytology from pleural fluid consistent with small cell lung cancer  - received palliative carbo/etoposide 12/6-12/9  - his current CT shows no change in lung nodules or mediastinal lymphadenopathy  - discussed CT findings at length with patient and also with his wife by phone  - absence of any improvement on CT imaging suggests cancer is resistant to 1st line chemotherapy  - best supportive care is the best option. Hospice and comfort care recommended  - complex family dynamics making discharge planning difficult  - palliative care consulted. Appreciate involvement.  - wants to wean back chronic medications as transitioning towards comfort.     2. Malignant pleural effusion  - cytology from right thoracentesis 11/26/2018 confirmed malignancy   - underwent repeat thoracentesis 1/18 and cytology and cultures pending   - pleurX an option for recurrent effusions     3. Chronic respiratory failure  - tolerating weaning of oxygen     4. Thrombophilia treated with long term anticoagulation  - if pleurex catheter placed, he understands he will need to be off anticoagulation for at least 24 hours before the procedure.    Fish AREVALO, CNP  [facility]  [phone]        Interval History:   Feeling okay today, back on oxygen by mask.               Review of Systems:   The 5 point Review of Systems is negative other than noted in the HPI             Medications:   Scheduled Medications    amLODIPine  2.5 mg Oral Daily     apixaban ANTICOAGULANT  5 mg Oral BID     aspirin  81 mg Oral At Bedtime     budesonide  0.5 mg Nebulization BID     colchicine  0.6 mg Oral QPM     digoxin  125  "mcg Oral Daily     FLUoxetine  40 mg Oral Daily     insulin aspart   Subcutaneous TID w/meals     insulin aspart  1-10 Units Subcutaneous TID AC     insulin aspart  1-7 Units Subcutaneous At Bedtime     insulin glargine  6 Units Subcutaneous At Bedtime     ipratropium - albuterol 0.5 mg/2.5 mg/3 mL  1 vial Nebulization Q4H While awake     levothyroxine  125 mcg Oral QAM AC     lisinopril  20 mg Oral Daily     melatonin  5 mg Oral At Bedtime     metoprolol succinate ER  25 mg Oral BID     omeprazole  40 mg Oral Daily     rosuvastatin  10 mg Oral At Bedtime     sildenafil  20 mg Oral TID     sodium chloride (PF)  3 mL Intracatheter Q8H     tamsulosin  0.4 mg Oral At Bedtime     vancomycin  125 mg Oral BID     PRN Medications  acetaminophen, acetaminophen, albuterol, bisacodyl, glucose **OR** dextrose **OR** glucagon, glucose **OR** dextrose **OR** glucagon, HYDROcodone-acetaminophen, HYDROmorphone, hypromellose-dextran, lidocaine 4%, lidocaine (buffered or not buffered), - MEDICATION INSTRUCTIONS -, metoprolol, miconazole, naloxone, - MEDICATION INSTRUCTIONS -, ondansetron **OR** ondansetron, - MEDICATION INSTRUCTIONS -, polyethylene glycol, QUEtiapine, senna-docusate **OR** senna-docusate, sodium chloride (PF)               Physical Exam:   Vitals were reviewed  Blood pressure 135/77, pulse 77, temperature 96.4  F (35.8  C), temperature source Oral, resp. rate 18, height 1.702 m (5' 7\"), weight 66.2 kg (146 lb), SpO2 91 %.  Wt Readings from Last 4 Encounters:   01/25/19 66.2 kg (146 lb)   01/09/19 65.1 kg (143 lb 9.6 oz)   01/07/19 65.1 kg (143 lb 9.6 oz)   01/03/19 64.4 kg (142 lb)       I/O last 3 completed shifts:  In: 780 [P.O.:780]  Out: 600 [Urine:600]      Constitutional: Awake, alert, cooperative, no apparent distress   Lungs: Oxygen by mask. Nonlabored. Diminished.   Cardiovascular: Regular rate and rhythm, normal S1 and S2, and no murmur noted   Abdomen: Normal bowel sounds, soft, non-distended, non-tender "   Skin: Scattered bruising   Other:               Data:   All laboratory data and imaging studies reviewed.    CMP  Recent Labs   Lab 01/24/19  0652 01/22/19  0720 01/20/19  0624 01/19/19  0618    140 139 139   POTASSIUM 4.1 4.0 4.3 4.1   CHLORIDE 105 108 106 102   CO2 28 26 26 26   ANIONGAP 7 6 7 11   GLC 83 99 293* 292*   BUN 18 20 21 25   CR 0.76 0.62* 0.66 0.84   GFRESTIMATED 89 >90 >90 85   GFRESTBLACK >90 >90 >90 >90   AV 7.8* 7.9* 7.6* 8.2*   MAG  --   --   --  2.0     CBC  Recent Labs   Lab 01/24/19  0652 01/22/19  0720 01/19/19  0618   WBC 13.8* 12.8* 15.8*   RBC 3.92* 3.62* 4.14*   HGB 11.5* 10.5* 12.1*   HCT 34.8* 31.8* 37.0*   MCV 89 88 89   MCH 29.3 29.0 29.2   MCHC 33.0 33.0 32.7   RDW 17.1* 16.8* 17.3*   * 121* 155

## 2019-01-25 NOTE — PROGRESS NOTES
SALLIE Note:    D/I:  SW is following for discharge planning.  SW and CC met with patient to discuss discharge planning.  SW discussed with patient acknowledging that he would like to return home with hospice services.  SW informed patient that the physicians feel that patient needs 24 hour care provided and that his wife is stating that she would not be able to be a caregiver for her and he would therefore have to hire 24/7 caregivers from an agency.  Patient stated that he did not have the financial resources to pay for private duty caregivers.  SW then discussed that he and his wife had prepaid for patient to have a place at [facility] and patient stated that they have a nurse there and some aides that intermittently check in on him and provide care.  SW discussed that I would call facility to confirm that they would be able to provide 24/7 care for patient along with hospice services.  We also discussed that if facility was not able to provide the necessary level of care for patient, that [facility] would also be another option for patient.  He stated that his first choice would be to go to [facility] and if they couldn't provide the care, then we could look into the next option of [facility].  Patient was pleasant and cooperative during conversation.  SW placed call to [facility] ([phone]) and left VM for nurse to call back about patient returning to facility.  SW will continue to keep patient updated with discharge plans.     Addendum: SALLIE spoke with Alem at [facility] ([phone]) who stated that patient would be able to return to their facility with hospice services and they are able to provide 24/7 care for patient.  They wouldn't be prepared to take patient back this weekend as they don't have staffing arranged for the weekend.  Patient is requesting [facility].  SALLIE spoke with Mulu at  Hospice and they have an available appointment at 9:00 on 1/28 for  "patient. SW provided information on location and that patient will need hospital bed, commode, oxygen.  Hospice staff will order on 1/28.  SW will arrange for transportation when ready for discharge.  SW updated patient and is in agreement stating, \"it isn't where I want to go, but thank you for getting that arranged.\"    P:  SW will continue to assist for discharge.    VALENTÍN Seymour, LGSW      "

## 2019-01-25 NOTE — CODE/RAPID RESPONSE
[facility]    RRT Note  1/25/2019   Time Called: 1330    RRT called for: hypoxia, epistaxis    Assessment & Plan   IMPRESSION & PLAN:  Epistaxis w/ acute on chronic hypoxic resp failure, increased FiO2 needs after epistaxis began    Pt had transitioned from HFNC to 10L oxymask at ~11am, then at 1300, sudden onset, unprovoked epistaxis (no prior similar episodes). With epistaxis, pressure holding was attempted but O2 was removed and SpO2 decreased to 73%.  FiO2 increased to 15L oxymask and RRT called.  There are numerous bloody tissues surrounding the patient several spots on the bed but overall appears to be a relatively small amount of blood loss.    Differential diagnosis: denies digital trauma (pt denied), anticoagulated w/ apixaban for PE in 12/2018    INTERVENTIONS:  -hold pressure on nose x15 minutes  -stat CBC  -FiO2 able to be decreased to 10L oxymask (baseline is 6-10L)  -2nd peripheral IV placed    At the end of the RRT hemostasis was maintained for at least 15 minutes, patient remained hemodynamically stable and labs in blood been obtained.    -Afrin is available as are nasal packing supplies at the bedside.  We will defer this for now since hemostasis continues following just pressure application.  Can consider multiple Afrin sprays to the right nare if there is any recurrence and/or nasal packing.  -No medication changes made i.e. continued his apixaban after risk benefit analysis, since it was started for PE.    Discussed with and defer further cares to rounding hospitalist Dr. Frank who is Kameron had a araceli discussion with the patient about overall prognosis.  He will revisit with him potentially stopping his anticoagulation in the near future pending overall goals of treatment.    Interval History     Ravi Sandoval is a 74 year old male who was admitted from an outside facility due to worsening respiratory status and a CT scan showing a moderate sized right pleural effusion.   The patient required BiPAP on admission.  He underwent a diagnostic and therapeutic thoracentesis.  He has been transitioned off of BiPAP to high flow oxygen.  The pleural effusion is likely a recurrent malignant effusion.     Medical history significant for a 74-year-old male with a history of metastatic small cell lung cancer with a malignant effusion on palliative chemotherapy, severe emphysema,  pulmonary fibrosis and pulmonary hypertension with chronic respiratory failure on 6-10L  of oxygen, as well as coronary artery disease, ischemic cardiomyopathy, systolic heart failure and type 2 diabetes.    Code Status: Full Code    Allergies   Allergies   Allergen Reactions     Tetracycline        Physical Exam   Vital Signs with Ranges:  Temp:  [96.4  F (35.8  C)-97.7  F (36.5  C)] 96.4  F (35.8  C)  Pulse:  [77] 77  Heart Rate:  [84-91] 84  Resp:  [15-18] 18  BP: (106-135)/(62-77) 135/77  FiO2 (%):  [60 %-80 %] 65 %  SpO2:  [90 %-95 %] 91 %  I/O last 3 completed shifts:  In: 780 [P.O.:780]  Out: 600 [Urine:600]      Constitutional:no acute distress  ENT: R nare bright red epistaxis  Neck: supple  Pulmonary: CTAB upper & lower lobes  Cardiovascular: normotensive  GI: NABS/s/NT/ND  Skin/Integumen: No edema  Neuro: Follows commands to show 2 fingers and wiggle feet bilaterally  Psych: defer  Extremities: see above    Data     Troponin:    Recent Labs   Lab Test 03/12/17  1000   TROPI <0.015  The 99th percentile for upper reference range is 0.045 ug/L.  Troponin values in   the range of 0.045 - 0.120 ug/L may be associated with risks of adverse   clinical events.       CBC with Diff:  Recent Labs   Lab Test 01/24/19  0652  01/18/19  0240   WBC 13.8*   < >  --    HGB 11.5*   < >  --    MCV 89   < >  --    *   < > 125*   INR  --   --  1.16*    < > = values in this interval not displayed.     Lactic Acid:    Lab Results   Component Value Date    LACT 2.5 01/21/2019           Comprehensive Metabolic Panel:  Recent  Labs   Lab 01/24/19  0652  01/19/19  0618      < > 139   POTASSIUM 4.1   < > 4.1   CHLORIDE 105   < > 102   CO2 28   < > 26   ANIONGAP 7   < > 11   GLC 83   < > 292*   BUN 18   < > 25   CR 0.76   < > 0.84   GFRESTIMATED 89   < > 85   GFRESTBLACK >90   < > >90   AV 7.8*   < > 8.2*   MAG  --   --  2.0    < > = values in this interval not displayed.       INR:    Recent Labs   Lab Test 01/18/19  0240   INR 1.16*       Time Spent on this Encounter   I spent 30 minutes on the unit/floor ([id number]) managing the care of Ravi Sandoval. Over 50% of my time was spent counseling the patient and/or coordinating care regarding services listed in this note.    Lenka Holloway, [facility]  Hospitalist House [facility]  [phone]

## 2019-01-25 NOTE — PLAN OF CARE
Pt AO, VSS on 10L oxymask. Unable to wean. Desats significantly with activity. Denies pain. Down for XR this afternoon. Ellison in place. Up A1 walker and GB. Mod carb diet. . Blanchable erythema on coccyx. Mepilex in place. Tegaderm on upper back for mole that is tearing off. IV SL. Continue to monitor.

## 2019-01-25 NOTE — PROGRESS NOTES
Patient had episode of epitaxies from R nostril during which he de-sated to 73% due to taking off oxymask. RRT called and pressure applied for 15 min while SpO2 restabilized at 92% on 10L O2 per oxymask. No packing or Afrin needed at this time.

## 2019-01-25 NOTE — PLAN OF CARE
PT: Orders received, eval attempted, unable to see for PT as pt currently with nosebleed and RRT for difficulty maintaining O2 sats off of HFNC.

## 2019-01-26 NOTE — PROGRESS NOTES
01/26/19 1300   Quick Adds   Type of Visit Initial Occupational Therapy Evaluation   Living Environment   Lives With spouse   Living Arrangements apartment   Home Accessibility no concerns   Living Environment Comment Pt is transitioning to care center for hospice, there will be no accessibility concerns there   Self-Care   Usual Activity Tolerance fair   Current Activity Tolerance poor   General Information   Onset of Illness/Injury or Date of Surgery - Date 01/17/19   Referring Physician Clayton Frank, DO   Patient/Family Goals Statement Pt would like to do some occupational therapy to improve his strength and feel a little big stronger going into his discharge/hospice   Precautions/Limitations fall precautions   Cognitive Status Examination   Orientation orientation to person, place and time   Level of Consciousness alert   Follows Commands (Cognition) WNL   Range of Motion (ROM)   ROM Comment BUE is WFL   Strength   Strength Comments Pt with deconditioning and poor strength throughout, BUE is 3+/5 grossly   Transfer Skill: Sit to Stand   Level of [place]: Sit/Stand contact guard   Physical Assist/Nonphysical Assist: Sit/Stand set-up required;supervision;verbal cues;1 person assist   Transfer Skill: Sit to Stand full weight-bearing   Assistive Device for Transfer: Sit/Stand (None used, but could benefit from WW)   Transfer Skill: Toilet Transfer   Level of [place]: Toilet contact guard  (to commode)   Physical Assist/Nonphysical Assist: Toilet set-up required;supervision;verbal cues;1 person assist   Tub/Shower Transfer   Tub/Shower Transfer Comments Pt will have A with bathing   Balance   Balance Comments No LOB noted in stand/pivot to commode   Upper Body Dressing   Level of [place]: Dress Upper Body minimum assist (75% patients effort)   Lower Body Dressing   Level of [place]: Dress Lower Body dependent (less than 25% patients effort)   Toileting   Level of [place]:  "Toilet dependent (less than 25% patients effort)   Grooming   Level of [place]: Grooming minimum assist (75% patients effort)   Eating/Self Feeding   Level of [place]: Eating independent   Physical Assist/Nonphysical Assist: Eating set-up required   Instrumental Activities of Daily Living (IADL)   IADL Comments Not currently performing   Activities of Daily Living Analysis   Impairments Contributing to Impaired Activities of Daily Living strength decreased;balance impaired  (SOB)   General Therapy Interventions   Planned Therapy Interventions ADL retraining   Clinical Impression   Criteria for Skilled Therapeutic Interventions Met yes, treatment indicated   OT Diagnosis Decreased I with ADL tasks   Influenced by the following impairments Pt is limited by deconditioning, SOB, and decreased balance   Assessment of Occupational Performance 3-5 Performance Deficits   Identified Performance Deficits Pt is functioning below baseline in dressing, grooming, toileting, and bathing   Clinical Decision Making (Complexity) Low complexity   Therapy Frequency daily   Predicted Duration of Therapy Intervention (days/wks) 5 days   Anticipated Discharge Disposition (Care facility with hospice)   Risks and Benefits of Treatment have been explained. Yes   Patient, Family & other staff in agreement with plan of care Yes   Clinical Impression Comments Skilled OT intervention is warranted for several sessions to improve pt's strength and I prior to discharge to hospice care   [facility] AM-PAC  \"6 Clicks\" Daily Activity Inpatient Short Form   1. Putting on and taking off regular lower body clothing? 2 - A Lot   2. Bathing (including washing, rinsing, drying)? 2 - A Lot   3. Toileting, which includes using toilet, bedpan or urinal? 2 - A Lot   4. Putting on and taking off regular upper body clothing? 3 - A Little   5. Taking care of personal grooming such as brushing teeth? 3 - A Little   6. Eating meals? 4 - None   Daily " Activity Raw Score (Score out of 24.Lower scores equate to lower levels of function) 16   Total Evaluation Time   Total Evaluation Time (Minutes) 10

## 2019-01-26 NOTE — PLAN OF CARE
Discharge Planner PT   Patient plan for discharge: Facility with 24/7 assistance and hospice care.     Current status: PT eval completed, treatment initiated. Pt is a 74 yr old male with hx of Small Cell Lung Cancer is admitted with large right pleural effusion and hypoxia. Pt have discharging with hospice care. However verbalized wanting PT in the acute setting for LE there ex and mobility as tolerated. Pt lives in an assisted and was mod I with mobility using walker. Pt reported needing assistance with most ADL's except for dressing. Currently pt is at SBA with supine>sit. Pt sat at EOB x 15 min with supervision for safety. Pt participated in sitting LE there ex with PT's guidance. Pt stood with SBA and took 5 side steps with SBA for safety. Pt was assisted back to bed with HOB elevated, requiring min assist x 1. Pt noted with SOB and O2 sats noted to be in the mid 70's on 8L of O2. With rest, pt's O2 sats returned to 90% within 2 minutes.   Barriers to return to prior living situation: Poor activity tolerance   Recommendations for discharge: 24/7 assist    Rationale for recommendations: Pt will need 24 hr assistance for all functional mobility to maintain safety. Pt will benefit from acute skilled PT to achieve increased strength in LE's and to improve activity tolerance.        Entered by: Wiliam Warren 01/26/2019 2:37 PM

## 2019-01-26 NOTE — PLAN OF CARE
Denies pain. Up to BSC with assist. CHOUDHURY, 8L oxymask. Tolerating small PO intake. DNR/DNI status changed. Plan for hospice meeting

## 2019-01-26 NOTE — PLAN OF CARE
Patient A&Ox4, pleasant. VSS, continues on 10 L O2 via oxymask sating well in the mid-high 90's. De-sats quickly during activity; has CHOUDHURY. C/o pain in right shoulder, given norco, one tab,  x 1 and tylenol x 1; effective. LS clear and diminished. Blanchable coccyx and courtney-anal/groin area, mepilex CDI. Peeling mole on R mid-back, tegaderm in place. No nose bleed overnight. Nose pink/red. Ellison in place with good UO. 2 PIV's SL. Did change dressing on right FA. Enteric precautions continue to C-diff. On PO Vanco. Did have one loose stool on shift. BG checks. 336 and 202 on shift. A1 and walker to BSC. Continue to monitor.

## 2019-01-26 NOTE — PROGRESS NOTES
01/26/19 1400   Quick Adds   Type of Visit Initial PT Evaluation   Living Environment   Lives With spouse   Living Arrangements assisted living   Home Accessibility no concerns   Living Environment Comment Pt reproted recently moving to an LILI with no steps to manage.   Self-Care   Usual Activity Tolerance fair   Current Activity Tolerance poor   Equipment Currently Used at Home none   Activity/Exercise/Self-Care Comment Pt required assistance with ADL's except for eating and dressing.    Functional Level Prior   Ambulation 1-->assistive equipment   Transferring 1-->assistive equipment   Toileting 2-->assistive person   Bathing 2-->assistive person   Communication 0-->understands/communicates without difficulty   Swallowing 0-->swallows foods/liquids without difficulty   Cognition 0 - no cognition issues reported   Fall history within last six months no   Which of the above functional risks had a recent onset or change? ambulation   Prior Functional Level Comment Pt was mod I with ambulation using walker   General Information   Onset of Illness/Injury or Date of Surgery - Date 01/17/19   Referring Physician Clayton Frank, DO   Patient/Family Goals Statement Get stronger   Pertinent History of Current Problem (include personal factors and/or comorbidities that impact the POC) 74 year old male with PMH of CHF, COPD, DM2 and Small Cell Lung Cancer, transferred from [place] [id number] for hypoxia and large right pleural effusion.   Precautions/Limitations fall precautions   Weight-Bearing Status - LLE full weight-bearing   Weight-Bearing Status - RLE full weight-bearing   General Observations Plesant and cooperative   Cognitive Status Examination   Orientation orientation to person, place and time   Level of Consciousness alert   Follows Commands and Answers Questions 100% of the time   Personal Safety and Judgment intact   Memory intact   Pain Assessment   Patient Currently in Pain No   Range of Motion (ROM)  "  ROM Comment BLE: WFL   Strength   Strength Comments BLE: 4-/5 bilaterally   Bed Mobility   Bed Mobility Comments Supine>sit: SBA, Sit>supine: min assist x 1   Transfer Skills   Transfer Comments STS at SBA   Gait   Gait Comments 5 side steps to the HOB without AD's and SBA for safety   Balance   Balance Comments sitting: good, standing: fair   Sensory Examination   Sensory Perception no deficits were identified   Coordination   Coordination no deficits were identified   Muscle Tone   Muscle Tone no deficits were identified   General Therapy Interventions   Planned Therapy Interventions balance training;bed mobility training;gait training;strengthening;transfer training;risk factor education;home program guidelines;progressive activity/exercise   Clinical Impression   Criteria for Skilled Therapeutic Intervention yes, treatment indicated   PT Diagnosis impaired gait   Influenced by the following impairments decreased strength, poor activity tolerance   Functional limitations due to impairments Assistance need with mobility   Clinical Presentation Stable/Uncomplicated   Clinical Presentation Rationale current clinical and fucntional presentation   Clinical Decision Making (Complexity) Low complexity   Therapy Frequency` 5 times/week   Predicted Duration of Therapy Intervention (days/wks) 5   Anticipated Discharge Disposition (facility with 24/7 assistance)   Risk & Benefits of therapy have been explained Yes   Patient, Family & other staff in agreement with plan of care Yes   Clinical Impression Comments Pt requested PT for strenghening and fucntional mobility. Pt presents with decreased strength and poor activity tolerance limtiing indepdence. Pt will benefit from continued skilled PT to acheive increase strength and fucntional mobility.    [facility] AM-PAC  \"6 Clicks\" V.2 Basic Mobility Inpatient Short Form   1. Turning from your back to your side while in a flat bed without using bedrails? 3 - A Little "   2. Moving from lying on your back to sitting on the side of a flat bed without using bedrails? 3 - A Little   3. Moving to and from a bed to a chair (including a wheelchair)? 3 - A Little   4. Standing up from a chair using your arms (e.g., wheelchair, or bedside chair)? 3 - A Little   5. To walk in hospital room? 2 - A Lot   6. Climbing 3-5 steps with a railing? 1 - Total   Basic Mobility Raw Score (Score out of 24.Lower scores equate to lower levels of function) 15   Total Evaluation Time   Total Evaluation Time (Minutes) 15

## 2019-01-26 NOTE — PROGRESS NOTES
Oncology chart check, pt will be discharged to  hospice possibly Monday.  Will follow peripherally.

## 2019-01-26 NOTE — PLAN OF CARE
OT eval completed and treatment initiated.  Pt plan is to discharge to facility with 24/7 A and hospice care. Initiating OT per pt's request to build some strength and I with self-cares prior to discontinue to hospice.      Discharge Planner OT   Patient plan for discharge: Facility with 24/7 A and hospice care  Current status: Pt demo's sit to stand with CGA, quickly fatigues and desats with any activity. Transfer to commode with CGA. Pt motivated to move his body and be more I with basic self-cares.  Barriers to return to prior living situation: None noted to facility  Recommendations for discharge: Facility with 24/7 A  Rationale for recommendations: Skilled OT indicated for brief period of time to address I in self-care tasks.       Entered by: Sonya Medina 01/26/2019 1:31 PM

## 2019-01-26 NOTE — PROGRESS NOTES
[facility]    Medicine Progress Note - Hospitalist Service       Date of Admission:  1/17/2019  Assessment & Plan    Ravi Jimenes is a 74-year-old male with a history of metastatic small cell lung cancer with a malignant effusion on palliative chemotherapy, severe emphysema,  pulmonary fibrosis and pulmonary hypertension with chronic respiratory failure on 6 L of oxygen, as well as coronary artery disease, ischemic cardiomyopathy, systolic heart failure and type 2 diabetes.    He was transferred here from an outside facility due to worsening respiratory status and a CT scan showing a moderate sized right pleural effusion.  The patient required BiPAP on admission.  He underwent a diagnostic and therapeutic thoracentesis.  He has been transitioned off of BiPAP to high flow oxygen.  The pleural effusion is likely a recurrent malignant effusion.     Acute on chronic respiratory failure, hypoxic, on high flow oxygen  Malignant right-sided pleural effusion status post thoracentesis  Possible acute exacerbation of COPD on admission  History of   Severe emphysema and pulmonary fibrosis with chronic respiratory failure  Severe pulmonary hypertension    CT scan done at the outside facility prior to transfer here showed a moderate sized right pleural effusion.  Patient had a thoracentesis here.  Cultures are negative.  This is likely a recurrence of his malignant pleural effusion.  Follow-up chest x-ray on January 20 did not show any large effusion.  The patient was initially on BiPAP and is now on high flow oxygen.  He had been on 6 L of oxygen continuously at home.  He is currently on levofloxacin, inhaled bronchodilators and prednisone.   Pro-calcitonin on 1/22 was negative and C-reactive protein was 7.  CT chest with contrast on 1/22 showed no new infiltrate and a small right sided effusion.    Continue same treatment and wean FIO2 as able.  Discharge when oxygen requirement is at or near baseline, with or  without hospice.  Patient is now agreeable for hospice in facility, hospice meeting planned on 1/27    Lactic acidosis  Improving with the patient's improved respiratory status    Metastatic small cell lung cancer  Diagnosed by thoracentesis in October 2018.  Followed by Dr. Brownlee with [facility] and received carboplatin and etoposide on 12/8.  Patient is only open to hospice at home, but unwilling to go to a facility. Family states they are unable to care for the patient at home, and so no safe discharge plan has been determined. Patient is aware delay in decision can lead to worsening medical state and potential death in hospital. He has refused code discussions.     Right lower lobe pulmonary embolism in December 2018  Continue apixaban    Coronary artery disease  Ischemic cardiomyopathy/chronic systolic heart failure  Hypertension  He does not appear to be in acute heart failure- no evidence of heart failure on CT chest 1/22  Continue cardiac medications as ordered    Type 2 diabetes (HGBA1c is 10.7%)  Uncontrolled  Managed on glipizide at home.  This was held on admission  The patient is currently on glargine and aspart insulin  Insulin dosing changed on 1/21.  He appears to having rising glucometers during the day and is dropping overnight.   Decrease glargine and increase aspart insulin carbohydrate ratio,    Hypothyroidism  Continue PTA levothyroxine    Hyperlipidemia  Continue PTA rosuvastatin    Depression  Continue PTA fluoxetine    Recent clostridium difficile diarrhea  Completed a course of vancomycin  Currently on prophylactic oral vancomycin due to broad-spectrum antibiotics    Gout  Continue PTA colchicine    BPH  Continue PTA Flomax     Continue Ellison catheter    End of life discussion  Patient is now agreeable for hospice in facility, hospice meeting planned on 1/27  Addressed his code status, rationale for DNR status. Patient is agreeable to change to DNR/I status    Diet: Moderate Consistent CHO Diet     DVT Prophylaxis: Apixaban  Ellison Catheter: in place, indication: Obstruction  Code Status: DNR/DNI      Disposition Plan   Expected discharge: 1-2 days after hospice intake. We need to verify ability to handle 10 liters of oxygen via transport and at their facility prior to discharge and discussed with the   Entered: Clayton Frank DO 01/26/2019, 3:29 PM       The patient's care was discussed with the Bedside Nurse and Patient.    Clayton Frank DO  Hospitalist Service  [facility]    ______________________________________________________________________    Interval History   Discussed with the patient who is ok with hospice in a facility now. Awaiting hospice intake. I discussed concerns about him requiring 10 liters of O2. We discussed code status and he agreed to change to DNR/DNI    Data reviewed today: I reviewed all medications, new labs and imaging results over the last 24 hours. I personally reviewed no images or EKG's today.    Physical Exam   Vital Signs: Temp: 96.3  F (35.7  C) Temp src: Oral BP: 130/67 Pulse: 81 Heart Rate: 89 Resp: 20 SpO2: 92 % O2 Device: Oxymask Oxygen Delivery: 10 LPM  Weight: 139 lbs 0 oz  Constitutional: awake, alert, cooperative, no apparent distress, and appears stated age  Respiratory: Decreased breath sounds bilaterally.  No wheezing rales or rhonchi heard  Cardiovascular: Regular, S1-S2 audible no murmur noted.  GI: normal bowel sounds, soft, non-distended, non-tender  Skin: no rashes  Neurologic: Awake, alert, oriented to name, place and time.  Cranial nerves II-XII are grossly intact.  Motor is 5 out of 5 bilaterally  Neuropsychiatric: General: normal, calm and normal eye contact  Level of consciousness: alert / normal    Data   Recent Labs   Lab 01/25/19  1403 01/24/19  0652 01/22/19  0720 01/20/19  0624   WBC 12.2* 13.8* 12.8*  --    HGB 11.9* 11.5* 10.5*  --    MCV 89 89 88  --     137* 121*  --    NA  --  140 140 139    POTASSIUM  --  4.1 4.0 4.3   CHLORIDE  --  105 108 106   CO2  --  28 26 26   BUN  --  18 20 21   CR  --  0.76 0.62* 0.66   ANIONGAP  --  7 6 7   AV  --  7.8* 7.9* 7.6*   GLC  --  83 99 293*     Recent Results (from the past 24 hour(s))   XR Chest 2 Views    Narrative    CHEST TWO VIEWS  1/25/2019 3:31 PM     HISTORY: Pleural effusion. Pulmonary embolism and pulmonary nodules.    COMPARISON: 1/22/2019 CT scan      Impression    IMPRESSION: Small right pleural effusion is new. Extensive  interstitial and patchy alveolar infiltrates in both lungs of  indeterminate etiology. Pulmonary nodules overlie the right pulmonary  hilum and right diaphragm. Normal heart size and pulmonary  vascularity.    JULIO C EID MD

## 2019-01-27 NOTE — PLAN OF CARE
Discharge Planner OT   Patient plan for discharge: Facility with 24/7 A and hospice care  Current status: Pt demo's supine to seated EOB Mod I with use of bedrails, quickly fatigues and desats with any activity but is able to moderate activity today and keep sats above the mid 80s. Taught pt BUE AROM ex, and pt does well with pacing, breaks, and breathing ex. Pt motivated to move his body and be more I with basic self-cares.  Barriers to return to prior living situation: None noted to facility  Recommendations for discharge: Facility with 24/7 A  Rationale for recommendations: Skilled OT indicated for brief period of time to address I in self-care tasks.       Entered by: Sonya Medina 01/27/2019 2:03 PM

## 2019-01-27 NOTE — PLAN OF CARE
Denies pain. Up with assist to bedside and commode. 8-10L oxymask. Tolerating small PO intake. BMx1. Plan for hospice meeting tomorrow.

## 2019-01-27 NOTE — PROGRESS NOTES
[facility]    Medicine Progress Note - Hospitalist Service       Date of Admission:  1/17/2019  Assessment & Plan    Ravi Jimenes is a 74-year-old male with a history of metastatic small cell lung cancer with a malignant effusion on palliative chemotherapy, severe emphysema,  pulmonary fibrosis and pulmonary hypertension with chronic respiratory failure on 6 L of oxygen, as well as coronary artery disease, ischemic cardiomyopathy, systolic heart failure and type 2 diabetes.    He was transferred here from an outside facility due to worsening respiratory status and a CT scan showing a moderate sized right pleural effusion.  The patient required BiPAP on admission.  He underwent a diagnostic and therapeutic thoracentesis.  He has been transitioned off of BiPAP to high flow oxygen.  The pleural effusion is likely a recurrent malignant effusion.     Acute on chronic respiratory failure, hypoxic, on high flow oxygen  Malignant right-sided pleural effusion status post thoracentesis  Possible acute exacerbation of COPD on admission  History of   Severe emphysema and pulmonary fibrosis with chronic respiratory failure  Severe pulmonary hypertension  CT scan done at the outside facility prior to transfer here showed a moderate sized right pleural effusion.  Patient had a thoracentesis here.  Cultures are negative.  This is likely a recurrence of his malignant pleural effusion.  Follow-up chest x-ray on January 20 did not show any large effusion.  The patient was initially on BiPAP and is now on high flow oxygen.  He had been on 6 L of oxygen continuously at home.  He is currently on levofloxacin, inhaled bronchodilators and prednisone.   Pro-calcitonin on 1/22 was negative and C-reactive protein was 7.  CT chest with contrast on 1/22 showed no new infiltrate and a small right sided effusion.    Continue same treatment and wean FIO2 as able.  Discharge when oxygen requirement is at or near baseline, with or  without hospice.  Patient is now agreeable for hospice in facility, hospice meeting planned on 1/28, he does not want     Lactic acidosis  Improving with the patient's improved respiratory status    Metastatic small cell lung cancer  Diagnosed by thoracentesis in October 2018.  Followed by Dr. Brownlee with [facility] and received carboplatin and etoposide on 12/8.  Patient is only open to hospice at home, but unwilling to go to a facility. Family states they are unable to care for the patient at home, and so no safe discharge plan has been determined. Patient is aware delay in decision can lead to worsening medical state and potential death in hospital. He has refused code discussions.     Right lower lobe pulmonary embolism in December 2018  Continue apixaban    Coronary artery disease  Ischemic cardiomyopathy/chronic systolic heart failure  Hypertension  He does not appear to be in acute heart failure- no evidence of heart failure on CT chest 1/22  Continue cardiac medications as ordered    Type 2 diabetes (HGBA1c is 10.7%)  Uncontrolled  Managed on glipizide at home.  This was held on admission  The patient is currently on glargine and aspart insulin  Plan  - stable regimen    Hypothyroidism  Continue PTA levothyroxine    Hyperlipidemia  Continue PTA rosuvastatin    Depression  Continue PTA fluoxetine    Recent clostridium difficile diarrhea  Completed a course of vancomycin  Currently on prophylactic oral vancomycin due to broad-spectrum antibiotics    Gout  Continue PTA colchicine    BPH  Continue PTA Flomax     Continue Ellison catheter    End of life discussion  Patient is now agreeable for hospice in facility, hospice meeting planned on 1/27  Addressed his code status, rationale for DNR status. Patient is agreeable to change to DNR/I status    Diet: Moderate Consistent CHO Diet    DVT Prophylaxis: Apixaban  Ellison Catheter: in place, indication: Obstruction  Code Status: DNR/DNI      Disposition Plan   Expected  discharge: 1-2 days after hospice intake. We need to verify ability to handle 8 liters of oxygen via transport and at their facility prior to discharge and discussed with the   Entered: Clayton Frank DO 01/27/2019, 4:41 PM       The patient's care was discussed with the Bedside Nurse and Patient.    Clayton Frank DO  Hospitalist Service  [facility]    ______________________________________________________________________    Interval History   Doing about the same, on 8 liters. Discussed hospice with his son and patient, both are understanding and agreeable    Data reviewed today: I reviewed all medications, new labs and imaging results over the last 24 hours. I personally reviewed no images or EKG's today.    Physical Exam   Vital Signs: Temp: 96.6  F (35.9  C) Temp src: Oral BP: 144/73 Pulse: 88 Heart Rate: 88 Resp: 16 SpO2: 95 % O2 Device: Oxymask Oxygen Delivery: 10 LPM  Weight: 138 lbs 14.4 oz  Constitutional: awake, alert, cooperative, no apparent distress, and appears stated age  Respiratory: Decreased breath sounds bilaterally.  No wheezing rales or rhonchi heard  Cardiovascular: Regular, S1-S2 audible no murmur noted.  GI: normal bowel sounds, soft, non-distended, non-tender  Skin: no rashes  Neurologic: Awake, alert, oriented to name, place and time.  Cranial nerves II-XII are grossly intact.  Motor is 5 out of 5 bilaterally  Neuropsychiatric: General: normal, calm and normal eye contact  Level of consciousness: alert / normal    Data   Recent Labs   Lab 01/27/19  0739 01/25/19  1403 01/24/19  0652 01/22/19  0720   WBC  --  12.2* 13.8* 12.8*   HGB  --  11.9* 11.5* 10.5*   MCV  --  89 89 88   PLT  --  156 137* 121*   NA  --   --  140 140   POTASSIUM  --   --  4.1 4.0   CHLORIDE  --   --  105 108   CO2  --   --  28 26   BUN  --   --  18 20   CR 0.63*  --  0.76 0.62*   ANIONGAP  --   --  7 6   AV  --   --  7.8* 7.9*   GLC  --   --  83 99     No results found for  this or any previous visit (from the past 24 hour(s)).

## 2019-01-27 NOTE — PLAN OF CARE
Patient stable on 8 liters via oxymask t/o shift. Unable to wean. Does de-sat with activity. VSS. Given one norco and Tylenol for c/o right shoulder pain; effective. Blanchable coccyx and courtney-anal/groin area, mepilex changed on shift; CDI. Peeling mole on R mid-back, tegaderm in place. Ellison to be pulled today, 1/27.  No stools on shift. Last BM 1/26. 2 PIV's SL. BG on shift 269 and 205. A1 and walker; did not get oob on shift. Continue to monitor.

## 2019-01-28 NOTE — PROGRESS NOTES
Patient is on 10 liters oxymask, bipap and high floww nasal cannula not in room as patient refuses.  Orders for these discontinued.  Delfina Gibson, RRT

## 2019-01-28 NOTE — PROVIDER NOTIFICATION
"MD Frank paged \"FYI low urine output. 250mL in last 12 hrs. Bladder scan done; no retention noted.\"   "

## 2019-01-28 NOTE — PLAN OF CARE
A&Ox4. VSS on 10LPM oxymask. Lung sounds diminished. CHOUDHURY. Right shoulder pain, pt declined intervention. Diabetic diet, well tolerated. Blood glucose check prior to meals and bedtime. Up with A1 to BSC. Terra patent. Blanchable redness to coccyx, mepilex in place. Plan for discharge tomorrow at 1100 to [place]. Continue to monitor.

## 2019-01-28 NOTE — PROGRESS NOTES
"Subjective:      Patient ID: Jm Flores is a 22 y.o. male.    Chief Complaint:  Hyperthyroidism      History of Present Illness    Mr.Macyn Fuentes Flores is referred to me for abnormal thyroid fucntion test     He has extensive PMH     accompanied by his mother     Denies unintentional weight loss,   Denies palpitations     Has IBS   Has interstitial cystitis also nephrolithiasis - follows with urology   Has KUB scheduled today       Review of Systems   Constitutional: Positive for fatigue.   Gastrointestinal: Positive for abdominal pain (lower abdominal pain - getting KUB today).   Genitourinary: Positive for dysuria.       Objective:   Physical Exam  Vitals:    09/11/17 1059   BP: 108/72   BP Method: X-Large (Manual)   Pulse: 110   Weight: 101.7 kg (224 lb 3.2 oz)   Height: 6' 1" (1.854 m)       Lab Review:   Results for JM FLORES (MRN [id number]) as of 6/5/2017 11:59   Ref. Range 8/29/2006 07:54 1/19/2015 08:52 7/2/2015 12:44 7/2/2015 14:59 7/9/2016 09:59 4/19/2017 11:08 4/28/2017 09:20   TSH Latest Ref Range: 0.400 - 4.000 uIU/mL 0.58 0.450 0.464  0.718 0.294 (L)    T3, Free Latest Ref Range: 2.3 - 4.2 pg/mL   2.7       Free T4 Latest Ref Range: 0.71 - 1.51 ng/dL   1.10   1.02    Thyroperoxidase Antibodies Latest Ref Range: <6.0 IU/mL       <6.0     Echo thyroid    The right lobe of thyroid gland measures 5.2 x 1.2 x 2.3 cm in size.  The left lobe the thyroid gland measures 4.6 x 1.0 x 1.8cm in size.  This gives an approximate volume of on the right of 7.6cc and an approximate volume on the left of 4.1 cc for a total approximate volume of 11.7 cc.    No worrisome nodules are noted   Impression         1.No worrisome thyroid nodules are noted      Electronically signed by: Brian Muse MD  Date: 04/24/17  Time: 10:17     Encounter     View Encounter          Reviewed By     Sandhya Jon NP on 4/24/2017 12:06         Assessment:         # abnormal thyroid " [facility] Progress Note     Primary Oncologist/Hematologist:  Dr. Brownlee          Assessment and Plan:   Ravi Sandoval is a 74 year old man who was admitted on 1/17/2019 with increasing dyspnea      1. Metastatic small cell lung cancer   - presented with lung/mediasitnal masses, left sided pleural effusion and pleural based densitiies  - cytology from pleural fluid consistent with small cell lung cancer  - received palliative carbo/etoposide 12/6-12/9  - his current CT shows no change in lung nodules or mediastinal lymphadenopathy  - we have discussed CT findings at length with patient and also with his wife by phone  - absence of any improvement on CT imaging suggests cancer is resistant to 1st line chemotherapy  - best supportive care is the best option. Hospice and comfort care recommended  - complex family dynamics making discharge planning difficult  - palliative care consulted. Appreciate involvement.  - wants to wean back chronic medications as transitioning towards comfort.  - plan is for hospice care within a facility.  Hospice intake planned for today.     2. Malignant pleural effusion  - cytology from right thoracentesis 11/26/2018 confirmed malignancy   - underwent repeat thoracentesis 1/18   - pleurX an option for recurrent effusions     3. Chronic respiratory failure  - tolerating weaning of oxygen     4. Thrombophilia treated with long term anticoagulation  - if pleurex catheter placed, he understands he will need to be off anticoagulation for at least 24 hours before the procedure.      DNR DNI          Interval History:   Was up to commode. Sats dropped to mid 60's.  Improved once back in bed.              Review of Systems:   The 5 point Review of Systems is negative other than noted in the HPI             Medications:   Scheduled Medications    amLODIPine  2.5 mg Oral Daily     apixaban ANTICOAGULANT  5 mg Oral BID     aspirin  81 mg Oral At Bedtime     budesonide  0.5 mg  "Nebulization BID     colchicine  0.6 mg Oral QPM     digoxin  125 mcg Oral Daily     FLUoxetine  40 mg Oral Daily     insulin aspart   Subcutaneous TID w/meals     insulin aspart  1-10 Units Subcutaneous TID AC     insulin aspart  1-7 Units Subcutaneous At Bedtime     insulin glargine  6 Units Subcutaneous At Bedtime     ipratropium - albuterol 0.5 mg/2.5 mg/3 mL  1 vial Nebulization Q4H While awake     levothyroxine  125 mcg Oral QAM AC     lisinopril  20 mg Oral Daily     melatonin  5 mg Oral At Bedtime     metoprolol succinate ER  25 mg Oral BID     omeprazole  40 mg Oral Daily     predniSONE  40 mg Oral Daily     rosuvastatin  10 mg Oral At Bedtime     sildenafil  20 mg Oral TID     sodium chloride (PF)  3 mL Intracatheter Q8H     tamsulosin  0.4 mg Oral At Bedtime     PRN Medications  acetaminophen, acetaminophen, albuterol, bisacodyl, glucose **OR** dextrose **OR** glucagon, HYDROcodone-acetaminophen, HYDROmorphone, hypromellose-dextran, lidocaine 4%, lidocaine (buffered or not buffered), - MEDICATION INSTRUCTIONS -, metoprolol, miconazole, naloxone, ondansetron **OR** ondansetron, - MEDICATION INSTRUCTIONS -, polyethylene glycol, QUEtiapine, senna-docusate **OR** senna-docusate, sodium chloride (PF)               Physical Exam:   Vitals were reviewed  Blood pressure 161/81, pulse 88, temperature 95.3  F (35.2  C), temperature source Oral, resp. rate 18, height 1.702 m (5' 7\"), weight 63.2 kg (139 lb 4.8 oz), SpO2 93 %.  Wt Readings from Last 4 Encounters:   01/28/19 63.2 kg (139 lb 4.8 oz)   01/09/19 65.1 kg (143 lb 9.6 oz)   01/07/19 65.1 kg (143 lb 9.6 oz)   01/03/19 64.4 kg (142 lb)       I/O last 3 completed shifts:  In: 240 [P.O.:240]  Out: 850 [Urine:850]                    Data:   All laboratory data and imaging studies reviewed.    CMP  Recent Labs   Lab 01/27/19  0739 01/24/19  0652 01/22/19  0720   NA  --  140 140   POTASSIUM  --  4.1 4.0   CHLORIDE  --  105 108   CO2  --  28 26   ANIONGAP  --  7 6 " function test     - improved likely sick euthyroid       Plan:     F/u PRN      GLC  --  83 99   BUN  --  18 20   CR 0.63* 0.76 0.62*   GFRESTIMATED >90 89 >90   GFRESTBLACK >90 >90 >90   AV  --  7.8* 7.9*     CBC  Recent Labs   Lab 01/25/19  1403 01/24/19  0652 01/22/19  0720   WBC 12.2* 13.8* 12.8*   RBC 4.07* 3.92* 3.62*   HGB 11.9* 11.5* 10.5*   HCT 36.2* 34.8* 31.8*   MCV 89 89 88   MCH 29.3 29.3 29.0   MCHC 32.9 33.0 33.0   RDW 17.2* 17.1* 16.8*    137* 121*     INRNo lab results found in last 7 days.        Lenin Hinton CNP  Nurse Practitioner  [facility]  [phone]

## 2019-01-28 NOTE — PROGRESS NOTES
[facility]    Medicine Progress Note - Hospitalist Service       Date of Admission:  1/17/2019  Assessment & Plan    Ravi Jimenes is a 74-year-old male with a history of metastatic small cell lung cancer with a malignant effusion on palliative chemotherapy, severe emphysema,  pulmonary fibrosis and pulmonary hypertension with chronic respiratory failure on 6 L of oxygen, as well as coronary artery disease, ischemic cardiomyopathy, systolic heart failure and type 2 diabetes.    He was transferred here from an outside facility due to worsening respiratory status and a CT scan showing a moderate sized right pleural effusion.  The patient required BiPAP on admission.  He underwent a diagnostic and therapeutic thoracentesis.  He has been transitioned off of BiPAP to high flow oxygen.  The pleural effusion is likely a recurrent malignant effusion.     Acute on chronic respiratory failure, hypoxic, on high flow oxygen  Malignant right-sided pleural effusion status post thoracentesis  Possible acute exacerbation of COPD on admission  History of   Severe emphysema and pulmonary fibrosis with chronic respiratory failure  Severe pulmonary hypertension  CT scan done at the outside facility prior to transfer here showed a moderate sized right pleural effusion.  Patient had a thoracentesis here.  Cultures are negative.  This is likely a recurrence of his malignant pleural effusion.  Follow-up chest x-ray on January 20 did not show any large effusion.  The patient was initially on BiPAP and is now on high flow oxygen.  He had been on 6 L of oxygen continuously at home.  He is currently on levofloxacin, inhaled bronchodilators and prednisone.   Pro-calcitonin on 1/22 was negative and C-reactive protein was 7.  CT chest with contrast on 1/22 showed no new infiltrate and a small right sided effusion.    Continue same treatment and wean FIO2 as able.  Discharge when oxygen requirement is at or near baseline, with or  without hospice.  Patient is now agreeable for hospice in facility, hospice intake completed. Discharge tomorrow AM    Lactic acidosis  Improving with the patient's improved respiratory status    Metastatic small cell lung cancer  Diagnosed by thoracentesis in October 2018.  Followed by Dr. Brownlee with [facility] and received carboplatin and etoposide on 12/8.  Patient is only open to hospice at home, but unwilling to go to a facility. Family states they are unable to care for the patient at home, and so no safe discharge plan has been determined. Patient is aware delay in decision can lead to worsening medical state and potential death in hospital. He is now discharging on hospice    Right lower lobe pulmonary embolism in December 2018  Continue apixaban    Coronary artery disease  Ischemic cardiomyopathy/chronic systolic heart failure  Hypertension  He does not appear to be in acute heart failure- no evidence of heart failure on CT chest 1/22  Continue cardiac medications as ordered    Type 2 diabetes (HGBA1c is 10.7%)  Uncontrolled  Managed on glipizide at home.  This was held on admission  The patient is currently on glargine and aspart insulin  Plan  - stable regimen    Hypothyroidism  Continue PTA levothyroxine    Hyperlipidemia  Continue PTA rosuvastatin    Depression  Continue PTA fluoxetine    Recent clostridium difficile diarrhea  Completed a course of vancomycin  Currently on prophylactic oral vancomycin due to broad-spectrum antibiotics    Gout  Continue PTA colchicine    BPH  Continue PTA Flomax     Continue Ellison catheter    End of life discussion  Patient is now agreeable for hospice in facility, hospice meeting planned on 1/27  Addressed his code status, rationale for DNR status. Patient is agreeable to change to DNR/I status    Diet: Moderate Consistent CHO Diet    DVT Prophylaxis: Apixaban  Ellison Catheter: in place, indication: Strict 1-2 Hour I&O  Code Status: DNR/DNI      Disposition Plan   Expected  discharge: 1 day to assisted living with hospice  Entered: Clayton Frank DO 01/28/2019, 5:36 PM       The patient's care was discussed with the Bedside Nurse and Patient.    Clayton Frank DO  Hospitalist Service  [facility]    ______________________________________________________________________    Interval History   On 10 liters, which social work and the coordinator state he will be able to continue at hospice care on discharge. Otherwise no change. At peace with his plan moving forward.    Data reviewed today: I reviewed all medications, new labs and imaging results over the last 24 hours. I personally reviewed no images or EKG's today.    Physical Exam   Vital Signs: Temp: 95.6  F (35.3  C) Temp src: Oral BP: 131/69   Heart Rate: 85 Resp: 18 SpO2: 91 % O2 Device: Oxymask Oxygen Delivery: 10 LPM  Weight: 139 lbs 4.8 oz  Constitutional: awake, alert, cooperative, no apparent distress, and appears stated age  Respiratory: Decreased breath sounds bilaterally.  No wheezing rales or rhonchi heard  Cardiovascular: Regular, S1-S2 audible no murmur noted.  GI: normal bowel sounds, soft, non-distended, non-tender  Skin: no rashes  Neurologic: Awake, alert, oriented to name, place and time.  Cranial nerves II-XII are grossly intact.  Motor is 5 out of 5 bilaterally  Neuropsychiatric: General: normal, calm and normal eye contact  Level of consciousness: alert / normal    Data   Recent Labs   Lab 01/27/19  0739 01/25/19  1403 01/24/19  0652 01/22/19  0720   WBC  --  12.2* 13.8* 12.8*   HGB  --  11.9* 11.5* 10.5*   MCV  --  89 89 88   PLT  --  156 137* 121*   NA  --   --  140 140   POTASSIUM  --   --  4.1 4.0   CHLORIDE  --   --  105 108   CO2  --   --  28 26   BUN  --   --  18 20   CR 0.63*  --  0.76 0.62*   ANIONGAP  --   --  7 6   AV  --   --  7.8* 7.9*   GLC  --   --  83 99     No results found for this or any previous visit (from the past 24 hour(s)).

## 2019-01-28 NOTE — PLAN OF CARE
Discharge Planner OT   Patient plan for discharge: facility with 24 hr asst and hospice cares  Current status: Pt supine in bed upon start of session, agreeable to OT - though stated has had a long/busy day and does not want to do too much. Per chart and pt, still interested in maintaining strength and ROM for ind in basic self-cares.   Completed 4 UE exercises while lying in bed: shoulder extension, shoulder abduction, elbow flexion, tricep punches. Pt completed 10 reps/arm, with rest breaks between each set. Pt able to keep O2 sats at 90 on 11L oxymask. Pt demonstrated understanding of UE exercises and recommendations for completing 3x/day.   Plan set to discharge to facility tomorrow (1/29) at 11:00AM. Will complete order.  Barriers to return to prior living situation: none  Recommendations for discharge: facility with 24hr A   Rationale for recommendations: Skilled OT to address I in basic self-cares.         Entered by: Melanie Patel 01/28/2019 3:43 PM

## 2019-01-28 NOTE — PROGRESS NOTES
Writer met with pt and his wife Kelly to review hospice philosophy.  Plan will be to discharge tomorrow at 11am back to [facility] living.  Writer will order equipment for delivery between 8-11am tomorrow.  Ordered a hospital bed, obt, commode, wheelchair with cushion, nebulizer and O2 set up with portability for 10 LPM.  Pt has a stretcher ride set up by [facility] for 11am tomorrow am. Hospice team will complete admission at 1pm tomorrow.     Please update pt discharge meds and send the following comfort meds with the patient.  Facility cannot accept liquids or ranges, please bubble pack meds:    Acetaminophen 650mg supp, 1 supp rectally every 4 hours PRN pain/fever  Bisacodyl 10mg suppository daily PRN constipation  Atropine 1% drops, give 2 drops po/sl PRN to dry secretions  Morphine sol tabs 2.5mg, give 1 tab PO/SL every 2 hours PRN pain/dyspnea  Lorazepam 0.5mg tab, give 0.25mg (1/2 tab) po/sl every 4 hours PRN anxiety/restlessness  Senna 8.6mg tab, give 1 tab PO BID PRN constipation      [facility] [place] [facility] does have lorazepam tablets and haldol sol tabs available.    Per hospice medical director, would discontinue insulin injections as pt does not want these and facility cannot do carb counting.  Would also discontinue the pulmicort nebs but continue the prednisone.  Duonebs should be continued.     Please send comfort meds with pt upon discharge, along with copy of the POLST.    Please call hospice if there are any changes to the discharge plan. Thank you for this consult.  [phone]

## 2019-01-28 NOTE — PLAN OF CARE
VSS. On 10 liters O2 via oxymask t/o night. C/o pain in right shoulder that was aching and lung pain that was burning. PRN Norco, one tab, and Tylenol given; effective. LS clear/dimimnished. CHOUDHURY; desats with activity. Mepilex changed on coccyx area; is blanchable redness and flaky. Peeling mole on R mid-back, tegaderm in place. Ellison in place. Low UO on shift, 150 mL. Did bladder scan to make sure not retaining. Highest number with scan was 100. . Last BM 1/27. 2 PIV SL. BG checks on shift 282 and 222. A1 and walker; did not get oob on shift. Hospice meeting planned for today @ 0900. Continue to monitor.

## 2019-01-29 NOTE — PLAN OF CARE
Pt A/O.  Very dyspneic on exertion.  Vitally stable on 15L O2.  Up with Ax1, but unable to tolerate long distances, due to dyspnea.  Pt discharged via [facility] to assisted living with hospice.  Belongings and medications brought with patient.

## 2019-01-29 NOTE — PLAN OF CARE
Pt A&Ox4.  VSS on 14L oxygen via oxymask.  Norco and Tylenol effective for pain management.  Up w/A1 and GB/walker.  Ellison patent w/adequate output.  R and L PIV SL.  BG = 200 at HS and 125 at 0200.  Enteric precautions maintained.  Lung sounds diminished.  CHOUDHURY.  Mod carb diet.  Blanchable redness on coccyx, Mepilex changed and in place.  Plan to discharge to [facility] on 1/29/19 at 1100.  Nursing to continue to monitor.

## 2019-01-29 NOTE — PROGRESS NOTES
SALLIE Discharge Note:    D/I:  Received discharge orders for patient.  Patient will be discharging to [facility] today with [facility].  Patient is in need of transport to be arranged secondary to oxygen needs and enrolling in hospice.  Clal placed to [facility] to arrange for stretcher ride at 11:00 today.  Patient and spouse informed of the plan and in agreement to the plan.  Updated facility and left  for fax number to send orders if requested.  Medications were sent to [facility] pharmacy and will be at facility at 11:00 for patient.  PCS form completed, faxed to [facility] with a facesheet and placed on the front of the chart.     Addendum: SALLIE spoke with Glenys at [facility] and faxed discharge orders to [phone].    VALENTÍN Seymour, LGSW

## 2019-01-29 NOTE — PLAN OF CARE
Physical Therapy Discharge Summary    Reason for therapy discharge:    Patient/family request discontinuation of services.  Hospice     Progress towards therapy goal(s). See goals on Care Plan in Epic electronic health record for goal details.  Goals not met.  Barriers to achieving goals:   discharge from facility.    Therapy recommendation(s):    No further therapy is recommended.

## 2019-01-29 NOTE — DISCHARGE SUMMARY
[facility]    Discharge Summary  Hospitalist    Date of Admission:  1/17/2019  Date of Discharge:  1/29/2019  Discharging Provider: Liz Villanueva    Discharge Diagnoses    Hospice Care Patient  Acute on chronic respiratory failure, hypoxic, on high flow oxygen  Malignant right-sided pleural effusion status post thoracentesis  Possible acute exacerbation of COPD on admission  History of   Severe emphysema and pulmonary fibrosis with chronic respiratory failure  Severe pulmonary hypertension  Lactic acidosis  Metastatic small cell lung cancer  Right lower lobe pulmonary embolism in December 2018  Coronary artery disease  Ischemic cardiomyopathy/chronic systolic heart failure  Hypertension  Type 2 diabetes (HGBA1c is 10.7%)  Hypothyroidism  Hyperlipidemia  Depression  Recent clostridium difficile diarrhea  Gout  BPH    History of Present Illness   Ravi Jimenes is a 74-year-old male with a history of metastatic small cell lung cancer with a malignant effusion on palliative chemotherapy, severe emphysema,  pulmonary fibrosis and pulmonary hypertension with chronic respiratory failure on 6 L of oxygen, as well as coronary artery disease, ischemic cardiomyopathy, systolic heart failure and type 2 diabetes.     He was transferred here from an outside facility due to worsening respiratory status and a CT scan showing a moderate sized right pleural effusion.  The patient required BiPAP on admission.  He underwent a diagnostic and therapeutic thoracentesis.  He has been transitioned off of BiPAP to high flow oxygen.  The pleural effusion is likely a recurrent malignant effusion.     Hospital Course   Ravi Sandoval was admitted on 1/17/2019.  The following problems were addressed during his hospitalization    Acute on chronic respiratory failure, hypoxic, on high flow oxygen  Malignant right-sided pleural effusion status post thoracentesis  Possible acute exacerbation of COPD on admission  History of  Severe emphysema and pulmonary fibrosis with chronic respiratory failure, Severe pulmonary hypertension    CT scan done at the outside facility prior to transfer here showed a moderate sized right pleural effusion. Patient had a thoracentesis here. Cultures are negative. This is likely a recurrence of his malignant pleural effusion. Follow-up chest x-ray on January 20 did not show any large effusion. The patient was initially on BiPAP and is now on high flow oxygen. He had been on 6 L of oxygen continuously at home.  Maintained on levofloxacin, inhaled bronchodilators and prednisone.   Pro-calcitonin on 1/22 was negative and C-reactive protein was 7.  CT chest with contrast on 1/22 showed no new infiltrate and a small right sided effusion.      After discussions with palliative care during his stay, he ultimately discharged with hospice care.     Lactic acidosis  Secondary to respiratory issues as above. Improved with treatment.     Metastatic small cell lung cancer  Diagnosed by thoracentesis in October 2018.  Followed by Dr. Brownlee with [facility] and received carboplatin and etoposide on 12/8.  After meeting with palliative care and SW this stay, ultimately discharged to hospice care facility. Per his request, his home medications were continued at discharge.     Right lower lobe pulmonary embolism in December 2018  Continued on apixaban     Coronary artery disease  Ischemic cardiomyopathy/chronic systolic heart failure  Hypertension  He does not appear to be in acute heart failure- no evidence of heart failure on CT chest 1/22  Remained stable on his cardiac medications.     Type 2 diabetes (HGBA1c is 10.7%)  Uncontrolled. Managed on glipizide at home.  This was held on admission  Given transition to hospice care at discharge, no adjustments were made to his regimen     Hypothyroidism, Hyperlipidemia, Depression, Gout  Remained stable on his home medications     Recent clostridium difficile diarrhea  Completed a  course of vancomycin  Currently on prophylactic oral vancomycin due to broad-spectrum antibiotics     BPH  Remained stable on Flomax and with catheter      Liz Villanueva    Code Status   DNR / DNI       Primary Care Physician   Florencio Patricia    Physical Exam   Temp: 95.4  F (35.2  C) Temp src: Axillary BP: 101/67   Heart Rate: 79 Resp: 18 SpO2: 92 % O2 Device: Oxymask Oxygen Delivery: 14 LPM  Vitals:    01/27/19 0500 01/28/19 0536 01/29/19 0301   Weight: 63 kg (138 lb 14.4 oz) 63.2 kg (139 lb 4.8 oz) 59.9 kg (132 lb)     Vital Signs with Ranges  Temp:  [95.4  F (35.2  C)-95.6  F (35.3  C)] 95.4  F (35.2  C)  Heart Rate:  [79-88] 79  Resp:  [18] 18  BP: (101-166)/(67-85) 101/67  SpO2:  [91 %-96 %] 92 %  I/O last 3 completed shifts:  In: 120 [P.O.:120]  Out: 800 [Urine:800]    General: Resting comfortably, alert, conversive, NAD  CVS: HRRR, on MGR, no LE edema  Respiratory: diminished air mvmt at bilateral lung bases, no wheezes, no increased work of breathing  GI: S, NT, +BS  Skin: Warm/dry    Discharge Disposition   Discharged to hospice care  Condition at discharge: Terminal    Consultations This Hospital Stay   HEMATOLOGY & ONCOLOGY IP CONSULT  PALLIATIVE CARE ADULT IP CONSULT    Time Spent on this Encounter   ILiz, personally saw the patient today and spent less than or equal to 30 minutes discharging this patient.    Discharge Orders      Activity - Up ad jeri     DNR/DNI     Advance Diet as Tolerated    Follow this diet upon discharge: Regular diet     Discharge Medications   Current Discharge Medication List      START taking these medications    Details   acetaminophen (TYLENOL) 650 MG suppository Place 1 suppository (650 mg) rectally every 4 hours as needed for fever  Qty: 12 suppository, Refills: 0    Associated Diagnoses: Malignant pleural effusion      atropine 1 % SOLN Place 2 drops under the tongue every 2 hours as needed for secretions  Qty: 5 mL, Refills: 0     Associated Diagnoses: Malignant pleural effusion      bisacodyl (DULCOLAX) 10 MG suppository Place 1 suppository (10 mg) rectally daily as needed for constipation  Qty: 6 suppository, Refills: 0    Associated Diagnoses: Malignant pleural effusion      haloperidol (HALDOL) 1 MG tablet Take 1 tablet (1 mg) by mouth 4 times daily as needed for agitation  Qty: 120 tablet, Refills: 0    Associated Diagnoses: Malignant pleural effusion      LORazepam (ATIVAN) 0.5 MG tablet Take 0.5 tablets (0.25 mg) by mouth every 6 hours as needed for anxiety (restlessness)  Qty: 20 tablet, Refills: 0    Associated Diagnoses: Malignant pleural effusion      morphine 2.5 MG solu-tab Place 1 tablet (2.5 mg) under the tongue every 2 hours as needed for shortness of breath / dyspnea, moderate to severe pain or pain  Qty: 20 tablet, Refills: 0    Associated Diagnoses: Malignant pleural effusion         CONTINUE these medications which have NOT CHANGED    Details   amLODIPine (NORVASC) 2.5 MG tablet Take 1 tablet (2.5 mg) by mouth daily  Qty: 30 tablet, Refills: 0    Associated Diagnoses: Benign essential hypertension      apixaban ANTICOAGULANT (ELIQUIS) 5 MG tablet Take 1 tablet (5 mg) by mouth 2 times daily  Qty: 60 tablet, Refills: 0    Associated Diagnoses: Pulmonary hypertension (H)      aspirin (ASA) 81 MG chewable tablet Take 1 tablet (81 mg) by mouth At Bedtime  Qty: 30 tablet, Refills: 0    Associated Diagnoses: Coronary artery disease involving native coronary artery of native heart without angina pectoris      colchicine (COLCRYS) 0.6 MG tablet Take 1 tablet (0.6 mg) by mouth daily TAKES IN THE EVENING  Qty: 30 tablet, Refills: 0    Associated Diagnoses: Physical deconditioning      digoxin (LANOXIN) 125 MCG tablet Take 1 tablet (125 mcg) by mouth daily  Qty: 30 tablet, Refills: 3    Associated Diagnoses: Chronic systolic heart failure (H)      FLUoxetine (PROZAC) 40 MG capsule Take 1 capsule (40 mg) by mouth daily  Qty: 30  capsule, Refills: 0    Associated Diagnoses: Physical deconditioning      furosemide (LASIX) 20 MG tablet Take 1 tablet (20 mg) by mouth daily  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)      glipiZIDE (GLUCOTROL XL) 10 MG 24 hr tablet Take 1 tablet (10 mg) by mouth daily  Qty: 30 tablet, Refills: 0    Associated Diagnoses: Type 2 diabetes mellitus with complication, with long-term current use of insulin (H)      levothyroxine (SYNTHROID/LEVOTHROID) 125 MCG tablet Take 1 tablet (125 mcg) by mouth daily  Qty: 30 tablet, Refills: 0    Associated Diagnoses: Other specified hypothyroidism      lisinopril (PRINIVIL/ZESTRIL) 20 MG tablet Take 1 tablet (20 mg) by mouth daily  Qty: 30 tablet, Refills: 0    Associated Diagnoses: Benign essential hypertension      melatonin 5 MG tablet Take 1 tablet (5 mg) by mouth At Bedtime  Qty: 30 tablet, Refills: 0    Associated Diagnoses: Physical deconditioning      metoprolol succinate ER (TOPROL-XL) 25 MG 24 hr tablet Take 1 tablet (25 mg) by mouth 2 times daily  Qty: 60 tablet, Refills: 0    Associated Diagnoses: Pulmonary hypertension (H)      multivitamin w/minerals (THERA-VIT-M) tablet Take 1 tablet by mouth daily  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)      omeprazole (PRILOSEC) 40 MG DR capsule Take 1 capsule (40 mg) by mouth daily  Qty: 30 capsule, Refills: 0    Associated Diagnoses: Physical deconditioning      predniSONE (DELTASONE) 10 MG tablet Take 10 mg by mouth daily.  Qty: 30 tablet, Refills: 0    Associated Diagnoses: Chronic respiratory failure with hypoxia (H)      rosuvastatin (CRESTOR) 10 MG tablet Take 1 tablet (10 mg) by mouth daily  Qty: 30 tablet, Refills: 0    Associated Diagnoses: Hyperlipidemia LDL goal <70      sildenafil (REVATIO) 20 MG tablet Take 20 mg by mouth 3 times daily @ 08:00, 14:00, 20:00      tamsulosin (FLOMAX) 0.4 MG capsule Take 1 capsule (0.4 mg) by  mouth daily  Qty: 30 capsule, Refills: 0    Associated Diagnoses: Benign prostatic hyperplasia without lower urinary tract symptoms      acetaminophen (TYLENOL) 325 MG tablet Take 650 mg by mouth every 6 hours as needed for mild pain      albuterol (PROVENTIL) (2.5 MG/3ML) 0.083% neb solution Take 1 vial (2.5 mg) by nebulization once as needed (refractory bronchospasm associated with hypersensitivity)  Qty: 3 mL, Refills: 0    Associated Diagnoses: 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)      budesonide (PULMICORT) 0.5 MG/2ML neb solution Take 2 mLs (0.5 mg) by nebulization 2 times daily  Qty: 180 ampule, Refills: 0    Associated Diagnoses: Pulmonary fibrosis (H); Acute respiratory distress      ipratropium - albuterol 0.5 mg/2.5 mg/3 mL (DUONEB) 0.5-2.5 (3) MG/3ML neb solution Take 1 vial (3 mLs) by nebulization 3 times daily as needed for shortness of breath / dyspnea, wheezing or other (use up to 3 times a day for shortness of breath, cough)  Qty: 30 vial, Refills: 0    Associated Diagnoses: Pulmonary fibrosis (H)           Allergies   Allergies   Allergen Reactions     Tetracycline      Data   Most Recent 3 CBC's:  Recent Labs   Lab Test 01/25/19  1403 01/24/19  0652 01/22/19  0720   WBC 12.2* 13.8* 12.8*   HGB 11.9* 11.5* 10.5*   MCV 89 89 88    137* 121*      Most Recent 3 BMP's:  Recent Labs   Lab Test 01/27/19  0739 01/24/19  0652 01/22/19  0720 01/20/19  0624   NA  --  140 140 139   POTASSIUM  --  4.1 4.0 4.3   CHLORIDE  --  105 108 106   CO2  --  28 26 26   BUN  --  18 20 21   CR 0.63* 0.76 0.62* 0.66   ANIONGAP  --  7 6 7   AV  --  7.8* 7.9* 7.6*   GLC  --  83 99 293*     Most Recent 2 LFT's:  Recent Labs   Lab Test 12/11/18  0733 12/06/18  0708   AST 22 38   ALT 29 44   ALKPHOS 53 41   BILITOTAL 0.5 0.3     Most Recent INR's and Anticoagulation Dosing History:  Anticoagulation Dose History     Recent Dosing and Labs Latest Ref Rng & Units 10/28/2010 10/30/2010 11/4/2010 2/12/2014 2/22/2017 11/26/2018  1/18/2019    INR 0.86 - 1.14 1.25(H) 1.21(H) 1.09 0.98 0.91 0.98 1.16(H)          Most Recent 6 Bacteria Isolates From Any Culture (See EPIC Reports for Culture Details):  Recent Labs   Lab Test 01/18/19  0950 11/26/18  1330 11/25/18  0859 11/24/18  1105 11/23/18  1153 11/23/18  1143   CULT No anaerobes isolated  No growth Culture negative for acid fast bacilli  Assayed at [facility] [address] [place] [id number] [phone]  Culture negative after 29 days  On day 4, isolated in broth only:  Propionibacterium (Cutibacterium) acnes  Susceptibility testing of Propionibacterium species is not done from this source. Our   antibiogram indicates that Propionibacterum species is susceptible to penicillin and   cefotaxime and most are susceptible to clindamycin.  Renetta Rosado M.D., Medical Director  *  Critical Value/Significant Value, preliminary result only, called to and read back by  Rina Rivero RN on 11.30.18 at 1050. bw   Moderate growth  Normal alexander    Moderate growth  Candida albicans / dubliniensis  Candida albicans and Candida dubliniensis are not routinely speciated  Susceptibility testing not routinely done  * Canceled, Test credited  >10 Squamous epithelial cells/low power field indicates oral contamination. Please   recollect.  *  Notification of test cancellation was given to  Alaina Logan RN @ 2000 11/24/18 CS   No growth No growth     Most Recent TSH, T4 and A1c Labs:  Recent Labs   Lab Test 11/23/18  1015  02/22/17  1932   TSH  --   --  7.59*   T4  --   --  1.30   A1C 10.7*   < >  --     < > = values in this interval not displayed.     Results for orders placed or performed during the hospital encounter of 01/17/19   US Thoracentesis    Narrative    EXAM: US THORACENTESIS       1/18/2019 10:11 AM       HISTORY: Right pleural effusion.      PROCEDURE:  Written informed consent was obtained from the patient  prior to the procedure. The risks and benefits including  bleeding,  infection and pneumothorax were discussed and the patient wished to  continue. Initial ultrasound images demonstrated a right-sided pleural  fluid collection. A permanent image was saved. The skin overlying this  collection was marked, prepped, draped and anesthetized in usual  sterile fashion utilizing 5 mL of lidocaine 1%. Thoracentesis catheter  was then placed into the pleural fluid collection with return of 900  mL of ivonne-colored fluid using ultrasound guidance. Estimated blood  loss during the procedure was less than 5 mL. No specimens collected.  Patient tolerated the procedure well. Followup chest x-ray was  ordered.        Impression    IMPRESSION:  Ultrasound-guided right-sided thoracentesis.     KIRAN TSAI PA-C   XR Chest Port 1 View - [facility]    Narrative    CHEST ONE VIEW PORTABLE   1/20/2019 11:05 AM     HISTORY: Status post right thoracentesis.    COMPARISON: 11/26/2018      Impression    IMPRESSION: Interstitial densities throughout the periphery of both  lungs with lower lobe predominance suggestive of fibrosis. Normal  heart size and pulmonary vascularity.    JULIO C EID MD   CT Chest w Contrast    Narrative    CT CHEST WITH CONTRAST 1/22/2019 9:30 AM    HISTORY: Lung cancer, pleural effusion.    TECHNIQUE: CT of the chest was performed following the administration  of 80 mL Isovue-370. Radiation dose for this scan was reduced using  automated exposure control, adjustment of the mA and/or kV according  to patient size, or iterative reconstruction technique.    COMPARISON: Chest CT dated 12/10/2018    FINDINGS: Again identified are diffuse emphysematous changes within  the lungs. There is a small right pleural effusion. This is unchanged  in size. Previously seen lung nodules are again identified and are not  significantly changed.    Right hilar lymphadenopathy and mediastinal lymphadenopathy is  unchanged. A lobulated mass at the inferior mediastinum right  paraesophageal  location abutting the atria of the heart is unchanged  in size measuring 4.8 x 2.6 cm.    There is a small linear filling defect consistent with thrombus in a  branch of the right superior pulmonary vein on image 41 series 3.  Previously described embolus in a right lower lobe pulmonary arterial  branch is unchanged.    The upper aspects of the abdomen are included on this exam. There are  gallstones.      Impression    IMPRESSION:   1. No significant change when compared to the previous exam. Again  identified are multiple lung nodules, enlarged lymph nodes in the  right hilum and mediastinum, and a small right pleural effusion. A  lobulated mass in the inferior mediastinum is also unchanged.  2. No change in an embolus in the right lower lobe pulmonary arterial  branch as well as linear thrombus in a branch of the right superior  pulmonary vein.  3. Gallstones.    NOMI ROBLEDO MD   XR Chest 2 Views    Narrative    CHEST TWO VIEWS  1/25/2019 3:31 PM     HISTORY: Pleural effusion. Pulmonary embolism and pulmonary nodules.    COMPARISON: 1/22/2019 CT scan      Impression    IMPRESSION: Small right pleural effusion is new. Extensive  interstitial and patchy alveolar infiltrates in both lungs of  indeterminate etiology. Pulmonary nodules overlie the right pulmonary  hilum and right diaphragm. Normal heart size and pulmonary  vascularity.    JULIO C EID MD

## 2019-01-29 NOTE — PROGRESS NOTES
Oncology    Note plan for discharge to [facility] today at 11am. No new recommendations.     Lenin Hinton  Nurse Practitioner  [facility]

## 2021-09-19 NOTE — CONSULTS
REQUESTING PHYSICIAN:  None stated.      HISTORY OF PRESENT ILLNESS:  Ravi Sandoval is a 72-year-old patient who has complicated medical history with chronic hypoxic respiratory failure secondary to histiocytosis X with pulmonary fibrosis, COPD, with severe emphysema on CT scan, severe pulmonary hypertension who came into the hospital on 02/22/2017 with increasing shortness of breath.  Really did not have precipitating infection symptoms.  His room air sat was 70% on room air.  Normally the patient uses 4 liters per minute, but is very inconsistent in his use, only uses it at night and rarely napping.  The patient continues to smoke 5 cigarettes a day as well.  He has never had benefit from inhalers in the past.  He was treated with steroids, BiPAP, diuretics and admitted.  Today, he is significantly improved on low-flow oxygen.  His chest CT showed evidence of severe emphysema with fibrosis, pulmonary edema and the lingular nodule which has increased in size.  The patient also has underlying coronary artery disease with stent placement.  He follows with Dr. Cabello and Dr. Rouse for his pulmonary hypertension and is on 40 mg for this.  There is no other contributing history.  He is not having chest pain.  His cough is at baseline.  He occasionally has green sputum.  He is having no wheezing, hemoptysis, weight loss, etc.      PAST MEDICAL HISTORY:  Pertinent for:   1.  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   2.  Severe pulmonary hypertension.   3.  Coronary artery disease.   4.  Tobacco dependence.   5.  Gout.   6.  Depression.   7.  Gastroesophageal reflux.   8.  Hypothyroidism.   9.  Hypertension.   10.  Hyperlipidemia.   11.  Iron-deficiency anemia.      ALLERGIES:  Tetracycline.       MEDICATIONS:  In the chart.      SOCIAL HISTORY:  He lives with his wife.  He is very inactive.  He says he does not want to go to  pulmonary rehab.  He continues to smoke 5 cigarettes a day.      FAMILY HISTORY:  Father had cardiovascular disease.  Mother with skin cancer.  Sister with neurological disease.      REVIEW OF SYSTEMS:  Ten-point review of systems is negative.      PHYSICAL EXAMINATION:   GENERAL:  Alert and oriented, sitting in a chair in no acute distress.  He has a catheter in.  He is thin.  No head and neck lymphadenopathy.   HEENT:  Negative.   CHEST:  Markedly decreased breath sounds.  No wheezes, crackles, rhonchi.   CARDIOVASCULAR:  Normal rate, rhythm.  No murmur, S3 or S4.  Heart tones are distant.   ABDOMEN:  Nontender, nondistended.     SKIN:  There are seborrheic keratoses present on the skin.   EXTREMITIES:  Minimal edema.      ASSESSMENT:  This patient has a complex history of cardiopulmonary disease with multiple etiologies described previously.  He has acute on chronic respiratory failure that appears to be likely secondary to pulmonary edema as on the CT and he should be treated in this way.  There does not appear to be a chronic obstructive pulmonary disease exacerbation per se.  The patient has never responded to inhalers per his history.  Most of his CT changes may be a result of his histiocytosis X as well and his underlying smoking.  We discussed the detrimental effects of continued smoking as well as noncompliance with his oxygen usage.  We have no further recommendations for this patient from a pulmonary point of view in regards to his acute on chronic respiratory failure.  In regards to the lingular nodule, when the patient is recovered, consideration of a PET CT should be made.  This may be inflammatory and/or malignant.  The patient clearly is not any sort of operative candidate and actually biopsies have some significant risk as well.  We will follow the patient intermittently.         ANANDA FISCHER MD             D: 02/23/2017 09:46   T: 02/23/2017 10:15   MT: RENAY      Name:     KYLE JACOBSON   MRN:       [id number]        Account:       [id number]   :      1945           Consult Date:  2017      Document: [id number]     normal...

## 2022-03-16 NOTE — PROGRESS NOTES
"Hospice liasion Davida, reports that patient and wife became very upset during hospice meeting.  This writer went to follow up with patient/family to discuss goals of care, discharge planning.  Wife in the hallway visible upset and asked to speak with me.  Visited with her in the conference room separately at her request.  She reports that the patient was yelling at her and asked her to leave.  She feels that he is mad at her.  She had not been here this am to speak with MD's.  She is interested in hospice care and willing to take the patient home.  We talked through oxgyen needs and hi-flow and she feels the patient is not realistic about his needs.  \"I don't want him to go to a TCU though.\"  \"He is just not hearing what the doctors are saying right now.\" I offered to go speak with the patient and hear what his goals are.  Met with patient in room and introduced my role.  Mentioned I spoke with patient's wife and he stated \"She kicked me out of my house\"  \"She doesn't want me around.\" \" Pt talked about oxygen and stated \"I can go home without this (hi-flow) and be just fine.\"  When talking about hospice patient stated he is open to that but reiterated he wants to go home.  Stated I would speak with his wife and care team but felt it would be helpful if he and his wife were in the same room.  Went to update wife on conversation and she informed me that in fact that she had recently moved him into [facility] because she did not want him at home.  She said that after thinking about things she did not know if she could take him home with hospice.  \"I would need to hire nurses to be there 24hrs/day.\"  She is going to consider this and get back to the care team.  Patient is transferring to .  Care coordinator updated to continue to follow for discharge needs.  " No

## 2022-05-16 NOTE — PLAN OF CARE
Problem: Goal Outcome Summary  Goal: Goal Outcome Summary  Outcome: No Change  VSS, A&O x4. On Enteric precautions to r/o C-diff, no stool this shift. On  5L O2 with sats of 90-92%, LS diminished with congested cough. Up with A1 to BR. BGM at HS was 216. Pt does have a reddened coccyx with small scabbed areas, barrier cream applied and will continue to monitor.        Unremarkable Unremarkable Unremarkable Unremarkable Unremarkable Unremarkable Unremarkable Unremarkable Unremarkable

## 2022-10-14 NOTE — PLAN OF CARE
Tolerated high flow nasal cannula all shift.  Sats low 90S co of SOB with transfer to chair Denied pain until 1630 and stated he was having an intermittent burning to  R upper lung chest area. Sinus rhythm. Hypertensive.  Lung sounds crackles and diminished to R side.dressing to thoracentesis site removed. Puncture site dry.sharma output adequate tolerating meals and fluids     Pt encountered seated @ edge of bed, no distress, AxOX4, with +IV. Pt agreeable to participate in PT evaluation.

## 2023-03-16 NOTE — PLAN OF CARE
Pt on high flow oxygen this shift, O2 sats mid 90's, tele NSR, sharma in place with good urine output, passing flatus, did have BM this shift, up in room with assist of one, pain controled with tylenol, tolerates diet, CT done this shift, independent with positioning in bed   Electrodesiccation Text: The wound bed was treated with electrodesiccation after the biopsy was performed.

## 2023-04-02 NOTE — ED NOTES
Bed: ED10  Expected date:   Expected time:   Means of arrival:   Comments:  Doris 413 SOB Pulm fibrosis 73 male   [id number] E/M Level 4 Established Patient

## (undated) DEVICE — EYE KNIFE SLIT XSTAR VISITEC 2.6MM 45DEG 373726

## (undated) DEVICE — GLOVE PROTEXIS MICRO 8.0  2D73PM80

## (undated) DEVICE — DRAPE SHEET REV FOLD 3/4 9349

## (undated) DEVICE — LINEN TOWEL PACK X5 5464

## (undated) DEVICE — PACK CATARACT CUSTOM SO DALE SEY32CTFCX

## (undated) DEVICE — GLOVE PROTEXIS MICRO 6.0  2D73PM60

## (undated) DEVICE — EYE SHIELD PLASTIC

## (undated) DEVICE — EYE PACK CUSTOM ANTERIOR 45DEG TIP CENTURION PPK6925-01

## (undated) DEVICE — GLOVE PROTEXIS MICRO 7.0  2D73PM70

## (undated) DEVICE — EYE PACK BVI READYPAK KIT #1

## (undated) DEVICE — EYE SOL BSS 500ML

## (undated) RX ORDER — MOXIFLOXACIN 5 MG/ML
SOLUTION/ DROPS OPHTHALMIC
Status: DISPENSED
Start: 2017-05-31

## (undated) RX ORDER — ONDANSETRON 2 MG/ML
INJECTION INTRAMUSCULAR; INTRAVENOUS
Status: DISPENSED
Start: 2017-05-31

## (undated) RX ORDER — KETOROLAC TROMETHAMINE 5 MG/ML
SOLUTION OPHTHALMIC
Status: DISPENSED
Start: 2017-05-31

## (undated) RX ORDER — LIDOCAINE HYDROCHLORIDE 10 MG/ML
INJECTION, SOLUTION EPIDURAL; INFILTRATION; INTRACAUDAL; PERINEURAL
Status: DISPENSED
Start: 2017-06-07